# Patient Record
Sex: MALE | Race: WHITE | Employment: OTHER | URBAN - METROPOLITAN AREA
[De-identification: names, ages, dates, MRNs, and addresses within clinical notes are randomized per-mention and may not be internally consistent; named-entity substitution may affect disease eponyms.]

---

## 2017-12-01 ENCOUNTER — APPOINTMENT (OUTPATIENT)
Dept: GENERAL RADIOLOGY | Age: 67
DRG: 853 | End: 2017-12-01
Attending: EMERGENCY MEDICINE
Payer: MEDICARE

## 2017-12-01 ENCOUNTER — HOSPITAL ENCOUNTER (INPATIENT)
Age: 67
LOS: 28 days | Discharge: NURSING, REHAB OR LONG-TERM CARE | DRG: 853 | End: 2017-12-29
Attending: EMERGENCY MEDICINE | Admitting: INTERNAL MEDICINE
Payer: MEDICARE

## 2017-12-01 ENCOUNTER — APPOINTMENT (OUTPATIENT)
Dept: GENERAL RADIOLOGY | Age: 67
DRG: 853 | End: 2017-12-01
Attending: INTERNAL MEDICINE
Payer: MEDICARE

## 2017-12-01 ENCOUNTER — ANESTHESIA EVENT (OUTPATIENT)
Dept: SURGERY | Age: 67
DRG: 853 | End: 2017-12-01
Payer: MEDICARE

## 2017-12-01 ENCOUNTER — APPOINTMENT (OUTPATIENT)
Dept: CT IMAGING | Age: 67
DRG: 853 | End: 2017-12-01
Attending: EMERGENCY MEDICINE
Payer: MEDICARE

## 2017-12-01 DIAGNOSIS — R41.0 DELIRIUM: ICD-10-CM

## 2017-12-01 DIAGNOSIS — J96.01 ACUTE HYPOXEMIC RESPIRATORY FAILURE (HCC): ICD-10-CM

## 2017-12-01 DIAGNOSIS — M72.9 FASCIITIS: ICD-10-CM

## 2017-12-01 DIAGNOSIS — A41.9 SEVERE SEPSIS WITH SEPTIC SHOCK (HCC): ICD-10-CM

## 2017-12-01 DIAGNOSIS — E87.20 LACTIC ACIDOSIS: ICD-10-CM

## 2017-12-01 DIAGNOSIS — R65.21 SEVERE SEPSIS WITH SEPTIC SHOCK (HCC): ICD-10-CM

## 2017-12-01 DIAGNOSIS — N17.9 AKI (ACUTE KIDNEY INJURY) (HCC): ICD-10-CM

## 2017-12-01 DIAGNOSIS — E11.8 TYPE 2 DIABETES MELLITUS WITH COMPLICATION, UNSPECIFIED LONG TERM INSULIN USE STATUS: ICD-10-CM

## 2017-12-01 DIAGNOSIS — T17.500A MUCUS PLUGGING OF BRONCHI: ICD-10-CM

## 2017-12-01 DIAGNOSIS — J98.11 ATELECTASIS: ICD-10-CM

## 2017-12-01 DIAGNOSIS — Z99.11 ENCOUNTER FOR WEANING FROM VENTILATOR (HCC): ICD-10-CM

## 2017-12-01 DIAGNOSIS — R65.21 SEPTIC SHOCK (HCC): ICD-10-CM

## 2017-12-01 DIAGNOSIS — D72.829 LEUKOCYTOSIS, UNSPECIFIED TYPE: ICD-10-CM

## 2017-12-01 DIAGNOSIS — N49.3 FOURNIER GANGRENE: ICD-10-CM

## 2017-12-01 DIAGNOSIS — L02.415 ABSCESS OF RIGHT THIGH: ICD-10-CM

## 2017-12-01 DIAGNOSIS — E87.0 HYPERNATREMIA: ICD-10-CM

## 2017-12-01 DIAGNOSIS — J18.9 PNEUMONIA OF LEFT LOWER LOBE DUE TO INFECTIOUS ORGANISM: ICD-10-CM

## 2017-12-01 DIAGNOSIS — E13.8 OTHER SPECIFIED DIABETES MELLITUS WITH COMPLICATION, WITHOUT LONG-TERM CURRENT USE OF INSULIN: Primary | ICD-10-CM

## 2017-12-01 DIAGNOSIS — J96.01 ACUTE RESPIRATORY FAILURE WITH HYPOXIA (HCC): ICD-10-CM

## 2017-12-01 DIAGNOSIS — S06.5XAA SUBDURAL HEMATOMA: ICD-10-CM

## 2017-12-01 DIAGNOSIS — A41.9 SEPTIC SHOCK (HCC): ICD-10-CM

## 2017-12-01 PROBLEM — E87.8 ELECTROLYTE ABNORMALITY: Status: ACTIVE | Noted: 2017-12-01

## 2017-12-01 PROBLEM — R55 SYNCOPE: Status: ACTIVE | Noted: 2017-12-01

## 2017-12-01 LAB
ALBUMIN SERPL-MCNC: 2.3 G/DL (ref 3.2–4.6)
ALBUMIN/GLOB SERPL: 0.5 {RATIO} (ref 1.2–3.5)
ALP SERPL-CCNC: 99 U/L (ref 50–136)
ALT SERPL-CCNC: 15 U/L (ref 12–65)
ANION GAP SERPL CALC-SCNC: 18 MMOL/L (ref 7–16)
AST SERPL-CCNC: 15 U/L (ref 15–37)
ATRIAL RATE: 109 BPM
BACTERIA URNS QL MICRO: ABNORMAL /HPF
BASE EXCESS BLD CALC-SCNC: 0 MMOL/L
BASOPHILS # BLD: 0.2 K/UL (ref 0–0.2)
BASOPHILS NFR BLD MANUAL: 1 % (ref 0–2)
BILIRUB SERPL-MCNC: 1.5 MG/DL (ref 0.2–1.1)
BUN SERPL-MCNC: 79 MG/DL (ref 8–23)
CA-I BLD-MCNC: 1.03 MMOL/L (ref 1.12–1.32)
CALCIUM SERPL-MCNC: 8.3 MG/DL (ref 8.3–10.4)
CALCULATED P AXIS, ECG09: 72 DEGREES
CALCULATED R AXIS, ECG10: 37 DEGREES
CALCULATED T AXIS, ECG11: 58 DEGREES
CASTS URNS QL MICRO: ABNORMAL /LPF
CHLORIDE SERPL-SCNC: 92 MMOL/L (ref 98–107)
CO2 SERPL-SCNC: 25 MMOL/L (ref 21–32)
CREAT SERPL-MCNC: 1.52 MG/DL (ref 0.8–1.5)
DIAGNOSIS, 93000: NORMAL
DIFFERENTIAL METHOD BLD: ABNORMAL
EPI CELLS #/AREA URNS HPF: ABNORMAL /HPF
ERYTHROCYTE [DISTWIDTH] IN BLOOD BY AUTOMATED COUNT: 13.1 % (ref 11.9–14.6)
EST. AVERAGE GLUCOSE BLD GHB EST-MCNC: 232 MG/DL
GLOBULIN SER CALC-MCNC: 4.8 G/DL (ref 2.3–3.5)
GLUCOSE BLD STRIP.AUTO-MCNC: 244 MG/DL (ref 65–100)
GLUCOSE BLD STRIP.AUTO-MCNC: 290 MG/DL (ref 65–100)
GLUCOSE BLD STRIP.AUTO-MCNC: 358 MG/DL (ref 65–100)
GLUCOSE SERPL-MCNC: 426 MG/DL (ref 65–100)
HBA1C MFR BLD: 9.7 % (ref 4.8–6)
HCO3 BLD-SCNC: 25.2 MMOL/L (ref 22–26)
HCT VFR BLD AUTO: 40.4 % (ref 41.1–50.3)
HGB BLD-MCNC: 13.9 G/DL (ref 13.6–17.2)
LACTATE BLD-SCNC: 4.8 MMOL/L (ref 0.5–1.9)
LIPASE SERPL-CCNC: 43 U/L (ref 73–393)
LYMPHOCYTES # BLD: 4.8 K/UL (ref 0.5–4.6)
LYMPHOCYTES NFR BLD MANUAL: 22 % (ref 16–44)
MAGNESIUM SERPL-MCNC: 3.2 MG/DL (ref 1.8–2.4)
MCH RBC QN AUTO: 29.8 PG (ref 26.1–32.9)
MCHC RBC AUTO-ENTMCNC: 34.4 G/DL (ref 31.4–35)
MCV RBC AUTO: 86.5 FL (ref 79.6–97.8)
METAMYELOCYTES NFR BLD MANUAL: 2 %
MONOCYTES # BLD: 0.9 K/UL (ref 0.1–1.3)
MONOCYTES NFR BLD MANUAL: 4 % (ref 3–9)
MYELOCYTES NFR BLD MANUAL: 5 %
NEUTS SEG # BLD: 15.8 K/UL (ref 1.7–8.2)
NEUTS SEG NFR BLD MANUAL: 66 % (ref 47–75)
P-R INTERVAL, ECG05: 126 MS
PCO2 BLD: 44.9 MMHG (ref 35–45)
PH BLD: 7.36 [PH] (ref 7.35–7.45)
PHOSPHATE SERPL-MCNC: 2.9 MG/DL (ref 2.3–3.7)
PLATELET # BLD AUTO: 190 K/UL (ref 150–450)
PLATELET COMMENTS,PCOM: ADEQUATE
PMV BLD AUTO: 12.9 FL (ref 10.8–14.1)
PO2 BLD: 267 MMHG (ref 80–105)
POTASSIUM BLD-SCNC: 3.7 MMOL/L (ref 3.5–5.1)
POTASSIUM SERPL-SCNC: 3.3 MMOL/L (ref 3.5–5.1)
PROCALCITONIN SERPL-MCNC: 15.5 NG/ML
PROT SERPL-MCNC: 7.1 G/DL (ref 6.3–8.2)
Q-T INTERVAL, ECG07: 318 MS
QRS DURATION, ECG06: 78 MS
QTC CALCULATION (BEZET), ECG08: 428 MS
RBC # BLD AUTO: 4.67 M/UL (ref 4.23–5.67)
RBC #/AREA URNS HPF: ABNORMAL /HPF
RBC MORPH BLD: ABNORMAL
SAO2 % BLD: 100 % (ref 95–98)
SODIUM BLD-SCNC: 137 MMOL/L (ref 136–145)
SODIUM SERPL-SCNC: 135 MMOL/L (ref 136–145)
TROPONIN I BLD-MCNC: 0 NG/ML (ref 0.02–0.05)
TSH SERPL DL<=0.005 MIU/L-ACNC: 0.45 UIU/ML (ref 0.36–3.74)
VENTRICULAR RATE, ECG03: 109 BPM
WBC # BLD AUTO: 21.7 K/UL (ref 4.3–11.1)
WBC MORPH BLD: ABNORMAL
WBC URNS QL MICRO: ABNORMAL /HPF

## 2017-12-01 PROCEDURE — 84145 PROCALCITONIN (PCT): CPT | Performed by: INTERNAL MEDICINE

## 2017-12-01 PROCEDURE — 76060000035 HC ANESTHESIA 2 TO 2.5 HR: Performed by: SURGERY

## 2017-12-01 PROCEDURE — 70450 CT HEAD/BRAIN W/O DYE: CPT

## 2017-12-01 PROCEDURE — 74011250636 HC RX REV CODE- 250/636: Performed by: EMERGENCY MEDICINE

## 2017-12-01 PROCEDURE — 74011000250 HC RX REV CODE- 250

## 2017-12-01 PROCEDURE — 74011250636 HC RX REV CODE- 250/636

## 2017-12-01 PROCEDURE — 85025 COMPLETE CBC W/AUTO DIFF WBC: CPT | Performed by: EMERGENCY MEDICINE

## 2017-12-01 PROCEDURE — 87205 SMEAR GRAM STAIN: CPT | Performed by: EMERGENCY MEDICINE

## 2017-12-01 PROCEDURE — 81015 MICROSCOPIC EXAM OF URINE: CPT | Performed by: EMERGENCY MEDICINE

## 2017-12-01 PROCEDURE — 99285 EMERGENCY DEPT VISIT HI MDM: CPT | Performed by: EMERGENCY MEDICINE

## 2017-12-01 PROCEDURE — 74011250636 HC RX REV CODE- 250/636: Performed by: ANESTHESIOLOGY

## 2017-12-01 PROCEDURE — 74011636637 HC RX REV CODE- 636/637: Performed by: EMERGENCY MEDICINE

## 2017-12-01 PROCEDURE — 65610000001 HC ROOM ICU GENERAL

## 2017-12-01 PROCEDURE — 87040 BLOOD CULTURE FOR BACTERIA: CPT | Performed by: EMERGENCY MEDICINE

## 2017-12-01 PROCEDURE — 83605 ASSAY OF LACTIC ACID: CPT

## 2017-12-01 PROCEDURE — 77030019908 HC STETH ESOPH SIMS -A: Performed by: ANESTHESIOLOGY

## 2017-12-01 PROCEDURE — 0JBL0ZZ EXCISION OF RIGHT UPPER LEG SUBCUTANEOUS TISSUE AND FASCIA, OPEN APPROACH: ICD-10-PCS | Performed by: SURGERY

## 2017-12-01 PROCEDURE — 84443 ASSAY THYROID STIM HORMONE: CPT | Performed by: EMERGENCY MEDICINE

## 2017-12-01 PROCEDURE — 77030018836 HC SOL IRR NACL ICUM -A: Performed by: SURGERY

## 2017-12-01 PROCEDURE — 87205 SMEAR GRAM STAIN: CPT | Performed by: INTERNAL MEDICINE

## 2017-12-01 PROCEDURE — 77030019605

## 2017-12-01 PROCEDURE — 93005 ELECTROCARDIOGRAM TRACING: CPT | Performed by: EMERGENCY MEDICINE

## 2017-12-01 PROCEDURE — 77030032490 HC SLV COMPR SCD KNE COVD -B: Performed by: SURGERY

## 2017-12-01 PROCEDURE — 83735 ASSAY OF MAGNESIUM: CPT | Performed by: EMERGENCY MEDICINE

## 2017-12-01 PROCEDURE — 99223 1ST HOSP IP/OBS HIGH 75: CPT | Performed by: INTERNAL MEDICINE

## 2017-12-01 PROCEDURE — 87076 CULTURE ANAEROBE IDENT EACH: CPT

## 2017-12-01 PROCEDURE — 84295 ASSAY OF SERUM SODIUM: CPT

## 2017-12-01 PROCEDURE — 87075 CULTR BACTERIA EXCEPT BLOOD: CPT | Performed by: INTERNAL MEDICINE

## 2017-12-01 PROCEDURE — 76010000153 HC OR TIME 1.5 TO 2 HR: Performed by: SURGERY

## 2017-12-01 PROCEDURE — 87153 DNA/RNA SEQUENCING: CPT

## 2017-12-01 PROCEDURE — 82803 BLOOD GASES ANY COMBINATION: CPT

## 2017-12-01 PROCEDURE — 74011636637 HC RX REV CODE- 636/637: Performed by: ANESTHESIOLOGY

## 2017-12-01 PROCEDURE — 77030011640 HC PAD GRND REM COVD -A: Performed by: SURGERY

## 2017-12-01 PROCEDURE — 77030008477 HC STYL SATN SLP COVD -A: Performed by: ANESTHESIOLOGY

## 2017-12-01 PROCEDURE — 84484 ASSAY OF TROPONIN QUANT: CPT

## 2017-12-01 PROCEDURE — 71010 XR CHEST SNGL V: CPT

## 2017-12-01 PROCEDURE — 77030019927 HC TBNG IRR CYSTO BAXT -A: Performed by: SURGERY

## 2017-12-01 PROCEDURE — 82962 GLUCOSE BLOOD TEST: CPT

## 2017-12-01 PROCEDURE — 77030008703 HC TU ET UNCUF COVD -A: Performed by: ANESTHESIOLOGY

## 2017-12-01 PROCEDURE — 74011000258 HC RX REV CODE- 258: Performed by: EMERGENCY MEDICINE

## 2017-12-01 PROCEDURE — 94002 VENT MGMT INPAT INIT DAY: CPT

## 2017-12-01 PROCEDURE — 96360 HYDRATION IV INFUSION INIT: CPT | Performed by: EMERGENCY MEDICINE

## 2017-12-01 PROCEDURE — 83690 ASSAY OF LIPASE: CPT | Performed by: EMERGENCY MEDICINE

## 2017-12-01 PROCEDURE — 71020 XR CHEST PA LAT: CPT

## 2017-12-01 PROCEDURE — 87077 CULTURE AEROBIC IDENTIFY: CPT | Performed by: INTERNAL MEDICINE

## 2017-12-01 PROCEDURE — 81003 URINALYSIS AUTO W/O SCOPE: CPT | Performed by: EMERGENCY MEDICINE

## 2017-12-01 PROCEDURE — 80053 COMPREHEN METABOLIC PANEL: CPT | Performed by: EMERGENCY MEDICINE

## 2017-12-01 PROCEDURE — 83036 HEMOGLOBIN GLYCOSYLATED A1C: CPT | Performed by: INTERNAL MEDICINE

## 2017-12-01 PROCEDURE — 84100 ASSAY OF PHOSPHORUS: CPT | Performed by: EMERGENCY MEDICINE

## 2017-12-01 PROCEDURE — 77030008467 HC STPLR SKN COVD -B: Performed by: SURGERY

## 2017-12-01 PROCEDURE — 87186 SC STD MICRODIL/AGAR DIL: CPT | Performed by: INTERNAL MEDICINE

## 2017-12-01 PROCEDURE — 74011636637 HC RX REV CODE- 636/637: Performed by: INTERNAL MEDICINE

## 2017-12-01 PROCEDURE — 74011000258 HC RX REV CODE- 258

## 2017-12-01 RX ORDER — POTASSIUM CHLORIDE 14.9 MG/ML
10 INJECTION INTRAVENOUS
Status: DISCONTINUED | OUTPATIENT
Start: 2017-12-01 | End: 2017-12-01 | Stop reason: SDUPTHER

## 2017-12-01 RX ORDER — SODIUM CHLORIDE 0.9 % (FLUSH) 0.9 %
5-10 SYRINGE (ML) INJECTION AS NEEDED
Status: DISCONTINUED | OUTPATIENT
Start: 2017-12-01 | End: 2017-12-01

## 2017-12-01 RX ORDER — PROPOFOL 10 MG/ML
INJECTION, EMULSION INTRAVENOUS AS NEEDED
Status: DISCONTINUED | OUTPATIENT
Start: 2017-12-01 | End: 2017-12-01 | Stop reason: HOSPADM

## 2017-12-01 RX ORDER — OXYCODONE HYDROCHLORIDE 5 MG/1
5 TABLET ORAL
Status: DISCONTINUED | OUTPATIENT
Start: 2017-12-01 | End: 2017-12-01

## 2017-12-01 RX ORDER — ROCURONIUM BROMIDE 10 MG/ML
INJECTION, SOLUTION INTRAVENOUS AS NEEDED
Status: DISCONTINUED | OUTPATIENT
Start: 2017-12-01 | End: 2017-12-01 | Stop reason: HOSPADM

## 2017-12-01 RX ORDER — SODIUM CHLORIDE 9 MG/ML
125 INJECTION, SOLUTION INTRAVENOUS CONTINUOUS
Status: DISCONTINUED | OUTPATIENT
Start: 2017-12-01 | End: 2017-12-01

## 2017-12-01 RX ORDER — SODIUM CHLORIDE, SODIUM LACTATE, POTASSIUM CHLORIDE, CALCIUM CHLORIDE 600; 310; 30; 20 MG/100ML; MG/100ML; MG/100ML; MG/100ML
75 INJECTION, SOLUTION INTRAVENOUS CONTINUOUS
Status: DISCONTINUED | OUTPATIENT
Start: 2017-12-01 | End: 2017-12-01

## 2017-12-01 RX ORDER — FENTANYL CITRATE 50 UG/ML
100 INJECTION, SOLUTION INTRAMUSCULAR; INTRAVENOUS ONCE
Status: DISCONTINUED | OUTPATIENT
Start: 2017-12-01 | End: 2017-12-01 | Stop reason: HOSPADM

## 2017-12-01 RX ORDER — SUCCINYLCHOLINE CHLORIDE 20 MG/ML
INJECTION INTRAMUSCULAR; INTRAVENOUS AS NEEDED
Status: DISCONTINUED | OUTPATIENT
Start: 2017-12-01 | End: 2017-12-01 | Stop reason: HOSPADM

## 2017-12-01 RX ORDER — MIDAZOLAM HYDROCHLORIDE 1 MG/ML
INJECTION, SOLUTION INTRAMUSCULAR; INTRAVENOUS AS NEEDED
Status: DISCONTINUED | OUTPATIENT
Start: 2017-12-01 | End: 2017-12-01 | Stop reason: HOSPADM

## 2017-12-01 RX ORDER — SODIUM CHLORIDE 0.9 % (FLUSH) 0.9 %
5-10 SYRINGE (ML) INJECTION EVERY 8 HOURS
Status: DISCONTINUED | OUTPATIENT
Start: 2017-12-01 | End: 2017-12-01

## 2017-12-01 RX ORDER — FENTANYL CITRATE 50 UG/ML
INJECTION, SOLUTION INTRAMUSCULAR; INTRAVENOUS AS NEEDED
Status: DISCONTINUED | OUTPATIENT
Start: 2017-12-01 | End: 2017-12-01 | Stop reason: HOSPADM

## 2017-12-01 RX ORDER — INSULIN LISPRO 100 [IU]/ML
INJECTION, SOLUTION INTRAVENOUS; SUBCUTANEOUS
Status: DISCONTINUED | OUTPATIENT
Start: 2017-12-01 | End: 2017-12-01

## 2017-12-01 RX ORDER — LIDOCAINE HYDROCHLORIDE 20 MG/ML
INJECTION, SOLUTION EPIDURAL; INFILTRATION; INTRACAUDAL; PERINEURAL AS NEEDED
Status: DISCONTINUED | OUTPATIENT
Start: 2017-12-01 | End: 2017-12-01 | Stop reason: HOSPADM

## 2017-12-01 RX ORDER — SODIUM CHLORIDE, SODIUM LACTATE, POTASSIUM CHLORIDE, CALCIUM CHLORIDE 600; 310; 30; 20 MG/100ML; MG/100ML; MG/100ML; MG/100ML
75 INJECTION, SOLUTION INTRAVENOUS CONTINUOUS
Status: DISCONTINUED | OUTPATIENT
Start: 2017-12-01 | End: 2017-12-01 | Stop reason: HOSPADM

## 2017-12-01 RX ORDER — POTASSIUM CHLORIDE 29.8 MG/ML
INJECTION INTRAVENOUS AS NEEDED
Status: DISCONTINUED | OUTPATIENT
Start: 2017-12-01 | End: 2017-12-01 | Stop reason: HOSPADM

## 2017-12-01 RX ORDER — FENTANYL CITRATE-0.9 % NACL/PF 25 MCG/ML
25-200 PLASTIC BAG, INJECTION (ML) INJECTION
Status: DISCONTINUED | OUTPATIENT
Start: 2017-12-02 | End: 2017-12-19

## 2017-12-01 RX ORDER — SODIUM CHLORIDE 0.9 % (FLUSH) 0.9 %
5-10 SYRINGE (ML) INJECTION AS NEEDED
Status: DISCONTINUED | OUTPATIENT
Start: 2017-12-01 | End: 2017-12-29 | Stop reason: HOSPADM

## 2017-12-01 RX ORDER — NALOXONE HYDROCHLORIDE 0.4 MG/ML
0.2 INJECTION, SOLUTION INTRAMUSCULAR; INTRAVENOUS; SUBCUTANEOUS AS NEEDED
Status: DISCONTINUED | OUTPATIENT
Start: 2017-12-01 | End: 2017-12-01

## 2017-12-01 RX ORDER — POTASSIUM CHLORIDE 14.9 MG/ML
20 INJECTION INTRAVENOUS ONCE
Status: COMPLETED | OUTPATIENT
Start: 2017-12-01 | End: 2017-12-02

## 2017-12-01 RX ORDER — MIDAZOLAM HYDROCHLORIDE 1 MG/ML
2 INJECTION, SOLUTION INTRAMUSCULAR; INTRAVENOUS ONCE
Status: DISCONTINUED | OUTPATIENT
Start: 2017-12-01 | End: 2017-12-01 | Stop reason: HOSPADM

## 2017-12-01 RX ORDER — ONDANSETRON 2 MG/ML
4 INJECTION INTRAMUSCULAR; INTRAVENOUS
Status: DISCONTINUED | OUTPATIENT
Start: 2017-12-01 | End: 2017-12-01

## 2017-12-01 RX ORDER — LIDOCAINE HYDROCHLORIDE 10 MG/ML
0.1 INJECTION INFILTRATION; PERINEURAL AS NEEDED
Status: DISCONTINUED | OUTPATIENT
Start: 2017-12-01 | End: 2017-12-01 | Stop reason: HOSPADM

## 2017-12-01 RX ORDER — HYDROMORPHONE HYDROCHLORIDE 2 MG/ML
0.5 INJECTION, SOLUTION INTRAMUSCULAR; INTRAVENOUS; SUBCUTANEOUS
Status: DISCONTINUED | OUTPATIENT
Start: 2017-12-01 | End: 2017-12-01

## 2017-12-01 RX ORDER — SODIUM CHLORIDE, SODIUM LACTATE, POTASSIUM CHLORIDE, CALCIUM CHLORIDE 600; 310; 30; 20 MG/100ML; MG/100ML; MG/100ML; MG/100ML
INJECTION, SOLUTION INTRAVENOUS
Status: DISCONTINUED | OUTPATIENT
Start: 2017-12-01 | End: 2017-12-01 | Stop reason: HOSPADM

## 2017-12-01 RX ORDER — ENOXAPARIN SODIUM 100 MG/ML
40 INJECTION SUBCUTANEOUS EVERY 24 HOURS
Status: DISCONTINUED | OUTPATIENT
Start: 2017-12-02 | End: 2017-12-03

## 2017-12-01 RX ORDER — ACETAMINOPHEN 325 MG/1
650 TABLET ORAL
Status: DISCONTINUED | OUTPATIENT
Start: 2017-12-01 | End: 2017-12-01

## 2017-12-01 RX ORDER — VANCOMYCIN HYDROCHLORIDE 1 G/20ML
INJECTION, POWDER, LYOPHILIZED, FOR SOLUTION INTRAVENOUS EVERY 12 HOURS
Status: DISCONTINUED | OUTPATIENT
Start: 2017-12-01 | End: 2017-12-01

## 2017-12-01 RX ORDER — MIDAZOLAM HYDROCHLORIDE 1 MG/ML
2 INJECTION, SOLUTION INTRAMUSCULAR; INTRAVENOUS
Status: DISCONTINUED | OUTPATIENT
Start: 2017-12-01 | End: 2017-12-01 | Stop reason: HOSPADM

## 2017-12-01 RX ORDER — SODIUM CHLORIDE 0.9 % (FLUSH) 0.9 %
5-10 SYRINGE (ML) INJECTION EVERY 8 HOURS
Status: DISCONTINUED | OUTPATIENT
Start: 2017-12-02 | End: 2017-12-29 | Stop reason: HOSPADM

## 2017-12-01 RX ORDER — SODIUM CHLORIDE 9 MG/ML
200 INJECTION, SOLUTION INTRAVENOUS CONTINUOUS
Status: DISCONTINUED | OUTPATIENT
Start: 2017-12-02 | End: 2017-12-02

## 2017-12-01 RX ADMIN — SUCCINYLCHOLINE CHLORIDE 120 MG: 20 INJECTION INTRAMUSCULAR; INTRAVENOUS at 19:55

## 2017-12-01 RX ADMIN — SODIUM CHLORIDE, SODIUM LACTATE, POTASSIUM CHLORIDE, AND CALCIUM CHLORIDE: 600; 310; 30; 20 INJECTION, SOLUTION INTRAVENOUS at 19:47

## 2017-12-01 RX ADMIN — MIDAZOLAM HYDROCHLORIDE 2 MG: 1 INJECTION, SOLUTION INTRAMUSCULAR; INTRAVENOUS at 21:25

## 2017-12-01 RX ADMIN — ROCURONIUM BROMIDE 5 MG: 10 INJECTION, SOLUTION INTRAVENOUS at 19:55

## 2017-12-01 RX ADMIN — INSULIN HUMAN 5 UNITS: 100 INJECTION, SOLUTION PARENTERAL at 18:17

## 2017-12-01 RX ADMIN — LIDOCAINE HYDROCHLORIDE 60 MG: 20 INJECTION, SOLUTION EPIDURAL; INFILTRATION; INTRACAUDAL; PERINEURAL at 19:55

## 2017-12-01 RX ADMIN — FENTANYL CITRATE 100 MCG: 50 INJECTION, SOLUTION INTRAMUSCULAR; INTRAVENOUS at 19:55

## 2017-12-01 RX ADMIN — POTASSIUM CHLORIDE 20 MEQ: 14.9 INJECTION, SOLUTION INTRAVENOUS at 18:17

## 2017-12-01 RX ADMIN — SODIUM CHLORIDE 2 G: 900 INJECTION, SOLUTION INTRAVENOUS at 18:17

## 2017-12-01 RX ADMIN — POTASSIUM CHLORIDE 20 MEQ: 29.8 INJECTION INTRAVENOUS at 20:12

## 2017-12-01 RX ADMIN — INSULIN HUMAN 5 UNITS: 100 INJECTION, SOLUTION PARENTERAL at 19:42

## 2017-12-01 RX ADMIN — ROCURONIUM BROMIDE 30 MG: 10 INJECTION, SOLUTION INTRAVENOUS at 21:25

## 2017-12-01 RX ADMIN — SODIUM CHLORIDE, SODIUM LACTATE, POTASSIUM CHLORIDE, CALCIUM CHLORIDE: 600; 310; 30; 20 INJECTION, SOLUTION INTRAVENOUS at 19:48

## 2017-12-01 RX ADMIN — SODIUM CHLORIDE 1000 ML: 900 INJECTION, SOLUTION INTRAVENOUS at 15:49

## 2017-12-01 RX ADMIN — ROCURONIUM BROMIDE 30 MG: 10 INJECTION, SOLUTION INTRAVENOUS at 20:38

## 2017-12-01 RX ADMIN — VANCOMYCIN HYDROCHLORIDE 1 G: 1 INJECTION, POWDER, LYOPHILIZED, FOR SOLUTION INTRAVENOUS at 20:45

## 2017-12-01 RX ADMIN — PROPOFOL 100 MG: 10 INJECTION, EMULSION INTRAVENOUS at 19:55

## 2017-12-01 RX ADMIN — INSULIN LISPRO 4 UNITS: 100 INJECTION, SOLUTION INTRAVENOUS; SUBCUTANEOUS at 22:25

## 2017-12-01 RX ADMIN — SODIUM CHLORIDE, SODIUM LACTATE, POTASSIUM CHLORIDE, AND CALCIUM CHLORIDE 75 ML/HR: 600; 310; 30; 20 INJECTION, SOLUTION INTRAVENOUS at 22:01

## 2017-12-01 NOTE — PROGRESS NOTES
CT head with small subacute densities in the frontal subdural space near the vertex and right parietal region. These are not really big enough to even contact the cortical surface of the brain. There is no acute hemorrhage from the syncopal episode today. These are at very low risk of gradual expansion. No need for acute intervention.

## 2017-12-01 NOTE — ED NOTES
Patient awake, A&O x3. Patient has no teeth and is very difficult to understand. Patient denies chest pain, SOB, N/V/D. Patient also denies pain. Patient states that he received a flu shot 3 weeks ago and feels as though his \"legs have been giving out a lot\" and that he has been weak. Patient also states that he has pneumonia because he has coughed up some green mucous. No coughing or difficulty breathing noted at this time.

## 2017-12-01 NOTE — ED NOTES
Patient placed in gown with assistance of Dr Gage Mendez and medical student. 2 small open, draining, wounds noted to right groin area. Also so noted large, half dollar sized black swollen abscess to upper inner region of right thigh. Patient states they have been this way for 2 weeks. Malodorous.

## 2017-12-01 NOTE — ED PROVIDER NOTES
HPI Comments: 55-year-old male with a istory of aving a syncopal episode at home today. A neighbor went to go check on him and when the patient opened the door he apparently passed out and fell backward. Patient says that he has not seen a doctor in over 10 years and as far as he knows he has no medical problems. Patient does not have any teeth and he has a very difficult speech pattern to understand. Overall he says that he just feels weak and tired but he does not say he is having any specific pain. He denies chest pain or difficulty breathing. Patient says that he has no known history of diabetes. Elements of this note were made using speech recognition software. As such, there may be errors of speech recognition present. Patient is a 79 y.o. male presenting with fall. The history is provided by the patient. Fall   Pertinent negatives include no fever, no abdominal pain, no nausea, no vomiting, no hematuria and no headaches. History reviewed. No pertinent past medical history. History reviewed. No pertinent surgical history. History reviewed. No pertinent family history. Social History     Social History    Marital status: N/A     Spouse name: N/A    Number of children: N/A    Years of education: N/A     Occupational History    Not on file. Social History Main Topics    Smoking status: Not on file    Smokeless tobacco: Not on file    Alcohol use Not on file    Drug use: Not on file    Sexual activity: Not on file     Other Topics Concern    Not on file     Social History Narrative    No narrative on file         ALLERGIES: Review of patient's allergies indicates no known allergies. Review of Systems   Constitutional: Positive for activity change, appetite change and fatigue. Negative for chills, diaphoresis and fever. HENT: Negative for congestion, rhinorrhea and sore throat. Eyes: Negative for redness and visual disturbance.    Respiratory: Negative for cough, chest tightness, shortness of breath and wheezing. Cardiovascular: Negative for chest pain and palpitations. Gastrointestinal: Negative for abdominal pain, blood in stool, diarrhea, nausea and vomiting. Endocrine: Negative for polydipsia and polyuria. Genitourinary: Negative for dysuria and hematuria. Musculoskeletal: Negative for arthralgias, myalgias and neck stiffness. Skin: Negative for rash. Allergic/Immunologic: Negative for environmental allergies and food allergies. Neurological: Positive for syncope. Negative for dizziness, weakness and headaches. Hematological: Negative for adenopathy. Does not bruise/bleed easily. Psychiatric/Behavioral: Negative for confusion and sleep disturbance. The patient is not nervous/anxious. Vitals:    12/01/17 1457   BP: 112/73   Pulse: (!) 110   Resp: 20   Temp: 98.1 °F (36.7 °C)   SpO2: 93%   Weight: 72.1 kg (159 lb)   Height: 5' 11\" (1.803 m)            Physical Exam   Constitutional: He is oriented to person, place, and time. He appears well-developed and well-nourished. HENT:   Head: Normocephalic and atraumatic. Eyes: Conjunctivae and EOM are normal. Pupils are equal, round, and reactive to light. Neck: Normal range of motion. Cardiovascular: Regular rhythm. Mild tachycardia   Pulmonary/Chest: Effort normal and breath sounds normal. No respiratory distress. He has no wheezes. He has no rales. He exhibits no tenderness. Abdominal: Soft. Bowel sounds are normal. There is no rebound and no guarding. Musculoskeletal: Normal range of motion. He exhibits no edema or tenderness. Lymphadenopathy:     He has no cervical adenopathy. Neurological: He is alert and oriented to person, place, and time. Patient is neurologically intact with 5 out of 5 arm and leg strength and normal coordination. His speech is difficult to understand he says that his baseline for him   Skin: Skin is warm and dry.    Again the patient fully undressed he had a large necrotic abscess on his right medial upper thigh with tracts that appear to go to his right inguinal lymph nodes with draining sinuses   Psychiatric: He has a normal mood and affect. Nursing note and vitals reviewed. MDM  Number of Diagnoses or Management Options  Diagnosis management comments: Patient's overall health and hygiene is poor EMS reports that the environment he was living in was of very poor hygiene. Patient says that he eats regularly but his diet is not very nutritional. Certainly may have Z53 or folate problems but also given his diabetes I will check some other basic labs and defer further nutrition evaluation for what will likely be admission. 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 I will discuss the subdural with neurosurgery. I will go ahead and treat his hyperglycemia with IV insulin and I will give him some IV potassium since he is borderline low potassium. 5:20 PM  I discussed the head CT with neurosurgery who advised no surgery at this time. Therefore I will discuss the case with the hospitalist.    I spoke with the hospitalist who kindly agreed to see the patient. 5:48 PM  After fully undressing the patient a large necrotic abscess was seen  I will discuss this with surgery. We'll still plan to admit him to the hospitalist.    ................................................................... Patient received a liter of fluid from EMS and we gave him an additional liter of fluid. I will give him one more bolus of 500 mL for a total of 2500 mL of fluid which should cover the 30 mL/kg. ......................................................................................................     ED Course       Procedures

## 2017-12-01 NOTE — CONSULTS
H&P/Consult Note/Progress Note/Office Note:   Estela Veloz  MRN: [id number]  [facility]  Age:67 y.o. General Surgery Consult ordered by: Dr. Marily Linda  Reason for Consult: Eval Abscess Right Thigh    HPI: Estela Veloz is a 79 y.o.  male who presented tot he ED 17 for evaluation. Pt with a history of having a syncopal episode at home today. A neighbor went to go check on him and when the patient opened the door he apparently passed out and fell backward. Patient says that he has not seen a doctor in over 10 years and as far as he knows he has no medical problems. Patient does not have any teeth and he has a very difficult speech pattern to understand. Overall he says that he just feels weak and tired but he does not say he is having any specific pain. He denies chest pain or difficulty breathing. Patient says that he has no known history of diabetes.     Glucose 426, WBC 21.7, Lactic Acid 4.8. Pt states abscess to Right thigh has been approximately 6 months. History reviewed. No pertinent past medical history. History reviewed. No pertinent surgical history. Current Facility-Administered Medications   Medication Dose Route Frequency    0.9% sodium chloride infusion  125 mL/hr IntraVENous CONTINUOUS    sodium chloride (NS) flush 5-10 mL  5-10 mL IntraVENous Q8H    sodium chloride (NS) flush 5-10 mL  5-10 mL IntraVENous PRN    potassium chloride 20 mEq in 100 ml IVPB  20 mEq IntraVENous ONCE    cefepime (MAXIPIME) 2 g in 0.9% sodium chloride (MBP/ADV) 100 mL ADV  2 g IntraVENous NOW    vancomycin (VANCOCIN) 1,000 mg in 0.9% sodium chloride (MBP/ADV) 250 mL  1 g IntraVENous Q12H     No current outpatient prescriptions on file. Review of patient's allergies indicates no known allergies.   Social History     Social History    Marital status: SINGLE     Spouse name: N/A    Number of children: N/A    Years of education: N/A     Social History Main Topics    Smoking status: None    Smokeless tobacco: None    Alcohol use None    Drug use: None    Sexual activity: Not Asked     Other Topics Concern    None     Social History Narrative    None     History   Smoking Status    Not on file   Smokeless Tobacco    Not on file     History reviewed. No pertinent family history. ROS: Comprehensive review of systems was otherwise unremarkable except as noted above. Physical Exam:   Visit Vitals    /68 (BP 1 Location: Right arm, BP Patient Position: Sitting)    Pulse (!) 107    Temp 98.1 °F (36.7 °C)    Resp 16    Ht 5' 11\" (1.803 m)    Wt 159 lb (72.1 kg)    SpO2 94%    BMI 22.18 kg/m2     Constitutional: Alert, cooperative, no acute distress; appears stated age    Eyes:Sclera are clear. ENMT: no external lesions gross hearing normal; no obvious neck masses, no ear or lip lesions, nares normal  CV: Tachy. Normal perfusion  Resp: No JVD. Breathing is  non-labored; no audible wheezing. GI: soft, non-tender, non-distended, BS active     Skin: abscess Right medial upper thigh with tracts that appear to go to his right inguinal lymph nodes with draining sinuses. +erythema, +edema, +foul odor   Musculoskeletal: unremarkable with normal function. No embolic signs or cyanosis.    Neuro:  Oriented; moves all 4; no focal deficits  Psychiatric: normal affect and mood    Recent vitals (if inpt):  Patient Vitals for the past 24 hrs:   BP Temp Pulse Resp SpO2 Height Weight   12/01/17 1543 138/68 - (!) 107 16 94 % - -   12/01/17 1457 112/73 98.1 °F (36.7 °C) (!) 110 20 93 % 5' 11\" (1.803 m) 159 lb (72.1 kg)       Labs:  Recent Labs      12/01/17   1501   WBC  21.7*   HGB  13.9   PLT  190   NA  135*   K  3.3*   CL  92*   CO2  25   BUN  79*   CREA  1.52*   GLU  426*   TBILI  1.5*   SGOT  15   ALT  15   AP  99   LPSE  43*       Lab Results   Component Value Date/Time    WBC 21.7 12/01/2017 03:01 PM    HGB 13.9 12/01/2017 03:01 PM    PLATELET 807 47/92/0783 03:01 PM    Sodium 135 12/01/2017 03:01 PM    Potassium 3.3 12/01/2017 03:01 PM    Chloride 92 12/01/2017 03:01 PM    CO2 25 12/01/2017 03:01 PM    BUN 79 12/01/2017 03:01 PM    Creatinine 1.52 12/01/2017 03:01 PM    Glucose 426 12/01/2017 03:01 PM    Bilirubin, total 1.5 12/01/2017 03:01 PM    AST (SGOT) 15 12/01/2017 03:01 PM    ALT (SGPT) 15 12/01/2017 03:01 PM    Alk. phosphatase 99 12/01/2017 03:01 PM    Lipase 43 12/01/2017 03:01 PM     12/1/17 CHEST X-RAY, 2 views 12/1/2017     History: Elevated white blood cell count.     Technique: PA and lateral views of the chest.      Comparison: None.     Findings: The cardiac silhouette is normal in respect to size. The lungs are expanded  without evidence for pneumothorax. No consolidation, or evidence of pleural  effusion is seen. Only mild interstitial prominence is seen at the lung bases  likely representing scarring or atelectasis.     The bony thorax demonstrates no acute changes. The upper abdomen is  unremarkable in appearance.     IMPRESSION:   1. No acute cardiopulmonary process evident by plain film imaging.       12/1/17 CT HEAD WITHOUT CONTRAST, 12/1/2017      History: Confusion and hyperglycemia. Patient fell in front of neighbors.      Comparison: None      Technique:   5 mm axial scans from the skull base to the vertex. All CT scans  performed at this facility use one or all of the following: Automated exposure  control, adjustment of the mA and/or kVp according to patient's size, iterative  reconstruction.      Findings: An abnormal moderately hyperdense collection is seen along the right  lateral and high convexity measuring up to 4 mm which is favored to represent a  subacute although recent subdural hematoma given the lack of significant  hyperdensity. Moderate cortical involutional changes are seen which are not  felt to be abnormal given the patient's age. No evidence for acute  hydrocephalus is seen. No evidence of midline shift or herniation is seen. No  abnormal edema pattern is seen in a vascular distribution to suggest large  artery infarction. Only chronic encephalomalacic changes are seen in the right  cerebellar hemisphere consistent with a chronic right PICA infarction.     Evaluation with bone windows shows no acute osseous changes. The visualized  sinuses, middle ears, and mastoid air cells are well aerated.     IMPRESSION:    1. Small right subdural hematoma measuring 4 mm which demonstrates intermediate  attenuation suggesting a subacute although recent subdural hematoma.      The subacute appearing right subdural hematoma was discussed with Dr. Yassine Weeks by  myself personally at 5:05 PM on 12/1/2017. I reviewed recent labs and recent radiologic studies. I independently reviewed radiology images for studies I described above or studies I have ordered. Admission date (for inpatients): 12/1/2017   * No surgery found *  * No surgery found *    ASSESSMENT/PLAN:  Problem List  Never Reviewed          Codes Class Noted    Subdural hematoma ([facility] ICD-10-CM: I62.00  ICD-9-CM: 432.1  12/1/2017        Type II diabetes mellitus with complication ([facility] OYF-36-DR: E11.8  ICD-9-CM: 250.90  12/1/2017        LAUREANO (acute kidney injury) ([facility] ICD-10-CM: N17.9  ICD-9-CM: 584.9  12/1/2017        Syncope ICD-10-CM: R55  ICD-9-CM: 780.2  12/1/2017        Leukocytosis ICD-10-CM: E39.810  ICD-9-CM: 288.60  12/1/2017            Active Problems:    Subdural hematoma ([facility] (12/1/2017)      Type II diabetes mellitus with complication ([facility] (55/2/1565)      LAUREANO (acute kidney injury) ([facility] (12/1/2017)      Syncope (12/1/2017)      Leukocytosis (12/1/2017)      Plan:   Care Management per Hospitalist  Neurosurgery Following   -- CT head with small subacute densities in the frontal subdural space near the vertex and right parietal region. These are not really big enough to even contact the cortical surface of the brain.  There is no acute hemorrhage from the syncopal episode today. These are at very low risk of gradual expansion. No need for acute intervention. General Surgery  --Will follow  --IV Abx  --NPO pending eval by Dr Shar Connor    Signed:  ROCÍO BlanchardBC      The patient was seen in conjunction with Dr. Shar Connor who independently evaluated the patient, reviewed the chart and agreed with the assessment and plan.

## 2017-12-01 NOTE — ED TRIAGE NOTES
Pt was found by neighbors. Seemed confused. FSBS 360 by EMS. Pt A&O x4. Pt fell in front of neighbors. Pt landed on back, denies LOC or head injury.

## 2017-12-02 ENCOUNTER — APPOINTMENT (OUTPATIENT)
Dept: GENERAL RADIOLOGY | Age: 67
DRG: 853 | End: 2017-12-02
Attending: ORTHOPAEDIC SURGERY
Payer: MEDICARE

## 2017-12-02 ENCOUNTER — ANESTHESIA EVENT (OUTPATIENT)
Dept: SURGERY | Age: 67
DRG: 853 | End: 2017-12-02
Payer: MEDICARE

## 2017-12-02 ENCOUNTER — ANESTHESIA (OUTPATIENT)
Dept: SURGERY | Age: 67
DRG: 853 | End: 2017-12-02
Payer: MEDICARE

## 2017-12-02 ENCOUNTER — APPOINTMENT (OUTPATIENT)
Dept: GENERAL RADIOLOGY | Age: 67
DRG: 853 | End: 2017-12-02
Attending: INTERNAL MEDICINE
Payer: MEDICARE

## 2017-12-02 PROBLEM — M72.9 FASCIITIS: Status: ACTIVE | Noted: 2017-12-01

## 2017-12-02 PROBLEM — E87.20 LACTIC ACIDOSIS: Status: ACTIVE | Noted: 2017-12-02

## 2017-12-02 LAB
ALBUMIN SERPL-MCNC: 1.7 G/DL (ref 3.2–4.6)
ALBUMIN/GLOB SERPL: 0.4 {RATIO} (ref 1.2–3.5)
ALP SERPL-CCNC: 77 U/L (ref 50–136)
ALT SERPL-CCNC: 12 U/L (ref 12–65)
ANION GAP SERPL CALC-SCNC: 15 MMOL/L (ref 7–16)
ANION GAP SERPL CALC-SCNC: 15 MMOL/L (ref 7–16)
ARTERIAL PATENCY WRIST A: ABNORMAL
AST SERPL-CCNC: 14 U/L (ref 15–37)
BASE DEFICIT BLDA-SCNC: 2 MMOL/L (ref 0–2)
BASOPHILS # BLD: 0 K/UL (ref 0–0.2)
BASOPHILS NFR BLD: 0 % (ref 0–2)
BDY SITE: ABNORMAL
BILIRUB SERPL-MCNC: 1.2 MG/DL (ref 0.2–1.1)
BUN SERPL-MCNC: 67 MG/DL (ref 8–23)
BUN SERPL-MCNC: 81 MG/DL (ref 8–23)
CALCIUM SERPL-MCNC: 6 MG/DL (ref 8.3–10.4)
CALCIUM SERPL-MCNC: 7.2 MG/DL (ref 8.3–10.4)
CHLORIDE SERPL-SCNC: 104 MMOL/L (ref 98–107)
CHLORIDE SERPL-SCNC: 116 MMOL/L (ref 98–107)
CO2 SERPL-SCNC: 16 MMOL/L (ref 21–32)
CO2 SERPL-SCNC: 22 MMOL/L (ref 21–32)
COHGB MFR BLD: 1.1 % (ref 0.5–1.5)
CREAT SERPL-MCNC: 0.98 MG/DL (ref 0.8–1.5)
CREAT SERPL-MCNC: 1.16 MG/DL (ref 0.8–1.5)
DIFFERENTIAL METHOD BLD: ABNORMAL
DO-HGB BLD-MCNC: 1 % (ref 0–5)
EOSINOPHIL # BLD: 0 K/UL (ref 0–0.8)
EOSINOPHIL NFR BLD: 0 % (ref 0.5–7.8)
ERYTHROCYTE [DISTWIDTH] IN BLOOD BY AUTOMATED COUNT: 13.2 % (ref 11.9–14.6)
FIO2 ON VENT: 50 %
GLOBULIN SER CALC-MCNC: 4.1 G/DL (ref 2.3–3.5)
GLUCOSE BLD STRIP.AUTO-MCNC: 266 MG/DL (ref 65–100)
GLUCOSE BLD STRIP.AUTO-MCNC: 272 MG/DL (ref 65–100)
GLUCOSE SERPL-MCNC: 289 MG/DL (ref 65–100)
GLUCOSE SERPL-MCNC: 325 MG/DL (ref 65–100)
HCO3 BLDA-SCNC: 22 MMOL/L (ref 22–26)
HCT VFR BLD AUTO: 35 % (ref 41.1–50.3)
HGB BLD-MCNC: 12 G/DL (ref 13.6–17.2)
HGB BLDMV-MCNC: 12.4 GM/DL (ref 11.7–15)
IMM GRANULOCYTES # BLD: 0.5 K/UL (ref 0–0.5)
IMM GRANULOCYTES NFR BLD AUTO: 2 % (ref 0–5)
LACTATE SERPL-SCNC: 2.5 MMOL/L (ref 0.4–2)
LACTATE SERPL-SCNC: 3.5 MMOL/L (ref 0.4–2)
LACTATE SERPL-SCNC: 3.5 MMOL/L (ref 0.4–2)
LYMPHOCYTES # BLD: 0.9 K/UL (ref 0.5–4.6)
LYMPHOCYTES NFR BLD: 4 % (ref 13–44)
MCH RBC QN AUTO: 29.5 PG (ref 26.1–32.9)
MCHC RBC AUTO-ENTMCNC: 34.3 G/DL (ref 31.4–35)
MCV RBC AUTO: 86 FL (ref 79.6–97.8)
METHGB MFR BLD: 0.5 % (ref 0–1.5)
MONOCYTES # BLD: 1.4 K/UL (ref 0.1–1.3)
MONOCYTES NFR BLD: 6 % (ref 4–12)
NEUTS SEG # BLD: 21.2 K/UL (ref 1.7–8.2)
NEUTS SEG NFR BLD: 90 % (ref 43–78)
OXYHGB MFR BLDA: 97.9 % (ref 94–97)
PCO2 BLDA: 33 MMHG (ref 35–45)
PEEP RESPIRATORY: 8 CM[H2O]
PH BLDA: 7.43 [PH] (ref 7.35–7.45)
PLATELET # BLD AUTO: 211 K/UL (ref 150–450)
PLATELET COMMENTS,PCOM: ADEQUATE
PMV BLD AUTO: 12.6 FL (ref 10.8–14.1)
PO2 BLDA: 213 MMHG (ref 80–105)
POTASSIUM SERPL-SCNC: 3.6 MMOL/L (ref 3.5–5.1)
POTASSIUM SERPL-SCNC: 3.6 MMOL/L (ref 3.5–5.1)
PROT SERPL-MCNC: 5.8 G/DL (ref 6.3–8.2)
RBC # BLD AUTO: 4.07 M/UL (ref 4.23–5.67)
RBC MORPH BLD: ABNORMAL
RESP RATE: 15
SAO2 % BLD: 100 % (ref 92–98.5)
SODIUM SERPL-SCNC: 141 MMOL/L (ref 136–145)
SODIUM SERPL-SCNC: 147 MMOL/L (ref 136–145)
VENTILATION MODE VENT: ABNORMAL
VT SETTING VENT: 450 ML
WBC # BLD AUTO: 23.5 K/UL (ref 4.3–11.1)
WBC MORPH BLD: ABNORMAL

## 2017-12-02 PROCEDURE — 80053 COMPREHEN METABOLIC PANEL: CPT | Performed by: INTERNAL MEDICINE

## 2017-12-02 PROCEDURE — 74011636637 HC RX REV CODE- 636/637: Performed by: HOSPITALIST

## 2017-12-02 PROCEDURE — 0KBQ0ZZ EXCISION OF RIGHT UPPER LEG MUSCLE, OPEN APPROACH: ICD-10-PCS | Performed by: SURGERY

## 2017-12-02 PROCEDURE — 77030020186 HC BOOT HL PROTCT SAGE -B

## 2017-12-02 PROCEDURE — 77030019908 HC STETH ESOPH SIMS -A: Performed by: ANESTHESIOLOGY

## 2017-12-02 PROCEDURE — 77030003029 HC SUT VCRL J&J -B: Performed by: SURGERY

## 2017-12-02 PROCEDURE — 71010 XR CHEST PORT: CPT

## 2017-12-02 PROCEDURE — 74011250636 HC RX REV CODE- 250/636: Performed by: INTERNAL MEDICINE

## 2017-12-02 PROCEDURE — 86923 COMPATIBILITY TEST ELECTRIC: CPT | Performed by: ANESTHESIOLOGY

## 2017-12-02 PROCEDURE — 5A1955Z RESPIRATORY VENTILATION, GREATER THAN 96 CONSECUTIVE HOURS: ICD-10-PCS | Performed by: INTERNAL MEDICINE

## 2017-12-02 PROCEDURE — 76010000131 HC OR TIME 2 TO 2.5 HR: Performed by: SURGERY

## 2017-12-02 PROCEDURE — 74011000250 HC RX REV CODE- 250: Performed by: SURGERY

## 2017-12-02 PROCEDURE — 77030034850: Performed by: SURGERY

## 2017-12-02 PROCEDURE — 74011000258 HC RX REV CODE- 258: Performed by: SURGERY

## 2017-12-02 PROCEDURE — 80048 BASIC METABOLIC PNL TOTAL CA: CPT | Performed by: INTERNAL MEDICINE

## 2017-12-02 PROCEDURE — 85025 COMPLETE CBC W/AUTO DIFF WBC: CPT | Performed by: INTERNAL MEDICINE

## 2017-12-02 PROCEDURE — 74011250636 HC RX REV CODE- 250/636

## 2017-12-02 PROCEDURE — 0D1L0Z4 BYPASS TRANSVERSE COLON TO CUTANEOUS, OPEN APPROACH: ICD-10-PCS | Performed by: SURGERY

## 2017-12-02 PROCEDURE — 77030002996 HC SUT SLK J&J -A: Performed by: SURGERY

## 2017-12-02 PROCEDURE — 83605 ASSAY OF LACTIC ACID: CPT | Performed by: INTERNAL MEDICINE

## 2017-12-02 PROCEDURE — 74011250636 HC RX REV CODE- 250/636: Performed by: SURGERY

## 2017-12-02 PROCEDURE — 77030018836 HC SOL IRR NACL ICUM -A: Performed by: SURGERY

## 2017-12-02 PROCEDURE — 76060000035 HC ANESTHESIA 2 TO 2.5 HR: Performed by: SURGERY

## 2017-12-02 PROCEDURE — 77030011640 HC PAD GRND REM COVD -A: Performed by: SURGERY

## 2017-12-02 PROCEDURE — 77030002966 HC SUT PDS J&J -A: Performed by: SURGERY

## 2017-12-02 PROCEDURE — 77030011264 HC ELECTRD BLD EXT COVD -A: Performed by: SURGERY

## 2017-12-02 PROCEDURE — 77030005322: Performed by: SURGERY

## 2017-12-02 PROCEDURE — 86901 BLOOD TYPING SEROLOGIC RH(D): CPT | Performed by: ANESTHESIOLOGY

## 2017-12-02 PROCEDURE — 77030002916 HC SUT ETHLN J&J -A: Performed by: SURGERY

## 2017-12-02 PROCEDURE — 82803 BLOOD GASES ANY COMBINATION: CPT

## 2017-12-02 PROCEDURE — 74011000250 HC RX REV CODE- 250: Performed by: INTERNAL MEDICINE

## 2017-12-02 PROCEDURE — 94003 VENT MGMT INPAT SUBQ DAY: CPT

## 2017-12-02 PROCEDURE — 65610000001 HC ROOM ICU GENERAL

## 2017-12-02 PROCEDURE — 74011000250 HC RX REV CODE- 250

## 2017-12-02 PROCEDURE — 36415 COLL VENOUS BLD VENIPUNCTURE: CPT | Performed by: INTERNAL MEDICINE

## 2017-12-02 PROCEDURE — 99233 SBSQ HOSP IP/OBS HIGH 50: CPT | Performed by: INTERNAL MEDICINE

## 2017-12-02 PROCEDURE — 72170 X-RAY EXAM OF PELVIS: CPT

## 2017-12-02 PROCEDURE — 82962 GLUCOSE BLOOD TEST: CPT

## 2017-12-02 PROCEDURE — 77030008467 HC STPLR SKN COVD -B: Performed by: SURGERY

## 2017-12-02 RX ORDER — SODIUM CHLORIDE, SODIUM LACTATE, POTASSIUM CHLORIDE, CALCIUM CHLORIDE 600; 310; 30; 20 MG/100ML; MG/100ML; MG/100ML; MG/100ML
75 INJECTION, SOLUTION INTRAVENOUS CONTINUOUS
Status: CANCELLED | OUTPATIENT
Start: 2017-12-02 | End: 2017-12-03

## 2017-12-02 RX ORDER — HYDROMORPHONE HYDROCHLORIDE 2 MG/ML
0.5 INJECTION, SOLUTION INTRAMUSCULAR; INTRAVENOUS; SUBCUTANEOUS
Status: CANCELLED | OUTPATIENT
Start: 2017-12-02

## 2017-12-02 RX ORDER — SODIUM CHLORIDE 0.9 % (FLUSH) 0.9 %
5-10 SYRINGE (ML) INJECTION AS NEEDED
Status: CANCELLED | OUTPATIENT
Start: 2017-12-02

## 2017-12-02 RX ORDER — OXYCODONE HYDROCHLORIDE 5 MG/1
5 TABLET ORAL
Status: CANCELLED | OUTPATIENT
Start: 2017-12-02

## 2017-12-02 RX ORDER — MIDAZOLAM HYDROCHLORIDE 1 MG/ML
2 INJECTION, SOLUTION INTRAMUSCULAR; INTRAVENOUS ONCE
Status: CANCELLED | OUTPATIENT
Start: 2017-12-02 | End: 2017-12-02

## 2017-12-02 RX ORDER — CELECOXIB 200 MG/1
200 CAPSULE ORAL
Status: CANCELLED | OUTPATIENT
Start: 2017-12-02

## 2017-12-02 RX ORDER — SODIUM CHLORIDE 0.9 % (FLUSH) 0.9 %
5-10 SYRINGE (ML) INJECTION EVERY 8 HOURS
Status: CANCELLED | OUTPATIENT
Start: 2017-12-02

## 2017-12-02 RX ORDER — ALBUTEROL SULFATE 0.83 MG/ML
2.5 SOLUTION RESPIRATORY (INHALATION) AS NEEDED
Status: CANCELLED | OUTPATIENT
Start: 2017-12-02

## 2017-12-02 RX ORDER — FENTANYL CITRATE 50 UG/ML
100 INJECTION, SOLUTION INTRAMUSCULAR; INTRAVENOUS ONCE
Status: CANCELLED | OUTPATIENT
Start: 2017-12-02 | End: 2017-12-02

## 2017-12-02 RX ORDER — NOREPINEPHRINE BITARTRATE/D5W 4MG/250ML
2-16 PLASTIC BAG, INJECTION (ML) INTRAVENOUS
Status: DISCONTINUED | OUTPATIENT
Start: 2017-12-02 | End: 2017-12-06

## 2017-12-02 RX ORDER — LIDOCAINE HYDROCHLORIDE 10 MG/ML
0.1 INJECTION INFILTRATION; PERINEURAL AS NEEDED
Status: CANCELLED | OUTPATIENT
Start: 2017-12-02

## 2017-12-02 RX ORDER — MIDAZOLAM HYDROCHLORIDE 1 MG/ML
2 INJECTION, SOLUTION INTRAMUSCULAR; INTRAVENOUS
Status: CANCELLED | OUTPATIENT
Start: 2017-12-02

## 2017-12-02 RX ORDER — ROCURONIUM BROMIDE 10 MG/ML
INJECTION, SOLUTION INTRAVENOUS AS NEEDED
Status: DISCONTINUED | OUTPATIENT
Start: 2017-12-02 | End: 2017-12-02 | Stop reason: HOSPADM

## 2017-12-02 RX ORDER — MIDAZOLAM IN 0.9 % SOD.CHLORID 1 MG/ML
0-10 PLASTIC BAG, INJECTION (ML) INTRAVENOUS
Status: DISCONTINUED | OUTPATIENT
Start: 2017-12-02 | End: 2017-12-09

## 2017-12-02 RX ORDER — INSULIN LISPRO 100 [IU]/ML
INJECTION, SOLUTION INTRAVENOUS; SUBCUTANEOUS
Status: DISCONTINUED | OUTPATIENT
Start: 2017-12-02 | End: 2017-12-03

## 2017-12-02 RX ORDER — SODIUM CHLORIDE 9 MG/ML
INJECTION, SOLUTION INTRAVENOUS
Status: DISCONTINUED | OUTPATIENT
Start: 2017-12-02 | End: 2017-12-02 | Stop reason: HOSPADM

## 2017-12-02 RX ORDER — SODIUM CHLORIDE, SODIUM LACTATE, POTASSIUM CHLORIDE, CALCIUM CHLORIDE 600; 310; 30; 20 MG/100ML; MG/100ML; MG/100ML; MG/100ML
50 INJECTION, SOLUTION INTRAVENOUS CONTINUOUS
Status: CANCELLED | OUTPATIENT
Start: 2017-12-02

## 2017-12-02 RX ORDER — SODIUM CHLORIDE, SODIUM LACTATE, POTASSIUM CHLORIDE, CALCIUM CHLORIDE 600; 310; 30; 20 MG/100ML; MG/100ML; MG/100ML; MG/100ML
0-130 INJECTION, SOLUTION INTRAVENOUS CONTINUOUS
Status: DISCONTINUED | OUTPATIENT
Start: 2017-12-02 | End: 2017-12-07

## 2017-12-02 RX ADMIN — ROCURONIUM BROMIDE 40 MG: 10 INJECTION, SOLUTION INTRAVENOUS at 08:17

## 2017-12-02 RX ADMIN — ENOXAPARIN SODIUM 40 MG: 40 INJECTION SUBCUTANEOUS at 15:05

## 2017-12-02 RX ADMIN — INSULIN LISPRO 6 UNITS: 100 INJECTION, SOLUTION INTRAVENOUS; SUBCUTANEOUS at 21:50

## 2017-12-02 RX ADMIN — SODIUM CHLORIDE 3 G: 900 INJECTION, SOLUTION INTRAVENOUS at 01:00

## 2017-12-02 RX ADMIN — SODIUM CHLORIDE, SODIUM LACTATE, POTASSIUM CHLORIDE, AND CALCIUM CHLORIDE 200 ML/HR: 600; 310; 30; 20 INJECTION, SOLUTION INTRAVENOUS at 21:41

## 2017-12-02 RX ADMIN — SODIUM CHLORIDE 3 G: 900 INJECTION, SOLUTION INTRAVENOUS at 18:17

## 2017-12-02 RX ADMIN — FAMOTIDINE 20 MG: 10 INJECTION, SOLUTION INTRAVENOUS at 10:51

## 2017-12-02 RX ADMIN — SODIUM CHLORIDE 1000 ML: 900 INJECTION, SOLUTION INTRAVENOUS at 12:10

## 2017-12-02 RX ADMIN — Medication 50 MCG/HR: at 00:18

## 2017-12-02 RX ADMIN — SODIUM CHLORIDE: 9 INJECTION, SOLUTION INTRAVENOUS at 08:16

## 2017-12-02 RX ADMIN — SODIUM CHLORIDE 200 ML/HR: 900 INJECTION, SOLUTION INTRAVENOUS at 16:19

## 2017-12-02 RX ADMIN — SODIUM CHLORIDE 900 MG: 900 INJECTION, SOLUTION INTRAVENOUS at 01:11

## 2017-12-02 RX ADMIN — VANCOMYCIN HYDROCHLORIDE 750 MG: 10 INJECTION, POWDER, LYOPHILIZED, FOR SOLUTION INTRAVENOUS at 10:50

## 2017-12-02 RX ADMIN — SODIUM CHLORIDE 3 G: 900 INJECTION, SOLUTION INTRAVENOUS at 05:49

## 2017-12-02 RX ADMIN — SODIUM CHLORIDE 125 ML/HR: 900 INJECTION, SOLUTION INTRAVENOUS at 00:39

## 2017-12-02 RX ADMIN — Medication 10 ML: at 00:00

## 2017-12-02 RX ADMIN — Medication 10 ML: at 15:06

## 2017-12-02 RX ADMIN — INSULIN LISPRO 6 UNITS: 100 INJECTION, SOLUTION INTRAVENOUS; SUBCUTANEOUS at 16:26

## 2017-12-02 RX ADMIN — SODIUM CHLORIDE 900 MG: 900 INJECTION, SOLUTION INTRAVENOUS at 19:32

## 2017-12-02 RX ADMIN — SODIUM CHLORIDE 500 ML: 900 INJECTION, SOLUTION INTRAVENOUS at 15:19

## 2017-12-02 RX ADMIN — Medication 2 MG/HR: at 00:19

## 2017-12-02 RX ADMIN — SODIUM CHLORIDE 1000 ML: 900 INJECTION, SOLUTION INTRAVENOUS at 05:56

## 2017-12-02 RX ADMIN — SODIUM CHLORIDE, SODIUM LACTATE, POTASSIUM CHLORIDE, AND CALCIUM CHLORIDE 500 ML: 600; 310; 30; 20 INJECTION, SOLUTION INTRAVENOUS at 18:39

## 2017-12-02 RX ADMIN — Medication 6 MCG/MIN: at 10:51

## 2017-12-02 RX ADMIN — SODIUM CHLORIDE 1000 ML: 900 INJECTION, SOLUTION INTRAVENOUS at 14:10

## 2017-12-02 RX ADMIN — SODIUM CHLORIDE 3 G: 900 INJECTION, SOLUTION INTRAVENOUS at 23:56

## 2017-12-02 RX ADMIN — SODIUM CHLORIDE 900 MG: 900 INJECTION, SOLUTION INTRAVENOUS at 07:28

## 2017-12-02 RX ADMIN — PHENYLEPHRINE HYDROCHLORIDE 25 MCG/MIN: 10 INJECTION INTRAVENOUS at 15:07

## 2017-12-02 RX ADMIN — ROCURONIUM BROMIDE 10 MG: 10 INJECTION, SOLUTION INTRAVENOUS at 10:04

## 2017-12-02 RX ADMIN — Medication 10 ML: at 06:00

## 2017-12-02 RX ADMIN — VANCOMYCIN HYDROCHLORIDE 750 MG: 10 INJECTION, POWDER, LYOPHILIZED, FOR SOLUTION INTRAVENOUS at 21:35

## 2017-12-02 RX ADMIN — SODIUM CHLORIDE 1000 ML: 900 INJECTION, SOLUTION INTRAVENOUS at 00:59

## 2017-12-02 RX ADMIN — Medication 13 MCG/MIN: at 21:06

## 2017-12-02 RX ADMIN — Medication 10 ML: at 21:37

## 2017-12-02 RX ADMIN — SODIUM CHLORIDE 200 ML/HR: 900 INJECTION, SOLUTION INTRAVENOUS at 11:21

## 2017-12-02 RX ADMIN — SODIUM CHLORIDE 1000 ML: 900 INJECTION, SOLUTION INTRAVENOUS at 13:10

## 2017-12-02 RX ADMIN — SODIUM CHLORIDE 900 MG: 900 INJECTION, SOLUTION INTRAVENOUS at 13:35

## 2017-12-02 RX ADMIN — SODIUM CHLORIDE 3 G: 900 INJECTION, SOLUTION INTRAVENOUS at 12:16

## 2017-12-02 RX ADMIN — SODIUM CHLORIDE, SODIUM LACTATE, POTASSIUM CHLORIDE, AND CALCIUM CHLORIDE 200 ML/HR: 600; 310; 30; 20 INJECTION, SOLUTION INTRAVENOUS at 19:00

## 2017-12-02 NOTE — PROGRESS NOTES
Pharmacokinetic Consult to Pharmacist    Jocylorena Ty is a 79 y.o. 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    Height: 5' 11\" (180.3 cm)  Weight: 72.1 kg (159 lb)  Lab Results   Component Value Date/Time    BUN 79 12/01/2017 03:01 PM    Creatinine 1.52 12/01/2017 03:01 PM    WBC 21.7 12/01/2017 03:01 PM    Procalcitonin 15.5 12/01/2017 03:01 PM    Lactic Acid (POC) 4.8 12/01/2017 06:10 PM      Estimated Creatinine Clearance: 48.1 mL/min (based on Cr of 1.52). All Micro Results     Procedure Component Value Units Date/Time    CULTURE, Tylene Nephew STAIN [[id number]] Collected:  12/01/17 2010    Order Status:  Completed Updated:  12/02/17 0150    CULTURE, Tylene Nephew STAIN [[id number]] Collected:  12/01/17 2010    Order Status:  Completed Updated:  12/02/17 0150    CULTURE, ANAEROBIC [[id number]] Collected:  12/01/17 2010    Order Status:  Completed Updated:  12/02/17 0149    CULTURE, ANAEROBIC [[id number]] Collected:  12/01/17 2010    Order Status:  Completed Updated:  12/02/17 0149    CULTURE, ANAEROBIC [[id number]]     Order Status:  Canceled Specimen:  Wound Drainage     CULTURE, WOUND Gwendalyn Cuban STAIN [[id number]]     Order Status:  Canceled Specimen:  Wound from Groin     CULTURE, Tylene Nephew STAIN [[id number]]     Order Status:  Canceled Specimen:  Wound from Groin     CULTURE, ANAEROBIC [[id number]]     Order Status:  Canceled Specimen:  Wound Drainage     CULTURE, BLOOD [[id number]] Collected:  12/01/17 1804    Order Status:  Completed Specimen:  Whole Blood from Blood Updated:  12/01/17 1915    CULTURE, BLOOD [[id number]] Collected:  12/01/17 1805    Order Status:  Completed Specimen:  Whole Blood from Blood Updated:  12/01/17 1915          Day 1 of vancomycin. Goal trough is 15-20. Vancomycin dose initiated at 1 g once (given kim-operatively), followed by 750 mg q12h. Will continue to follow patient.       Thank you,  Danuta Hager Calvin Canas, PharmD

## 2017-12-02 NOTE — PROGRESS NOTES
Pt arrived via bed from OR with 2 RNs and Anesthesiologist. Placed pt on monitor, NSR, /63 with MAP 84, HR 96. Pt placed on vent on PRVC @ 50 % with 8 PEEP, O2 saturation 100%, fingers are cold, placed O2 sat sensor on nose, warm blanket placed. Bilateral breath sounds clear, chest expansion symmetrical. Abdomen is intact and soft, bowel sounds active. Pt not responding to voice or pain at the moment. Levophed stopped, LR restarted at 75 ml hr. Right IJ quad lumen central line with stop cocks on each port- placed new caps & flushed all ports. Dual Skin assessment completed with Barbie Naylor RN: Scattered scabs on left leg, right leg has incision from posterior margin of buttock to back of knee. Incision covered in gauze and kerlex, yellow drainage noted upon arrival- extra pads placed under right leg. CHG bath complete, sacrum is intact, no breakdown noted, allevyn placed.      Lines:   #18 Left AC  #20 Right Forearm  Right IJ Quad lumen  Left Radial Art line    Drains:  Gerardo

## 2017-12-02 NOTE — PROGRESS NOTES
Current CVP still 6. Dr. Last Joaquin re-notified, instructed to give 1 more liter of NS. Will monitor.

## 2017-12-02 NOTE — BRIEF OP NOTE
BRIEF OPERATIVE NOTE    Date of Procedure: 12/2/2017   Preoperative Diagnosis: Scrotal abcess  Postoperative Diagnosis: Scrotal abcess    Procedure(s):  LAPAROTOMY EXPLORATORY, loop colostomy ( first procedure): (second procedure) Re explore right thigh fasciitis  Debridement of Subcutaneous and Fascia Tissue  Surgeon(s) and Role:     * Bettye Marinelli MD - Primary         Assistant Staff:       Surgical Staff:  Circ-1: David Escalera RN  Scrub Tech-1: Reena Double  Scrub Tech-2: Sayra Steven  Event Time In   Incision Start 1551   Incision Close 0915     Anesthesia: General   Estimated Blood Loss: per anesthesia  Specimens: * No specimens in log *   Findings: circumferential fasciitis, involving all compartments from ischial tuberosity to at least the popliteal fossa. This process does not appear salvageable by debridement. Opinion requested from Dr Joey Hammond who agrees that disarticulation may be indicated.    Complications: none  Implants: * No implants in log *

## 2017-12-02 NOTE — PROGRESS NOTES
Kerlex on incision site on right leg is wet, removed pads from legs & placed new underpads. Foul odor coming from leg- no orders to replace dressings.

## 2017-12-02 NOTE — PROGRESS NOTES
Ventilator check complete; patient has a #8. 0 ET tube secured at the 22 at the lip. Patient is sedated. Patient is not able to follow commands. Breath sounds are clear and diminished. Trachea is midline, Negative for subcutaneous air, and chest excursion is symmetric. Patient is also Negative for cyanosis and is Negative for pitting edema. All alarms are set and audible. Resuscitation bag is at the head of the bed.       Ventilator Settings  Mode FIO2 Rate Tidal Volume Pressure PEEP I:E Ratio   PRVC  40 %          8 cm H20  1:3.33      Peak airway pressure: 17.5 cm H2O   Minute ventilation: 10.6 l/min     ABG:   Recent Labs      12/02/17   0220   PH  7.43   PCO2  33*   PO2  213*   HCO3  22         Lexine Duty, RT

## 2017-12-02 NOTE — PROGRESS NOTES
Dr. Gabby Redmond at bedside, plan is to take patient to the OR tomorrow morning 12/3/2017 for complex debridement of the right hip with possible leg amputation with wound vac placement. Procedure explained to the daughter who is at the bedside. Will monitor.

## 2017-12-02 NOTE — PROGRESS NOTES
MD Kline notified about HR in 120's and being hypotensive with SBP in 70's and MAP at 57. Telephone orders given to administer 1 liter NS bolus and to increase continuous NS fluids to 200 ml/hr. Orders given to start Levophed gtt if bolus does not help.

## 2017-12-02 NOTE — CONSULTS
Consult    Subjective:     Kayce Chin is a 79 y.o.      male seen and evaluated at the request of Dr. Sara Buchanan. Admitted to the hospital after he had a syncopal episode. Upon evaluation in the emergency room he was found to have a small subdural hematoma on head CT but he was found to have very large abscess in the right thigh area extended from his right groin all the way to the right popliteal area. This required urgent surgical consultation. Patient was taken to the operating room and underwent drainage and debridement of the abscess area. Postoperatively he returned back to the intensive care unit intubated and mechanically ventilated on pressors. Currently he is sedated and on Levophed drip because of severe hypotension. Patient is does not see a primary care physician on a regular basis. He has a daughter, Tricia Castañeda who is present in the room. Per discussion with Dr. Marlen Garcia his entire thigh was severely infected and necrotic with involvement of the neurovascular bundle. This did not appear to salvageable. The thigh was debrided as much as possible and then packed. Dr. Annika Kc was operating close by and was able to quickly view the wound as well to help confirm the level of necrotic tissue. 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 Past Medical History:   Diagnosis Date    Diabetes [facility])       History reviewed. No pertinent surgical history. History reviewed. No pertinent family history.    Social History   Substance Use Topics    Smoking status: Former Smoker    Smokeless tobacco: Not on file    Alcohol use Not on file       Current Facility-Administered Medications   Medication Dose Route Frequency Provider Last Rate Last Dose    midazolam in normal saline (VERSED) 1 mg/mL infusion  0-10 mg/hr IntraVENous TITRATE Orestes Lomeli MD 2 mL/hr at 12/02/17 1107 2 mg/hr at 12/02/17 1107    NOREPINephrine (LEVOPHED) 4 mg in 5% dextrose 250 mL infusion  2-16 mcg/min IntraVENous TITRATE Archana Gates MD 22.5 mL/hr at 12/02/17 1051 6 mcg/min at 12/02/17 1051    PHENYLephrine (ROSA-SYNEPHRINE) 30 mg in 0.9% sodium chloride 250 mL infusion   mcg/min IntraVENous TITRATE Papo Mohan MD        sodium chloride (NS) flush 5-10 mL  5-10 mL IntraVENous Q8H Rachana Ch MD   10 mL at 12/02/17 0600    sodium chloride (NS) flush 5-10 mL  5-10 mL IntraVENous PRN Rachana Ch MD        0.9% sodium chloride infusion  200 mL/hr IntraVENous CONTINUOUS Archana Gates  mL/hr at 12/02/17 1121 200 mL/hr at 12/02/17 1121    enoxaparin (LOVENOX) injection 40 mg  40 mg SubCUTAneous Q24H Rachana Ch MD        ampicillin-sulbactam (UNASYN) 3 g in 0.9% sodium chloride (MBP/ADV) 100 mL  3 g IntraVENous Q6H Rachana Ch  mL/hr at 12/02/17 1216 3 g at 12/02/17 1216    clindamycin phosphate (CLEOCIN) 900 mg in 0.9% sodium chloride 100 mL IVPB  900 mg IntraVENous Q6H Rachana Ch  mL/hr at 12/02/17 0728 900 mg at 12/02/17 0728    famotidine (PF) (PEPCID) 20 mg in sodium chloride 0.9 % 10 mL injection  20 mg IntraVENous Q24H Rachana Ch MD   20 mg at 12/02/17 1051    fentaNYL in normal saline (pf) 25 mcg/mL infusion   mcg/hr IntraVENous TITRATE Archana Gates MD 2 mL/hr at 12/02/17 1108 50 mcg/hr at 12/02/17 1108    vancomycin (VANCOCIN) 750 mg in  ml infusion  750 mg IntraVENous Q12H Rachana Ch  mL/hr at 12/02/17 1050 750 mg at 12/02/17 1050        No Known Allergies     Review of Systems:  A comprehensive review of systems was negative except for that written in the History of Present Illness. Objective: Intake and Output:    12/02 0701 - 12/02 1900  In: 1900 [I.V.:1900]  Out: 350 [Urine:340]  11/30 1901 - 12/02 0700  In: 2000 [I.V.:2000]  Out: 1300 [Urine:1100]    Physical Exam:     Respiratory: Intubated. Cardiovascular:  Tachy  Gastrointestinal: soft, colostomy in place  Musculoskeletal: warm without cyanosis.  No palpable fluctuance. Large posterior thigh wound from groin to popliteal fossa with gauze packing in place. Foul smell present. Below knee without significant findings. Pulses present at ankle with doppler. Skin:  no jaundice or rashes, surgical wounds R thigh. Neurologic: sedated    Psychiatric:  sedated      Data Review:   Recent Results (from the past 24 hour(s))   CBC WITH AUTOMATED DIFF    Collection Time: 12/01/17  3:01 PM   Result Value Ref Range    WBC 21.7 (H) 4.3 - 11.1 K/uL    RBC 4.67 4.23 - 5.67 M/uL    HGB 13.9 13.6 - 17.2 g/dL    HCT 40.4 (L) 41.1 - 50.3 %    MCV 86.5 79.6 - 97.8 FL    MCH 29.8 26.1 - 32.9 PG    MCHC 34.4 31.4 - 35.0 g/dL    RDW 13.1 11.9 - 14.6 %    PLATELET 866 142 - 224 K/uL    MPV 12.9 10.8 - 14.1 FL    NEUTROPHILS 66 47 - 75 %    LYMPHOCYTES 22 16 - 44 %    MONOCYTES 4 3 - 9 %    BASOPHILS 1 0 - 2 %    METAMYELOCYTES 2 %    MYELOCYTES 5 %    ABS. NEUTROPHILS 15.8 (H) 1.7 - 8.2 K/UL    ABS. LYMPHOCYTES 4.8 (H) 0.5 - 4.6 K/UL    ABS. MONOCYTES 0.9 0.1 - 1.3 K/UL    ABS. BASOPHILS 0.2 0.0 - 0.2 K/UL    RBC COMMENTS SLIGHT  ANISOCYTOSIS + POIKILOCYTOSIS        WBC COMMENTS OCCASIONAL      PLATELET COMMENTS ADEQUATE      DF AUTOMATED     METABOLIC PANEL, COMPREHENSIVE    Collection Time: 12/01/17  3:01 PM   Result Value Ref Range    Sodium 135 (L) 136 - 145 mmol/L    Potassium 3.3 (L) 3.5 - 5.1 mmol/L    Chloride 92 (L) 98 - 107 mmol/L    CO2 25 21 - 32 mmol/L    Anion gap 18 (H) 7 - 16 mmol/L    Glucose 426 (H) 65 - 100 mg/dL    BUN 79 (H) 8 - 23 MG/DL    Creatinine 1.52 (H) 0.8 - 1.5 MG/DL    GFR est AA 59 (L) >60 ml/min/1.73m2    GFR est non-AA 49 (L) >60 ml/min/1.73m2    Calcium 8.3 8.3 - 10.4 MG/DL    Bilirubin, total 1.5 (H) 0.2 - 1.1 MG/DL    ALT (SGPT) 15 12 - 65 U/L    AST (SGOT) 15 15 - 37 U/L    Alk.  phosphatase 99 50 - 136 U/L    Protein, total 7.1 6.3 - 8.2 g/dL    Albumin 2.3 (L) 3.2 - 4.6 g/dL    Globulin 4.8 (H) 2.3 - 3.5 g/dL    A-G Ratio 0.5 (L) 1.2 - 3.5 PHOSPHORUS    Collection Time: 12/01/17  3:01 PM   Result Value Ref Range    Phosphorus 2.9 2.3 - 3.7 MG/DL   MAGNESIUM    Collection Time: 12/01/17  3:01 PM   Result Value Ref Range    Magnesium 3.2 (H) 1.8 - 2.4 mg/dL   LIPASE    Collection Time: 12/01/17  3:01 PM   Result Value Ref Range    Lipase 43 (L) 73 - 393 U/L   HEMOGLOBIN A1C WITH EAG    Collection Time: 12/01/17  3:01 PM   Result Value Ref Range    Hemoglobin A1c 9.7 (H) 4.8 - 6.0 %    Est. average glucose 232 mg/dL   PROCALCITONIN    Collection Time: 12/01/17  3:01 PM   Result Value Ref Range    Procalcitonin 15.5 ng/mL   EKG, 12 LEAD, INITIAL    Collection Time: 12/01/17  3:42 PM   Result Value Ref Range    Ventricular Rate 109 BPM    Atrial Rate 109 BPM    P-R Interval 126 ms    QRS Duration 78 ms    Q-T Interval 318 ms    QTC Calculation (Bezet) 428 ms    Calculated P Axis 72 degrees    Calculated R Axis 37 degrees    Calculated T Axis 58 degrees    Diagnosis       !! AGE AND GENDER SPECIFIC ECG ANALYSIS !!   Sinus tachycardia  Otherwise normal ECG  No previous ECGs available  Confirmed by LUCAS ABBASI (), Dennis Dias ([id number]) on 12/1/2017 5:30:55 PM     TSH 3RD GENERATION    Collection Time: 12/01/17  3:45 PM   Result Value Ref Range    TSH 0.447 0.358 - 3.740 uIU/mL   POC TROPONIN-I    Collection Time: 12/01/17  3:46 PM   Result Value Ref Range    Troponin-I (POC) 0 (L) 0.02 - 0.05 ng/ml   URINE MICROSCOPIC    Collection Time: 12/01/17  4:42 PM   Result Value Ref Range    WBC 0-3 0 /hpf    RBC 3-5 0 /hpf    Epithelial cells 0-3 0 /hpf    Bacteria 1+ (H) 0 /hpf    Casts 5-10 0 /lpf   CULTURE, BLOOD    Collection Time: 12/01/17  6:04 PM   Result Value Ref Range    Special Requests: LEFT  HAND        Culture result: NO GROWTH AFTER 15 HOURS     CULTURE, BLOOD    Collection Time: 12/01/17  6:05 PM   Result Value Ref Range    Special Requests: RIGHT  FOREARM        Culture result: NO GROWTH AFTER 15 HOURS     POC LACTIC ACID    Collection Time: 12/01/17  6:10 PM   Result Value Ref Range    Lactic Acid (POC) 4.8 (H) 0.5 - 1.9 mmol/L   GLUCOSE, POC    Collection Time: 12/01/17  7:34 PM   Result Value Ref Range    Glucose (POC) 358 (H) 65 - 100 mg/dL   CULTURE, WOUND W GRAM STAIN    Collection Time: 12/01/17  8:10 PM   Result Value Ref Range    Special Requests: NO SPECIAL REQUESTS      GRAM STAIN PENDING     Culture result:        NO GROWTH AFTER SHORT PERIOD OF INCUBATION. FURTHER RESULTS TO FOLLOW AFTER OVERNIGHT INCUBATION. CULTURE, WOUND Benetta Ling STAIN    Collection Time: 12/01/17  8:10 PM   Result Value Ref Range    Special Requests: NO SPECIAL REQUESTS      GRAM STAIN PENDING     Culture result:        NO GROWTH AFTER SHORT PERIOD OF INCUBATION. FURTHER RESULTS TO FOLLOW AFTER OVERNIGHT INCUBATION.    POC CG8I    Collection Time: 12/01/17  9:11 PM   Result Value Ref Range    pH (POC) 7.357 7.35 - 7.45      pCO2 (POC) 44.9 35 - 45 MMHG    pO2 (POC) 267 (H) 80 - 105 MMHG    HCO3 (POC) 25.2 22 - 26 MMOL/L    sO2 (POC) 100 (H) 95 - 98 %    Base excess (POC) 0 mmol/L    Sodium,  136 - 145 MMOL/L    Potassium, POC 3.7 3.5 - 5.1 MMOL/L    Glucose,  (H) 65 - 100 MG/DL    Calcium, ionized (POC) 1.03 (L) 1.12 - 1.32 MMOL/L   GLUCOSE, POC    Collection Time: 12/01/17 10:19 PM   Result Value Ref Range    Glucose (POC) 244 (H) 65 - 100 mg/dL   BLOOD GAS, ARTERIAL    Collection Time: 12/02/17  2:20 AM   Result Value Ref Range    pH 7.43 7.35 - 7.45      PCO2 33 (L) 35 - 45 mmHg    PO2 213 (H) 80 - 105 mmHg    BICARBONATE 22 22 - 26 mmol/L    BASE DEFICIT 2.0 0 - 2 mmol/L    TOTAL HEMOGLOBIN 12.4 11.7 - 15.0 GM/DL    O2  (H) 92 - 98.5 %    Arterial O2 Hgb 97.9 (H) 94 - 97 %    CARBOXYHEMOGLOBIN 1.1 0.5 - 1.5 %    METHEMOGLOBIN 0.5 0.0 - 1.5 %    DEOXYHEMOGLOBIN 1 0.0 - 5.0 %    SITE NUHA     ALLENS TEST NA     MODE PRVC     FIO2 50.0 %    Tidal volume 450.0      RATE 15.0      PEEP/CPAP 8.0     CBC WITH AUTOMATED DIFF    Collection Time: 12/02/17  4:19 AM Result Value Ref Range    WBC 23.5 (H) 4.3 - 11.1 K/uL    RBC 4.07 (L) 4.23 - 5.67 M/uL    HGB 12.0 (L) 13.6 - 17.2 g/dL    HCT 35.0 (L) 41.1 - 50.3 %    MCV 86.0 79.6 - 97.8 FL    MCH 29.5 26.1 - 32.9 PG    MCHC 34.3 31.4 - 35.0 g/dL    RDW 13.2 11.9 - 14.6 %    PLATELET 418 887 - 232 K/uL    MPV 12.6 10.8 - 14.1 FL    NEUTROPHILS 90 (H) 43 - 78 %    LYMPHOCYTES 4 (L) 13 - 44 %    MONOCYTES 6 4.0 - 12.0 %    EOSINOPHILS 0 (L) 0.5 - 7.8 %    BASOPHILS 0 0.0 - 2.0 %    IMMATURE GRANULOCYTES 2 0.0 - 5.0 %    ABS. NEUTROPHILS 21.2 (H) 1.7 - 8.2 K/UL    ABS. LYMPHOCYTES 0.9 0.5 - 4.6 K/UL    ABS. MONOCYTES 1.4 (H) 0.1 - 1.3 K/UL    ABS. EOSINOPHILS 0.0 0.0 - 0.8 K/UL    ABS. BASOPHILS 0.0 0.0 - 0.2 K/UL    ABS. IMM. GRANS. 0.5 0.0 - 0.5 K/UL    RBC COMMENTS SLIGHT  ANISOCYTOSIS        WBC COMMENTS OCCASIONAL      PLATELET COMMENTS ADEQUATE      DF AUTOMATED     METABOLIC PANEL, COMPREHENSIVE    Collection Time: 12/02/17  4:19 AM   Result Value Ref Range    Sodium 141 136 - 145 mmol/L    Potassium 3.6 3.5 - 5.1 mmol/L    Chloride 104 98 - 107 mmol/L    CO2 22 21 - 32 mmol/L    Anion gap 15 7 - 16 mmol/L    Glucose 325 (H) 65 - 100 mg/dL    BUN 81 (H) 8 - 23 MG/DL    Creatinine 1.16 0.8 - 1.5 MG/DL    GFR est AA >60 >60 ml/min/1.73m2    GFR est non-AA >60 >60 ml/min/1.73m2    Calcium 7.2 (L) 8.3 - 10.4 MG/DL    Bilirubin, total 1.2 (H) 0.2 - 1.1 MG/DL    ALT (SGPT) 12 12 - 65 U/L    AST (SGOT) 14 (L) 15 - 37 U/L    Alk.  phosphatase 77 50 - 136 U/L    Protein, total 5.8 (L) 6.3 - 8.2 g/dL    Albumin 1.7 (L) 3.2 - 4.6 g/dL    Globulin 4.1 (H) 2.3 - 3.5 g/dL    A-G Ratio 0.4 (L) 1.2 - 3.5     LACTIC ACID    Collection Time: 12/02/17  4:19 AM   Result Value Ref Range    Lactic acid 3.5 (HH) 0.4 - 2.0 MMOL/L   TYPE & SCREEN    Collection Time: 12/02/17  4:23 AM   Result Value Ref Range    Crossmatch Expiration 12/05/2017     ABO/Rh(D) Shamika Mendiola NEGATIVE     Antibody screen NEG    LACTIC ACID    Collection Time: 12/02/17 11:37 AM Result Value Ref Range    Lactic acid 3.5 (HH) 0.4 - 2.0 MMOL/L       Pelvic x-ray does not show any obvious subcutaneous gas or other abnormality. 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 Marlen Garcia, Annika Kc, and Melisa Jorgensen. Complex history and situation. Right leg seems to be un-salvageable from description of those involved with prior debridement this am. Apparently proximal tissues of the pelvis may be healthy. Discussed possibility that patient may still have proximal involvement though. Patient unable at this time to be extubated to convey wishes. Discussed with family the severity of the situation and likely outcome of conservative care/comfort care. Daughter's wish for surgical debridement. Discussed potential continued morbity risks and mortality risks of all options. Likely further surgeries to include debridement, plastic surgery, etc.     Plan: Will discuss with Gen. Surgery and fellow orthopaedist. Likely plan further debridement. Electrolytes seem normal except for increased lactic acid.        Signed By: Ghassan Villalta MD     December 2, 2017

## 2017-12-02 NOTE — ANESTHESIA PREPROCEDURE EVALUATION
Anesthetic History   No history of anesthetic complications            Review of Systems / Medical History  Patient summary reviewed, nursing notes reviewed and pertinent labs reviewed    Pulmonary          Smoker (Former)         Neuro/Psych              Cardiovascular                  Exercise tolerance: >4 METS  Comments: Recent syncope, possible due to sepsis  Very poor historian  Denies CP, SOB or changes in functional status   GI/Hepatic/Renal         Renal disease: CRI and ARF       Endo/Other    Diabetes: poorly controlled, type 2         Other Findings   Comments: Small subdural hematoma without required intervention  Lactic acid >4           Physical Exam    Airway  Mallampati: II  TM Distance: 4 - 6 cm  Neck ROM: normal range of motion   Mouth opening: Normal     Cardiovascular    Rhythm: regular  Rate: normal         Dental    Dentition: Edentulous     Pulmonary  Breath sounds clear to auscultation               Abdominal  GI exam deferred       Other Findings            Anesthetic Plan    ASA: 4  Anesthesia type: general    Monitoring Plan: Arterial line    Post procedure ventilation   Induction: Intravenous and RSI  Anesthetic plan and risks discussed with: Patient      VERY POOR HISTORIAN. Likely numerous medical issues but undiagnosed. Pt has R IJ CVL and L radial arterial line. On norepinephrine infusion.  Septic

## 2017-12-02 NOTE — PROGRESS NOTES
Hospitalist Progress Note    2017  Admit Date: 2017  2:54 PM   NAME: Rafia Lizama   :  1950   DOS:              17  MRN:  [id number]   Attending: Sheila Grider MD  PCP:  PROVIDER UNKNOWN  Treatment Team: Attending Provider: Anna Le DO; Surgeon: Eulalia Franco MD; Consulting Provider: Tye Kussmaul, MD; Consulting Provider: Merritt Kim MD    Full Code     SUBJECTIVE:   As previously documented: \" Mr. Lon Sood is a 80 yo M with unknown PMHv - never seen a healthcare provider as an adult who was admitted on   After an syncopal episode. CT head with small right subdural hematoma - discussed with neurosurgery by Dr. Rosita Key - nonsurgical. 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 Placed on broad spectrum antibiotics, aggressive IV hydration. General surgery consulted and had emergent surgery on  that showed gangrene of subcutaneus fat and fascia of posterior compartment from groin to popliteal fossa, extending along margin of buttock. Had re-exploration of right tight fasciitis on  and thought that is extremity is not salvageable only by debridement and surgery requested orthopedic opinion who thought that likely will need disarticulation. \"               17   Mr. Rafia Lizama is intubated and sedated. On IV Unasyn/Clinda/Vanc. Low BP - on IV NS bolus  And on Levophed and Malcom- synephrine. Daughter and son in law at bedside. Waiting for ortho evaluation. Unable to review ROS due to patient clinical status.     No Known Allergies  Current Facility-Administered Medications   Medication Dose Route Frequency    midazolam in normal saline (VERSED) 1 mg/mL infusion  0-10 mg/hr IntraVENous TITRATE    NOREPINephrine (LEVOPHED) 4 mg in 5% dextrose 250 mL infusion  2-16 mcg/min IntraVENous TITRATE    PHENYLephrine (MALCOM-SYNEPHRINE) 30 mg in 0.9% sodium chloride 250 mL infusion  mcg/min IntraVENous TITRATE    sodium chloride 0.9 % bolus infusion 1,000 mL  1,000 mL IntraVENous ONCE    sodium chloride (NS) flush 5-10 mL  5-10 mL IntraVENous Q8H    sodium chloride (NS) flush 5-10 mL  5-10 mL IntraVENous PRN    0.9% sodium chloride infusion  200 mL/hr IntraVENous CONTINUOUS    enoxaparin (LOVENOX) injection 40 mg  40 mg SubCUTAneous Q24H    ampicillin-sulbactam (UNASYN) 3 g in 0.9% sodium chloride (MBP/ADV) 100 mL  3 g IntraVENous Q6H    clindamycin phosphate (CLEOCIN) 900 mg in 0.9% sodium chloride 100 mL IVPB  900 mg IntraVENous Q6H    famotidine (PF) (PEPCID) 20 mg in sodium chloride 0.9 % 10 mL injection  20 mg IntraVENous Q24H    fentaNYL in normal saline (pf) 25 mcg/mL infusion   mcg/hr IntraVENous TITRATE    vancomycin (VANCOCIN) 750 mg in  ml infusion  750 mg IntraVENous Q12H           There is no immunization history on file for this patient.   Objective:     Patient Vitals for the past 24 hrs:   Temp Pulse Resp BP SpO2   12/02/17 1146 - (!) 121 18 120/63 99 %   12/02/17 1130 - (!) 121 16 119/68 99 %   12/02/17 1115 - (!) 121 15 119/68 99 %   12/02/17 1100 - (!) 119 15 115/61 100 %   12/02/17 1059 99.4 °F (37.4 °C) (!) 119 15 115/71 100 %   12/02/17 1053 - (!) 117 15 - 99 %   12/02/17 1044 - (!) 115 15 - 99 %   12/02/17 1038 (!) 101.3 °F (38.5 °C) (!) 112 16 107/42 99 %   12/02/17 0759 - (!) 117 23 - 100 %   12/02/17 0745 - (!) 117 22 104/58 100 %   12/02/17 0716 - (!) 117 22 99/58 100 %   12/02/17 0701 99.3 °F (37.4 °C) (!) 114 20 105/58 100 %   12/02/17 0646 - (!) 114 22 114/55 100 %   12/02/17 0631 - (!) 116 22 112/59 100 %   12/02/17 0616 - (!) 117 23 118/57 100 %   12/02/17 0600 - (!) 121 23 111/57 100 %   12/02/17 0545 - (!) 121 23 99/63 100 %   12/02/17 0530 - (!) 121 25 110/62 100 %   12/02/17 0516 - (!) 120 21 100/55 100 %   12/02/17 0501 - (!) 121 22 108/60 100 %   12/02/17 0446 - (!) 120 23 106/64 100 %   12/02/17 0431 - (!) 120 22 106/63 100 % 17 0416 - (!) 119 24 100/62 100 %   17 0400 - (!) 119 21 100/55 100 %   17 0345 - (!) 117 21 97/54 100 %   17 0330 - (!) 115 22 - 100 %   17 0316 - (!) 113 23 98/53 100 %   17 0301 - (!) 112 23 98/53 100 %   17 0246 - (!) 112 23 101/57 100 %   17 0231 - (!) 111 21 102/57 100 %   17 0215 98.5 °F (36.9 °C) (!) 110 15 122/61 100 %   17 0200 - (!) 113 17 (!) 87/56 100 %   17 0146 - (!) 112 24 (!) 85/57 100 %   17 0131 - (!) 114 25 92/55 93 %   17 0116 - (!) 117 19 103/57 92 %   17 0101 - (!) 118 24 96/52 91 %   17 0046 - (!) 120 30 92/55 98 %   17 0030 - (!) 122 (!) 32 114/70 100 %   17 0016 - (!) 119 30 126/67 100 %   17 2346 - (!) 116 19 107/65 93 %   17 2333 - (!) 117 26 - 94 %   17 2331 - (!) 116 24 112/64 95 %   17 2316 - (!) 113 21 120/62 96 %   17 2301 - (!) 108 15 128/63 97 %   17 2245 - (!) 106 15 126/71 98 %   17 2230 - 100 15 96/56 98 %   17 2215 - 97 15 105/59 -   17 2201 - 96 15 105/62 -   17 2158 - 96 20 - -   17 97.5 °F (36.4 °C) 96 15 113/63 -   17 2152 - 94 15 113/63 -   17 1800 - 100 - 125/64 92 %   17 1543 - (!) 107 16 138/68 94 %   17 1457 98.1 °F (36.7 °C) (!) 110 20 112/73 93 %     Temp (24hrs), Av °F (37.2 °C), Min:97.5 °F (36.4 °C), Max:101.3 °F (38.5 °C)    Oxygen Therapy  O2 Sat (%): 99 % (17 1146)  Pulse via Oximetry: 121 beats per minute (17 1146)  O2 Device: Other (comment) (ETT) (17 1038)  O2 Flow Rate (L/min): 2 l/min (17 2230)  FIO2 (%): 40 % (17 1053)  Oxygen Therapy  O2 Sat (%): 99 % (17 1146)  Pulse via Oximetry: 121 beats per minute (17 1146)  O2 Device: Other (comment) (ETT) (17 1038)  O2 Flow Rate (L/min): 2 l/min (17 2230)  FIO2 (%): 40 % (17 1053)    Physical Exam:  General:         Intubated and sedated. Febrile 101.3F. Looks older that stated age. Alert, cooperative, no distress   HEENT:               NCAT. No obvious deformity. Nares normal. No drainage  Lungs:               Decreased air entry b/l. No wheezing  Cardiovascular:   tachycardic. No m/r/g. No pedal edema b/l. +2 PT/DT pulses b/l. Abdomen:       S/nt/nd. Bowel sounds normal. .   Skin:         Surgical wound R thigh. Not Jaundiced  Neurologic:    Intubated and sedated. + painful stimuli. Psychiatric:         Sedated              DIAGNOSTIC STUDIES      Data Review:   Recent Results (from the past 24 hour(s))   CBC WITH AUTOMATED DIFF    Collection Time: 12/01/17  3:01 PM   Result Value Ref Range    WBC 21.7 (H) 4.3 - 11.1 K/uL    RBC 4.67 4.23 - 5.67 M/uL    HGB 13.9 13.6 - 17.2 g/dL    HCT 40.4 (L) 41.1 - 50.3 %    MCV 86.5 79.6 - 97.8 FL    MCH 29.8 26.1 - 32.9 PG    MCHC 34.4 31.4 - 35.0 g/dL    RDW 13.1 11.9 - 14.6 %    PLATELET 822 524 - 456 K/uL    MPV 12.9 10.8 - 14.1 FL    NEUTROPHILS 66 47 - 75 %    LYMPHOCYTES 22 16 - 44 %    MONOCYTES 4 3 - 9 %    BASOPHILS 1 0 - 2 %    METAMYELOCYTES 2 %    MYELOCYTES 5 %    ABS. NEUTROPHILS 15.8 (H) 1.7 - 8.2 K/UL    ABS. LYMPHOCYTES 4.8 (H) 0.5 - 4.6 K/UL    ABS. MONOCYTES 0.9 0.1 - 1.3 K/UL    ABS.  BASOPHILS 0.2 0.0 - 0.2 K/UL    RBC COMMENTS SLIGHT  ANISOCYTOSIS + POIKILOCYTOSIS        WBC COMMENTS OCCASIONAL      PLATELET COMMENTS ADEQUATE      DF AUTOMATED     METABOLIC PANEL, COMPREHENSIVE    Collection Time: 12/01/17  3:01 PM   Result Value Ref Range    Sodium 135 (L) 136 - 145 mmol/L    Potassium 3.3 (L) 3.5 - 5.1 mmol/L    Chloride 92 (L) 98 - 107 mmol/L    CO2 25 21 - 32 mmol/L    Anion gap 18 (H) 7 - 16 mmol/L    Glucose 426 (H) 65 - 100 mg/dL    BUN 79 (H) 8 - 23 MG/DL    Creatinine 1.52 (H) 0.8 - 1.5 MG/DL    GFR est AA 59 (L) >60 ml/min/1.73m2    GFR est non-AA 49 (L) >60 ml/min/1.73m2    Calcium 8.3 8.3 - 10.4 MG/DL    Bilirubin, total 1.5 (H) 0.2 - 1.1 MG/DL    ALT (SGPT) 15 12 - 65 U/L    AST (SGOT) 15 15 - 37 U/L    Alk. phosphatase 99 50 - 136 U/L    Protein, total 7.1 6.3 - 8.2 g/dL    Albumin 2.3 (L) 3.2 - 4.6 g/dL    Globulin 4.8 (H) 2.3 - 3.5 g/dL    A-G Ratio 0.5 (L) 1.2 - 3.5     PHOSPHORUS    Collection Time: 12/01/17  3:01 PM   Result Value Ref Range    Phosphorus 2.9 2.3 - 3.7 MG/DL   MAGNESIUM    Collection Time: 12/01/17  3:01 PM   Result Value Ref Range    Magnesium 3.2 (H) 1.8 - 2.4 mg/dL   LIPASE    Collection Time: 12/01/17  3:01 PM   Result Value Ref Range    Lipase 43 (L) 73 - 393 U/L   HEMOGLOBIN A1C WITH EAG    Collection Time: 12/01/17  3:01 PM   Result Value Ref Range    Hemoglobin A1c 9.7 (H) 4.8 - 6.0 %    Est. average glucose 232 mg/dL   PROCALCITONIN    Collection Time: 12/01/17  3:01 PM   Result Value Ref Range    Procalcitonin 15.5 ng/mL   EKG, 12 LEAD, INITIAL    Collection Time: 12/01/17  3:42 PM   Result Value Ref Range    Ventricular Rate 109 BPM    Atrial Rate 109 BPM    P-R Interval 126 ms    QRS Duration 78 ms    Q-T Interval 318 ms    QTC Calculation (Bezet) 428 ms    Calculated P Axis 72 degrees    Calculated R Axis 37 degrees    Calculated T Axis 58 degrees    Diagnosis       !! AGE AND GENDER SPECIFIC ECG ANALYSIS !!   Sinus tachycardia  Otherwise normal ECG  No previous ECGs available  Confirmed by LUCAS ABBASI (), Jaida Winston ([id number]) on 12/1/2017 5:30:55 PM     TSH 3RD GENERATION    Collection Time: 12/01/17  3:45 PM   Result Value Ref Range    TSH 0.447 0.358 - 3.740 uIU/mL   POC TROPONIN-I    Collection Time: 12/01/17  3:46 PM   Result Value Ref Range    Troponin-I (POC) 0 (L) 0.02 - 0.05 ng/ml   URINE MICROSCOPIC    Collection Time: 12/01/17  4:42 PM   Result Value Ref Range    WBC 0-3 0 /hpf    RBC 3-5 0 /hpf    Epithelial cells 0-3 0 /hpf    Bacteria 1+ (H) 0 /hpf    Casts 5-10 0 /lpf   CULTURE, BLOOD    Collection Time: 12/01/17  6:04 PM   Result Value Ref Range    Special Requests: LEFT  HAND        Culture result: NO GROWTH AFTER 15 HOURS     CULTURE, BLOOD Collection Time: 12/01/17  6:05 PM   Result Value Ref Range    Special Requests: RIGHT  FOREARM        Culture result: NO GROWTH AFTER 15 HOURS     POC LACTIC ACID    Collection Time: 12/01/17  6:10 PM   Result Value Ref Range    Lactic Acid (POC) 4.8 (H) 0.5 - 1.9 mmol/L   GLUCOSE, POC    Collection Time: 12/01/17  7:34 PM   Result Value Ref Range    Glucose (POC) 358 (H) 65 - 100 mg/dL   CULTURE, WOUND W GRAM STAIN    Collection Time: 12/01/17  8:10 PM   Result Value Ref Range    Special Requests: NO SPECIAL REQUESTS      GRAM STAIN PENDING     Culture result:        NO GROWTH AFTER SHORT PERIOD OF INCUBATION. FURTHER RESULTS TO FOLLOW AFTER OVERNIGHT INCUBATION. CULTURE, WOUND Rich Medico STAIN    Collection Time: 12/01/17  8:10 PM   Result Value Ref Range    Special Requests: NO SPECIAL REQUESTS      GRAM STAIN PENDING     Culture result:        NO GROWTH AFTER SHORT PERIOD OF INCUBATION. FURTHER RESULTS TO FOLLOW AFTER OVERNIGHT INCUBATION.    POC CG8I    Collection Time: 12/01/17  9:11 PM   Result Value Ref Range    pH (POC) 7.357 7.35 - 7.45      pCO2 (POC) 44.9 35 - 45 MMHG    pO2 (POC) 267 (H) 80 - 105 MMHG    HCO3 (POC) 25.2 22 - 26 MMOL/L    sO2 (POC) 100 (H) 95 - 98 %    Base excess (POC) 0 mmol/L    Sodium,  136 - 145 MMOL/L    Potassium, POC 3.7 3.5 - 5.1 MMOL/L    Glucose,  (H) 65 - 100 MG/DL    Calcium, ionized (POC) 1.03 (L) 1.12 - 1.32 MMOL/L   GLUCOSE, POC    Collection Time: 12/01/17 10:19 PM   Result Value Ref Range    Glucose (POC) 244 (H) 65 - 100 mg/dL   BLOOD GAS, ARTERIAL    Collection Time: 12/02/17  2:20 AM   Result Value Ref Range    pH 7.43 7.35 - 7.45      PCO2 33 (L) 35 - 45 mmHg    PO2 213 (H) 80 - 105 mmHg    BICARBONATE 22 22 - 26 mmol/L    BASE DEFICIT 2.0 0 - 2 mmol/L    TOTAL HEMOGLOBIN 12.4 11.7 - 15.0 GM/DL    O2  (H) 92 - 98.5 %    Arterial O2 Hgb 97.9 (H) 94 - 97 %    CARBOXYHEMOGLOBIN 1.1 0.5 - 1.5 %    METHEMOGLOBIN 0.5 0.0 - 1.5 %    DEOXYHEMOGLOBIN 1 0.0 - 5.0 %    SITE NUHA     ALLENS TEST NA     MODE PRVC     FIO2 50.0 %    Tidal volume 450.0      RATE 15.0      PEEP/CPAP 8.0     CBC WITH AUTOMATED DIFF    Collection Time: 12/02/17  4:19 AM   Result Value Ref Range    WBC 23.5 (H) 4.3 - 11.1 K/uL    RBC 4.07 (L) 4.23 - 5.67 M/uL    HGB 12.0 (L) 13.6 - 17.2 g/dL    HCT 35.0 (L) 41.1 - 50.3 %    MCV 86.0 79.6 - 97.8 FL    MCH 29.5 26.1 - 32.9 PG    MCHC 34.3 31.4 - 35.0 g/dL    RDW 13.2 11.9 - 14.6 %    PLATELET 739 631 - 009 K/uL    MPV 12.6 10.8 - 14.1 FL    NEUTROPHILS 90 (H) 43 - 78 %    LYMPHOCYTES 4 (L) 13 - 44 %    MONOCYTES 6 4.0 - 12.0 %    EOSINOPHILS 0 (L) 0.5 - 7.8 %    BASOPHILS 0 0.0 - 2.0 %    IMMATURE GRANULOCYTES 2 0.0 - 5.0 %    ABS. NEUTROPHILS 21.2 (H) 1.7 - 8.2 K/UL    ABS. LYMPHOCYTES 0.9 0.5 - 4.6 K/UL    ABS. MONOCYTES 1.4 (H) 0.1 - 1.3 K/UL    ABS. EOSINOPHILS 0.0 0.0 - 0.8 K/UL    ABS. BASOPHILS 0.0 0.0 - 0.2 K/UL    ABS. IMM. GRANS. 0.5 0.0 - 0.5 K/UL    RBC COMMENTS SLIGHT  ANISOCYTOSIS        WBC COMMENTS OCCASIONAL      PLATELET COMMENTS ADEQUATE      DF AUTOMATED     METABOLIC PANEL, COMPREHENSIVE    Collection Time: 12/02/17  4:19 AM   Result Value Ref Range    Sodium 141 136 - 145 mmol/L    Potassium 3.6 3.5 - 5.1 mmol/L    Chloride 104 98 - 107 mmol/L    CO2 22 21 - 32 mmol/L    Anion gap 15 7 - 16 mmol/L    Glucose 325 (H) 65 - 100 mg/dL    BUN 81 (H) 8 - 23 MG/DL    Creatinine 1.16 0.8 - 1.5 MG/DL    GFR est AA >60 >60 ml/min/1.73m2    GFR est non-AA >60 >60 ml/min/1.73m2    Calcium 7.2 (L) 8.3 - 10.4 MG/DL    Bilirubin, total 1.2 (H) 0.2 - 1.1 MG/DL    ALT (SGPT) 12 12 - 65 U/L    AST (SGOT) 14 (L) 15 - 37 U/L    Alk.  phosphatase 77 50 - 136 U/L    Protein, total 5.8 (L) 6.3 - 8.2 g/dL    Albumin 1.7 (L) 3.2 - 4.6 g/dL    Globulin 4.1 (H) 2.3 - 3.5 g/dL    A-G Ratio 0.4 (L) 1.2 - 3.5     LACTIC ACID    Collection Time: 12/02/17  4:19 AM   Result Value Ref Range    Lactic acid 3.5 (HH) 0.4 - 2.0 MMOL/L   TYPE & SCREEN Collection Time: 12/02/17  4:23 AM   Result Value Ref Range    Crossmatch Expiration 12/05/2017     ABO/Rh(D) Vince Phillip NEGATIVE     Antibody screen NEG    LACTIC ACID    Collection Time: 12/02/17 11:37 AM   Result Value Ref Range    Lactic acid 3.5 (HH) 0.4 - 2.0 MMOL/L       All Micro Results     Procedure Component Value Units Date/Time    CULTURE, BLOOD [[id number]] Collected:  12/01/17 1804    Order Status:  Completed Specimen:  Whole Blood from Blood Updated:  12/02/17 1113     Special Requests: --        LEFT  HAND       Culture result: NO GROWTH AFTER 15 HOURS       CULTURE, BLOOD [[id number]] Collected:  12/01/17 1805    Order Status:  Completed Specimen:  Whole Blood from Blood Updated:  12/02/17 1113     Special Requests: --        RIGHT  FOREARM       Culture result: NO GROWTH AFTER 15 HOURS       CULTURE, Christianson Galindo STAIN [[id number]] Collected:  12/01/17 2010    Order Status:  Completed Specimen:  Groin Updated:  12/02/17 1046     Special Requests: NO SPECIAL REQUESTS        GRAM STAIN PENDING     Culture result:         NO GROWTH AFTER SHORT PERIOD OF INCUBATION. FURTHER RESULTS TO FOLLOW AFTER OVERNIGHT INCUBATION. CULTURE, Christianson Galindo STAIN [[id number]] Collected:  12/01/17 2010    Order Status:  Completed Specimen:  Groin Updated:  12/02/17 1045     Special Requests: NO SPECIAL REQUESTS        GRAM STAIN PENDING     Culture result:         NO GROWTH AFTER SHORT PERIOD OF INCUBATION. FURTHER RESULTS TO FOLLOW AFTER OVERNIGHT INCUBATION.     Bettye Reynaga [[id number]] Collected:  12/01/17 2010    Order Status:  Completed Updated:  12/02/17 0149    CULTURE, ANAEROBIC [[id number]] Collected:  12/01/17 2010    Order Status:  Completed Updated:  12/02/17 0149    CULTURE, ANAEROBIC [[id number]]     Order Status:  Canceled Specimen:  Wound Drainage     CULTURE, WOUND Bernie Daring STAIN [[id number]]     Order Status:  Canceled Specimen:  Wound from Groin     CULTURE, Christianson Galindo STAIN [[id number]]     Order Status: Canceled Specimen:  Wound from Groin     CULTURE, ANAEROBIC [[id number]]     Order Status:  Canceled Specimen:  Wound Drainage           Imaging /Procedures /Studies:    CXR Results  (Last 48 hours)               12/01/17 2211  XR CHEST SNGL V Final result    Impression:  IMPRESSION:    1. Low position of endotracheal tube tip overlying the most proximal right   mainstem bronchus. Retraction of this by 3 cm should place this at a more   acceptable position. 2.  Slight high position of feeding tube with the tip overlying the stomach   although the side-port likely overlies the distal esophagus. Advancement of this   by 4 cm should ensure that the side port is below the gastroesophageal junction. 3. No evolving acute cardiopulmonary changes. The abnormal endotracheal tube position was discussed with nursing staff a   muscle personally at 10:15 PM on 12/1/2017. Narrative:  CHEST RADIOGRAPH, 1 views, 12/1/2017       History: Intubation and central line placement. Technique: Portable frontal view of the chest.        Comparison: Chest radiograph 12/1/2017 at 5:15 PM       Findings:    The patient is now intubated. The endotracheal tube tip overlies the most   proximal right mainstem bronchus consistent with a right mainstem intubation. Retraction of this by 3 cm should place this in a more acceptable position. A   feeding tube tip overlies the most proximal stomach. The side-port is likely   above the gastroesophageal junction. Advancement of this by a proximally 4 cm   should ensure that the side port is below the gastroesophageal junction. The   cardiac silhouette is nonenlarged. Lungs are expanded without pneumothorax. No   consolidation, or pleural effusion is seen. 12/01/17 1724  XR CHEST PA LAT Final result    Impression:  IMPRESSION:    1. No acute cardiopulmonary process evident by plain film imaging.                    Narrative:  CHEST X-RAY, 2 views 12/1/2017 History: Elevated white blood cell count. Technique: PA and lateral views of the chest.        Comparison: None. Findings: The cardiac silhouette is normal in respect to size. The lungs are expanded   without evidence for pneumothorax. No consolidation, or evidence of pleural   effusion is seen. Only mild interstitial prominence is seen at the lung bases   likely representing scarring or atelectasis. The bony thorax demonstrates no acute changes. The upper abdomen is   unremarkable in appearance. CT Results  (Last 48 hours)               12/01/17 1715  CT HEAD WO CONT Final result    Impression:  IMPRESSION:     1. Small right subdural hematoma measuring 4 mm which demonstrates intermediate   attenuation suggesting a subacute although recent subdural hematoma. The subacute appearing right subdural hematoma was discussed with Dr. Seth Grant by   myself personally at 5:05 PM on 12/1/2017. Narrative:  CT HEAD WITHOUT CONTRAST, 12/1/2017        History: Confusion and hyperglycemia. Patient fell in front of neighbors. Comparison: None        Technique:   5 mm axial scans from the skull base to the vertex. All CT scans   performed at this facility use one or all of the following: Automated exposure   control, adjustment of the mA and/or kVp according to patient's size, iterative   reconstruction. Findings: An abnormal moderately hyperdense collection is seen along the right   lateral and high convexity measuring up to 4 mm which is favored to represent a   subacute although recent subdural hematoma given the lack of significant   hyperdensity. Moderate cortical involutional changes are seen which are not   felt to be abnormal given the patient's age. No evidence for acute   hydrocephalus is seen. No evidence of midline shift or herniation is seen. No   abnormal edema pattern is seen in a vascular distribution to suggest large   artery infarction. Only chronic encephalomalacic changes are seen in the right   cerebellar hemisphere consistent with a chronic right PICA infarction. Evaluation with bone windows shows no acute osseous changes. The visualized   sinuses, middle ears, and mastoid air cells are well aerated. No results found for this visit on 12/01/17. Labs and Studies from previous 24 hours have been personally reviewed by myself Meg Walker as of 12/2/2017  Date Reviewed: 12/1/2017          Codes Class Noted - Resolved POA    Lactic acidosis ICD-10-CM: E87.2  ICD-9-CM: 276.2  12/2/2017 - Present Unknown        Subdural hematoma ([facility] ICD-10-CM: I62.00  ICD-9-CM: 432.1  12/1/2017 - Present Yes        Type II diabetes mellitus with complication ([facility] WAK-27-QD: E11.8  ICD-9-CM: 250.90  12/1/2017 - Present Yes        LAUREANO (acute kidney injury) ([facility] ICD-10-CM: N17.9  ICD-9-CM: 584.9  12/1/2017 - Present Yes        Syncope ICD-10-CM: R55  ICD-9-CM: 780.2  12/1/2017 - Present Yes        Leukocytosis ICD-10-CM: T13.952  ICD-9-CM: 288.60  12/1/2017 - Present Yes        Fasciitis ICD-10-CM: M72.9  ICD-9-CM: 729.4  12/1/2017 - Present Yes        * (Principal)Severe sepsis with septic shock ([facility] ICD-10-CM: A41.9, R65.21  ICD-9-CM: 038.9, 995.92, 785.52  12/1/2017 - Present Yes        Encounter for weaning from ventilator [facility]) ICD-10-CM: Z99.11  ICD-9-CM: V46.13  12/1/2017 - Present Yes        Electrolyte abnormality ICD-10-CM: E87.8  ICD-9-CM: 276.9  12/1/2017 - Present Yes              Plan:  - continue broad spectrum abx - follow cultures. follow serial lactic acid. - on IV hydration and vassopressors  - ortho consult for evaluation for amputation  - intubated and sedated  - will need repeat CT Head when safely can go to CT. Avoid anticoagulation/ antiplatelets  - On ISS.  HgA1c:9.7  - renal function improving - monitor  - keep in ICU  - appreciate multiple specialists help      DVT Prophylaxis: SCDS  CODE Status: Full CODE  Plan of Care Discussed with: patient daughter and son in law at bedside in ICU. Care team.  Medical Risk: high. Critically ill.  Guarded outcome  Disposition: keep in ICU    Olga Castro MD  12/02/17

## 2017-12-02 NOTE — PROGRESS NOTES
Spoke with Dr. Jacque Li, CVP set up per request, current reading 6. Ordered to give 1 liter NS bolus at this time. Will monitor.

## 2017-12-02 NOTE — PROGRESS NOTES
Pt's only daughter named Mendy Stevens at bedside. Explained about pt going back to OR in the morning, daughter refused to sign consent for exploratory laparotomy for loop colostomy and for re-exploring scrotal abscess.

## 2017-12-02 NOTE — CONSULTS
CONSULT NOTE    Lazarus North    12/1/2017    Date of Admission:  12/1/2017    The patient's chart is reviewed and the patient is discussed with the staff. Subjective:     Patient is a 79 y.o.  male seen and evaluated at the request of Dr. Damon Rehman. This patient was admitted to the hospital after he had a syncopal episode. Upon evaluation in the emergency room he was found to have a small subdural hematoma on head CT but he was found to have very large abscess in the right thigh area extended from his right groin all the way to the right popliteal area. This required urgent surgical consultation. Patient was taken to the operating room and underwent drainage of the abscess area. Postoperatively he returned back to the intensive care unit intubated and mechanically ventilated. Currently he is sedated and on Levophed drip because of severe hypotension. Patient is does not see a primary care physician on a regular basis. No family is available at this point for any further history. Review of Systems  Review of systems not obtained due to patient factors. 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 None       Past Medical History:   Diagnosis Date    Diabetes [facility])      History reviewed. No pertinent surgical history. Social History     Social History    Marital status: SINGLE     Spouse name: N/A    Number of children: N/A    Years of education: N/A     Occupational History    Not on file.      Social History Main Topics    Smoking status: Former Smoker    Smokeless tobacco: Not on file    Alcohol use Not on file    Drug use: Not on file    Sexual activity: Not on file     Other Topics Concern    Not on file     Social History Narrative    No narrative on file     History reviewed. No pertinent family history. No Known Allergies    Current Facility-Administered Medications   Medication Dose Route Frequency    [START ON 12/2/2017] sodium chloride (NS) flush 5-10 mL  5-10 mL IntraVENous Q8H    sodium chloride (NS) flush 5-10 mL  5-10 mL IntraVENous PRN    [START ON 12/2/2017] 0.9% sodium chloride infusion  125 mL/hr IntraVENous CONTINUOUS    [START ON 12/2/2017] enoxaparin (LOVENOX) injection 40 mg  40 mg SubCUTAneous Q24H    [START ON 12/2/2017] ampicillin-sulbactam (UNASYN) 3 g in 0.9% sodium chloride (MBP/ADV) 100 mL  3 g IntraVENous Q6H    [START ON 12/2/2017] clindamycin phosphate (CLEOCIN) 900 mg in 0.9% sodium chloride 100 mL IVPB  900 mg IntraVENous Q6H    [START ON 12/2/2017] pantoprazole (PROTONIX) 40 mg in sodium chloride 0.9 % 10 mL injection  40 mg IntraVENous DAILY         Objective:     Vitals:    12/01/17 2301 12/01/17 2316 12/01/17 2331 12/01/17 2333   BP: 128/63 120/62 112/64    Pulse: (!) 108 (!) 113 (!) 116 (!) 117   Resp: 15 21 24 26   Temp:       SpO2: 97% 96% 95% 94%   Weight:       Height:           PHYSICAL EXAM     Constitutional:  the patient is chronically ill, orally intubated and mechanically ventilated  EENMT:  Sclera clear, pupils equal, oral mucosa moist  Respiratory: Clear anteriorly  Cardiovascular:  RRR without M,G,R  Gastrointestinal: soft and non-tender; with positive bowel sounds. Musculoskeletal: warm without cyanosis.  There is large abscess in the right thigh area posteriorly which was drained in the operating room and he has a ROSALINDA drain at the present time  Skin:  no jaundice or rashes, surgical wounds are noted  Neurologic: no gross neuro deficits     Psychiatric: Sedated at present    CXR:              Recent Labs      12/01/17   1501   WBC  21.7*   HGB  13.9   HCT 40.4*   PLT  190     Recent Labs      12/01/17   1501   NA  135*   K  3.3*   CL  92*   GLU  426*   CO2  25   BUN  79*   CREA  1.52*   MG  3.2*   PHOS  2.9   CA  8.3   ALB  2.3*   TBILI  1.5*   ALT  15   SGOT  15     No results for input(s): PH, PCO2, PO2, HCO3 in the last 72 hours. No results for input(s): LCAD, LAC in the last 72 hours. Assessment:  (Medical Decision Making)     Hospital Problems  Date Reviewed: 12/1/2017          Codes Class Noted POA    Subdural hematoma ([facility] ICD-10-CM: I62.00  ICD-9-CM: 432.1  12/1/2017 Yes        Type II diabetes mellitus with complication ([facility] SOY-35-YJ: E11.8  ICD-9-CM: 250.90  12/1/2017 Yes        LAUREANO (acute kidney injury) ([facility] ICD-10-CM: N17.9  ICD-9-CM: 584.9  12/1/2017 Yes        Syncope ICD-10-CM: R55  ICD-9-CM: 780.2  12/1/2017 Yes        Leukocytosis ICD-10-CM: C45.507  ICD-9-CM: 288.60  12/1/2017 Yes        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  ICD-9-CM: 608.83  12/1/2017 Yes        Septic shock ([facility] ICD-10-CM: A41.9, R65.21  ICD-9-CM: 038.9, 785.52, 995.92  12/1/2017 Yes        Encounter for weaning from ventilator [facility]) ICD-10-CM: Z99.11  ICD-9-CM: V46.13  12/1/2017 Yes        Electrolyte abnormality ICD-10-CM: E87.8  ICD-9-CM: 276.9  12/1/2017 Yes              Plan:  (Medical Decision Making)     --Adjust ventilator support  --Broad-spectrum antibiotic  --Continue Levophed drip for now and titrate to maintain MAP>/65  --He is likely to require further debridement of his wound. Therefore, we would keep him on complete vent support at this point  --DVT and GI prophylaxis  --Close follow-up for his lab value including chemistry, electrolytes replacement as needed by nutrition protocol, ABGs  --Poor ET tube back 2 cm for better positioning    He is definitely very critical at present. More than 50% of the time documented was spent in face-to-face contact with the patient and in the care of the patient on the floor/unit where the patient is located.     Thank you very much for this referral.  We appreciate the opportunity to participate in this patient's care. Will follow along with above stated plan.     Virgie Hooks MD

## 2017-12-02 NOTE — PROGRESS NOTES
After 1 liter NS bolus given, pt remains hypotensive- SBP in 70's and MAP 56. Levophed gtt started at 4 mcg/min and then increased to 6 mcg/min. Will continue to monitor closely.

## 2017-12-02 NOTE — PROGRESS NOTES
[facility]  Admission Date: 12/1/2017             Daily Progress Note: 12/2/2017    The patient's chart is reviewed and the patient is discussed with the staff. 68yo WMadmitted with syncope. 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 Subjective:     Patient just received from OR. Intubated and on levophed and rosa. Surgery to discuss amputation with ortho. Current Facility-Administered Medications   Medication Dose Route Frequency    midazolam in normal saline (VERSED) 1 mg/mL infusion  0-10 mg/hr IntraVENous TITRATE    NOREPINephrine (LEVOPHED) 4 mg in 5% dextrose 250 mL infusion  2-16 mcg/min IntraVENous TITRATE    PHENYLephrine (ROSA-SYNEPHRINE) 30 mg in 0.9% sodium chloride 250 mL infusion   mcg/min IntraVENous TITRATE    sodium chloride (NS) flush 5-10 mL  5-10 mL IntraVENous Q8H    sodium chloride (NS) flush 5-10 mL  5-10 mL IntraVENous PRN    0.9% sodium chloride infusion  200 mL/hr IntraVENous CONTINUOUS    enoxaparin (LOVENOX) injection 40 mg  40 mg SubCUTAneous Q24H    ampicillin-sulbactam (UNASYN) 3 g in 0.9% sodium chloride (MBP/ADV) 100 mL  3 g IntraVENous Q6H    clindamycin phosphate (CLEOCIN) 900 mg in 0.9% sodium chloride 100 mL IVPB  900 mg IntraVENous Q6H    famotidine (PF) (PEPCID) 20 mg in sodium chloride 0.9 % 10 mL injection  20 mg IntraVENous Q24H    fentaNYL in normal saline (pf) 25 mcg/mL infusion   mcg/hr IntraVENous TITRATE    vancomycin (VANCOCIN) 750 mg in  ml infusion  750 mg IntraVENous Q12H       Review of Systems  Unobtainable due to patient status.     Objective:     Vitals:    12/02/17 0745 12/02/17 0759 12/02/17 1038 12/02/17 1053   BP: 104/58  107/42    Pulse: (!) 117 (!) 117 (!) 112 (!) 117   Resp: 22 23 16 15   Temp:   (!) 101.3 °F (38.5 °C)    SpO2: 100% 100% 99% 99%   Weight:       Height:         Intake and Output: 11/30 1901 - 12/02 0700  In: 2000 [I.V.:2000]  Out: 1300 [Urine:1100]  12/02 0701 - 12/02 1900  In: 1900 [I.V.:1900]  Out: 350 [Urine:340]    Physical Exam:   Constitution:  the patient is well developed and in no acute distress  EENMT:  Sclera clear, pupils equal, oral mucosa moist  Respiratory: Intubated. Cardiovascular:  Tachy, regular. without M,G,R  Gastrointestinal: soft and non-tender; with positive bowel sounds. Musculoskeletal: warm without cyanosis. There is trace lower leg edema. Skin:  no jaundice or rashes, surgical wounds R thigh. Neurologic: sedated    Psychiatric:  sedated    CHEST XRAY:   12/1/17  1. Low position of endotracheal tube tip overlying the most proximal right  mainstem bronchus. Retraction of this by 3 cm should place this at a more  acceptable position.     2.  Slight high position of feeding tube with the tip overlying the stomach  although the side-port likely overlies the distal esophagus. Advancement of this  by 4 cm should ensure that the side port is below the gastroesophageal junction.     3. No evolving acute cardiopulmonary changes.       LAB  No lab exists for component: [place]   Recent Labs      12/02/17   0419  12/01/17   1501   WBC  23.5*  21.7*   HGB  12.0*  13.9   HCT  35.0*  40.4*   PLT  211  190     Recent Labs      12/02/17   0419  12/01/17   1501   NA  141  135*   K  3.6  3.3*   CL  104  92*   CO2  22  25   GLU  325*  426*   BUN  81*  79*   CREA  1.16  1.52*   MG   --   3.2*   CA  7.2*  8.3   PHOS   --   2.9   ALB  1.7*  2.3*   SGOT  14*  15     Recent Labs      12/02/17   0220   PH  7.43   PCO2  33*   PO2  213*   HCO3  22       MICRO  Blood - NGTD    Assessment:  (Medical Decision Making)     Hospital Problems  Date Reviewed: 12/1/2017          Codes Class Noted POA    Subdural hematoma ([facility] ICD-10-CM: I62.00  ICD-9-CM: 432.1  12/1/2017 Yes        Type II diabetes mellitus with complication ([facility] NTT-57-KZ: E11.8  ICD-9-CM: 250.90  12/1/2017 Yes        LAUREANO (acute kidney injury) ([facility] ICD-10-CM: N17.9  ICD-9-CM: 584.9  12/1/2017 Yes        Syncope ICD-10-CM: R55  ICD-9-CM: 780.2  12/1/2017 Yes        Leukocytosis ICD-10-CM: F25.996  ICD-9-CM: 288.60  12/1/2017 Yes        Ady gangrene ICD-10-CM: N49.3  ICD-9-CM: 608.83  12/1/2017 Yes        * (Principal)Septic shock ([facility] ICD-10-CM: A41.9, R65.21  ICD-9-CM: 038.9, 785.52, 995.92  12/1/2017 Yes        Encounter for weaning from ventilator [facility]) ICD-10-CM: Z99.11  ICD-9-CM: V46.13  12/1/2017 Yes        Electrolyte abnormality ICD-10-CM: E87.8  ICD-9-CM: 276.9  12/1/2017 Yes            Pt with severe ady's gangrene, resultant sepsis. Worsening hypotension post op. CXR with low ETT and high NG tube positioning. Plan:  (Medical Decision Making)   -repeat stat CXR now to check position of tubes. -continue current abx coverage.   -check lactate now and every 6 hours.   -check cvp now to determine need for additional aggressive IVF resuscitation. -patient will remain on vent with hemodynamic instability and need for additional surgery.        Richard Earl MD

## 2017-12-02 NOTE — PROGRESS NOTES
Patient out from operating room and placed on the ventilator on documented settings. Patient is orally intubated with a # 8.0 ET Tube secured at the 22 cm [place] at the [place]. Breath sounds are diminished. Trachea is midline. Negative for subcutaneous air, chest excursion is symmetric. Negative for pitting edema. Patient is also Negative for cyanosis. Patient has  arterial line. All alarms are set and audible. Resuscitation bag is at the head of the bed. Ventilator Settings  Mode FIO2 Rate Tidal Volume Pressure PEEP I:E Ratio   PRVC  60 %   15   500    8 cm H20  1:2.00        Peak airway pressure: 17.2 cm H2O   Minute ventilation: 7.6 l/min     ABG: No results for input(s): PH, PCO2, PO2, HCO3 in the last 72 hours.       RT Murray

## 2017-12-02 NOTE — PERIOP NOTES
Pt not consentable, daughter refused to sign consent for surgery. Signatures obtained from Dr Erin Robin and Dr Edin Berman for surgery.

## 2017-12-02 NOTE — PROGRESS NOTES
Events overnight noted. Daughter at bedside  Plan return to OR for further debridement, transverse colostomy.

## 2017-12-02 NOTE — PROGRESS NOTES
Pt responding to fluid, CVP now 8 HR down in the 90s. Will give one more liter of NS per Dr. Sade Monzon.

## 2017-12-02 NOTE — PROGRESS NOTES
Bedside, Verbal and Written shift change report given to Chloé Albert RN (oncoming nurse) by Tom Eisenberg RN (offgoing nurse). Report included the following information SBAR, Kardex, ED Summary, OR Summary, Procedure Summary, Intake/Output, MAR, Accordion, Recent Results, Med Rec Status, Cardiac Rhythm Sinus Tach and Alarm Parameters . Dually verified drips:   Fentanyl drip at 50 mcg/ hr  Versed at 2 mg/hr. Levophed at 6 mcg/min     Pulses on right lower leg found with doppler, however very faint.

## 2017-12-02 NOTE — H&P
HOSPITALIST H&P/CONSULT  NAME:  Jourdan Green   Age:  79 y.o.  :   1950   MRN:   [id number]  PCP: PROVIDER Yael Guerrier  Consulting MD:  Treatment Team: Attending Provider: Rebeca Leslie MD; Primary Nurse: Xavier Molina RN; Surgeon: Margarita Dao MD  HPI:   Patient 70D with no known PMHx - has never seen a doctor as an adult. Per EMS report was brought to ED after syncopal episode. Patient opened the door for a friend and passed out falling backwards. Patient says he is here because he thinks he has pneumonia due to a cough (cxr clear). Patient is adentuous and hard to understand, poor historian. Multiple other issues revealed during ED course. Subdural hematoma on CT head - reviewed by neurosx (no intervention). WBC 21K, , Cr 1.5 and found to have large foul smelling abscess of right inner thigh with purulent drainage. Surgery has seen with plans for operation tonight. Complete ROS done and is as stated in HPI or otherwise negative  History reviewed. No pertinent past medical history. History reviewed. No pertinent surgical history. None     No Known Allergies   Social History   Substance Use Topics    Smoking status: Not on file    Smokeless tobacco: Not on file    Alcohol use Not on file      History reviewed. No pertinent family history. Objective:     Visit Vitals    /64    Pulse 100    Temp 98.1 °F (36.7 °C)    Resp 16    Ht 5' 11\" (1.803 m)    Wt 72.1 kg (159 lb)    SpO2 92%    BMI 22.18 kg/m2      Temp (24hrs), Av.1 °F (36.7 °C), Min:98.1 °F (36.7 °C), Max:98.1 °F (36.7 °C)    Oxygen Therapy  O2 Sat (%): 92 % (17 1800)  Pulse via Oximetry: 100 beats per minute (17 1800)  O2 Device: Nasal cannula (17)  O2 Flow Rate (L/min): 2 l/min (17 155)  Physical Exam:  General:    Alert, cooperative, no distress, appears stated age. Head:   Normocephalic, without obvious abnormality, atraumatic.   Nose:  Nares normal. No drainage or sinus tenderness. Lungs:   Clear to auscultation bilaterally. No Wheezing or Rhonchi. No rales. Heart:   Regular rate and rhythm,  no murmur, rub or gallop. Abdomen:   Soft, non-tender. Not distended. Bowel sounds normal.   Extremities: Necrotic ulceration of right medial thigh with purulent foul smelling drainage. Skin:     Texture, turgor normal. No rashes or lesions. Not Jaundiced  Neurologic: Alert and oriented x 3, no focal deficits   Data Review:   Recent Results (from the past 24 hour(s))   CBC WITH AUTOMATED DIFF    Collection Time: 12/01/17  3:01 PM   Result Value Ref Range    WBC 21.7 (H) 4.3 - 11.1 K/uL    RBC 4.67 4.23 - 5.67 M/uL    HGB 13.9 13.6 - 17.2 g/dL    HCT 40.4 (L) 41.1 - 50.3 %    MCV 86.5 79.6 - 97.8 FL    MCH 29.8 26.1 - 32.9 PG    MCHC 34.4 31.4 - 35.0 g/dL    RDW 13.1 11.9 - 14.6 %    PLATELET 315 810 - 017 K/uL    MPV 12.9 10.8 - 14.1 FL    NEUTROPHILS 66 47 - 75 %    LYMPHOCYTES 22 16 - 44 %    MONOCYTES 4 3 - 9 %    BASOPHILS 1 0 - 2 %    METAMYELOCYTES 2 %    MYELOCYTES 5 %    ABS. NEUTROPHILS 15.8 (H) 1.7 - 8.2 K/UL    ABS. LYMPHOCYTES 4.8 (H) 0.5 - 4.6 K/UL    ABS. MONOCYTES 0.9 0.1 - 1.3 K/UL    ABS. BASOPHILS 0.2 0.0 - 0.2 K/UL    RBC COMMENTS SLIGHT  ANISOCYTOSIS + POIKILOCYTOSIS        WBC COMMENTS OCCASIONAL      PLATELET COMMENTS ADEQUATE      DF AUTOMATED     METABOLIC PANEL, COMPREHENSIVE    Collection Time: 12/01/17  3:01 PM   Result Value Ref Range    Sodium 135 (L) 136 - 145 mmol/L    Potassium 3.3 (L) 3.5 - 5.1 mmol/L    Chloride 92 (L) 98 - 107 mmol/L    CO2 25 21 - 32 mmol/L    Anion gap 18 (H) 7 - 16 mmol/L    Glucose 426 (H) 65 - 100 mg/dL    BUN 79 (H) 8 - 23 MG/DL    Creatinine 1.52 (H) 0.8 - 1.5 MG/DL    GFR est AA 59 (L) >60 ml/min/1.73m2    GFR est non-AA 49 (L) >60 ml/min/1.73m2    Calcium 8.3 8.3 - 10.4 MG/DL    Bilirubin, total 1.5 (H) 0.2 - 1.1 MG/DL    ALT (SGPT) 15 12 - 65 U/L    AST (SGOT) 15 15 - 37 U/L    Alk.  phosphatase 99 50 - 136 U/L Protein, total 7.1 6.3 - 8.2 g/dL    Albumin 2.3 (L) 3.2 - 4.6 g/dL    Globulin 4.8 (H) 2.3 - 3.5 g/dL    A-G Ratio 0.5 (L) 1.2 - 3.5     PHOSPHORUS    Collection Time: 12/01/17  3:01 PM   Result Value Ref Range    Phosphorus 2.9 2.3 - 3.7 MG/DL   MAGNESIUM    Collection Time: 12/01/17  3:01 PM   Result Value Ref Range    Magnesium 3.2 (H) 1.8 - 2.4 mg/dL   LIPASE    Collection Time: 12/01/17  3:01 PM   Result Value Ref Range    Lipase 43 (L) 73 - 393 U/L   EKG, 12 LEAD, INITIAL    Collection Time: 12/01/17  3:42 PM   Result Value Ref Range    Ventricular Rate 109 BPM    Atrial Rate 109 BPM    P-R Interval 126 ms    QRS Duration 78 ms    Q-T Interval 318 ms    QTC Calculation (Bezet) 428 ms    Calculated P Axis 72 degrees    Calculated R Axis 37 degrees    Calculated T Axis 58 degrees    Diagnosis       !! AGE AND GENDER SPECIFIC ECG ANALYSIS !! Sinus tachycardia  Otherwise normal ECG  No previous ECGs available  Confirmed by LUCAS ABBASI (), Slava Munoz ([id number]) on 12/1/2017 5:30:55 PM     TSH 3RD GENERATION    Collection Time: 12/01/17  3:45 PM   Result Value Ref Range    TSH 0.447 0.358 - 3.740 uIU/mL   POC TROPONIN-I    Collection Time: 12/01/17  3:46 PM   Result Value Ref Range    Troponin-I (POC) 0 (L) 0.02 - 0.05 ng/ml   URINE MICROSCOPIC    Collection Time: 12/01/17  4:42 PM   Result Value Ref Range    WBC 0-3 0 /hpf    RBC 3-5 0 /hpf    Epithelial cells 0-3 0 /hpf    Bacteria 1+ (H) 0 /hpf    Casts 5-10 0 /lpf   POC LACTIC ACID    Collection Time: 12/01/17  6:10 PM   Result Value Ref Range    Lactic Acid (POC) 4.8 (H) 0.5 - 1.9 mmol/L     Imaging Iris Inez /Studies   Status Provider Status         Final result (Exam End: 12/1/2017  5:24 PM) Open        Study Result      CHEST X-RAY, 2 views 12/1/2017     History: Elevated white blood cell count.     Technique: PA and lateral views of the chest.      Comparison: None.     Findings: The cardiac silhouette is normal in respect to size.   The lungs are expanded  without evidence for pneumothorax. No consolidation, or evidence of pleural  effusion is seen. Only mild interstitial prominence is seen at the lung bases  likely representing scarring or atelectasis.     The bony thorax demonstrates no acute changes. The upper abdomen is  unremarkable in appearance.     IMPRESSION  IMPRESSION:   1. No acute cardiopulmonary process evident by plain film imaging. Final result (Exam End: 12/1/2017  5:15 PM) Open        Study Result      CT HEAD WITHOUT CONTRAST, 12/1/2017      History: Confusion and hyperglycemia. Patient fell in front of neighbors.      Comparison: None      Technique:   5 mm axial scans from the skull base to the vertex. All CT scans  performed at this facility use one or all of the following: Automated exposure  control, adjustment of the mA and/or kVp according to patient's size, iterative  reconstruction.      Findings: An abnormal moderately hyperdense collection is seen along the right  lateral and high convexity measuring up to 4 mm which is favored to represent a  subacute although recent subdural hematoma given the lack of significant  hyperdensity. Moderate cortical involutional changes are seen which are not  felt to be abnormal given the patient's age. No evidence for acute  hydrocephalus is seen. No evidence of midline shift or herniation is seen. No  abnormal edema pattern is seen in a vascular distribution to suggest large  artery infarction. Only chronic encephalomalacic changes are seen in the right  cerebellar hemisphere consistent with a chronic right PICA infarction.     Evaluation with bone windows shows no acute osseous changes. The visualized  sinuses, middle ears, and mastoid air cells are well aerated.     IMPRESSION  IMPRESSION:    1.   Small right subdural hematoma measuring 4 mm which demonstrates intermediate  attenuation suggesting a subacute although recent subdural hematoma.           Assessment and Plan: Active Hospital Problems    Diagnosis Date Noted    Subdural hematoma ([facility] 12/01/2017    Type II diabetes mellitus with complication ([facility] 74/33/6399    LAUREANO (acute kidney injury) ([facility] 12/01/2017    Syncope 12/01/2017    Leukocytosis 12/01/2017    Shima gangrene 12/01/2017     A/P  - Subdural hematoma - will admit to ICU. Non surgical. Repeat CT head in AM and if stable plan for transfer to floor. Do not appreciate any neuro deficits. - Shima gangrene - sx has seen - plans for OR tonight. Keep NPO. IV atbx. Will follow-up intra-op cultures  - Leukocytosis - likely r/t above. However, smudge cells and atypical lymphocytes on smear. Consider oncology consult if no improvement in leukocytosis with antibiotic  - Syncope - echo, carotid duplex. Telemetry  - LAUREANO - uncertain baseline.  Cont ivf and repeat labs in AM. UA ok      Code Status: full code    Anticipated discharge: scds    Signed By: Anthony Guzman DO     December 1, 2017

## 2017-12-02 NOTE — PROGRESS NOTES
MD Kline notified on critical lab- Lactic 3.5. Orders received to administer 1 liter NS bolus. Will continue to monitor closely.

## 2017-12-02 NOTE — ANESTHESIA PREPROCEDURE EVALUATION
Anesthetic History   No history of anesthetic complications            Review of Systems / Medical History  Patient summary reviewed, nursing notes reviewed and pertinent labs reviewed    Pulmonary          Smoker (Former)         Neuro/Psych              Cardiovascular                  Exercise tolerance: >4 METS  Comments: Recent syncope, possible due to sepsis  Very poor historian  Denies CP, SOB or changes in functional status   GI/Hepatic/Renal         Renal disease: CRI and ARF       Endo/Other    Diabetes: poorly controlled, type 2         Other Findings   Comments: Small subdural hematoma without required intervention  Lactic acid >4         Physical Exam    Airway  Mallampati: II  TM Distance: 4 - 6 cm  Neck ROM: normal range of motion   Mouth opening: Normal     Cardiovascular    Rhythm: regular  Rate: normal         Dental    Dentition: Edentulous     Pulmonary  Breath sounds clear to auscultation               Abdominal  GI exam deferred       Other Findings            Anesthetic Plan    ASA: 3, emergent  Anesthesia type: general          Induction: Intravenous and RSI  Anesthetic plan and risks discussed with: Patient      VERY POOR HISTORIAN. Likely numerous medical issues but undiagnosed. Emergent procedure due to worsening sepsis. Attempted to contact patient's daughter, Luis Eduardo Torres. Glucose 400. Addendum: Glucose down to 350. Giving 5 more units of regular insulin. Pt's daughter, Luis Eduardo Torres, is at bedside.

## 2017-12-02 NOTE — ANESTHESIA POSTPROCEDURE EVALUATION
Post-Anesthesia Evaluation and Assessment    Patient: Yoana Sears MRN: [id number]  SSN: [id number]    YOB: 1950  Age: 79 y.o. Sex: male       Cardiovascular Function/Vital Signs  Visit Vitals    /62    Pulse 96    Temp 36.4 °C (97.5 °F)    Resp 15    Ht 5' 11\" (1.803 m)    Wt 72.1 kg (159 lb)    SpO2 92%    BMI 22.18 kg/m2       Patient is status post general anesthesia for Procedure(s):  INCISION AND DRAINAGE RIGHT GROIN. Nausea/Vomiting: None    Postoperative hydration reviewed and adequate. Pain:  Pain Scale 1: Numeric (0 - 10) (12/01/17 1553)  Pain Intensity 1: 0 (12/01/17 1553)   Managed    Neurological Status: At baseline    Mental Status and Level of Consciousness: Sedated    Pulmonary Status:   Intubated   Adequate oxygenation and airway patent    Complications related to anesthesia: None    Post-anesthesia assessment completed. No concerns. Report called to Dr. Dawson Hinds with pulmonary.      Signed By: Emily Colin MD     December 1, 2017

## 2017-12-02 NOTE — BRIEF OP NOTE
BRIEF OPERATIVE NOTE    Date of Procedure: 12/1/2017   Preoperative Diagnosis: Cellulitis of the right groin  Postoperative Diagnosis: Fourniers gangrene     Procedure(s):  INCISION AND DRAINAGE RIGHT GROIN  Surgeon(s) and Role:     * Grace Sparks MD - Primary         Assistant Staff:       Surgical Staff:  Circ-1: Antelmo Bello RN  Circ-2: Carlos Enrique Zheng RN  Scrub Tech-1: [employer]  Event Time In   Incision Start 2008   Incision Close 2125     Anesthesia: General   Estimated Blood Loss: per anesth.   Specimens:   ID Type Source Tests Collected by Time Destination   1 : right groin  Wound Groin CULTURE, ANAEROBIC, CULTURE, WOUND W GRAM STAIN Grace Sparks MD 12/1/2017 2010 Microbiology   2 : right groin  Wound Groin CULTURE, ANAEROBIC, CULTURE, WOUND W GRAM STAIN Grace Sparks MD 12/1/2017 2011 Microbiology      Findings: gangrene of subcutaneous fat and fascia of posterior compartment of thigh from groin to popliteal fossa, extending along posterior margin of buttock   Complications: none  Implants: * No implants in log *

## 2017-12-02 NOTE — PERIOP NOTES
TRANSFER - OUT REPORT:    Verbal report given to ANN Mims(name) on Clayton Crawford  being transferred to 3110(unit) for routine post - op       Report consisted of patients Situation, Background, Assessment and   Recommendations(SBAR). Information from the following report(s) OR Summary was reviewed with the receiving nurse. Lines:   Double Lumen Quad lumen- IJ 12/01/17 Right Internal jugular (Active)       Peripheral IV 12/01/17 Left Antecubital (Active)   Site Assessment Clean, dry, & intact 12/2/2017  7:01 AM   Phlebitis Assessment 0 12/2/2017  7:01 AM   Infiltration Assessment 0 12/2/2017  7:01 AM   Dressing Status Clean, dry, & intact 12/2/2017  7:01 AM   Dressing Type Transparent;Tape 12/2/2017  7:01 AM   Hub Color/Line Status Patent;Green;Flushed 12/2/2017  7:01 AM   Alcohol Cap Used No 12/2/2017  7:01 AM       Peripheral IV 12/01/17 Right Forearm (Active)   Site Assessment Clean, dry, & intact 12/2/2017  7:01 AM   Phlebitis Assessment 0 12/2/2017  7:01 AM   Infiltration Assessment 0 12/2/2017  7:01 AM   Dressing Status Clean, dry, & intact 12/2/2017  7:01 AM   Hub Color/Line Status Patent; Flushed;Pink 12/2/2017  7:01 AM   Alcohol Cap Used No 12/2/2017  7:01 AM       Arterial Line 12/01/17 Left Radial artery (Active)   Site Assessment Clean, dry, & intact 12/2/2017  7:01 AM   Dressing Status Clean, dry, & intact 12/2/2017  7:01 AM   Dressing Type Transparent;Tape 12/2/2017  7:01 AM   Line Status Intact and in place 12/2/2017  7:01 AM   Treatment Zeroed or re-zeroed 12/2/2017  7:01 AM   Affected Extremity/Extremities Color distal to insertion site pink (or appropriate for race); Pulses palpable;Range of motion performed 12/2/2017  7:01 AM        Opportunity for questions and clarification was provided.       Patient transported with:   Monitor Rn O2

## 2017-12-02 NOTE — ANESTHESIA PROCEDURE NOTES
Central Line Placement    Start time: 2017 8:20 PM  End time: 2017 8:30 PM  Performed by: Ginny Chavis  Authorized by: Janiya CARLSON     Indications: vascular access, need for vasopressors and sepsis protocol  Preanesthetic Checklist: patient identified, risks and benefits discussed, anesthesia consent, site marked, patient being monitored and timeout performed    Timeout Time: 20:20       Pre-procedure: All elements of maximal sterile barrier technique followed? Yes    Maximal barrier precautions followed, 2% Chlorhexidine for cutaneous antisepsis, Hand hygiene performed prior to catheter insertion and Ultrasound guidance    Sterile Ultrasound Technique followed?: Yes          Procedure:   Prep:  ChloraPrep  Location:  Internal jugular  Orientation:  Right  Patient position:  Trendelenburg  Catheter type:  Double lumen  Catheter size:  8.5 Fr  Catheter length:  20 cm  Number of attempts:  1  Successful placement: Yes      Assessment:   Post-procedure:  Catheter secured, sterile dressing applied and sterile dressing with CHG applied  Assessment:  Placement verified by x-ray, free fluid flow, blood return through all ports and guidewire removal verified  Insertion:  Uncomplicated  Patient tolerance:  Patient tolerated the procedure well with no immediate complications  Ultrasound used.  IMAGE ON CHART

## 2017-12-02 NOTE — PROGRESS NOTES
TRANSFER - IN REPORT:    Verbal report received from Orly Yusuf RN on [facility]  being received from OR(unit) for routine progression of care      Report consisted of patients Situation, Background, Assessment and   Recommendations(SBAR). Information from the following report(s) SBAR, Kardex, ED Summary, OR Summary, Procedure Summary, Intake/Output, MAR, Accordion, Recent Results, Med Rec Status, Cardiac Rhythm NSR and Alarm Parameters  was reviewed with the receiving nurse. Opportunity for questions and clarification was provided. Assessment completed upon patients arrival to unit and care assumed.

## 2017-12-02 NOTE — ROUTINE PROCESS
TRANSFER - OUT REPORT:    Verbal report given to [facility] RN(name) on [facility]  being transferred to [facility](unit) for routine progression of care       Report consisted of patients Situation, Background, Assessment and   Recommendations(SBAR). Information from the following report(s) SBAR and OR Summary was reviewed with the receiving nurse. Lines:   Double Lumen 12/01/17 Right Internal jugular (Active)       Peripheral IV 12/01/17 Left Antecubital (Active)   Site Assessment Clean, dry, & intact 12/1/2017  3:02 PM   Phlebitis Assessment 0 12/1/2017  3:02 PM   Infiltration Assessment 0 12/1/2017  3:02 PM   Dressing Status Clean, dry, & intact 12/1/2017  3:02 PM       Peripheral IV 12/01/17 Right Forearm (Active)   Site Assessment Clean, dry, & intact 12/1/2017  6:30 PM   Phlebitis Assessment 0 12/1/2017  6:30 PM   Infiltration Assessment 0 12/1/2017  6:30 PM   Dressing Status Clean, dry, & intact 12/1/2017  6:30 PM   Hub Color/Line Status Pink 12/1/2017  6:30 PM       Arterial Line 12/01/17 Left Radial artery (Active)        Opportunity for questions and clarification was provided.       Patient transported with:   Monitor  O2 @ 15 liters  Registered Nurse   CRNA  MDA

## 2017-12-02 NOTE — PROGRESS NOTES
Pt back from surgery, Dr. Bre Rosenbaum at the bedside with family. Instructed that infection has spread, only option is to amputate leg at the hip. Family discussing future approach, stable at this time.

## 2017-12-02 NOTE — ANESTHESIA PROCEDURE NOTES
Arterial Line Placement    Start time: 12/1/2017 8:55 PM  End time: 12/1/2017 9:00 PM  Performed by: Ailyn Holloway  Authorized by: [facility]     Pre-Procedure  Indications:  Arterial pressure monitoring and blood sampling  Preanesthetic Checklist: patient identified, risks and benefits discussed, anesthesia consent, site marked, patient being monitored, timeout performed and patient being monitored    Timeout Time: 20:55        Procedure:   Prep:  Chlorhexidine  Seldinger Technique?: Yes    Orientation:  Left  Location:  Radial artery  Catheter size:  20 G  Number of attempts:  1  Cont Cardiac Output Sensor: No      Assessment:   Post-procedure:  Line secured and sterile dressing applied  Patient Tolerance:  Patient tolerated the procedure well with no immediate complications

## 2017-12-02 NOTE — ANESTHESIA POSTPROCEDURE EVALUATION
Post-Anesthesia Evaluation and Assessment    Patient: Gogo Alonzo MRN: [id number]  SSN: [id number]    YOB: 1950  Age: 79 y.o. Sex: male       Cardiovascular Function/Vital Signs  Visit Vitals    /42    Pulse (!) 112    Temp (!) 38.5 °C (101.3 °F)    Resp 16    Ht 5' 11\" (1.803 m)    Wt 72.1 kg (159 lb)    SpO2 99%    BMI 22.18 kg/m2       Patient is status post general anesthesia for Procedure(s):  LAPAROTOMY EXPLORATORY, loop colostomy ( first procedure): (second procedure) Re explore scrotal abcess  Debridement of Subcutaneous and Fascia Tissue. Nausea/Vomiting: None    Postoperative hydration reviewed and adequate. Pain:  Pain Scale 1: Adult Nonverbal Pain Scale (12/02/17 0701)  Pain Intensity 1: 0 (12/02/17 0701)   Managed    Neurological Status:   Neuro  Neurologic State: Pharmacologically induced (comment) (12/02/17 0701)  Orientation Level: Unable to verbalize (12/02/17 0701)  Speech: Intubated (12/02/17 0701)  Assessment L Pupil: Brisk;Round (12/02/17 0701)  Size L Pupil (mm): 2 (12/02/17 0701)  Assessment R Pupil: Brisk;Round (12/02/17 0701)  Size R Pupil (mm): 2 (12/02/17 0701)  LUE Motor Response: Spontaneous  (12/02/17 0701)  LLE Motor Response: Spontaneous  (12/02/17 0701)  RUE Motor Response: Spontaneous  (12/02/17 0701)  RLE Motor Response: Spontaneous  (12/02/17 0701)   Neuromuscular block resolving    Mental Status and Level of Consciousness: Sedated    Pulmonary Status:   O2 Device: Other (comment) (ETT) (12/02/17 1038)   Adequate oxygenation and airway patent    Complications related to anesthesia: None    Post-anesthesia assessment completed.  No concerns    Signed By: Jef Mendoza MD     December 2, 2017

## 2017-12-03 ENCOUNTER — APPOINTMENT (OUTPATIENT)
Dept: GENERAL RADIOLOGY | Age: 67
DRG: 853 | End: 2017-12-03
Attending: INTERNAL MEDICINE
Payer: MEDICARE

## 2017-12-03 ENCOUNTER — ANESTHESIA (OUTPATIENT)
Dept: SURGERY | Age: 67
DRG: 853 | End: 2017-12-03
Payer: MEDICARE

## 2017-12-03 LAB
ALBUMIN SERPL-MCNC: 1.2 G/DL (ref 3.2–4.6)
ALBUMIN/GLOB SERPL: 0.3 {RATIO} (ref 1.2–3.5)
ALP SERPL-CCNC: 56 U/L (ref 50–136)
ALT SERPL-CCNC: 11 U/L (ref 12–65)
ANION GAP SERPL CALC-SCNC: 10 MMOL/L (ref 7–16)
ANION GAP SERPL CALC-SCNC: 8 MMOL/L (ref 7–16)
ARTERIAL PATENCY WRIST A: ABNORMAL
AST SERPL-CCNC: 19 U/L (ref 15–37)
BASE DEFICIT BLD-SCNC: 3 MMOL/L
BASE DEFICIT BLDA-SCNC: 6.4 MMOL/L (ref 0–2)
BDY SITE: ABNORMAL
BILIRUB SERPL-MCNC: 0.8 MG/DL (ref 0.2–1.1)
BNP SERPL-MCNC: 50 PG/ML
BUN SERPL-MCNC: 42 MG/DL (ref 8–23)
BUN SERPL-MCNC: 56 MG/DL (ref 8–23)
CA-I BLD-MCNC: 0.88 MMOL/L (ref 1.12–1.32)
CALCIUM SERPL-MCNC: 6 MG/DL (ref 8.3–10.4)
CALCIUM SERPL-MCNC: 6.4 MG/DL (ref 8.3–10.4)
CHLORIDE SERPL-SCNC: 116 MMOL/L (ref 98–107)
CHLORIDE SERPL-SCNC: 118 MMOL/L (ref 98–107)
CO2 SERPL-SCNC: 21 MMOL/L (ref 21–32)
CO2 SERPL-SCNC: 23 MMOL/L (ref 21–32)
COHGB MFR BLD: 0.7 % (ref 0.5–1.5)
CORTIS AM PEAK SERPL-MCNC: 38.2 UG/DL (ref 7–25)
CREAT SERPL-MCNC: 0.68 MG/DL (ref 0.8–1.5)
CREAT SERPL-MCNC: 0.9 MG/DL (ref 0.8–1.5)
DO-HGB BLD-MCNC: 1 % (ref 0–5)
ERYTHROCYTE [DISTWIDTH] IN BLOOD BY AUTOMATED COUNT: 13.9 % (ref 11.9–14.6)
FIO2 ON VENT: 40 %
GLOBULIN SER CALC-MCNC: 3.7 G/DL (ref 2.3–3.5)
GLUCOSE BLD STRIP.AUTO-MCNC: 199 MG/DL (ref 65–100)
GLUCOSE BLD STRIP.AUTO-MCNC: 221 MG/DL (ref 65–100)
GLUCOSE BLD STRIP.AUTO-MCNC: 223 MG/DL (ref 65–100)
GLUCOSE BLD STRIP.AUTO-MCNC: 231 MG/DL (ref 65–100)
GLUCOSE BLD STRIP.AUTO-MCNC: 238 MG/DL (ref 65–100)
GLUCOSE SERPL-MCNC: 210 MG/DL (ref 65–100)
GLUCOSE SERPL-MCNC: 266 MG/DL (ref 65–100)
HCO3 BLD-SCNC: 22.4 MMOL/L (ref 22–26)
HCO3 BLDA-SCNC: 19 MMOL/L (ref 22–26)
HCT VFR BLD AUTO: 28 % (ref 41.1–50.3)
HCT VFR BLD AUTO: 29.8 % (ref 41.1–50.3)
HGB BLD-MCNC: 9.1 G/DL (ref 13.6–17.2)
HGB BLD-MCNC: 9.6 G/DL (ref 13.6–17.2)
HGB BLDMV-MCNC: 10 GM/DL (ref 11.7–15)
LACTATE SERPL-SCNC: 2 MMOL/L (ref 0.4–2)
LACTATE SERPL-SCNC: 2.1 MMOL/L (ref 0.4–2)
LACTATE SERPL-SCNC: 2.2 MMOL/L (ref 0.4–2)
LACTATE SERPL-SCNC: 2.2 MMOL/L (ref 0.4–2)
MCH RBC QN AUTO: 29 PG (ref 26.1–32.9)
MCHC RBC AUTO-ENTMCNC: 32.9 G/DL (ref 31.4–35)
MCV RBC AUTO: 88.3 FL (ref 79.6–97.8)
METHGB MFR BLD: 0.5 % (ref 0–1.5)
OXYHGB MFR BLDA: 97.9 % (ref 94–97)
PCO2 BLD: 37.2 MMHG (ref 35–45)
PCO2 BLDA: 37 MMHG (ref 35–45)
PEEP RESPIRATORY: 8 CM[H2O]
PH BLD: 7.39 [PH] (ref 7.35–7.45)
PH BLDA: 7.33 [PH] (ref 7.35–7.45)
PLATELET # BLD AUTO: 173 K/UL (ref 150–450)
PMV BLD AUTO: 11.9 FL (ref 10.8–14.1)
PO2 BLD: 109 MMHG (ref 80–105)
PO2 BLDA: 162 MMHG (ref 80–105)
POTASSIUM BLD-SCNC: 4.1 MMOL/L (ref 3.5–5.1)
POTASSIUM SERPL-SCNC: 3.3 MMOL/L (ref 3.5–5.1)
POTASSIUM SERPL-SCNC: 3.7 MMOL/L (ref 3.5–5.1)
PROT SERPL-MCNC: 4.9 G/DL (ref 6.3–8.2)
RBC # BLD AUTO: 3.17 M/UL (ref 4.23–5.67)
RESP RATE: 15
SAO2 % BLD: 98 % (ref 95–98)
SAO2 % BLD: 99 % (ref 92–98.5)
SODIUM BLD-SCNC: 147 MMOL/L (ref 136–145)
SODIUM SERPL-SCNC: 147 MMOL/L (ref 136–145)
SODIUM SERPL-SCNC: 149 MMOL/L (ref 136–145)
VANCOMYCIN TROUGH SERPL-MCNC: 7.2 UG/ML (ref 5–20)
VENTILATION MODE VENT: ABNORMAL
VT SETTING VENT: 450 ML
WBC # BLD AUTO: 12.5 K/UL (ref 4.3–11.1)

## 2017-12-03 PROCEDURE — 82533 TOTAL CORTISOL: CPT | Performed by: INTERNAL MEDICINE

## 2017-12-03 PROCEDURE — 83605 ASSAY OF LACTIC ACID: CPT | Performed by: INTERNAL MEDICINE

## 2017-12-03 PROCEDURE — P9016 RBC LEUKOCYTES REDUCED: HCPCS | Performed by: ANESTHESIOLOGY

## 2017-12-03 PROCEDURE — 74011250636 HC RX REV CODE- 250/636: Performed by: SURGERY

## 2017-12-03 PROCEDURE — 0KB70ZZ EXCISION OF RIGHT UPPER ARM MUSCLE, OPEN APPROACH: ICD-10-PCS | Performed by: ORTHOPAEDIC SURGERY

## 2017-12-03 PROCEDURE — 82330 ASSAY OF CALCIUM: CPT

## 2017-12-03 PROCEDURE — 74011250637 HC RX REV CODE- 250/637: Performed by: INTERNAL MEDICINE

## 2017-12-03 PROCEDURE — 77030018717 HC DRSG GRNUFM KCON -B: Performed by: ORTHOPAEDIC SURGERY

## 2017-12-03 PROCEDURE — 74000 XR ABD (KUB): CPT

## 2017-12-03 PROCEDURE — 36592 COLLECT BLOOD FROM PICC: CPT

## 2017-12-03 PROCEDURE — 87070 CULTURE OTHR SPECIMN AEROBIC: CPT | Performed by: ORTHOPAEDIC SURGERY

## 2017-12-03 PROCEDURE — 77030019952 HC CANSTR VAC ASST KCON -B: Performed by: ANESTHESIOLOGY

## 2017-12-03 PROCEDURE — 80048 BASIC METABOLIC PNL TOTAL CA: CPT | Performed by: INTERNAL MEDICINE

## 2017-12-03 PROCEDURE — 77030018798 HC PMP KT ENTRL FED COVD -A

## 2017-12-03 PROCEDURE — 77030002996 HC SUT SLK J&J -A: Performed by: ORTHOPAEDIC SURGERY

## 2017-12-03 PROCEDURE — 80053 COMPREHEN METABOLIC PANEL: CPT | Performed by: INTERNAL MEDICINE

## 2017-12-03 PROCEDURE — 74011250636 HC RX REV CODE- 250/636: Performed by: INTERNAL MEDICINE

## 2017-12-03 PROCEDURE — 74011000250 HC RX REV CODE- 250

## 2017-12-03 PROCEDURE — 82962 GLUCOSE BLOOD TEST: CPT

## 2017-12-03 PROCEDURE — 77030010541

## 2017-12-03 PROCEDURE — 94003 VENT MGMT INPAT SUBQ DAY: CPT

## 2017-12-03 PROCEDURE — 76010000171 HC OR TIME 2 TO 2.5 HR INTENSV-TIER 1: Performed by: ORTHOPAEDIC SURGERY

## 2017-12-03 PROCEDURE — C9113 INJ PANTOPRAZOLE SODIUM, VIA: HCPCS | Performed by: SURGERY

## 2017-12-03 PROCEDURE — 83880 ASSAY OF NATRIURETIC PEPTIDE: CPT | Performed by: HOSPITALIST

## 2017-12-03 PROCEDURE — 77030019908 HC STETH ESOPH SIMS -A: Performed by: ANESTHESIOLOGY

## 2017-12-03 PROCEDURE — 74011000250 HC RX REV CODE- 250: Performed by: SURGERY

## 2017-12-03 PROCEDURE — 74011636637 HC RX REV CODE- 636/637: Performed by: HOSPITALIST

## 2017-12-03 PROCEDURE — 77030011640 HC PAD GRND REM COVD -A: Performed by: ORTHOPAEDIC SURGERY

## 2017-12-03 PROCEDURE — 82803 BLOOD GASES ANY COMBINATION: CPT

## 2017-12-03 PROCEDURE — 77030008467 HC STPLR SKN COVD -B: Performed by: ORTHOPAEDIC SURGERY

## 2017-12-03 PROCEDURE — 71010 XR CHEST SNGL V: CPT

## 2017-12-03 PROCEDURE — 74011000258 HC RX REV CODE- 258

## 2017-12-03 PROCEDURE — 80202 ASSAY OF VANCOMYCIN: CPT | Performed by: INTERNAL MEDICINE

## 2017-12-03 PROCEDURE — 99232 SBSQ HOSP IP/OBS MODERATE 35: CPT | Performed by: INTERNAL MEDICINE

## 2017-12-03 PROCEDURE — 85018 HEMOGLOBIN: CPT | Performed by: INTERNAL MEDICINE

## 2017-12-03 PROCEDURE — 74011250636 HC RX REV CODE- 250/636: Performed by: HOSPITALIST

## 2017-12-03 PROCEDURE — 77030019940 HC BLNKT HYPOTHRM STRY -B: Performed by: ANESTHESIOLOGY

## 2017-12-03 PROCEDURE — 77030006835 HC BLD SAW SAG STRY -B: Performed by: ORTHOPAEDIC SURGERY

## 2017-12-03 PROCEDURE — 74011000250 HC RX REV CODE- 250: Performed by: INTERNAL MEDICINE

## 2017-12-03 PROCEDURE — 77030013794 HC KT TRNSDUC BLD EDWD -B

## 2017-12-03 PROCEDURE — 74011000258 HC RX REV CODE- 258: Performed by: SURGERY

## 2017-12-03 PROCEDURE — 85027 COMPLETE CBC AUTOMATED: CPT | Performed by: INTERNAL MEDICINE

## 2017-12-03 PROCEDURE — 87075 CULTR BACTERIA EXCEPT BLOOD: CPT | Performed by: ORTHOPAEDIC SURGERY

## 2017-12-03 PROCEDURE — 76060000035 HC ANESTHESIA 2 TO 2.5 HR: Performed by: ORTHOPAEDIC SURGERY

## 2017-12-03 PROCEDURE — 77030010522

## 2017-12-03 PROCEDURE — 65610000001 HC ROOM ICU GENERAL

## 2017-12-03 RX ORDER — INSULIN LISPRO 100 [IU]/ML
INJECTION, SOLUTION INTRAVENOUS; SUBCUTANEOUS EVERY 4 HOURS
Status: DISCONTINUED | OUTPATIENT
Start: 2017-12-03 | End: 2017-12-29 | Stop reason: HOSPADM

## 2017-12-03 RX ORDER — POTASSIUM CHLORIDE 14.9 MG/ML
20 INJECTION INTRAVENOUS
Status: DISCONTINUED | OUTPATIENT
Start: 2017-12-03 | End: 2017-12-03 | Stop reason: SDUPTHER

## 2017-12-03 RX ORDER — PANTOPRAZOLE SODIUM 40 MG/10ML
40 INJECTION, POWDER, LYOPHILIZED, FOR SOLUTION INTRAVENOUS EVERY 24 HOURS
Status: DISCONTINUED | OUTPATIENT
Start: 2017-12-03 | End: 2017-12-03 | Stop reason: SDUPTHER

## 2017-12-03 RX ORDER — CHLORHEXIDINE GLUCONATE 1.2 MG/ML
15 RINSE ORAL 2 TIMES DAILY
Status: DISCONTINUED | OUTPATIENT
Start: 2017-12-03 | End: 2017-12-04

## 2017-12-03 RX ORDER — SODIUM CHLORIDE 9 MG/ML
250 INJECTION, SOLUTION INTRAVENOUS AS NEEDED
Status: DISCONTINUED | OUTPATIENT
Start: 2017-12-03 | End: 2017-12-04

## 2017-12-03 RX ORDER — ROCURONIUM BROMIDE 10 MG/ML
INJECTION, SOLUTION INTRAVENOUS AS NEEDED
Status: DISCONTINUED | OUTPATIENT
Start: 2017-12-03 | End: 2017-12-03 | Stop reason: HOSPADM

## 2017-12-03 RX ADMIN — SODIUM CHLORIDE 40 MEQ: 900 INJECTION, SOLUTION INTRAVENOUS at 06:00

## 2017-12-03 RX ADMIN — CHLORHEXIDINE GLUCONATE 15 ML: 1.2 RINSE ORAL at 18:16

## 2017-12-03 RX ADMIN — FAMOTIDINE 20 MG: 10 INJECTION, SOLUTION INTRAVENOUS at 09:40

## 2017-12-03 RX ADMIN — SODIUM CHLORIDE 3 G: 900 INJECTION, SOLUTION INTRAVENOUS at 05:20

## 2017-12-03 RX ADMIN — VANCOMYCIN HYDROCHLORIDE 0.75 G: 10 INJECTION, POWDER, LYOPHILIZED, FOR SOLUTION INTRAVENOUS at 11:01

## 2017-12-03 RX ADMIN — INSULIN LISPRO 4 UNITS: 100 INJECTION, SOLUTION INTRAVENOUS; SUBCUTANEOUS at 23:23

## 2017-12-03 RX ADMIN — INSULIN LISPRO 2 UNITS: 100 INJECTION, SOLUTION INTRAVENOUS; SUBCUTANEOUS at 15:27

## 2017-12-03 RX ADMIN — SODIUM CHLORIDE 900 MG: 900 INJECTION, SOLUTION INTRAVENOUS at 07:11

## 2017-12-03 RX ADMIN — SODIUM CHLORIDE 900 MG: 900 INJECTION, SOLUTION INTRAVENOUS at 00:55

## 2017-12-03 RX ADMIN — Medication 13 MCG/MIN: at 02:42

## 2017-12-03 RX ADMIN — SODIUM CHLORIDE 40 MG: 9 INJECTION INTRAMUSCULAR; INTRAVENOUS; SUBCUTANEOUS at 15:27

## 2017-12-03 RX ADMIN — AMPICILLIN SODIUM AND SULBACTAM SODIUM 3 G: 2; 1 INJECTION, POWDER, FOR SOLUTION INTRAMUSCULAR; INTRAVENOUS at 15:26

## 2017-12-03 RX ADMIN — SODIUM CHLORIDE, SODIUM LACTATE, POTASSIUM CHLORIDE, AND CALCIUM CHLORIDE 100 ML/HR: 600; 310; 30; 20 INJECTION, SOLUTION INTRAVENOUS at 19:08

## 2017-12-03 RX ADMIN — VANCOMYCIN HYDROCHLORIDE 750 MG: 10 INJECTION, POWDER, LYOPHILIZED, FOR SOLUTION INTRAVENOUS at 22:01

## 2017-12-03 RX ADMIN — SODIUM CHLORIDE, SODIUM LACTATE, POTASSIUM CHLORIDE, AND CALCIUM CHLORIDE: 600; 310; 30; 20 INJECTION, SOLUTION INTRAVENOUS at 11:01

## 2017-12-03 RX ADMIN — VANCOMYCIN HYDROCHLORIDE 750 MG: 10 INJECTION, POWDER, LYOPHILIZED, FOR SOLUTION INTRAVENOUS at 10:04

## 2017-12-03 RX ADMIN — SODIUM CHLORIDE, SODIUM LACTATE, POTASSIUM CHLORIDE, AND CALCIUM CHLORIDE 200 ML/HR: 600; 310; 30; 20 INJECTION, SOLUTION INTRAVENOUS at 07:30

## 2017-12-03 RX ADMIN — Medication 10 ML: at 15:21

## 2017-12-03 RX ADMIN — SODIUM CHLORIDE, SODIUM LACTATE, POTASSIUM CHLORIDE, AND CALCIUM CHLORIDE 200 ML/HR: 600; 310; 30; 20 INJECTION, SOLUTION INTRAVENOUS at 02:42

## 2017-12-03 RX ADMIN — AMPICILLIN SODIUM AND SULBACTAM SODIUM 3 G: 2; 1 INJECTION, POWDER, FOR SOLUTION INTRAMUSCULAR; INTRAVENOUS at 22:00

## 2017-12-03 RX ADMIN — SODIUM CHLORIDE, SODIUM LACTATE, POTASSIUM CHLORIDE, AND CALCIUM CHLORIDE: 600; 310; 30; 20 INJECTION, SOLUTION INTRAVENOUS at 12:23

## 2017-12-03 RX ADMIN — Medication 10 ML: at 22:01

## 2017-12-03 RX ADMIN — INSULIN LISPRO 4 UNITS: 100 INJECTION, SOLUTION INTRAVENOUS; SUBCUTANEOUS at 20:27

## 2017-12-03 RX ADMIN — SODIUM CHLORIDE 900 MG: 900 INJECTION, SOLUTION INTRAVENOUS at 13:56

## 2017-12-03 RX ADMIN — ROCURONIUM BROMIDE 30 MG: 10 INJECTION, SOLUTION INTRAVENOUS at 12:50

## 2017-12-03 RX ADMIN — SODIUM CHLORIDE 900 MG: 900 INJECTION, SOLUTION INTRAVENOUS at 19:13

## 2017-12-03 RX ADMIN — INSULIN LISPRO 4 UNITS: 100 INJECTION, SOLUTION INTRAVENOUS; SUBCUTANEOUS at 07:10

## 2017-12-03 RX ADMIN — Medication 2 MCG/MIN: at 19:58

## 2017-12-03 RX ADMIN — Medication 10 ML: at 05:22

## 2017-12-03 RX ADMIN — CHLORHEXIDINE GLUCONATE 15 ML: 1.2 RINSE ORAL at 09:40

## 2017-12-03 NOTE — ANESTHESIA POSTPROCEDURE EVALUATION
Post-Anesthesia Evaluation and Assessment    Patient: Anjana Hernandez MRN: [id number]  SSN: [id number]    YOB: 1950  Age: 79 y.o. Sex: male       Cardiovascular Function/Vital Signs  Visit Vitals    /78    Pulse 93    Temp 36.9 °C (98.5 °F)    Resp 15    Ht 5' 11\" (1.803 m)    Wt 83.5 kg (184 lb 1.4 oz)    SpO2 98%    BMI 25.67 kg/m2       Patient is status post general anesthesia for Procedure(s):  RIGHT LEG AND HIP DEBRIDEMENT WOUND VAC PLACEMENT. Nausea/Vomiting: None    Postoperative hydration reviewed and adequate. Pain:  Pain Scale 1: Adult Nonverbal Pain Scale (12/03/17 0716)  Pain Intensity 1: 0 (12/03/17 0716)   Managed    Neurological Status:   Neuro  Neurologic State: Pharmacologically induced (comment); Lethargic (12/03/17 8706)  Orientation Level: Unable to verbalize (12/03/17 0716)  Cognition: Decreased attention/concentration;Decreased command following;Poor safety awareness (12/03/17 0716)  Speech: Intubated (12/03/17 0716)  Assessment L Pupil: Brisk;Round (12/03/17 0716)  Size L Pupil (mm): 2 (12/03/17 0716)  Assessment R Pupil: Brisk;Round (12/03/17 0716)  Size R Pupil (mm): 2 (12/03/17 0716)  LUE Motor Response: Weak;Withdraws (12/03/17 0716)  LLE Motor Response: Weak;Withdraws (12/03/17 0716)  RUE Motor Response: Weak;Withdraws (12/03/17 0716)  RLE Motor Response: Weak;Withdraws (12/03/17 0716)   At baseline    Mental Status and Level of Consciousness: Arousable    Pulmonary Status:   O2 Device: Other (comment) (Intubated) (12/03/17 1314)   Adequate oxygenation and airway patent    Complications related to anesthesia: None    Post-anesthesia assessment completed.  No concerns    Signed By: Narciso Mann MD     December 3, 2017

## 2017-12-03 NOTE — PROGRESS NOTES
0000: ABD pads saturated on RLE. Replaced with dry pads and secured with paper tape. Patient in no apparent distress.

## 2017-12-03 NOTE — PROGRESS NOTES
Ventilator check complete; patient has a #8. 0 ET tube secured at the 22 at the lip. Patient is sedated. Patient is not able to follow commands. Breath sounds are diminished. Trachea is midline, Negative for subcutaneous air, and chest excursion is symmetric. Patient is also Negative for cyanosis and is Negative for pitting edema. All alarms are set and audible. Resuscitation bag is at the head of the bed.       Ventilator Settings  Mode FIO2 Rate Tidal Volume Pressure PEEP I:E Ratio   PRVC  40 %   15 450 ml     8 cm H20  1:3.33      Peak airway pressure: 14.1 cm H2O   Minute ventilation: 8.2 l/min     ABG:   Recent Labs      12/02/17   0220   PH  7.43   PCO2  33*   PO2  213*   HCO3  22         [place], RT

## 2017-12-03 NOTE — ANESTHESIA PREPROCEDURE EVALUATION
Anesthetic History   No history of anesthetic complications            Review of Systems / Medical History  Patient summary reviewed, nursing notes reviewed and pertinent labs reviewed    Pulmonary          Smoker (Former)         Neuro/Psych              Cardiovascular                  Exercise tolerance: >4 METS  Comments: Recent syncope, possible due to sepsis  Very poor historian  Denies CP, SOB or changes in functional status   GI/Hepatic/Renal         Renal disease: CRI and ARF       Endo/Other    Diabetes: poorly controlled, type 2         Other Findings   Comments: Small subdural hematoma without required intervention  Lactic acid 2.5  Sepsis           Physical Exam    Airway  Mallampati: II  TM Distance: 4 - 6 cm  Neck ROM: normal range of motion   Mouth opening: Normal  Intubated   Cardiovascular    Rhythm: regular  Rate: normal         Dental    Dentition: Edentulous     Pulmonary  Breath sounds clear to auscultation               Abdominal  GI exam deferred       Other Findings            Anesthetic Plan    ASA: 4  Anesthesia type: general    Monitoring Plan: Arterial line    Post procedure ventilation   Induction: Intravenous and Inhalational  Anesthetic plan and risks discussed with: Patient, Son / Daughter and Carol. Likely numerous medical issues but undiagnosed. Pt has R IJ CVL and L radial arterial line. On norepinephrine infusion at 10.  Septic

## 2017-12-03 NOTE — OP NOTES
Operative Note    12/3/2017     Preoperative diagnosis:  Right leg infection and soft tissue necrosis    Postoperative diagnosis: Right leg infection and soft tissue necrosis    Surgeon(s) and Role:     * Nicol Machado MD - Sandra Crum MD - Assisting     * Debbie Blank MD - Co-surgeon    Anesthesia: General    Antibiotics: IV Vancomycin, Unasyn    Procedures:  Procedure(s):  RIGHT LEG AND HIP COMPLEX EXCISIONAL DEBRIDEMENT OF SKIN, SUBCUTANEOUS TISSUE AND MUSCLE WITH WOUND VAC PLACEMENT   [id number] over 50 square centimeters    Findings:  1. Right leg wound inspection from prior debridement revealed the tissue although poor with further areas of infectious involvement was still viable and hip dis-articulation was not deemed necessary by all three surgeons involved today. Once the debridement began excision of skin was performed where the linear area of demarcation anteriorly and posteriorly was noted on the skin indicating it was not viable tissue. The fascia of the TFL did not appear healthy was was resected. The posterior lateral compartment of the thigh showed that the semimembranosus and parts of the muscle of the biceps femoris was dead tissue and was removed. The wound extended posteriorly just proximal to the distal aspect of the gluteous vinny down the leg to just superior to the popliteal fossa. The infection did not extend into the calf or distal to the knee. Much of the anterior thigh compartment did no appear to be involved and there was at least a 10 cm skin bridge. The entire wound was about 62 cm x 52 cm for which the wound vac was applied. The anterior neurovascular bundle was not visible and was within healthy tissue. The posterior sciatic nerve was able to be visualized and was in continuity without sign of necrosis. Indications / Consent:   This is a patient who was noted from prior debridements to have significant necrosis to warrant consideration for possible hip dis-articulation. After extensive discussions with general surgery, vascular surgery and the pulmonary intensive care team the decsion to proceed with complex debridement and possible hip amputation. We discuss treatments using both conservative and/or non-operative treatment options with the patient's family as he was on the ventilator. A review of the risks and benefits, including but not limited to death, need for further debridements, injury to nerves and vessels, DVT, PE, MI, and other anesthesia related risks was performed with the patient's family. After this review and the review of the likely outcome and potential complications of the procedure, preoperative verbal and written consents were obtained. The operative procedure and postoperative course were discussed with the family in detail and the extremity was marked by myself. Procedure: The patient is brought to the Operative Suite and placed in the supine position. The patient was positioned in the lateral decubitus position with the right side up. Down leg is well padded and axillary roll is placed. Time out was done to confirm the operating procedure, surgeon, patient, and site. Prior to the beginning of the procedure, a time-out was performed for correct surgical site identification as was marked during the pre-operative meeting. This was confirmed using the written consent and history/physical. Time-out for antibiotic dosing, timing and selection was also performed. Once confirmed by the team, procedure was started. The righthip is then prepped and draped in the usual sterile fashion. The peripheral skin around the wound had demarcated and any areas that were not viable were removed with the scalpel and bovie cauterization. The subcutaneous tissue that appeared necrotic was removed with blunt and sharp dissection using curets, rongeurs and bovie. A small collection of fluid was cultured as well.  The fascia marilee did not appear viable and was removed almost in its entirety. The posterior compartment was debrided and portions of the hamstring complex involving the biceps femoris and semimembranosus were noted to be ischemic without response to cautery. The dead tissue was removed. Once the debridement of the non-viable tissue was removed the tissue was pulse lavaged. Dakin's soaked sponges were used on the tissue. The wound was irrigated and the skin edges cleaned and prepared for wound vac placement. A wound vac sponge was placed after being cut to size. If needed a skin stapler was used to position the sponge within the wound. Then using the adhesive for the wound vac and ioban the sponge and wound was contained with the suction attachment in place. A good suction seal was obtained. All counts were correct. Post-operative plan: The patient was transferred back to the ICU with the wound vac in place. He will remain on IV abx's. As discussed with the primary treating surgeon, plastics/neutrition/ and infectious disease will be consulted. From an orthopedic standpoint if he has worsening issues that require possible orthopedic involvement I will be available. Estimated Blood Loss:  350 ml    Fluids:   See anesthesia notes.   1 unit of PRBC's was started during the procedure    Implant: * No implants in log *    Closure: Large wound vac    Complications: None    Signed By: Mel Salter MD

## 2017-12-03 NOTE — PROGRESS NOTES
Pt back from the OR, stable, I&D completed by Dr. Minal Garcia with wound vac placement. Family present and updated, will monitor.

## 2017-12-03 NOTE — PROGRESS NOTES
Bedside and Verbal shift change report given to [employer], RN (oncoming nurse) by ANN Bo (offgoing nurse). Report included the following information SBAR, Kardex, OR Summary, Procedure Summary, Intake/Output, MAR, Recent Results, Med Rec Status and Cardiac Rhythm SR. Dual verification of gtts completed.

## 2017-12-03 NOTE — PROGRESS NOTES
Patient is calm. [place] family at bedside. Receptive to  presence and support. Prayer for upcoming procedure.     Jhonathan Zabala, staff [address], [address] [facility]  /   [email or web address]

## 2017-12-03 NOTE — PROGRESS NOTES
Pharmacokinetic Consult to Pharmacist    Naya Yahir is a 79 y.o. 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    Height: 5' 11\" (180.3 cm)  Weight: 83.5 kg (184 lb 1.4 oz)  Lab Results   Component Value Date/Time    BUN 56 12/03/2017 03:38 AM    Creatinine 0.90 12/03/2017 03:38 AM    WBC 12.5 12/03/2017 03:38 AM    Procalcitonin 15.5 12/01/2017 03:01 PM    Lactic acid 2.2 12/03/2017 09:04 AM    Lactic Acid (POC) 4.8 12/01/2017 06:10 PM      Estimated Creatinine Clearance: 84.8 mL/min (based on Cr of 0.9). Lab Results   Component Value Date/Time    Vancomycin,trough 7.2 12/03/2017 09:04 AM       Day 2 of vancomycin. Goal trough is 15-20. Increase to 750 mg q8h with next dose at 2200 tonight. Will continue to follow patient. Thank you,  Miladys Cummings, Pharm. D.   Clinical Pharmacist  [phone]

## 2017-12-03 NOTE — PROGRESS NOTES
Mr. Shellie Diamond is intubated  and sedated, on IV antibiotics and plan for surgery today. Managed by intesivist, general surgery, orthopedic surgery and vascular surgery. Discussed wit dr. Jacque Li and family. Matt Hammond will take over as primary team. Will sign off and will take over when MR. Shellie Diamond is extubated/ out of ICU. Appreciate the help. Please do not hesitate to call us if any questions.        Bebe Connolly MD  12/03/17

## 2017-12-03 NOTE — PROGRESS NOTES
Lab results back, spoke with Dr. Johnson Edge, ordered to give LR 500mL bolus x 1 then change continuous fluids to LR at 200ml/hr. Will monitor.

## 2017-12-03 NOTE — OP NOTES
[address]   OPERATIVE REPORT       Name:  Fabiano Alexis   MR#:  [id number]   :  1950   Account #:  [de-identified]   Date of Adm:  2017       PREOPERATIVE DIAGNOSIS: Shima gangrene of the right groin,   extending down the right leg. POSTOPERATIVE DIAGNOSIS: Shima gangrene of the right groin,   extending down the right leg. PROCEDURE PERFORMED: Debridement of skin, subcutaneous tissue   and fascia of the right groin, right posterior thigh and right   buttocks, comprising an area of 45 x 15 cm. SURGEON: Gomez Lima MD    ANESTHESIA: General.    COMPLICATIONS: None. INDICATIONS: As outlined in the history and physical by me   earlier this evening. PROCEDURE IN DETAIL: Informed consent was not able to be   obtained from the patient, given questionable mental status, and   his daughter, who was present, declined to give consent. The   consent form was signed by myself and Dr. Margie Abrams, the acting   anesthesiologist.     The patient was thus brought to the operating room, placed on   the table in supine position with adequate padding of all   pressure points. After successful induction of general   anesthesia, he was placed in a lithotomy position in candy   canes, and the perineum and right leg were prepped and draped   sterilely. A knife was used to excise the roughly half dollar-sized area of   frankly necrotic skin of the posterior right groin. This   revealed the characteristic appearance and odor of synergistic   gangrene. This incision was initially extended anteriorly along   the groin crease to the location of several other draining   sites. Sharp dissection and cautery were used to excise the frankly   necrotic subcutaneous tissue and areas of fascial and even some   superficial myonecrosis where it was present.  The incision was   extended in an anterior direction, a posterior direction and a   distal direction down the posterior thigh as more necrotic   tissue was encountered. Quite unbelievably, the subcutaneous   necrosis extended all the way down the posterior aspect of the   thigh to just above the popliteal fossa, and even at that level,   the tissue was edematous, but not frankly necrotic. The process   extended from essentially the equator of the thigh medially to   the lateral midline of the thigh along its entire posterior   length. All of the subcutaneous tissue and several large   sections of fascia were removed. There were several areas of   skin that became apparently ischemic from loss of subcutaneous   tissue, and these were removed; however, the majority of the   skin was left in place at this initial debridement. The   dissection also continued posteriorly onto the buttocks for   another 5 to 10 cm. Significant bleeding perforating vessels   were controlled with cautery. After this prolonged excision, the area was irrigated   thoroughly. A packing of dilute Betadine-impregnated Kerlix was   placed over the exposed thigh musculature. Along the posterior   thigh in 2 locations, the skin was approximated over top of the   Kerlix, as well as along the posterior limb of the incision in   an attempt to minimize fluid loss from this wound. The entire   area was then covered with gauze. A Gerardo catheter was placed. He was taken directly to the   Intensive Care Unit for additional resuscitation. Plans are made   for return to the operating room tomorrow for additional   debridement. Due to the proximity to the anus, a colonic   diversion will also be recommended to the family.         MD JENNIFER Crain / Philomena Lindsay   D:  12/01/2017   22:30   T:  12/02/2017   21:31   Job #:  [id number]

## 2017-12-03 NOTE — PROGRESS NOTES
Annamaria Armas  Admission Date: 12/1/2017             Daily Progress Note: 12/3/2017    The patient's chart is reviewed and the patient is discussed with the staff. 68yo WM admitted with syncope. 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 Subjective:     Patient remains intubated. On levophed 13mcg today. To go back to OR today.      Current Facility-Administered Medications   Medication Dose Route Frequency    chlorhexidine (PERIDEX) 0.12 % mouthwash 15 mL  15 mL Oral BID    ampicillin-sulbactam 3 g in 0.9% sodium chloride (MBP/ADV) 100 mL ADV  3 g IntraVENous Q6H    NUTRITIONAL SUPPORT ELECTROLYTE PRN ORDERS   Does Not Apply PRN    potassium chloride 40 mEq in 0.9% sodium chloride 250 mL IVPB  40 mEq IntraVENous ONCE    insulin lispro (HUMALOG) injection   SubCUTAneous Q4H    0.9% sodium chloride infusion 250 mL  250 mL IntraVENous PRN    midazolam in normal saline (VERSED) 1 mg/mL infusion  0-10 mg/hr IntraVENous TITRATE    NOREPINephrine (LEVOPHED) 4 mg in 5% dextrose 250 mL infusion  2-16 mcg/min IntraVENous TITRATE    PHENYLephrine (ROSA-SYNEPHRINE) 30 mg in 0.9% sodium chloride 250 mL infusion   mcg/min IntraVENous TITRATE    VANCOMYCIN TROUGH LEVEL REMINDER   Other ONCE    lactated Ringers infusion  200 mL/hr IntraVENous CONTINUOUS    sodium chloride (NS) flush 5-10 mL  5-10 mL IntraVENous Q8H    sodium chloride (NS) flush 5-10 mL  5-10 mL IntraVENous PRN    clindamycin phosphate (CLEOCIN) 900 mg in 0.9% sodium chloride 100 mL IVPB  900 mg IntraVENous Q6H    famotidine (PF) (PEPCID) 20 mg in sodium chloride 0.9 % 10 mL injection  20 mg IntraVENous Q24H    fentaNYL in normal saline (pf) 25 mcg/mL infusion   mcg/hr IntraVENous TITRATE    vancomycin (VANCOCIN) 750 mg in  ml infusion  750 mg IntraVENous Q12H       Review of Systems  Unobtainable due to patient status. Objective:     Vitals:    12/03/17 0800 12/03/17 0846 12/03/17 0901 12/03/17 0916   BP: 101/56  110/58 110/56   Pulse: 86 86 86 87   Resp: 18      Temp:       SpO2: 98%  98% 98%   Weight:       Height:         Intake and Output:   12/01 1901 - 12/03 0700  In: 9724.5 [I.V.:9724.5]  Out: 6655 [Urine:3150]       Physical Exam:   Constitution:  the patient is well developed and in no acute distress  EENMT:  Sclera clear, pupils equal, oral mucosa moist  Respiratory: Intubated. Cardiovascular:  Tachy, regular. without M,G,R  Gastrointestinal: soft and non-tender; with positive bowel sounds. Musculoskeletal: warm without cyanosis. There is trace lower leg edema. Skin:  no jaundice or rashes, surgical wounds R thigh. Neurologic: sedated    Psychiatric:  sedated    CHEST XRAY:   12/3/17  IMPRESSION: No acute cardiopulmonary disease.         LAB  No lab exists for component: [place]   Recent Labs      12/03/17   0338  12/02/17   0419  12/01/17   1501   WBC  12.5*  23.5*  21.7*   HGB  9.1*  12.0*  13.9   HCT  28.0*  35.0*  40.4*   PLT  173  211  190     Recent Labs      12/03/17   0338  12/02/17   1723  12/02/17   0419  12/01/17   1501   NA  147*  147*  141  135*   K  3.3*  3.6  3.6  3.3*   CL  116*  116*  104  92*   CO2  23  16*  22  25   GLU  266*  289*  325*  426*   BUN  56*  67*  81*  79*   CREA  0.90  0.98  1.16  1.52*   MG   --    --    --   3.2*   CA  6.0*  6.0*  7.2*  8.3   PHOS   --    --    --   2.9   ALB  1.2*   --   1.7*  2.3*   SGOT  19   --   14*  15     Recent Labs      12/03/17   0235  12/02/17   0220   PH  7.33*  7.43   PCO2  37  33*   PO2  162*  213*   HCO3  19*  22       MICRO  Blood - NGTD    Assessment:  (Medical Decision Making)     Hospital Problems  Date Reviewed: 12/1/2017          Codes Class Noted POA    Lactic acidosis ICD-10-CM: E87.2  ICD-9-CM: 276.2  12/2/2017 Unknown        Subdural hematoma (HCC) ICD-10-CM: I62.00  ICD-9-CM: 432.1  12/1/2017 Yes        Type II diabetes mellitus with complication ([facility] LDH-43-HZ: E11.8  ICD-9-CM: 250.90  12/1/2017 Yes        LAUREANO (acute kidney injury) ([facility] ICD-10-CM: N17.9  ICD-9-CM: 584.9  12/1/2017 Yes        Syncope ICD-10-CM: R55  ICD-9-CM: 780.2  12/1/2017 Yes        Leukocytosis ICD-10-CM: K43.097  ICD-9-CM: 288.60  12/1/2017 Yes        Fasciitis ICD-10-CM: M72.9  ICD-9-CM: 729.4  12/1/2017 Yes        * (Principal)Severe sepsis with septic shock [facility]) ICD-10-CM: A41.9, R65.21  ICD-9-CM: 038.9, 995.92, 785.52  12/1/2017 Yes        Encounter for weaning from ventilator [facility]) ICD-10-CM: Z99.11  ICD-9-CM: V46.13  12/1/2017 Yes        Electrolyte abnormality ICD-10-CM: E87.8  ICD-9-CM: 276.9  12/1/2017 Yes            Pt with severe ady's gangrene, resultant sepsis. Worsening hypotension post op. On 13mcg levophed. Plan:  (Medical Decision Making)   -patient to remain intubated. -cont pressor support and IVF  -cont abx.   -follow up post op.        Papo Mohan MD

## 2017-12-03 NOTE — PROGRESS NOTES
Ventilator check complete; patient has a #8. 0 ET tube secured at the 22 at the lip. Patient is sedated. Patient is not able to follow commands. Breath sounds are clear and diminished. Trachea is midline, Negative for subcutaneous air, and chest excursion is symmetric. Patient is also Negative for cyanosis and is Negative for pitting edema. All alarms are set and audible. Resuscitation bag is at the head of the bed.       Ventilator Settings  Mode FIO2 Rate Tidal Volume Pressure PEEP I:E Ratio   PRVC  35 %    450 ml     8 cm H20  1:3.33      Peak airway pressure: 15 cm H2O   Minute ventilation: 7.5 l/min     ABG:   Recent Labs      12/03/17   0235  12/02/17   0220   PH  7.33*  7.43   PCO2  37  33*   PO2  162*  213*   HCO3  19*  22         Durga Fernandez, RT

## 2017-12-03 NOTE — BRIEF OP NOTE
BRIEF OPERATIVE NOTE    Date of Procedure: 12/3/2017   Preoperative Diagnosis: .Right leg infection and soft tissue necrosis  Postoperative Diagnosis: Right leg infection and soft tissue necrosis  Procedure(s):  RIGHT LEG AND HIP COMPLEX EXCISIONAL DEBRIDEMENT OF SKIN/ SUBCUTANEOUS TISSUE/ MUSCLE - WOUND VAC PLACEMENT  Surgeon(s) and Role:     * Leigha Lin MD - co-surgeon     * Miky Ayala MD - assisting     * Grace Sparks MD - co-surgeon         Assistant Staff:       Surgical Staff:  Circ-1: Brooke Fuentes RN  Scrub Tech-1: Ari Cardenas  Event Time In   Incision Start 1140   Incision Close 1253     Anesthesia: General   Estimated Blood Loss: 350 ml  Specimens:   ID Type Source Tests Collected by Time Destination   1 : Right leg wound Wound Leg CULTURE, WOUND W GRAM STAIN, CULTURE, ANAEROBIC Leigha Lin MD 12/3/2017 1210 Microbiology      Findings: Right thigh soft tissue infection of skin, subcutaneous tissue, muscle.     Complications: none  Implants: * No implants in log *

## 2017-12-04 ENCOUNTER — APPOINTMENT (OUTPATIENT)
Dept: GENERAL RADIOLOGY | Age: 67
DRG: 853 | End: 2017-12-04
Attending: SURGERY
Payer: MEDICARE

## 2017-12-04 ENCOUNTER — ANESTHESIA (OUTPATIENT)
Dept: SURGERY | Age: 67
DRG: 853 | End: 2017-12-04
Payer: MEDICARE

## 2017-12-04 LAB
ALBUMIN SERPL-MCNC: 1 G/DL (ref 3.2–4.6)
ALBUMIN/GLOB SERPL: 0.3 {RATIO} (ref 1.2–3.5)
ALP SERPL-CCNC: 59 U/L (ref 50–136)
ALT SERPL-CCNC: 12 U/L (ref 12–65)
ANION GAP SERPL CALC-SCNC: 7 MMOL/L (ref 7–16)
ARTERIAL PATENCY WRIST A: ABNORMAL
AST SERPL-CCNC: 27 U/L (ref 15–37)
BASE DEFICIT BLDA-SCNC: 1.8 MMOL/L (ref 0–2)
BDY SITE: ABNORMAL
BILIRUB SERPL-MCNC: 0.7 MG/DL (ref 0.2–1.1)
BUN SERPL-MCNC: 39 MG/DL (ref 8–23)
CALCIUM SERPL-MCNC: 6.3 MG/DL (ref 8.3–10.4)
CHLORIDE SERPL-SCNC: 118 MMOL/L (ref 98–107)
CO2 SERPL-SCNC: 25 MMOL/L (ref 21–32)
COHGB MFR BLD: 0.5 % (ref 0.5–1.5)
CREAT SERPL-MCNC: 0.75 MG/DL (ref 0.8–1.5)
DO-HGB BLD-MCNC: 1 % (ref 0–5)
ERYTHROCYTE [DISTWIDTH] IN BLOOD BY AUTOMATED COUNT: 14.7 % (ref 11.9–14.6)
FIO2 ON VENT: 50 %
GLOBULIN SER CALC-MCNC: 3.8 G/DL (ref 2.3–3.5)
GLUCOSE BLD STRIP.AUTO-MCNC: 186 MG/DL (ref 65–100)
GLUCOSE BLD STRIP.AUTO-MCNC: 188 MG/DL (ref 65–100)
GLUCOSE BLD STRIP.AUTO-MCNC: 200 MG/DL (ref 65–100)
GLUCOSE BLD STRIP.AUTO-MCNC: 202 MG/DL (ref 65–100)
GLUCOSE BLD STRIP.AUTO-MCNC: 206 MG/DL (ref 65–100)
GLUCOSE SERPL-MCNC: 177 MG/DL (ref 65–100)
HCO3 BLDA-SCNC: 23 MMOL/L (ref 22–26)
HCT VFR BLD AUTO: 26.6 % (ref 41.1–50.3)
HGB BLD-MCNC: 8.7 G/DL (ref 13.6–17.2)
HGB BLDMV-MCNC: 9.4 GM/DL (ref 11.7–15)
LACTATE SERPL-SCNC: 1.7 MMOL/L (ref 0.4–2)
MCH RBC QN AUTO: 29 PG (ref 26.1–32.9)
MCHC RBC AUTO-ENTMCNC: 32.7 G/DL (ref 31.4–35)
MCV RBC AUTO: 88.7 FL (ref 79.6–97.8)
METHGB MFR BLD: 0.8 % (ref 0–1.5)
OXYHGB MFR BLDA: 97.6 % (ref 94–97)
PCO2 BLDA: 38 MMHG (ref 35–45)
PEEP RESPIRATORY: 8 CM[H2O]
PH BLDA: 7.4 [PH] (ref 7.35–7.45)
PLATELET # BLD AUTO: 121 K/UL (ref 150–450)
PMV BLD AUTO: 11.6 FL (ref 10.8–14.1)
PO2 BLDA: 159 MMHG (ref 80–105)
POTASSIUM SERPL-SCNC: 3.5 MMOL/L (ref 3.5–5.1)
PROT SERPL-MCNC: 4.8 G/DL (ref 6.3–8.2)
RBC # BLD AUTO: 3 M/UL (ref 4.23–5.67)
RESP RATE: 15
SAO2 % BLD: 99 % (ref 92–98.5)
SODIUM SERPL-SCNC: 150 MMOL/L (ref 136–145)
VENTILATION MODE VENT: ABNORMAL
VT SETTING VENT: 450 ML
WBC # BLD AUTO: 10.7 K/UL (ref 4.3–11.1)

## 2017-12-04 PROCEDURE — 36592 COLLECT BLOOD FROM PICC: CPT

## 2017-12-04 PROCEDURE — 74011250636 HC RX REV CODE- 250/636: Performed by: INTERNAL MEDICINE

## 2017-12-04 PROCEDURE — 74750000023 HC WOUND THERAPY

## 2017-12-04 PROCEDURE — 83605 ASSAY OF LACTIC ACID: CPT | Performed by: SURGERY

## 2017-12-04 PROCEDURE — 74011000258 HC RX REV CODE- 258: Performed by: SURGERY

## 2017-12-04 PROCEDURE — 74011000250 HC RX REV CODE- 250: Performed by: SURGERY

## 2017-12-04 PROCEDURE — 94003 VENT MGMT INPAT SUBQ DAY: CPT

## 2017-12-04 PROCEDURE — 71010 XR CHEST SNGL V: CPT

## 2017-12-04 PROCEDURE — 82962 GLUCOSE BLOOD TEST: CPT

## 2017-12-04 PROCEDURE — 80053 COMPREHEN METABOLIC PANEL: CPT | Performed by: SURGERY

## 2017-12-04 PROCEDURE — 74011636637 HC RX REV CODE- 636/637: Performed by: HOSPITALIST

## 2017-12-04 PROCEDURE — 65610000001 HC ROOM ICU GENERAL

## 2017-12-04 PROCEDURE — 74011250637 HC RX REV CODE- 250/637: Performed by: INTERNAL MEDICINE

## 2017-12-04 PROCEDURE — 77030018798 HC PMP KT ENTRL FED COVD -A

## 2017-12-04 PROCEDURE — 99291 CRITICAL CARE FIRST HOUR: CPT | Performed by: INTERNAL MEDICINE

## 2017-12-04 PROCEDURE — C9113 INJ PANTOPRAZOLE SODIUM, VIA: HCPCS | Performed by: SURGERY

## 2017-12-04 PROCEDURE — 82803 BLOOD GASES ANY COMBINATION: CPT

## 2017-12-04 PROCEDURE — 74011250636 HC RX REV CODE- 250/636: Performed by: SURGERY

## 2017-12-04 PROCEDURE — 85027 COMPLETE CBC AUTOMATED: CPT | Performed by: SURGERY

## 2017-12-04 PROCEDURE — 77030019952 HC CANSTR VAC ASST KCON -B

## 2017-12-04 RX ORDER — CHLORHEXIDINE GLUCONATE 1.2 MG/ML
15 RINSE ORAL EVERY 12 HOURS
Status: DISCONTINUED | OUTPATIENT
Start: 2017-12-04 | End: 2017-12-15

## 2017-12-04 RX ORDER — BISMUTH SUBSALICYLATE 262 MG
1 TABLET,CHEWABLE ORAL DAILY
COMMUNITY

## 2017-12-04 RX ADMIN — SODIUM CHLORIDE, SODIUM LACTATE, POTASSIUM CHLORIDE, AND CALCIUM CHLORIDE 100 ML/HR: 600; 310; 30; 20 INJECTION, SOLUTION INTRAVENOUS at 09:18

## 2017-12-04 RX ADMIN — PIPERACILLIN SODIUM,TAZOBACTAM SODIUM 4.5 G: 4; .5 INJECTION, POWDER, FOR SOLUTION INTRAVENOUS at 13:26

## 2017-12-04 RX ADMIN — Medication 10 ML: at 13:37

## 2017-12-04 RX ADMIN — PIPERACILLIN SODIUM,TAZOBACTAM SODIUM 4.5 G: 4; .5 INJECTION, POWDER, FOR SOLUTION INTRAVENOUS at 05:04

## 2017-12-04 RX ADMIN — SODIUM CHLORIDE 900 MG: 900 INJECTION, SOLUTION INTRAVENOUS at 06:43

## 2017-12-04 RX ADMIN — CHLORHEXIDINE GLUCONATE 15 ML: 1.2 RINSE ORAL at 21:06

## 2017-12-04 RX ADMIN — SODIUM CHLORIDE 900 MG: 900 INJECTION, SOLUTION INTRAVENOUS at 00:55

## 2017-12-04 RX ADMIN — INSULIN LISPRO 4 UNITS: 100 INJECTION, SOLUTION INTRAVENOUS; SUBCUTANEOUS at 16:33

## 2017-12-04 RX ADMIN — SODIUM CHLORIDE 500 ML: 900 INJECTION, SOLUTION INTRAVENOUS at 12:26

## 2017-12-04 RX ADMIN — Medication 10 ML: at 21:05

## 2017-12-04 RX ADMIN — INSULIN LISPRO 2 UNITS: 100 INJECTION, SOLUTION INTRAVENOUS; SUBCUTANEOUS at 04:56

## 2017-12-04 RX ADMIN — Medication 25 MCG/HR: at 06:00

## 2017-12-04 RX ADMIN — VANCOMYCIN HYDROCHLORIDE 750 MG: 10 INJECTION, POWDER, LYOPHILIZED, FOR SOLUTION INTRAVENOUS at 21:48

## 2017-12-04 RX ADMIN — CHLORHEXIDINE GLUCONATE 15 ML: 1.2 RINSE ORAL at 08:37

## 2017-12-04 RX ADMIN — INSULIN LISPRO 4 UNITS: 100 INJECTION, SOLUTION INTRAVENOUS; SUBCUTANEOUS at 20:14

## 2017-12-04 RX ADMIN — SODIUM CHLORIDE, SODIUM LACTATE, POTASSIUM CHLORIDE, AND CALCIUM CHLORIDE 100 ML/HR: 600; 310; 30; 20 INJECTION, SOLUTION INTRAVENOUS at 19:01

## 2017-12-04 RX ADMIN — PIPERACILLIN SODIUM,TAZOBACTAM SODIUM 4.5 G: 4; .5 INJECTION, POWDER, FOR SOLUTION INTRAVENOUS at 21:04

## 2017-12-04 RX ADMIN — SODIUM CHLORIDE 500 ML: 900 INJECTION, SOLUTION INTRAVENOUS at 10:54

## 2017-12-04 RX ADMIN — SODIUM CHLORIDE 500 ML: 900 INJECTION, SOLUTION INTRAVENOUS at 20:33

## 2017-12-04 RX ADMIN — VANCOMYCIN HYDROCHLORIDE 750 MG: 10 INJECTION, POWDER, LYOPHILIZED, FOR SOLUTION INTRAVENOUS at 05:37

## 2017-12-04 RX ADMIN — INSULIN LISPRO 2 UNITS: 100 INJECTION, SOLUTION INTRAVENOUS; SUBCUTANEOUS at 08:26

## 2017-12-04 RX ADMIN — SODIUM CHLORIDE 900 MG: 900 INJECTION, SOLUTION INTRAVENOUS at 12:31

## 2017-12-04 RX ADMIN — SODIUM CHLORIDE 40 MG: 9 INJECTION INTRAMUSCULAR; INTRAVENOUS; SUBCUTANEOUS at 16:06

## 2017-12-04 RX ADMIN — SODIUM CHLORIDE 900 MG: 900 INJECTION, SOLUTION INTRAVENOUS at 21:14

## 2017-12-04 RX ADMIN — Medication 10 ML: at 05:04

## 2017-12-04 RX ADMIN — VANCOMYCIN HYDROCHLORIDE 750 MG: 10 INJECTION, POWDER, LYOPHILIZED, FOR SOLUTION INTRAVENOUS at 13:41

## 2017-12-04 RX ADMIN — INSULIN LISPRO 4 UNITS: 100 INJECTION, SOLUTION INTRAVENOUS; SUBCUTANEOUS at 12:23

## 2017-12-04 NOTE — PROGRESS NOTES
[facility] Drips dually verified. Pt resting quietly in bed, does not open eyes to any stimulus. No movement to any stimulus. Sedation decreased. Pupils equal and round, sluggish to light 2mm. Afebrile, NSR, normotensive on levo gtt, O2 sat 100% on vent. Lines:  Peripheral IV x2  R quad IJ  L radial art line  ETT    Drains:  Gerardo  Wound vac  OGT  ostomy    Drips:  LR @ 100 mL/hr  Fentanyl @ 50 mcg/hr  Versed @ 1 mg/hr  Levophed @ 4 mcg/min    Skin assessment performed. Bilateral feet cool with pulses present. R leg wound vac intact and draining serosanguinous drainage. Black foam stapled to portion of leg. Ostomy to mid abdomen with no drainage at this time.

## 2017-12-04 NOTE — PROGRESS NOTES
PLAN:  Consult Dr. Tremaine Matthew for leg reconstruction  No further debridements at this time  Will continue to monitor    ASSESSMENT:  Admit Date: 12/1/2017   1 Day Post-Op  Procedure(s):  RIGHT LEG AND HIP DEBRIDEMENT WOUND VAC PLACEMENT    Principal Problem:    Severe sepsis with septic shock ([facility] (12/1/2017)    Active Problems:    Subdural hematoma ([address] (12/1/2017)      Type II diabetes mellitus with complication ([address] (55/0/9867)      LAUREANO (acute kidney injury) ([address] (12/1/2017)      Syncope (12/1/2017)      Leukocytosis (12/1/2017)      Fasciitis (12/1/2017)      Encounter for weaning from ventilator ([facility] (12/1/2017)      Electrolyte abnormality (12/1/2017)      Lactic acidosis (12/2/2017)       HPI: Brianna Giron is a 79 y.o.  male who presented tot he ED 12/1/17 for evaluation. Pt with a history of having a syncopal episode at home today.  A neighbor went to go check on him and when the patient opened the door he apparently passed out and fell backward. Jef Baez says that he has not seen a doctor in over 10 years and as far as he knows he has no medical problems.  Patient does not have any teeth and he has a very difficult speech pattern to understand.  Overall he says that he just feels weak and tired but he does not say he is having any specific pain.  He denies chest pain or difficulty breathing. Patient says that he has no known history of diabetes. SUBJECTIVE: Patient intubated and on Levo @ 7.5mL/hr. Unresponsive. S/p right thigh debridement with NPWT placement and diverting colostomy. VAC had 750 mL output overnight. Colostomy has no documented output so far. H/H 8.7/26.6, WBC 10.7. On Cleocin, Vanc, and Zosyn. OBJECTIVE:  Constitutional: Unresponsive in no acute distress; appears stated age   Visit Vitals    BP 95/54    Pulse 93    Temp 99.4 °F (37.4 °C)    Resp 16    Ht 5' 11\" (1.803 m)    Wt 84.7 kg (186 lb 11.7 oz)    SpO2 98%    BMI 26.04 kg/m2     Eyes:Sclera are clear. ENMT: no external lesions gross hearing normal; no obvious neck masses, no ear or lip lesions  CV: RRR. Normal perfusion  Resp: No JVD. Breathing is  non-labored; no audible wheezing. GI: soft and non-distended. Colostomy patent with small amount of watery output in pouch. Stoma edematous but viable. Musculoskeletal: Right second toe cyanotic. Pedal pulse monophasic on doppler. All 5 digits cool to touch. Foot and lower leg warm to touch.   Neuro:  Unresponsive  Psychiatric: unable to assess    Patient Vitals for the past 24 hrs:   BP Temp Pulse Resp SpO2 Weight   12/04/17 0915 - - 93 16 98 % -   12/04/17 0901 95/54 - 97 15 99 % -   12/04/17 0900 - - 94 16 97 % -   12/04/17 0845 - - 93 14 98 % -   12/04/17 0839 - - 92 20 97 % -   12/04/17 0830 111/58 - 90 15 98 % -   12/04/17 0815 - - 90 15 97 % -   12/04/17 0800 94/55 99.4 °F (37.4 °C) 89 13 97 % -   12/04/17 0745 - - 88 13 96 % -   12/04/17 0730 - - 90 13 96 % -   12/04/17 0715 - - 91 13 98 % -   12/04/17 0701 90/51 - 90 14 97 % -   12/04/17 0700 - - 89 14 97 % -   12/04/17 0645 - - 90 15 97 % -   12/04/17 0630 99/56 - 90 13 98 % -   12/04/17 0615 - - 90 13 98 % -   12/04/17 0600 100/56 - 91 12 99 % -   12/04/17 0545 - - 92 15 99 % -   12/04/17 0530 - - 87 14 99 % -   12/04/17 0515 - - 87 15 98 % -   12/04/17 0500 - - 87 14 97 % -   12/04/17 0445 - - 87 15 97 % -   12/04/17 0430 97/52 - 86 13 97 % -   12/04/17 0415 - - 86 14 97 % -   12/04/17 0400 - - 84 17 99 % -   12/04/17 0346 - - - - - 84.7 kg (186 lb 11.7 oz)   12/04/17 0345 - - 85 14 98 % -   12/04/17 0333 - - 85 15 99 % -   12/04/17 0330 - - 85 15 99 % -   12/04/17 0315 - - 84 19 99 % -   12/04/17 0300 99/55 98 °F (36.7 °C) 84 15 98 % -   12/04/17 0245 - - 83 20 98 % -   12/04/17 0230 93/55 - 83 15 99 % -   12/04/17 0215 - - 82 16 99 % -   12/04/17 0200 - - 83 13 100 % -   12/04/17 0145 - - 85 18 100 % -   12/04/17 0130 - - 90 15 100 % -   12/04/17 0115 - - 82 10 99 % -   12/04/17 0100 99/56 - 83 8 100 % -   12/04/17 0045 - - 83 12 100 % -   12/04/17 0030 96/54 - 83 15 100 % -   12/04/17 0024 - - 83 10 99 % -   12/04/17 0015 - - 83 8 100 % -   12/03/17 2345 - - 86 12 99 % -   12/03/17 2330 - - 87 12 100 % -   12/03/17 2315 - - 85 14 99 % -   12/03/17 2300 106/55 97.9 °F (36.6 °C) 85 12 99 % -   12/03/17 2245 - - 86 12 100 % -   12/03/17 2230 - - 85 14 100 % -   12/03/17 2215 - - 85 14 100 % -   12/03/17 2145 - - 84 15 100 % -   12/03/17 2130 - - 84 15 100 % -   12/03/17 2115 - - 85 12 100 % -   12/03/17 2100 106/59 - 84 15 100 % -   12/03/17 2045 - - 85 15 100 % -   12/03/17 2030 - - 85 15 100 % -   12/03/17 2027 - - 84 15 100 % -   12/03/17 2015 - - 84 14 100 % -   12/03/17 2000 - - 86 15 100 % -   12/03/17 1945 - - 86 10 100 % -   12/03/17 1930 99/54 - 88 12 100 % -   12/03/17 1915 102/55 98.4 °F (36.9 °C) 87 15 100 % -   12/03/17 1601 113/56 - 91 15 100 % -   12/03/17 1545 116/59 - 91 12 100 % -   12/03/17 1530 118/63 - 90 15 100 % -   12/03/17 1515 110/58 97.7 °F (36.5 °C) 90 15 100 % -   12/03/17 1501 104/56 - 90 15 100 % -   12/03/17 1446 107/58 - 90 15 99 % -   12/03/17 1432 115/63 - 91 12 99 % -   12/03/17 1414 - 97.3 °F (36.3 °C) 91 15 98 % -   12/03/17 1314 145/78 98.5 °F (36.9 °C) 93 15 98 % -     Labs:  Recent Labs      12/04/17   0346   12/01/17   1501   WBC  10.7   < >  21.7*   HGB  8.7*   < >  13.9   PLT  121*   < >  190   NA  150*   < >  135*   K  3.5   < >  3.3*   CL  118*   < >  92*   CO2  25   < >  25   BUN  39*   < >  79*   CREA  0.75*   < >  1.52*   GLU  177*   < >  426*   TBILI  0.7   < >  1.5*   SGOT  27   < >  15   ALT  12   < >  15   AP  59   < >  99   LPSE   --    --   43*    < > = values in this interval not displayed.      Elmira Eisenmenger, NP

## 2017-12-04 NOTE — PROGRESS NOTES
Spoke with Dr Alvarez Key. Given results of BNP and lactic, ordered to decreased LR to 100/hr and add free water 40mL Q1 to OGT. Will monitor.

## 2017-12-04 NOTE — PROGRESS NOTES
Weaning sedation as pt tolerates. Remains minimally responsive to pain at rest. Will intermittently withdraw in BUE, but not BLE. Pupils equal round and sluggish. With turning, pt appears uncomfortable and moves bilateral UE nonpurposefully. VSS. Will continue to monitor.

## 2017-12-04 NOTE — PROGRESS NOTES
Bedside shift change report given to Christina Gonzalez (oncoming nurse) by Lon Alexander RN (offgoing nurse). Report included the following information SBAR, Kardex, ED Summary, OR Summary, Intake/Output, MAR, Recent Results and Cardiac Rhythm NSR. Skin assessed. Patient turned. Fentanyl gtt verified.

## 2017-12-04 NOTE — PROGRESS NOTES
Ventilator check complete; patient has a #8. 0 ET tube secured at the 22 at the lip. Patient is sedated. Patient is not able to follow commands. Breath sounds are coarse and diminished. Trachea is midline, Negative for subcutaneous air, and chest excursion is symmetric. Patient is also Negative for cyanosis and is Negative for pitting edema. All alarms are set and audible. Resuscitation bag is at the head of the bed.       Ventilator Settings  Mode FIO2 Rate Tidal Volume Pressure PEEP I:E Ratio   PRVC  35 %  15  450 ml     8 cm H20  1:3.33      Peak airway pressure: 14 cm H2O   Minute ventilation: 7.1 l/min     ABG:   Recent Labs      12/04/17   0248  12/03/17   0235  12/02/17   0220   PH  7.40  7.33*  7.43   PCO2  38  37  33*   PO2  159*  162*  213*   HCO3  23  19*  22         Melodie Ward, RT

## 2017-12-04 NOTE — PROGRESS NOTES
Spoke with Dr. Jennefer Schwab, 1 unit PRBC given in OR. Ordered to do repeat HH and BNP at 1500. Will monitor.

## 2017-12-04 NOTE — PROGRESS NOTES
Patient appears calm. Higginson family at bedside. Family very optimistic and thankful for progress made. Continue to support as more procedures may be needed.     Vinay Moser, staff [address] [facility]  /   [email or web address]

## 2017-12-04 NOTE — PROGRESS NOTES
Ventilator check complete; patient has a #8. 0 ET tube secured at the 22 at the lip. Patient is sedated. Patient is not able to follow commands. Breath sounds are diminished. Trachea is midline, Negative for subcutaneous air, and chest excursion is symmetric. Patient is also Negative for cyanosis and is Negative for pitting edema. All alarms are set and audible. Resuscitation bag is at the head of the bed.       Ventilator Settings  Mode FIO2 Rate Tidal Volume Pressure PEEP I:E Ratio   PRVC  51 %   15 450 ml     8 cm H20  1:3.33      Peak airway pressure: 17.6 cm H2O   Minute ventilation: 6.7 l/min     ABG:   Recent Labs      12/03/17   0235  12/02/17   0220   PH  7.33*  7.43   PCO2  37  33*   PO2  162*  213*   HCO3  19*  22         Abimbola James, RT

## 2017-12-04 NOTE — INTERDISCIPLINARY ROUNDS
Interdisciplinary team rounds were held 12/4/2017 with the following team members:Care Management, Nursing, Nurse Practitioner, Nutrition, Palliative Care, Pastoral Care, Pharmacy, Physical Therapy, Physician, Respiratory Therapy and Clinical Coordinator. Plan of care discussed. See clinical pathway and/or care plan for interventions and desired outcomes.

## 2017-12-04 NOTE — PROGRESS NOTES
Billie Cordero  Admission Date: 12/1/2017             Daily Progress Note: 12/4/2017    The patient's chart is reviewed and the patient is discussed with the staff. 68yo WM admitted with syncope. 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 Pt with severe ady's gangrene, resultant sepsis. Worsening hypotension post op(12/3/17). Subjective:     Patient remains intubated. On levophed 1mcg today. To go back to OR today possibly for further debridement.   Unresponsive, preferentially moves L side    Current Facility-Administered Medications   Medication Dose Route Frequency    piperacillin-tazobactam (ZOSYN) 4.5 g in 0.9% sodium chloride (MBP/ADV) 100 mL  4.5 g IntraVENous Q8H    [START ON 12/5/2017] vanc trough reminder   Other ONCE    chlorhexidine (PERIDEX) 0.12 % mouthwash 15 mL  15 mL Oral BID    NUTRITIONAL SUPPORT ELECTROLYTE PRN ORDERS   Does Not Apply PRN    insulin lispro (HUMALOG) injection   SubCUTAneous Q4H    0.9% sodium chloride infusion 250 mL  250 mL IntraVENous PRN    0.9% sodium chloride infusion 250 mL  250 mL IntraVENous PRN    vancomycin (VANCOCIN) 750 mg in  ml infusion  750 mg IntraVENous Q8H    pantoprazole (PROTONIX) 40 mg in sodium chloride 0.9 % 10 mL injection  40 mg IntraVENous Q24H    midazolam in normal saline (VERSED) 1 mg/mL infusion  0-10 mg/hr IntraVENous TITRATE    NOREPINephrine (LEVOPHED) 4 mg in 5% dextrose 250 mL infusion  2-16 mcg/min IntraVENous TITRATE    PHENYLephrine (ROSA-SYNEPHRINE) 30 mg in 0.9% sodium chloride 250 mL infusion   mcg/min IntraVENous TITRATE    lactated Ringers infusion  100 mL/hr IntraVENous CONTINUOUS    sodium chloride (NS) flush 5-10 mL  5-10 mL IntraVENous Q8H    sodium chloride (NS) flush 5-10 mL  5-10 mL IntraVENous PRN    clindamycin phosphate (CLEOCIN) 900 mg in 0.9% sodium chloride 100 mL IVPB  900 mg IntraVENous Q6H    fentaNYL in normal saline (pf) 25 mcg/mL infusion   mcg/hr IntraVENous TITRATE       Review of Systems  Unobtainable due to patient status. Objective:     Vitals:    12/04/17 0845 12/04/17 0900 12/04/17 0901 12/04/17 0915   BP:   95/54    Pulse: 93 94 97 93   Resp: 14 16 15 16   Temp:       SpO2: 98% 97% 99% 98%   Weight:       Height:         Intake and Output:   12/02 1901 - 12/04 0700  In: 43032 [I.V.:20966]  Out: 9301 [RPJTX:7766; Drains:1400]  12/04 0701 - 12/04 1900  In: -   Out: 280 [Urine:280]    Physical Exam:   Constitution:  the patient is well developed and in no acute distress  EENMT:  Sclera clear, pupils equal, oral mucosa moist  Respiratory: Intubated. Cardiovascular:  Tachy, regular. without M,G,R  Gastrointestinal: soft and non-tender; with positive bowel sounds. Musculoskeletal: warm without cyanosis. There is trace lower leg edema. Skin:  no jaundice or rashes, surgical wounds R thigh. Neurologic: sedated    Psychiatric:  sedated    CHEST XRAY:   12/3/17  IMPRESSION: No acute cardiopulmonary disease.         LAB  No lab exists for component: [place]   Recent Labs      12/04/17   0346  12/03/17   1512  12/03/17   0338  12/02/17   0419  12/01/17   1501   WBC  10.7   --   12.5*  23.5*  21.7*   HGB  8.7*  9.6*  9.1*  12.0*  13.9   HCT  26.6*  29.8*  28.0*  35.0*  40.4*   PLT  121*   --   173  211  190     Recent Labs      12/04/17   0346  12/03/17   1512  12/03/17   0338   12/02/17   0419  12/01/17   1501   NA  150*  149*  147*   < >  141  135*   K  3.5  3.7  3.3*   < >  3.6  3.3*   CL  118*  118*  116*   < >  104  92*   CO2  25  21  23   < >  22  25   GLU  177*  210*  266*   < >  325*  426*   BUN  39*  42*  56*   < >  81*  79*   CREA  0.75*  0.68*  0.90   < >  1.16  1.52*   MG   --    --    --    --    --   3.2*   CA  6.3*  6.4*  6.0*   < >  7.2*  8.3   PHOS   --    --    --    --    --   2.9   ALB  1.0*   --   1.2*   --   1.7*  2.3*   SGOT  27   --   19   --   14* 15    < > = values in this interval not displayed. Recent Labs      12/04/17   0248  12/03/17   0235  12/02/17   0220   PH  7.40  7.33*  7.43   PCO2  38  37  33*   PO2  159*  162*  213*   HCO3  23  19*  22       MICRO  Blood - GPC ident pending  Wound Nl skin genevieve + GNR- ident pending    Assessment:  (Medical Decision Making)     Patient Active Problem List   Diagnosis Code    Subdural hematoma (HCC) I62.00    Type II diabetes mellitus with complication (HCC) Q01.0    LAUREANO (acute kidney injury) ([facility] N17.9    Syncope R55    Leukocytosis D72.829    Fasciitis M72.9    Severe sepsis with septic shock (HCC) A41.9, R65.21    Encounter for weaning from ventilator ([facility] Z99.11    Electrolyte abnormality E87.8    Lactic acidosis E87.2            Plan:  (Medical Decision Making)     Hospital Problems  Date Reviewed: 12/1/2017          Codes Class Noted POA    Lactic acidosis ICD-10-CM: E87.2  ICD-9-CM: 276.2  12/2/2017 Unknown    Due to sepsis and hypotension    Subdural hematoma (HCC) ICD-10-CM: I62.00  ICD-9-CM: 432.1  12/1/2017 Yes    No surgical    Type II diabetes mellitus with complication ([facility] ZVL-20-KS: E11.8  ICD-9-CM: 250.90  12/1/2017 Yes    Results for Bettye Noland (MRN [id number]) as of 12/4/2017 09:36   Ref.  Range 12/3/2017 15:20 12/3/2017 20:24 12/3/2017 23:17 12/4/2017 04:52 12/4/2017 08:22   GLUCOSE,FAST - POC Latest Ref Range: 65 - 100 mg/dL 199 (H) 221 (H) 231 (H) 186 (H) 188 (H)     Fair control    LAUREANO (acute kidney injury) ([facility] ICD-10-CM: N17.9  ICD-9-CM: 584.9  12/1/2017 Yes    Normal kidney function today    Leukocytosis ICD-10-CM: D72.829  ICD-9-CM: 288.60  12/1/2017 Yes    Due to sepsis    Fasciitis ICD-10-CM: M72.9  ICD-9-CM: 729.4  12/1/2017 Yes    Shima's gangrene- for possible further debridement today  On Vancomycin , zosyn and clindamycin    * (Principal)Severe sepsis with septic shock ([facility] ICD-10-CM: A41.9, R65.21  ICD-9-CM: 038.9, 995.92, 785.52  12/1/2017 Yes Encounter for weaning from ventilator [facility]) ICD-10-CM: Z99.11  ICD-9-CM: V46.13  12/1/2017 Yes    Not ready to wean    Electrolyte abnormality ICD-10-CM: E87.8  ICD-9-CM: 276.9  12/1/2017 Yes          On SCD(not on lovenox due to SDH)  Pepcid IV 20 mg daily for SUP    Total CC time spent 30 min      Isabela Ron MD

## 2017-12-05 ENCOUNTER — ANESTHESIA EVENT (OUTPATIENT)
Dept: SURGERY | Age: 67
DRG: 853 | End: 2017-12-05
Payer: MEDICARE

## 2017-12-05 ENCOUNTER — APPOINTMENT (OUTPATIENT)
Dept: GENERAL RADIOLOGY | Age: 67
DRG: 853 | End: 2017-12-05
Attending: SURGERY
Payer: MEDICARE

## 2017-12-05 LAB
ALBUMIN SERPL-MCNC: 0.9 G/DL (ref 3.2–4.6)
ALBUMIN/GLOB SERPL: 0.2 {RATIO} (ref 1.2–3.5)
ALP SERPL-CCNC: 98 U/L (ref 50–136)
ALT SERPL-CCNC: 15 U/L (ref 12–65)
ANION GAP SERPL CALC-SCNC: 8 MMOL/L (ref 7–16)
ARTERIAL PATENCY WRIST A: ABNORMAL
AST SERPL-CCNC: 32 U/L (ref 15–37)
BACTERIA SPEC CULT: NORMAL
BASE DEFICIT BLDA-SCNC: 2.6 MMOL/L (ref 0–2)
BDY SITE: ABNORMAL
BILIRUB SERPL-MCNC: 0.7 MG/DL (ref 0.2–1.1)
BUN SERPL-MCNC: 30 MG/DL (ref 8–23)
CALCIUM SERPL-MCNC: 6.4 MG/DL (ref 8.3–10.4)
CHLORIDE SERPL-SCNC: 118 MMOL/L (ref 98–107)
CO2 SERPL-SCNC: 26 MMOL/L (ref 21–32)
COHGB MFR BLD: 2 % (ref 0.5–1.5)
CREAT SERPL-MCNC: 0.76 MG/DL (ref 0.8–1.5)
DO-HGB BLD-MCNC: 1 % (ref 0–5)
ERYTHROCYTE [DISTWIDTH] IN BLOOD BY AUTOMATED COUNT: 14.7 % (ref 11.9–14.6)
GLOBULIN SER CALC-MCNC: 4.4 G/DL (ref 2.3–3.5)
GLUCOSE BLD STRIP.AUTO-MCNC: 171 MG/DL (ref 65–100)
GLUCOSE BLD STRIP.AUTO-MCNC: 176 MG/DL (ref 65–100)
GLUCOSE BLD STRIP.AUTO-MCNC: 192 MG/DL (ref 65–100)
GLUCOSE BLD STRIP.AUTO-MCNC: 199 MG/DL (ref 65–100)
GLUCOSE BLD STRIP.AUTO-MCNC: 201 MG/DL (ref 65–100)
GLUCOSE BLD STRIP.AUTO-MCNC: 207 MG/DL (ref 65–100)
GLUCOSE SERPL-MCNC: 173 MG/DL (ref 65–100)
GRAM STN SPEC: NORMAL
HCO3 BLDA-SCNC: 21 MMOL/L (ref 22–26)
HCT VFR BLD AUTO: 25.8 % (ref 41.1–50.3)
HGB BLD-MCNC: 8.4 G/DL (ref 13.6–17.2)
HGB BLDMV-MCNC: 7.7 GM/DL (ref 11.7–15)
MCH RBC QN AUTO: 29.3 PG (ref 26.1–32.9)
MCHC RBC AUTO-ENTMCNC: 32.6 G/DL (ref 31.4–35)
MCV RBC AUTO: 89.9 FL (ref 79.6–97.8)
METHGB MFR BLD: 0.3 % (ref 0–1.5)
OXYHGB MFR BLDA: 97.2 % (ref 94–97)
PCO2 BLDA: 31 MMHG (ref 35–45)
PEEP RESPIRATORY: 8 CM[H2O]
PH BLDA: 7.45 [PH] (ref 7.35–7.45)
PLATELET # BLD AUTO: 132 K/UL (ref 150–450)
PMV BLD AUTO: 12 FL (ref 10.8–14.1)
PO2 BLDA: 132 MMHG (ref 80–105)
POTASSIUM SERPL-SCNC: 3.5 MMOL/L (ref 3.5–5.1)
PROT SERPL-MCNC: 5.3 G/DL (ref 6.3–8.2)
RBC # BLD AUTO: 2.87 M/UL (ref 4.23–5.67)
RESP RATE: 15
SAO2 % BLD: 100 % (ref 92–98.5)
SERVICE CMNT-IMP: NORMAL
SODIUM SERPL-SCNC: 152 MMOL/L (ref 136–145)
VANCOMYCIN TROUGH SERPL-MCNC: 23.1 UG/ML (ref 5–20)
VENTILATION MODE VENT: ABNORMAL
VT SETTING VENT: 450 ML
WBC # BLD AUTO: 9.5 K/UL (ref 4.3–11.1)

## 2017-12-05 PROCEDURE — 80053 COMPREHEN METABOLIC PANEL: CPT | Performed by: SURGERY

## 2017-12-05 PROCEDURE — 85027 COMPLETE CBC AUTOMATED: CPT | Performed by: SURGERY

## 2017-12-05 PROCEDURE — 74011250636 HC RX REV CODE- 250/636: Performed by: SURGERY

## 2017-12-05 PROCEDURE — 82962 GLUCOSE BLOOD TEST: CPT

## 2017-12-05 PROCEDURE — 36592 COLLECT BLOOD FROM PICC: CPT

## 2017-12-05 PROCEDURE — 65610000001 HC ROOM ICU GENERAL

## 2017-12-05 PROCEDURE — 94003 VENT MGMT INPAT SUBQ DAY: CPT

## 2017-12-05 PROCEDURE — 74011000250 HC RX REV CODE- 250: Performed by: SURGERY

## 2017-12-05 PROCEDURE — 77030019952 HC CANSTR VAC ASST KCON -B

## 2017-12-05 PROCEDURE — 77030018798 HC PMP KT ENTRL FED COVD -A

## 2017-12-05 PROCEDURE — 74011250636 HC RX REV CODE- 250/636: Performed by: INTERNAL MEDICINE

## 2017-12-05 PROCEDURE — 71010 XR CHEST SNGL V: CPT

## 2017-12-05 PROCEDURE — 82803 BLOOD GASES ANY COMBINATION: CPT

## 2017-12-05 PROCEDURE — 76450000000

## 2017-12-05 PROCEDURE — 74750000023 HC WOUND THERAPY

## 2017-12-05 PROCEDURE — 99291 CRITICAL CARE FIRST HOUR: CPT | Performed by: INTERNAL MEDICINE

## 2017-12-05 PROCEDURE — 80202 ASSAY OF VANCOMYCIN: CPT | Performed by: INTERNAL MEDICINE

## 2017-12-05 PROCEDURE — 74011250637 HC RX REV CODE- 250/637: Performed by: SURGERY

## 2017-12-05 PROCEDURE — 74011250637 HC RX REV CODE- 250/637: Performed by: INTERNAL MEDICINE

## 2017-12-05 PROCEDURE — 74011000258 HC RX REV CODE- 258: Performed by: SURGERY

## 2017-12-05 PROCEDURE — 74011636637 HC RX REV CODE- 636/637: Performed by: HOSPITALIST

## 2017-12-05 PROCEDURE — C9113 INJ PANTOPRAZOLE SODIUM, VIA: HCPCS | Performed by: SURGERY

## 2017-12-05 RX ORDER — SODIUM CHLORIDE, SODIUM LACTATE, POTASSIUM CHLORIDE, CALCIUM CHLORIDE 600; 310; 30; 20 MG/100ML; MG/100ML; MG/100ML; MG/100ML
150 INJECTION, SOLUTION INTRAVENOUS CONTINUOUS
Status: CANCELLED | OUTPATIENT
Start: 2017-12-05

## 2017-12-05 RX ORDER — SODIUM CHLORIDE 0.9 % (FLUSH) 0.9 %
5-10 SYRINGE (ML) INJECTION AS NEEDED
Status: CANCELLED | OUTPATIENT
Start: 2017-12-05

## 2017-12-05 RX ORDER — MIDAZOLAM HYDROCHLORIDE 1 MG/ML
5 INJECTION, SOLUTION INTRAMUSCULAR; INTRAVENOUS ONCE
Status: CANCELLED | OUTPATIENT
Start: 2017-12-05 | End: 2017-12-05

## 2017-12-05 RX ORDER — LIDOCAINE HYDROCHLORIDE 10 MG/ML
0.1 INJECTION INFILTRATION; PERINEURAL AS NEEDED
Status: CANCELLED | OUTPATIENT
Start: 2017-12-05

## 2017-12-05 RX ORDER — MIDAZOLAM HYDROCHLORIDE 1 MG/ML
2 INJECTION, SOLUTION INTRAMUSCULAR; INTRAVENOUS
Status: CANCELLED | OUTPATIENT
Start: 2017-12-05

## 2017-12-05 RX ORDER — SODIUM CHLORIDE 9 MG/ML
50 INJECTION, SOLUTION INTRAVENOUS CONTINUOUS
Status: CANCELLED | OUTPATIENT
Start: 2017-12-05

## 2017-12-05 RX ORDER — HYDROCODONE BITARTRATE AND ACETAMINOPHEN 5; 325 MG/1; MG/1
1 TABLET ORAL AS NEEDED
Status: CANCELLED | OUTPATIENT
Start: 2017-12-05

## 2017-12-05 RX ORDER — SODIUM CHLORIDE 0.9 % (FLUSH) 0.9 %
5-10 SYRINGE (ML) INJECTION EVERY 8 HOURS
Status: CANCELLED | OUTPATIENT
Start: 2017-12-05

## 2017-12-05 RX ORDER — SODIUM CHLORIDE, SODIUM LACTATE, POTASSIUM CHLORIDE, CALCIUM CHLORIDE 600; 310; 30; 20 MG/100ML; MG/100ML; MG/100ML; MG/100ML
150 INJECTION, SOLUTION INTRAVENOUS CONTINUOUS
Status: CANCELLED | OUTPATIENT
Start: 2017-12-05 | End: 2017-12-06

## 2017-12-05 RX ORDER — ACETAMINOPHEN 500 MG
1000 TABLET ORAL
Status: CANCELLED | OUTPATIENT
Start: 2017-12-05

## 2017-12-05 RX ORDER — FENTANYL CITRATE 50 UG/ML
100 INJECTION, SOLUTION INTRAMUSCULAR; INTRAVENOUS ONCE
Status: CANCELLED | OUTPATIENT
Start: 2017-12-05 | End: 2017-12-05

## 2017-12-05 RX ORDER — FAMOTIDINE 20 MG/1
20 TABLET, FILM COATED ORAL ONCE
Status: CANCELLED | OUTPATIENT
Start: 2017-12-05 | End: 2017-12-05

## 2017-12-05 RX ORDER — HYDROMORPHONE HYDROCHLORIDE 2 MG/ML
0.5 INJECTION, SOLUTION INTRAMUSCULAR; INTRAVENOUS; SUBCUTANEOUS
Status: CANCELLED | OUTPATIENT
Start: 2017-12-05

## 2017-12-05 RX ADMIN — SODIUM CHLORIDE 900 MG: 900 INJECTION, SOLUTION INTRAVENOUS at 12:55

## 2017-12-05 RX ADMIN — Medication 10 ML: at 21:32

## 2017-12-05 RX ADMIN — Medication 10 ML: at 13:37

## 2017-12-05 RX ADMIN — Medication 1 AMPULE: at 21:32

## 2017-12-05 RX ADMIN — INSULIN LISPRO 2 UNITS: 100 INJECTION, SOLUTION INTRAVENOUS; SUBCUTANEOUS at 16:30

## 2017-12-05 RX ADMIN — Medication 10 ML: at 05:10

## 2017-12-05 RX ADMIN — INSULIN LISPRO 4 UNITS: 100 INJECTION, SOLUTION INTRAVENOUS; SUBCUTANEOUS at 12:54

## 2017-12-05 RX ADMIN — PIPERACILLIN SODIUM,TAZOBACTAM SODIUM 4.5 G: 4; .5 INJECTION, POWDER, FOR SOLUTION INTRAVENOUS at 21:32

## 2017-12-05 RX ADMIN — Medication 50 MCG/HR: at 07:04

## 2017-12-05 RX ADMIN — SODIUM CHLORIDE 40 MG: 9 INJECTION INTRAMUSCULAR; INTRAVENOUS; SUBCUTANEOUS at 14:08

## 2017-12-05 RX ADMIN — PIPERACILLIN SODIUM,TAZOBACTAM SODIUM 4.5 G: 4; .5 INJECTION, POWDER, FOR SOLUTION INTRAVENOUS at 04:46

## 2017-12-05 RX ADMIN — VANCOMYCIN HYDROCHLORIDE 1000 MG: 1 INJECTION, POWDER, LYOPHILIZED, FOR SOLUTION INTRAVENOUS at 21:31

## 2017-12-05 RX ADMIN — VANCOMYCIN HYDROCHLORIDE 750 MG: 10 INJECTION, POWDER, LYOPHILIZED, FOR SOLUTION INTRAVENOUS at 06:00

## 2017-12-05 RX ADMIN — INSULIN LISPRO 4 UNITS: 100 INJECTION, SOLUTION INTRAVENOUS; SUBCUTANEOUS at 19:49

## 2017-12-05 RX ADMIN — SODIUM CHLORIDE 900 MG: 900 INJECTION, SOLUTION INTRAVENOUS at 04:51

## 2017-12-05 RX ADMIN — Medication 1 AMPULE: at 08:45

## 2017-12-05 RX ADMIN — PIPERACILLIN SODIUM,TAZOBACTAM SODIUM 4.5 G: 4; .5 INJECTION, POWDER, FOR SOLUTION INTRAVENOUS at 13:36

## 2017-12-05 RX ADMIN — INSULIN LISPRO 2 UNITS: 100 INJECTION, SOLUTION INTRAVENOUS; SUBCUTANEOUS at 08:46

## 2017-12-05 RX ADMIN — SODIUM CHLORIDE, SODIUM LACTATE, POTASSIUM CHLORIDE, AND CALCIUM CHLORIDE 50 ML/HR: 600; 310; 30; 20 INJECTION, SOLUTION INTRAVENOUS at 20:22

## 2017-12-05 RX ADMIN — CHLORHEXIDINE GLUCONATE 15 ML: 1.2 RINSE ORAL at 21:32

## 2017-12-05 RX ADMIN — CHLORHEXIDINE GLUCONATE 15 ML: 1.2 RINSE ORAL at 08:45

## 2017-12-05 RX ADMIN — SODIUM CHLORIDE 900 MG: 900 INJECTION, SOLUTION INTRAVENOUS at 20:29

## 2017-12-05 RX ADMIN — INSULIN LISPRO 2 UNITS: 100 INJECTION, SOLUTION INTRAVENOUS; SUBCUTANEOUS at 01:00

## 2017-12-05 RX ADMIN — INSULIN LISPRO 2 UNITS: 100 INJECTION, SOLUTION INTRAVENOUS; SUBCUTANEOUS at 23:58

## 2017-12-05 RX ADMIN — INSULIN LISPRO 2 UNITS: 100 INJECTION, SOLUTION INTRAVENOUS; SUBCUTANEOUS at 04:46

## 2017-12-05 NOTE — PROGRESS NOTES
Kasia Core  Admission Date: 12/1/2017             Daily Progress Note: 12/5/2017    The patient's chart is reviewed and the patient is discussed with the staff. 70yo WM admitted with syncope. 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 Pt with severe ady's gangrene, resultant sepsis. Worsening hypotension post op(12/3/17). Subjective:     Patient remains intubated. On levophed 1mcg today. To go back to OR today possibly for further debridement.   Unresponsive, preferentially moves L side    Current Facility-Administered Medications   Medication Dose Route Frequency    alcohol 62% (NOZIN) nasal  1 Ampule  1 Ampule Topical Q12H    piperacillin-tazobactam (ZOSYN) 4.5 g in 0.9% sodium chloride (MBP/ADV) 100 mL  4.5 g IntraVENous Q8H    sodium chloride 0.9 % bolus infusion 500 mL  500 mL IntraVENous PRN    influenza vaccine 2017-18 (3 yrs+)(PF) (FLUZONE QUAD/FLUARIX QUAD) injection 0.5 mL  0.5 mL IntraMUSCular PRIOR TO DISCHARGE    clindamycin phosphate (CLEOCIN) 900 mg in 0.9% sodium chloride 100 mL IVPB  900 mg IntraVENous Q8H    chlorhexidine (PERIDEX) 0.12 % mouthwash 15 mL  15 mL Oral Q12H    NUTRITIONAL SUPPORT ELECTROLYTE PRN ORDERS   Does Not Apply PRN    insulin lispro (HUMALOG) injection   SubCUTAneous Q4H    vancomycin (VANCOCIN) 750 mg in  ml infusion  750 mg IntraVENous Q8H    pantoprazole (PROTONIX) 40 mg in sodium chloride 0.9 % 10 mL injection  40 mg IntraVENous Q24H    midazolam in normal saline (VERSED) 1 mg/mL infusion  0-10 mg/hr IntraVENous TITRATE    NOREPINephrine (LEVOPHED) 4 mg in 5% dextrose 250 mL infusion  2-16 mcg/min IntraVENous TITRATE    PHENYLephrine (ROSA-SYNEPHRINE) 30 mg in 0.9% sodium chloride 250 mL infusion   mcg/min IntraVENous TITRATE    lactated Ringers infusion  100 mL/hr IntraVENous CONTINUOUS    sodium chloride (NS) flush 5-10 mL  5-10 mL IntraVENous Q8H    sodium chloride (NS) flush 5-10 mL  5-10 mL IntraVENous PRN    fentaNYL in normal saline (pf) 25 mcg/mL infusion   mcg/hr IntraVENous TITRATE       Review of Systems  Unobtainable due to patient status. Objective:     Vitals:    12/05/17 0615 12/05/17 0631 12/05/17 0640 12/05/17 0849   BP:  137/69     Pulse: 84  87 94   Resp: 15  15 18   Temp:       SpO2: 96% 96% 96% 99%   Weight:       Height:         Intake and Output:   12/03 1901 - 12/05 0700  In: 8945.7 [I.V.:6963.7]  Out: 7094 [Urine:2635; Drains:4200]       Physical Exam:   Constitution:  the patient is well developed and in no acute distress  EENMT:  Sclera clear, pupils equal, oral mucosa moist  Respiratory: Intubated. Cardiovascular:  Tachy, regular. without M,G,R  Gastrointestinal: soft and non-tender; with positive bowel sounds. Musculoskeletal: warm without cyanosis. There is trace lower leg edema. Skin:  no jaundice or rashes, surgical wounds R thigh. Neurologic: sedated    Psychiatric:  sedated    CHEST XRAY:   12/3/17  IMPRESSION: No acute cardiopulmonary disease.         LAB  No lab exists for component: [place]   Recent Labs      12/05/17   0320  12/04/17   0346  12/03/17   1512  12/03/17   0338   WBC  9.5  10.7   --   12.5*   HGB  8.4*  8.7*  9.6*  9.1*   HCT  25.8*  26.6*  29.8*  28.0*   PLT  132*  121*   --   173     Recent Labs      12/05/17   0320  12/04/17   0346  12/03/17   1512  12/03/17   0338   NA  152*  150*  149*  147*   K  3.5  3.5  3.7  3.3*   CL  118*  118*  118*  116*   CO2  26  25  21  23   GLU  173*  177*  210*  266*   BUN  30*  39*  42*  56*   CREA  0.76*  0.75*  0.68*  0.90   CA  6.4*  6.3*  6.4*  6.0*   ALB  0.9*  1.0*   --   1.2*   SGOT  32  27   --   19     Recent Labs      12/05/17   0330  12/04/17   0248  12/03/17   0235   PH  7.45  7.40  7.33*   PCO2  31*  38  37   PO2  132*  159*  162*   HCO3  21*  23  19*       MICRO  Blood - GPC ident pending  Wound Nl skin genevieve + GNR- ident pending    Assessment:  (Medical Decision Making)     Patient Active Problem List   Diagnosis Code    Subdural hematoma (HCC) I62.00    Type II diabetes mellitus with complication (HCC) T66.5    LAUREANO (acute kidney injury) ([facility] N17.9    Syncope R55    Leukocytosis D72.829    Fasciitis M72.9    Severe sepsis with septic shock ([facility] A41.9, R65.21    Encounter for weaning from ventilator ([facility] Z99.11    Electrolyte abnormality E87.8    Lactic acidosis E87.2            Plan:  (Medical Decision Making)     Hospital Problems  Date Reviewed: 12/1/2017          Codes Class Noted POA    Lactic acidosis ICD-10-CM: E87.2  ICD-9-CM: 276.2  12/2/2017 Unknown    Due to sepsis and hypotension    Subdural hematoma ([facility] ICD-10-CM: I62.00  ICD-9-CM: 432.1  12/1/2017 Yes    No surgical    Type II diabetes mellitus with complication ([facility] BNQ-30-CV: E11.8  ICD-9-CM: 250.90  12/1/2017 Yes    Results for Shankar Anna (MRN [id number]) as of 12/5/2017 09:00   Ref.  Range 12/5/2017 04:43 12/5/2017 08:28   GLUCOSE,FAST - POC Latest Ref Range: 65 - 100 mg/dL 176 (H) 192 (H)       Fair control    LAUREANO (acute kidney injury) ([facility] ICD-10-CM: N17.9  ICD-9-CM: 584.9  12/1/2017 Yes    Normal kidney function today  Hypernatremic- add FWF through  cc q 4h    Leukocytosis ICD-10-CM: J72.081  ICD-9-CM: 288.60  12/1/2017 Yes    Due to sepsis    Fasciitis ICD-10-CM: M72.9  ICD-9-CM: 729.4  12/1/2017 Yes    Shima's gangrene- for possible further debridement in AM  On Vancomycin , zosyn and clindamycin    * (Principal)Severe sepsis with septic shock ([facility] ICD-10-CM: A41.9, R65.21  ICD-9-CM: 038.9, 995.92, 785.52  12/1/2017 Yes        Encounter for weaning from ventilator [facility]) ICD-10-CM: Z99.11  ICD-9-CM: V46.13  12/1/2017 Yes    Seems to be tolerating PSV- continue as tolerated- will likely extubate after OR debridement if ready    Electrolyte abnormality ICD-10-CM: E87.8  ICD-9-CM: 276.9  12/1/2017 Yes          On SCD(not on lovenox due to SDH)  Pepcid IV 20 mg daily for SUP    Total CC time spent 30 min      Tom Grewal MD

## 2017-12-05 NOTE — PROGRESS NOTES
Bedside shift report received from [facility]. Drips dually verified. Pt attempts to open eyes with loud voice stimulation. Spontaneous movements noted to BUE, L arm moves more than R arm. No response to any stimuli in BLE and no command following. PERRL. Afebrile, NSR, BP stable on levo gtt, O2 sat 97% on vent. Lines:  ETT  L radial art line  R quad IJ    Drains:  Gerardo  Ostomy  OGT    Drips:  Fentanyl @ 25 mcg/hr  LR @ 100 mL/hr  Levophed @ 2 mcg/min    Dual skin assessment performed. RLE wound vac remains in place. Staples noted to posterior thigh. Wound vac draining serosanguinous drainage. BUE edematous. R first and second toe are purple and blanchable. Pulses found with doppler in BLE. No family at bedside. Will continue to monitor.

## 2017-12-05 NOTE — INTERDISCIPLINARY ROUNDS
Interdisciplinary team rounds were held 12/5/2017 with the following team members:Care Management, Nursing, Nurse Practitioner, Nutrition, Palliative Care, Pastoral Care, Pharmacy, Physician, Respiratory Therapy and Clinical Coordinator. Plan of care discussed. See clinical pathway and/or care plan for interventions and desired outcomes.

## 2017-12-05 NOTE — CONSULTS
Plastic Surgery Consult    Impression:   · Ady gangrene and necrotizing fasciitis, sepsis, hypotension    Plan:   · Will plan to evaluate wound possibly debride further if necessary and exchange VAC in OR likely 12/6 to allow further stabilization, pressor weaning. Further surgical plans pending that clinical evaluation. Subjective:   Date of Consultation:  December 5, 2017  Referring Physician: Lorna Centeno    80 y/o with history limited to chart review as he is intubated and sedated. Apparently presented to the ER via EMS 12/1 with non op subdural hematoma, sepsis, ady gangrene with essentially sustained medical care in his adult life. Has undergone serial debridement, transverse colostomy, evaluation for possible leg amputation, hip disartic although this was deemed unnecessary in the OR. Cultures polymicrobial on gram stain. Has remained on the ventilator and is still pressor dependent. Wound being managed by wound vac, putting out >1500 ml a day although patient is still receiving significant volume for pressure support and has gross anasarca. Sedation and analgesia limited since last pm 10 but still has not made much neuro progress. Getting TF at 25 and still awaiting ostomy output. Patient Active Problem List   Diagnosis Code    Subdural hematoma (HCC) I62.00    Type II diabetes mellitus with complication ([facility]) W94.4    LAUREANO (acute kidney injury) ([facility] N17.9    Syncope R55    Leukocytosis D72.829    Fasciitis M72.9    Severe sepsis with septic shock ([address] A41.9, R65.21    Encounter for weaning from ventilator ([facility] Z99.11    Electrolyte abnormality E87.8    Lactic acidosis E87.2     Past Medical History:   Diagnosis Date    Diabetes ([address]       History reviewed. No pertinent family history. Social History   Substance Use Topics    Smoking status: Former Smoker    Smokeless tobacco: Not on file    Alcohol use Not on file     History reviewed. No pertinent surgical history. Prior to Admission medications    Medication Sig Start Date End Date Taking? Authorizing Provider   multivitamin (ONE A DAY) tablet Take 1 Tab by mouth daily. Yes Historical Provider     No Known Allergies     ROS unobtainable due to condition    Objective:   Blood pressure 137/69, pulse 87, temperature 98.2 °F (36.8 °C), resp. rate 15, height 5' 11\" (1.803 m), weight 86.5 kg (190 lb 11.2 oz), SpO2 96 %.   Temp (24hrs), Av.6 °F (37 °C), Min:98.1 °F (36.7 °C), Max:99.4 °F (37.4 °C)    Current Facility-Administered Medications   Medication Dose Route Frequency    alcohol 62% (NOZIN) nasal  1 Ampule  1 Ampule Topical Q12H    piperacillin-tazobactam (ZOSYN) 4.5 g in 0.9% sodium chloride (MBP/ADV) 100 mL  4.5 g IntraVENous Q8H    sodium chloride 0.9 % bolus infusion 500 mL  500 mL IntraVENous PRN    influenza vaccine - (3 yrs+)(PF) (FLUZONE QUAD/FLUARIX QUAD) injection 0.5 mL  0.5 mL IntraMUSCular PRIOR TO DISCHARGE    clindamycin phosphate (CLEOCIN) 900 mg in 0.9% sodium chloride 100 mL IVPB  900 mg IntraVENous Q8H    chlorhexidine (PERIDEX) 0.12 % mouthwash 15 mL  15 mL Oral Q12H    NUTRITIONAL SUPPORT ELECTROLYTE PRN ORDERS   Does Not Apply PRN    insulin lispro (HUMALOG) injection   SubCUTAneous Q4H    vancomycin (VANCOCIN) 750 mg in  ml infusion  750 mg IntraVENous Q8H    pantoprazole (PROTONIX) 40 mg in sodium chloride 0.9 % 10 mL injection  40 mg IntraVENous Q24H    midazolam in normal saline (VERSED) 1 mg/mL infusion  0-10 mg/hr IntraVENous TITRATE    NOREPINephrine (LEVOPHED) 4 mg in 5% dextrose 250 mL infusion  2-16 mcg/min IntraVENous TITRATE    PHENYLephrine (ROSA-SYNEPHRINE) 30 mg in 0.9% sodium chloride 250 mL infusion   mcg/min IntraVENous TITRATE    lactated Ringers infusion  100 mL/hr IntraVENous CONTINUOUS    sodium chloride (NS) flush 5-10 mL  5-10 mL IntraVENous Q8H    sodium chloride (NS) flush 5-10 mL  5-10 mL IntraVENous PRN    fentaNYL in normal saline (pf) 25 mcg/mL infusion   mcg/hr IntraVENous TITRATE        Exam:      Visit Vitals    /69    Pulse 87    Temp 98.2 °F (36.8 °C)    Resp 15    Ht 5' 11\" (1.803 m)    Wt 86.5 kg (190 lb 11.2 oz)    SpO2 96%    BMI 26.6 kg/m2     Grimaces to repositioning, does not respond to commands. ET tube in place, secured  Feeding tube in place  Tachy  Mechanically vented  Abd soft, minimal BS  Anasarca throughout  Right 2nd and 3rd toes with some distal ischemia. DP/PT weak  Wound VAC to right lateral thigh extending posteriorly to the medial groin. No crepitance. Further visualization limited. Some echymotic skin visible through vac drape. Vac output serous.         Signed By: Lauren Chan MD     December 5, 2017

## 2017-12-05 NOTE — PROGRESS NOTES
Pharmacokinetic Consult to Pharmacist    Sukhi Judgedevin is a 79 y.o. 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 Height: 5' 11\" (180.3 cm)  Weight: 86.5 kg (190 lb 11.2 oz)  Lab Results   Component Value Date/Time    BUN 30 12/05/2017 03:20 AM    Creatinine 0.76 12/05/2017 03:20 AM    WBC 9.5 12/05/2017 03:20 AM    Procalcitonin 15.5 12/01/2017 03:01 PM    Lactic acid 1.7 12/04/2017 03:46 AM    Lactic Acid (POC) 4.8 12/01/2017 06:10 PM      Estimated Creatinine Clearance: 100.5 mL/min (based on Cr of 0.76). Lab Results   Component Value Date/Time    Vancomycin,trough 23.1 12/05/2017 05:03 AM       Day 5 of vancomycin. Goal trough is 15-20. Trough is supra therapeutic today. Calculated half life ~ 12 hours. Will adjust dose to 1000 mg q 12 hours. Will continue to follow patient.       Thank you,  Sb Siegel, PharmD  Clinical Pharmacist  [phone]

## 2017-12-05 NOTE — PROGRESS NOTES
Bedside shift change report given to Chad Kwan (oncoming nurse) by Dahlia Judge RN (offgoing nurse). Report included the following information SBAR, Kardex, ED Summary, OR Summary, Intake/Output, MAR, Recent Results and Cardiac Rhythm NSR. Fentanyl gtt verified, skin assessed, patient turned.

## 2017-12-05 NOTE — PROGRESS NOTES
Ventilator check complete; patient has a #8. 0 ET tube secured at the 22 at the gums. Breath sounds are coarse. Trachea is midline, Negative for subcutaneous air, and chest excursion is symmetric. Patient is also Negative for cyanosis and is Negative for pitting edema. All alarms are set and audible. Resuscitation bag is at the head of the bed.       Ventilator Settings  Mode FIO2 Rate Tidal Volume Pressure PEEP I:E Ratio   PRVC  35 %   15 450 ml     8 cm H20  1:3.33      Peak airway pressure: 16.7 cm H2O   Minute ventilation: 8.3 l/min     ABG:   Recent Labs      12/04/17   0248  12/03/17   0235  12/02/17   0220   PH  7.40  7.33*  7.43   PCO2  38  37  33*   PO2  159*  162*  213*   HCO3  23  19*  22         Jorge Gomes, RT

## 2017-12-05 NOTE — PROGRESS NOTES
Visited attempted but patient was asleep.  left a  contact card and had a prayer for the patient.       [employer]

## 2017-12-05 NOTE — PROGRESS NOTES
With loud voice patient noted to be trying to open eyes but unable at this time, continues to not follow commands, continues to spontaneously move BUE, left more than right. Breath sounds now coarse to bilateral lower lobes.

## 2017-12-05 NOTE — CONSULTS
Palliative Care    Patient: West Adam MRN: [id number]  SSN: [id number]    YOB: 1950  Age: 79 y.o. Sex: male       Date of Request: 12/5/2017  Date of Consult:  12/5/2017  Reason for Consult:  goals of care  Requesting Physician: Balwinder Otto NP     Assessment/Plan:     Principal Diagnosis:    Altered Mental Status R41.82  Additional Diagnoses:   · Acute Respiratory Failure, Unspecified  J96.00  · Debility, Unspecified  R53.81  · Edema  R60.9  · Frailty  R54  · Encounter for Palliative Care  Z51.5    Palliative Performance Scale (PPS):  PPS: 20    Medical Decision Making:   Reviewed and summarized chart from admission to present. Discussed case with appropriate providers: ICU IDT  Reviewed laboratory and x-ray data: CBC, CMP, ABG, CXR    Patient intubated and ventilated. He attempts to open eyes to voice. He moves spontaneously, but does not follow commands. On no sedation. Reassured patient of our ongoing care. No family present. Will attempt to meet with family. Will continue to follow. Will discuss findings with members of the interdisciplinary team.      Thank you for this referral.   The Palliative Care team is available from 8 am to 4:30 pm Monday-Friday. Medical management during other hours is per the primary attending service. .    Subjective:     History obtained from:  Care Provider and Chart    Chief Complaint: Respiratory failure, AMS  History of Present Illness:  Mr. Jared Whatley is a 80 yo male with limited history due to not seeing a doctor as an adult. He presented to [facility] ER via EMS on 12/1 after syncopal episode at home. In ER, patient reported cough. CXR in ER was clear. CT head was done due to fall and AMS, which revealed SDH. He was evaluated by neurosurgery and not felt to be a surgical candidate. Other workup included the finding of large abscess of right inner thigh with purulent drainage and leukocytosis.   Surgery evaluated [facility] for [facility] gangrene. He was taken to surgery that evening and admitted to ICU intubated and on vasopressors. Patient has had debridement with wound vac placed. Plans are for possible debridement and wound vac exchange on 12/6. Advance Directive: No       Code Status:  Full Code            Health Care Power of : No - Patient does not have a [address]. Past Medical History:   Diagnosis Date    Diabetes [facility])       History reviewed. No pertinent surgical history. History reviewed. No pertinent family history. Social History   Substance Use Topics    Smoking status: Former Smoker    Smokeless tobacco: Not on file    Alcohol use Not on file     Prior to Admission medications    Medication Sig Start Date End Date Taking? Authorizing Provider   multivitamin (ONE A DAY) tablet Take 1 Tab by mouth daily. Yes Historical Provider       No Known Allergies     Review of Systems:  Review of systems not obtained due to patient factors: AMS     Objective:     Visit Vitals    /61    Pulse 95    Temp 98.6 °F (37 °C)    Resp 15    Ht 5' 11\" (1.803 m)    Wt 86.5 kg (190 lb 11.2 oz)    SpO2 98%    BMI 26.6 kg/m2        Physical Exam:    General:  Frail. Intubated and ventilated. Eyes:  Conjunctivae/corneas clear. Nose: Nares normal. Septum midline. Neck: Supple, symmetrical, trachea midline. Lungs:   Clear to auscultation bilaterally, unlabored. Heart:  Regular rate and rhythm. Abdomen:   Soft, non-tender, non-distended. Extremities: Normal, atraumatic, no cyanosis. BLE edema. Wound to right thigh with wound vac. Skin: Skin color, texture, turgor normal. No rash. Neurologic: AMS. Psych: Unable to assess.       Assessment:     Hospital Problems  Date Reviewed: 12/1/2017          Codes Class Noted POA    Lactic acidosis ICD-10-CM: E87.2  ICD-9-CM: 276.2  12/2/2017 Unknown        Subdural hematoma ([facility] ICD-10-CM: I62.00  ICD-9-CM: 432.1  12/1/2017 Yes        Type II diabetes mellitus with complication ([facility] WTY-21-UC: E11.8  ICD-9-CM: 250.90  12/1/2017 Yes        LAUREANO (acute kidney injury) ([facility] ICD-10-CM: N17.9  ICD-9-CM: 584.9  12/1/2017 Yes        Syncope ICD-10-CM: R55  ICD-9-CM: 780.2  12/1/2017 Yes        Leukocytosis ICD-10-CM: I33.678  ICD-9-CM: 288.60  12/1/2017 Yes        Fasciitis ICD-10-CM: M72.9  ICD-9-CM: 729.4  12/1/2017 Yes        * (Principal)Severe sepsis with septic shock [facility]) ICD-10-CM: A41.9, R65.21  ICD-9-CM: 038.9, 995.92, 785.52  12/1/2017 Yes        Encounter for weaning from ventilator [facility]) ICD-10-CM: Z99.11  ICD-9-CM: V46.13  12/1/2017 Yes        Electrolyte abnormality ICD-10-CM: E87.8  ICD-9-CM: 276.9  12/1/2017 Yes              Signed By: Matthias Wallis NP     December 5, 2017

## 2017-12-05 NOTE — PROGRESS NOTES
Problem: Falls - Risk of  Goal: *Absence of Falls  Document Larry Fall Risk and appropriate interventions in the flowsheet.    Outcome: Progressing Towards Goal  Fall Risk Interventions:  Mobility Interventions: Bed/chair exit alarm, Communicate number of staff needed for ambulation/transfer, Patient to call before getting OOB, PT Consult for mobility concerns, PT Consult for assist device competence, Strengthening exercises (ROM-active/passive)         Medication Interventions: Bed/chair exit alarm, Patient to call before getting OOB, Teach patient to arise slowly    Elimination Interventions: Bed/chair exit alarm, Call light in reach, Toileting schedule/hourly rounds    History of Falls Interventions: Bed/chair exit alarm, Consult care management for discharge planning, Evaluate medications/consider consulting pharmacy, Investigate reason for fall

## 2017-12-05 NOTE — PROGRESS NOTES
Bedside shift change report given to Jac Ruff RN (oncoming nurse) by Kingsley Rosa RN (offgoing nurse). Report included the following information SBAR, Kardex, OR Summary, Procedure Summary, Intake/Output, MAR, Recent Results and Cardiac Rhythm NSR. Skin assessed. Patient turned. Fentanyl gtt verified.

## 2017-12-05 NOTE — PROGRESS NOTES
Shift assessment complete, chart reviewed. Patient is orally intubated, vent settings per RT. No command following, no eye opening. Pupils are round, sluggish to react, 3 mm, scleral edema. NSR on monitor, BP stable on Levophed gtt at 2 mcg/min, will wean as BP permits for goal MAP > 65. Breath sounds clear throughout, FiO2 35%, SpO2 96-98%. OGT in place, tolerating tube feedings with minimal residual. Abdomen is semi-soft, edematous, bowel sounds hypoactive. Left colostomy in place, stoma is pink, moist, and edematous, no output. Gerardo cath in place, draining laurence, clear urine. Wound vac to right leg, upper thigh, and inner thigh, CDI, large amounts of serosanguinous output, approximately 1500 ml per shift. Spontaneously moves BUE, left more than right, withdraws from left arm, no movement to BLE. +2 pitting edema to BUE, +1 pitting edema to BLE. Pulses are palpable to BUE, doppler to BLE. Right great toe and 2nd toe are purple in color, blanchable. BLE are cool to touch with greater than 3 second cap refill. No s/s of pain at this time, fentanyl gtt infusing at 25 mcg/hr for pain control. NAD.

## 2017-12-05 NOTE — PROGRESS NOTES
Problem: Nutrition Deficit  Goal: *Optimize nutritional status  Nutrition:  Nutrition Consult for TF Management. (Dr. Gary Pascal)  Assessment:  Anthropometrics:   Ht - 5'11\", wgt - 86.5 kg (ICU bed 12/5/17), BMI 26.6 c/w \"overweight\", edema - 2+ pitting generalized. Macronutrient Needs:  Estimated calorie needs - 5170-0488 sunni/day (25-28 sunni/kg/day)   Estimated protein needs - 101-117 gm pro/day (1.3-1.5 gm pro/kgIBW/day) (GFR >60 ml/min)  Max CHO/day - 294 gm CHO/day (50% sunni/day)   Fluid/day - 2.1-2.4 liters/day (1 ml/sunni/day)  Intake/Comparative Standards: The patient is currently NPO which meets 0% of his calorie and 0% of his protein needs. Pertinent Labs/Hemodynamic Parameters:   AM glucose 173, sodium 152, potassium 3.5; MAP - 97. Pertinent Medications/Drips:   Cleocin, Vancomycin, Zosyn, SSI coverage; Fentanyl drip; IVF - LR @ 100 ml/hr. GI Function/Activity:   Hypoactive bowel sounds, stool x 7 yesterday. Diet:   NPO. Food/Nutrition History:   79year old gentleman admitted with severe sepsis from thigh fasciitis. He is a newly diagnosed diabetic this admission. He is now s/p 3 surgeries this admission ( I & D, colostomy with re-exploration and debridement and placement of wound vac) and scheduled for another tomorrow. Diagnosis (Nutrition): Inadequate energy intake related to NPO status as evidenced by the patient being intubated and ventilated and requires TF for nutrition support. Intervention:  Meals and Snacks: NPO. Enteral Nutrition: Start TF with Glucerna 1.2 @ 15 ml/hr with a 45 ml/hr water flush via OGT. Increase TF as tolerated to the goal rate of 75 ml/hr -  2160 calories/day (100% calorie goal), 108 grams protein/day (100% protein goal), 207 grams CHO/day (does not exceed max CHO limit) and 2592 ml water/day (>100 fluid goal - to treat hypernatremia). IV Fluid: (TF + water flush) + IVF = 130 ml/hr.   Mineral Supplement Therapy: Nutrition Support Orders for Electrolyte Management Replacements are activated and placed on the MAR. Coordination of Nutrition Care by a Nutrition Professional: AM ICU rounds, collaboration with Bessie Varela. Nutrition Discharge Plan: Too soon to determine. Justina Delgado.  Heather Mock  [phone]

## 2017-12-05 NOTE — PROGRESS NOTES
PLAN:  To OR for VAC change with Dr. Xander Funez tomorrow  Will continue to monitor  Medical care per primary    ASSESSMENT:  Admit Date: 12/1/2017   2 Day Post-Op  Procedure(s):  RIGHT LEG AND HIP DEBRIDEMENT WOUND VAC PLACEMENT    Principal Problem:    Severe sepsis with septic shock ([address] (12/1/2017)    Active Problems:    Subdural hematoma ([address] (12/1/2017)      Type II diabetes mellitus with complication ([address] (25/7/7082)      LAUREANO (acute kidney injury) ([address] (12/1/2017)      Syncope (12/1/2017)      Leukocytosis (12/1/2017)      Fasciitis (12/1/2017)      Encounter for weaning from ventilator ([address] (12/1/2017)      Electrolyte abnormality (12/1/2017)      Lactic acidosis (12/2/2017)       HPI: Will Gutierrez is a 79 y.o.  male who presented tot he ED 12/1/17 for evaluation. Pt with a history of having a syncopal episode at home today.  A neighbor went to go check on him and when the patient opened the door he apparently passed out and fell backward. Sixto Oliver says that he has not seen a doctor in over 10 years and as far as he knows he has no medical problems.  Patient does not have any teeth and he has a very difficult speech pattern to understand.  Overall he says that he just feels weak and tired but he does not say he is having any specific pain.  He denies chest pain or difficulty breathing. Patient says that he has no known history of diabetes. SUBJECTIVE: Patient intubated. Off Levo. Grimaces to movement. S/p right thigh debridement with NPWT placement and diverting colostomy. VAC had 3450 mL output overnight. Colostomy had 7mL documented output so far. H/H 8.4/25.8. On Cleocin, Vanc, and Zosyn. OBJECTIVE:  Constitutional: Grimaces to movements but in no acute distress; appears stated age   Visit Vitals    /61    Pulse 88    Temp 98.6 °F (37 °C)    Resp 14    Ht 5' 11\" (1.803 m)    Wt 86.5 kg (190 lb 11.2 oz)    SpO2 100%    BMI 26.6 kg/m2     Eyes:Sclera are clear.    ENMT: no external lesions gross hearing normal; no obvious neck masses, no ear or lip lesions  CV: RRR. Normal perfusion  Resp: No JVD. Breathing is  non-labored; no audible wheezing. GI: soft and non-distended. Colostomy patent with small amount of brown output in pouch. Stoma edematous but viable. Musculoskeletal: Right great and second toe cyanotic. Pulses + on doppler. All 5 digits cool to touch. Foot and lower leg warm to touch. Skin: Left lateral thigh wound with NPWT intact. Wound is putting out large amounts of serous drainage.    Neuro:  Grimaces to stimuli  Psychiatric: unable to assess    Patient Vitals for the past 24 hrs:   BP Temp Pulse Resp SpO2 Weight   12/05/17 1158 - - 88 14 100 % -   12/05/17 1115 - - 92 13 98 % -   12/05/17 1100 - - 95 16 98 % -   12/05/17 1045 - - 96 19 98 % -   12/05/17 1030 - - 96 19 98 % -   12/05/17 1015 - - 96 13 98 % -   12/05/17 1000 129/61 - 95 15 98 % -   12/05/17 0945 - - 95 16 98 % -   12/05/17 0935 - - 96 13 98 % -   12/05/17 0930 - - 95 27 98 % -   12/05/17 0915 - - 96 18 98 % -   12/05/17 0900 - - 96 30 98 % -   12/05/17 0849 - - 94 18 99 % -   12/05/17 0845 - - 98 18 98 % -   12/05/17 0830 - - 95 13 100 % -   12/05/17 0815 - - 90 15 100 % -   12/05/17 0800 104/53 98.6 °F (37 °C) 85 13 99 % -   12/05/17 0745 - - 87 15 99 % -   12/05/17 0730 115/59 - 89 15 99 % -   12/05/17 0715 - - 87 15 99 % -   12/05/17 0700 - - 94 12 - -   12/05/17 0645 - - 90 14 98 % -   12/05/17 0640 - - 87 15 96 % -   12/05/17 0631 137/69 - - - 96 % -   12/05/17 0615 - - 84 15 96 % -   12/05/17 0600 102/58 - 85 15 96 % -   12/05/17 0545 - - 85 15 97 % -   12/05/17 0531 98/55 - - - - -   12/05/17 0530 - - 87 15 96 % -   12/05/17 0515 - - 91 15 96 % -   12/05/17 0502 112/59 - - - - -   12/05/17 0500 - - 93 19 96 % -   12/05/17 0445 - - 97 19 96 % -   12/05/17 0431 120/58 - - - - -   12/05/17 0430 - - 93 14 97 % -   12/05/17 0417 - - 90 15 98 % -   12/05/17 0415 - - 90 15 97 % -   12/05/17 0400 117/58 - 93 15 96 % -   12/05/17 0333 - - - - - 86.5 kg (190 lb 11.2 oz)   12/05/17 0331 127/65 - 94 17 99 % -   12/05/17 0325 - - 95 19 99 % -   12/05/17 0315 - - 95 19 98 % -   12/05/17 0300 126/60 98.2 °F (36.8 °C) 92 15 97 % -   12/05/17 0245 - - 81 16 98 % -   12/05/17 0230 97/54 - 82 14 98 % -   12/05/17 0215 - - 83 15 98 % -   12/05/17 0200 115/56 - 88 14 98 % -   12/05/17 0145 - - 90 21 98 % -   12/05/17 0130 - - 79 15 98 % -   12/05/17 0115 - - 80 15 98 % -   12/05/17 0100 - - 80 13 98 % -   12/05/17 0045 - - 81 20 98 % -   12/05/17 0030 93/51 - 84 15 98 % -   12/05/17 0015 - - 85 15 97 % -   12/04/17 2355 - - 92 25 97 % -   12/04/17 2345 - - 87 15 97 % -   12/04/17 2330 101/55 - 88 15 96 % -   12/04/17 2315 - - 92 13 97 % -   12/04/17 2300 105/57 98.1 °F (36.7 °C) 89 14 96 % -   12/04/17 2245 - - 95 16 98 % -   12/04/17 2215 - - 85 13 98 % -   12/04/17 2200 - - 84 14 98 % -   12/04/17 2145 - - 86 8 98 % -   12/04/17 2130 94/52 - 87 14 98 % -   12/04/17 2123 - - 91 14 98 % -   12/04/17 2115 - - 93 15 98 % -   12/04/17 2111 - - 95 23 98 % -   12/04/17 2101 108/56 - 87 12 98 % -   12/04/17 2100 - - 88 14 98 % -   12/04/17 2045 - - 91 14 98 % -   12/04/17 2015 - - 90 13 97 % -   12/04/17 2000 115/58 - 91 - 98 % -   12/04/17 1945 - - 91 15 98 % -   12/04/17 1930 115/57 98.4 °F (36.9 °C) 90 15 98 % -   12/04/17 1915 - - 94 12 98 % -   12/04/17 1901 123/64 - 90 15 98 % -   12/04/17 1900 - - 90 13 98 % -   12/04/17 1845 - - 81 17 98 % -   12/04/17 1831 93/55 - 81 17 98 % -   12/04/17 1830 - - 81 18 98 % -   12/04/17 1815 - - 82 15 97 % -   12/04/17 1800 91/50 - 84 19 97 % -   12/04/17 1745 - - 88 14 97 % -   12/04/17 1730 94/62 - 90 13 98 % -   12/04/17 1715 - - 86 15 98 % -   12/04/17 1701 100/53 - 87 14 99 % -   12/04/17 1700 - - 86 14 99 % -   12/04/17 1645 - - 88 14 98 % -   12/04/17 1631 100/53 - 92 17 97 % -   12/04/17 1630 - - 92 18 97 % -   12/04/17 1615 - - 95 (!) 46 98 % -   12/04/17 1600 107/57 98.2 °F (36.8 °C) 87 13 99 % -   12/04/17 1545 - - 87 (!) 0 99 % -   12/04/17 1530 - - 88 11 99 % -   12/04/17 1515 - - 89 13 99 % -   12/04/17 1501 103/61 - 89 (!) 6 99 % -   12/04/17 1500 - - 88 (!) 6 100 % -   12/04/17 1445 - - 89 14 99 % -   12/04/17 1430 101/59 - 86 14 99 % -   12/04/17 1415 - - 87 14 99 % -   12/04/17 1400 102/58 - 87 14 99 % -   12/04/17 1345 - - 91 18 98 % -   12/04/17 1330 - - 91 14 99 % -   12/04/17 1300 99/54 - 92 12 98 % -   12/04/17 1245 - - 91 16 98 % -   12/04/17 1230 - - 91 11 99 % -   12/04/17 1215 - - 91 14 99 % -     Labs:    Recent Labs      12/05/17   0320   WBC  9.5   HGB  8.4*   PLT  132*   NA  152*   K  3.5   CL  118*   CO2  26   BUN  30*   CREA  0.76*   GLU  173*   TBILI  0.7   SGOT  32   ALT  15   AP  98     Falguni Shields NP

## 2017-12-05 NOTE — PROGRESS NOTES
With stimulation, pt becomes agitated and restless. Fentanyl gtt infusing @ 50 mcg/hr. No purposeful movement or command following. Levophed gtt has remained off since 2317. VSS at this time.

## 2017-12-05 NOTE — PROGRESS NOTES
Ventilator check complete; patient has a #8. 0 ET tube secured at the 22 at the lip. Patient is sedated. Patient is not able to follow commands. Breath sounds are clear and diminished. Trachea is midline, Negative for subcutaneous air, and chest excursion is symmetric. Patient is also Negative for cyanosis and is Negative for pitting edema. All alarms are set and audible. Resuscitation bag is at the head of the bed.       Ventilator Settings  Mode FIO2 Rate Tidal Volume Pressure PEEP I:E Ratio   PRVC  30%   15 450 ml     8 cm H20  1:3.33      Peak airway pressure: 19 cm H2O   Minute ventilation: 10 l/min     ABG:   Recent Labs      12/05/17   0330  12/04/17   0248  12/03/17   0235   PH  7.45  7.40  7.33*   PCO2  31*  38  37   PO2  132*  159*  162*   HCO3  21*  23  19*         Melodie Ward, RT

## 2017-12-06 ENCOUNTER — APPOINTMENT (OUTPATIENT)
Dept: GENERAL RADIOLOGY | Age: 67
DRG: 853 | End: 2017-12-06
Attending: SURGERY
Payer: MEDICARE

## 2017-12-06 ENCOUNTER — ANESTHESIA (OUTPATIENT)
Dept: SURGERY | Age: 67
DRG: 853 | End: 2017-12-06
Payer: MEDICARE

## 2017-12-06 PROBLEM — E87.0 HYPERNATREMIA: Status: ACTIVE | Noted: 2017-12-06

## 2017-12-06 PROBLEM — E87.20 LACTIC ACIDOSIS: Status: RESOLVED | Noted: 2017-12-02 | Resolved: 2017-12-06

## 2017-12-06 LAB
ABO + RH BLD: NORMAL
ANION GAP SERPL CALC-SCNC: 7 MMOL/L (ref 7–16)
ARTERIAL PATENCY WRIST A: POSITIVE
BACTERIA SPEC CULT: NORMAL
BACTERIA SPEC CULT: NORMAL
BASE EXCESS BLDA CALC-SCNC: 2.1 MMOL/L (ref 0–3)
BDY SITE: ABNORMAL
BLD PROD TYP BPU: NORMAL
BLD PROD TYP BPU: NORMAL
BLOOD BANK CMNT PATIENT-IMP: NORMAL
BLOOD GROUP ANTIBODIES SERPL: NORMAL
BPU ID: NORMAL
BPU ID: NORMAL
BUN SERPL-MCNC: 21 MG/DL (ref 8–23)
CALCIUM SERPL-MCNC: 6.5 MG/DL (ref 8.3–10.4)
CHLORIDE SERPL-SCNC: 114 MMOL/L (ref 98–107)
CO2 SERPL-SCNC: 27 MMOL/L (ref 21–32)
COHGB MFR BLD: 1.2 % (ref 0.5–1.5)
CREAT SERPL-MCNC: 0.6 MG/DL (ref 0.8–1.5)
CROSSMATCH RESULT,%XM: NORMAL
CROSSMATCH RESULT,%XM: NORMAL
DO-HGB BLD-MCNC: 1 % (ref 0–5)
GLUCOSE BLD STRIP.AUTO-MCNC: 140 MG/DL (ref 65–100)
GLUCOSE BLD STRIP.AUTO-MCNC: 145 MG/DL (ref 65–100)
GLUCOSE BLD STRIP.AUTO-MCNC: 149 MG/DL (ref 65–100)
GLUCOSE BLD STRIP.AUTO-MCNC: 159 MG/DL (ref 65–100)
GLUCOSE BLD STRIP.AUTO-MCNC: 179 MG/DL (ref 65–100)
GLUCOSE BLD STRIP.AUTO-MCNC: 180 MG/DL (ref 65–100)
GLUCOSE SERPL-MCNC: 149 MG/DL (ref 65–100)
HCO3 BLDA-SCNC: 26 MMOL/L (ref 22–26)
HGB BLDMV-MCNC: 10 GM/DL (ref 11.7–15)
MAGNESIUM SERPL-MCNC: 2.2 MG/DL (ref 1.8–2.4)
METHGB MFR BLD: 0.3 % (ref 0–1.5)
OXYHGB MFR BLDA: 97.9 % (ref 94–97)
PCO2 BLDA: 36 MMHG (ref 35–45)
PEEP RESPIRATORY: 8 CM[H2O]
PH BLDA: 7.48 [PH] (ref 7.35–7.45)
PHOSPHATE SERPL-MCNC: 1.5 MG/DL (ref 2.3–3.7)
PHOSPHATE SERPL-MCNC: 2.6 MG/DL (ref 2.3–3.7)
PO2 BLDA: 132 MMHG (ref 80–105)
POTASSIUM SERPL-SCNC: 3.3 MMOL/L (ref 3.5–5.1)
POTASSIUM SERPL-SCNC: 3.5 MMOL/L (ref 3.5–5.1)
RESP RATE: 15
SAO2 % BLD: 99 % (ref 92–98.5)
SERVICE CMNT-IMP: NORMAL
SERVICE CMNT-IMP: NORMAL
SODIUM SERPL-SCNC: 148 MMOL/L (ref 136–145)
SPECIMEN EXP DATE BLD: NORMAL
STATUS OF UNIT,%ST: NORMAL
STATUS OF UNIT,%ST: NORMAL
UNIT DIVISION, %UDIV: 0
UNIT DIVISION, %UDIV: 0
VENTILATION MODE VENT: ABNORMAL

## 2017-12-06 PROCEDURE — 77030028873 HC PULSVAC IRR ZIMM -B: Performed by: SURGERY

## 2017-12-06 PROCEDURE — 74011000258 HC RX REV CODE- 258

## 2017-12-06 PROCEDURE — 74011250636 HC RX REV CODE- 250/636: Performed by: SURGERY

## 2017-12-06 PROCEDURE — 77030019940 HC BLNKT HYPOTHRM STRY -B: Performed by: ANESTHESIOLOGY

## 2017-12-06 PROCEDURE — 74011000250 HC RX REV CODE- 250: Performed by: SURGERY

## 2017-12-06 PROCEDURE — 76060000033 HC ANESTHESIA 1 TO 1.5 HR: Performed by: SURGERY

## 2017-12-06 PROCEDURE — 87075 CULTR BACTERIA EXCEPT BLOOD: CPT | Performed by: SURGERY

## 2017-12-06 PROCEDURE — 87076 CULTURE ANAEROBE IDENT EACH: CPT

## 2017-12-06 PROCEDURE — 77030019952 HC CANSTR VAC ASST KCON -B

## 2017-12-06 PROCEDURE — 83735 ASSAY OF MAGNESIUM: CPT | Performed by: INTERNAL MEDICINE

## 2017-12-06 PROCEDURE — 76010000149 HC OR TIME 1 TO 1.5 HR: Performed by: SURGERY

## 2017-12-06 PROCEDURE — 65610000001 HC ROOM ICU GENERAL

## 2017-12-06 PROCEDURE — 71010 XR CHEST SNGL V: CPT

## 2017-12-06 PROCEDURE — 0BDL8ZX EXTRACTION OF LEFT LUNG, VIA NATURAL OR ARTIFICIAL OPENING ENDOSCOPIC, DIAGNOSTIC: ICD-10-PCS | Performed by: INTERNAL MEDICINE

## 2017-12-06 PROCEDURE — 74011250636 HC RX REV CODE- 250/636: Performed by: INTERNAL MEDICINE

## 2017-12-06 PROCEDURE — C9113 INJ PANTOPRAZOLE SODIUM, VIA: HCPCS | Performed by: SURGERY

## 2017-12-06 PROCEDURE — 74750000023 HC WOUND THERAPY

## 2017-12-06 PROCEDURE — 99233 SBSQ HOSP IP/OBS HIGH 50: CPT | Performed by: INTERNAL MEDICINE

## 2017-12-06 PROCEDURE — 87176 TISSUE HOMOGENIZATION CULTR: CPT | Performed by: INTERNAL MEDICINE

## 2017-12-06 PROCEDURE — 84132 ASSAY OF SERUM POTASSIUM: CPT | Performed by: INTERNAL MEDICINE

## 2017-12-06 PROCEDURE — 0JBL0ZZ EXCISION OF RIGHT UPPER LEG SUBCUTANEOUS TISSUE AND FASCIA, OPEN APPROACH: ICD-10-PCS | Performed by: SURGERY

## 2017-12-06 PROCEDURE — 74011250636 HC RX REV CODE- 250/636

## 2017-12-06 PROCEDURE — 0BD18ZX EXTRACTION OF TRACHEA, VIA NATURAL OR ARTIFICIAL OPENING ENDOSCOPIC, DIAGNOSTIC: ICD-10-PCS | Performed by: INTERNAL MEDICINE

## 2017-12-06 PROCEDURE — 74011000250 HC RX REV CODE- 250: Performed by: INTERNAL MEDICINE

## 2017-12-06 PROCEDURE — 87070 CULTURE OTHR SPECIMN AEROBIC: CPT | Performed by: INTERNAL MEDICINE

## 2017-12-06 PROCEDURE — 74011250637 HC RX REV CODE- 250/637: Performed by: SURGERY

## 2017-12-06 PROCEDURE — 94003 VENT MGMT INPAT SUBQ DAY: CPT

## 2017-12-06 PROCEDURE — 87153 DNA/RNA SEQUENCING: CPT

## 2017-12-06 PROCEDURE — 80048 BASIC METABOLIC PNL TOTAL CA: CPT | Performed by: INTERNAL MEDICINE

## 2017-12-06 PROCEDURE — 87205 SMEAR GRAM STAIN: CPT | Performed by: INTERNAL MEDICINE

## 2017-12-06 PROCEDURE — 82803 BLOOD GASES ANY COMBINATION: CPT

## 2017-12-06 PROCEDURE — 36592 COLLECT BLOOD FROM PICC: CPT

## 2017-12-06 PROCEDURE — 36600 WITHDRAWAL OF ARTERIAL BLOOD: CPT

## 2017-12-06 PROCEDURE — 84100 ASSAY OF PHOSPHORUS: CPT | Performed by: INTERNAL MEDICINE

## 2017-12-06 PROCEDURE — 74011636637 HC RX REV CODE- 636/637: Performed by: HOSPITALIST

## 2017-12-06 PROCEDURE — 74011000250 HC RX REV CODE- 250

## 2017-12-06 PROCEDURE — 74011000258 HC RX REV CODE- 258: Performed by: SURGERY

## 2017-12-06 PROCEDURE — 74011250637 HC RX REV CODE- 250/637: Performed by: INTERNAL MEDICINE

## 2017-12-06 RX ORDER — BACITRACIN 50000 [IU]/1
INJECTION, POWDER, FOR SOLUTION INTRAMUSCULAR AS NEEDED
Status: DISCONTINUED | OUTPATIENT
Start: 2017-12-06 | End: 2017-12-06 | Stop reason: HOSPADM

## 2017-12-06 RX ORDER — SODIUM CHLORIDE, SODIUM LACTATE, POTASSIUM CHLORIDE, CALCIUM CHLORIDE 600; 310; 30; 20 MG/100ML; MG/100ML; MG/100ML; MG/100ML
INJECTION, SOLUTION INTRAVENOUS
Status: DISCONTINUED | OUTPATIENT
Start: 2017-12-06 | End: 2017-12-06 | Stop reason: HOSPADM

## 2017-12-06 RX ORDER — HYDRALAZINE HYDROCHLORIDE 20 MG/ML
10 INJECTION INTRAMUSCULAR; INTRAVENOUS
Status: DISCONTINUED | OUTPATIENT
Start: 2017-12-06 | End: 2017-12-29 | Stop reason: HOSPADM

## 2017-12-06 RX ADMIN — PIPERACILLIN SODIUM,TAZOBACTAM SODIUM 4.5 G: 4; .5 INJECTION, POWDER, FOR SOLUTION INTRAVENOUS at 20:02

## 2017-12-06 RX ADMIN — INSULIN LISPRO 2 UNITS: 100 INJECTION, SOLUTION INTRAVENOUS; SUBCUTANEOUS at 11:29

## 2017-12-06 RX ADMIN — Medication 1 AMPULE: at 21:04

## 2017-12-06 RX ADMIN — SODIUM CHLORIDE, SODIUM LACTATE, POTASSIUM CHLORIDE, AND CALCIUM CHLORIDE 130 ML/HR: 600; 310; 30; 20 INJECTION, SOLUTION INTRAVENOUS at 09:08

## 2017-12-06 RX ADMIN — Medication 10 ML: at 21:10

## 2017-12-06 RX ADMIN — CHLORHEXIDINE GLUCONATE 15 ML: 1.2 RINSE ORAL at 20:43

## 2017-12-06 RX ADMIN — PIPERACILLIN SODIUM,TAZOBACTAM SODIUM 4.5 G: 4; .5 INJECTION, POWDER, FOR SOLUTION INTRAVENOUS at 12:01

## 2017-12-06 RX ADMIN — INSULIN LISPRO 2 UNITS: 100 INJECTION, SOLUTION INTRAVENOUS; SUBCUTANEOUS at 09:03

## 2017-12-06 RX ADMIN — SODIUM CHLORIDE 40 MG: 9 INJECTION INTRAMUSCULAR; INTRAVENOUS; SUBCUTANEOUS at 14:26

## 2017-12-06 RX ADMIN — VANCOMYCIN HYDROCHLORIDE 1000 MG: 1 INJECTION, POWDER, LYOPHILIZED, FOR SOLUTION INTRAVENOUS at 09:07

## 2017-12-06 RX ADMIN — PIPERACILLIN SODIUM,TAZOBACTAM SODIUM 4.5 G: 4; .5 INJECTION, POWDER, FOR SOLUTION INTRAVENOUS at 04:35

## 2017-12-06 RX ADMIN — HYDRALAZINE HYDROCHLORIDE 10 MG: 20 INJECTION INTRAMUSCULAR; INTRAVENOUS at 04:36

## 2017-12-06 RX ADMIN — POTASSIUM PHOSPHATE, MONOBASIC AND POTASSIUM PHOSPHATE, DIBASIC: 224; 236 INJECTION, SOLUTION INTRAVENOUS at 05:36

## 2017-12-06 RX ADMIN — SODIUM CHLORIDE 900 MG: 900 INJECTION, SOLUTION INTRAVENOUS at 04:39

## 2017-12-06 RX ADMIN — SODIUM CHLORIDE 900 MG: 900 INJECTION, SOLUTION INTRAVENOUS at 12:43

## 2017-12-06 RX ADMIN — SODIUM CHLORIDE, SODIUM LACTATE, POTASSIUM CHLORIDE, CALCIUM CHLORIDE: 600; 310; 30; 20 INJECTION, SOLUTION INTRAVENOUS at 19:15

## 2017-12-06 RX ADMIN — VANCOMYCIN HYDROCHLORIDE 1 G: 1 INJECTION, POWDER, LYOPHILIZED, FOR SOLUTION INTRAVENOUS at 20:05

## 2017-12-06 RX ADMIN — CHLORHEXIDINE GLUCONATE 15 ML: 1.2 RINSE ORAL at 09:03

## 2017-12-06 RX ADMIN — Medication 10 ML: at 13:18

## 2017-12-06 RX ADMIN — HYDRALAZINE HYDROCHLORIDE 10 MG: 20 INJECTION INTRAMUSCULAR; INTRAVENOUS at 17:36

## 2017-12-06 RX ADMIN — INSULIN LISPRO 2 UNITS: 100 INJECTION, SOLUTION INTRAVENOUS; SUBCUTANEOUS at 23:38

## 2017-12-06 RX ADMIN — SODIUM CHLORIDE, SODIUM LACTATE, POTASSIUM CHLORIDE, AND CALCIUM CHLORIDE 130 ML/HR: 600; 310; 30; 20 INJECTION, SOLUTION INTRAVENOUS at 16:49

## 2017-12-06 RX ADMIN — Medication 1 AMPULE: at 09:07

## 2017-12-06 RX ADMIN — Medication 10 ML: at 05:37

## 2017-12-06 RX ADMIN — Medication 75 MCG/HR: at 16:55

## 2017-12-06 RX ADMIN — SODIUM CHLORIDE 900 MG: 900 INJECTION, SOLUTION INTRAVENOUS at 20:03

## 2017-12-06 NOTE — INTERDISCIPLINARY ROUNDS
Interdisciplinary team rounds were held 12/6/2017 with the following team members:Nursing, Nurse Practitioner, Nutrition, Occupational Therapy, Palliative Care, Pastoral Care, Pharmacy, Physical Therapy, Physician, Respiratory Therapy, Speech Therapy and Clinical Coordinator. Plan of care discussed. See clinical pathway and/or care plan for interventions and desired outcomes.

## 2017-12-06 NOTE — PROGRESS NOTES
H&P Update:  Toniamello Kasperchidi was seen and examined. No changes. Will proceed to OR.       Signed By: Mello Dubose MD     December 6, 2017 6:21 PM

## 2017-12-06 NOTE — PROGRESS NOTES
Ventilator check complete; patient has a #8. 0 ET tube secured at the 22 at the lip. Patient is sedated. Patient is not able to follow commands. Breath sounds are diminished. Trachea is midline, Negative for subcutaneous air, and chest excursion is symmetric. Patient is also Negative for cyanosis and is Negative for pitting edema. All alarms are set and audible. Resuscitation bag is at the head of the bed.       Ventilator Settings  Mode FIO2 Rate Tidal Volume Pressure PEEP I:E Ratio   Pressure support  31 %    450 ml  5 cm H2O  8 cm H20  1:3.33      Peak airway pressure: 14.5 cm H2O   Minute ventilation: 7.3 l/min     ABG:   Recent Labs      12/06/17   0305  12/05/17   0330  12/04/17   0248   PH  7.48*  7.45  7.40   PCO2  36  31*  38   PO2  132*  132*  159*   HCO3  26  21*  23         Stephanie Martin, RT

## 2017-12-06 NOTE — PROGRESS NOTES
Bedside shift change report given to Isak Antunez RN (oncoming nurse) by Chasity Avalos RN (offgoing nurse). Report included the following information SBAR, Kardex, ED Summary, OR Summary, Procedure Summary, Intake/Output, MAR, Recent Results and Cardiac Rhythm NSR. Skin assessed. Patient turned. Fentanyl gtt verified. Patient did squeeze right hand on command x 2, spontaneous movement to all other extremities.

## 2017-12-06 NOTE — PROGRESS NOTES
A follow up visit was made to the patient. Support and prayer were provided. The patient was sedated.  had a discussion with a relative of his that was present in the room.       [employer]

## 2017-12-06 NOTE — PROGRESS NOTES
Ventilator check complete; patient has a #8. 0 ET tube secured at the 22 at the gum. Breath sounds are diminished. Trachea is midline, Negative for subcutaneous air, and chest excursion is symmetric. Patient is also Negative for cyanosis and is Negative for pitting edema. All alarms are set and audible. Resuscitation bag is at the head of the bed.       Ventilator Settings  Mode FIO2 Rate Tidal Volume Pressure PEEP I:E Ratio   Pressure support  30 %    450 ml  5 cm H2O  8 cm H20  1:3.33      Peak airway pressure: 14.5 cm H2O   Minute ventilation: 9.6 l/min     ABG:   Recent Labs      12/05/17   0330  12/04/17   0248  12/03/17   0235   PH  7.45  7.40  7.33*   PCO2  31*  38  37   PO2  132*  159*  162*   HCO3  21*  23  19*         David Pedro, RT

## 2017-12-06 NOTE — PROGRESS NOTES
MD notified of pt's hypertension -180 despite increase of fentanyl gtt. Orders received for PRN hydralazine.

## 2017-12-06 NOTE — PROGRESS NOTES
Problem: Nutrition Deficit  Goal: *Optimize nutritional status  Nutrition F/U:  TF Management for Dr. Tereso Lambert. Assessment:  Weight 83.5 kg (ICU bed 12/6/17), edema - 2+ generalized. The patient was started on TF yesterday at noon and the rate was able to be advanced to 35 ml/hr without difficulty before it was held for surgery today. Good GI tolerance was reported with low gastric residual and some colostomy output. Labs are remarkable for sodium 148 (down from 152), potassium 3.3 and phosphorus 1.5 - he is currently receiving a 30 millimole IV potassium phosphate bolus. SQBS (12/6) [phone] - covered with SSI coverage. Macronutrient Needs:  Estimated calorie needs - 0729-1668 sunni/day (25-28 sunni/kg/day)   Estimated protein needs - 101-117 gm pro/day (1.3-1.5 gm pro/kgIBW/day) (GFR >60 ml/min)  Max CHO/day - 294 gm CHO/day (50% sunni/day)   Fluid/day - 2.1-2.4 liters/day (1 ml/sunni/day)  Intake/Comparative Standards:  Current intake of TF (Glucerna 1.2 @ 35 ml/hr with a 45 ml/hr water flush) provides 1008 calories/day (47% calorie goal), 50 grams protein/day (46% protein goal), 97 grams CHO/day (does not exceed max CHO limit) and 1786 ml water/day (69% fluid goal). Intervention:   Meals and Snacks: NPO. Enteral Nutrition: Resume TF post-op at previous rate and advance to goal rate as tolerated. Mineral Supplement Therapy: Nutrition Support Orders/Electrolyte Management Replacement Protocols are active on the MAR. 30 millimole IV potassium phosphate bolus x 1 today. Check phosphorus and magnesium with AM labs x 2 days. Coordination of Nutrition Care by a Nutrition Professional: AM ICU rounds, collaboration with Caterina Whatley RN. Nutrition Discharge Plan: Too soon to determine. Belkys Cordon Session  [phone]

## 2017-12-06 NOTE — PROGRESS NOTES
Pt now opening eyes to voice more frequently and focuses with eyes. No command following. Continues to move all extremities and become agitated with stimulation. VSS.

## 2017-12-06 NOTE — PROGRESS NOTES
PLAN:  To OR for VAC change with Dr. Kent Better today  Will continue to monitor  Medical care per primary    ASSESSMENT:  Admit Date: 12/1/2017   3 Day Post-Op  Procedure(s):  RIGHT LEG AND HIP DEBRIDEMENT WOUND VAC PLACEMENT    Principal Problem:    Severe sepsis with septic shock ([address] (12/1/2017)    Active Problems:    Subdural hematoma ([address] (12/1/2017)      Type II diabetes mellitus with complication ([address] (97/2/5342)      LAUREANO (acute kidney injury) ([address] (12/1/2017)      Syncope (12/1/2017)      Leukocytosis (12/1/2017)      Fasciitis (12/1/2017)      Encounter for weaning from ventilator ([facility] (12/1/2017)      Electrolyte abnormality (12/1/2017)      Lactic acidosis (12/2/2017)       HPI: Tiago Rosenthal is a 79 y.o.  male who presented tot he ED 12/1/17 for evaluation. Pt with a history of having a syncopal episode at home today.  A neighbor went to go check on him and when the patient opened the door he apparently passed out and fell backward. Cheli Mckenzie says that he has not seen a doctor in over 10 years and as far as he knows he has no medical problems.  Patient does not have any teeth and he has a very difficult speech pattern to understand.  Overall he says that he just feels weak and tired but he does not say he is having any specific pain.  He denies chest pain or difficulty breathing. Patient says that he has no known history of diabetes. SUBJECTIVE: Patient intubated. Off Levo. Only on Fentanyl. Grimaces to movement. S/p right thigh debridement with NPWT placement and diverting colostomy. VAC had 3500 mL output overnight. Colostomy had 245mL/24h. On Cleocin, Vanc, and Zosyn. OBJECTIVE:  Constitutional: Grimaces to movements but in no acute distress; appears stated age   Visit Vitals    /63    Pulse 90    Temp 98.2 °F (36.8 °C)    Resp 13    Ht 5' 11\" (1.803 m)    Wt 83.5 kg (184 lb 1.4 oz)    SpO2 98%    BMI 25.67 kg/m2     Eyes:Sclera are clear.    ENMT: no external lesions gross hearing normal; no obvious neck masses, no ear or lip lesions  CV: RRR. Normal perfusion  Resp: No JVD. Breathing is  non-labored; no audible wheezing. GI: soft and non-distended. Colostomy patent with brown output in pouch. Stoma edematous but viable. Musculoskeletal: Right great and second toe cyanotic. Pulses + on doppler. All 5 digits cool to touch. Foot and lower leg warm to touch. Skin: Left lateral thigh wound with NPWT intact. Wound is putting out large amounts of serous drainage.    Neuro:  Grimaces to stimuli  Psychiatric: unable to assess    Patient Vitals for the past 24 hrs:   BP Temp Pulse Resp SpO2 Weight   12/06/17 0531 134/63 - 90 13 98 % -   12/06/17 0502 162/77 - 95 16 99 % -   12/06/17 0436 175/84 - 85 - - -   12/06/17 0434 175/84 - 85 (!) 6 99 % -   12/06/17 0410 163/79 - 84 (!) 6 99 % -   12/06/17 0333 165/83 - 83 12 99 % -   12/06/17 0312 - - - - - 83.5 kg (184 lb 1.4 oz)   12/06/17 0302 162/87 98.2 °F (36.8 °C) 85 14 99 % -   12/06/17 0259 - - 86 16 99 % -   12/06/17 0231 159/79 - 84 11 99 % -   12/06/17 0201 160/85 - 85 11 99 % -   12/06/17 0132 153/79 - 87 12 99 % -   12/06/17 0101 155/74 - 86 13 99 % -   12/06/17 0031 136/71 - 85 9 99 % -   12/05/17 2332 162/80 - 94 20 99 % -   12/05/17 2322 - - 94 14 99 % -   12/05/17 2301 159/67 97.7 °F (36.5 °C) 96 13 98 % -   12/05/17 2231 150/77 - 94 21 98 % -   12/05/17 2202 124/61 - 90 12 97 % -   12/05/17 2131 155/74 - 94 19 97 % -   12/05/17 2101 152/75 - 96 14 97 % -   12/05/17 2048 160/78 - 97 18 98 % -   12/05/17 2011 - - 100 21 97 % -   12/05/17 2002 150/82 - 100 15 97 % -   12/05/17 1931 162/74 98.5 °F (36.9 °C) 99 14 98 % -   12/05/17 1902 176/86 - (!) 101 (!) 32 98 % -   12/05/17 1831 164/79 - 98 14 98 % -   12/05/17 1802 153/82 - 96 15 98 % -   12/05/17 1731 142/74 - 93 12 98 % -   12/05/17 1701 143/71 - 94 18 98 % -   12/05/17 1635 - - 95 16 98 % -   12/05/17 1601 157/75 98.4 °F (36.9 °C) 93 9 98 % -   12/05/17 1600 - - 93 13 98 % -   12/05/17 1545 - - 93 14 99 % -   12/05/17 1530 - - 92 25 98 % -   12/05/17 1515 - - 89 16 98 % -   12/05/17 1500 - - 90 14 98 % -   12/05/17 1445 - - 91 11 98 % -   12/05/17 1430 - - 93 12 98 % -   12/05/17 1415 - - 92 12 99 % -   12/05/17 1401 130/63 - 93 12 98 % -   12/05/17 1400 - - 93 15 99 % -   12/05/17 1345 - - 100 24 99 % -   12/05/17 1330 - - 95 14 99 % -   12/05/17 1315 - - 92 14 99 % -   12/05/17 1300 - - 97 12 99 % -   12/05/17 1245 - - 97 16 98 % -   12/05/17 1230 - - 96 13 99 % -   12/05/17 1215 - - 92 13 99 % -   12/05/17 1201 135/68 98.5 °F (36.9 °C) 92 16 100 % -   12/05/17 1200 - - 92 14 100 % -   12/05/17 1158 - - 88 14 100 % -   12/05/17 1145 - - 96 15 98 % -   12/05/17 1130 - - 92 14 98 % -   12/05/17 1115 - - 92 13 98 % -   12/05/17 1100 - - 95 16 98 % -   12/05/17 1045 - - 96 19 98 % -   12/05/17 1030 - - 96 19 98 % -   12/05/17 1015 - - 96 13 98 % -   12/05/17 1000 129/61 - 95 15 98 % -   12/05/17 0945 - - 95 16 98 % -   12/05/17 0935 - - 96 13 98 % -   12/05/17 0930 - - 95 27 98 % -   12/05/17 0915 - - 96 18 98 % -   12/05/17 0900 - - 96 30 98 % -     Labs:    Recent Labs      12/06/17   0320  12/05/17   0320   WBC   --   9.5   HGB   --   8.4*   PLT   --   132*   NA  148*  152*   K  3.3*  3.5   CL  114*  118*   CO2  27  26   BUN  21  30*   CREA  0.60*  0.76*   GLU  149*  173*   TBILI   --   0.7   SGOT   --   32   ALT   --   15   AP   --   98     Josefina Dotson NP    As above- watching ischemia of tips of toes.  Otherwise no acute surgical issues  dmj

## 2017-12-06 NOTE — PROGRESS NOTES
RLE now warm to touch, spontaneous movement of all 4 extremities at this time, continues with no command following.

## 2017-12-06 NOTE — PROGRESS NOTES
Palliative Care Progress Note    Patient: Stefan Lamar MRN: [id number]  SSN: [id number]    YOB: 1950  Age: 79 y.o. Sex: male       Assessment/Plan:     Chief Complaint/Interval History: Remains on ventilator, to OR today     Principal Diagnosis:    · Altered Mental Status R41.82    Additional Diagnoses:   · Debility, Unspecified  R53.81  · Fatigue, Lethargy  R53.83  · Frailty  R54  · Sepsis, Unspecified Organism:  A41.9  · Encounter for Palliative Care  Z51.5    Palliative Performance Scale (PPS)  PPS: 20    Medical Decision Making:   Reviewed and summarized notes over previous 24 hours. Discussed case with appropriate providers: ICU IDT; primary RN  Reviewed laboratory and x-ray data: ABG, BMP, CXR    Patient lightly sedated on ventilator, granddaughter Caty León is at the bedside. Patient calmer today, opens eyes to voice and squeezes hand on command, but otherwise difficult to communicate with patient. Caty León defers any sharing of medical information to her mom and does not really want to know update. Will attempt to speak to patient's daughter (patient's only child per Caty León). Plans are for OR later today. Will continue to follow. Will discuss findings with members of the interdisciplinary team.         More than 50% of this 15 minute visit was spent counseling and coordination of care as outlined above. Subjective:     Review of Systems:  Review of systems not obtained due to patient factors: intubated and sedated     Objective:     Visit Vitals    /83 (BP 1 Location: Right arm, BP Patient Position: At rest)    Pulse 91    Temp 97.5 °F (36.4 °C)    Resp 14    Ht 5' 11\" (1.803 m)    Wt 83.5 kg (184 lb 1.4 oz)    SpO2 98%    BMI 25.67 kg/m2       Physical Exam:    General:  Frail, intubated and ventilated. Eyes:  Conjunctivae/corneas clear. Nose: Nares normal. Septum midline. Neck: Supple, symmetrical, trachea midline.    Lungs:   Clear to auscultation bilaterally, unlabored. Heart:  Regular rate and rhythm. Abdomen:   Soft, non-tender, non-distended. Extremities: Normal, atraumatic, no cyanosis. Wound vac to right thigh wound. Skin: Skin color, texture, turgor normal.    Neurologic: Sedated. Psych: Unable to assess.      Signed By: Natasha Hayes NP     December 6, 2017

## 2017-12-06 NOTE — PROGRESS NOTES
Patient shaved per family request. Central line dressing changed from better skin contact. Patient tolerated well.

## 2017-12-06 NOTE — PROGRESS NOTES
Shift assessment complete, chart reviewed. Patient is orally intubated, vent settings per RT. No command following, eye opening to voice and any stimulus. More restless today but continues with no command following. Pupils are round, sluggish to react, 3 mm, scleral edema. NSR on monitor, BP stable off Levophed. Breath sounds coarse with rhonchi throughout, FiO2 30%, SpO2 96-98%. OGT in place, tolerating tube feedings with minimal residual. Abdomen is semi-soft, edematous, bowel sounds hypoactive. Left colostomy in place, stoma is pink, moist, and edematous, small amount of brown loose stool in ostomy bag. Gerardo cath in place, draining laurence, clear urine. Wound vac to right leg, upper thigh, and inner thigh, CDI, large amounts of serosanguinous output, approximately 1500 ml per shift. Spontaneously moves BUE, lwithdraws from [address] [place] [address], no movement to BLE. +2 pitting edema to BUE, +1 pitting edema to BLE. Pulses are palpable to BUE, doppler to BLE. Right great toe and 2nd toe are purple in color. RLE are cool, LLE warm to touch with greater than 3 second cap refill. No s/s of pain at this time, fentanyl gtt infusing at 50 mcg/hr for pain control.

## 2017-12-06 NOTE — PROGRESS NOTES
Bedside, Verbal and Written shift change report given to Saintclair Half, RN and Rene Elias by [facility] ANN DIMAS. Report included the following information SBAR, Kardex, ED Summary, OR Summary, Procedure Summary, Intake/Output, MAR, Recent Results, Med Rec Status and Cardiac Rhythm NSR.

## 2017-12-06 NOTE — PROGRESS NOTES
Care Daily Progress Note: 12/6/2017  Admission Date: 12/1/2017     The patient's chart is reviewed and the patient is discussed with the staff. 70yo WM admitted with syncope. 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 Pt with severe ady's gangrene, resultant sepsis. Worsening hypotension post op(12/3/17).      Subjective:     Sedated ,on vent  Going to OR today     Current Facility-Administered Medications   Medication Dose Route Frequency    hydrALAZINE (APRESOLINE) 20 mg/mL injection 10 mg  10 mg IntraVENous Q4H PRN    potassium phosphate 30 mmol in 0.9% sodium chloride 250 mL infusion   IntraVENous ONCE    [START ON 12/7/2017] vanc trough reminder   Other ONCE    alcohol 62% (NOZIN) nasal  1 Ampule  1 Ampule Topical Q12H    vancomycin (VANCOCIN) 1,000 mg in 0.9% sodium chloride (MBP/ADV) 250 mL  1 g IntraVENous Q12H    NUTRITIONAL SUPPORT ELECTROLYTE PRN ORDERS   Does Not Apply PRN    piperacillin-tazobactam (ZOSYN) 4.5 g in 0.9% sodium chloride (MBP/ADV) 100 mL  4.5 g IntraVENous Q8H    sodium chloride 0.9 % bolus infusion 500 mL  500 mL IntraVENous PRN    influenza vaccine 2017-18 (3 yrs+)(PF) (FLUZONE QUAD/FLUARIX QUAD) injection 0.5 mL  0.5 mL IntraMUSCular PRIOR TO DISCHARGE    clindamycin phosphate (CLEOCIN) 900 mg in 0.9% sodium chloride 100 mL IVPB  900 mg IntraVENous Q8H    chlorhexidine (PERIDEX) 0.12 % mouthwash 15 mL  15 mL Oral Q12H    NUTRITIONAL SUPPORT ELECTROLYTE PRN ORDERS   Does Not Apply PRN    insulin lispro (HUMALOG) injection   SubCUTAneous Q4H    pantoprazole (PROTONIX) 40 mg in sodium chloride 0.9 % 10 mL injection  40 mg IntraVENous Q24H    midazolam in normal saline (VERSED) 1 mg/mL infusion  0-10 mg/hr IntraVENous TITRATE    lactated Ringers infusion  0-130 mL/hr IntraVENous CONTINUOUS    sodium chloride (NS) flush 5-10 mL  5-10 mL IntraVENous Q8H    sodium chloride (NS) flush 5-10 mL  5-10 mL IntraVENous PRN    fentaNYL in normal saline (pf) 25 mcg/mL infusion   mcg/hr IntraVENous TITRATE       Review of Systems   Unobtainable due to patient status. Objective:     Vitals:    12/06/17 0436 12/06/17 0502 12/06/17 0531 12/06/17 0859   BP: 175/84 162/77 134/63    Pulse: 85 95 90 90   Resp:  16 13 11   Temp:       SpO2:  99% 98% 99%   Weight:       Height:           Intake and Output:   12/04 1901 - 12/06 0700  In: 7944.1 [I.V.:4860.1]  Out: 9100 [Urine:3400; Drains:5450]  12/06 0701 - 12/06 1900  In: -   Out: 450 [Drains:450]    Physical Exam:          Constitutional:  intubated and mechanically ventilated.   EENMT:  Sclera clear, pupils equal, oral mucosa moist  Respiratory:coarse  Cardiovascular:  RRR with no M,G,R;  Gastrointestinal:  soft with no tenderness; positive bowel sounds present  Musculoskeletal:  warm with no cyanosis, trace  lower leg edema  Skin:  no jaundice or ecchymosis  Neurologic: no gross neuro deficits     Psychiatric:  Sedated, on vent      LINES:  ETT, Gerardo, RIj centra line, Wound vac , NG     DRIPS:  Fentanyl, iv fluids ,versed     CXR:        Ventilator Settings  Mode FIO2 Rate Tidal Volume Pressure PEEP   Pressure support  31 %    450 ml  5 cm H2O  8 cm H20      Peak airway pressure: 14.5 cm H2O   Minute ventilation: 7.3 l/min     ABG:   Recent Labs      12/06/17   0305  12/05/17   0330  12/04/17   0248   PH  7.48*  7.45  7.40   PCO2  36  31*  38   PO2  132*  132*  159*   HCO3  26  21*  23        LAB  Recent Labs      12/06/17   0720  12/06/17   0435  12/05/17   2355  12/05/17   1947  12/05/17   1626   GLUCPOC  180*  140*  179*  201*  199*     Recent Labs      12/05/17   0320  12/04/17   0346  12/03/17   1512   WBC  9.5  10.7   --    HGB  8.4*  8.7*  9.6*   HCT  25.8*  26.6*  29.8*   PLT  132*  121*   --      Recent Labs      12/06/17   0320  12/05/17   0320 12/04/17   0346   NA  148*  152*  150*   K 3. 3*  3.5  3.5   CL  114*  118*  118*   CO2  27  26  25   GLU  149*  173*  177*   BUN  21  30*  39*   CREA  0.60*  0.76*  0.75*   MG  2.2   --    --    PHOS  1.5*   --    --    CA  6.5*  6.4*  6.3*   ALB   --   0.9*  1.0*   SGOT   --   32  27     Recent Labs      12/04/17   0346  12/03/17   2125  12/03/17   1512   LAC  1.7  2.0  2.1*         Assessment:  (Medical Decision Making)     Hospital Problems  Date Reviewed: 12/1/2017          Codes Class Noted POA    Lactic acidosis   resolved  ICD-10-CM: E87.2  ICD-9-CM: 276.2  12/2/2017 Unknown        Subdural hematoma ([facility] ICD-10-CM: I62.00  ICD-9-CM: 432.1  12/1/2017 Yes        Type II diabetes mellitus with complication (HCC) [id number]-03-XY: E11.8  ICD-9-CM: 250.90  12/1/2017 Yes        LAUREANO (acute kidney injury) ([facility] ICD-10-CM: N17.9  ICD-9-CM: 584.9  12/1/2017 Yes        Leukocytosis ICD-10-CM: T85.631  ICD-9-CM: 288.60  12/1/2017 Yes        Fasciitis Shima gangrene, on Vanco ,Clinda, Zosyn, going to OR  ICD-10-CM: M72.9  ICD-9-CM: 729.4  12/1/2017 Yes        * (Principal)Severe sepsis with septic shock (HCC)off pressors  ICD-10-CM: A41.9, R65.21  ICD-9-CM: 038.9, 995.92, 785.52  12/1/2017 Yes        Encounter for weaning from ventilator ([facility]   still on vent ,could be extubated after back from OR ,maybe in AM as he is going to OR late today  ICD-10-CM: Z99.11  ICD-9-CM: V46.13  12/1/2017 Yes        Electrolyte abnormality ICD-10-CM: E87.8  ICD-9-CM: 276.9  12/1/2017 Yes              Plan:  (Medical Decision Making)     -- continue full support, ABX  - will keep on vent at least today as he is going to OR later today but tomorrow could start weaning     More than 50% of the time documented was spent in face-to-face contact with the patient and in the care of the patient on the floor/unit where the patient is located.     Mackenzie Rapp MD

## 2017-12-06 NOTE — PROGRESS NOTES
Bedside shift report received from [facility]. Pt resting quietly in bed, opens eyes to voice. No tracking or focusing. Moves all extremities spontaneously. With stimulation, pt becomes restless. Command following noted to [facility] [address] [facility]. Afebrile, NSR, 150/82, O2 sat 97% on vent. Lines:  Lines:  ETT  R quad IJ     Drains:  Gerardo  Ostomy  OGT     Drips:  Fentanyl @ 50 mcg/hr  LR @ 60 mL/hr       Dual skin assessment performed. RLE wound vac remains in place. Staples noted to posterior thigh. Wound vac draining serosanguinous drainage. R first and second toe are purple and blanchable. Pulses found with doppler in BLE.      Family at bedside.  Will continue to monitor.

## 2017-12-06 NOTE — ANESTHESIA PREPROCEDURE EVALUATION
Anesthetic History   No history of anesthetic complications            Review of Systems / Medical History  Patient summary reviewed, nursing notes reviewed and pertinent labs reviewed    Pulmonary          Smoker (Former)      Comments: Intubated and ventilated, but on minimal support. Currently on Pressure Support Mode with PS of 5, PEEP of 8 and FiO2 of 30%. Neuro/Psych              Cardiovascular                  Exercise tolerance: >4 METS     GI/Hepatic/Renal         Renal disease: CRI and ARF       Endo/Other    Diabetes: poorly controlled, type 2         Other Findings   Comments: Small subdural hematoma without required intervention    Admitted with Shima's Gangrene and s/p multiple debridements. Physical Exam    Airway  Mallampati: II  TM Distance: 4 - 6 cm  Neck ROM: normal range of motion   Mouth opening: Normal  Intubated   Cardiovascular    Rhythm: regular  Rate: normal         Dental    Dentition: Edentulous     Pulmonary  Breath sounds clear to auscultation               Abdominal  GI exam deferred       Other Findings            Anesthetic Plan    ASA: 4  Anesthesia type: general        Post procedure ventilation   Induction: Inhalational  Anesthetic plan and risks discussed with: Patient, Son / Daughter and Family      No longer on Norepi gtt. Only gtt is Fentanyl.

## 2017-12-06 NOTE — PROGRESS NOTES
Plastic Surgery Consult    Impression:   · Ady gangrene and necrotizing fasciitis, sepsis, hypotension    Plan:   · OR tomorrow for washout, vac change. Further surgical plans pending that clinical evaluation. Subjective:   Date of Consultation:  December 5, 2017  Referring Physician: Lorna Centeno    78 y/o with history limited to chart review as he is intubated and sedated. Apparently presented to the ER via EMS 12/1 with non op subdural hematoma, sepsis, ady gangrene with essentially sustained medical care in his adult life. Has undergone serial debridement, transverse colostomy, evaluation for possible leg amputation, hip disartic although this was deemed unnecessary in the OR. Cultures polymicrobial on gram stain. Has remained on the ventilator and is still pressor dependent. Wound being managed by wound vac, putting out >1500 ml a day. On vanc, clinda, zosyn. TF at 25. Some ostomy output. Patient Active Problem List   Diagnosis Code    Subdural hematoma (HCC) I62.00    Type II diabetes mellitus with complication ([facility]) L61.9    LAUREANO (acute kidney injury) ([facility] N17.9    Syncope R55    Leukocytosis D72.829    Fasciitis M72.9    Severe sepsis with septic shock ([address] A41.9, R65.21    Encounter for weaning from ventilator ([facility] Z99.11    Electrolyte abnormality E87.8    Hypernatremia E87.0     Past Medical History:   Diagnosis Date    Diabetes ([facility]       History reviewed. No pertinent family history. Social History   Substance Use Topics    Smoking status: Former Smoker    Smokeless tobacco: Not on file    Alcohol use Not on file     History reviewed. No pertinent surgical history. Prior to Admission medications    Medication Sig Start Date End Date Taking? Authorizing Provider   multivitamin (ONE A DAY) tablet Take 1 Tab by mouth daily.    Yes Historical Provider     No Known Allergies     Review of Systems: Unobtainable    Objective:   Blood pressure 173/78, pulse 96, temperature 98.4 °F (36.9 °C), resp. rate 14, height 5' 11\" (1.803 m), weight 83.5 kg (184 lb 1.4 oz), SpO2 99 %. Temp (24hrs), Av.1 °F (36.7 °C), Min:97.5 °F (36.4 °C), Max:98.5 °F (36.9 °C)    Current Facility-Administered Medications   Medication Dose Route Frequency    hydrALAZINE (APRESOLINE) 20 mg/mL injection 10 mg  10 mg IntraVENous Q4H PRN    [START ON 2017] vanc trough reminder   Other ONCE    alcohol 62% (NOZIN) nasal  1 Ampule  1 Ampule Topical Q12H    vancomycin (VANCOCIN) 1,000 mg in 0.9% sodium chloride (MBP/ADV) 250 mL  1 g IntraVENous Q12H    NUTRITIONAL SUPPORT ELECTROLYTE PRN ORDERS   Does Not Apply PRN    piperacillin-tazobactam (ZOSYN) 4.5 g in 0.9% sodium chloride (MBP/ADV) 100 mL  4.5 g IntraVENous Q8H    sodium chloride 0.9 % bolus infusion 500 mL  500 mL IntraVENous PRN    influenza vaccine  (3 yrs+)(PF) (FLUZONE QUAD/FLUARIX QUAD) injection 0.5 mL  0.5 mL IntraMUSCular PRIOR TO DISCHARGE    clindamycin phosphate (CLEOCIN) 900 mg in 0.9% sodium chloride 100 mL IVPB  900 mg IntraVENous Q8H    chlorhexidine (PERIDEX) 0.12 % mouthwash 15 mL  15 mL Oral Q12H    insulin lispro (HUMALOG) injection   SubCUTAneous Q4H    pantoprazole (PROTONIX) 40 mg in sodium chloride 0.9 % 10 mL injection  40 mg IntraVENous Q24H    midazolam in normal saline (VERSED) 1 mg/mL infusion  0-10 mg/hr IntraVENous TITRATE    lactated Ringers infusion  0-130 mL/hr IntraVENous CONTINUOUS    sodium chloride (NS) flush 5-10 mL  5-10 mL IntraVENous Q8H    sodium chloride (NS) flush 5-10 mL  5-10 mL IntraVENous PRN    fentaNYL in normal saline (pf) 25 mcg/mL infusion   mcg/hr IntraVENous TITRATE        Exam:      General:  Intubated and ventilated. Eyes:  Conjunctivae/corneas clear. Nose: Nares normal. Septum midline. Neck: Supple, symmetrical, trachea midline. Lungs:   Mechanical vent   Heart:  Regular rate and rhythm. Abdomen:   Soft, non-tender, non-distended. Extremities: Normal, atraumatic, no cyanosis. Wound vac to right thigh wound. Skin: Warm, pink, some distal 2nd and 3rd right toe ischemia. Neurologic: Sedated. Follows commands   Psych: Unable to assess.          Signed By: Savita Pike MD     December 6, 2017

## 2017-12-07 ENCOUNTER — ANESTHESIA EVENT (OUTPATIENT)
Dept: SURGERY | Age: 67
DRG: 853 | End: 2017-12-07
Payer: MEDICARE

## 2017-12-07 ENCOUNTER — APPOINTMENT (OUTPATIENT)
Dept: GENERAL RADIOLOGY | Age: 67
DRG: 853 | End: 2017-12-07
Attending: SURGERY
Payer: MEDICARE

## 2017-12-07 LAB
ANION GAP SERPL CALC-SCNC: 9 MMOL/L (ref 7–16)
ARTERIAL PATENCY WRIST A: POSITIVE
BACTERIA SPEC CULT: ABNORMAL
BASE EXCESS BLDA CALC-SCNC: 2.4 MMOL/L (ref 0–3)
BDY SITE: ABNORMAL
BUN SERPL-MCNC: 16 MG/DL (ref 8–23)
CALCIUM SERPL-MCNC: 6.6 MG/DL (ref 8.3–10.4)
CHLORIDE SERPL-SCNC: 111 MMOL/L (ref 98–107)
CO2 SERPL-SCNC: 27 MMOL/L (ref 21–32)
COHGB MFR BLD: 1 % (ref 0.5–1.5)
CREAT SERPL-MCNC: 0.59 MG/DL (ref 0.8–1.5)
DO-HGB BLD-MCNC: 2 % (ref 0–5)
ERYTHROCYTE [DISTWIDTH] IN BLOOD BY AUTOMATED COUNT: 14.7 % (ref 11.9–14.6)
FIO2 ON VENT: 30 %
GLUCOSE BLD STRIP.AUTO-MCNC: 160 MG/DL (ref 65–100)
GLUCOSE BLD STRIP.AUTO-MCNC: 171 MG/DL (ref 65–100)
GLUCOSE BLD STRIP.AUTO-MCNC: 173 MG/DL (ref 65–100)
GLUCOSE BLD STRIP.AUTO-MCNC: 201 MG/DL (ref 65–100)
GLUCOSE BLD STRIP.AUTO-MCNC: 205 MG/DL (ref 65–100)
GLUCOSE BLD STRIP.AUTO-MCNC: 208 MG/DL (ref 65–100)
GLUCOSE BLD STRIP.AUTO-MCNC: 213 MG/DL (ref 65–100)
GLUCOSE SERPL-MCNC: 177 MG/DL (ref 65–100)
GRAM STN SPEC: ABNORMAL
HCO3 BLDA-SCNC: 26 MMOL/L (ref 22–26)
HCT VFR BLD AUTO: 27.9 % (ref 41.1–50.3)
HGB BLD-MCNC: 8.9 G/DL (ref 13.6–17.2)
HGB BLDMV-MCNC: 9.7 GM/DL (ref 11.7–15)
MAGNESIUM SERPL-MCNC: 1.9 MG/DL (ref 1.8–2.4)
MCH RBC QN AUTO: 28.6 PG (ref 26.1–32.9)
MCHC RBC AUTO-ENTMCNC: 31.9 G/DL (ref 31.4–35)
MCV RBC AUTO: 89.7 FL (ref 79.6–97.8)
METHGB MFR BLD: 0.6 % (ref 0–1.5)
OXYHGB MFR BLDA: 96.5 % (ref 94–97)
PCO2 BLDA: 36 MMHG (ref 35–45)
PEEP RESPIRATORY: 8 CM[H2O]
PH BLDA: 7.48 [PH] (ref 7.35–7.45)
PHOSPHATE SERPL-MCNC: 2 MG/DL (ref 2.3–3.7)
PHOSPHATE SERPL-MCNC: 2 MG/DL (ref 2.3–3.7)
PLATELET # BLD AUTO: 181 K/UL (ref 150–450)
PMV BLD AUTO: 11.8 FL (ref 10.8–14.1)
PO2 BLDA: 108 MMHG (ref 80–105)
POTASSIUM SERPL-SCNC: 3.4 MMOL/L (ref 3.5–5.1)
POTASSIUM SERPL-SCNC: 3.6 MMOL/L (ref 3.5–5.1)
RBC # BLD AUTO: 3.11 M/UL (ref 4.23–5.67)
SAO2 % BLD: 98 % (ref 92–98.5)
SERVICE CMNT-IMP: ABNORMAL
SODIUM SERPL-SCNC: 147 MMOL/L (ref 136–145)
VANCOMYCIN TROUGH SERPL-MCNC: 13 UG/ML (ref 5–20)
VENTILATION MODE VENT: ABNORMAL
WBC # BLD AUTO: 8.3 K/UL (ref 4.3–11.1)

## 2017-12-07 PROCEDURE — 80048 BASIC METABOLIC PNL TOTAL CA: CPT | Performed by: SURGERY

## 2017-12-07 PROCEDURE — 74011250636 HC RX REV CODE- 250/636: Performed by: INTERNAL MEDICINE

## 2017-12-07 PROCEDURE — 84100 ASSAY OF PHOSPHORUS: CPT | Performed by: SURGERY

## 2017-12-07 PROCEDURE — 77030018846 HC SOL IRR STRL H20 ICUM -A

## 2017-12-07 PROCEDURE — 74011000258 HC RX REV CODE- 258: Performed by: SURGERY

## 2017-12-07 PROCEDURE — 65610000001 HC ROOM ICU GENERAL

## 2017-12-07 PROCEDURE — 82803 BLOOD GASES ANY COMBINATION: CPT

## 2017-12-07 PROCEDURE — 80202 ASSAY OF VANCOMYCIN: CPT | Performed by: INTERNAL MEDICINE

## 2017-12-07 PROCEDURE — 77030018836 HC SOL IRR NACL ICUM -A

## 2017-12-07 PROCEDURE — 74011250637 HC RX REV CODE- 250/637: Performed by: INTERNAL MEDICINE

## 2017-12-07 PROCEDURE — 74011250636 HC RX REV CODE- 250/636: Performed by: SURGERY

## 2017-12-07 PROCEDURE — 84100 ASSAY OF PHOSPHORUS: CPT | Performed by: INTERNAL MEDICINE

## 2017-12-07 PROCEDURE — 74011000250 HC RX REV CODE- 250: Performed by: SURGERY

## 2017-12-07 PROCEDURE — 74011000250 HC RX REV CODE- 250: Performed by: INTERNAL MEDICINE

## 2017-12-07 PROCEDURE — 85027 COMPLETE CBC AUTOMATED: CPT | Performed by: SURGERY

## 2017-12-07 PROCEDURE — 83735 ASSAY OF MAGNESIUM: CPT | Performed by: SURGERY

## 2017-12-07 PROCEDURE — 74011250637 HC RX REV CODE- 250/637: Performed by: SURGERY

## 2017-12-07 PROCEDURE — 74011636637 HC RX REV CODE- 636/637: Performed by: HOSPITALIST

## 2017-12-07 PROCEDURE — 36600 WITHDRAWAL OF ARTERIAL BLOOD: CPT

## 2017-12-07 PROCEDURE — 82962 GLUCOSE BLOOD TEST: CPT

## 2017-12-07 PROCEDURE — 94003 VENT MGMT INPAT SUBQ DAY: CPT

## 2017-12-07 PROCEDURE — 84132 ASSAY OF SERUM POTASSIUM: CPT | Performed by: INTERNAL MEDICINE

## 2017-12-07 PROCEDURE — C9113 INJ PANTOPRAZOLE SODIUM, VIA: HCPCS | Performed by: SURGERY

## 2017-12-07 PROCEDURE — 99233 SBSQ HOSP IP/OBS HIGH 50: CPT | Performed by: INTERNAL MEDICINE

## 2017-12-07 PROCEDURE — 71010 XR CHEST SNGL V: CPT

## 2017-12-07 RX ORDER — ENOXAPARIN SODIUM 100 MG/ML
40 INJECTION SUBCUTANEOUS EVERY 24 HOURS
Status: DISCONTINUED | OUTPATIENT
Start: 2017-12-07 | End: 2017-12-29 | Stop reason: HOSPADM

## 2017-12-07 RX ORDER — VANCOMYCIN HYDROCHLORIDE
1250 EVERY 12 HOURS
Status: DISCONTINUED | OUTPATIENT
Start: 2017-12-07 | End: 2017-12-13

## 2017-12-07 RX ADMIN — SODIUM CHLORIDE 900 MG: 900 INJECTION, SOLUTION INTRAVENOUS at 04:05

## 2017-12-07 RX ADMIN — VANCOMYCIN HYDROCHLORIDE 1000 MG: 1 INJECTION, POWDER, LYOPHILIZED, FOR SOLUTION INTRAVENOUS at 08:41

## 2017-12-07 RX ADMIN — ENOXAPARIN SODIUM 40 MG: 40 INJECTION SUBCUTANEOUS at 13:25

## 2017-12-07 RX ADMIN — SODIUM CHLORIDE 40 MG: 9 INJECTION INTRAMUSCULAR; INTRAVENOUS; SUBCUTANEOUS at 15:31

## 2017-12-07 RX ADMIN — SODIUM CHLORIDE 900 MG: 900 INJECTION, SOLUTION INTRAVENOUS at 13:17

## 2017-12-07 RX ADMIN — INSULIN LISPRO 4 UNITS: 100 INJECTION, SOLUTION INTRAVENOUS; SUBCUTANEOUS at 19:15

## 2017-12-07 RX ADMIN — INSULIN LISPRO 4 UNITS: 100 INJECTION, SOLUTION INTRAVENOUS; SUBCUTANEOUS at 11:48

## 2017-12-07 RX ADMIN — CHLORHEXIDINE GLUCONATE 15 ML: 1.2 RINSE ORAL at 20:19

## 2017-12-07 RX ADMIN — PIPERACILLIN SODIUM,TAZOBACTAM SODIUM 4.5 G: 4; .5 INJECTION, POWDER, FOR SOLUTION INTRAVENOUS at 04:22

## 2017-12-07 RX ADMIN — Medication 125 MCG/HR: at 14:32

## 2017-12-07 RX ADMIN — INSULIN LISPRO 4 UNITS: 100 INJECTION, SOLUTION INTRAVENOUS; SUBCUTANEOUS at 23:13

## 2017-12-07 RX ADMIN — VANCOMYCIN HYDROCHLORIDE 1250 MG: 10 INJECTION, POWDER, LYOPHILIZED, FOR SOLUTION INTRAVENOUS at 17:56

## 2017-12-07 RX ADMIN — CHLORHEXIDINE GLUCONATE 15 ML: 1.2 RINSE ORAL at 11:49

## 2017-12-07 RX ADMIN — INSULIN LISPRO 2 UNITS: 100 INJECTION, SOLUTION INTRAVENOUS; SUBCUTANEOUS at 04:00

## 2017-12-07 RX ADMIN — PIPERACILLIN SODIUM,TAZOBACTAM SODIUM 4.5 G: 4; .5 INJECTION, POWDER, FOR SOLUTION INTRAVENOUS at 20:19

## 2017-12-07 RX ADMIN — SODIUM CHLORIDE 900 MG: 900 INJECTION, SOLUTION INTRAVENOUS at 20:19

## 2017-12-07 RX ADMIN — Medication 10 ML: at 20:19

## 2017-12-07 RX ADMIN — PIPERACILLIN SODIUM,TAZOBACTAM SODIUM 4.5 G: 4; .5 INJECTION, POWDER, FOR SOLUTION INTRAVENOUS at 13:13

## 2017-12-07 RX ADMIN — Medication 10 ML: at 05:57

## 2017-12-07 RX ADMIN — Medication 1 AMPULE: at 08:28

## 2017-12-07 RX ADMIN — Medication 1 AMPULE: at 20:19

## 2017-12-07 RX ADMIN — POTASSIUM PHOSPHATE, MONOBASIC AND POTASSIUM PHOSPHATE, DIBASIC: 224; 236 INJECTION, SOLUTION INTRAVENOUS at 05:58

## 2017-12-07 RX ADMIN — SODIUM CHLORIDE, SODIUM LACTATE, POTASSIUM CHLORIDE, AND CALCIUM CHLORIDE 20 ML/HR: 600; 310; 30; 20 INJECTION, SOLUTION INTRAVENOUS at 11:44

## 2017-12-07 RX ADMIN — Medication 10 ML: at 13:26

## 2017-12-07 RX ADMIN — CHLORHEXIDINE GLUCONATE 15 ML: 1.2 RINSE ORAL at 08:29

## 2017-12-07 RX ADMIN — INSULIN LISPRO 2 UNITS: 100 INJECTION, SOLUTION INTRAVENOUS; SUBCUTANEOUS at 08:18

## 2017-12-07 RX ADMIN — INSULIN LISPRO 4 UNITS: 100 INJECTION, SOLUTION INTRAVENOUS; SUBCUTANEOUS at 15:30

## 2017-12-07 NOTE — PROGRESS NOTES
Patient leaving to go to OR at this time. Report received from [facility] for when patient returns to ICU.

## 2017-12-07 NOTE — PROGRESS NOTES
Patient returned from OR. MD came by and stated that patient's wound appears infected so the wound vac was not reapplied. The patient is to get dry to dry dressing changes twice a day. MD also stated he will talk to the intensivist in the AM regarding the patient remaining on the vent to tolerate dressing changes.

## 2017-12-07 NOTE — PROGRESS NOTES
Ventilator check complete; patient has a #8. 0 ET tube secured at the 22 at the lip. Patient is sedated. Patient is able to follow commands. Breath sounds are clear. Trachea is midline, Negative for subcutaneous air, and chest excursion is symmetric. Patient is also Negative for cyanosis and is Negative for pitting edema. All alarms are set and audible. Resuscitation bag is at the head of the bed.       Ventilator Settings  Mode FIO2 Rate Tidal Volume Pressure PEEP I:E Ratio   Pressure support  31 %    450 ml  5 cm H2O  8 cm H20  1:3.33      Peak airway pressure: 14.5 cm H2O   Minute ventilation: 5.3 l/min     ABG:   Recent Labs      12/07/17   0300  12/06/17   0305  12/05/17   0330   PH  7.48*  7.48*  7.45   PCO2  36  36  31*   PO2  108*  132*  132*   HCO3  26  26  21*         Stephanie Martin, RT

## 2017-12-07 NOTE — PROGRESS NOTES
Bedside shift report given to WINDY JARROD [facility] and fentanyl gtt verified. Patient remains on pressure support on the vent. VSS and does not appear to be in any pain at the moment.

## 2017-12-07 NOTE — PROGRESS NOTES
Ventilator check complete; patient has a #8. 0 ET tube secured at the 22 at the lip. Patient is sedated. Patient is not able to follow commands. Breath sounds are clear. Trachea is midline, Negative for subcutaneous air, and chest excursion is symmetric. Patient is also Negative for cyanosis and is Negative for pitting edema. All alarms are set and audible. Resuscitation bag is at the head of the bed.       Ventilator Settings  Mode FIO2 Rate Tidal Volume Pressure PEEP I:E Ratio   Pressure support  30 %    450 ml  5 cm H2O  8 cm H20  1:3.33      Peak airway pressure: 14.6 cm H2O   Minute ventilation: 8.6 l/min     ABG:   Recent Labs      12/06/17   0305  12/05/17   0330  12/04/17   0248   PH  7.48*  7.45  7.40   PCO2  36  31*  38   PO2  132*  132*  159*   HCO3  26  21*  23         Milus Day, RT

## 2017-12-07 NOTE — BRIEF OP NOTE
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 Patient taken to OR and position left lateral decubitus position. Surgical timeout performed. Wound vac removed and patient prepped. Noted to have additional areas of non viable skin as well as fascia. Skin margins were primarily lateral and medial. These were excised using scissors and bovie to healthy appearing viable tissue. Deep culture taken and sent. Pulsavac used to further cleanse wound. Due to the presence of some purulent drainage in the wound bed, the vac was not reapplied and instead the wound was packed and dressed with 9 kerlexes. Pre Debridment Dimensions:  60x 40 cm x 6 cm deep  Post Debridement Dimensions: Same  Dimensions of material removed: 60 x 40 x 3-4 mm  Percentage of Wound:  100%  Material Removed:  Skin, subq, fascia  Frequency of Debridements:  To be determined by clinical response       Surgical Staff:  Circ-1: Bonnie Weeks RN  Scrub Tech-1: Bereket Gonzalez  Scrub Tech-2: Arash Baez  Event Time In   Incision Start 1947   Incision Close 2016     Anesthesia: General   Estimated Blood Loss: 30 ml  Specimens:   ID Type Source Tests Collected by Time Destination   1 : RIGHT LEG WOUND Wound Leg, Right CULTURE, ANAEROBIC, CULTURE, WOUND W [address] Rajesh Shields MD 12/6/2017 2018 Microbiology   2 : RIGHT LATERAL COMPARTMENT FASCIA Wound Leg, Right CULTURE, ANAEROBIC, CULTURE, WOUND W GRAM STAIN Marc Zavala MD 12/6/2017 2012 Microbiology      Implants: * No implants in log *

## 2017-12-07 NOTE — PROGRESS NOTES
PLAN:  Will continue to monitor  Monitor ischemia of right 1st and 2nd toes  Leg wound per Dr. Alyssa Tellez care per primary    ASSESSMENT:  Admit Date: 12/1/2017   4 Day Post-Op  Procedure(s):  DEBRIDEMENT RIGHT LEG WOUND       Principal Problem:    Severe sepsis with septic shock ([facility] [address] (12/1/2017)    Active Problems:    Subdural hematoma ([address] (12/1/2017)      Type II diabetes mellitus with complication ([facility] [address] (61/7/6056)      LAUREANO (acute kidney injury) ([address] (12/1/2017)      Syncope (12/1/2017)      Leukocytosis (12/1/2017)      Fasciitis (12/1/2017)      Encounter for weaning from ventilator ([facility] [address] (12/1/2017)      Electrolyte abnormality (12/1/2017)      Hypernatremia (12/6/2017)       HPI: Marco A Johnson is a 79 y.o.  male who presented tot he ED 12/1/17 for evaluation. Pt with a history of having a syncopal episode at home today.  A neighbor went to go check on him and when the patient opened the door he apparently passed out and fell backward. Jose Antonio Miller says that he has not seen a doctor in over 10 years and as far as he knows he has no medical problems.  Patient does not have any teeth and he has a very difficult speech pattern to understand.  Overall he says that he just feels weak and tired but he does not say he is having any specific pain.  He denies chest pain or difficulty breathing. Patient says that he has no known history of diabetes. SUBJECTIVE: Patient intubated. Off Levo. Only on Fentanyl. Grimaces to movement. Opens eyes to verbal. S/p diverting colostomy and rt leg debridement. Colostomy had 100mL/24h. On Cleocin, Vanc, and Zosyn. Went to OR with Dr. Nohemy Bell yesterday-notes reviewed. OBJECTIVE:  Constitutional: Grimaces to movements but in no acute distress; appears stated age   Visit Vitals    /67    Pulse 97    Temp 99.5 °F (37.5 °C)    Resp 11    Ht 5' 11\" (1.803 m)    Wt 82.7 kg (182 lb 5.1 oz)    SpO2 96%    BMI 25.43 kg/m2     Eyes:Sclera are clear.    ENMT: no external lesions gross hearing normal; no obvious neck masses, no ear or lip lesions  CV: RRR. Normal perfusion  Resp: No JVD. Breathing is  non-labored; no audible wheezing. GI: soft and non-distended. Colostomy patent with brown output in pouch. Stoma edematous but viable. Musculoskeletal: Right great and second toe cyanotic. Pulses + on doppler. All 5 digits cool to touch. Foot and lower leg warm to touch. Skin: Right lateral thigh wound with dressing intact with serous drainange.    Neuro:  Grimaces to stimuli  Psychiatric: unable to assess    Patient Vitals for the past 24 hrs:   BP Temp Pulse Resp SpO2 Weight   12/07/17 1145 - 99.5 °F (37.5 °C) - - - -   12/07/17 1102 - - 97 11 96 % -   12/07/17 1101 123/67 - - - - -   12/07/17 1031 112/60 - 95 11 97 % -   12/07/17 1002 118/69 - 92 11 97 % -   12/07/17 0931 109/62 - 96 14 98 % -   12/07/17 0901 105/58 - 95 11 95 % -   12/07/17 0837 - - 97 14 96 % -   12/07/17 0801 102/56 - 95 12 (!) 87 % -   12/07/17 0731 112/58 98.5 °F (36.9 °C) 95 12 97 % -   12/07/17 0701 105/59 - 91 10 97 % -   12/07/17 0632 119/61 - 92 11 98 % -   12/07/17 0601 121/66 - 93 11 97 % -   12/07/17 0549 117/63 - 95 12 97 % -   12/07/17 0501 111/58 - 95 13 97 % -   12/07/17 0432 122/62 - 95 11 96 % -   12/07/17 0401 143/69 - 99 25 97 % -   12/07/17 0349 - - - - - 82.7 kg (182 lb 5.1 oz)   12/07/17 0332 - 98.3 °F (36.8 °C) - - - -   12/07/17 0331 118/61 - 95 11 96 % -   12/07/17 0308 - - 97 11 96 % -   12/07/17 0302 134/70 - 95 14 98 % -   12/07/17 0232 120/62 - 93 15 97 % -   12/07/17 0201 117/64 - 96 12 97 % -   12/07/17 0131 118/64 - 97 18 98 % -   12/07/17 0102 126/61 - 100 11 98 % -   12/07/17 0031 134/71 - 97 15 98 % -   12/07/17 0005 - - 97 14 98 % -   12/06/17 2332 136/66 - 100 15 98 % -   12/06/17 2301 134/71 - 99 15 98 % -   12/06/17 2231 138/68 - (!) 101 15 98 % -   12/06/17 2201 147/76 - 97 14 99 % -   12/06/17 2135 - - 95 15 97 % -   12/06/17 2130 146/79 - 96 15 98 % - 12/06/17 2115 141/81 - 94 14 98 % -   12/06/17 2100 139/75 - 96 15 97 % -   12/06/17 2045 159/82 97.8 °F (36.6 °C) 97 17 96 % -   12/06/17 2034 134/66 98 °F (36.7 °C) 97 18 96 % -   12/06/17 2030 120/58 - 96 16 95 % -   12/06/17 2015 121/56 - - - - -   12/06/17 1736 173/78 - 96 - - -   12/06/17 1733 173/78 - 96 14 99 % -   12/06/17 1701 173/84 - 94 12 99 % -   12/06/17 1631 164/79 - 93 13 99 % -   12/06/17 1603 179/85 98.4 °F (36.9 °C) 94 13 99 % -   12/06/17 1433 162/83 - 94 11 98 % -   12/06/17 1402 143/70 - 88 17 99 % -   12/06/17 1331 134/70 - 89 12 98 % -   12/06/17 1301 161/82 - 93 12 99 % -     Labs:    Recent Labs      12/07/17   0345   12/05/17   0320   WBC  8.3   --   9.5   HGB  8.9*   --   8.4*   PLT  181   --   132*   NA  147*   < >  152*   K  3.4*   < >  3.5   CL  111*   < >  118*   CO2  27   < >  26   BUN  16   < >  30*   CREA  0.59*   < >  0.76*   GLU  177*   < >  173*   TBILI   --    --   0.7   SGOT   --    --   32   ALT   --    --   15   AP   --    --   98    < > = values in this interval not displayed.      Anthony Omer, NP

## 2017-12-07 NOTE — PROGRESS NOTES
Bedside report received from [facility]. Pt resting quietly in bed. Will respond to voice with decreased command following. Pt is intubated with ETT at 22cm, Pressure Support with 31% Fi02. Lung sounds are diminished bilaterally. Pulses palpable and present all extremities. BUE's have 1+ pitting edema. Abdomen soft with active bowel sounds. NGT in place and tube feedings infusing. Ostomy in place draining loose brown stool. Gerardo catheter in place draining yellow clear urine. Pt has severe wound on RLE (see wound flow sheet). Dual verification of fentanyl gtt completed. Will continue to monitor pt.

## 2017-12-07 NOTE — PROGRESS NOTES
Pharmacokinetic Consult to Pharmacist    Rene Lin is a 79 y.o. 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 Height: 5' 11\" (180.3 cm)  Weight: 82.7 kg (182 lb 5.1 oz)  Lab Results   Component Value Date/Time    BUN 16 12/07/2017 03:45 AM    Creatinine 0.59 12/07/2017 03:45 AM    WBC 8.3 12/07/2017 03:45 AM    Procalcitonin 15.5 12/01/2017 03:01 PM    Lactic acid 1.7 12/04/2017 03:46 AM    Lactic Acid (POC) 4.8 12/01/2017 06:10 PM      Estimated Creatinine Clearance: 129.4 mL/min (based on Cr of 0.59). Lab Results   Component Value Date/Time    Vancomycin,trough 13.0 12/07/2017 08:20 AM       Day 7 of vancomycin. Goal trough is 15-20. Trough is slightly low. Will increase from 1000 mg IV q12h to 1250 mg IV q12h. Further levels will be ordered as clinically indicated. Pharmacy will continue to follow. Please call with any questions.     Thank you,  Lauren Garner, PharmD  Clinical Pharmacist  [phone]

## 2017-12-07 NOTE — PROGRESS NOTES
Care Daily Progress Note: 12/7/2017  Admission Date: 12/1/2017     The patient's chart is reviewed and the patient is discussed with the staff. Pt is a 80 yo male with no prior known medical history. Pt presented to the ER on 12/1 with syncopal episode. He was admitted by hospitalist after CT head confirmed subdural hematoma that was not felt to require intervention. 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 He was taken to the OR for debridement and has been seen by Dr. Juice Apodaca with plastic surgery for continued wound debridement. He has remained intubated for further anticipated procedures. Subjective:     Sedated and unresponsive to voice.      Current Facility-Administered Medications   Medication Dose Route Frequency    potassium phosphate 20 mmol in 0.9% sodium chloride 250 mL infusion   IntraVENous ONCE    vancomycin (VANCOCIN) 1250 mg in  ml infusion  1,250 mg IntraVENous Q12H    hydrALAZINE (APRESOLINE) 20 mg/mL injection 10 mg  10 mg IntraVENous Q4H PRN    alcohol 62% (NOZIN) nasal  1 Ampule  1 Ampule Topical Q12H    NUTRITIONAL SUPPORT ELECTROLYTE PRN ORDERS   Does Not Apply PRN    piperacillin-tazobactam (ZOSYN) 4.5 g in 0.9% sodium chloride (MBP/ADV) 100 mL  4.5 g IntraVENous Q8H    sodium chloride 0.9 % bolus infusion 500 mL  500 mL IntraVENous PRN    influenza vaccine 2017-18 (3 yrs+)(PF) (FLUZONE QUAD/FLUARIX QUAD) injection 0.5 mL  0.5 mL IntraMUSCular PRIOR TO DISCHARGE    clindamycin phosphate (CLEOCIN) 900 mg in 0.9% sodium chloride 100 mL IVPB  900 mg IntraVENous Q8H    chlorhexidine (PERIDEX) 0.12 % mouthwash 15 mL  15 mL Oral Q12H    insulin lispro (HUMALOG) injection   SubCUTAneous Q4H    pantoprazole (PROTONIX) 40 mg in sodium chloride 0.9 % 10 mL injection  40 mg IntraVENous Q24H    midazolam in normal saline (VERSED) 1 mg/mL infusion  0-10 mg/hr IntraVENous TITRATE    lactated Ringers infusion  0-130 mL/hr IntraVENous CONTINUOUS    sodium chloride (NS) flush 5-10 mL  5-10 mL IntraVENous Q8H    sodium chloride (NS) flush 5-10 mL  5-10 mL IntraVENous PRN    fentaNYL in normal saline (pf) 25 mcg/mL infusion   mcg/hr IntraVENous TITRATE       Review of Systems  Unobtainable due to patient status. Objective:     Vitals:    12/07/17 0801 12/07/17 0837 12/07/17 0901 12/07/17 0931   BP: 102/56  105/58 109/62   Pulse: 95 97 95 96   Resp: 12 14 11 14   Temp:       SpO2: (!) 87% 96% 95% 98%   Weight:       Height:           Intake and Output:   12/05 1901 - 12/07 0700  In: 7017.4 [I.V.:5435.4]  Out: 8370 [Urine:4975; Drains:3200]       Physical Exam:          Constitutional:  intubated and mechanically ventilated.   EENMT:  Sclera clear, pupils equal, oral mucosa moist  Respiratory: clear anteriorly  Cardiovascular:  RRR with no M,G,R;  Gastrointestinal:  soft with no tenderness; positive bowel sounds present  Musculoskeletal:  warm with no cyanosis, trace lower leg edema  Skin:  no jaundice or ecchymosis  Neurologic: sedated    Psychiatric:  Sedated, unresponsive    LINES:  ETT, quad lumen R IJ, OGT, colostomy, kathleen,     DRIPS:  Fentanyl, KCL,    CXR:        Ventilator Settings  Mode FIO2 Rate Tidal Volume Pressure PEEP   Pressure support  31 %    450 ml  5 cm H2O  8 cm H20      Peak airway pressure: 14.5 cm H2O   Minute ventilation: 5.3 l/min     ABG:   Recent Labs      12/07/17   0300  12/06/17   0305  12/05/17   0330   PH  7.48*  7.48*  7.45   PCO2  36  36  31*   PO2  108*  132*  132*   HCO3  26  26  21*        LAB  Recent Labs      12/07/17   0729  12/07/17   0355  12/06/17   2335  12/06/17   2108  12/06/17   1524   GLUCPOC  160*  171*  173*  149*  145*     Recent Labs      12/07/17   0345  12/05/17   0320   WBC  8.3  9.5   HGB  8.9*  8.4*   HCT  27.9*  25.8*   PLT  181  132*     Recent Labs      12/07/17   0345  12/06/17   1208  12/06/17   0320  12/05/17   0320   NA  147*   --   148* 152*   K  3.4*  3.5  3.3*  3.5   CL  111*   --   114*  118*   CO2  27   --   27  26   GLU  177*   --   149*  173*   BUN  16   --   21  30*   CREA  0.59*   --   0.60*  0.76*   MG  1.9   --   2.2   --    PHOS  2.0*  2.6  1.5*   --    CA  6.6*   --   6.5*  6.4*   ALB   --    --    --   0.9*   SGOT   --    --    --   32     No results for input(s): LCAD, LAC in the last 72 hours. Assessment:  (Medical Decision Making)     Hospital Problems  Date Reviewed: 12/7/2017          Codes Class Noted POA    Hypernatremia-monitor, may need to increase free water in tf ICD-10-CM: E87.0  ICD-9-CM: 276.0  12/6/2017 Unknown        Subdural hematoma (HCC)-nonsurgical ICD-10-CM: I62.00  ICD-9-CM: 432.1  12/1/2017 Yes    Will resume dvt ppx now.     Type II diabetes mellitus with complication (HCC)-SSI HCA-00-IB: E11.8  ICD-9-CM: 250.90  12/1/2017 Yes        LAUREANO (acute kidney injury) (HCC)-resolved ICD-10-CM: N17.9  ICD-9-CM: 584.9  12/1/2017 Yes        Syncope-on monitor, no cardiac issues noted  ICD-10-CM: R55  ICD-9-CM: 780.2  12/1/2017 Yes        Leukocytosis-resolved ICD-10-CM: P89.156  ICD-9-CM: 288.60  12/1/2017 Yes        Fasciitis-per surgery ICD-10-CM: M72.9  ICD-9-CM: 729.4  12/1/2017 Yes        * (Principal)Severe sepsis with septic shock (HCC)-clindaymcin/vanc/zosyn ICD-10-CM: A41.9, R65.21  ICD-9-CM: 038.9, 995.92, 785.52  12/1/2017 Yes        Encounter for weaning from ventilator ([facility])-will keep pt intubated until procedures completed ICD-10-CM: Z99.11  ICD-9-CM: V46.13  12/1/2017 Yes        Electrolyte abnormality-will given K+ ICD-10-CM: E87.8  ICD-9-CM: 276.9  12/1/2017 Yes              Plan:  (Medical Decision Making)     --maintain vent for now  --continue Clindamycin, Vanc, Zosyn  --debridement per plastics/surgery  --prognosis guarded for now    More than 50% of the time documented was spent in face-to-face contact with the patient and in the care of the patient on the floor/unit where the patient is located. KASSI Mendez     I have spoken with and examined the patient. I agree with the above assessment and plan as documented. Currently not on vasopressors    Gen: pleasant, NGT  Lungs: CTA  Heart:  RRR with no Murmur/Rubs/Gallops  Abd: NABS  Ext: anasarca    QLC 12/1  Colostomy 12/2  ET tube 12/1  Gerardo 12/1  Clindamycin 900mg q8h 12/  Zosyn  Vanco  TF  CXR improved. --Wean sedation when not getting dressing changes.    --Continue surgical plans per Dr. Cathy Edwards  --Can stop daily CXR and order prn  --continue PPI, DVT ppx resumed, TF  --full code  Suzan Case MD

## 2017-12-07 NOTE — PROGRESS NOTES
Ventilator check complete; patient has a #8. 0 ET tube secured at the 22 at the lip. Patient is sedated. Patient is not able to follow commands. Breath sounds are clear. Trachea is midline, Negative for subcutaneous air, and chest excursion is symmetric. Patient is also Negative for cyanosis and is Negative for pitting edema. All alarms are set and audible. Resuscitation bag is at the head of the bed.       Ventilator Settings  Mode FIO2 Rate Tidal Volume Pressure PEEP I:E Ratio   Pressure support  30 %    450 ml  5 cm H2O  8 cm H20  1:3.33      Peak airway pressure: 14.6 cm H2O   Minute ventilation: 8.6 l/min     ABG:   Recent Labs      12/06/17   0305  12/05/17   0330  12/04/17   0248   PH  7.48*  7.45  7.40   PCO2  36  31*  38   PO2  132*  132*  159*   HCO3  26  21*  23         Allyssa Maldonado RT

## 2017-12-07 NOTE — PROGRESS NOTES
Problem: Nutrition Deficit  Goal: *Optimize nutritional status  Nutrition F/U:  TF Management for Dr. Bettye Azar. Assessment:  Weight 82.7 kg (ICU bed 12/7/17), edema - 1+ generalized. TF was resumed last night after his return from the OR. Current infusion rate is 65 ml/hr with a 45 ml/hr water flush - 87% goal rate. Good GI tolerance is reported with low gastric residuals reported and minimal colostomy output. Labs are remarkable for AM glucose 177, sodium 147 (trending downward), potassium 3.4 and phosphorus 2.0 - some refeeding noted based on low lytes. He received a 20 millimole IV potassium phosphate bolus today. POC glucose (12/6) 140,180,159,145,149,173 and (12/7) 171,160.208. Macronutrient Needs:  Estimated calorie needs - 6622-5915 sunni/day (25-28 sunni/kg/day)   Estimated protein needs - 101-117 gm pro/day (1.3-1.5 gm pro/kgIBW/day) (GFR >60 ml/min)  Max CHO/day - 294 gm CHO/day (50% sunni/day)   Fluid/day - 2.1-2.4 liters/day (1 ml/sunni/day)  Intake/Comparative Standards:  Current intake of TF (Glucerna 1.2 @ 65 ml/hr with a 45 ml/hr water flush) provides 1872 calories/day (87% calorie goal), 94 grams protein/day (87% protein goal), 179 grams CHO/day (does not exceed max CHO limit) and 2390 ml water/day (>100% fluid goal to treat hypernatremia). Intervention:   Meals and Snacks: NPO. Enteral Nutrition: Continue to advance TF as tolerated to the goal rate of 75 ml/hr with a 45 ml/hr water flush - 2160 calories/day (100% calorie goal), 108 grams protein/day (100% protein goal), 207 grams CHO/day (does not exceed max CHO limit) and 2592 ml water/day (>100% fluid goal to treat hypernatremia). Mineral Supplement Therapy: Nutrition Support Orders/Electrolyte Management Replacement Protocols are active on the MAR. 20 millimole IV potassium phosphate x 1 today. Coordination of Nutrition Care by a Nutrition Professional: AM ICU rounds, collaboration with Amanda Kelly RN.   Nutrition Discharge Plan: Too soon to determine. Mariam Puentes.  Richardson Liebermaning  [phone]

## 2017-12-07 NOTE — PROGRESS NOTES
Palliative Care Progress Note    Patient: Hannah Driver MRN: [id number]  SSN: [id number]    YOB: 1950  Age: 79 y.o. Sex: male       Assessment/Plan:     Chief Complaint/Interval History: Remains on ventilator, to OR today     Principal Diagnosis:    · Altered Mental Status R41.82    Additional Diagnoses:   · Debility, Unspecified  R53.81  · Fatigue, Lethargy  R53.83  · Frailty  R54  · Sepsis, Unspecified Organism:  A41.9  · Encounter for Palliative Care  Z51.5    Palliative Performance Scale (PPS)  PPS: 20    Medical Decision Making:   Reviewed and summarized notes over previous 24 hours. Discussed case with appropriate providers: ICU IDT; primary RN  Reviewed laboratory and x-ray data: ABG, BMP, CXR    Ongoing surgical management with serial debridements. Hemodynamically stable. Will sign off, please call if needed. Will discuss findings with members of the interdisciplinary team.         More than 50% of this 15 minute visit was spent counseling and coordination of care as outlined above. Subjective:     Review of Systems:  Review of systems not obtained due to patient factors: intubated and sedated     Objective:     Visit Vitals    /59    Pulse 97    Temp 98.3 °F (36.8 °C)    Resp 14    Ht 5' 11\" (1.803 m)    Wt 82.7 kg (182 lb 5.1 oz)    SpO2 96%    BMI 25.43 kg/m2       Physical Exam:    General:  Frail, intubated and ventilated. Eyes:  Conjunctivae/corneas clear. Nose: Nares normal. Septum midline. Neck: Supple, symmetrical, trachea midline. Lungs:    unlabored. Heart:  Regular rate and rhythm. Abdomen:    non-distended. Extremities: Normal, atraumatic, no cyanosis. Wound vac to right thigh wound. Skin: Skin color, texture, turgor normal.    Neurologic: Sedated. Psych: Unable to assess.      Signed By: Caden Rodriguez MD     December 7, 2017

## 2017-12-07 NOTE — PROGRESS NOTES
Patient is currently on pressure support on the vent with respirations even and unlabored. Intensivist aware of patient on fentanyl gtt for pain and that Dr. Lenis Apgar requests patient to remain intubated.

## 2017-12-07 NOTE — INTERDISCIPLINARY ROUNDS
Interdisciplinary team rounds were held 12/7/2017 with the following team members:Care Management, Nursing, Nurse Practitioner, Nutrition, Palliative Care, Pastoral Care, Pharmacy, Physical Therapy, Physician, Physician's Assistant, Respiratory Therapy and Clinical Coordinator and the patient. Plan of care discussed. See clinical pathway and/or care plan for interventions and desired outcomes.

## 2017-12-08 ENCOUNTER — ANESTHESIA (OUTPATIENT)
Dept: SURGERY | Age: 67
DRG: 853 | End: 2017-12-08
Payer: MEDICARE

## 2017-12-08 PROBLEM — E87.8 ELECTROLYTE ABNORMALITY: Status: RESOLVED | Noted: 2017-12-01 | Resolved: 2017-12-08

## 2017-12-08 PROBLEM — D72.829 LEUKOCYTOSIS: Status: RESOLVED | Noted: 2017-12-01 | Resolved: 2017-12-08

## 2017-12-08 PROBLEM — N17.9 AKI (ACUTE KIDNEY INJURY) (HCC): Status: RESOLVED | Noted: 2017-12-01 | Resolved: 2017-12-08

## 2017-12-08 PROBLEM — J96.00 ACUTE RESPIRATORY FAILURE (HCC): Status: ACTIVE | Noted: 2017-12-08

## 2017-12-08 LAB
ARTERIAL PATENCY WRIST A: ABNORMAL
BACTERIA SPEC CULT: NORMAL
BACTERIA SPEC CULT: NORMAL
BASE EXCESS BLDA CALC-SCNC: 3.9 MMOL/L (ref 0–3)
BDY SITE: ABNORMAL
COHGB MFR BLD: 0.7 % (ref 0.5–1.5)
DO-HGB BLD-MCNC: 2 % (ref 0–5)
FIO2 ON VENT: 30 %
GLUCOSE BLD STRIP.AUTO-MCNC: 144 MG/DL (ref 65–100)
GLUCOSE BLD STRIP.AUTO-MCNC: 178 MG/DL (ref 65–100)
GLUCOSE BLD STRIP.AUTO-MCNC: 201 MG/DL (ref 65–100)
GLUCOSE BLD STRIP.AUTO-MCNC: 234 MG/DL (ref 65–100)
GLUCOSE BLD STRIP.AUTO-MCNC: 256 MG/DL (ref 65–100)
HCO3 BLDA-SCNC: 27 MMOL/L (ref 22–26)
HGB BLDMV-MCNC: 9.1 GM/DL (ref 11.7–15)
MAGNESIUM SERPL-MCNC: 2 MG/DL (ref 1.8–2.4)
METHGB MFR BLD: 0.6 % (ref 0–1.5)
OXYHGB MFR BLDA: 96.4 % (ref 94–97)
PCO2 BLDA: 35 MMHG (ref 35–45)
PEEP RESPIRATORY: 8 CM[H2O]
PH BLDA: 7.51 [PH] (ref 7.35–7.45)
PHOSPHATE SERPL-MCNC: 1.9 MG/DL (ref 2.3–3.7)
PO2 BLDA: 101 MMHG (ref 80–105)
POTASSIUM SERPL-SCNC: 3.6 MMOL/L (ref 3.5–5.1)
SAO2 % BLD: 98 % (ref 92–98.5)
SERVICE CMNT-IMP: ABNORMAL
SERVICE CMNT-IMP: NORMAL
SERVICE CMNT-IMP: NORMAL
VENTILATION MODE VENT: ABNORMAL

## 2017-12-08 PROCEDURE — 74011250636 HC RX REV CODE- 250/636: Performed by: INTERNAL MEDICINE

## 2017-12-08 PROCEDURE — 77030018798 HC PMP KT ENTRL FED COVD -A

## 2017-12-08 PROCEDURE — 77030020782 HC GWN BAIR PAWS FLX 3M -B: Performed by: ANESTHESIOLOGY

## 2017-12-08 PROCEDURE — 74011636637 HC RX REV CODE- 636/637: Performed by: HOSPITALIST

## 2017-12-08 PROCEDURE — 84100 ASSAY OF PHOSPHORUS: CPT | Performed by: SURGERY

## 2017-12-08 PROCEDURE — 0JBL0ZZ EXCISION OF RIGHT UPPER LEG SUBCUTANEOUS TISSUE AND FASCIA, OPEN APPROACH: ICD-10-PCS | Performed by: SURGERY

## 2017-12-08 PROCEDURE — 74011000250 HC RX REV CODE- 250

## 2017-12-08 PROCEDURE — 74011000250 HC RX REV CODE- 250: Performed by: INTERNAL MEDICINE

## 2017-12-08 PROCEDURE — 36600 WITHDRAWAL OF ARTERIAL BLOOD: CPT

## 2017-12-08 PROCEDURE — 84132 ASSAY OF SERUM POTASSIUM: CPT | Performed by: INTERNAL MEDICINE

## 2017-12-08 PROCEDURE — 74011250637 HC RX REV CODE- 250/637: Performed by: SURGERY

## 2017-12-08 PROCEDURE — 74011000258 HC RX REV CODE- 258: Performed by: SURGERY

## 2017-12-08 PROCEDURE — C9113 INJ PANTOPRAZOLE SODIUM, VIA: HCPCS | Performed by: SURGERY

## 2017-12-08 PROCEDURE — 65610000001 HC ROOM ICU GENERAL

## 2017-12-08 PROCEDURE — 76010000161 HC OR TIME 1 TO 1.5 HR INTENSV-TIER 1: Performed by: SURGERY

## 2017-12-08 PROCEDURE — 76060000033 HC ANESTHESIA 1 TO 1.5 HR: Performed by: SURGERY

## 2017-12-08 PROCEDURE — 77030008703 HC TU ET UNCUF COVD -A: Performed by: ANESTHESIOLOGY

## 2017-12-08 PROCEDURE — 74011000250 HC RX REV CODE- 250: Performed by: SURGERY

## 2017-12-08 PROCEDURE — 74011250637 HC RX REV CODE- 250/637: Performed by: INTERNAL MEDICINE

## 2017-12-08 PROCEDURE — 82803 BLOOD GASES ANY COMBINATION: CPT

## 2017-12-08 PROCEDURE — 77030008477 HC STYL SATN SLP COVD -A: Performed by: ANESTHESIOLOGY

## 2017-12-08 PROCEDURE — 77030013727 HC IRR FAN PULSVC ZIMM -B: Performed by: SURGERY

## 2017-12-08 PROCEDURE — 83735 ASSAY OF MAGNESIUM: CPT | Performed by: SURGERY

## 2017-12-08 PROCEDURE — 99233 SBSQ HOSP IP/OBS HIGH 50: CPT | Performed by: INTERNAL MEDICINE

## 2017-12-08 PROCEDURE — 94003 VENT MGMT INPAT SUBQ DAY: CPT

## 2017-12-08 PROCEDURE — 74011250636 HC RX REV CODE- 250/636: Performed by: SURGERY

## 2017-12-08 PROCEDURE — 74011250636 HC RX REV CODE- 250/636

## 2017-12-08 PROCEDURE — 82962 GLUCOSE BLOOD TEST: CPT

## 2017-12-08 PROCEDURE — 77030011640 HC PAD GRND REM COVD -A: Performed by: SURGERY

## 2017-12-08 RX ORDER — SODIUM CHLORIDE, SODIUM LACTATE, POTASSIUM CHLORIDE, CALCIUM CHLORIDE 600; 310; 30; 20 MG/100ML; MG/100ML; MG/100ML; MG/100ML
INJECTION, SOLUTION INTRAVENOUS
Status: DISCONTINUED | OUTPATIENT
Start: 2017-12-08 | End: 2017-12-08 | Stop reason: HOSPADM

## 2017-12-08 RX ADMIN — SODIUM CHLORIDE 900 MG: 900 INJECTION, SOLUTION INTRAVENOUS at 05:13

## 2017-12-08 RX ADMIN — SODIUM CHLORIDE 40 MG: 9 INJECTION INTRAMUSCULAR; INTRAVENOUS; SUBCUTANEOUS at 15:26

## 2017-12-08 RX ADMIN — Medication 10 ML: at 22:52

## 2017-12-08 RX ADMIN — INSULIN LISPRO 4 UNITS: 100 INJECTION, SOLUTION INTRAVENOUS; SUBCUTANEOUS at 04:03

## 2017-12-08 RX ADMIN — INSULIN LISPRO 6 UNITS: 100 INJECTION, SOLUTION INTRAVENOUS; SUBCUTANEOUS at 12:47

## 2017-12-08 RX ADMIN — INSULIN LISPRO 4 UNITS: 100 INJECTION, SOLUTION INTRAVENOUS; SUBCUTANEOUS at 08:14

## 2017-12-08 RX ADMIN — PIPERACILLIN SODIUM,TAZOBACTAM SODIUM 4.5 G: 4; .5 INJECTION, POWDER, FOR SOLUTION INTRAVENOUS at 04:04

## 2017-12-08 RX ADMIN — PIPERACILLIN SODIUM,TAZOBACTAM SODIUM 4.5 G: 4; .5 INJECTION, POWDER, FOR SOLUTION INTRAVENOUS at 20:56

## 2017-12-08 RX ADMIN — Medication 10 ML: at 13:54

## 2017-12-08 RX ADMIN — SODIUM CHLORIDE 900 MG: 900 INJECTION, SOLUTION INTRAVENOUS at 12:35

## 2017-12-08 RX ADMIN — ENOXAPARIN SODIUM 40 MG: 40 INJECTION SUBCUTANEOUS at 12:35

## 2017-12-08 RX ADMIN — SODIUM CHLORIDE 900 MG: 900 INJECTION, SOLUTION INTRAVENOUS at 20:57

## 2017-12-08 RX ADMIN — POTASSIUM PHOSPHATE, MONOBASIC AND POTASSIUM PHOSPHATE, DIBASIC: 224; 236 INJECTION, SOLUTION INTRAVENOUS at 08:11

## 2017-12-08 RX ADMIN — Medication 1 AMPULE: at 08:12

## 2017-12-08 RX ADMIN — VANCOMYCIN HYDROCHLORIDE 1250 MG: 10 INJECTION, POWDER, LYOPHILIZED, FOR SOLUTION INTRAVENOUS at 05:14

## 2017-12-08 RX ADMIN — VANCOMYCIN HYDROCHLORIDE 1250 MG: 10 INJECTION, POWDER, LYOPHILIZED, FOR SOLUTION INTRAVENOUS at 17:37

## 2017-12-08 RX ADMIN — Medication 125 MCG/HR: at 12:38

## 2017-12-08 RX ADMIN — CHLORHEXIDINE GLUCONATE 15 ML: 1.2 RINSE ORAL at 08:12

## 2017-12-08 RX ADMIN — Medication 10 ML: at 05:17

## 2017-12-08 RX ADMIN — Medication 1 AMPULE: at 20:57

## 2017-12-08 RX ADMIN — CHLORHEXIDINE GLUCONATE 15 ML: 1.2 RINSE ORAL at 22:52

## 2017-12-08 RX ADMIN — SODIUM CHLORIDE, SODIUM LACTATE, POTASSIUM CHLORIDE, CALCIUM CHLORIDE: 600; 310; 30; 20 INJECTION, SOLUTION INTRAVENOUS at 18:34

## 2017-12-08 RX ADMIN — PIPERACILLIN SODIUM,TAZOBACTAM SODIUM 4.5 G: 4; .5 INJECTION, POWDER, FOR SOLUTION INTRAVENOUS at 13:53

## 2017-12-08 NOTE — PROGRESS NOTES
TF placed on hold in anticipation of surgery this evening. Pt fails PS trial but awakens to voice, follows commands, & denies pain. Fentanyl infusing, VSS. Will continue to monitor.

## 2017-12-08 NOTE — PROGRESS NOTES
Shift report given to Majo Calvo RN. Pt resting in bed. VSS. Dual skin and wound assessment. Fentanyl gtt signed off.

## 2017-12-08 NOTE — PROGRESS NOTES
Shift report received from [place], [facility]. Pt resting in bed on vent. VSS. Dual sign off of fentanyl gtt. Wound sites dually checked. See flowsheet for full assessment.

## 2017-12-08 NOTE — PROGRESS NOTES
Ventilator check complete; patient has a #8. 0 ET tube secured at the 23 at the lip. .  Breath sounds are diminished. Trachea is midline, Negative for subcutaneous air, and chest excursion is symmetric. Patient is also Negative for cyanosis and is Negative for pitting edema. All alarms are set and audible. Resuscitation bag is at the head of the bed.       Ventilator Settings  Mode FIO2 Rate Tidal Volume Pressure PEEP I:E Ratio   PRVC  30 %    450 ml  5 cm H2O  8 cm H20  1:2.00      Peak airway pressure: 17.8 cm H2O   Minute ventilation: 7.9 l/min     ABG:   Recent Labs      12/08/17   0313  12/07/17   0300  12/06/17   0305   PH  7.51*  7.48*  7.48*   PCO2  35  36  36   PO2  101  108*  132*   HCO3  27*  26  26         Jacki Jesus, RT

## 2017-12-08 NOTE — PROGRESS NOTES
provided initial visit. Pt entered through the ED and came into ICU. Pt was not alert. Pt's daughter and son-in-law were present in the room. Visitors shared family dynamics and other health concerns with additional family members. Some frustration shared on daughters part towards her father regarding his reluctance in getting medical care that could have prevented this visit.  provided spiritual care through presence, active listening, supportive responses, and Pre surgical prayer.

## 2017-12-08 NOTE — PROGRESS NOTES
TRANSFER - OUT REPORT:    Verbal report given to Luana Pitt RN(name) on [facility]  being transferred to OR(unit) for ordered procedure       Report consisted of patients Situation, Background, Assessment and   Recommendations(SBAR). Information from the following report(s) SBAR, Kardex, Med Rec Status and Cardiac Rhythm SR was reviewed with the receiving nurse. Lines:   Quad Lumen placed in procedure, documented incorrectly as a double lumen 12/01/17 Right Internal jugular (Active)   Central Line Being Utilized Yes 12/8/2017  4:45 PM   Criteria for Appropriate Use Hemodynamically unstable, requiring monitoring lines, vasopressors, or volume resuscitation 12/8/2017  4:45 PM   Site Assessment Clean, dry, & intact 12/8/2017  4:45 PM   Infiltration Assessment 0 12/8/2017  4:45 PM   Affected Extremity/Extremities Color distal to insertion site pink (or appropriate for race) 12/8/2017  4:45 PM   Date of Last Dressing Change 12/10/17 12/8/2017  4:45 PM   Dressing Status Clean, dry, & intact 12/8/2017  4:45 PM   Dressing Type Disk with Chlorhexadine gluconate (CHG); Transparent 12/8/2017  4:45 PM   Action Taken Blood drawn 12/7/2017  8:01 AM   Proximal Hub Color/Line Status Patent; White 12/8/2017  4:45 PM   Positive Blood Return (Medial Site) Yes 12/8/2017  4:45 PM   Medial 1 Hub Color/Line Status Patent;Gray 12/8/2017  4:45 PM   Positive Blood Return (Lateral Site) Yes 12/8/2017  4:45 PM   Medial 2 Hub Color/Line Status Patent; Infusing 12/8/2017  4:45 PM   Positive Blood Return (Site #3) Yes 12/8/2017  4:45 PM   Distal Hub Color/Line Status Patent; Infusing 12/8/2017  4:45 PM   Positive Blood Return (Site #4) Yes 12/8/2017  4:45 PM   Alcohol Cap Used No 12/6/2017  9:00 PM        Opportunity for questions and clarification was provided.       Patient transported with:   Monitor  Registered Nurse

## 2017-12-08 NOTE — PROGRESS NOTES
Dressing changed on RUE/groin surgical site. Large amount of serous drainage on old dressing which also saturated joann pads around leg. Wound area was moist, muscle tissue was red, minimal blood noted around wound and no foul odor noted. Right greater and second toe noted to be dusky in color. Pulses palpable in right foot. Will continue to monitor. Wound redressed with 6 kerlex, 12 4x4's and 2 abd pads. Paper tape placed around edges of outer dressing.

## 2017-12-08 NOTE — PROGRESS NOTES
No acute events past 24 hours  Remains intubated- I agree this is a good idea due to severity of wounds  Nothing to add from gen surg standpoint.

## 2017-12-08 NOTE — INTERDISCIPLINARY ROUNDS
Interdisciplinary team rounds were held 12/8/2017 with the following team members:Nursing, Nurse Practitioner, Nutrition, Occupational Therapy, Palliative Care, Pastoral Care, Pharmacy, Physical Therapy, Physician, Respiratory Therapy and Clinical Coordinator and the patient. Plan of care discussed. See clinical pathway and/or care plan for interventions and desired outcomes.

## 2017-12-08 NOTE — PROGRESS NOTES
Physical Therapy Note:    Participated in interdisciplinary rounds in ICU/CCU and chart reviewed. Patient is experiencing decrease in function from baseline. Patient would benefit from skilled acute therapy to increase independence with self care/ADLs, strength, endurance, and functional mobility. Recommend PT/OT consult when medically stable and MD agrees.     Thank you for your consideration,  ARCHIE EcheverriaT

## 2017-12-08 NOTE — PROGRESS NOTES
Ventilator check complete; patient has a #8. 0 ET tube secured at the 22 at the lip. Patient is sedated. Patient is not able to follow commands. Breath sounds are diminished. Trachea is midline, Negative for subcutaneous air, and chest excursion is symmetric. Patient is also Negative for cyanosis and is Negative for pitting edema. All alarms are set and audible. Resuscitation bag is at the head of the bed.       Ventilator Settings  Mode FIO2 Rate Tidal Volume Pressure PEEP I:E Ratio   Pressure support  31 %    450 ml  5 cm H2O  8 cm H20  1:3.33      Peak airway pressure: 13.3 cm H2O   Minute ventilation: 6.6 l/min     ABG:   Recent Labs      12/07/17   0300  12/06/17   0305  12/05/17   0330   PH  7.48*  7.48*  7.45   PCO2  36  36  31*   PO2  108*  132*  132*   HCO3  26  26  21*         Dominga Sheets, RT

## 2017-12-08 NOTE — PROGRESS NOTES
H&P Update:  Benjy Green was seen and examined. No acute events. Proceed with washout today.       Signed By: Milind Wright MD     December 8, 2017 6:16 PM

## 2017-12-08 NOTE — PROGRESS NOTES
Dal Blizzard  home retrieved pt, family going home with pt belongings. Pt cleaned & identification attached.

## 2017-12-08 NOTE — PROGRESS NOTES
Jourdan Avelar MD from anesthesia came by to check on patient. From his standpoint it is OK to continue TF.  Plan for debridement tomorrow AM.

## 2017-12-08 NOTE — ANESTHESIA PREPROCEDURE EVALUATION
Anesthetic History   No history of anesthetic complications            Review of Systems / Medical History  Patient summary reviewed and pertinent labs reviewed    Pulmonary          Smoker (Former)      Comments: Intubated and ventilated, but on minimal support. Currently on Pressure Support Mode with PS of 5, PEEP of 8 and FiO2 of 30%. Neuro/Psych              Cardiovascular                  Exercise tolerance: >4 METS     GI/Hepatic/Renal         Renal disease (improved, CR now in normal range. ): ARF       Endo/Other    Diabetes: poorly controlled, type 2    Anemia    Comments: Hypernatremic (improving)  Hypocalcemic  Hyperchloremia Other Findings   Comments: H/o Small subdural hematoma without required intervention. Admitted with Shima's Gangrene and s/p multiple debridements. Patient has little known medical history other than poorly controlled diabetes, however given his albumin <1.0 on 12/5/17 he likely has longstanding problems with fraility and nutrition. Physical Exam    Airway  Mallampati: II  TM Distance: 4 - 6 cm  Neck ROM: normal range of motion   Mouth opening: Normal  Intubated   Cardiovascular    Rhythm: regular  Rate: normal      Pertinent negatives: No murmur, JVD and peripheral edema   Dental    Dentition: Edentulous     Pulmonary  Breath sounds clear to auscultation               Abdominal  GI exam deferred       Other Findings            Anesthetic Plan    ASA: 3  Anesthesia type: general          Induction: Inhalational  Anesthetic plan and risks discussed with: Patient, Son / Daughter and Family      Norepi and fentangyl gtt off, patient still intubated but on minimal support settings.

## 2017-12-08 NOTE — PROGRESS NOTES
Care Daily Progress Note: 12/8/2017  Admission Date: 12/1/2017     The patient's chart is reviewed and the patient is discussed with the staff. The patient's chart is reviewed and the patient is discussed with the staff. Pt is a 78 yo male with no prior known medical history. Pt presented to the ER on 12/1 with syncopal episode. He was admitted by hospitalist after CT head confirmed subdural hematoma that was not felt to require intervention. 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 He was taken to the OR for debridement and has been seen by Dr. Anu Pimentel with plastic surgery for continued wound debridement. He has remained intubated for further anticipated procedures.      Subjective:     Sedated ,on vent     Current Facility-Administered Medications   Medication Dose Route Frequency    potassium phosphate 20 mmol in 0.9% sodium chloride 250 mL infusion   IntraVENous ONCE    vancomycin (VANCOCIN) 1250 mg in  ml infusion  1,250 mg IntraVENous Q12H    enoxaparin (LOVENOX) injection 40 mg  40 mg SubCUTAneous Q24H    hydrALAZINE (APRESOLINE) 20 mg/mL injection 10 mg  10 mg IntraVENous Q4H PRN    alcohol 62% (NOZIN) nasal  1 Ampule  1 Ampule Topical Q12H    NUTRITIONAL SUPPORT ELECTROLYTE PRN ORDERS   Does Not Apply PRN    piperacillin-tazobactam (ZOSYN) 4.5 g in 0.9% sodium chloride (MBP/ADV) 100 mL  4.5 g IntraVENous Q8H    sodium chloride 0.9 % bolus infusion 500 mL  500 mL IntraVENous PRN    influenza vaccine 2017-18 (3 yrs+)(PF) (FLUZONE QUAD/FLUARIX QUAD) injection 0.5 mL  0.5 mL IntraMUSCular PRIOR TO DISCHARGE    clindamycin phosphate (CLEOCIN) 900 mg in 0.9% sodium chloride 100 mL IVPB  900 mg IntraVENous Q8H    chlorhexidine (PERIDEX) 0.12 % mouthwash 15 mL  15 mL Oral Q12H    insulin lispro (HUMALOG) injection   SubCUTAneous Q4H    pantoprazole (PROTONIX) 40 mg in sodium chloride 0.9 % 10 mL injection  40 mg IntraVENous Q24H    midazolam in normal saline (VERSED) 1 mg/mL infusion  0-10 mg/hr IntraVENous TITRATE    sodium chloride (NS) flush 5-10 mL  5-10 mL IntraVENous Q8H    sodium chloride (NS) flush 5-10 mL  5-10 mL IntraVENous PRN    fentaNYL in normal saline (pf) 25 mcg/mL infusion   mcg/hr IntraVENous TITRATE       Review of Systems   Unobtainable due to patient status. Objective:     Vitals:    12/08/17 0801 12/08/17 0825 12/08/17 0831 12/08/17 0902   BP: 115/63  113/56 113/60   Pulse: 84 87 86 84   Resp:  14 15 15   Temp:       SpO2: 98% 98% 98% 97%   Weight:       Height:           Intake and Output:   12/06 1901 - 12/08 0700  In: 8811 [I.V.:4654]  Out: 4656 [Urine:3495]       Physical Exam:          Constitutional:  intubated and mechanically ventilated.   EENMT:  Sclera clear, pupils equal, oral mucosa moist  Respiratory: coarse   Cardiovascular:  RRR with no M,G,R;  Gastrointestinal:  soft with no tenderness; positive bowel sounds present  Musculoskeletal:  warm with no cyanosis, no lower leg edema  Skin:  no jaundice or ecchymosis  Neurologic: no gross neuro deficits     Psychiatric:  On vent, sedated      LINES:  ETT, Gerardo, RIJ central line, NG,colostomy     DRIPS:  TF, fentanyl   CXR:  None today       Ventilator Settings  Mode FIO2 Rate Tidal Volume Pressure PEEP   PRVC  30 %    450 ml  5 cm H2O  8 cm H20      Peak airway pressure: 17.8 cm H2O   Minute ventilation: 7.9 l/min     ABG:   Recent Labs      12/08/17   0313  12/07/17   0300  12/06/17   0305   PH  7.51*  7.48*  7.48*   PCO2  35  36  36   PO2  101  108*  132*   HCO3  27*  26  26        LAB  Recent Labs      12/08/17   0814  12/08/17   0345  12/07/17   2310  12/07/17   1905  12/07/17   1526   GLUCPOC  234*  201*  213*  205*  201*     Recent Labs      12/07/17   0345   WBC  8.3   HGB  8.9*   HCT  27.9*   PLT  181     Recent Labs      12/08/17   0345  12/07/17   1300  12/07/17   0345   12/06/17   0320   NA   --    --   147*   -- 148*   K  3.6  3.6  3.4*   < >  3.3*   CL   --    --   111*   --   114*   CO2   --    --   27   --   27   GLU   --    --   177*   --   149*   BUN   --    --   16   --   21   CREA   --    --   0.59*   --   0.60*   MG  2.0   --   1.9   --   2.2   PHOS  1.9*  2.0*  2.0*   < >  1.5*   CA   --    --   6.6*   --   6.5*    < > = values in this interval not displayed. Assessment:  (Medical Decision Making)     Hospital Problems  Date Reviewed: 12/7/2017          Codes Class Noted POA     no labs today, will order BMP  ICD-10-CM: E87.0  ICD-9-CM: 276.0  12/6/2017 Unknown        Subdural hematoma ([facility] ICD-10-CM: I62.00  ICD-9-CM: 432.1  12/1/2017 Yes        Type II diabetes mellitus with complication ([facility] KDZ-69-HR: E11.8  ICD-9-CM: 250.90  12/1/2017 Yes        LAUREANO (acute kidney injury) ([facility] ICD-10-CM: N17.9  ICD-9-CM: 584.9  12/1/2017 Yes        Syncope ICD-10-CM: R55  ICD-9-CM: 780.2  12/1/2017 Yes        Leukocytosis ICD-10-CM: J73.111  ICD-9-CM: 288.60  12/1/2017 Yes        Fasciitis ICD-10-CM: M72.9  ICD-9-CM: 729.4  12/1/2017 Yes        * (Principal)Severe sepsis with septic shock ([facility] ICD-10-CM: A41.9, R65.21  ICD-9-CM: 038.9, 995.92, 785.52  12/1/2017 Yes        Encounter for weaning from ventilator [facility]) ICD-10-CM: Z99.11  ICD-9-CM: V46.13  12/1/2017 Yes                  Plan:  (Medical Decision Making)     --continue Clinda, Vanco Zosyn  -almost daily OR visit for debridement, will keep on vent for now will OR is not daily     More than 50% of the time documented was spent in face-to-face contact with the patient and in the care of the patient on the floor/unit where the patient is located.     Shaggy Chacko MD

## 2017-12-09 ENCOUNTER — APPOINTMENT (OUTPATIENT)
Dept: GENERAL RADIOLOGY | Age: 67
DRG: 853 | End: 2017-12-09
Attending: INTERNAL MEDICINE
Payer: MEDICARE

## 2017-12-09 LAB
ARTERIAL PATENCY WRIST A: POSITIVE
BACTERIA SPEC CULT: NORMAL
BACTERIA SPEC CULT: NORMAL
BASE EXCESS BLDA CALC-SCNC: 4.5 MMOL/L (ref 0–3)
BDY SITE: ABNORMAL
FIO2 ON VENT: 30 %
GLUCOSE BLD STRIP.AUTO-MCNC: 190 MG/DL (ref 65–100)
GLUCOSE BLD STRIP.AUTO-MCNC: 210 MG/DL (ref 65–100)
GLUCOSE BLD STRIP.AUTO-MCNC: 213 MG/DL (ref 65–100)
GLUCOSE BLD STRIP.AUTO-MCNC: 213 MG/DL (ref 65–100)
GLUCOSE BLD STRIP.AUTO-MCNC: 219 MG/DL (ref 65–100)
GLUCOSE BLD STRIP.AUTO-MCNC: 227 MG/DL (ref 65–100)
GRAM STN SPEC: NORMAL
HCO3 BLDA-SCNC: 25 MMOL/L (ref 22–26)
PCO2 BLDA: 28 MMHG (ref 35–45)
PEEP RESPIRATORY: 8 CM[H2O]
PH BLDA: 7.58 [PH] (ref 7.35–7.45)
PO2 BLDA: 123 MMHG (ref 80–105)
RESP RATE: 15
SERVICE CMNT-IMP: ABNORMAL
SERVICE CMNT-IMP: NORMAL
SERVICE CMNT-IMP: NORMAL
VENTILATION MODE VENT: ABNORMAL
VT SETTING VENT: 500 ML

## 2017-12-09 PROCEDURE — 77030018836 HC SOL IRR NACL ICUM -A

## 2017-12-09 PROCEDURE — 74011250636 HC RX REV CODE- 250/636: Performed by: SURGERY

## 2017-12-09 PROCEDURE — 99233 SBSQ HOSP IP/OBS HIGH 50: CPT | Performed by: INTERNAL MEDICINE

## 2017-12-09 PROCEDURE — C9113 INJ PANTOPRAZOLE SODIUM, VIA: HCPCS | Performed by: SURGERY

## 2017-12-09 PROCEDURE — 82803 BLOOD GASES ANY COMBINATION: CPT

## 2017-12-09 PROCEDURE — 74011250636 HC RX REV CODE- 250/636: Performed by: INTERNAL MEDICINE

## 2017-12-09 PROCEDURE — 94003 VENT MGMT INPAT SUBQ DAY: CPT

## 2017-12-09 PROCEDURE — 74011000250 HC RX REV CODE- 250: Performed by: SURGERY

## 2017-12-09 PROCEDURE — 65610000001 HC ROOM ICU GENERAL

## 2017-12-09 PROCEDURE — 74011250637 HC RX REV CODE- 250/637: Performed by: SURGERY

## 2017-12-09 PROCEDURE — 74011000258 HC RX REV CODE- 258: Performed by: SURGERY

## 2017-12-09 PROCEDURE — 71010 XR CHEST SNGL V: CPT

## 2017-12-09 PROCEDURE — 77030019605

## 2017-12-09 PROCEDURE — 77030018798 HC PMP KT ENTRL FED COVD -A

## 2017-12-09 PROCEDURE — 82962 GLUCOSE BLOOD TEST: CPT

## 2017-12-09 PROCEDURE — 74011636637 HC RX REV CODE- 636/637: Performed by: HOSPITALIST

## 2017-12-09 PROCEDURE — 74011250637 HC RX REV CODE- 250/637: Performed by: INTERNAL MEDICINE

## 2017-12-09 PROCEDURE — 36600 WITHDRAWAL OF ARTERIAL BLOOD: CPT

## 2017-12-09 RX ADMIN — INSULIN LISPRO 2 UNITS: 100 INJECTION, SOLUTION INTRAVENOUS; SUBCUTANEOUS at 01:46

## 2017-12-09 RX ADMIN — ENOXAPARIN SODIUM 40 MG: 40 INJECTION SUBCUTANEOUS at 13:50

## 2017-12-09 RX ADMIN — VANCOMYCIN HYDROCHLORIDE 1250 MG: 10 INJECTION, POWDER, LYOPHILIZED, FOR SOLUTION INTRAVENOUS at 05:06

## 2017-12-09 RX ADMIN — Medication 1 AMPULE: at 08:07

## 2017-12-09 RX ADMIN — CHLORHEXIDINE GLUCONATE 15 ML: 1.2 RINSE ORAL at 20:32

## 2017-12-09 RX ADMIN — Medication 10 ML: at 13:50

## 2017-12-09 RX ADMIN — SODIUM CHLORIDE 900 MG: 900 INJECTION, SOLUTION INTRAVENOUS at 20:33

## 2017-12-09 RX ADMIN — INSULIN LISPRO 4 UNITS: 100 INJECTION, SOLUTION INTRAVENOUS; SUBCUTANEOUS at 20:32

## 2017-12-09 RX ADMIN — Medication 10 ML: at 21:36

## 2017-12-09 RX ADMIN — INSULIN LISPRO 4 UNITS: 100 INJECTION, SOLUTION INTRAVENOUS; SUBCUTANEOUS at 11:45

## 2017-12-09 RX ADMIN — INSULIN LISPRO 4 UNITS: 100 INJECTION, SOLUTION INTRAVENOUS; SUBCUTANEOUS at 16:27

## 2017-12-09 RX ADMIN — SODIUM CHLORIDE 900 MG: 900 INJECTION, SOLUTION INTRAVENOUS at 12:34

## 2017-12-09 RX ADMIN — SODIUM CHLORIDE 900 MG: 900 INJECTION, SOLUTION INTRAVENOUS at 04:31

## 2017-12-09 RX ADMIN — CHLORHEXIDINE GLUCONATE 15 ML: 1.2 RINSE ORAL at 08:07

## 2017-12-09 RX ADMIN — Medication 1 AMPULE: at 20:32

## 2017-12-09 RX ADMIN — PIPERACILLIN SODIUM,TAZOBACTAM SODIUM 4.5 G: 4; .5 INJECTION, POWDER, FOR SOLUTION INTRAVENOUS at 04:31

## 2017-12-09 RX ADMIN — INSULIN LISPRO 4 UNITS: 100 INJECTION, SOLUTION INTRAVENOUS; SUBCUTANEOUS at 07:55

## 2017-12-09 RX ADMIN — PIPERACILLIN SODIUM,TAZOBACTAM SODIUM 4.5 G: 4; .5 INJECTION, POWDER, FOR SOLUTION INTRAVENOUS at 20:32

## 2017-12-09 RX ADMIN — VANCOMYCIN HYDROCHLORIDE 1250 MG: 10 INJECTION, POWDER, LYOPHILIZED, FOR SOLUTION INTRAVENOUS at 18:17

## 2017-12-09 RX ADMIN — Medication 10 ML: at 05:06

## 2017-12-09 RX ADMIN — SODIUM CHLORIDE 40 MG: 9 INJECTION INTRAMUSCULAR; INTRAVENOUS; SUBCUTANEOUS at 16:27

## 2017-12-09 RX ADMIN — Medication 50 MCG/HR: at 21:58

## 2017-12-09 RX ADMIN — INSULIN LISPRO 4 UNITS: 100 INJECTION, SOLUTION INTRAVENOUS; SUBCUTANEOUS at 04:31

## 2017-12-09 RX ADMIN — PIPERACILLIN SODIUM,TAZOBACTAM SODIUM 4.5 G: 4; .5 INJECTION, POWDER, FOR SOLUTION INTRAVENOUS at 13:50

## 2017-12-09 NOTE — PROGRESS NOTES
Care Daily Progress Note: 12/9/2017  Admission Date: 12/1/2017     The patient's chart is reviewed and the patient is discussed with the staff. The patient's chart is reviewed and the patient is discussed with the staff.     The patient's chart is reviewed and the patient is discussed with the staff. Pt is a 80 yo male with no prior known medical history. Pt presented to the ER on 12/1 with syncopal episode. He was admitted by hospitalist after CT head confirmed subdural hematoma that was not felt to require intervention. 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 He was taken to the OR for debridement and has been seen by Dr. Tremaine Matthew with plastic surgery for continued wound debridement. He has remained intubated for further anticipated procedures.      Subjective:     On vent  Open eyes to voice     Current Facility-Administered Medications   Medication Dose Route Frequency    bacitracin (BACIIM) 50,000/1000ml NS irrigation bottle    PRN    vancomycin (VANCOCIN) 1250 mg in  ml infusion  1,250 mg IntraVENous Q12H    enoxaparin (LOVENOX) injection 40 mg  40 mg SubCUTAneous Q24H    hydrALAZINE (APRESOLINE) 20 mg/mL injection 10 mg  10 mg IntraVENous Q4H PRN    alcohol 62% (NOZIN) nasal  1 Ampule  1 Ampule Topical Q12H    NUTRITIONAL SUPPORT ELECTROLYTE PRN ORDERS   Does Not Apply PRN    piperacillin-tazobactam (ZOSYN) 4.5 g in 0.9% sodium chloride (MBP/ADV) 100 mL  4.5 g IntraVENous Q8H    influenza vaccine 2017-18 (3 yrs+)(PF) (FLUZONE QUAD/FLUARIX QUAD) injection 0.5 mL  0.5 mL IntraMUSCular PRIOR TO DISCHARGE    clindamycin phosphate (CLEOCIN) 900 mg in 0.9% sodium chloride 100 mL IVPB  900 mg IntraVENous Q8H    chlorhexidine (PERIDEX) 0.12 % mouthwash 15 mL  15 mL Oral Q12H    insulin lispro (HUMALOG) injection   SubCUTAneous Q4H    pantoprazole (PROTONIX) 40 mg in sodium chloride 0.9 % 10 mL injection  40 mg IntraVENous Q24H    sodium chloride (NS) flush 5-10 mL  5-10 mL IntraVENous Q8H    sodium chloride (NS) flush 5-10 mL  5-10 mL IntraVENous PRN    fentaNYL in normal saline (pf) 25 mcg/mL infusion   mcg/hr IntraVENous TITRATE       Review of Systems    Constitutional:  negative for fever, chills, sweats  Cardiovascular:  negative for chest pain, palpitations, syncope, edema  Gastrointestinal:  negative for dysphagia, reflux, vomiting, diarrhea, abdominal pain, or melena  Neurologic:  negative for focal weakness, numbness, headache        Objective:     Vitals:    12/09/17 0746 12/09/17 0801 12/09/17 0818 12/09/17 0847   BP: 121/59 104/55     Pulse: 91 88 91 85   Resp: 14 15 15 11   Temp:       SpO2: 97% 97% 98% 97%   Weight:       Height:           Intake and Output:   12/07 1901 - 12/09 0700  In: 6362.2 [I.V.:1700.2]  Out: 3225 [Urine:2720]  12/09 0701 - 12/09 1900  In: 300   Out: 185 [Urine:185]    Physical Exam:          Constitutional:  intubated and mechanically ventilated.   EENMT:  Sclera clear, pupils equal, oral mucosa moist  Respiratory: clear  Cardiovascular:  RRR with no M,G,R;  Gastrointestinal:  soft with no tenderness; positive bowel sounds present  Musculoskeletal:  warm with no cyanosis, no  lower leg edema, R leg -wound   Skin:  no jaundice or ecchymosis  Neurologic: no gross neuro deficits     Psychiatric:  Awake      LINES:  ETT, Gerardo, RIJ central line, colostomy, NG     DRIPS:  TF, fentanyl gtt     CXR:        Ventilator Settings  Mode FIO2 Rate Tidal Volume Pressure PEEP   Pressure support (weaned per Dr. Yon Multani)  31 %    500 ml  8 cm H2O  8 cm H20      Peak airway pressure: 17.3 cm H2O   Minute ventilation: 6.9 l/min     ABG:   Recent Labs      12/09/17   0345  12/08/17   0313  12/07/17   0300   PH  7.58*  7.51*  7.48*   PCO2  28*  35  36   PO2  123*  101  108*   HCO3  25  27*  26        LAB  Recent Labs      12/09/17   0752  12/09/17   0430  12/09/17   0139  12/08/17 2055 12/08/17   1523   GLUCPOC  219*  213*  190*  144*  178*     Recent Labs      12/07/17   0345   WBC  8.3   HGB  8.9*   HCT  27.9*   PLT  181     Recent Labs      12/08/17   0345  12/07/17   1300  12/07/17   0345   NA   --    --   147*   K  3.6  3.6  3.4*   CL   --    --   111*   CO2   --    --   27   GLU   --    --   177*   BUN   --    --   16   CREA   --    --   0.59*   MG  2.0   --   1.9   PHOS  1.9*  2.0*  2.0*   CA   --    --   6.6*         Assessment:  (Medical Decision Making)     Hospital Problems  Date Reviewed: 12/7/2017          Codes Class Noted POA    Acute respiratory failure ([facility]   on vent  ICD-10-CM: J96.00  ICD-9-CM: 518.81  12/8/2017 Unknown        Hypernatremia   147  ICD-10-CM: E87.0  ICD-9-CM: 276.0  12/6/2017 Unknown        Subdural hematoma ([facility] ICD-10-CM: I62.00  ICD-9-CM: 432.1  12/1/2017 Yes        Type II diabetes mellitus with complication ([facility]) NTB-98-VX: E11.8  ICD-9-CM: 250.90  12/1/2017 Yes            Fasciitis   s/p multiple OR visits due to wash outs,  ICD-10-CM: M72.9  ICD-9-CM: 729.4  12/1/2017 Yes        * (Principal)Severe sepsis with septic shock ([facility]   off pressors now ICD-10-CM: A41.9, R65.21  ICD-9-CM: 038.9, 995.92, 785.52  12/1/2017 Yes              Plan:  (Medical Decision Making)     -- he is not going to OR till next week therefore will try to extubate him today  - continue ABX -clinda, Vanco ,Zosyn       More than 50% of the time documented was spent in face-to-face contact with the patient and in the care of the patient on the floor/unit where the patient is located.     Clayton Ashton MD

## 2017-12-09 NOTE — PROGRESS NOTES
Ventilator check complete; patient has a #8. 0 ET tube secured at the 23 at the lip. Patient is sedated. Patient is not able to follow commands. Breath sounds are coarse. Trachea is midline, Negative for subcutaneous air, and chest excursion is symmetric. Patient is also Negative for cyanosis and is Negative for pitting edema. All alarms are set and audible. Resuscitation bag is at the head of the bed.       Ventilator Settings  Mode FIO2 Rate Tidal Volume Pressure PEEP I:E Ratio   PRVC  36 %    500 ml  5 cm H2O  8 cm H20  1:2.00      Peak airway pressure: 19 cm H2O   Minute ventilation: 7.3 l/min     ABG:   Recent Labs      12/08/17   0313  12/07/17   0300  12/06/17   0305   PH  7.51*  7.48*  7.48*   PCO2  35  36  36   PO2  101  108*  132*   HCO3  27*  26  26         Lana Bhatt, RT

## 2017-12-09 NOTE — PROGRESS NOTES
Bedside shift change report given to Fawn Pickett RN (oncoming nurse) by Javed Spence RN (offgoing nurse). Report included the following information SBAR, Kardex, ED Summary, OR Summary, Procedure Summary, Intake/Output, MAR, Recent Results and Cardiac Rhythm NSR. Skin assessed. Patient turned. Fentanyl gtt decreased to 100 mcg/hr and verified.

## 2017-12-09 NOTE — PROGRESS NOTES
Ventilator check complete; patient has a #8. 0 ET tube secured at the 24 at the lip. .  Breath sounds are diminished. Trachea is midline, Negative for subcutaneous air, and chest excursion is symmetric. Patient is also Negative for cyanosis and is Positive for pitting edema. All alarms are set and audible. Resuscitation bag is at the head of the bed.       Ventilator Settings  Mode FIO2 Rate Tidal Volume Pressure PEEP I:E Ratio   PRVC  74 %    500 ml  5 cm H2O  8 cm H20  1:2.00      Peak airway pressure: 24.3 cm H2O   Minute ventilation: 5.5 l/min     ABG:   Recent Labs      12/09/17   0345  12/08/17   0313  12/07/17   0300   PH  7.58*  7.51*  7.48*   PCO2  28*  35  36   PO2  123*  101  108*   HCO3  25  27*  26         Daren Branch, RT

## 2017-12-09 NOTE — PROGRESS NOTES
Bedside and Verbal shift change report given to Royer by Tre Lemus. Report included the following information SBAR, Kardex, ED Summary, OR Summary, Intake/Output, MAR, Accordion, Recent Results, Med Rec Status and Cardiac Rhythm NSR.

## 2017-12-09 NOTE — OP NOTES
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 Patient taken to OR and position left lateral decubitus position. Surgical timeout performed. Wound vac removed and patient prepped. Noted to have additional areas of non viable skin as well as fascia. Skin margins were primarily lateral and medial. These were excised using scissors and bovie to healthy appearing viable tissue. Deep culture taken and sent. Pulsavac used to further cleanse wound. Due to the presence of some purulent drainage in the wound bed, the vac was not reapplied and instead the wound was packed and dressed with 9 kerlexes.      Pre Debridment Dimensions:             60x 40 cm x 6 cm deep  Post Debridement Dimensions:                    Same  Dimensions of material removed: 60 x 40 x 3-4 mm  Percentage of Wound:                                            100%  Material Removed:                                      Skin, subq, fascia  Frequency of Debridements:             To be determined by clinical response         Surgical Staff:  Circ-1: Ashia Owens RN  Scrub Tech-1: Bereket Gonzalez  Scrub Tech-2: Renee Evans  Event Time In   Incision Start 1947   Incision Close 2016      Anesthesia: General   Estimated Blood Loss: 30 ml  Specimens:   ID Type Source Tests Collected by Time Destination   1 : RIGHT LEG WOUND Wound Leg, Right CULTURE, ANAEROBIC, CULTURE, WOUND W [address] Raz Ibrahim MD 12/6/2017 2018 Microbiology   2 : RIGHT LATERAL COMPARTMENT FASCIA Wound Leg, Right CULTURE, ANAEROBIC, CULTURE, WOUND W GRAM STAIN Sheri Bonner MD 12/6/2017 2012 Microbiology       Implants: * No implants in log *

## 2017-12-09 NOTE — PROGRESS NOTES
Did well with washout. Tissue appeared healthier, no overt purulence drainage. No progression of fasciitis. Muscle appeared viable and contractile. Will plan for additional washout Monday or Tuesday with replacement of the wound VAC as infection appears under control. Continue twice daily packing until then. Will plan for skin grafting to wound bed as long as it continues to progress towards end of week.

## 2017-12-09 NOTE — ANESTHESIA POSTPROCEDURE EVALUATION
Post-Anesthesia Evaluation and Assessment    Patient: Aline Carrero MRN: [id number]  SSN: [id number]    YOB: 1950  Age: 79 y.o. Sex: male       Cardiovascular Function/Vital Signs  Visit Vitals    /66    Pulse 90    Temp 36.7 °C (98 °F)    Resp 15    Ht 5' 11\" (1.803 m)    Wt 82.1 kg (181 lb)    SpO2 98%    BMI 25.24 kg/m2       Patient is status post general anesthesia for Procedure(s):  Irrigation and DEBRIDEMENT RIGHT LEG WOUND AND Dressing change  . Nausea/Vomiting: N/A    Postoperative hydration reviewed and adequate. Pain:  Pain Scale 1: Visual (12/08/17 1546)  Pain Intensity 1: 0 (12/08/17 1546)   Managed    Neurological Status: Sedated    Mental Status and Level of Consciousness: Sedated    Pulmonary Status:   O2 Device: Endotracheal tube;Ventilator (12/08/17 0731)   Adequate oxygenation and airway patent    Complications related to anesthesia: None    Post-anesthesia assessment completed.  No concerns    Signed By: Azeem Pascual MD     December 8, 2017

## 2017-12-09 NOTE — OP NOTES
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 Patient taken to OR and position left lateral decubitus position. Surgical timeout performed. Dressing removed and patient prepped. No additional areas of questionable skin. Some additional fascia excised to healthier appearing tissue. No overt purulence present. Wound irrigated with pulsavac and OR scrub brush. Packed with kerlex and absorbent cotton batting.     Pre Debridment Dimensions:             60x 40 cm x 6 cm deep  Post Debridement Dimensions:                    Same  Dimensions of material removed: 10 x 5 x 3-4 mm  Percentage of Wound:                                            100%  Material Removed:                                      Skin, subq, fascia  Frequency of Debridements:             To be determined by clinical response           Surgical Staff:  Circ-1: Neva Gil RN  Circ-Relief: Laila Barker RN  Scrub Tech-1: Domingo Posada  Scrub Tech-2: Lindy Shoulder Pyhala  Event Time In   Incision Start 1911   Incision Close 1925     Anesthesia: General   Estimated Blood Loss: 20 ml

## 2017-12-10 ENCOUNTER — APPOINTMENT (OUTPATIENT)
Dept: GENERAL RADIOLOGY | Age: 67
DRG: 853 | End: 2017-12-10
Attending: INTERNAL MEDICINE
Payer: MEDICARE

## 2017-12-10 LAB
ANION GAP SERPL CALC-SCNC: 5 MMOL/L (ref 7–16)
ARTERIAL PATENCY WRIST A: POSITIVE
BASE EXCESS BLDA CALC-SCNC: 1.8 MMOL/L (ref 0–3)
BASOPHILS # BLD: 0 K/UL (ref 0–0.2)
BASOPHILS NFR BLD: 0 % (ref 0–2)
BDY SITE: ABNORMAL
BUN SERPL-MCNC: 14 MG/DL (ref 8–23)
CALCIUM SERPL-MCNC: 6.8 MG/DL (ref 8.3–10.4)
CHLORIDE SERPL-SCNC: 106 MMOL/L (ref 98–107)
CO2 SERPL-SCNC: 30 MMOL/L (ref 21–32)
COHGB MFR BLD: 1 % (ref 0.5–1.5)
CREAT SERPL-MCNC: 0.67 MG/DL (ref 0.8–1.5)
DIFFERENTIAL METHOD BLD: ABNORMAL
DO-HGB BLD-MCNC: 4 % (ref 0–5)
EOSINOPHIL # BLD: 0.1 K/UL (ref 0–0.8)
EOSINOPHIL NFR BLD: 1 % (ref 0.5–7.8)
ERYTHROCYTE [DISTWIDTH] IN BLOOD BY AUTOMATED COUNT: 14.5 % (ref 11.9–14.6)
FIO2 ON VENT: 30 %
GLUCOSE BLD STRIP.AUTO-MCNC: 184 MG/DL (ref 65–100)
GLUCOSE BLD STRIP.AUTO-MCNC: 200 MG/DL (ref 65–100)
GLUCOSE BLD STRIP.AUTO-MCNC: 204 MG/DL (ref 65–100)
GLUCOSE BLD STRIP.AUTO-MCNC: 206 MG/DL (ref 65–100)
GLUCOSE BLD STRIP.AUTO-MCNC: 210 MG/DL (ref 65–100)
GLUCOSE BLD STRIP.AUTO-MCNC: 215 MG/DL (ref 65–100)
GLUCOSE SERPL-MCNC: 208 MG/DL (ref 65–100)
HCO3 BLDA-SCNC: 25 MMOL/L (ref 22–26)
HCT VFR BLD AUTO: 25.1 % (ref 41.1–50.3)
HGB BLD-MCNC: 7.9 G/DL (ref 13.6–17.2)
HGB BLDMV-MCNC: 8.8 GM/DL (ref 11.7–15)
IMM GRANULOCYTES # BLD: 0.1 K/UL (ref 0–0.5)
IMM GRANULOCYTES NFR BLD AUTO: 1 % (ref 0–5)
LYMPHOCYTES # BLD: 1.1 K/UL (ref 0.5–4.6)
LYMPHOCYTES NFR BLD: 14 % (ref 13–44)
MCH RBC QN AUTO: 28.4 PG (ref 26.1–32.9)
MCHC RBC AUTO-ENTMCNC: 31.5 G/DL (ref 31.4–35)
MCV RBC AUTO: 90.3 FL (ref 79.6–97.8)
METHGB MFR BLD: 0.7 % (ref 0–1.5)
MONOCYTES # BLD: 0.5 K/UL (ref 0.1–1.3)
MONOCYTES NFR BLD: 6 % (ref 4–12)
NEUTS SEG # BLD: 6.6 K/UL (ref 1.7–8.2)
NEUTS SEG NFR BLD: 78 % (ref 43–78)
OXYHGB MFR BLDA: 93.9 % (ref 94–97)
PCO2 BLDA: 35 MMHG (ref 35–45)
PEEP RESPIRATORY: 8 CM[H2O]
PH BLDA: 7.48 [PH] (ref 7.35–7.45)
PLATELET # BLD AUTO: 231 K/UL (ref 150–450)
PMV BLD AUTO: 11.7 FL (ref 10.8–14.1)
PO2 BLDA: 78 MMHG (ref 80–105)
POTASSIUM SERPL-SCNC: 3.8 MMOL/L (ref 3.5–5.1)
RBC # BLD AUTO: 2.78 M/UL (ref 4.23–5.67)
RESP RATE: 15
SAO2 % BLD: 96 % (ref 92–98.5)
SERVICE CMNT-IMP: ABNORMAL
SODIUM SERPL-SCNC: 141 MMOL/L (ref 136–145)
VENTILATION MODE VENT: ABNORMAL
VT SETTING VENT: 458 ML
WBC # BLD AUTO: 8.3 K/UL (ref 4.3–11.1)

## 2017-12-10 PROCEDURE — 74011000258 HC RX REV CODE- 258: Performed by: SURGERY

## 2017-12-10 PROCEDURE — 74011250637 HC RX REV CODE- 250/637: Performed by: SURGERY

## 2017-12-10 PROCEDURE — 80048 BASIC METABOLIC PNL TOTAL CA: CPT | Performed by: INTERNAL MEDICINE

## 2017-12-10 PROCEDURE — 85025 COMPLETE CBC W/AUTO DIFF WBC: CPT | Performed by: INTERNAL MEDICINE

## 2017-12-10 PROCEDURE — 71010 XR CHEST SNGL V: CPT

## 2017-12-10 PROCEDURE — 65610000001 HC ROOM ICU GENERAL

## 2017-12-10 PROCEDURE — 77030018836 HC SOL IRR NACL ICUM -A

## 2017-12-10 PROCEDURE — 36600 WITHDRAWAL OF ARTERIAL BLOOD: CPT

## 2017-12-10 PROCEDURE — 74011636637 HC RX REV CODE- 636/637: Performed by: HOSPITALIST

## 2017-12-10 PROCEDURE — 74011636637 HC RX REV CODE- 636/637: Performed by: INTERNAL MEDICINE

## 2017-12-10 PROCEDURE — C9113 INJ PANTOPRAZOLE SODIUM, VIA: HCPCS | Performed by: SURGERY

## 2017-12-10 PROCEDURE — 99233 SBSQ HOSP IP/OBS HIGH 50: CPT | Performed by: INTERNAL MEDICINE

## 2017-12-10 PROCEDURE — 74011250636 HC RX REV CODE- 250/636: Performed by: INTERNAL MEDICINE

## 2017-12-10 PROCEDURE — 74011250636 HC RX REV CODE- 250/636: Performed by: SURGERY

## 2017-12-10 PROCEDURE — 82803 BLOOD GASES ANY COMBINATION: CPT

## 2017-12-10 PROCEDURE — 74011000250 HC RX REV CODE- 250: Performed by: SURGERY

## 2017-12-10 PROCEDURE — 82962 GLUCOSE BLOOD TEST: CPT

## 2017-12-10 PROCEDURE — 74011250637 HC RX REV CODE- 250/637: Performed by: INTERNAL MEDICINE

## 2017-12-10 RX ORDER — INSULIN GLARGINE 100 [IU]/ML
10 INJECTION, SOLUTION SUBCUTANEOUS DAILY
Status: DISCONTINUED | OUTPATIENT
Start: 2017-12-10 | End: 2017-12-18

## 2017-12-10 RX ADMIN — PIPERACILLIN SODIUM,TAZOBACTAM SODIUM 4.5 G: 4; .5 INJECTION, POWDER, FOR SOLUTION INTRAVENOUS at 04:35

## 2017-12-10 RX ADMIN — Medication 10 ML: at 06:28

## 2017-12-10 RX ADMIN — PIPERACILLIN SODIUM,TAZOBACTAM SODIUM 4.5 G: 4; .5 INJECTION, POWDER, FOR SOLUTION INTRAVENOUS at 14:04

## 2017-12-10 RX ADMIN — Medication 1 AMPULE: at 08:56

## 2017-12-10 RX ADMIN — VANCOMYCIN HYDROCHLORIDE 1250 MG: 10 INJECTION, POWDER, LYOPHILIZED, FOR SOLUTION INTRAVENOUS at 18:03

## 2017-12-10 RX ADMIN — INSULIN LISPRO 4 UNITS: 100 INJECTION, SOLUTION INTRAVENOUS; SUBCUTANEOUS at 12:07

## 2017-12-10 RX ADMIN — INSULIN LISPRO 4 UNITS: 100 INJECTION, SOLUTION INTRAVENOUS; SUBCUTANEOUS at 08:55

## 2017-12-10 RX ADMIN — CHLORHEXIDINE GLUCONATE 15 ML: 1.2 RINSE ORAL at 08:56

## 2017-12-10 RX ADMIN — PIPERACILLIN SODIUM,TAZOBACTAM SODIUM 4.5 G: 4; .5 INJECTION, POWDER, FOR SOLUTION INTRAVENOUS at 20:07

## 2017-12-10 RX ADMIN — SODIUM CHLORIDE 900 MG: 900 INJECTION, SOLUTION INTRAVENOUS at 04:24

## 2017-12-10 RX ADMIN — Medication 10 ML: at 13:26

## 2017-12-10 RX ADMIN — SODIUM CHLORIDE 900 MG: 900 INJECTION, SOLUTION INTRAVENOUS at 13:14

## 2017-12-10 RX ADMIN — CHLORHEXIDINE GLUCONATE 15 ML: 1.2 RINSE ORAL at 20:03

## 2017-12-10 RX ADMIN — VANCOMYCIN HYDROCHLORIDE 1250 MG: 10 INJECTION, POWDER, LYOPHILIZED, FOR SOLUTION INTRAVENOUS at 06:27

## 2017-12-10 RX ADMIN — ENOXAPARIN SODIUM 40 MG: 40 INJECTION SUBCUTANEOUS at 13:20

## 2017-12-10 RX ADMIN — INSULIN LISPRO 4 UNITS: 100 INJECTION, SOLUTION INTRAVENOUS; SUBCUTANEOUS at 19:58

## 2017-12-10 RX ADMIN — INSULIN LISPRO 4 UNITS: 100 INJECTION, SOLUTION INTRAVENOUS; SUBCUTANEOUS at 00:39

## 2017-12-10 RX ADMIN — SODIUM CHLORIDE 40 MG: 9 INJECTION INTRAMUSCULAR; INTRAVENOUS; SUBCUTANEOUS at 14:06

## 2017-12-10 RX ADMIN — INSULIN LISPRO 4 UNITS: 100 INJECTION, SOLUTION INTRAVENOUS; SUBCUTANEOUS at 04:23

## 2017-12-10 RX ADMIN — INSULIN GLARGINE 10 UNITS: 100 INJECTION, SOLUTION SUBCUTANEOUS at 12:07

## 2017-12-10 RX ADMIN — INSULIN LISPRO 2 UNITS: 100 INJECTION, SOLUTION INTRAVENOUS; SUBCUTANEOUS at 16:16

## 2017-12-10 RX ADMIN — Medication 1 AMPULE: at 20:07

## 2017-12-10 RX ADMIN — INSULIN LISPRO 4 UNITS: 100 INJECTION, SOLUTION INTRAVENOUS; SUBCUTANEOUS at 23:54

## 2017-12-10 RX ADMIN — Medication 10 ML: at 22:00

## 2017-12-10 RX ADMIN — SODIUM CHLORIDE 900 MG: 900 INJECTION, SOLUTION INTRAVENOUS at 20:05

## 2017-12-10 NOTE — PROGRESS NOTES
Shift assessment complete, chart reviewed. Patient awakens to voice easily, follows some commands, CAM-ICU positive. Pupils are sluggish to react, equal, 2 mm. NSR on monitor, BP stable. Intubated, vent settings per RT, breath sounds have expiratory rhonchi, worse to right lower lobe, FiO2 30%, SpO2 98%. OG tube, tolerating tube feedings well, minimal gastric residual. Abdomen is semi-soft, intact, bowel sounds active, left colostomy in place, draining loose, brown stool. Gerardo cath in place, draining clear, yellow urine. Moves all extremities spontaneously and to command, +2/+3 pitting edema to BUE, +2 pitting edema to BLE in upper thigh area, pulses doppler to BLE. Tip of right great toe and 2nd toe purple, MD aware. Dressing to right upper leg/thigh/groin-Intact. No s/s of pain at this time, Fentanyl gtt infusing at 50 mcg/hr. VSS. NAD.

## 2017-12-10 NOTE — PROGRESS NOTES
Bedside shift change report given to Benito Padilla RN and Ashutosh Damon RN (oncoming nurse) by Juaquin Meredith RN (offgoing nurse). Report included the following information SBAR, Kardex, ED Summary, OR Summary, Procedure Summary, Intake/Output, MAR, Recent Results and Cardiac Rhythm NSR. Skin assessed. Patient turned. Fentanyl gtt verified.

## 2017-12-10 NOTE — PROGRESS NOTES
Shift assessment complete, chart reviewed. Patient is lethargic, awakens to verbal stimuli, follows some commands with delayed responses. Pupils are sluggish to react, equal, 3 mm. NSR on monitor, BP stable. Intubated, vent settings per RT, breath sounds are coarse upper, diminished lower, FiO2 30%, SpO2 98%. OG tube, tolerating tube feedings well, minimal gastric residual. Abdomen is semi-soft, intact, bowel sounds active, left colostomy in place, draining loose, brown stool. Gerardo cath in place, draining clear, yellow urine. Moves all extremities spontaneously and to command except for right foot at this time. +2 pitting edema to BUE, +1 non-pitting to BLE, pulses doppler to BLE, right pedal pulse very faint with doppler. Tip of right great toe and 2nd toe purple, MD aware. Dressing to right upper leg/thigh/groin-CDI. No s/s of pain at this time. VSS. NAD.

## 2017-12-10 NOTE — PROGRESS NOTES
Bedside shift change report given to ANN Linton (oncoming nurse) by Sadia Pino RN and Med Elizondo RN (offgoing nurse). Report included the following information SBAR, Kardex, ED Summary, OR Summary, Procedure Summary, Intake/Output, MAR, Recent Results and Cardiac Rhythm NSR. Skin assessed. Patient turned. Fentanyl gtt verified.

## 2017-12-10 NOTE — PROGRESS NOTES
Dressing changed per order to right thigh using sterile technique. Wound bed is pink, not as beefy red as yesterday to right outer thigh area, muscle, tendon, and ligament visualized. Applied wet to dry packing with , covered with ABD pad, Gila wrap, and Elastic Bandage. Patient tolerated well.

## 2017-12-10 NOTE — PROGRESS NOTES
Bedside shift change report given to ANN Linton (oncoming nurse) by Becca Doshi RN (offgoing nurse). Report included the following information SBAR, Kardex, ED Summary, OR Summary, Procedure Summary, Intake/Output, MAR, Recent Results and Cardiac Rhythm NSR. Skin assessed. Patient turned.

## 2017-12-10 NOTE — PROGRESS NOTES
Dressing changed to right leg per order. Removed surgical dressing. Applied wet-to-dry semaj wrap to wound and packed in areas, covered with ABD pad, semaj wrap, and wrapped with ACE wrap using sterile technique and assist of 3 staff members. Fentanyl gtt restarted due to pain with dressing change and extubation to wait until tomorrow. Family at bedside and requested to visualize wound.

## 2017-12-10 NOTE — PROGRESS NOTES
Ventilator check complete; patient has a #8. 0 ET tube secured at the 23 at the lip. .  Breath sounds are coarse and diminished. Trachea is midline, Negative for subcutaneous air, and chest excursion is symmetric. Patient is also Negative for cyanosis and is Negative for pitting edema. All alarms are set and audible. Resuscitation bag is at the head of the bed.       Ventilator Settings  Mode FIO2 Rate Tidal Volume Pressure PEEP I:E Ratio   CPAP (weaned per Dr. Norberto Lechuga)  31 %    500 ml  0 cm H2O  8 cm H20  1:2.00      Peak airway pressure: 11.6 cm H2O   Minute ventilation: 8.1 l/min     ABG:   Recent Labs      12/10/17   0303  12/09/17   0345  12/08/17   0313   PH  7.48*  7.58*  7.51*   PCO2  35  28*  35   PO2  78*  123*  101   HCO3  25  25  27*         Kirti Israel, RT

## 2017-12-10 NOTE — PROGRESS NOTES
Ventilator check complete; patient has a #8. 0 ET tube secured at the 23 at the lip. Patient is sedated. Patient is able to follow commands. Breath sounds are diminished. Trachea is midline, Negative for subcutaneous air, and chest excursion is symmetric. Patient is also Negative for cyanosis and is Negative for pitting edema. All alarms are set and audible. Resuscitation bag is at the head of the bed.       Ventilator Settings  Mode FIO2 Rate Tidal Volume Pressure PEEP I:E Ratio   Pressure control  31 %    500 ml  5 cm H2O  8 cm H20  1:2.00      Peak airway pressure: 14.5 cm H2O   Minute ventilation: 9.1 l/min     ABG:   Recent Labs      12/09/17   0345  12/08/17   0313  12/07/17   0300   PH  7.58*  7.51*  7.48*   PCO2  28*  35  36   PO2  123*  101  108*   HCO3  25  27*  26         Ino Holloway, RT

## 2017-12-11 ENCOUNTER — APPOINTMENT (OUTPATIENT)
Dept: GENERAL RADIOLOGY | Age: 67
DRG: 853 | End: 2017-12-11
Attending: INTERNAL MEDICINE
Payer: MEDICARE

## 2017-12-11 LAB
ARTERIAL PATENCY WRIST A: POSITIVE
BASE EXCESS BLDA CALC-SCNC: 4.2 MMOL/L (ref 0–3)
BDY SITE: ABNORMAL
COHGB MFR BLD: 0.7 % (ref 0.5–1.5)
DO-HGB BLD-MCNC: 2 % (ref 0–5)
FIO2 ON VENT: 30 %
GLUCOSE BLD STRIP.AUTO-MCNC: 196 MG/DL (ref 65–100)
GLUCOSE BLD STRIP.AUTO-MCNC: 202 MG/DL (ref 65–100)
GLUCOSE BLD STRIP.AUTO-MCNC: 205 MG/DL (ref 65–100)
GLUCOSE BLD STRIP.AUTO-MCNC: 207 MG/DL (ref 65–100)
GLUCOSE BLD STRIP.AUTO-MCNC: 216 MG/DL (ref 65–100)
GLUCOSE BLD STRIP.AUTO-MCNC: 220 MG/DL (ref 65–100)
GLUCOSE BLD STRIP.AUTO-MCNC: 221 MG/DL (ref 65–100)
HCO3 BLDA-SCNC: 28 MMOL/L (ref 22–26)
HGB BLDMV-MCNC: 9.1 GM/DL (ref 11.7–15)
METHGB MFR BLD: 0.6 % (ref 0–1.5)
OXYHGB MFR BLDA: 96.5 % (ref 94–97)
PCO2 BLDA: 36 MMHG (ref 35–45)
PEEP RESPIRATORY: 8 CM[H2O]
PH BLDA: 7.5 [PH] (ref 7.35–7.45)
PO2 BLDA: 104 MMHG (ref 80–105)
RESP RATE: 15
SAO2 % BLD: 98 % (ref 92–98.5)
SERVICE CMNT-IMP: ABNORMAL
VANCOMYCIN TROUGH SERPL-MCNC: 16.9 UG/ML (ref 5–20)
VENTILATION MODE VENT: ABNORMAL
VT SETTING VENT: 639 ML

## 2017-12-11 PROCEDURE — 74011636637 HC RX REV CODE- 636/637: Performed by: HOSPITALIST

## 2017-12-11 PROCEDURE — 99233 SBSQ HOSP IP/OBS HIGH 50: CPT | Performed by: INTERNAL MEDICINE

## 2017-12-11 PROCEDURE — 82962 GLUCOSE BLOOD TEST: CPT

## 2017-12-11 PROCEDURE — 74011250637 HC RX REV CODE- 250/637: Performed by: INTERNAL MEDICINE

## 2017-12-11 PROCEDURE — 74011250636 HC RX REV CODE- 250/636: Performed by: INTERNAL MEDICINE

## 2017-12-11 PROCEDURE — 74011250636 HC RX REV CODE- 250/636: Performed by: SURGERY

## 2017-12-11 PROCEDURE — 77030018798 HC PMP KT ENTRL FED COVD -A

## 2017-12-11 PROCEDURE — 74011000258 HC RX REV CODE- 258: Performed by: SURGERY

## 2017-12-11 PROCEDURE — 74011000250 HC RX REV CODE- 250: Performed by: SURGERY

## 2017-12-11 PROCEDURE — 65610000001 HC ROOM ICU GENERAL

## 2017-12-11 PROCEDURE — C9113 INJ PANTOPRAZOLE SODIUM, VIA: HCPCS | Performed by: SURGERY

## 2017-12-11 PROCEDURE — 82803 BLOOD GASES ANY COMBINATION: CPT

## 2017-12-11 PROCEDURE — 71010 XR CHEST SNGL V: CPT

## 2017-12-11 PROCEDURE — 77030018836 HC SOL IRR NACL ICUM -A

## 2017-12-11 PROCEDURE — 74011636637 HC RX REV CODE- 636/637: Performed by: INTERNAL MEDICINE

## 2017-12-11 PROCEDURE — 36600 WITHDRAWAL OF ARTERIAL BLOOD: CPT

## 2017-12-11 PROCEDURE — 80202 ASSAY OF VANCOMYCIN: CPT | Performed by: INTERNAL MEDICINE

## 2017-12-11 PROCEDURE — 74011250637 HC RX REV CODE- 250/637: Performed by: SURGERY

## 2017-12-11 RX ADMIN — Medication 10 ML: at 21:45

## 2017-12-11 RX ADMIN — INSULIN GLARGINE 10 UNITS: 100 INJECTION, SOLUTION SUBCUTANEOUS at 08:27

## 2017-12-11 RX ADMIN — Medication 1 AMPULE: at 20:23

## 2017-12-11 RX ADMIN — PIPERACILLIN SODIUM,TAZOBACTAM SODIUM 4.5 G: 4; .5 INJECTION, POWDER, FOR SOLUTION INTRAVENOUS at 05:10

## 2017-12-11 RX ADMIN — INSULIN LISPRO 2 UNITS: 100 INJECTION, SOLUTION INTRAVENOUS; SUBCUTANEOUS at 17:17

## 2017-12-11 RX ADMIN — Medication 10 ML: at 14:00

## 2017-12-11 RX ADMIN — INSULIN LISPRO 4 UNITS: 100 INJECTION, SOLUTION INTRAVENOUS; SUBCUTANEOUS at 20:22

## 2017-12-11 RX ADMIN — VANCOMYCIN HYDROCHLORIDE 1250 MG: 10 INJECTION, POWDER, LYOPHILIZED, FOR SOLUTION INTRAVENOUS at 17:15

## 2017-12-11 RX ADMIN — INSULIN LISPRO 4 UNITS: 100 INJECTION, SOLUTION INTRAVENOUS; SUBCUTANEOUS at 23:33

## 2017-12-11 RX ADMIN — ENOXAPARIN SODIUM 40 MG: 40 INJECTION SUBCUTANEOUS at 12:11

## 2017-12-11 RX ADMIN — CHLORHEXIDINE GLUCONATE 15 ML: 1.2 RINSE ORAL at 08:26

## 2017-12-11 RX ADMIN — SODIUM CHLORIDE 900 MG: 900 INJECTION, SOLUTION INTRAVENOUS at 13:59

## 2017-12-11 RX ADMIN — VANCOMYCIN HYDROCHLORIDE 1250 MG: 10 INJECTION, POWDER, LYOPHILIZED, FOR SOLUTION INTRAVENOUS at 05:11

## 2017-12-11 RX ADMIN — Medication 1 AMPULE: at 08:26

## 2017-12-11 RX ADMIN — SODIUM CHLORIDE 900 MG: 900 INJECTION, SOLUTION INTRAVENOUS at 05:11

## 2017-12-11 RX ADMIN — PIPERACILLIN SODIUM,TAZOBACTAM SODIUM 4.5 G: 4; .5 INJECTION, POWDER, FOR SOLUTION INTRAVENOUS at 12:11

## 2017-12-11 RX ADMIN — INSULIN LISPRO 4 UNITS: 100 INJECTION, SOLUTION INTRAVENOUS; SUBCUTANEOUS at 12:24

## 2017-12-11 RX ADMIN — INSULIN LISPRO 4 UNITS: 100 INJECTION, SOLUTION INTRAVENOUS; SUBCUTANEOUS at 08:26

## 2017-12-11 RX ADMIN — PIPERACILLIN SODIUM,TAZOBACTAM SODIUM 4.5 G: 4; .5 INJECTION, POWDER, FOR SOLUTION INTRAVENOUS at 20:23

## 2017-12-11 RX ADMIN — Medication 10 ML: at 05:12

## 2017-12-11 RX ADMIN — SODIUM CHLORIDE 40 MG: 9 INJECTION INTRAMUSCULAR; INTRAVENOUS; SUBCUTANEOUS at 14:09

## 2017-12-11 RX ADMIN — INSULIN LISPRO 4 UNITS: 100 INJECTION, SOLUTION INTRAVENOUS; SUBCUTANEOUS at 03:40

## 2017-12-11 RX ADMIN — Medication 75 MCG/HR: at 10:55

## 2017-12-11 RX ADMIN — CHLORHEXIDINE GLUCONATE 15 ML: 1.2 RINSE ORAL at 20:23

## 2017-12-11 NOTE — OP NOTES
Operative Report    Patient: Mortimer Beal MRN: [id number]     YOB: 1950  Age: 79 y.o. Sex: male       Date of Surgery: 12/2/2017     Preoperative Diagnosis: Scrotal abcess     Postoperative Diagnosis: Scrotal abcess     NAME OF PROCEDURE:  Procedure(s):  LAPAROTOMY EXPLORATORY, loop colostomy ( first procedure): (second procedure) Re explore scrotal abcess  Debridement of Subcutaneous and Fascia Tissue, muscle and skin (sharp, escisional)    SURGEON: Kaelyn Barraza MD    Anesthesia: General     Complications: none      Procedure Details       Consent ;form was not signed by family, although daughter verbally agrees with the plan to proceed; consent form signed by myself and anesthesiologist as this is a reasonable, indicated procedure. The patient was brought to the operating room, remained in his ICU bed. After induction of a general anesthetic, the abdomen was prepped and draped in standard fashion. A short upper midline vertical incision was made and cautery was used to dissect  into the peritoneal cavity. Digital manipulation of the unremarkable omentum allowed visualization of the transverse colon. This was elevated through the fascial defect and the omentum was readily freed from the mesocolon with cautery. This continued until a roughly 5cm segment was mobile, ensuring adequate length for loop colostomy formation. A window was made through the mesentery at the margin of the bowel and the colostomy bar was passed through. The colon was then incised axially for 2cm. The colon wall was fixated to the skin in a 4-corners fashion with vicryl to aid in stoma maturation, and the bar was sutured to the skin on its lower end. An appliance was placed. There was minimal blood loss during this portion of the procedure. The patient was then rolled into a prone krys-knife position on the OR table ensuring adequate padding to all pressure points.   The right buttock, hip and leg were prepped with betadine to the mid calf, and draped. Examination revealed continued/advancing fasciitis ascending further up the right buttock and distally towards the popliteal fossa. Scissors, scalpel, and cautery were all used to remove addition necrotic subcutaneous tissue and obviously necrotic fascia, as well as frankly ischemic overlying skin. Findings were similar to the process noted the prior day. The mid thigh revealed the fasciitis extended >180 degrees around the thigh. The hamstrings were elevated revealing necrosis of the fascia around the neurovascular bundle. Obviously necrotic fascia as well as some non-contractile muscle was excised. The tissue planes were easily dissected all the way down to the popliteal fossa, which was not formally entered. Upon noting the condition of the deeper thigh muscles and the fascia around the neurovascular bundle, salvage of the extremity became questionable. Dr Linnette Landau was operating next door and agreed, although not on call officially for consults,  to view the leg and also felt that salvage was questionable. Thus, after removing remaining overtly nonviable tissue, the wound was packed with gauze and the procedure terminated, with plans to discuss the option of leg removal/disarticulation with orthopedics and the family. The patient tolerated the procedure well with no apparent complications and was awakened from anesthesia and extubated in the operating room and taken to the recovery room in satisfactory condition. counts were correct times two as reported to me.     Estimated Blood Loss: per anesthesia           Specimens: * No specimens in log *        Signed By:  Amadou Morocho MD     December 11, 2017

## 2017-12-11 NOTE — PROGRESS NOTES
Pt restless, eyes open, moving arms and left leg, trying to move ET and OG tube with tongue, repositioned in bed, pt nods head to indicate yes to question on pain, will increase fentanyl gtt from 75mcg/hr to 100mcg/hr. vss on monitor.

## 2017-12-11 NOTE — PROGRESS NOTES
PLAN:  Colostomy viable  Leg wound per Dr. Tereza Caal  Will sign off--nothing further to add from general surgery standpoint    ASSESSMENT:  Admit Date: 12/1/2017   8 Day Post-Op  Procedure(s):  Irrigation and DEBRIDEMENT RIGHT LEG WOUND AND Dressing change      Principal Problem:    Severe sepsis with septic shock ([address] (12/1/2017)    Active Problems:    Subdural hematoma ([address] (12/1/2017)      Type II diabetes mellitus with complication ([address] (52/6/9538)      Syncope (12/1/2017)      Fasciitis (12/1/2017)      Hypernatremia (12/6/2017)      Acute respiratory failure ([address] (12/8/2017)       HPI: Brodie Winston is a 79 y.o.  male who presented tot he ED 12/1/17 for evaluation. Pt with a history of having a syncopal episode at home today.  A neighbor went to go check on him and when the patient opened the door he apparently passed out and fell backward. Cheo Roland says that he has not seen a doctor in over 10 years and as far as he knows he has no medical problems.  Patient does not have any teeth and he has a very difficult speech pattern to understand.  Overall he says that he just feels weak and tired but he does not say he is having any specific pain.  He denies chest pain or difficulty breathing. Patient says that he has no known history of diabetes. SUBJECTIVE: Patient intubated and on Fentanyl. Grimaces to movement. S/p diverting colostomy and rt leg debridement. OBJECTIVE:  Constitutional: Grimaces to movements but in no acute distress; appears stated age   Visit Vitals    /72 (BP 1 Location: Right arm, BP Patient Position: At rest;Head of bed elevated (Comment degrees))    Pulse 83    Temp 98.4 °F (36.9 °C)    Resp 12    Ht 5' 11\" (1.803 m)    Wt 84.9 kg (187 lb 2.7 oz)    SpO2 93%    BMI 26.11 kg/m2     Eyes:Sclera are clear. ENMT: no external lesions gross hearing normal; no obvious neck masses, no ear or lip lesions  CV: RRR. Normal perfusion  Resp: No JVD.   Breathing is non-labored; no audible wheezing. GI: soft and non-distended. Colostomy patent with brown output in pouch. Stoma edematous but viable. Musculoskeletal: Right great and second toe cyanotic. Pulses + on doppler. All 5 digits cool to touch. Foot and lower leg warm to touch. Skin: Right lateral thigh wound with dressing intact with serous drainange.    Neuro:  Grimaces to stimuli  Psychiatric: unable to assess    Patient Vitals for the past 24 hrs:   BP Temp Pulse Resp SpO2   12/11/17 0839 - - 83 12 93 %   12/11/17 0702 155/72 98.4 °F (36.9 °C) 87 15 96 %   12/11/17 0601 111/58 - 81 11 93 %   12/11/17 0501 113/60 - 87 9 95 %   12/11/17 0401 97/59 98.5 °F (36.9 °C) 86 12 99 %   12/11/17 0309 - - 84 13 99 %   12/11/17 0301 97/58 - 85 (!) 6 99 %   12/11/17 0201 99/62 - 89 11 100 %   12/11/17 0101 98/56 - 86 17 100 %   12/10/17 2301 112/56 97.8 °F (36.6 °C) 87 15 98 %   12/10/17 2258 - - 85 17 97 %   12/10/17 2202 136/59 - 83 15 98 %   12/10/17 2101 104/60 - 83 13 100 %   12/10/17 2007 - - 84 15 99 %   12/10/17 2001 106/61 98.4 °F (36.9 °C) 85 11 99 %   12/10/17 2000 - - - - 99 %   12/10/17 1901 110/57 - 83 8 99 %   12/10/17 1801 122/55 - 81 15 99 %   12/10/17 1701 96/53 - 80 15 99 %   12/10/17 1601 101/57 98.6 °F (37 °C) 80 15 96 %   12/10/17 1501 111/57 - 83 15 99 %   12/10/17 1401 115/56 - 83 15 100 %   12/10/17 1301 127/64 - 85 16 98 %   12/10/17 1201 101/55 98.6 °F (37 °C) 90 15 97 %   12/10/17 1139 - - 86 13 96 %   12/10/17 1102 110/64 - 88 15 98 %   12/10/17 1016 113/59 - 85 14 98 %   12/10/17 1001 111/55 - 88 14 97 %   12/10/17 0946 124/65 - 86 21 97 %   12/10/17 0931 131/69 - 82 9 100 %     Labs:    Recent Labs      12/10/17   0431   WBC  8.3   HGB  7.9*   PLT  231   NA  141   K  3.8   CL  106   CO2  30   BUN  14   CREA  0.67*   GLU  208*     Josefina Dotson, FNP-BC

## 2017-12-11 NOTE — PROGRESS NOTES
Problem: Nutrition Deficit  Goal: *Optimize nutritional status  Nutrition F/U:  TF Management for Dr. Stefani Brown. Assessment:  Weight 84.9 kg (ICU bed 12/10/17), edema - 2+ generalized. The patient remains dependent on enteral nutrition while intubated and ventilated for further surgical debridements of wound (s/p washout on 12/8 with plans for repeat tomorrow morning). Good GI tolerance is reported with low gastric residuals reported and minimal colostomy output (400 cc noted yesterday). No BMP ordered for today. POC glucose 221, 207, 202 mg/dl (12/11); Lantus was initiated yesterday (10 units/day) as he has received between 14-26 units SSI for the past 4 days; AM BMP glucose 12/10: 208. Macronutrient Needs:  Estimated calorie needs - 6542-7083 sunni/day (25-28 sunni/kg/day)   Estimated protein needs - 101-117 gm pro/day (1.3-1.5 gm pro/kgIBW/day) (GFR >60 ml/min)  Max CHO/day - 294 gm CHO/day (50% sunni/day)   Fluid/day - 2.1-2.4 liters/day (1 ml/sunni/day)  Intake/Comparative Standards:  Current intake of TF (Glucerna 1.2 @ goal rate of 75 ml/hr with a 45 ml/hr water flush - 2160 calories/day (100% calorie goal), 108 grams protein/day (100% protein goal), 207 grams CHO/day (does not exceed max CHO limit) and 2592 ml water/day (>100% fluid goal to treat hypernatremia). Intervention:   Meals and Snacks: NPO. Enteral Nutrition: Continue current TF and water flush of glucerna 1.2 at goal rate of 75 ml/hr with a 45 ml/hr water flush. Mineral Supplement Therapy: Nutrition Support Orders/Electrolyte Management Replacement Protocols are active on the MAR. Coordination of Nutrition Care by a Nutrition Professional: AM ICU rounds  Nutrition Discharge Plan: Too soon to determine.     Reji Carrasquillo [address], [address] [place] [address], [address] [place] [address], [phone]

## 2017-12-11 NOTE — PROGRESS NOTES
Bedside report from [place] with dual gtt verification, fentanyl continuesn at 100mcg/hr. Pt resting quietly, nods appropriately and follows commands, indicates comfort by nodding head. vss on monitor.

## 2017-12-11 NOTE — PROGRESS NOTES
Ventilator check complete; patient has a #8. 0 ET tube secured at the 23 at the lip. Patient is sedated. Patient is able to follow commands. Breath sounds are diminished. Trachea is midline, Negative for subcutaneous air, and chest excursion is symmetric. Patient is also Negative for cyanosis and is Negative for pitting edema. All alarms are set and audible. Resuscitation bag is at the head of the bed.       Ventilator Settings  Mode FIO2 Rate Tidal Volume Pressure PEEP I:E Ratio   Pressure support  30 %    500 ml  6 cm H2O  8 cm H20  1:2.00      Peak airway pressure: 14.9 cm H2O   Minute ventilation: 8.1 l/min     ABG:   Recent Labs      12/10/17   0303  12/09/17   0345  12/08/17   0313   PH  7.48*  7.58*  7.51*   PCO2  35  28*  35   PO2  78*  123*  101   HCO3  25  25  27*         Olayinka Pardo, RT

## 2017-12-11 NOTE — PROGRESS NOTES
Bedside and Verbal shift change report given to Royer by Diego Hawkins. Report included the following information SBAR, Kardex, ED Summary, OR Summary, Intake/Output, MAR, Accordion, Recent Results, Med Rec Status and Cardiac Rhythm NSR. Fentanyl gtt dual verified.

## 2017-12-11 NOTE — PROGRESS NOTES
Ventilator check complete; patient has a #8. 0 ET tube secured at the 22 at the lip. Patient is sedated. Patient is not able to follow commands. Breath sounds are clear. Trachea is midline, Negative for subcutaneous air, and chest excursion is symmetric. Patient is also Negative for cyanosis. All alarms are set and audible. Resuscitation bag is at the head of the bed.       Ventilator Settings  Mode FIO2 Rate Tidal Volume Pressure PEEP I:E Ratio   Pressure support  30 %      5 cm H2O  8 cm H20        Peak airway pressure: 14.5 cm H2O   Minute ventilation: 6.8 l/min     ABG:   Recent Labs      12/11/17   0315  12/10/17   0303  12/09/17   0345   PH  7.50*  7.48*  7.58*   PCO2  36  35  28*   PO2  104  78*  123*   HCO3  28*  25  25         Rosita Shelley, RT

## 2017-12-11 NOTE — PROGRESS NOTES
Care Daily Progress Note: 12/11/2017  Admission Date: 12/1/2017       The patient's chart is reviewed and the patient is discussed with the staff. Pt is a 80 yo male with no prior known medical history. Pt presented to the ER on 12/1 with syncopal episode. He was admitted by hospitalist after CT head confirmed subdural hematoma that was not felt to require intervention. 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 He was taken to the OR for debridement and has been seen by Dr. Mari Chanel with plastic surgery for continued wound debridement. He has remained intubated for further anticipated procedures.      Subjective:     On vent  Sedated with Fentanyl    Current Facility-Administered Medications   Medication Dose Route Frequency    vanc trough reminder   Other ONCE    insulin glargine (LANTUS) injection 10 Units  10 Units SubCUTAneous DAILY    bacitracin (BACIIM) 50,000/1000ml NS irrigation bottle    PRN    vancomycin (VANCOCIN) 1250 mg in  ml infusion  1,250 mg IntraVENous Q12H    enoxaparin (LOVENOX) injection 40 mg  40 mg SubCUTAneous Q24H    hydrALAZINE (APRESOLINE) 20 mg/mL injection 10 mg  10 mg IntraVENous Q4H PRN    alcohol 62% (NOZIN) nasal  1 Ampule  1 Ampule Topical Q12H    NUTRITIONAL SUPPORT ELECTROLYTE PRN ORDERS   Does Not Apply PRN    piperacillin-tazobactam (ZOSYN) 4.5 g in 0.9% sodium chloride (MBP/ADV) 100 mL  4.5 g IntraVENous Q8H    influenza vaccine 2017-18 (3 yrs+)(PF) (FLUZONE QUAD/FLUARIX QUAD) injection 0.5 mL  0.5 mL IntraMUSCular PRIOR TO DISCHARGE    clindamycin phosphate (CLEOCIN) 900 mg in 0.9% sodium chloride 100 mL IVPB  900 mg IntraVENous Q8H    chlorhexidine (PERIDEX) 0.12 % mouthwash 15 mL  15 mL Oral Q12H    insulin lispro (HUMALOG) injection   SubCUTAneous Q4H    pantoprazole (PROTONIX) 40 mg in sodium chloride 0.9 % 10 mL injection  40 mg IntraVENous Q24H    sodium chloride (NS) flush 5-10 mL  5-10 mL IntraVENous Q8H    sodium chloride (NS) flush 5-10 mL  5-10 mL IntraVENous PRN    fentaNYL in normal saline (pf) 25 mcg/mL infusion   mcg/hr IntraVENous TITRATE       Review of Systems    Unable assess      Objective:     Vitals:    12/11/17 1101 12/11/17 1102 12/11/17 1151 12/11/17 1202   BP:  100/58  120/59   Pulse: 82 82 80 85   Resp: 13 12 13 19   Temp: 98.3 °F (36.8 °C)      SpO2: 95% 94% 94% 95%   Weight:       Height:           Intake and Output:   12/09 1901 - 12/11 0700  In: 7886.3 [I.V.:1579.3]  Out: 4500 [Urine:3950]  12/11 0701 - 12/11 1900  In: 660.7 [I.V.:16.7]  Out: 1450 [Urine:1350]    Physical Exam:          Constitutional:  intubated and mechanically ventilated.   EENMT:  Sclera clear, pupils equal, oral mucosa moist  Respiratory: clear  Cardiovascular:  RRR with no M,G,R;  Gastrointestinal:  soft with no tenderness; positive bowel sounds present  Musculoskeletal:  warm with no cyanosis, no  lower leg edema, R leg -wound   Skin:  no jaundice or ecchymosis  Neurologic: no gross neuro deficits     Psychiatric:  Awake      LINES:  ETT, Gerardo, RIJ central line, colostomy, NG     DRIPS:  TF, fentanyl     CXR:            Ventilator Settings  Mode FIO2 Rate Tidal Volume Pressure PEEP   Pressure support  30 %    500 ml  5 cm H2O  8 cm H20      Peak airway pressure: 14.6 cm H2O   Minute ventilation: 6.3 l/min     ABG:   Recent Labs      12/11/17   0315  12/10/17   0303  12/09/17   0345   PH  7.50*  7.48*  7.58*   PCO2  36  35  28*   PO2  104  78*  123*   HCO3  28*  25  25        LAB  Recent Labs      12/11/17   0825  12/11/17   0339  12/10/17   2352  12/10/17   1957  12/10/17   1611   GLUCPOC  207*  221*  216*  200*  184*     Recent Labs      12/10/17   0431   WBC  8.3   HGB  7.9*   HCT  25.1*   PLT  231     Recent Labs      12/10/17   0431   NA  141   K  3.8   CL  106   CO2  30   GLU  208*   BUN  14   CREA  0.67*   CA  6.8*         Assessment:  (Medical Decision Making) Hospital Problems  Date Reviewed: 12/7/2017          Codes Class Noted POA    Acute respiratory failure ([facility]   on vent  ICD-10-CM: J96.00  ICD-9-CM: 518.81  12/8/2017 Unknown    No changes till surgery is done with debridement     Hypernatremia   141  ICD-10-CM: E87.0  ICD-9-CM: 276.0  12/6/2017 Unknown        Subdural hematoma ([facility] ICD-10-CM: I62.00  ICD-9-CM: 432.1  12/1/2017 Yes        Type II diabetes mellitus with complication ([facility] SEQ-09-YO: E11.8  ICD-9-CM: 250.90  12/1/2017 Yes            Fasciitis   s/p multiple OR visits due to wash outs,  ICD-10-CM: M72.9  ICD-9-CM: 729.4  12/1/2017 Yes        * (Principal)Severe sepsis with septic shock ([facility]   off pressors  ICD-10-CM: A41.9, R65.21  ICD-9-CM: 038.9, 995.92, 785.52  12/1/2017 Yes              Plan:  (Medical Decision Making)     - continue ABX -Vanco ,Zosyn. Will stop cleocin  - he could be extubated but he is going to OR frequently and maybe in AM therefore will leave him on vent  For now   -cont. SSI  -follow labs    More than 50% of the time documented was spent in face-to-face contact with the patient and in the care of the patient on the floor/unit where the patient is located.     Becca James MD

## 2017-12-11 NOTE — PROGRESS NOTES
Pharmacokinetic Consult to Pharmacist    [place] Galo is a 79 y.o. 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 The patient was also on IV clindamycin until 12/11/17 at which time it was discontinued. Height: 5' 11\" (180.3 cm)  Weight: 84.9 kg (187 lb 2.7 oz)  Lab Results   Component Value Date/Time    BUN 14 12/10/2017 04:31 AM    Creatinine 0.67 12/10/2017 04:31 AM    WBC 8.3 12/10/2017 04:31 AM    Procalcitonin 15.5 12/01/2017 03:01 PM    Lactic acid 1.7 12/04/2017 03:46 AM    Lactic Acid (POC) 4.8 12/01/2017 06:10 PM      Estimated Creatinine Clearance: 113.9 mL/min (based on Cr of 0.67). Lab Results   Component Value Date/Time    Vancomycin,trough 16.9 12/11/2017 05:08 PM       Day 11 of vancomycin. Goal trough is 15-20. Trough drawn this afternoon resulted within goal at 16.9, so will continue with 1250 mg Q12H. Next trough level to be drawn in 2 to 3 days or as clinically indicated. Plans noted for possible further washout in OR tomorrow. Pharmacy will continue to follow. Please call with any questions.     Thank you,  Army Jeter, PharmD  Clinical Pharmacist  [phone]

## 2017-12-12 ENCOUNTER — ANESTHESIA (OUTPATIENT)
Dept: SURGERY | Age: 67
DRG: 853 | End: 2017-12-12
Payer: MEDICARE

## 2017-12-12 ENCOUNTER — ANESTHESIA EVENT (OUTPATIENT)
Dept: SURGERY | Age: 67
DRG: 853 | End: 2017-12-12
Payer: MEDICARE

## 2017-12-12 ENCOUNTER — APPOINTMENT (OUTPATIENT)
Dept: GENERAL RADIOLOGY | Age: 67
DRG: 853 | End: 2017-12-12
Attending: INTERNAL MEDICINE
Payer: MEDICARE

## 2017-12-12 LAB
ALBUMIN SERPL-MCNC: 1.1 G/DL (ref 3.2–4.6)
ALBUMIN/GLOB SERPL: 0.2 {RATIO} (ref 1.2–3.5)
ALP SERPL-CCNC: 108 U/L (ref 50–136)
ALT SERPL-CCNC: 13 U/L (ref 12–65)
ANION GAP SERPL CALC-SCNC: 5 MMOL/L (ref 7–16)
ARTERIAL PATENCY WRIST A: POSITIVE
AST SERPL-CCNC: 17 U/L (ref 15–37)
BASE EXCESS BLDA CALC-SCNC: 7.6 MMOL/L (ref 0–3)
BDY SITE: ABNORMAL
BILIRUB SERPL-MCNC: 0.3 MG/DL (ref 0.2–1.1)
BUN SERPL-MCNC: 12 MG/DL (ref 8–23)
CALCIUM SERPL-MCNC: 7.3 MG/DL (ref 8.3–10.4)
CHLORIDE SERPL-SCNC: 103 MMOL/L (ref 98–107)
CO2 SERPL-SCNC: 31 MMOL/L (ref 21–32)
COHGB MFR BLD: 0.8 % (ref 0.5–1.5)
CREAT SERPL-MCNC: 0.63 MG/DL (ref 0.8–1.5)
DO-HGB BLD-MCNC: 6 % (ref 0–5)
ERYTHROCYTE [DISTWIDTH] IN BLOOD BY AUTOMATED COUNT: 14.3 % (ref 11.9–14.6)
FIO2 ON VENT: 30 %
GLOBULIN SER CALC-MCNC: 5 G/DL (ref 2.3–3.5)
GLUCOSE BLD STRIP.AUTO-MCNC: 139 MG/DL (ref 65–100)
GLUCOSE BLD STRIP.AUTO-MCNC: 159 MG/DL (ref 65–100)
GLUCOSE BLD STRIP.AUTO-MCNC: 163 MG/DL (ref 65–100)
GLUCOSE BLD STRIP.AUTO-MCNC: 171 MG/DL (ref 65–100)
GLUCOSE BLD STRIP.AUTO-MCNC: 204 MG/DL (ref 65–100)
GLUCOSE SERPL-MCNC: 187 MG/DL (ref 65–100)
HCO3 BLDA-SCNC: 32 MMOL/L (ref 22–26)
HCT VFR BLD AUTO: 26.1 % (ref 41.1–50.3)
HGB BLD-MCNC: 8.2 G/DL (ref 13.6–17.2)
HGB BLDMV-MCNC: 11.2 GM/DL (ref 11.7–15)
MCH RBC QN AUTO: 28.4 PG (ref 26.1–32.9)
MCHC RBC AUTO-ENTMCNC: 31.4 G/DL (ref 31.4–35)
MCV RBC AUTO: 90.3 FL (ref 79.6–97.8)
METHGB MFR BLD: 0.4 % (ref 0–1.5)
OXYHGB MFR BLDA: 92.7 % (ref 94–97)
PCO2 BLDA: 43 MMHG (ref 35–45)
PEEP RESPIRATORY: 8 CM[H2O]
PH BLDA: 7.48 [PH] (ref 7.35–7.45)
PLATELET # BLD AUTO: 287 K/UL (ref 150–450)
PMV BLD AUTO: 11.8 FL (ref 10.8–14.1)
PO2 BLDA: 68 MMHG (ref 80–105)
POTASSIUM SERPL-SCNC: 4.1 MMOL/L (ref 3.5–5.1)
PROCALCITONIN SERPL-MCNC: 0.2 NG/ML
PROT SERPL-MCNC: 6.1 G/DL (ref 6.3–8.2)
RBC # BLD AUTO: 2.89 M/UL (ref 4.23–5.67)
SAO2 % BLD: 94 % (ref 92–98.5)
SODIUM SERPL-SCNC: 139 MMOL/L (ref 136–145)
VENTILATION MODE VENT: ABNORMAL
WBC # BLD AUTO: 9.9 K/UL (ref 4.3–11.1)

## 2017-12-12 PROCEDURE — 80053 COMPREHEN METABOLIC PANEL: CPT | Performed by: INTERNAL MEDICINE

## 2017-12-12 PROCEDURE — 82803 BLOOD GASES ANY COMBINATION: CPT

## 2017-12-12 PROCEDURE — 85027 COMPLETE CBC AUTOMATED: CPT | Performed by: INTERNAL MEDICINE

## 2017-12-12 PROCEDURE — 65610000001 HC ROOM ICU GENERAL

## 2017-12-12 PROCEDURE — 74011250636 HC RX REV CODE- 250/636: Performed by: INTERNAL MEDICINE

## 2017-12-12 PROCEDURE — P9047 ALBUMIN (HUMAN), 25%, 50ML: HCPCS | Performed by: INTERNAL MEDICINE

## 2017-12-12 PROCEDURE — 74011250636 HC RX REV CODE- 250/636: Performed by: SURGERY

## 2017-12-12 PROCEDURE — 36600 WITHDRAWAL OF ARTERIAL BLOOD: CPT

## 2017-12-12 PROCEDURE — 84145 PROCALCITONIN (PCT): CPT | Performed by: INTERNAL MEDICINE

## 2017-12-12 PROCEDURE — 74011636637 HC RX REV CODE- 636/637: Performed by: INTERNAL MEDICINE

## 2017-12-12 PROCEDURE — 74011000258 HC RX REV CODE- 258: Performed by: SURGERY

## 2017-12-12 PROCEDURE — 77030018798 HC PMP KT ENTRL FED COVD -A

## 2017-12-12 PROCEDURE — 94003 VENT MGMT INPAT SUBQ DAY: CPT

## 2017-12-12 PROCEDURE — 74011636637 HC RX REV CODE- 636/637: Performed by: HOSPITALIST

## 2017-12-12 PROCEDURE — 77030018836 HC SOL IRR NACL ICUM -A

## 2017-12-12 PROCEDURE — 74011000250 HC RX REV CODE- 250: Performed by: SURGERY

## 2017-12-12 PROCEDURE — 87205 SMEAR GRAM STAIN: CPT | Performed by: INTERNAL MEDICINE

## 2017-12-12 PROCEDURE — 74011250637 HC RX REV CODE- 250/637: Performed by: INTERNAL MEDICINE

## 2017-12-12 PROCEDURE — 99233 SBSQ HOSP IP/OBS HIGH 50: CPT | Performed by: INTERNAL MEDICINE

## 2017-12-12 PROCEDURE — 36592 COLLECT BLOOD FROM PICC: CPT

## 2017-12-12 PROCEDURE — 87106 FUNGI IDENTIFICATION YEAST: CPT | Performed by: INTERNAL MEDICINE

## 2017-12-12 PROCEDURE — C9113 INJ PANTOPRAZOLE SODIUM, VIA: HCPCS | Performed by: SURGERY

## 2017-12-12 PROCEDURE — 77030010547 HC BG URIN W/UMETER -A

## 2017-12-12 PROCEDURE — 74011250637 HC RX REV CODE- 250/637: Performed by: SURGERY

## 2017-12-12 PROCEDURE — 82962 GLUCOSE BLOOD TEST: CPT

## 2017-12-12 PROCEDURE — 71010 XR CHEST SNGL V: CPT

## 2017-12-12 RX ORDER — ALBUMIN HUMAN 250 G/1000ML
25 SOLUTION INTRAVENOUS EVERY 6 HOURS
Status: COMPLETED | OUTPATIENT
Start: 2017-12-12 | End: 2017-12-19

## 2017-12-12 RX ORDER — SODIUM CHLORIDE 9 MG/ML
100 INJECTION, SOLUTION INTRAVENOUS CONTINUOUS
Status: DISCONTINUED | OUTPATIENT
Start: 2017-12-12 | End: 2017-12-15

## 2017-12-12 RX ORDER — HYDROMORPHONE HYDROCHLORIDE 2 MG/ML
1 INJECTION, SOLUTION INTRAMUSCULAR; INTRAVENOUS; SUBCUTANEOUS
Status: DISCONTINUED | OUTPATIENT
Start: 2017-12-12 | End: 2017-12-29

## 2017-12-12 RX ADMIN — PIPERACILLIN SODIUM,TAZOBACTAM SODIUM 4.5 G: 4; .5 INJECTION, POWDER, FOR SOLUTION INTRAVENOUS at 04:17

## 2017-12-12 RX ADMIN — Medication 10 ML: at 05:06

## 2017-12-12 RX ADMIN — INSULIN LISPRO 2 UNITS: 100 INJECTION, SOLUTION INTRAVENOUS; SUBCUTANEOUS at 23:25

## 2017-12-12 RX ADMIN — INSULIN LISPRO 4 UNITS: 100 INJECTION, SOLUTION INTRAVENOUS; SUBCUTANEOUS at 09:25

## 2017-12-12 RX ADMIN — Medication 1 AMPULE: at 09:27

## 2017-12-12 RX ADMIN — SODIUM CHLORIDE 100 ML/HR: 900 INJECTION, SOLUTION INTRAVENOUS at 10:16

## 2017-12-12 RX ADMIN — VANCOMYCIN HYDROCHLORIDE 1250 MG: 10 INJECTION, POWDER, LYOPHILIZED, FOR SOLUTION INTRAVENOUS at 18:02

## 2017-12-12 RX ADMIN — INSULIN LISPRO 2 UNITS: 100 INJECTION, SOLUTION INTRAVENOUS; SUBCUTANEOUS at 17:10

## 2017-12-12 RX ADMIN — PIPERACILLIN SODIUM,TAZOBACTAM SODIUM 4.5 G: 4; .5 INJECTION, POWDER, FOR SOLUTION INTRAVENOUS at 21:24

## 2017-12-12 RX ADMIN — INSULIN LISPRO 2 UNITS: 100 INJECTION, SOLUTION INTRAVENOUS; SUBCUTANEOUS at 12:27

## 2017-12-12 RX ADMIN — SODIUM CHLORIDE 40 MG: 9 INJECTION INTRAMUSCULAR; INTRAVENOUS; SUBCUTANEOUS at 14:42

## 2017-12-12 RX ADMIN — CHLORHEXIDINE GLUCONATE 15 ML: 1.2 RINSE ORAL at 20:11

## 2017-12-12 RX ADMIN — ALBUMIN (HUMAN) 25 G: 0.25 INJECTION, SOLUTION INTRAVENOUS at 17:09

## 2017-12-12 RX ADMIN — Medication 10 ML: at 21:26

## 2017-12-12 RX ADMIN — INSULIN GLARGINE 10 UNITS: 100 INJECTION, SOLUTION SUBCUTANEOUS at 09:25

## 2017-12-12 RX ADMIN — VANCOMYCIN HYDROCHLORIDE 1250 MG: 10 INJECTION, POWDER, LYOPHILIZED, FOR SOLUTION INTRAVENOUS at 05:05

## 2017-12-12 RX ADMIN — Medication 1 AMPULE: at 20:11

## 2017-12-12 RX ADMIN — Medication 125 MCG/HR: at 05:27

## 2017-12-12 RX ADMIN — PIPERACILLIN SODIUM,TAZOBACTAM SODIUM 4.5 G: 4; .5 INJECTION, POWDER, FOR SOLUTION INTRAVENOUS at 12:20

## 2017-12-12 RX ADMIN — SODIUM CHLORIDE 100 ML/HR: 900 INJECTION, SOLUTION INTRAVENOUS at 18:03

## 2017-12-12 RX ADMIN — Medication 10 ML: at 14:42

## 2017-12-12 RX ADMIN — CHLORHEXIDINE GLUCONATE 15 ML: 1.2 RINSE ORAL at 09:25

## 2017-12-12 RX ADMIN — INSULIN LISPRO 2 UNITS: 100 INJECTION, SOLUTION INTRAVENOUS; SUBCUTANEOUS at 04:33

## 2017-12-12 RX ADMIN — ENOXAPARIN SODIUM 40 MG: 40 INJECTION SUBCUTANEOUS at 12:20

## 2017-12-12 RX ADMIN — ALBUMIN (HUMAN) 25 G: 0.25 INJECTION, SOLUTION INTRAVENOUS at 12:19

## 2017-12-12 NOTE — PROGRESS NOTES
Bedside verbal report received from Joel Escalona, [place]. Patient resting on vent, VSS. Responds to voice, RASS -1. Fentanyl verified in MAR. RLE dressing and colostomy C/D/I.

## 2017-12-12 NOTE — PROGRESS NOTES
Bedside and Verbal shift change report given to Christiane [address] (oncoming nurse) by Petey Martino (offgoing nurse). Report included the following information SBAR, Kardex, ED Summary, OR Summary, Procedure Summary, Intake/Output, MAR, Accordion, Recent Results, Med Rec Status, Cardiac Rhythm SR and Alarm Parameters .

## 2017-12-12 NOTE — PROGRESS NOTES
Changed pt to SIMV+PRVC+Pressure Support to rest pt. Pt wasn't taking any spontaneous breath while on Pressure Support/CPAP. No complications noted at this time.

## 2017-12-12 NOTE — PROGRESS NOTES
Chart reviewed for LOS 10 days. Pt remains intubated with vent. Pt open eyes and follows commands. He remains on medication and vent as returning to OR frequently for debridement and dressing changes with Dr. Margo Ewing. Dr. James Guerrero hoping to extubate soon. CM will continue to follow and assist with any needs.

## 2017-12-12 NOTE — PROGRESS NOTES
Per Dr. Shay Smith, will restart tube feeds tonight. Resume dressing changes. And plan for debridement tomorrow afternoon.

## 2017-12-12 NOTE — PROGRESS NOTES
Ventilator check complete; patient has a #8. 0 ET tube secured at the 22 at the lip. Patient is not able to follow commands. Breath sounds are coarse. Trachea is midline, Negative for subcutaneous air, and chest excursion is symmetric. Patient is also Negative for cyanosis and is Negative for pitting edema. All alarms are set and audible. Resuscitation bag is at the head of the bed.       Ventilator Settings  Mode FIO2 Rate Tidal Volume Pressure PEEP I:E Ratio   Pressure support  40 % (increased from 30% post ABG)    500 ml (post abg)  8 cm H2O  8 cm H20  1:2.00      Peak airway pressure: 17 cm H2O   Minute ventilation: 5.8 l/min     ABG:   Recent Labs      12/12/17   0320  12/11/17   0315  12/10/17   0303   PH  7.48*  7.50*  7.48*   PCO2  43  36  35   PO2  68*  104  78*   HCO3  32*  28*  25         Ana Dillon

## 2017-12-12 NOTE — PROGRESS NOTES
Ventilator check complete; patient has a #8. 0 ET tube secured at the 22 at the lip. Patient is sedated. Patient is able to follow commands. Breath sounds are clear. Trachea is midline, Negative for subcutaneous air, and chest excursion is symmetric. Patient is also Negative for cyanosis. All alarms are set and audible. Resuscitation bag is at the head of the bed.       Ventilator Settings  Mode FIO2 Rate Tidal Volume Pressure PEEP I:E Ratio   SIMV, PRVC, Pressure support  40 %   10 500 ml  8 cm H2O  8 cm H20  1:3.33      Peak airway pressure: 16.9 cm H2O   Minute ventilation: 8.5 l/min     ABG:   Recent Labs      12/12/17   0320  12/11/17   0315  12/10/17   0303   PH  7.48*  7.50*  7.48*   PCO2  43  36  35   PO2  68*  104  78*   HCO3  32*  28*  25         Rosita Shelley, RT

## 2017-12-12 NOTE — INTERDISCIPLINARY ROUNDS
Interdisciplinary team rounds were held 12/12/2017 with the following team members:Nursing, Nurse Practitioner, Nutrition, Palliative Care, Pastoral Care, Pharmacy, Physical Therapy, Physician, Respiratory Therapy and Clinical Coordinator and the patient. Plan of care discussed. See clinical pathway and/or care plan for interventions and desired outcomes.

## 2017-12-12 NOTE — PROGRESS NOTES
Critical Care Daily Progress Note: 12/12/2017  Admission Date: 12/1/2017       The patient's chart is reviewed and the patient is discussed with the staff. Pt is a 80 yo male with no prior known medical history. Pt presented to the ER on 12/1 with syncopal episode. He was admitted by hospitalist after CT head confirmed subdural hematoma that was not felt to require intervention. 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 He was taken to the OR for debridement and has been seen by Dr. Oli Fisher with plastic surgery for continued wound debridement. He has remained intubated for further anticipated procedures.      Subjective:     On vent  Sedated with Fentanyl but can open his eyes and nod his head  For OR this pm    Current Facility-Administered Medications   Medication Dose Route Frequency    insulin glargine (LANTUS) injection 10 Units  10 Units SubCUTAneous DAILY    bacitracin (BACIIM) 50,000/1000ml NS irrigation bottle    PRN    vancomycin (VANCOCIN) 1250 mg in  ml infusion  1,250 mg IntraVENous Q12H    enoxaparin (LOVENOX) injection 40 mg  40 mg SubCUTAneous Q24H    hydrALAZINE (APRESOLINE) 20 mg/mL injection 10 mg  10 mg IntraVENous Q4H PRN    alcohol 62% (NOZIN) nasal  1 Ampule  1 Ampule Topical Q12H    NUTRITIONAL SUPPORT ELECTROLYTE PRN ORDERS   Does Not Apply PRN    piperacillin-tazobactam (ZOSYN) 4.5 g in 0.9% sodium chloride (MBP/ADV) 100 mL  4.5 g IntraVENous Q8H    influenza vaccine 2017-18 (3 yrs+)(PF) (FLUZONE QUAD/FLUARIX QUAD) injection 0.5 mL  0.5 mL IntraMUSCular PRIOR TO DISCHARGE    chlorhexidine (PERIDEX) 0.12 % mouthwash 15 mL  15 mL Oral Q12H    insulin lispro (HUMALOG) injection   SubCUTAneous Q4H    pantoprazole (PROTONIX) 40 mg in sodium chloride 0.9 % 10 mL injection  40 mg IntraVENous Q24H    sodium chloride (NS) flush 5-10 mL  5-10 mL IntraVENous Q8H    sodium chloride (NS) flush 5-10 mL  5-10 mL IntraVENous PRN    fentaNYL in normal saline (pf) 25 mcg/mL infusion   mcg/hr IntraVENous TITRATE       Review of Systems    Unable assess      Objective:     Vitals:    12/12/17 0702 12/12/17 0802 12/12/17 0825 12/12/17 0901   BP: 122/64 110/56  103/56   Pulse: 89 87 90 86   Resp: 16 11 12 12   Temp:    98.4 °F (36.9 °C)   SpO2: 98% 99% 100% 99%   Weight:       Height:           Intake and Output:   12/10 1901 - 12/12 0700  In: 5798.4 [I.V.:1326.4]  [facility]: 7606 [facility]  12/12 0701 - 12/12 1900  In: 30   Out: -     Physical Exam:          Constitutional:  intubated and mechanically ventilated.   EENMT:  Sclera clear, pupils equal, oral mucosa moist  Respiratory: clear  Cardiovascular:  RRR with no M,G,R;  Gastrointestinal:  soft with no tenderness; positive bowel sounds present  Musculoskeletal:  warm with no cyanosis, no  lower leg edema, R leg -wound   Skin:  no jaundice or ecchymosis  Neurologic: no gross neuro deficits     Psychiatric:  Awake      LINES:  ETT, Gerardo, RIJ central line, colostomy, NG     DRIPS:  fentanyl     CXR:              Ventilator Settings  Mode FIO2 Rate Tidal Volume Pressure PEEP   SIMV, PRVC, Pressure support  40 %    500 ml  8 cm H2O  8 cm H20      Peak airway pressure: 16.9 cm H2O   Minute ventilation: 8.5 l/min     ABG:   Recent Labs      12/12/17   0320  12/11/17   0315  12/10/17   0303   PH  7.48*  7.50*  7.48*   PCO2  43  36  35   PO2  68*  104  78*   HCO3  32*  28*  25        LAB  Recent Labs      12/12/17   0918  12/11/17   2329  12/11/17   2007  12/11/17   1713  12/11/17   1223   GLUCPOC  204*  220*  205*  196*  202*     Recent Labs      12/12/17   0350  12/10/17   0431   WBC  9.9  8.3   HGB  8.2*  7.9*   HCT  26.1*  25.1*   PLT  287  231     Recent Labs      12/12/17   0350  12/10/17   0431   NA  139  141   K  4.1  3.8   CL  103  106   CO2  31  30   GLU  187*  208*   BUN  12  14   CREA  0.63*  0.67*   CA  7.3*  6.8*   ALB  1.1*   --    SGOT  17   -- Assessment:  (Medical Decision Making)     Hospital Problems  Date Reviewed: 12/7/2017          Codes Class Noted POA    Acute respiratory failure ([facility]   on vent  ICD-10-CM: J96.00  ICD-9-CM: 518.81  12/8/2017 Unknown    No changes till surgery is done with debridement     Hypernatremia    ICD-10-CM: E87.0  ICD-9-CM: 276.0  12/6/2017 Unknown    139    Subdural hematoma ([facility] ICD-10-CM: I62.00  ICD-9-CM: 432.1  12/1/2017 Yes        Type II diabetes mellitus with complication ([facility] TAP-25-FO: E11.8  ICD-9-CM: 250.90  12/1/2017 Yes        -220    Fasciitis   s/p multiple OR visits due to wash outs,  ICD-10-CM: M72.9  ICD-9-CM: 729.4  12/1/2017 Yes    For OR today    * (Principal)Severe sepsis with septic shock ([facility]   off pressors  ICD-10-CM: A41.9, R65.21  ICD-9-CM: 038.9, 995.92, 785.52  12/1/2017 Yes          CXR is worse. Will get Pro sunni and sputum culture and follow    Plan:  (Medical Decision Making)     - continue ABX -Vanco ,Zosyn. Will get Pro sunni and sputum culture   - he could be extubated but he is going to OR frequently and maybe in AM therefore will leave him on vent  For now   -cont. SSI  -follow labs  -IVF  --Albumin X2 today    More than 50% of the time documented was spent in face-to-face contact with the patient and in the care of the patient on the floor/unit where the patient is located.     Orestes Lomeli MD

## 2017-12-13 ENCOUNTER — ANESTHESIA EVENT (OUTPATIENT)
Dept: SURGERY | Age: 67
DRG: 853 | End: 2017-12-13
Payer: MEDICARE

## 2017-12-13 ENCOUNTER — APPOINTMENT (OUTPATIENT)
Dept: GENERAL RADIOLOGY | Age: 67
DRG: 853 | End: 2017-12-13
Attending: INTERNAL MEDICINE
Payer: MEDICARE

## 2017-12-13 ENCOUNTER — ANESTHESIA (OUTPATIENT)
Dept: SURGERY | Age: 67
DRG: 853 | End: 2017-12-13
Payer: MEDICARE

## 2017-12-13 LAB
ALBUMIN SERPL-MCNC: 1.7 G/DL (ref 3.2–4.6)
ALBUMIN/GLOB SERPL: 0.4 {RATIO} (ref 1.2–3.5)
ALP SERPL-CCNC: 86 U/L (ref 50–136)
ALT SERPL-CCNC: 11 U/L (ref 12–65)
ANION GAP SERPL CALC-SCNC: 5 MMOL/L (ref 7–16)
ARTERIAL PATENCY WRIST A: POSITIVE
AST SERPL-CCNC: 18 U/L (ref 15–37)
BASE EXCESS BLDA CALC-SCNC: 6.4 MMOL/L (ref 0–3)
BDY SITE: ABNORMAL
BILIRUB SERPL-MCNC: 0.5 MG/DL (ref 0.2–1.1)
BUN SERPL-MCNC: 9 MG/DL (ref 8–23)
CALCIUM SERPL-MCNC: 7.2 MG/DL (ref 8.3–10.4)
CHLORIDE SERPL-SCNC: 104 MMOL/L (ref 98–107)
CO2 SERPL-SCNC: 31 MMOL/L (ref 21–32)
COHGB MFR BLD: 1.8 % (ref 0.5–1.5)
CREAT SERPL-MCNC: 0.52 MG/DL (ref 0.8–1.5)
DO-HGB BLD-MCNC: 3 % (ref 0–5)
ERYTHROCYTE [DISTWIDTH] IN BLOOD BY AUTOMATED COUNT: 14.5 % (ref 11.9–14.6)
FIO2 ON VENT: 40 %
GLOBULIN SER CALC-MCNC: 4.3 G/DL (ref 2.3–3.5)
GLUCOSE BLD STRIP.AUTO-MCNC: 113 MG/DL (ref 65–100)
GLUCOSE BLD STRIP.AUTO-MCNC: 129 MG/DL (ref 65–100)
GLUCOSE BLD STRIP.AUTO-MCNC: 205 MG/DL (ref 65–100)
GLUCOSE BLD STRIP.AUTO-MCNC: 57 MG/DL (ref 65–100)
GLUCOSE BLD STRIP.AUTO-MCNC: 97 MG/DL (ref 65–100)
GLUCOSE SERPL-MCNC: 146 MG/DL (ref 65–100)
HCO3 BLDA-SCNC: 32 MMOL/L (ref 22–26)
HCT VFR BLD AUTO: 23.9 % (ref 41.1–50.3)
HGB BLD-MCNC: 7.4 G/DL (ref 13.6–17.2)
HGB BLDMV-MCNC: 8 GM/DL (ref 11.7–15)
MCH RBC QN AUTO: 28.4 PG (ref 26.1–32.9)
MCHC RBC AUTO-ENTMCNC: 31 G/DL (ref 31.4–35)
MCV RBC AUTO: 91.6 FL (ref 79.6–97.8)
METHGB MFR BLD: 0.6 % (ref 0–1.5)
OXYHGB MFR BLDA: 95 % (ref 94–97)
PCO2 BLDA: 50 MMHG (ref 35–45)
PEEP RESPIRATORY: 8 CM[H2O]
PH BLDA: 7.42 [PH] (ref 7.35–7.45)
PLATELET # BLD AUTO: 266 K/UL (ref 150–450)
PMV BLD AUTO: 11.3 FL (ref 10.8–14.1)
PO2 BLDA: 96 MMHG (ref 80–105)
POTASSIUM SERPL-SCNC: 3.9 MMOL/L (ref 3.5–5.1)
PROT SERPL-MCNC: 6 G/DL (ref 6.3–8.2)
RBC # BLD AUTO: 2.61 M/UL (ref 4.23–5.67)
RESP RATE: 12
SAO2 % BLD: 97 % (ref 92–98.5)
SODIUM SERPL-SCNC: 140 MMOL/L (ref 136–145)
VENTILATION MODE VENT: ABNORMAL
VT SETTING VENT: 450 ML
WBC # BLD AUTO: 7.5 K/UL (ref 4.3–11.1)

## 2017-12-13 PROCEDURE — 77030002916 HC SUT ETHLN J&J -A: Performed by: SURGERY

## 2017-12-13 PROCEDURE — 77030011283 HC ELECTRD NDL COVD -A: Performed by: SURGERY

## 2017-12-13 PROCEDURE — 74011636637 HC RX REV CODE- 636/637: Performed by: HOSPITALIST

## 2017-12-13 PROCEDURE — 77030006998

## 2017-12-13 PROCEDURE — 74011250637 HC RX REV CODE- 250/637: Performed by: SURGERY

## 2017-12-13 PROCEDURE — 76060000034 HC ANESTHESIA 1.5 TO 2 HR: Performed by: SURGERY

## 2017-12-13 PROCEDURE — 74011250636 HC RX REV CODE- 250/636: Performed by: INTERNAL MEDICINE

## 2017-12-13 PROCEDURE — 74011000250 HC RX REV CODE- 250: Performed by: SURGERY

## 2017-12-13 PROCEDURE — 65610000001 HC ROOM ICU GENERAL

## 2017-12-13 PROCEDURE — 74750000023 HC WOUND THERAPY

## 2017-12-13 PROCEDURE — 74011000250 HC RX REV CODE- 250

## 2017-12-13 PROCEDURE — 74011250636 HC RX REV CODE- 250/636: Performed by: SURGERY

## 2017-12-13 PROCEDURE — 77030019952 HC CANSTR VAC ASST KCON -B: Performed by: SURGERY

## 2017-12-13 PROCEDURE — 0JBL0ZZ EXCISION OF RIGHT UPPER LEG SUBCUTANEOUS TISSUE AND FASCIA, OPEN APPROACH: ICD-10-PCS | Performed by: SURGERY

## 2017-12-13 PROCEDURE — 36592 COLLECT BLOOD FROM PICC: CPT

## 2017-12-13 PROCEDURE — 74011000258 HC RX REV CODE- 258: Performed by: SURGERY

## 2017-12-13 PROCEDURE — 74011250636 HC RX REV CODE- 250/636

## 2017-12-13 PROCEDURE — 94003 VENT MGMT INPAT SUBQ DAY: CPT

## 2017-12-13 PROCEDURE — 77030018836 HC SOL IRR NACL ICUM -A: Performed by: SURGERY

## 2017-12-13 PROCEDURE — 77030019605

## 2017-12-13 PROCEDURE — C9113 INJ PANTOPRAZOLE SODIUM, VIA: HCPCS | Performed by: SURGERY

## 2017-12-13 PROCEDURE — 77030018673: Performed by: SURGERY

## 2017-12-13 PROCEDURE — 74011000258 HC RX REV CODE- 258: Performed by: INTERNAL MEDICINE

## 2017-12-13 PROCEDURE — 74011000250 HC RX REV CODE- 250: Performed by: INTERNAL MEDICINE

## 2017-12-13 PROCEDURE — 82962 GLUCOSE BLOOD TEST: CPT

## 2017-12-13 PROCEDURE — 74011250637 HC RX REV CODE- 250/637: Performed by: INTERNAL MEDICINE

## 2017-12-13 PROCEDURE — 76010000149 HC OR TIME 1 TO 1.5 HR: Performed by: SURGERY

## 2017-12-13 PROCEDURE — 77030031476 HC EXCH HEAT MOISTW FLTR HALY -A

## 2017-12-13 PROCEDURE — 36600 WITHDRAWAL OF ARTERIAL BLOOD: CPT

## 2017-12-13 PROCEDURE — 71010 XR CHEST SNGL V: CPT

## 2017-12-13 PROCEDURE — 77030019908 HC STETH ESOPH SIMS -A: Performed by: ANESTHESIOLOGY

## 2017-12-13 PROCEDURE — 99233 SBSQ HOSP IP/OBS HIGH 50: CPT | Performed by: INTERNAL MEDICINE

## 2017-12-13 PROCEDURE — 85027 COMPLETE CBC AUTOMATED: CPT | Performed by: INTERNAL MEDICINE

## 2017-12-13 PROCEDURE — 80053 COMPREHEN METABOLIC PANEL: CPT | Performed by: INTERNAL MEDICINE

## 2017-12-13 PROCEDURE — 77030018717 HC DRSG GRNUFM KCON -B: Performed by: SURGERY

## 2017-12-13 PROCEDURE — 77030020788: Performed by: SURGERY

## 2017-12-13 PROCEDURE — 74011636637 HC RX REV CODE- 636/637: Performed by: INTERNAL MEDICINE

## 2017-12-13 PROCEDURE — 77030013727 HC IRR FAN PULSVC ZIMM -B: Performed by: SURGERY

## 2017-12-13 PROCEDURE — 77030008467 HC STPLR SKN COVD -B: Performed by: SURGERY

## 2017-12-13 PROCEDURE — 77030018836 HC SOL IRR NACL ICUM -A

## 2017-12-13 PROCEDURE — 77030011640 HC PAD GRND REM COVD -A: Performed by: SURGERY

## 2017-12-13 PROCEDURE — 82803 BLOOD GASES ANY COMBINATION: CPT

## 2017-12-13 PROCEDURE — 94660 CPAP INITIATION&MGMT: CPT

## 2017-12-13 RX ORDER — FENTANYL CITRATE 50 UG/ML
INJECTION, SOLUTION INTRAMUSCULAR; INTRAVENOUS AS NEEDED
Status: DISCONTINUED | OUTPATIENT
Start: 2017-12-13 | End: 2017-12-13 | Stop reason: HOSPADM

## 2017-12-13 RX ORDER — SODIUM CHLORIDE, SODIUM LACTATE, POTASSIUM CHLORIDE, CALCIUM CHLORIDE 600; 310; 30; 20 MG/100ML; MG/100ML; MG/100ML; MG/100ML
INJECTION, SOLUTION INTRAVENOUS
Status: DISCONTINUED | OUTPATIENT
Start: 2017-12-13 | End: 2017-12-13 | Stop reason: HOSPADM

## 2017-12-13 RX ORDER — VANCOMYCIN HYDROCHLORIDE
1250 EVERY 12 HOURS
Status: COMPLETED | OUTPATIENT
Start: 2017-12-13 | End: 2017-12-19

## 2017-12-13 RX ORDER — DEXTROSE 50 % IN WATER (D50W) INTRAVENOUS SYRINGE
Status: ACTIVE
Start: 2017-12-13 | End: 2017-12-14

## 2017-12-13 RX ORDER — DEXTROSE 50 % IN WATER (D50W) INTRAVENOUS SYRINGE
25 AS NEEDED
Status: DISCONTINUED | OUTPATIENT
Start: 2017-12-13 | End: 2017-12-29 | Stop reason: HOSPADM

## 2017-12-13 RX ORDER — ONDANSETRON 2 MG/ML
INJECTION INTRAMUSCULAR; INTRAVENOUS AS NEEDED
Status: DISCONTINUED | OUTPATIENT
Start: 2017-12-13 | End: 2017-12-13 | Stop reason: HOSPADM

## 2017-12-13 RX ADMIN — HYDROMORPHONE HYDROCHLORIDE 1 MG: 2 INJECTION, SOLUTION INTRAMUSCULAR; INTRAVENOUS; SUBCUTANEOUS at 14:33

## 2017-12-13 RX ADMIN — SODIUM CHLORIDE, SODIUM LACTATE, POTASSIUM CHLORIDE, CALCIUM CHLORIDE: 600; 310; 30; 20 INJECTION, SOLUTION INTRAVENOUS at 17:50

## 2017-12-13 RX ADMIN — Medication 10 ML: at 05:06

## 2017-12-13 RX ADMIN — FENTANYL CITRATE 100 MCG: 50 INJECTION, SOLUTION INTRAMUSCULAR; INTRAVENOUS at 19:01

## 2017-12-13 RX ADMIN — INSULIN LISPRO 4 UNITS: 100 INJECTION, SOLUTION INTRAVENOUS; SUBCUTANEOUS at 08:31

## 2017-12-13 RX ADMIN — VANCOMYCIN HYDROCHLORIDE 1250 MG: 10 INJECTION, POWDER, LYOPHILIZED, FOR SOLUTION INTRAVENOUS at 05:06

## 2017-12-13 RX ADMIN — Medication 1 AMPULE: at 08:31

## 2017-12-13 RX ADMIN — SODIUM CHLORIDE 100 ML/HR: 900 INJECTION, SOLUTION INTRAVENOUS at 22:01

## 2017-12-13 RX ADMIN — CHLORHEXIDINE GLUCONATE 15 ML: 1.2 RINSE ORAL at 21:09

## 2017-12-13 RX ADMIN — SODIUM CHLORIDE 100 ML/HR: 900 INJECTION, SOLUTION INTRAVENOUS at 02:41

## 2017-12-13 RX ADMIN — ONDANSETRON 4 MG: 2 INJECTION INTRAMUSCULAR; INTRAVENOUS at 18:29

## 2017-12-13 RX ADMIN — Medication 125 MCG/HR: at 02:41

## 2017-12-13 RX ADMIN — PIPERACILLIN SODIUM,TAZOBACTAM SODIUM 4.5 G: 4; .5 INJECTION, POWDER, FOR SOLUTION INTRAVENOUS at 05:06

## 2017-12-13 RX ADMIN — Medication 10 ML: at 15:02

## 2017-12-13 RX ADMIN — Medication 1 AMPULE: at 21:09

## 2017-12-13 RX ADMIN — PIPERACILLIN SODIUM,TAZOBACTAM SODIUM 4.5 G: 4; .5 INJECTION, POWDER, FOR SOLUTION INTRAVENOUS at 13:06

## 2017-12-13 RX ADMIN — SODIUM CHLORIDE 40 MG: 9 INJECTION INTRAMUSCULAR; INTRAVENOUS; SUBCUTANEOUS at 15:01

## 2017-12-13 RX ADMIN — HYDROMORPHONE HYDROCHLORIDE 1 MG: 2 INJECTION, SOLUTION INTRAMUSCULAR; INTRAVENOUS; SUBCUTANEOUS at 01:51

## 2017-12-13 RX ADMIN — CHLORHEXIDINE GLUCONATE 15 ML: 1.2 RINSE ORAL at 08:31

## 2017-12-13 RX ADMIN — ENOXAPARIN SODIUM 40 MG: 40 INJECTION SUBCUTANEOUS at 13:07

## 2017-12-13 RX ADMIN — VANCOMYCIN HYDROCHLORIDE 1250 MG: 10 INJECTION, POWDER, LYOPHILIZED, FOR SOLUTION INTRAVENOUS at 17:17

## 2017-12-13 RX ADMIN — DEXTROSE MONOHYDRATE 12.5 G: 25 INJECTION, SOLUTION INTRAVENOUS at 21:27

## 2017-12-13 RX ADMIN — Medication 150 MCG/HR: at 22:02

## 2017-12-13 RX ADMIN — SODIUM CHLORIDE, SODIUM LACTATE, POTASSIUM CHLORIDE, CALCIUM CHLORIDE: 600; 310; 30; 20 INJECTION, SOLUTION INTRAVENOUS at 18:47

## 2017-12-13 RX ADMIN — SODIUM CHLORIDE 100 ML/HR: 900 INJECTION, SOLUTION INTRAVENOUS at 16:36

## 2017-12-13 RX ADMIN — PIPERACILLIN SODIUM,TAZOBACTAM SODIUM 4.5 G: 4; .5 INJECTION, POWDER, FOR SOLUTION INTRAVENOUS at 21:09

## 2017-12-13 RX ADMIN — Medication 10 ML: at 21:10

## 2017-12-13 RX ADMIN — INSULIN GLARGINE 10 UNITS: 100 INJECTION, SOLUTION SUBCUTANEOUS at 08:31

## 2017-12-13 NOTE — ANESTHESIA POSTPROCEDURE EVALUATION
Post-Anesthesia Evaluation and Assessment    Patient: Justus Tothh MRN: [id number]  SSN: [id number]    YOB: 1950  Age: 79 y.o. Sex: male       Cardiovascular Function/Vital Signs  Visit Vitals    /56    Pulse 82    Temp 36.5 °C (97.7 °F)    Resp 16    Ht 5' 11\" (1.803 m)    Wt 81 kg (178 lb 9.2 oz)    SpO2 100%    BMI 24.91 kg/m2       Patient is status post general anesthesia for Procedure(s):  DEBRIDEMENT RIGHT LEG WOUND   . Nausea/Vomiting: None    Postoperative hydration reviewed and adequate. Pain:  Pain Scale 1: Adult Nonverbal Pain Scale (12/13/17 0342)  Pain Intensity 1: 0 (12/13/17 0342)   Managed    Neurological Status:   Neuro  Neurologic State: Lethargic; Pharmacologically induced (comment); Eyes open spontaneously; Eyes open to voice (12/12/17 1910)  Orientation Level: Unable to verbalize (12/12/17 1910)  Cognition: Follows commands;Decreased attention/concentration (12/12/17 1910)  Speech: Intubated; Nods appropriately (12/12/17 1910)  Assessment L Pupil: Round;Brisk (12/12/17 1910)  Size L Pupil (mm): 2 (12/12/17 1910)  Assessment R Pupil: Brisk;Round (12/12/17 1910)  Size R Pupil (mm): 2 (12/12/17 1910)  LUE Motor Response: Weak;Purposeful (12/12/17 1910)  LLE Motor Response: Weak;Purposeful (12/12/17 1910)  RUE Motor Response: Weak;Purposeful (12/12/17 1910)  RLE Motor Response: Weak;Purposeful (12/12/17 1910)   At baseline    Mental Status and Level of Consciousness: Arousable    Pulmonary Status:   O2 Device: BIPAP (12/13/17 0009)   Adequate oxygenation and airway patent    Complications related to anesthesia: None    Post-anesthesia assessment completed.  No concerns    Signed By: Kera Chery MD     December 13, 2017

## 2017-12-13 NOTE — PROGRESS NOTES
Critical Care Daily Progress Note: 12/13/2017  Admission Date: 12/1/2017       The patient's chart is reviewed and the patient is discussed with the staff. Pt is a 78 yo male with no prior known medical history. Pt presented to the ER on 12/1 with syncopal episode. He was admitted by hospitalist after CT head confirmed subdural hematoma that was not felt to require intervention. 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 He was taken to the OR for debridement and has been seen by Dr. Tereza Caal with plastic surgery for continued wound debridement. He has remained intubated for further anticipated procedures.      Subjective:     Did not go to OR yesterday  On vent  Sedated with Fentanyl   For OR this pm    Current Facility-Administered Medications   Medication Dose Route Frequency    0.9% sodium chloride infusion  100 mL/hr IntraVENous CONTINUOUS    HYDROmorphone (PF) (DILAUDID) injection 1 mg  1 mg IntraVENous Q4H PRN    insulin glargine (LANTUS) injection 10 Units  10 Units SubCUTAneous DAILY    bacitracin (BACIIM) 50,000/1000ml NS irrigation bottle    PRN    vancomycin (VANCOCIN) 1250 mg in  ml infusion  1,250 mg IntraVENous Q12H    enoxaparin (LOVENOX) injection 40 mg  40 mg SubCUTAneous Q24H    hydrALAZINE (APRESOLINE) 20 mg/mL injection 10 mg  10 mg IntraVENous Q4H PRN    alcohol 62% (NOZIN) nasal  1 Ampule  1 Ampule Topical Q12H    NUTRITIONAL SUPPORT ELECTROLYTE PRN ORDERS   Does Not Apply PRN    piperacillin-tazobactam (ZOSYN) 4.5 g in 0.9% sodium chloride (MBP/ADV) 100 mL  4.5 g IntraVENous Q8H    influenza vaccine 2017-18 (3 yrs+)(PF) (FLUZONE QUAD/FLUARIX QUAD) injection 0.5 mL  0.5 mL IntraMUSCular PRIOR TO DISCHARGE    chlorhexidine (PERIDEX) 0.12 % mouthwash 15 mL  15 mL Oral Q12H    insulin lispro (HUMALOG) injection   SubCUTAneous Q4H    pantoprazole (PROTONIX) 40 mg in sodium chloride 0.9 % 10 mL injection  40 mg IntraVENous Q24H    sodium chloride (NS) flush 5-10 mL  5-10 mL IntraVENous Q8H    sodium chloride (NS) flush 5-10 mL  5-10 mL IntraVENous PRN    fentaNYL in normal saline (pf) 25 mcg/mL infusion   mcg/hr IntraVENous TITRATE       Review of Systems    Unable assess      Objective:     Vitals:    12/13/17 0601 12/13/17 0701 12/13/17 0801 12/13/17 0811   BP: 120/56 117/58 117/59    Pulse: 82 89 87 86   Resp: 16 14 16 12   Temp:   98 °F (36.7 °C)    SpO2: 100% 100% 100% 100%   Weight:       Height:           Intake and Output:   12/11 1901 - 12/13 0700  In: 6220 [I.V.:3169]  Out: 0549 [Urine:4525]       Physical Exam:          Constitutional:  intubated and mechanically ventilated.   EENMT:  Sclera clear, pupils equal, oral mucosa moist  Respiratory: clear  Cardiovascular:  RRR with no M,G,R;  Gastrointestinal:  soft with no tenderness; positive bowel sounds present  Musculoskeletal:  warm with no cyanosis, no  lower leg edema, R leg -wound   Skin:  no jaundice or ecchymosis  Neurologic: no gross neuro deficits     Psychiatric:  Awake      LINES:  ETT, Gerardo, RIJ central line, colostomy, NG     DRIPS:  fentanyl     CXR:        \      Ventilator Settings  Mode FIO2 Rate Tidal Volume Pressure PEEP   SIMV  41 %    450 ml  8 cm H2O  8 cm H20      Peak airway pressure: 18.6 cm H2O   Minute ventilation: 6.8 l/min     ABG:   Recent Labs      12/13/17   0320  12/12/17   0320  12/11/17   0315   PH  7.42  7.48*  7.50*   PCO2  50*  43  36   PO2  96  68*  104   HCO3  32*  32*  28*        LAB  Recent Labs      12/13/17   0830  12/12/17   2322  12/12/17 2010 12/12/17   1708  12/12/17   1224   GLUCPOC  205*  171*  139*  159*  163*     Recent Labs      12/13/17   0353  12/12/17   0350   WBC  7.5  9.9   HGB  7.4*  8.2*   HCT  23.9*  26.1*   PLT  266  287     Recent Labs      12/13/17   0353  12/12/17   0350   NA  140  139   K  3.9  4.1   CL  104  103   CO2  31  31   GLU  146*  187*   BUN  9  12   CREA  0.52* 0.63*   CA  7.2*  7.3*   ALB  1.7*  1.1*   SGOT  18  17         Assessment:  (Medical Decision Making)     Hospital Problems  Date Reviewed: 12/7/2017          Codes Class Noted POA    Acute respiratory failure ([facility]   on vent  ICD-10-CM: J96.00  ICD-9-CM: 518.81  12/8/2017 Unknown    No changes till surgery is done with debridement   Hope to wean and extubate post surgery today    Hypernatremia    ICD-10-CM: E87.0  ICD-9-CM: 276.0  12/6/2017 Unknown     Na140    [facility] ICD-10-CM: I62.00  ICD-9-CM: 432.1  12/1/2017 Yes        Type II diabetes mellitus with complication ([facility] JOL-62-GO: E11.8  ICD-9-CM: 250.90  12/1/2017 Yes        -205    Fasciitis   s/p multiple OR visits due to wash outs,  ICD-10-CM: M72.9  ICD-9-CM: 729.4  12/1/2017 Yes    For OR today    * (Principal)Severe sepsis with septic shock ([facility]   off pressors  ICD-10-CM: A41.9, R65.21  ICD-9-CM: 038.9, 995.92, 785.52  12/1/2017 Yes          CXR is noted. Pro sunni is 0.2. Finish ABX 2 weeks      Plan:  (Medical Decision Making)     - continue ABX -Vanco ,Zosyn D #13 of 14  -cont. SSI  -follow labs  -IVF  -hope to extubate post surgery today    More than 50% of the time documented was spent in face-to-face contact with the patient and in the care of the patient on the floor/unit where the patient is located.     Dorothea Mancilla MD

## 2017-12-13 NOTE — WOUND CARE
Patient seen for small scab to right side of nose (nare) from reported infra red probe. Area dry and without redness at this time. No dressing required. Continue to monitor and re-consult wound team if it develops redness or drainage.

## 2017-12-13 NOTE — PROGRESS NOTES
TRANSFER - OUT REPORT:    Verbal report given to Martha Saldana (name) on [facility]  being transferred to OR(unit) for ordered procedure       Report consisted of patients Situation, Background, Assessment and   Recommendations(SBAR). Information from the following report(s) SBAR, Kardex, OR Summary, Procedure Summary, Intake/Output, MAR, Accordion, Recent Results, Med Rec Status, Cardiac Rhythm SR and Alarm Parameters  was reviewed with the receiving nurse. Opportunity for questions and clarification was provided. Patient transported with:   Monitor  O2 @ 15 liters  Registered Nurse  Tech   Respiratory therapist    Fentanyl drip @ 150 mcg/hr.

## 2017-12-13 NOTE — PROGRESS NOTES
No new issues. Discussed with ICU team. Patient ready to extubate. Tolerating dressing changes. Dressings c/d/i. No strikethrough. On vent, responsive    Will proceed with washout and vac placement (no residual signs of overt wound infection). ICU to extubate following. Plan to leave vac in place plan for SG early next week.

## 2017-12-13 NOTE — PROGRESS NOTES
made visit for possible pre-op prayer. Pt was unable to communicate. Daughter was present and shared her emotions and challenges in caring for her father as an only child. Spoke of children and pts siblings who can only do/help in a limited fashion. Pt is on waiting list for surgery. Another  visited earlier and had prayer with the pt and daughter.  provided spiritual care through presence, pastoral conversation, and assurance of our prayers.

## 2017-12-13 NOTE — PROGRESS NOTES
A follow up visit was made to the patient. Support and prayer were provided.  talked with patient's daughter at length and provided active listening. She had some concerns about a procedure the patient needs and she shared her concerns with his nurse.       [employer]

## 2017-12-13 NOTE — PROGRESS NOTES
Bedside verbal report received from [facility]. Patient responds to voice, follows commands in all extremeties. Dressing clean, intact, some moisture weeping through. Patient denies pain. VSS. Tube Feedings restarted at this time.

## 2017-12-13 NOTE — PROGRESS NOTES
Bedside and Verbal shift change report given to Christiane Hernández (oncoming nurse) by Modesta Degroot (offgoing nurse). Report included the following information SBAR, Kardex, OR Summary, Procedure Summary, Intake/Output, MAR, Accordion, Recent Results, Med Rec Status, Cardiac Rhythm SR and Alarm Parameters .

## 2017-12-13 NOTE — PROGRESS NOTES
Ventilator check complete; patient has a #8. 0 ET tube secured at the 22 at the lip. Patient is not sedated. Patient is able to follow commands. Breath sounds are diminished. Trachea is midline, Negative for subcutaneous air, and chest excursion is symmetric. Patient is also Negative for cyanosis and is Negative for pitting edema. All alarms are set and audible. Resuscitation bag is at the head of the bed.       Ventilator Settings  Mode FIO2 Rate Tidal Volume Pressure PEEP I:E Ratio   SIMV, PRVC, Pressure support  86 %    450 ml  8 cm H2O  8 cm H20  1:3.33      Peak airway pressure: 20.4 cm H2O   Minute ventilation: 5.7 l/min     ABG:   Recent Labs      12/13/17   0320  12/12/17   0320  12/11/17   0315   PH  7.42  7.48*  7.50*   PCO2  50*  43  36   PO2  96  68*  104   HCO3  32*  32*  28*         Khai Escamilla, RT

## 2017-12-13 NOTE — INTERDISCIPLINARY ROUNDS
Interdisciplinary team rounds were held 12/13/2017 with the following team members:Nursing, Nurse Practitioner, Nutrition, Palliative Care, Pastoral Care, Pharmacy, Physical Therapy, Physician, Respiratory Therapy and Clinical Coordinator and the patient. Plan of care discussed. See clinical pathway and/or care plan for interventions and desired outcomes.

## 2017-12-13 NOTE — PROGRESS NOTES
Ventilator check complete; patient has a #8. 0 ET tube secured at the 22 at the lip. Patient is not able to follow commands. Breath sounds are diminished. Trachea is midline, Negative for subcutaneous air, and chest excursion is symmetric. Patient is also Negative for cyanosis and is Negative for pitting edema. All alarms are set and audible. Resuscitation bag is at the head of the bed.       Ventilator Settings  Mode FIO2 Rate Tidal Volume Pressure PEEP I:E Ratio   SIMV, PRVC, Pressure support  40%   12 450 ml  8 cm H2O  8 cm H20  1:3.33      Peak airway pressure: 20.4 cm H2O   Minute ventilation: 5.7 l/min     ABG:   Recent Labs      12/13/17   0320  12/12/17   0320  12/11/17   0315   PH  7.42  7.48*  7.50*   PCO2  50*  43  36   PO2  96  68*  104   HCO3  32*  32*  28*         Lorenzo Mayt

## 2017-12-14 ENCOUNTER — APPOINTMENT (OUTPATIENT)
Dept: GENERAL RADIOLOGY | Age: 67
DRG: 853 | End: 2017-12-14
Attending: INTERNAL MEDICINE
Payer: MEDICARE

## 2017-12-14 LAB
ANION GAP SERPL CALC-SCNC: 5 MMOL/L (ref 7–16)
ARTERIAL PATENCY WRIST A: POSITIVE
BACTERIA SPEC CULT: NORMAL
BASE EXCESS BLDA CALC-SCNC: 7.5 MMOL/L (ref 0–3)
BASOPHILS # BLD: 0 K/UL (ref 0–0.2)
BASOPHILS NFR BLD: 0 % (ref 0–2)
BDY SITE: ABNORMAL
BUN SERPL-MCNC: 7 MG/DL (ref 8–23)
CALCIUM SERPL-MCNC: 7.5 MG/DL (ref 8.3–10.4)
CHLORIDE SERPL-SCNC: 103 MMOL/L (ref 98–107)
CO2 SERPL-SCNC: 32 MMOL/L (ref 21–32)
COHGB MFR BLD: 1.3 % (ref 0.5–1.5)
CREAT SERPL-MCNC: 0.58 MG/DL (ref 0.8–1.5)
DIFFERENTIAL METHOD BLD: ABNORMAL
DO-HGB BLD-MCNC: 2 % (ref 0–5)
EOSINOPHIL # BLD: 0 K/UL (ref 0–0.8)
EOSINOPHIL NFR BLD: 0 % (ref 0.5–7.8)
ERYTHROCYTE [DISTWIDTH] IN BLOOD BY AUTOMATED COUNT: 14.4 % (ref 11.9–14.6)
FIO2 ON VENT: 40 %
GLUCOSE BLD STRIP.AUTO-MCNC: 108 MG/DL (ref 65–100)
GLUCOSE BLD STRIP.AUTO-MCNC: 135 MG/DL (ref 65–100)
GLUCOSE BLD STRIP.AUTO-MCNC: 150 MG/DL (ref 65–100)
GLUCOSE BLD STRIP.AUTO-MCNC: 154 MG/DL (ref 65–100)
GLUCOSE BLD STRIP.AUTO-MCNC: 185 MG/DL (ref 65–100)
GLUCOSE SERPL-MCNC: 154 MG/DL (ref 65–100)
GRAM STN SPEC: NORMAL
HCO3 BLDA-SCNC: 33 MMOL/L (ref 22–26)
HCT VFR BLD AUTO: 23.8 % (ref 41.1–50.3)
HGB BLD-MCNC: 7.5 G/DL (ref 13.6–17.2)
HGB BLDMV-MCNC: 9.1 GM/DL (ref 11.7–15)
IMM GRANULOCYTES # BLD: 0.1 K/UL (ref 0–0.5)
IMM GRANULOCYTES NFR BLD AUTO: 1 % (ref 0–5)
LYMPHOCYTES # BLD: 1.2 K/UL (ref 0.5–4.6)
LYMPHOCYTES NFR BLD: 13 % (ref 13–44)
MCH RBC QN AUTO: 28.8 PG (ref 26.1–32.9)
MCHC RBC AUTO-ENTMCNC: 31.5 G/DL (ref 31.4–35)
MCV RBC AUTO: 91.5 FL (ref 79.6–97.8)
METHGB MFR BLD: 0.6 % (ref 0–1.5)
MONOCYTES # BLD: 0.6 K/UL (ref 0.1–1.3)
MONOCYTES NFR BLD: 6 % (ref 4–12)
NEUTS SEG # BLD: 7.3 K/UL (ref 1.7–8.2)
NEUTS SEG NFR BLD: 80 % (ref 43–78)
OXYHGB MFR BLDA: 96.1 % (ref 94–97)
PCO2 BLDA: 52 MMHG (ref 35–45)
PEEP RESPIRATORY: 8 CM[H2O]
PH BLDA: 7.42 [PH] (ref 7.35–7.45)
PLATELET # BLD AUTO: 258 K/UL (ref 150–450)
PMV BLD AUTO: 11.4 FL (ref 10.8–14.1)
PO2 BLDA: 109 MMHG (ref 80–105)
POTASSIUM SERPL-SCNC: 3.9 MMOL/L (ref 3.5–5.1)
RBC # BLD AUTO: 2.6 M/UL (ref 4.23–5.67)
SAO2 % BLD: 98 % (ref 92–98.5)
SERVICE CMNT-IMP: ABNORMAL
SERVICE CMNT-IMP: NORMAL
SODIUM SERPL-SCNC: 140 MMOL/L (ref 136–145)
VENTILATION MODE VENT: ABNORMAL
WBC # BLD AUTO: 9.1 K/UL (ref 4.3–11.1)

## 2017-12-14 PROCEDURE — 74000 XR ABD (KUB): CPT

## 2017-12-14 PROCEDURE — 74011250636 HC RX REV CODE- 250/636: Performed by: INTERNAL MEDICINE

## 2017-12-14 PROCEDURE — 74011000250 HC RX REV CODE- 250: Performed by: SURGERY

## 2017-12-14 PROCEDURE — 92610 EVALUATE SWALLOWING FUNCTION: CPT

## 2017-12-14 PROCEDURE — 36600 WITHDRAWAL OF ARTERIAL BLOOD: CPT

## 2017-12-14 PROCEDURE — 65610000001 HC ROOM ICU GENERAL

## 2017-12-14 PROCEDURE — 74011636637 HC RX REV CODE- 636/637: Performed by: HOSPITALIST

## 2017-12-14 PROCEDURE — 74011000258 HC RX REV CODE- 258: Performed by: INTERNAL MEDICINE

## 2017-12-14 PROCEDURE — 77010033678 HC OXYGEN DAILY

## 2017-12-14 PROCEDURE — 74750000023 HC WOUND THERAPY

## 2017-12-14 PROCEDURE — 74011636637 HC RX REV CODE- 636/637: Performed by: INTERNAL MEDICINE

## 2017-12-14 PROCEDURE — 77030018798 HC PMP KT ENTRL FED COVD -A

## 2017-12-14 PROCEDURE — 94003 VENT MGMT INPAT SUBQ DAY: CPT

## 2017-12-14 PROCEDURE — 36591 DRAW BLOOD OFF VENOUS DEVICE: CPT

## 2017-12-14 PROCEDURE — 74011250636 HC RX REV CODE- 250/636: Performed by: SURGERY

## 2017-12-14 PROCEDURE — 74011250637 HC RX REV CODE- 250/637: Performed by: INTERNAL MEDICINE

## 2017-12-14 PROCEDURE — C9113 INJ PANTOPRAZOLE SODIUM, VIA: HCPCS | Performed by: SURGERY

## 2017-12-14 PROCEDURE — 85025 COMPLETE CBC W/AUTO DIFF WBC: CPT | Performed by: INTERNAL MEDICINE

## 2017-12-14 PROCEDURE — 80048 BASIC METABOLIC PNL TOTAL CA: CPT | Performed by: INTERNAL MEDICINE

## 2017-12-14 PROCEDURE — 97162 PT EVAL MOD COMPLEX 30 MIN: CPT

## 2017-12-14 PROCEDURE — 99291 CRITICAL CARE FIRST HOUR: CPT | Performed by: INTERNAL MEDICINE

## 2017-12-14 PROCEDURE — 77030019605

## 2017-12-14 PROCEDURE — 82803 BLOOD GASES ANY COMBINATION: CPT

## 2017-12-14 PROCEDURE — 77030004950 HC CATH ENTRL NG COVD -A

## 2017-12-14 PROCEDURE — 74011250637 HC RX REV CODE- 250/637: Performed by: SURGERY

## 2017-12-14 PROCEDURE — 82962 GLUCOSE BLOOD TEST: CPT

## 2017-12-14 RX ADMIN — SODIUM CHLORIDE 40 MG: 9 INJECTION INTRAMUSCULAR; INTRAVENOUS; SUBCUTANEOUS at 15:30

## 2017-12-14 RX ADMIN — INSULIN LISPRO 2 UNITS: 100 INJECTION, SOLUTION INTRAVENOUS; SUBCUTANEOUS at 15:30

## 2017-12-14 RX ADMIN — INSULIN GLARGINE 10 UNITS: 100 INJECTION, SOLUTION SUBCUTANEOUS at 09:15

## 2017-12-14 RX ADMIN — Medication 1 AMPULE: at 09:12

## 2017-12-14 RX ADMIN — Medication 10 ML: at 15:22

## 2017-12-14 RX ADMIN — PIPERACILLIN SODIUM,TAZOBACTAM SODIUM 4.5 G: 4; .5 INJECTION, POWDER, FOR SOLUTION INTRAVENOUS at 12:22

## 2017-12-14 RX ADMIN — CHLORHEXIDINE GLUCONATE 15 ML: 1.2 RINSE ORAL at 09:12

## 2017-12-14 RX ADMIN — CHLORHEXIDINE GLUCONATE 15 ML: 1.2 RINSE ORAL at 20:11

## 2017-12-14 RX ADMIN — INSULIN LISPRO 2 UNITS: 100 INJECTION, SOLUTION INTRAVENOUS; SUBCUTANEOUS at 20:10

## 2017-12-14 RX ADMIN — SODIUM CHLORIDE 100 ML/HR: 900 INJECTION, SOLUTION INTRAVENOUS at 17:09

## 2017-12-14 RX ADMIN — ENOXAPARIN SODIUM 40 MG: 40 INJECTION SUBCUTANEOUS at 12:21

## 2017-12-14 RX ADMIN — Medication 1 AMPULE: at 20:16

## 2017-12-14 RX ADMIN — Medication 10 ML: at 22:27

## 2017-12-14 RX ADMIN — INSULIN LISPRO 2 UNITS: 100 INJECTION, SOLUTION INTRAVENOUS; SUBCUTANEOUS at 12:12

## 2017-12-14 RX ADMIN — PIPERACILLIN SODIUM,TAZOBACTAM SODIUM 4.5 G: 4; .5 INJECTION, POWDER, FOR SOLUTION INTRAVENOUS at 04:50

## 2017-12-14 RX ADMIN — Medication 10 ML: at 05:45

## 2017-12-14 RX ADMIN — VANCOMYCIN HYDROCHLORIDE 1250 MG: 10 INJECTION, POWDER, LYOPHILIZED, FOR SOLUTION INTRAVENOUS at 05:44

## 2017-12-14 RX ADMIN — VANCOMYCIN HYDROCHLORIDE 1250 MG: 10 INJECTION, POWDER, LYOPHILIZED, FOR SOLUTION INTRAVENOUS at 17:03

## 2017-12-14 RX ADMIN — PIPERACILLIN SODIUM,TAZOBACTAM SODIUM 4.5 G: 4; .5 INJECTION, POWDER, FOR SOLUTION INTRAVENOUS at 22:27

## 2017-12-14 NOTE — PROGRESS NOTES
Bedside and Verbal shift change report given to [address] (oncoming nurse) by Jenna Temple (offgoing nurse). Report included the following information SBAR, Kardex, OR Summary, Procedure Summary, Intake/Output, MAR, Accordion, Recent Results, Med Rec Status, Cardiac Rhythm SR and Alarm Parameters .

## 2017-12-14 NOTE — PROGRESS NOTES
Ventilator check complete; patient has a #8. 0 ET tube secured at the 22 at the lip. Patient is not sedated. Patient is able to follow commands. Breath sounds are diminished. Trachea is midline, Negative for subcutaneous air, and chest excursion is symmetric. Patient is also Negative for cyanosis and is Negative for pitting edema. All alarms are set and audible. Resuscitation bag is at the head of the bed.       Ventilator Settings  Mode FIO2 Rate Tidal Volume Pressure PEEP I:E Ratio   Pressure support  30 % (weaned from 40% post ABG)    450 ml  5 cm H2O  8 cm H20  1:3.33      Peak airway pressure: 14 cm H2O   Minute ventilation: 5.4 l/min     ABG:   Recent Labs      12/14/17   0315  12/13/17   0320  12/12/17   0320   PH  7.42  7.42  7.48*   PCO2  52*  50*  43   PO2  109*  96  68*   HCO3  33*  32*  32*         Khai Kitchen, RT

## 2017-12-14 NOTE — PROGRESS NOTES
Critical Care Daily Progress Note: 12/14/2017  Admission Date: 12/1/2017       The patient's chart is reviewed and the patient is discussed with the staff. Pt is a 80 yo male with no prior known medical history. Pt presented to the ER on 12/1 with syncopal episode. He was admitted by hospitalist after CT head confirmed subdural hematoma that was not felt to require intervention. 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 He was taken to the OR for debridement and has been seen by Dr. Cirilo Zaidi with plastic surgery for continued wound debridement. He has remained intubated for further anticipated procedures.      Subjective:     Awake and alert seems ready to wean off ventilator    Current Facility-Administered Medications   Medication Dose Route Frequency    vancomycin (VANCOCIN) 1250 mg in  ml infusion  1,250 mg IntraVENous Q12H    piperacillin-tazobactam (ZOSYN) 4.5 g in 0.9% sodium chloride (MBP/ADV) 100 mL  4.5 g IntraVENous Q8H    dextrose (D50W) injection syrg 12.5 g  25 mL IntraVENous PRN    0.9% sodium chloride infusion  100 mL/hr IntraVENous CONTINUOUS    HYDROmorphone (PF) (DILAUDID) injection 1 mg  1 mg IntraVENous Q4H PRN    insulin glargine (LANTUS) injection 10 Units  10 Units SubCUTAneous DAILY    enoxaparin (LOVENOX) injection 40 mg  40 mg SubCUTAneous Q24H    hydrALAZINE (APRESOLINE) 20 mg/mL injection 10 mg  10 mg IntraVENous Q4H PRN    alcohol 62% (NOZIN) nasal  1 Ampule  1 Ampule Topical Q12H    NUTRITIONAL SUPPORT ELECTROLYTE PRN ORDERS   Does Not Apply PRN    influenza vaccine 2017-18 (3 yrs+)(PF) (FLUZONE QUAD/FLUARIX QUAD) injection 0.5 mL  0.5 mL IntraMUSCular PRIOR TO DISCHARGE    chlorhexidine (PERIDEX) 0.12 % mouthwash 15 mL  15 mL Oral Q12H    insulin lispro (HUMALOG) injection   SubCUTAneous Q4H    pantoprazole (PROTONIX) 40 mg in sodium chloride 0.9 % 10 mL injection  40 mg IntraVENous Q24H    sodium chloride (NS) flush 5-10 mL  5-10 mL IntraVENous Q8H    sodium chloride (NS) flush 5-10 mL  5-10 mL IntraVENous PRN    fentaNYL in normal saline (pf) 25 mcg/mL infusion   mcg/hr IntraVENous TITRATE       Review of Systems    Unable assess      Objective:     Vitals:    12/14/17 0818 12/14/17 0833 12/14/17 0849 12/14/17 0903   BP:  111/50 111/54 114/57   Pulse: 88 86 86 86   Resp: 19 15 17 15   Temp:   98.7 °F (37.1 °C)    SpO2: 97% 99% 99% 98%   Weight:       Height:           Intake and Output:   12/12 1901 - 12/14 0700  In: 8045.6 [I.V.:5597.6]  Out: 3164 [Urine:5150]       Physical Exam:          Constitutional:  intubated and mechanically ventilated.   EENMT:  Sclera clear, pupils equal, oral mucosa moist  Respiratory: clear  Cardiovascular:  RRR with no M,G,R;  Gastrointestinal:  soft with no tenderness; positive bowel sounds present  Musculoskeletal:  warm with no cyanosis, no  lower leg edema, R leg -wound   Skin:  no jaundice or ecchymosis  Neurologic: no gross neuro deficits     Psychiatric:  Awake  Follows vommands    LINES:  ETT, Gerardo, RIJ central line, colostomy, NG     DRIPS:  fentanyl     CXR:        \      Ventilator Settings  Mode FIO2 Rate Tidal Volume Pressure PEEP   CPAP  30 %    450 ml  0 cm H2O  8 cm H20      Peak airway pressure: 10.8 cm H2O   Minute ventilation: 5.2 l/min     ABG:   Recent Labs      12/14/17   0315  12/13/17   0320  12/12/17   0320   PH  7.42  7.42  7.48*   PCO2  52*  50*  43   PO2  109*  96  68*   HCO3  33*  32*  32*        LAB  Recent Labs      12/14/17   0455  12/14/17   0017  12/13/17   2144  12/13/17   2111  12/13/17   1630   GLUCPOC  135*  108*  113*  57*  97     Recent Labs      12/14/17   0747  12/13/17   0353  12/12/17   0350   WBC  9.1  7.5  9.9   HGB  7.5*  7.4*  8.2*   HCT  23.8*  23.9*  26.1*   PLT  258  266  287     Recent Labs      12/14/17   0747  12/13/17   0353  12/12/17   0350   NA  140  140  139   K  3.9  3.9  4.1   CL  103  104  103 CO2  32  31  31   GLU  154*  146*  187*   BUN  7*  9  12   CREA  0.58*  0.52*  0.63*   CA  7.5*  7.2*  7.3*   ALB   --   1.7*  1.1*   SGOT   --   18  17         Assessment:  (Medical Decision Making)     Patient Active Problem List   Diagnosis Code    Subdural hematoma (HCC) I62.00    Type II diabetes mellitus with complication (HCC) C11.5    Syncope R55    Fasciitis M72.9    Severe sepsis with septic shock (HCC) A41.9, R65.21    Hypernatremia E87.0    Acute respiratory failure (HCC) J96.00         Plan:  (Medical Decision Making)     Hospital Problems  Date Reviewed: 12/7/2017          Codes Class Noted POA    Acute respiratory failure (HCC)   on vent  ICD-10-CM: J96.00  ICD-9-CM: 518.81  12/8/2017 Unknown    No changes till surgery is done with debridement   Hope to wean and extubate post surgery today    Hypernatremia    ICD-10-CM: E87.0  ICD-9-CM: 276.0  12/6/2017 Unknown     Na140    Subdural hematoma ([facility] ICD-10-CM: I62.00  ICD-9-CM: 432.1  12/1/2017 Yes        Type II diabetes mellitus with complication ([facility] [id number]: E11.8  ICD-9-CM: 250.90  12/1/2017 Yes        -205    Fasciitis   s/p multiple OR visits due to wash outs,  ICD-10-CM: M72.9  ICD-9-CM: 729.4  12/1/2017 Yes    For OR today    * (Principal)Severe sepsis with septic shock (HCC)   off pressors  ICD-10-CM: A41.9, R65.21  ICD-9-CM: 038.9, 995.92, 785.52  12/1/2017 Yes          CXR is noted. Pro sunni is 0.2. Finish ABX 2 weeks- last day today  -cont. SSI  -extubate today    More than 50% of the time documented was spent in face-to-face contact with the patient and in the care of the patient on the floor/unit where the patient is located.     Dawit Bowman MD    Total critical care time spent 30 min

## 2017-12-14 NOTE — INTERDISCIPLINARY ROUNDS
Interdisciplinary team rounds were held 12/14/2017 with the following team members:Nursing, Nurse Practitioner, Nutrition, Palliative Care, Pastoral Care, Pharmacy, Physical Therapy, Physician, Respiratory Therapy and Clinical Coordinator and the patient. Plan of care discussed. See clinical pathway and/or care plan for interventions and desired outcomes.

## 2017-12-14 NOTE — PROGRESS NOTES
Problem: Nutrition Deficit  Goal: *Optimize nutritional status  Nutrition F/U:  TF Management for [facility]. Assessment:  Weight 81 kg (ICU bed 12/14/17), edema - 2+ generalized edema. The patient was extubated today but failed his bedside swallow evaluation, therefore FT will be replaced and TF resumed. Expect ST to revisit tomorrow and re-evaluate. Labs are remarkable for AM glucose 154; POC glucose (12/14) 927,741,525,416. Daily stool output. Macronutrient Needs:  Estimated calorie needs - 4079-1505 sunni/day (25-28 sunni/kg/day)   Estimated protein needs - 101-117 gm pro/day (1.3-1.5 gm pro/kgIBW/day) (GFR >60 ml/min)  Max CHO/day - 294 gm CHO/day (50% sunni/day)   Fluid/day - 2.1-2.4 liters/day (1 ml/sunni/day)  Intake/Comparative Standards:  Previous intake of TF (Glucerna 1.2 @ 60 ml/hr with 45 ml/hr water flush) provided 1728 calories/day (82% calorie goal), 86 grams protein/day (85% protein goal), 166 grams CHO/day (does not exceed max CHO limit) and 2290 ml water/day (100% fluid goal). Intervention:   Meals and Snacks: NPO. Enteral Nutrition: Place NGT and resume TF, starting with Glucerna 1.2 @ 35 ml/hr with a 45 ml/hr and advance as tolerated to the goal rate of 75 ml/hr - 2160 calories/day (100% calorie goal), 108 grams protein/day (100% protein goal), 207 grams CHO/day (does not exceed max CHO limit) and 2592 ml water/day (100% fluid goal). Mineral Supplement Therapy: Nutrition Support Orders/Electrolyte Management Replacement Protocols are active on the MAR. Coordination of Nutrition Care by a Nutrition Professional: AM ICU rounds, collaboration with Hao Victoria. Nutrition Discharge Plan: Too soon to determine. Varun Tate.  Xi Qiu  [phone]

## 2017-12-14 NOTE — ROUTINE PROCESS
TRANSFER - OUT REPORT:    Verbal report given to Jerry Acuna RN(name) on [facility]  being transferred to [facility](unit) for routine progression of care       Report consisted of patients Situation, Background, Assessment and   Recommendations(SBAR). Information from the following report(s) SBAR and OR Summary was reviewed with the receiving nurse. Lines:   Quad Lumen placed in procedure, documented incorrectly as a double lumen 12/01/17 Right Internal jugular (Active)   Central Line Being Utilized Yes 12/13/2017  8:01 AM   Criteria for Appropriate Use Hemodynamically unstable, requiring monitoring lines, vasopressors, or volume resuscitation 12/13/2017  8:01 AM   Site Assessment Clean, dry, & intact 12/13/2017  8:01 AM   Infiltration Assessment 0 12/13/2017  8:01 AM   Affected Extremity/Extremities Color distal to insertion site pink (or appropriate for race); Pulses palpable;Range of motion performed 12/13/2017  8:01 AM   Date of Last Dressing Change 12/13/17 12/13/2017  3:59 PM   Dressing Status Clean, dry, & intact; New 12/13/2017  3:59 PM   Dressing Type Disk with Chlorhexadine gluconate (CHG); Transparent 12/13/2017  8:01 AM   Action Taken Dressing changed 12/11/2017  3:09 PM   Proximal Hub Color/Line Status White; Infusing 12/13/2017  8:01 AM   Positive Blood Return (Medial Site) Yes 12/13/2017  8:01 AM   Medial 1 Hub Color/Line Status Sosa Mail; Infusing 12/13/2017  8:01 AM   Positive Blood Return (Lateral Site) Yes 12/13/2017  8:01 AM   Medial 2 Hub Color/Line Status Blue;Flushed;Capped 12/13/2017  8:01 AM   Positive Blood Return (Site #3) Yes 12/13/2017  8:01 AM   Distal Hub Color/Line Status Brown; Infusing;Flushed 12/13/2017  8:01 AM   Positive Blood Return (Site #4) Yes 12/13/2017  8:01 AM   Alcohol Cap Used No 12/11/2017  7:50 PM        Opportunity for questions and clarification was provided.       Patient transported with:   Monitor  O2 @ 15 liters  Registered Nurse   CRNA

## 2017-12-14 NOTE — ANESTHESIA POSTPROCEDURE EVALUATION
Post-Anesthesia Evaluation and Assessment    Patient: Bessie Villatoro MRN: [id number]  SSN: [id number]    YOB: 1950  Age: 79 y.o. Sex: male       Cardiovascular Function/Vital Signs  Visit Vitals    /54    Pulse 77    Temp 36.6 °C (97.8 °F)    Resp 14    Ht 5' 11\" (1.803 m)    Wt 81 kg (178 lb 9.2 oz)    SpO2 100%    BMI 24.91 kg/m2       Patient is status post general anesthesia for Procedure(s):  RIGHT LEG DEBRIDEMENT / WOUND VAC APPLICATION . Nausea/Vomiting: None    Postoperative hydration reviewed and adequate. Pain:  Pain Scale 1: Adult Nonverbal Pain Scale (12/13/17 1601)  Pain Intensity 1: 0 (12/13/17 1601)   Managed    Neurological Status:   Neuro  Neurologic State: Eyes open spontaneously (12/13/17 0801)  Orientation Level: Unable to verbalize (12/13/17 0801)  Cognition: Follows commands;Decreased attention/concentration;Decreased command following (12/13/17 0801)  Speech: Intubated; Nods appropriately (12/13/17 0801)  Assessment L Pupil: Round;Sluggish (12/13/17 0801)  Size L Pupil (mm): 3 (12/13/17 0801)  Assessment R Pupil: Round;Sluggish (12/13/17 0801)  Size R Pupil (mm): 3 (12/13/17 0801)  LUE Motor Response: Weak;Purposeful (12/13/17 0801)  LLE Motor Response: Weak;Purposeful (12/13/17 0801)  RUE Motor Response: Weak;Purposeful (12/13/17 0801)  RLE Motor Response: Weak;Purposeful (12/13/17 0801)   At baseline    Mental Status and Level of Consciousness: Arousable    Pulmonary Status:   O2 Device: Ventilator;Endotracheal tube (12/13/17 0801)   Adequate oxygenation and airway patent    Complications related to anesthesia: None    Post-anesthesia assessment completed.  No concerns    Signed By: Ann-Marie Milian MD     December 13, 2017

## 2017-12-14 NOTE — PROGRESS NOTES
Problem: Mobility Impaired (Adult and Pediatric)  Goal: *Acute Goals and Plan of Care (Insert Text)  STG:  (1.)Mr. Robel Sorenson will move from supine to sit and sit to supine , scoot up and down and roll side to side with MAXIMAL ASSIST within 3 day(s). (2.)Mr. Robel Sorenson will transfer from bed to chair and chair to bed with MINIMAL ASSIST using the least restrictive device within 3 day(s). (3.)Mr. Robel Sorenson will ambulate with MAXIMAL ASSIST for 15 feet with the least restrictive device within 3 day(s). (4.)Mr. Robel Sorenson will participate in therapeutic activity/exerices x 12 minutes for increased strength within 3 days. LTG:  (1.)Mr. Robel Sorenson will move from supine to sit and sit to supine , scoot up and down and roll side to side in bed with MINIMAL ASSIST within 7 day(s). (2.)Mr. Robel Sorenson will transfer from bed to chair and chair to bed with MINIMAL ASSIST using the least restrictive device within 7 day(s). (3.)Mr. Robel Sorenson will ambulate with MINIMAL ASSIST for 75 feet with the least restrictive device within 7 day(s). (4.)Mr. Robel Sorenson will participate in therapeutic activity/exerices x 23 minutes for increased strength within 7 days.     ________________________________________________________________________________________________      PHYSICAL THERAPY: Initial Assessment, PM 12/14/2017  INPATIENT: Hospital Day: 14  Payor: Kadi Matthews / Plan: [address] [place] [address] HMO / Product Type: [employer] /    *SEE ASSESSMENT NOTE*     NAME/AGE/GENDER: Nga Morfin is a 79 y.o. 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 ASSESSMENT:     Mr. Jared Whatley is a 79 y.o. male in the hospital for the above who was unable to vwerbalize during evaluation. Pt indicate that he was in a hospital and lived alone with head gestures. When asked if he was able to walk PTA he shook his head no. According to pt's granddaughter he was living in mane apartment by himself and independent with ADLs. She also stated he used a cane for ambulating and has had recent falls. Mr. Jared Whatley presents to PT with grossly decreased strength and AROM in B LEs. His R LE was wrapped with wound vac in tact so unable to test sensation. He did exhibit slight knee flexion AROM (2+/5) and B dorsiflexion AROM. He reported intact sensation to light touch in L LE. Pt required max assist for rolling and total assist x 2 for supine to sit and sit to supine. He has poor sitting balance with increased posterior lean and required constant support. Pt grimaced often when R LE moved likely given nature of wound. Pt's SpO2 noted to drop to high 60s after movement with RN alerted but could have been due to artifact from mobility. Later pt recorded at 100% SpO2. Mr. Jared Whatley could benefit from skilled PT due to his deficits in strength, AROM, and balance. *PT spoke with Dr. Lana Young, plastic surgery, who reported pt has no activity or weight bearing restriction but if excess posterior drainage noted (especially with R hip abduction) to \"back off. \"*    This section established at most recent assessment   PROBLEM LIST (Impairments causing functional limitations):  1. Decreased Strength  2. Decreased ADL/Functional Activities  3. Decreased Transfer Abilities  4. Decreased Ambulation Ability/Technique  5. Decreased Balance  6. Increased Pain  7. Decreased Activity Tolerance   INTERVENTIONS PLANNED: (Benefits and precautions of physical therapy have been discussed with the patient.)  1. Balance Exercise  2. Bed Mobility  3. Family Education  4.  Gait Training  5. Neuromuscular Re-education/Strengthening  6. Range of Motion (ROM)  7. Therapeutic Activites  8. Therapeutic Exercise/Strengthening  9. Transfer Training  10. Group Therapy     TREATMENT PLAN: Frequency/Duration: 3 times a week for duration of hospital stay  Rehabilitation Potential For Stated Goals: Good     RECOMMENDED REHABILITATION/EQUIPMENT: (at time of discharge pending progress): Due to the probability of continued deficits (see above) this patient will likely need continued skilled physical therapy after discharge. Equipment:    None at this time              HISTORY:   History of Present Injury/Illness (Reason for Referral):  Per H&P: \"  Past Medical History/Comorbidities:   Mr. Danielle Carvajal  has a past medical history of Diabetes ([facility] Mr. Danielle Carvajal  has no past surgical history on file. Social History/Living Environment:   Home Environment: Apartment  # Steps to Enter: 0  One/Two Story Residence: One story  Living Alone: Yes  Support Systems: Family member(s)  Patient Expects to be Discharged to[de-identified] Unknown  Current DME Used/Available at Home: Walker, rollator, Cane, straight  Prior Level of Function/Work/Activity:  Per granddaughter he was independent with ADLs and used a cane for ambulation with recent history of falls. Also pt was living alone. Number of Personal Factors/Comorbidities that affect the Plan of Care:  Diabetes 1-2: MODERATE COMPLEXITY   EXAMINATION:   Most Recent Physical Functioning:   Gross Assessment:  AROM: Grossly decreased, non-functional  PROM: Within functional limits (L LE)  Strength: Grossly decreased, non-functional  Tone: Normal  Sensation: Intact (L LE)               Posture:     Balance:  Sitting: Impaired  Sitting - Static: Poor (constant support)  Sitting - Dynamic: Poor (constant support) Bed Mobility:  Rolling: Maximum assistance  Supine to Sit: Total assistance;Assist x2  Sit to Supine: Total assistance;Assist x2  Scooting:  Total assistance;Assist x2  Wheelchair Mobility:     Transfers:     Gait:            Body Structures Involved:  1. Nerves  2. Voice/Speech  3. Metabolic  4. Muscles Body Functions Affected:  1. Mental  2. Sensory/Pain  3. Voice and Speech  4. Neuromusculoskeletal  5. Movement Related  6. Skin Related  7. Metobolic/Endocrine Activities and Participation Affected:  1. General Tasks and Demands  2. Communication  3. Mobility  4. Self Care  5. Domestic Life  6. Interpersonal Interactions and Relationships  7. Community, Social and [place]   Number of elements that affect the Plan of Care: 4+: HIGH COMPLEXITY   CLINICAL PRESENTATION:   Presentation: Evolving clinical presentation with changing clinical characteristics: MODERATE COMPLEXITY   CLINICAL DECISION MAKIN [facility] Mobility Inpatient Short Form  How much difficulty does the patient currently have. .. Unable A Lot A Little None   1. Turning over in bed (including adjusting bedclothes, sheets and blankets)? [] 1   [x] 2   [] 3   [] 4   2. Sitting down on and standing up from a chair with arms ( e.g., wheelchair, bedside commode, etc.)   [x] 1   [] 2   [] 3   [] 4   3. Moving from lying on back to sitting on the side of the bed? [x] 1   [] 2   [] 3   [] 4   How much help from another person does the patient currently need. .. Total A Lot A Little None   4. Moving to and from a bed to a chair (including a wheelchair)? [x] 1   [] 2   [] 3   [] 4   5. Need to walk in hospital room? [x] 1   [] 2   [] 3   [] 4   6. Climbing 3-5 steps with a railing? [x] 1   [] 2   [] 3   [] 4   © , Trustees of [address] [place] [id number], under license to [employer]. All rights reserved      Score:  Initial: 7 Most Recent: X (Date: -- )    Interpretation of Tool:  Represents activities that are increasingly more difficult (i.e. Bed mobility, Transfers, Gait). Score 24 23 22-20 19-15 14-10 9-7 6     Modifier CH CI CJ CK CL CM CN      ?  Mobility - Walking and Moving Around:     - CURRENT STATUS: CM - 80%-99% impaired, limited or restricted    - GOAL STATUS: CL - 60%-79% impaired, limited or restricted    - D/C STATUS:  ---------------To be determined---------------  Payor: [employer] MEDICARE / Plan: [address] [place] [address] HMO / Product Type: Managed Care Medicare /      Medical Necessity:     · Patient demonstrates good rehab potential due to higher previous functional level. Reason for Services/Other Comments:  · Patient continues to require skilled intervention due to decreased strength, balance, and activity tolerance. Use of outcome tool(s) and clinical judgement create a POC that gives a: Questionable prediction of patient's progress: MODERATE COMPLEXITY            TREATMENT:   (In addition to Assessment/Re-Assessment sessions the following treatments were rendered)   Pre-treatment Symptoms/Complaints:  Pain in R LE with movement  Pain: Initial:   Pain Intensity 1: 0  Post Session:  0     Assessment/Reassessment only, no treatment provided today    Braces/Orthotics/Lines/Etc:   · IV  · kathleen catheter  · wound vac  · O2 Device: Nasal cannula  Treatment/Session Assessment:    · Response to Treatment:  Pt tolerated poorly due to increased pain with movement. · Interdisciplinary Collaboration:   o Physical Therapist  o Registered Nurse  o Rehabilitation Attendant  o Respiratory Therapist  · After treatment position/precautions:   o Supine in bed  o Bed/Chair-wheels locked  o Bed in low position  o RN notified  o Side rails x 3   · Compliance with Program/Exercises: Will assess as treatment progresses. · Recommendations/Intent for next treatment session: \"Next visit will focus on advancements to more challenging activities and reduction in assistance provided\".   Total Treatment Duration:  PT Patient Time In/Time Out  Time In: 8610  Time Out: [facility], PT, DPT

## 2017-12-14 NOTE — PROGRESS NOTES
Respiratory Mechanics completed and are as follows:  Weaning Parameters  Spontaneous Breathing Trial Complete: Yes  Resp Rate Observed: 17  Ve: 7.9  VT: 558  RSBI: 30  Hedrick Agitation Sedation Scale (RASS): Drowsy  Patient extubated to a 4L NC. Patient is able to communicate and is negative for stridor. Breath sounds are diminished. No complications with extubation.      Mayur Martinez, RT

## 2017-12-14 NOTE — PROGRESS NOTES
Physical Therapy Note:    Participated in interdisciplinary rounds in ICU/CCU and chart reviewed. Patient is experiencing decrease in function from baseline. Patient would benefit from skilled acute therapy to increase independence with self care/ADLs, strength, endurance, and functional mobility. Recommend PT/OT consult when medically stable and MD agrees.     Thank you for your consideration,  ARCHIE BaigT

## 2017-12-14 NOTE — PROGRESS NOTES
Reported BPs 89/53 MAP 68 to Dr. Melani Marshall. Will continue to monitor. Patient has no signs or symptoms of distress. Will continue to monitor.

## 2017-12-14 NOTE — PROGRESS NOTES
Ventilator check complete; patient has a #8. 0 ET tube secured at the 22 at the teeth. Patient is sedated. Patient is not able to follow commands. Breath sounds are coarse. Trachea is midline, Negative for subcutaneous air, and chest excursion is symmetric. Patient is also Negative for cyanosis and is Negative for pitting edema. All alarms are set and audible. Resuscitation bag is at the head of the bed.       Ventilator Settings  Mode FIO2 Rate Tidal Volume Pressure PEEP I:E Ratio   SIMV, PRVC, Pressure support  40 %  15  450 ml  8 cm H2O  8 cm H20  1:3.33      Peak airway pressure: 20 cm H2O   Minute ventilation: 7 l/min     ABG:   Recent Labs      12/13/17   0320  12/12/17   0320  12/11/17   0315   PH  7.42  7.48*  7.50*   PCO2  50*  43  36   PO2  96  68*  104   HCO3  32*  32*  28*         Burnie Corazon, RT

## 2017-12-14 NOTE — PROGRESS NOTES
Pt brought back from OR, VSS stable. Wound vac reapplied in OR after debridement with adequate seal. Fentanyl gtt restarted at previous rate of 150mcg/hr. Family updated, will monitor.

## 2017-12-14 NOTE — PROGRESS NOTES
LTG: Patient will tolerate least restrictive diet without overt signs or symptoms of airway compromise. STG: Patient will tolerate po trials with speech therapy only. STG: Patient will participate in modified barium swallow study as clinically indicated. Speech language pathology: bedside swallow note: Initial Assessment    NAME/AGE/GENDER: Estela Veloz is a 79 y.o. 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 INTERDISCIPLINARY COLLABORATION: Registered Nurse and Physician  PRECAUTIONS/ALLERGIES: Review of patient's allergies indicates no known allergies. ASSESSMENT:Based on the objective data described below, Mr. Margaret Leach presents with severe oropharyngeal dysphagia. He extubated this AM following 12 day intubation. Oriented to self and location, but not situation or time. Patient is aphonic with very minimal verbalizations. He was presented with ice chips, thin via tsp, and honey via tsp. Delayed swallow initiation with reduced hyolaryngeal elevation/excursion palpated with all trials. Weak cough with facial redness, increased respiratory rate, and decreased Sp02 with thin liquid trials. No overt signs or symptoms of airway compromise initially noted with tsp honey thick; however, audible cracks and exhalations after 3 trials. With cued vocalization attempt, patient with throat clear and subsequent wet cough, likely indicating silent aspiration of honey thick liquids. No additional trials provided at this time. Recommend NPO with alternative means of nutrition/hydration/medication. Suspect dysphagia is related to prolonged intubation rather than subdural hematoma. Speech to continue following for po trials and initiation of po diet when appropriate.  Patient will benefit from skilled intervention to address the below impairments. ?????? ? ? This section established at most recent assessment??????????  PROBLEM LIST (Impairments causing functional limitations):  1. Oropharyngeal dysphagia  REHABILITATION POTENTIAL FOR STATED GOALS: Good  PLAN OF CARE:   Patient will benefit from skilled intervention to address the following impairments. RECOMMENDATIONS AND PLANNED INTERVENTIONS (Benefits and precautions of therapy have been discussed with the patient.):  · NPO with alternative means of nutrition  MEDICATIONS:  · Non-oral  COMPENSATORY STRATEGIES/MODIFICATIONS INCLUDING:  · None  OTHER RECOMMENDATIONS (including follow up treatment recommendations):   · Laryngeal exercises  · Patient education  RECOMMENDED DIET MODIFICATIONS DISCUSSED WITH:  · Medical Sub-Specialist  · Nursing  · Patient  FREQUENCY/DURATION: Continue to follow patient 3 times a week for duration of hospital stay to address above goals. RECOMMENDED REHABILITATION/EQUIPMENT: (at time of discharge pending progress): Due to the probability of continued deficits (see above) this patient will likely need continued skilled speech therapy after discharge. SUBJECTIVE:   Drowsy, but attended to clinician with minimal prompts  History of Present Injury/Illness: Mr. Aaron Viera  has a past medical history of Diabetes ([facility] [address] .  He also  has no past surgical history on file. Present Symptoms: oropharyngeal dysphagia   Pain Intensity 1: 9  Pain Location 1: Buttocks  Pain Orientation 1: Right  Pain Intervention(s) 1: Nurse notified  Current Medications:   No current facility-administered medications on file prior to encounter. No current outpatient prescriptions on file prior to encounter.      Current Dietary Status:  NPO      Social History/Home Situation:    Home Environment: Apartment  # Steps to Enter: 0  One/Two Story Residence: One story  Living Alone: Yes  Support Systems: Family member(s)  Patient Expects to be Discharged to[de-identified] Rehabilitation facility  Current DME Used/Available at Home: nick Altman  OBJECTIVE:   Respiratory Status:  Endotracheal tube;Ventilator     CXR Results:No acute findings  MRI/CT Results:1. Small right subdural hematoma measuring 4 mm which demonstrates intermediate  attenuation suggesting a subacute although recent subdural hematoma  Oral Motor Structure/Speech:  Oral-Motor Structure/Motor Speech  Labial: No impairment  Dentition: Edentulous  Oral Hygiene: Dry  Lingual: Decreased strength, Decreased rate    Cognitive and Communication Status:  Neurologic State: Drowsy; Eyes open to stimulus  Orientation Level: Oriented to person;Disoriented to time;Disoriented to situation;Disoriented to place  Cognition: Follows commands  Perception: Appears intact  Perseveration: No perseveration noted  Safety/Judgement: Decreased insight into deficits; Decreased awareness of environment    BEDSIDE SWALLOW EVALUATION  Oral Assessment:  Oral Assessment  Labial: No impairment  Dentition: Edentulous  Oral Hygiene: Dry  Lingual: Decreased strength;Decreased rate  P.O. Trials: The patient was given tsp amounts of the following:   Consistency Presented: Thin liquid;Honey thick liquid  How Presented: SLP-fed/presented;Spoon    ORAL PHASE:  Bolus Acceptance: No impairment  Bolus Formation/Control: Impaired  Propulsion: Delayed (# of seconds); Discoordination  Type of Impairment: Delayed  Oral Residue: None    PHARYNGEAL PHASE:  Initiation of Swallow: Delayed (# of seconds)  Laryngeal Elevation: Decreased;Weak  Aspiration Signs/Symptoms: Delayed cough/throat clear  Vocal Quality: Aphonic           Pharyngeal Phase Characteristics: Suspected pharyngeal residue; Other (comment) (Audible exhalations)    OTHER OBSERVATIONS:  Rate/bite size: Impaired   Endurance:  Impaired   Comments:       Tool Used: Dysphagia Outcome and Severity Scale (ANGEL)    Score Comments   Normal Diet  [] 7 With no strategies or extra time needed   Functional Swallow  [] 6 May have mild oral or pharyngeal delay       Mild Dysphagia    [] 5 Which may require one diet consistency restricted (those who demonstrate penetration which is entirely cleared on MBS would be included)   Mild-Moderate Dysphagia  [] 4 With 1-2 diet consistencies restricted       Moderate Dysphagia  [] 3 With 2 or more diet consistencies restricted       Moderately Severe Dysphagia  [] 2 With partial PO strategies (trials with ST only)       Severe Dysphagia  [x] 1 With inability to tolerate any PO safely          Score:  Initial: 1 Most Recent: X (Date: -- )   Interpretation of Tool: The Dysphagia Outcome and Severity Scale (ANGEL) is a simple, easy-to-use, 7-point scale developed to systematically rate the functional severity of dysphagia based on objective assessment and make recommendations for diet level, independence level, and type of nutrition. Score 7 6 5 4 3 2 1   Modifier CH CI CJ CK CL CM CN   ?  Swallowing:     - CURRENT STATUS: CN - 100% impaired, limited or restricted    - GOAL STATUS:  CI - 1%-19% impaired, limited or restricted    - D/C STATUS:  ---------------To be determined---------------  Payor: Rodrigo Diego / Plan: [address] [place] [address] HMO / Product Type: Managed Care Medicare /     TREATMENT:    (In addition to Assessment/Re-Assessment sessions the following treatments were rendered)  Assessment/Reassessment only, no treatment provided today  MODALITIES:                                                                    ORAL MOTOR  EXERCISES:                                                                                                                                                                      LARYNGEAL / PHARYNGEAL EXERCISES: __________________________________________________________________________________________________  Safety:   After treatment position/precautions:  · Nurse at bedside  · Upright in Bed. Progression/Medical Necessity:   · Patient is expected to demonstrate progress in swallow strength, swallow timeliness, swallow function and diet tolerance to improve swallow safety, work toward diet advancement and decrease aspiration risk. Compliance with Program/Exercises: Will assess as treatment progresses. Reason for Continuation of Services/Other Comments:  · Patient continues to require skilled intervention due to oropharyngeal dysphagia. Recommendations/Intent for next treatment session: \"Treatment next visit will focus on po trials\".     Total Treatment Duration:  Time In: 1340  Time Out: 1400    DEAN Paniagua, CCC-SLP, CBIS

## 2017-12-15 PROBLEM — J96.00 ACUTE RESPIRATORY FAILURE (HCC): Status: RESOLVED | Noted: 2017-12-08 | Resolved: 2017-12-15

## 2017-12-15 PROBLEM — R65.21 SEVERE SEPSIS WITH SEPTIC SHOCK (HCC): Status: RESOLVED | Noted: 2017-12-01 | Resolved: 2017-12-15

## 2017-12-15 PROBLEM — E11.8 TYPE II DIABETES MELLITUS WITH COMPLICATION (HCC): Chronic | Status: ACTIVE | Noted: 2017-12-01

## 2017-12-15 PROBLEM — E87.0 HYPERNATREMIA: Status: RESOLVED | Noted: 2017-12-06 | Resolved: 2017-12-15

## 2017-12-15 PROBLEM — A41.9 SEVERE SEPSIS WITH SEPTIC SHOCK (HCC): Status: RESOLVED | Noted: 2017-12-01 | Resolved: 2017-12-15

## 2017-12-15 LAB
ANION GAP SERPL CALC-SCNC: 7 MMOL/L (ref 7–16)
BASOPHILS # BLD: 0 K/UL (ref 0–0.2)
BASOPHILS NFR BLD: 0 % (ref 0–2)
BUN SERPL-MCNC: 7 MG/DL (ref 8–23)
CALCIUM SERPL-MCNC: 7.4 MG/DL (ref 8.3–10.4)
CHLORIDE SERPL-SCNC: 103 MMOL/L (ref 98–107)
CO2 SERPL-SCNC: 32 MMOL/L (ref 21–32)
CREAT SERPL-MCNC: 0.53 MG/DL (ref 0.8–1.5)
DIFFERENTIAL METHOD BLD: ABNORMAL
EOSINOPHIL # BLD: 0 K/UL (ref 0–0.8)
EOSINOPHIL NFR BLD: 0 % (ref 0.5–7.8)
ERYTHROCYTE [DISTWIDTH] IN BLOOD BY AUTOMATED COUNT: 14.1 % (ref 11.9–14.6)
GLUCOSE BLD STRIP.AUTO-MCNC: 143 MG/DL (ref 65–100)
GLUCOSE BLD STRIP.AUTO-MCNC: 158 MG/DL (ref 65–100)
GLUCOSE BLD STRIP.AUTO-MCNC: 173 MG/DL (ref 65–100)
GLUCOSE BLD STRIP.AUTO-MCNC: 182 MG/DL (ref 65–100)
GLUCOSE BLD STRIP.AUTO-MCNC: 195 MG/DL (ref 65–100)
GLUCOSE BLD STRIP.AUTO-MCNC: 211 MG/DL (ref 65–100)
GLUCOSE BLD STRIP.AUTO-MCNC: 219 MG/DL (ref 65–100)
GLUCOSE SERPL-MCNC: 163 MG/DL (ref 65–100)
HCT VFR BLD AUTO: 24 % (ref 41.1–50.3)
HGB BLD-MCNC: 7.4 G/DL (ref 13.6–17.2)
IMM GRANULOCYTES # BLD: 0.1 K/UL (ref 0–0.5)
IMM GRANULOCYTES NFR BLD AUTO: 1 % (ref 0–5)
LYMPHOCYTES # BLD: 1.1 K/UL (ref 0.5–4.6)
LYMPHOCYTES NFR BLD: 16 % (ref 13–44)
MCH RBC QN AUTO: 27.9 PG (ref 26.1–32.9)
MCHC RBC AUTO-ENTMCNC: 30.8 G/DL (ref 31.4–35)
MCV RBC AUTO: 90.6 FL (ref 79.6–97.8)
MONOCYTES # BLD: 0.6 K/UL (ref 0.1–1.3)
MONOCYTES NFR BLD: 8 % (ref 4–12)
NEUTS SEG # BLD: 5.1 K/UL (ref 1.7–8.2)
NEUTS SEG NFR BLD: 75 % (ref 43–78)
PLATELET # BLD AUTO: 306 K/UL (ref 150–450)
PMV BLD AUTO: 11.5 FL (ref 10.8–14.1)
POTASSIUM SERPL-SCNC: 3.5 MMOL/L (ref 3.5–5.1)
RBC # BLD AUTO: 2.65 M/UL (ref 4.23–5.67)
SODIUM SERPL-SCNC: 142 MMOL/L (ref 136–145)
WBC # BLD AUTO: 6.8 K/UL (ref 4.3–11.1)

## 2017-12-15 PROCEDURE — 77030018798 HC PMP KT ENTRL FED COVD -A

## 2017-12-15 PROCEDURE — 74011250636 HC RX REV CODE- 250/636: Performed by: INTERNAL MEDICINE

## 2017-12-15 PROCEDURE — C9113 INJ PANTOPRAZOLE SODIUM, VIA: HCPCS | Performed by: SURGERY

## 2017-12-15 PROCEDURE — 97110 THERAPEUTIC EXERCISES: CPT

## 2017-12-15 PROCEDURE — 82962 GLUCOSE BLOOD TEST: CPT

## 2017-12-15 PROCEDURE — 74011000250 HC RX REV CODE- 250: Performed by: SURGERY

## 2017-12-15 PROCEDURE — 97165 OT EVAL LOW COMPLEX 30 MIN: CPT

## 2017-12-15 PROCEDURE — 77010033678 HC OXYGEN DAILY

## 2017-12-15 PROCEDURE — 80048 BASIC METABOLIC PNL TOTAL CA: CPT | Performed by: INTERNAL MEDICINE

## 2017-12-15 PROCEDURE — 74011250637 HC RX REV CODE- 250/637: Performed by: INTERNAL MEDICINE

## 2017-12-15 PROCEDURE — 74750000023 HC WOUND THERAPY

## 2017-12-15 PROCEDURE — 65610000001 HC ROOM ICU GENERAL

## 2017-12-15 PROCEDURE — 74011250636 HC RX REV CODE- 250/636: Performed by: SURGERY

## 2017-12-15 PROCEDURE — 74011636637 HC RX REV CODE- 636/637: Performed by: INTERNAL MEDICINE

## 2017-12-15 PROCEDURE — 74011250637 HC RX REV CODE- 250/637: Performed by: SURGERY

## 2017-12-15 PROCEDURE — 85025 COMPLETE CBC W/AUTO DIFF WBC: CPT | Performed by: INTERNAL MEDICINE

## 2017-12-15 PROCEDURE — 92526 ORAL FUNCTION THERAPY: CPT

## 2017-12-15 PROCEDURE — 74011636637 HC RX REV CODE- 636/637: Performed by: HOSPITALIST

## 2017-12-15 PROCEDURE — 99233 SBSQ HOSP IP/OBS HIGH 50: CPT | Performed by: INTERNAL MEDICINE

## 2017-12-15 RX ADMIN — INSULIN GLARGINE 10 UNITS: 100 INJECTION, SOLUTION SUBCUTANEOUS at 09:02

## 2017-12-15 RX ADMIN — INSULIN LISPRO 4 UNITS: 100 INJECTION, SOLUTION INTRAVENOUS; SUBCUTANEOUS at 23:23

## 2017-12-15 RX ADMIN — Medication 10 ML: at 16:17

## 2017-12-15 RX ADMIN — Medication 1 AMPULE: at 20:30

## 2017-12-15 RX ADMIN — CHLORHEXIDINE GLUCONATE 15 ML: 1.2 RINSE ORAL at 09:03

## 2017-12-15 RX ADMIN — SODIUM CHLORIDE 40 MG: 9 INJECTION INTRAMUSCULAR; INTRAVENOUS; SUBCUTANEOUS at 16:17

## 2017-12-15 RX ADMIN — Medication 10 ML: at 23:22

## 2017-12-15 RX ADMIN — ENOXAPARIN SODIUM 40 MG: 40 INJECTION SUBCUTANEOUS at 12:44

## 2017-12-15 RX ADMIN — Medication 1 AMPULE: at 09:02

## 2017-12-15 RX ADMIN — INSULIN LISPRO 2 UNITS: 100 INJECTION, SOLUTION INTRAVENOUS; SUBCUTANEOUS at 20:30

## 2017-12-15 RX ADMIN — INSULIN LISPRO 4 UNITS: 100 INJECTION, SOLUTION INTRAVENOUS; SUBCUTANEOUS at 16:20

## 2017-12-15 RX ADMIN — INSULIN LISPRO 2 UNITS: 100 INJECTION, SOLUTION INTRAVENOUS; SUBCUTANEOUS at 09:02

## 2017-12-15 RX ADMIN — Medication 10 ML: at 05:12

## 2017-12-15 RX ADMIN — INSULIN LISPRO 2 UNITS: 100 INJECTION, SOLUTION INTRAVENOUS; SUBCUTANEOUS at 04:13

## 2017-12-15 RX ADMIN — INSULIN LISPRO 2 UNITS: 100 INJECTION, SOLUTION INTRAVENOUS; SUBCUTANEOUS at 12:42

## 2017-12-15 NOTE — PROGRESS NOTES
No new issues. Extubated. Responsive. Getting TF. Jolaine Comes has remained sealed. Jolaine Comes in place, sealed. ~ 1500 serous fluid out. Distal toe cyanosis stable. Continue WV, will plan for skin graft early next week and replace Jolaine Comes over. Wound appeared healthy, not signs of overt infection in OR. Starting to granulate. Discussed with PT. PT limited only by vac stability (back off on ROM if it is causing WV leak).

## 2017-12-15 NOTE — PROGRESS NOTES
Pt is alert, oriented to person and place \"hospital\", disoriented to time and situation. Eyes focus and track, pt follows commands. Breath sounds coarse, symmetrical chest expansion on 4L NC. NSR on monitor, S1/S2 auscultated. Bowel sounds active, abdomen intact and soft with ostomy present. Skin pale and wound on R thigh with wound vac in place. Lines: R IJ  Drips: Tube Feed, NS @ 100 mL/hr  Drains: wound vac, kathleen, NGT     Pt has no complaints of pain. Will continue to monitor.

## 2017-12-15 NOTE — PROGRESS NOTES
Mortimer Beal  Admission Date: 12/1/2017             Daily Progress Note: 12/15/2017     Pt is a 80 yo male with no prior known medical history. Pt presented to the ER on 12/1 with syncopal episode. He was admitted by hospitalist after CT head confirmed subdural hematoma that was not felt to require intervention. 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 He was taken to the OR for debridement and has been seen by Dr. Damaris Baugh with plastic surgery for continued wound debridement. He has remained intubated for further anticipated procedures. Now extubated. Subjective:     Comfortable, off vent.    Tube feedings per NG, speech following but recommending continued NPO    Review of Systems  Constitutional: negative for fevers, chills and sweats  Respiratory: negative for cough, sputum or dyspnea on exertion  Cardiovascular: negative for chest pressure/discomfort    Current Facility-Administered Medications   Medication Dose Route Frequency    dextrose (D50W) injection syrg 12.5 g  25 mL IntraVENous PRN    HYDROmorphone (PF) (DILAUDID) injection 1 mg  1 mg IntraVENous Q4H PRN    insulin glargine (LANTUS) injection 10 Units  10 Units SubCUTAneous DAILY    enoxaparin (LOVENOX) injection 40 mg  40 mg SubCUTAneous Q24H    hydrALAZINE (APRESOLINE) 20 mg/mL injection 10 mg  10 mg IntraVENous Q4H PRN    alcohol 62% (NOZIN) nasal  1 Ampule  1 Ampule Topical Q12H    NUTRITIONAL SUPPORT ELECTROLYTE PRN ORDERS   Does Not Apply PRN    influenza vaccine 2017-18 (3 yrs+)(PF) (FLUZONE QUAD/FLUARIX QUAD) injection 0.5 mL  0.5 mL IntraMUSCular PRIOR TO DISCHARGE    insulin lispro (HUMALOG) injection   SubCUTAneous Q4H    pantoprazole (PROTONIX) 40 mg in sodium chloride 0.9 % 10 mL injection  40 mg IntraVENous Q24H    sodium chloride (NS) flush 5-10 mL  5-10 mL IntraVENous Q8H    sodium chloride (NS) flush 5-10 mL  5-10 mL IntraVENous PRN    fentaNYL in normal saline (pf) 25 mcg/mL infusion   mcg/hr IntraVENous TITRATE         Objective:     Vitals:    12/15/17 0243 12/15/17 0304 12/15/17 0403 12/15/17 0503   BP:  102/58 107/59 111/55   Pulse:  86 81 86   Resp:  15 18 27   Temp:   98.2 °F (36.8 °C)    SpO2: 99% 99% 100% 100%   Weight:       Height:         Intake and Output:   12/13 1901 - 12/15 0700  In: 6049.2 [I.V.:4026.2]  Out: 4380 [Urine:3855]       Physical Exam:          Constitutional: the patient is debiliated  HEENT: Sclera clear, pupils equal, oral mucosa moist  Lungs:  Clear bilaterally  Cardiovascular: RRR without M,G,R  Abd/GI: soft and non-tender; with positive bowel sounds. ostomy  Ext: warm without cyanosis. There is no lower leg edema.  RLE with ace wrap  Skin: RLE wounds   Neuro: alert, flat      Lines/Drains: IV,  NG, quad lumen, ostomy, kathleen  Nutrition: npo, tube feedings    CHEST XRAY: 12/13      LAB  Recent Labs      12/15/17   0406  12/14/17   0747  12/13/17   0353   WBC  6.8  9.1  7.5   HGB  7.4*  7.5*  7.4*   HCT  24.0*  23.8*  23.9*   PLT  306  258  266     Recent Labs      12/15/17   0406  12/14/17   0747  12/13/17   0353   NA  142  140  140   K  3.5  3.9  3.9   CL  103  103  104   CO2  32  32  31   GLU  163*  154*  146*   BUN  7*  7*  9   CREA  0.53*  0.58*  0.52*     Recent Labs      12/14/17   0315  12/13/17   0320   PH  7.42  7.42   PCO2  52*  50*   PO2  109*  96   HCO3  33*  32*       Assessment:     Patient Active Problem List   Diagnosis Code    Subdural hematoma (HCC) I62.00    Type II diabetes mellitus with complication ([facility] [address] W45.1    Syncope R55    Fasciitis M72.9       Plan     Hospital Problems  Date Reviewed: 12/15/2017          Codes Class Noted POA    Subdural hematoma ([facility] [address] ICD-10-CM: I62.00  ICD-9-CM: 432.1  12/1/2017 Yes        Type II diabetes mellitus with complication (HCC) (Chronic) ICD-10-CM: E11.8  ICD-9-CM: 250.90  12/1/2017 Yes    controlled    Syncope ICD-10-CM: R55  ICD-9-CM: 780.2  12/1/2017 Yes Fasciitis ICD-10-CM: M72.9  ICD-9-CM: 729.4  12/1/2017 Yes    Per plastic surgery          -- now extubated, S/P abx  -- wound care, plastic surgery  -- Hospitalist last saw on 12/3 when in ICU , ask to resume care soon  -- Diet per speech therapy. TF  -- PT/OT as able    Junior Armas, JUAN MIGUEL    More than 50% of time documented was spent in face-to-face contact with the patient and in the care of the patient on the floor/unit where the patient is located. The patient has been seen and examined by me personally, the chart,labs, and radiographic studies have been reviewed. Chest: CTA  Extremities: trace edema    I agree with the above assessment and plan.     Josesito Harris MD.

## 2017-12-15 NOTE — PROGRESS NOTES
Bedside and Verbal shift change report given to Shiv Barron RN (oncoming nurse) by Chen Schmitz RN (offgoing nurse). Report included the following information SBAR, Kardex, OR Summary, Intake/Output, MAR and Cardiac Rhythm NSR.

## 2017-12-15 NOTE — PROGRESS NOTES
Interdisciplinary team rounds were held 12/15/2017 with the following team members:Care Management, Nursing, Nurse Practitioner, Nutrition, Palliative Care, Pharmacy, Physical Therapy, Physician, Respiratory Therapy, Speech Therapy and Clinical Coordinator and the patient. Plan of care discussed. See clinical pathway and/or care plan for interventions and desired outcomes.

## 2017-12-15 NOTE — PROGRESS NOTES
LTG: Patient will tolerate least restrictive diet without overt signs or symptoms of airway compromise. STG: Patient will tolerate po trials with speech therapy only. STG: Patient will participate in modified barium swallow study as clinically indicated. Speech language pathology: bedside swallow note: Daily Note 1    NAME/AGE/GENDER: Ceci Lovett is a 79 y.o. 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 INTERDISCIPLINARY COLLABORATION: Registered Nurse and Physician  PRECAUTIONS/ALLERGIES: Review of patient's allergies indicates no known allergies. ASSESSMENT:Patient seen for po trials. He was drowsy this AM, but able to maintain adequate alertness for participation. Improved vocal quality but hoarseness and low volume persist. Per RN, patient has been requiring oral suction of thick secretions intermittently. Patient presented with thin, nectar, and honey thick liquids via tsp. Timely swallow initiation with each trials. Decreased laryngeal elevation and excursion palpated. Immediate weak coughing with thin liquid and nectar thick trials. When presented with honey by tsp, not immediate, overt signs or symptoms of airway compromise. However, weak cough and throat clearing occurred following cued vocalizations. No additional trials presented secondary to overt signs and symptoms of airway compromise with all liquid consistencies. Recommend NPO with alternative means of nutrition/hydration/medication. Speech to continue following for po trials and initiation of po diet when appropriate. Patient will benefit from skilled intervention to address the below impairments. ?????? ? ? This section established at most recent assessment??????????  PROBLEM LIST (Impairments causing functional limitations):  1. Oropharyngeal dysphagia  REHABILITATION POTENTIAL FOR STATED GOALS: Good  PLAN OF CARE:   Patient will benefit from skilled intervention to address the following impairments. RECOMMENDATIONS AND PLANNED INTERVENTIONS (Benefits and precautions of therapy have been discussed with the patient.):  · NPO with alternative means of nutrition  MEDICATIONS:  · Non-oral  COMPENSATORY STRATEGIES/MODIFICATIONS INCLUDING:  · None  OTHER RECOMMENDATIONS (including follow up treatment recommendations):   · Laryngeal exercises  · Patient education  RECOMMENDED DIET MODIFICATIONS DISCUSSED WITH:  · Medical Sub-Specialist  · Nursing  · Patient  FREQUENCY/DURATION: Continue to follow patient 3 times a week for duration of hospital stay to address above goals. RECOMMENDED REHABILITATION/EQUIPMENT: (at time of discharge pending progress): Due to the probability of continued deficits (see above) this patient will likely need continued skilled speech therapy after discharge. SUBJECTIVE:   Drowsy, but able to participate in minimal cues. History of Present Injury/Illness: Mr. Ace  has a past medical history of Diabetes ([facility] [address] .  He also  has no past surgical history on file. Present Symptoms: oropharyngeal dysphagia   Pain Intensity 1: 0  Pain Location 1: Buttocks  Pain Orientation 1: Right  Pain Intervention(s) 1: Nurse notified  Current Medications:   No current facility-administered medications on file prior to encounter. No current outpatient prescriptions on file prior to encounter. Current Dietary Status:  NPO- tube feedings.        Social History/Home Situation:    Home Environment: Apartment  # Steps to Enter: 0  One/Two Story Residence: One story  Living Alone: Yes  Support Systems: Family member(s)  Patient Expects to be Discharged to[de-identified] Unknown  Current DME Used/Available at Home: Walker, rollator, Cane, straight  OBJECTIVE:   Respiratory Status:  Nasal cannula  4 l/min  CXR Results:No acute findings  MRI/CT Results:1. Small right subdural hematoma measuring 4 mm which demonstrates intermediate  attenuation suggesting a subacute although recent subdural hematoma  Oral Motor Structure/Speech:  Oral-Motor Structure/Motor Speech  Labial: No impairment  Dentition: Edentulous  Oral Hygiene: Dry  Lingual: No impairment    Cognitive and Communication Status:  Neurologic State: Drowsy; Eyes open to stimulus  Orientation Level: Oriented to person;Oriented to place  Cognition: Decreased attention/concentration; Follows commands  Perception: Appears intact  Perseveration: No perseveration noted  Safety/Judgement: Decreased insight into deficits    BEDSIDE SWALLOW EVALUATION  Oral Assessment:  Oral Assessment  Labial: No impairment  Dentition: Edentulous  Oral Hygiene: Dry  Lingual: No impairment  P.O. Trials:  Patient Position: Upright in bed    The patient was given tsp amounts of the following:   Consistency Presented: Thin liquid;Honey thick liquid; Nectar thick liquid  How Presented: SLP-fed/presented;Spoon    ORAL PHASE:  Bolus Acceptance: No impairment  Bolus Formation/Control: No impairment  Propulsion: No impairment     Oral Residue: None    PHARYNGEAL PHASE:     Laryngeal Elevation: Decreased;Weak  Aspiration Signs/Symptoms: Delayed cough/throat clear;Weak cough  Vocal Quality: Hoarse;Low volume     Effective Modifications: None     Pharyngeal Phase Characteristics: Suspected pharyngeal residue    OTHER OBSERVATIONS:  Rate/bite size: Impaired   Endurance:  Impaired   Comments:       Tool Used: Dysphagia Outcome and Severity Scale (ANGEL)    Score Comments   Normal Diet  [] 7 With no strategies or extra time needed   Functional Swallow  [] 6 May have mild oral or pharyngeal delay       Mild Dysphagia    [] 5 Which may require one diet consistency restricted (those who demonstrate penetration which is entirely cleared on MBS would be included)   Mild-Moderate Dysphagia  [] 4 With 1-2 diet consistencies restricted       Moderate Dysphagia  [] 3 With 2 or more diet consistencies restricted       Moderately Severe Dysphagia  [] 2 With partial PO strategies (trials with ST only)       Severe Dysphagia  [x] 1 With inability to tolerate any PO safely          Score:  Initial: 1 Most Recent: X (Date: -- )   Interpretation of Tool: The Dysphagia Outcome and Severity Scale (ANGEL) is a simple, easy-to-use, 7-point scale developed to systematically rate the functional severity of dysphagia based on objective assessment and make recommendations for diet level, independence level, and type of nutrition. Score 7 6 5 4 3 2 1   Modifier CH CI CJ CK CL CM CN   ? Swallowing:     - CURRENT STATUS: CN - 100% impaired, limited or restricted    - GOAL STATUS:  CI - 1%-19% impaired, limited or restricted    - D/C STATUS:  ---------------To be determined---------------  Payor: Shane Haley / Plan: [address] [place] [address] HMO / Product Type: [employer] Medicare /     TREATMENT:    (In addition to Assessment/Re-Assessment sessions the following treatments were rendered)  Assessment/Reassessment only, no treatment provided today  MODALITIES:                                                                    ORAL MOTOR  EXERCISES:                                                                                                                                                                      LARYNGEAL / PHARYNGEAL EXERCISES:                                                                                                                                     __________________________________________________________________________________________________  Safety:   After treatment position/precautions:  · Nurse at bedside  · Upright in Bed.   Progression/Medical Necessity:   · Patient is expected to demonstrate progress in swallow strength, swallow timeliness, swallow function and diet tolerance to improve swallow safety, work toward diet advancement and decrease aspiration risk. Compliance with Program/Exercises: Will assess as treatment progresses. Reason for Continuation of Services/Other Comments:  · Patient continues to require skilled intervention due to oropharyngeal dysphagia. Recommendations/Intent for next treatment session: \"Treatment next visit will focus on po trials\".     Total Treatment Duration:  Time In: 0930  Time Out: 0940    DEAN Hull, CCC-SLP, CBIS

## 2017-12-15 NOTE — PROGRESS NOTES
Per primary RN, Daniel Diaz. MD would like referral sent to [facility]. Clinical to [place], liaison and aware of referral. Pt is [employer] so will need precert. CM to follow.

## 2017-12-15 NOTE — PROGRESS NOTES
Problem: Self Care Deficits Care Plan (Adult)  Goal: *Acute Goals and Plan of Care (Insert Text)  1. Dot Decant will complete BUE strengthening 15-20 minutes in prep for ADL. 107 Haleigh Garcia will complete grooming/oral care with minimal assistance. Tex Garcia will roll L/R with moderate assistance or less as needed for toileting/kim-care. Time frame: 4 - 7 visits    OCCUPATIONAL THERAPY: Initial Assessment and Treatment Day: 1st 12/15/2017  INPATIENT: Hospital Day: 15  Payor: Rodrigo Diego / Plan: [address] [place] [address] HMO / Product Type: Managed Care Medicare /      NAME/AGE/GENDER: Brianna Giron is a 79 y.o. 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 Tremaine Matthew, plastic surgery, who reported pt has no activity or weight bearing restriction but if excess posterior drainage noted (especially with R hip abduction) to 'back off.'\" ; Review of patient's allergies indicates no known allergies. ASSESSMENT:     Mr. Aaron Viera presents with several irrigation and debridement surgeries secondary to proximal RLE necrotizing fascitis. He was intubated now extubated on 2 L oxygen via nasal cannula. Bed placed in pseudo-chair position and patient had difficulty leaning forward. Bilateral shoulders very weak especially LUE. He participated in a brief set of BUE exercises. Laid back in supine due to complaints of RLE pain and numbness. Currently he requires total assistance for activities of daily living.   Brianna Giron presents with the following problems and would benefit from occupational therapy to maximize independence with self-care,instrumental activities of daily living, and functional transfers/mobility for activities of daily living/instrumental activities of daily living via the stated goals. This section established at most recent assessment   PROBLEM LIST (Impairments causing functional limitations):  1. Decreased Strength  2. Decreased ADL/Functional Activities  3. Decreased Transfer Abilities  4. Decreased Balance  5. Increased Pain  6. Decreased Activity Tolerance  7. Decreased Flexibility/Joint Mobility  8. Edema/Girth  9. Decreased Stephenson with Home Exercise Program   INTERVENTIONS PLANNED: (Benefits and precautions of occupational therapy have been discussed with the patient.)  1. Activities of daily living training  2. Therapeutic activity  3. Therapeutic exercise     TREATMENT PLAN: Frequency/Duration: Follow patient 3 times/week to address above goals. Rehabilitation Potential For Stated Goals: Good     RECOMMENDED REHABILITATION/EQUIPMENT: (at time of discharge pending progress): Due to the probability of continued deficits (see above) this patient will likely need continued skilled occupational therapy after discharge. Equipment:    None at this time              OCCUPATIONAL PROFILE AND HISTORY:   History of Present Injury/Illness (Reason for Referral):  Per MD H & P: Patient 67M with no known PMHx - has never seen a doctor as an adult. Per EMS report was brought to ED after syncopal episode. Patient opened the door for a friend and passed out falling backwards. Patient says he is here because he thinks he has pneumonia due to a cough (cxr clear). Patient is adentuous and hard to understand, poor historian. Multiple other issues revealed during ED course. Subdural hematoma on CT head - reviewed by neurosx (no intervention). WBC 21K, , Cr 1.5 and found to have large foul smelling abscess of right inner thigh with purulent drainage.  Surgery has seen with plans for operation tonight. Past Medical History/Comorbidities:   Mr. Jacklyn Angel  has a past medical history of Diabetes ([facility] [address] Mr. Jacklyn Angel  has no past surgical history on file. Social History/Living Environment: Lives alone. Home Environment: Apartment  # Steps to Enter: 0  One/Two Story Residence: One story  Living Alone: Yes  Support Systems: Family member(s)  Patient Expects to be Discharged to[de-identified] Unknown  Current DME Used/Available at Home: Walker, rollator, Cane, straight  Prior Level of Function/Work/Activity:  Patient typically independent with all; however, recently using a cane to walk due to falls over a period of 2 weeks. Dominant Side:         LEFT   Number of Personal Factors/Comorbidities that affect the Plan of Care: Brief history (0):  LOW COMPLEXITY   ASSESSMENT OF OCCUPATIONAL PERFORMANCE[de-identified]   Activities of Daily Living:           Basic ADLs (From Assessment) Complex ADLs (From Assessment)   Basic ADL  Feeding: Total assistance  Oral Facial Hygiene/Grooming: Maximum assistance  Bathing: Total assistance  Upper Body Dressing: Total assistance  Lower Body Dressing: Total assistance  Toileting: Total assistance Instrumental ADL  Meal Preparation: Total assistance  Homemaking:  Total assistance  Medication Management: Total assistance  Financial Management: Total assistance   Grooming/Bathing/Dressing Activities of Daily Living     Cognitive Retraining  Safety/Judgement: Fall prevention                       Bed/Mat Mobility  Supine to Sit: Total assistance       Most Recent Physical Functioning:   Gross Assessment:  AROM: Generally decreased, functional (Minimal L shoulder flexion)  Strength: Generally decreased, functional (BUE grossly 3+/5 except; L shoulder 2/5; R shoulder 3-/5)               Posture:     Balance:  Sitting: Impaired  Sitting - Static: Poor (constant support)  Sitting - Dynamic: Poor (constant support) Bed Mobility:  Supine to Sit: Total assistance  Wheelchair Mobility:     Transfers: Patient Vitals for the past 6 hrs:   BP SpO2 O2 Flow Rate (L/min) Pulse   12/15/17 0903 109/54 97 % - 71   12/15/17 1003 108/58 97 % - 79   12/15/17 1014 - 96 % 2 l/min -   12/15/17 1103 119/59 97 % - 80   12/15/17 1303 113/57 98 % - 85       Mental Status  Neurologic State: Drowsy  Orientation Level: Oriented to person  Cognition: Follows commands  Perception: Appears intact  Perseveration: No perseveration noted  Safety/Judgement: Fall prevention                          Physical Skills Involved:  1. Range of Motion  2. Balance  3. Strength  4. Activity Tolerance  5. Sensation  6. Fine Motor Control  7. Gross Motor Control  8. Pain (Chronic)  9. Edema  10. Skin Integrity Cognitive Skills Affected (resulting in the inability to perform in a timely and safe manner):  1. Executive Function  2. Divided Attention Psychosocial Skills Affected:  1. Habits/Routines  2. Environmental Adaptation  3. Social Interaction  4. Social Roles   Number of elements that affect the Plan of Care: 1-3:  LOW COMPLEXITY   CLINICAL DECISION MAKING:   [facility] AM-PAC 6 Clicks   Daily Activity Inpatient Short Form  How much help from another person does the patient currently need. .. Total A Lot A Little None   1. Putting on and taking off regular lower body clothing? [x] 1   [] 2   [] 3   [] 4   2. Bathing (including washing, rinsing, drying)? [x] 1   [] 2   [] 3   [] 4   3. Toileting, which includes using toilet, bedpan or urinal?   [x] 1   [] 2   [] 3   [] 4   4. Putting on and taking off regular upper body clothing? [x] 1   [] 2   [] 3   [] 4   5. Taking care of personal grooming such as brushing teeth? [x] 1   [] 2   [] 3   [] 4   6. Eating meals? [x] 1   [] 2   [] 3   [] 4   © 2007, Trustees of [facility], under license to [employer].  All rights reserved      Score:  Initial: 6 Most Recent: X (Date: -- )    Interpretation of Tool:  Represents activities that are increasingly more difficult (i.e. Bed mobility, Transfers, Gait). Score 24 23 22-20 19-15 14-10 9-7 6     Modifier CH CI CJ CK CL CM CN      ? Self Care:     - CURRENT STATUS: CN - 100% impaired, limited or restricted    - GOAL STATUS: CL - 60%-79% impaired, limited or restricted    - D/C STATUS:  ---------------To be determined---------------  Payor: [employer] MEDICARE / Plan: [address] [place] [address] HMO / Product Type: Managed Care Medicare /      Medical Necessity:     · Patient demonstrates fair rehab potential due to higher previous functional level. Reason for Services/Other Comments:  · Patient continues to require skilled intervention due to decreased independence with activities of daily living and instrumental activities of daily living. Use of outcome tool(s) and clinical judgement create a POC that gives a: MODERATE COMPLEXITY         TREATMENT:   (In addition to Assessment/Re-Assessment sessions the following treatments were rendered)     Pre-treatment Symptoms/Complaints:    Pain: Initial:   Pain Intensity 1:  (complains of RLE pain - did not rate)  Post Session:  Less after repositioned. Therapeutic Exercise: (  8 minutes):  Exercises per grid below to improve mobility, strength, balance and coordination. Required maximal visual, verbal, manual and tactile cues to promote proper body alignment, promote proper body posture, promote proper body mechanics and promote proper body breathing techniques. Progressed resistance, range, repetitions and complexity of movement as indicated. Date:  12/15/17 Date:   Date:     Activity/Exercise Parameters Parameters Parameters   Shoulder elevation 2 sets 5-10 reps (however, pt.  Compensating by extending elbows)     Hands to head with shoulders supported at ~90° 2 sets 5 reps     Gripping 10 reps                                   Braces/Orthotics/Lines/Etc:   · IV  · kathleen catheter  · nasogastric tube  · O2 Device: Nasal cannula  Treatment/Session Assessment: Response to Treatment:  Tolerated well without complications. · Interdisciplinary Collaboration:   o Occupational Therapist  o Registered Nurse  o Rehabilitation Attendant  · After treatment position/precautions:   o Supine in bed  o Bed/Chair-wheels locked  o Bed in low position  o Call light within reach  o Family at bedside  o Side rails x 3   · Compliance with Program/Exercises: Will assess as treatment progresses. · Recommendations/Intent for next treatment session: \"Next visit will focus on advancements to more challenging activities\".   Total Treatment Duration:  OT Patient Time In/Time Out  Time In: 1329  Time Out: [address] [facility] SYBIL Collins, OTR/L

## 2017-12-16 LAB
BACTERIA SPEC CULT: ABNORMAL
BACTERIA SPEC CULT: ABNORMAL
GLUCOSE BLD STRIP.AUTO-MCNC: 183 MG/DL (ref 65–100)
GLUCOSE BLD STRIP.AUTO-MCNC: 204 MG/DL (ref 65–100)
GLUCOSE BLD STRIP.AUTO-MCNC: 204 MG/DL (ref 65–100)
GLUCOSE BLD STRIP.AUTO-MCNC: 206 MG/DL (ref 65–100)
GLUCOSE BLD STRIP.AUTO-MCNC: 228 MG/DL (ref 65–100)
GRAM STN SPEC: ABNORMAL
SERVICE CMNT-IMP: ABNORMAL

## 2017-12-16 PROCEDURE — 74011636637 HC RX REV CODE- 636/637: Performed by: INTERNAL MEDICINE

## 2017-12-16 PROCEDURE — 77030018798 HC PMP KT ENTRL FED COVD -A

## 2017-12-16 PROCEDURE — C9113 INJ PANTOPRAZOLE SODIUM, VIA: HCPCS | Performed by: SURGERY

## 2017-12-16 PROCEDURE — 74011636637 HC RX REV CODE- 636/637: Performed by: HOSPITALIST

## 2017-12-16 PROCEDURE — 65270000029 HC RM PRIVATE

## 2017-12-16 PROCEDURE — 99233 SBSQ HOSP IP/OBS HIGH 50: CPT | Performed by: INTERNAL MEDICINE

## 2017-12-16 PROCEDURE — 82962 GLUCOSE BLOOD TEST: CPT

## 2017-12-16 PROCEDURE — 74750000023 HC WOUND THERAPY

## 2017-12-16 PROCEDURE — 74011250637 HC RX REV CODE- 250/637: Performed by: SURGERY

## 2017-12-16 PROCEDURE — 74011250636 HC RX REV CODE- 250/636: Performed by: SURGERY

## 2017-12-16 PROCEDURE — 74011250636 HC RX REV CODE- 250/636: Performed by: INTERNAL MEDICINE

## 2017-12-16 PROCEDURE — 74011000250 HC RX REV CODE- 250: Performed by: SURGERY

## 2017-12-16 RX ADMIN — INSULIN LISPRO 2 UNITS: 100 INJECTION, SOLUTION INTRAVENOUS; SUBCUTANEOUS at 16:00

## 2017-12-16 RX ADMIN — Medication 10 ML: at 21:03

## 2017-12-16 RX ADMIN — Medication 1 AMPULE: at 21:14

## 2017-12-16 RX ADMIN — INSULIN LISPRO 2 UNITS: 100 INJECTION, SOLUTION INTRAVENOUS; SUBCUTANEOUS at 23:53

## 2017-12-16 RX ADMIN — INSULIN LISPRO 4 UNITS: 100 INJECTION, SOLUTION INTRAVENOUS; SUBCUTANEOUS at 04:17

## 2017-12-16 RX ADMIN — INSULIN LISPRO 4 UNITS: 100 INJECTION, SOLUTION INTRAVENOUS; SUBCUTANEOUS at 20:50

## 2017-12-16 RX ADMIN — Medication 1 AMPULE: at 08:15

## 2017-12-16 RX ADMIN — Medication 10 ML: at 13:16

## 2017-12-16 RX ADMIN — ENOXAPARIN SODIUM 40 MG: 40 INJECTION SUBCUTANEOUS at 13:15

## 2017-12-16 RX ADMIN — Medication 10 ML: at 05:34

## 2017-12-16 RX ADMIN — SODIUM CHLORIDE 40 MG: 9 INJECTION INTRAMUSCULAR; INTRAVENOUS; SUBCUTANEOUS at 15:17

## 2017-12-16 RX ADMIN — INSULIN GLARGINE 10 UNITS: 100 INJECTION, SOLUTION SUBCUTANEOUS at 08:15

## 2017-12-16 RX ADMIN — INSULIN LISPRO 4 UNITS: 100 INJECTION, SOLUTION INTRAVENOUS; SUBCUTANEOUS at 13:16

## 2017-12-16 RX ADMIN — INSULIN LISPRO 4 UNITS: 100 INJECTION, SOLUTION INTRAVENOUS; SUBCUTANEOUS at 08:15

## 2017-12-16 NOTE — PROGRESS NOTES
TRANSFER - OUT REPORT:    Verbal report given to Josh Mark RN(name) on [facility]  being transferred to [facility](unit) for routine progression of care       Report consisted of patients Situation, Background, Assessment and   Recommendations(SBAR). Information from the following report(s) SBAR, Kardex, OR Summary, MAR and Cardiac Rhythm NSR was reviewed with the receiving nurse. Opportunity for questions and clarification was provided.       Patient transported with:   O2 @ 2 liters  Registered Nurse  [facility]

## 2017-12-16 NOTE — PROGRESS NOTES
Shift report received from [facility], [address] [facility]. Patient drowsy, opens eyes spontaneously and to voice. Oriented to self and place only. Delayed responses. Follows commands. NSR on the monitor. BP WDL. SpO2 91% on 2L NC. Patient with copious amount of thick, tan secretions. Orally suctioned. Lung sounds coarse. NGT in place and infusing at goal. Abdomen soft, non tender. Ostomy dressing c/d/i. Gerardo draining c/y urine. Right leg dressing c/d/i. Wound vac at continuous suction with serosanguinous drainage.

## 2017-12-16 NOTE — PROGRESS NOTES
Problem: Falls - Risk of  Goal: *Absence of Falls  Document Larry Fall Risk and appropriate interventions in the flowsheet.    Outcome: Progressing Towards Goal  Fall Risk Interventions:  Mobility Interventions: Bed/chair exit alarm, Communicate number of staff needed for ambulation/transfer, OT consult for ADLs, Patient to call before getting OOB, PT Consult for mobility concerns, PT Consult for assist device competence, Strengthening exercises (ROM-active/passive)    Mentation Interventions: Adequate sleep, hydration, pain control, Bed/chair exit alarm, Door open when patient unattended, Evaluate medications/consider consulting pharmacy, Reorient patient, Toileting rounds, Update white board    Medication Interventions: Bed/chair exit alarm, Evaluate medications/consider consulting pharmacy, Patient to call before getting OOB    Elimination Interventions: Bed/chair exit alarm, Call light in reach, Toileting schedule/hourly rounds    History of Falls Interventions: Consult care management for discharge planning, Bed/chair exit alarm, Door open when patient unattended, Evaluate medications/consider consulting pharmacy

## 2017-12-16 NOTE — PROGRESS NOTES
Pt is alert, oriented to person and place, eyes focus and track, following commands. NSR on monitor, S1/S2 auscultated. Breath sounds coarse and diminished, symmetrical chest expansion on room air. Bowel sounds active, abdomen soft with colostomy present. Skin pale, R thigh wound with wound vac present. Purple discoloration to R Great and 2nd toe, pulses are present. Lines: R IJ  Drips: tube feed @ 75 mL/hr  Drains: NGT, kathleen, wound vac    Pt has no complaints of pain. Will continue to monitor.

## 2017-12-16 NOTE — PROGRESS NOTES
Patient doing well with clearing secretions. Expectorating large amount of tan secretions/ suction PRN by this RN.

## 2017-12-16 NOTE — PROGRESS NOTES
Bedside and Verbal shift change report given to Andrea Halsted, RN (oncoming nurse) by Arina Romano RN (offgoing nurse). Report included the following information SBAR, Kardex, ED Summary, MAR, Recent Results and Cardiac Rhythm NSR.

## 2017-12-16 NOTE — PROGRESS NOTES
Clayton Crawford  Admission Date: 12/1/2017             Daily Progress Note: 12/16/2017     Pt is a 80 yo male with no prior known medical history. Pt presented to the ER on 12/1 with syncopal episode. He was admitted by hospitalist after CT head confirmed subdural hematoma that was not felt to require intervention. 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 He was taken to the OR for debridement and has been seen by Dr. Ericka Guerrero with plastic surgery for continued wound debridement. He has remained intubated for further anticipated procedures. Now extubated. Subjective:     Comfortable, off vent.    Tube feedings per NG, speech following but recommending continued NPO  Mildly confused needs mittens to prevent NG removal    Review of Systems  Constitutional: negative for fevers, chills and sweats  Respiratory: negative for cough, sputum or dyspnea on exertion  Cardiovascular: negative for chest pressure/discomfort    Current Facility-Administered Medications   Medication Dose Route Frequency    dextrose (D50W) injection syrg 12.5 g  25 mL IntraVENous PRN    HYDROmorphone (PF) (DILAUDID) injection 1 mg  1 mg IntraVENous Q4H PRN    insulin glargine (LANTUS) injection 10 Units  10 Units SubCUTAneous DAILY    enoxaparin (LOVENOX) injection 40 mg  40 mg SubCUTAneous Q24H    hydrALAZINE (APRESOLINE) 20 mg/mL injection 10 mg  10 mg IntraVENous Q4H PRN    alcohol 62% (NOZIN) nasal  1 Ampule  1 Ampule Topical Q12H    NUTRITIONAL SUPPORT ELECTROLYTE PRN ORDERS   Does Not Apply PRN    influenza vaccine 2017-18 (3 yrs+)(PF) (FLUZONE QUAD/FLUARIX QUAD) injection 0.5 mL  0.5 mL IntraMUSCular PRIOR TO DISCHARGE    insulin lispro (HUMALOG) injection   SubCUTAneous Q4H    pantoprazole (PROTONIX) 40 mg in sodium chloride 0.9 % 10 mL injection  40 mg IntraVENous Q24H    sodium chloride (NS) flush 5-10 mL  5-10 mL IntraVENous Q8H    sodium chloride (NS) flush 5-10 mL  5-10 mL IntraVENous PRN    fentaNYL in normal saline (pf) 25 mcg/mL infusion   mcg/hr IntraVENous TITRATE         Objective:     Vitals:    12/16/17 0659 12/16/17 0703 12/16/17 0803 12/16/17 0903   BP: 115/63 111/58 115/61 117/68   Pulse: 86 86 84 83   Resp: 15 20 23 13   Temp: 98 °F (36.7 °C)      SpO2: 94% 97% 93% 91%   Weight:       Height:         Intake and Output:   12/14 1901 - 12/16 0700  In: 3832 [I.V.:1590]  Out: 4880 [Urine:3080; Drains:975]       Physical Exam:          Constitutional: the patient is debiliated  HEENT: Sclera clear, pupils equal, oral mucosa moist  Lungs:  Clear bilaterally  Cardiovascular: RRR without M,G,R  Abd/GI: soft and non-tender; with positive bowel sounds. ostomy  Ext: warm without cyanosis. There is no lower leg edema.  RLE with ace wrap  Skin: RLE wounds   Neuro: alert, flat affect      Lines/Drains: IV,  NG, quad lumen, ostomy, kathleen  Nutrition: npo, tube feedings    CHEST XRAY: 12/13      LAB  Recent Labs      12/15/17   0406  12/14/17   0747   WBC  6.8  9.1   HGB  7.4*  7.5*   HCT  24.0*  23.8*   PLT  306  258     Recent Labs      12/15/17   0406  12/14/17   0747   NA  142  140   K  3.5  3.9   CL  103  103   CO2  32  32   GLU  163*  154*   BUN  7*  7*   CREA  0.53*  0.58*     Recent Labs      12/14/17   0315   PH  7.42   PCO2  52*   PO2  109*   HCO3  33*       Assessment:     Patient Active Problem List   Diagnosis Code    Subdural hematoma (HCC) I62.00    Type II diabetes mellitus with complication ([facility] [address] H31.9    Syncope R55    Fasciitis M72.9       Plan     Hospital Problems  Date Reviewed: 12/15/2017          Codes Class Noted POA    Subdural hematoma ([facility] [address] ICD-10-CM: I62.00  ICD-9-CM: 432.1  12/1/2017 Yes        Type II diabetes mellitus with complication (HCC) (Chronic) ICD-10-CM: E11.8  ICD-9-CM: 250.90  12/1/2017 Yes    controlled    Syncope ICD-10-CM: R55  ICD-9-CM: 780.2  12/1/2017 Yes        Fasciitis ICD-10-CM: M72.9  ICD-9-CM: 729.4 12/1/2017 Yes    Per plastic surgery          -- now extubated, S/P abx  -- wound care, plastic surgery  -- Hospitalist last saw on 12/3 when in ICU   -- Diet per speech therapy. TF  -- PT/OT as abl  --transfer to surgical floor      Colleen Grier MD    More than 50% of time documented was spent in face-to-face contact with the patient and in the care of the patient on the floor/unit where the patient is located.

## 2017-12-17 PROBLEM — N49.3 FOURNIER GANGRENE: Status: ACTIVE | Noted: 2017-12-17

## 2017-12-17 LAB
GLUCOSE BLD STRIP.AUTO-MCNC: 183 MG/DL (ref 65–100)
GLUCOSE BLD STRIP.AUTO-MCNC: 190 MG/DL (ref 65–100)
GLUCOSE BLD STRIP.AUTO-MCNC: 198 MG/DL (ref 65–100)
GLUCOSE BLD STRIP.AUTO-MCNC: 199 MG/DL (ref 65–100)
GLUCOSE BLD STRIP.AUTO-MCNC: 201 MG/DL (ref 65–100)
GLUCOSE BLD STRIP.AUTO-MCNC: 236 MG/DL (ref 65–100)

## 2017-12-17 PROCEDURE — C9113 INJ PANTOPRAZOLE SODIUM, VIA: HCPCS | Performed by: SURGERY

## 2017-12-17 PROCEDURE — 74011250637 HC RX REV CODE- 250/637: Performed by: SURGERY

## 2017-12-17 PROCEDURE — 74750000023 HC WOUND THERAPY

## 2017-12-17 PROCEDURE — 74011250636 HC RX REV CODE- 250/636: Performed by: INTERNAL MEDICINE

## 2017-12-17 PROCEDURE — 74011000250 HC RX REV CODE- 250: Performed by: SURGERY

## 2017-12-17 PROCEDURE — 74011250636 HC RX REV CODE- 250/636: Performed by: SURGERY

## 2017-12-17 PROCEDURE — 77030018798 HC PMP KT ENTRL FED COVD -A

## 2017-12-17 PROCEDURE — 74011636637 HC RX REV CODE- 636/637: Performed by: INTERNAL MEDICINE

## 2017-12-17 PROCEDURE — 65270000029 HC RM PRIVATE

## 2017-12-17 PROCEDURE — 99232 SBSQ HOSP IP/OBS MODERATE 35: CPT | Performed by: INTERNAL MEDICINE

## 2017-12-17 PROCEDURE — 74011636637 HC RX REV CODE- 636/637: Performed by: HOSPITALIST

## 2017-12-17 PROCEDURE — 77030019952 HC CANSTR VAC ASST KCON -B

## 2017-12-17 RX ADMIN — Medication 5 ML: at 13:42

## 2017-12-17 RX ADMIN — INSULIN LISPRO 4 UNITS: 100 INJECTION, SOLUTION INTRAVENOUS; SUBCUTANEOUS at 05:20

## 2017-12-17 RX ADMIN — INSULIN LISPRO 2 UNITS: 100 INJECTION, SOLUTION INTRAVENOUS; SUBCUTANEOUS at 17:02

## 2017-12-17 RX ADMIN — INSULIN LISPRO 2 UNITS: 100 INJECTION, SOLUTION INTRAVENOUS; SUBCUTANEOUS at 12:26

## 2017-12-17 RX ADMIN — Medication 1 AMPULE: at 22:25

## 2017-12-17 RX ADMIN — ENOXAPARIN SODIUM 40 MG: 40 INJECTION SUBCUTANEOUS at 13:41

## 2017-12-17 RX ADMIN — Medication 20 ML: at 07:40

## 2017-12-17 RX ADMIN — INSULIN LISPRO 4 UNITS: 100 INJECTION, SOLUTION INTRAVENOUS; SUBCUTANEOUS at 09:51

## 2017-12-17 RX ADMIN — INSULIN GLARGINE 10 UNITS: 100 INJECTION, SOLUTION SUBCUTANEOUS at 09:51

## 2017-12-17 RX ADMIN — SODIUM CHLORIDE 40 MG: 9 INJECTION INTRAMUSCULAR; INTRAVENOUS; SUBCUTANEOUS at 14:16

## 2017-12-17 RX ADMIN — INSULIN LISPRO 2 UNITS: 100 INJECTION, SOLUTION INTRAVENOUS; SUBCUTANEOUS at 22:26

## 2017-12-17 NOTE — PROGRESS NOTES
END OF SHIFT NOTE:    INTAKE/OUTPUT  12/16 0701 - 12/17 0700  In: 710   Out: 2225 [KQTQC:[id number]]  Voiding: NO  Catheter: YES  Drain:   Nasogastric Tube 12/14/17 (Active)   Site Assessment Clean, dry, & intact 12/17/2017  2:24 PM   Dressing Status Clean, dry, & intact 12/17/2017  2:24 PM   G Port Status Infusing 12/17/2017  2:24 PM   External Insertion Binh (cms) 60 cms 12/16/2017  2:18 PM   Action Taken Placement verified (comment) 12/16/2017  2:18 PM   Drainage Description Tan 12/16/2017  2:18 PM   Gastric Residual (mL) 125 ml 12/16/2017  2:18 PM   Tube Feeding/Formula Options Diabetic (Glucerna 1.2) 12/17/2017  2:24 PM   Tube Feeding/Verify Rate (mL/hr) 75 12/17/2017  2:24 PM   Water Flush Volume (mL) 45 mL 12/17/2017  2:24 PM   Intake (ml) 455 ml 12/16/2017 11:56 PM       Vacuum Assisted Closure Right Thigh (Active)   Vac Status Suction, continuous 12/17/2017  2:24 PM   Suction (mmHg) 125 12/17/2017  2:24 PM   Site Assessment Clean, dry, & intact 12/17/2017  2:24 PM   Dressing Status Clean, dry, & intact 12/17/2017  2:24 PM   Drainage Description Serosanguinous 12/17/2017  2:24 PM   Drainage Chamber Level (ml) 0 ml 12/16/2017  2:18 PM   Cannister Changed No 12/16/2017  6:59 AM   Output (ml) 0 ml 12/16/2017  2:18 PM       Colostomy 12/02/17 Upper Abdomen (Active)   Drainage Color [place] 12/17/2017  2:24 PM   Site Assessment Clean, dry, intact 12/17/2017  2:24 PM   Treatment Bag change;Site care; Wafer changed 12/9/2017  1:59 AM   Output (ml) 50 mL 12/17/2017 11:30 AM               Flatus: Patient does have flatus present. Stool:  Colostomy with brown stool. Stoma pink. .   Characteristics:  Stool Assessment  Stool Color: Brown  Stool Appearance: Loose  Stool Amount: Medium  Stool Source/Status: Colostomy    Emesis: 0 occurrences.     Characteristics:        VITAL SIGNS  Patient Vitals for the past 12 hrs:   Temp Pulse Resp BP SpO2   12/17/17 1102 97.9 °F (36.6 °C) 82 18 134/77 93 %   12/17/17 0717 98 °F (36.7 °C) 90 18 119/71 95 %       Pain Assessment  Pain Intensity 1: 0 (12/17/17 1424)  Pain Location 1: Buttocks  Pain Intervention(s) 1: Nurse notified  Patient Stated Pain Goal: 0    Ambulating  No    Shift report given to oncoming nurse at the bedside. Alli Canales RN    bilateral mitts remain on patients hands to prevent patient from pulling at lines.   Good radial pulses

## 2017-12-17 NOTE — PROGRESS NOTES
Discussed with pulmonary team, Dr. Yolanda Calloway. Will resume care starting tomorrow. Appreciate the help.      Vera Sauceda MD  12/17/17

## 2017-12-17 NOTE — PROGRESS NOTES
END OF SHIFT NOTE:    INTAKE/OUTPUT   0701 -  0700  In: 710   Out: 2225 [[facility]]  Voiding: NO  Catheter: YES  Drain:   Nasogastric Tube 17 (Active)   Site Assessment Clean, dry, & intact 2017  2:18 PM   Dressing Status Clean, dry, & intact 2017  2:18 PM   G Port Status Infusing 2017  2:18 PM   External Insertion Binh (cms) 60 cms 2017  2:18 PM   Action Taken Placement verified (comment) 2017  2:18 PM   Drainage Description Tan 2017  2:18 PM   Gastric Residual (mL) 125 ml 2017  2:18 PM   Tube Feeding/Formula Options Diabetic (Glucerna 1.2) 2017  2:18 PM   Tube Feeding/Verify Rate (mL/hr) 75 2017  2:18 PM   Water Flush Volume (mL) 255 mL 2017 11:56 PM   Intake (ml) 455 ml 2017 11:56 PM       Vacuum Assisted Closure Right Thigh (Active)   Vac Status Suction, continuous 2017  2:18 PM   Suction (mmHg) 125 2017  2:18 PM   Site Assessment Clean, dry, & intact 2017  2:18 PM   Dressing Status Clean, dry, & intact 2017  2:18 PM   Drainage Description Serosanguinous 2017  2:18 PM   Drainage Chamber Level (ml) 0 ml 2017  2:18 PM   Cannister Changed No 2017  6:59 AM   Output (ml) 0 ml 2017  2:18 PM       Colostomy 17 Upper Abdomen (Active)   Drainage Color Travon Gear 2017  2:18 PM   Site Assessment Clean, dry, intact 2017  2:18 PM   Treatment Bag change;Site care; Wafer changed 2017  1:59 AM   Output (ml) 75 mL 2017  3:00 AM               Flatus: Patient does have flatus present. Stool:  2 occurrences. Characteristics:  Stool Assessment  Stool Color: Brown  Stool Appearance: Loose  Stool Amount: Medium  Stool Source/Status: Colostomy    Emesis: 0 occurrences.     Characteristics:        VITAL SIGNS  Patient Vitals for the past 12 hrs:   Temp Pulse Resp BP   17 0300 98.1 °F (36.7 °C) 64 - 145/75   17 2300 98.1 °F (36.7 °C) - 21 124/75   17 1900 98.5 °F (36.9 °C) - 21 147/82       Pain Assessment  Pain Intensity 1: 0 (12/16/17 1418)  Pain Location 1: Buttocks  Pain Intervention(s) 1: Nurse notified  Patient Stated Pain Goal: 0    Ambulating  No  Mitts removed periodicaly throughout the shift and replaced to prevent pt from pullng out tubes. Shift report given to oncoming nurse at the bedside.     Jared Joseph RN

## 2017-12-17 NOTE — PROGRESS NOTES
Cuong Robledo  Admission Date: 12/1/2017             Daily Progress Note: 12/17/2017    The patient's chart is reviewed and the patient is discussed with the staff. Pt is a 80 yo male with no prior known medical history. Pt presented to the ER on 12/1 with syncopal episode. He was admitted by hospitalist after CT head confirmed subdural hematoma that was not felt to require intervention. 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 He was taken to the OR for debridement and has been seen by Dr. Cathy Edwards with plastic surgery for continued wound debridement. He has remained intubated for further anticipated procedures. Pt extubated on 12/14/17. Pt transferred out of ICU on 12/16. Subjective:     Pt asleep and did not awake to voice.      Current Facility-Administered Medications   Medication Dose Route Frequency    dextrose (D50W) injection syrg 12.5 g  25 mL IntraVENous PRN    HYDROmorphone (PF) (DILAUDID) injection 1 mg  1 mg IntraVENous Q4H PRN    insulin glargine (LANTUS) injection 10 Units  10 Units SubCUTAneous DAILY    enoxaparin (LOVENOX) injection 40 mg  40 mg SubCUTAneous Q24H    hydrALAZINE (APRESOLINE) 20 mg/mL injection 10 mg  10 mg IntraVENous Q4H PRN    alcohol 62% (NOZIN) nasal  1 Ampule  1 Ampule Topical Q12H    NUTRITIONAL SUPPORT ELECTROLYTE PRN ORDERS   Does Not Apply PRN    influenza vaccine 2017-18 (3 yrs+)(PF) (FLUZONE QUAD/FLUARIX QUAD) injection 0.5 mL  0.5 mL IntraMUSCular PRIOR TO DISCHARGE    insulin lispro (HUMALOG) injection   SubCUTAneous Q4H    pantoprazole (PROTONIX) 40 mg in sodium chloride 0.9 % 10 mL injection  40 mg IntraVENous Q24H    sodium chloride (NS) flush 5-10 mL  5-10 mL IntraVENous Q8H    sodium chloride (NS) flush 5-10 mL  5-10 mL IntraVENous PRN    fentaNYL in normal saline (pf) 25 mcg/mL infusion   mcg/hr IntraVENous TITRATE       Review of Systems   Unobtainable due to patient status. Objective:     Vitals:    12/16/17 2300 12/17/17 0205 12/17/17 0300 12/17/17 0717   BP: 124/75  145/75 119/71   Pulse:   64 90   Resp: 21   18   Temp: 98.1 °F (36.7 °C)  98.1 °F (36.7 °C) 98 °F (36.7 °C)   SpO2:    95%   Weight:  151 lb 14.4 oz (68.9 kg)     Height:         Intake and Output:   12/15 1901 - 12/17 0700  In: 2099   Out: 4200 [RDHOC:3620; Drains:475]  12/17 0701 - 12/17 1900  In: -   Out: 900 [Urine:900]    Physical Exam:   Constitution:  the patient is well developed and in no acute distress, on RA  EENMT:  Sclera clear, pupils equal, oral mucosa moist  Respiratory: diminished anteriorly  Cardiovascular:  RRR without M,G,R  Gastrointestinal: soft and non-tender; with positive bowel sounds. Musculoskeletal: warm without cyanosis. There is no lower leg edema. Skin:  no jaundice or rashes, wound vac in place   Neurologic:asleep    Psychiatric:  Asleep     CXR: none today      LAB  Recent Labs      12/17/17   0514  12/16/17   2350  12/16/17   2049  12/16/17   1619  12/16/17   1308   GLUCPOC  236*  190*  204*  183*  206*      Recent Labs      12/15/17   0406   WBC  6.8   HGB  7.4*   HCT  24.0*   PLT  306     Recent Labs      12/15/17   0406   NA  142   K  3.5   CL  103   CO2  32   GLU  163*   BUN  7*   CREA  0.53*   CA  7.4*     No results for input(s): PH, PCO2, PO2, HCO3 in the last 72 hours. No results for input(s): LCAD, LAC in the last 72 hours.       Assessment:  (Medical Decision Making)     Patient Active Problem List   Diagnosis Code    Subdural hematoma ([facility] I62.00    Type II diabetes mellitus with complication ([facility] A42.4    Syncope R55    Fasciitis M72.9         Plan:  (Medical Decision Making)     Hospital Problems  Date Reviewed: 12/17/2017          Codes Class Noted POA    Subdural hematoma ([facility] ICD-10-CM: I62.00  ICD-9-CM: 432.1  12/1/2017 Yes    No surgical intervention needed    Type II diabetes mellitus with complication (HCC) (Chronic) ICD-10-CM: E11.8  ICD-9-CM: 250.90  12/1/2017 Yes    SSI    Syncope ICD-10-CM: R55  ICD-9-CM: 780.2  12/1/2017 Yes    Cause of hematoma    Fasciitis ICD-10-CM: M72.9  ICD-9-CM: 729.4  12/1/2017 Yes    S/p debridement for ady's gangrene, wound vac in place. Will ask hospitalist to resume as primary, once they have taken over we will sign off. Discussed with Dr Jenny Fine    More than 50% of the time documented was spent in face-to-face contact with the patient and in the care of the patient on the floor/unit where the patient is located. KASSI Castro  The patient has been seen and examined by me personally, the chart,labs, and radiographic studies have been reviewed. Chest:CTA-occasional rhonchi  Extremities: trace edema    I agree with the above assessment and plan.     Rainer Iverson MD.

## 2017-12-18 ENCOUNTER — ANESTHESIA EVENT (OUTPATIENT)
Dept: SURGERY | Age: 67
DRG: 853 | End: 2017-12-18
Payer: MEDICARE

## 2017-12-18 LAB
GLUCOSE BLD STRIP.AUTO-MCNC: 172 MG/DL (ref 65–100)
GLUCOSE BLD STRIP.AUTO-MCNC: 204 MG/DL (ref 65–100)
GLUCOSE BLD STRIP.AUTO-MCNC: 209 MG/DL (ref 65–100)
GLUCOSE BLD STRIP.AUTO-MCNC: 225 MG/DL (ref 65–100)
GLUCOSE BLD STRIP.AUTO-MCNC: 227 MG/DL (ref 65–100)

## 2017-12-18 PROCEDURE — 74750000023 HC WOUND THERAPY

## 2017-12-18 PROCEDURE — C9113 INJ PANTOPRAZOLE SODIUM, VIA: HCPCS | Performed by: SURGERY

## 2017-12-18 PROCEDURE — 97530 THERAPEUTIC ACTIVITIES: CPT

## 2017-12-18 PROCEDURE — 77030019952 HC CANSTR VAC ASST KCON -B

## 2017-12-18 PROCEDURE — 74011636637 HC RX REV CODE- 636/637: Performed by: INTERNAL MEDICINE

## 2017-12-18 PROCEDURE — 82962 GLUCOSE BLOOD TEST: CPT

## 2017-12-18 PROCEDURE — 74011250636 HC RX REV CODE- 250/636: Performed by: INTERNAL MEDICINE

## 2017-12-18 PROCEDURE — 92526 ORAL FUNCTION THERAPY: CPT

## 2017-12-18 PROCEDURE — 74011250637 HC RX REV CODE- 250/637: Performed by: SURGERY

## 2017-12-18 PROCEDURE — 74011250636 HC RX REV CODE- 250/636: Performed by: SURGERY

## 2017-12-18 PROCEDURE — 74011000250 HC RX REV CODE- 250: Performed by: SURGERY

## 2017-12-18 PROCEDURE — 77030027138 HC INCENT SPIROMETER -A

## 2017-12-18 PROCEDURE — 74011636637 HC RX REV CODE- 636/637: Performed by: HOSPITALIST

## 2017-12-18 PROCEDURE — 77030018798 HC PMP KT ENTRL FED COVD -A

## 2017-12-18 PROCEDURE — 65270000029 HC RM PRIVATE

## 2017-12-18 RX ORDER — INSULIN GLARGINE 100 [IU]/ML
15 INJECTION, SOLUTION SUBCUTANEOUS DAILY
Status: DISCONTINUED | OUTPATIENT
Start: 2017-12-19 | End: 2017-12-19

## 2017-12-18 RX ADMIN — ENOXAPARIN SODIUM 40 MG: 40 INJECTION SUBCUTANEOUS at 12:30

## 2017-12-18 RX ADMIN — Medication 5 ML: at 06:04

## 2017-12-18 RX ADMIN — Medication 1 AMPULE: at 07:30

## 2017-12-18 RX ADMIN — Medication 1 AMPULE: at 20:56

## 2017-12-18 RX ADMIN — INSULIN GLARGINE 10 UNITS: 100 INJECTION, SOLUTION SUBCUTANEOUS at 07:31

## 2017-12-18 RX ADMIN — INSULIN LISPRO 4 UNITS: 100 INJECTION, SOLUTION INTRAVENOUS; SUBCUTANEOUS at 17:28

## 2017-12-18 RX ADMIN — INSULIN LISPRO 4 UNITS: 100 INJECTION, SOLUTION INTRAVENOUS; SUBCUTANEOUS at 20:56

## 2017-12-18 RX ADMIN — Medication 5 ML: at 13:14

## 2017-12-18 RX ADMIN — Medication 20 ML: at 00:53

## 2017-12-18 RX ADMIN — INSULIN LISPRO 4 UNITS: 100 INJECTION, SOLUTION INTRAVENOUS; SUBCUTANEOUS at 00:51

## 2017-12-18 RX ADMIN — INSULIN LISPRO 2 UNITS: 100 INJECTION, SOLUTION INTRAVENOUS; SUBCUTANEOUS at 11:40

## 2017-12-18 RX ADMIN — INSULIN LISPRO 4 UNITS: 100 INJECTION, SOLUTION INTRAVENOUS; SUBCUTANEOUS at 06:02

## 2017-12-18 RX ADMIN — SODIUM CHLORIDE 40 MG: 9 INJECTION INTRAMUSCULAR; INTRAVENOUS; SUBCUTANEOUS at 16:12

## 2017-12-18 RX ADMIN — Medication 10 ML: at 20:57

## 2017-12-18 RX ADMIN — INSULIN LISPRO 4 UNITS: 100 INJECTION, SOLUTION INTRAVENOUS; SUBCUTANEOUS at 07:30

## 2017-12-18 NOTE — PROGRESS NOTES
HOSPITALIST  NAME:  Lazarus North   Age:  79 y.o.  :   1950   MRN:   [id number]  PCP: None  Consulting MD:  Treatment Team: Attending Provider: Kel Michel MD; Consulting Provider: Bernie Xiong MD; Consulting Provider: Matthieu Nava MD; Consulting Provider: Roula Cardoso MD; Consulting Provider: Kel Michel MD; Care Manager: Darwin Billings RN  HPI:   80 yo male with no prior known medical history. Pt presented to the ER on  with syncopal episode. He was admitted by hospitalist after CT head confirmed subdural hematoma that was not felt to require intervention. 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 He was taken to the OR for debridement and has been seen by Dr. Oli Fisher with plastic surgery for continued wound debridement. Pt is extubated and transferred out fo ICU    Today, no pain or fever, no ac events    Past Medical History:   Diagnosis Date    Diabetes ([facility]       History reviewed. No pertinent surgical history. None     No Known Allergies   Social History   Substance Use Topics    Smoking status: Former Smoker    Smokeless tobacco: Not on file    Alcohol use Not on file      History reviewed. No pertinent family history. Objective:     Visit Vitals    /75    Pulse 81    Temp 98.9 °F (37.2 °C)    Resp 18    Ht 5' 11\" (1.803 m)    Wt 75.5 kg (166 lb 6.4 oz)    SpO2 97%    BMI 23.21 kg/m2      Temp (24hrs), Av.7 °F (37.1 °C), Min:98 °F (36.7 °C), Max:99.3 °F (37.4 °C)    Oxygen Therapy  O2 Sat (%): 97 % (17 1549)  Pulse via Oximetry: 88 beats per minute (17 1304)  O2 Device: Room air (17 0659)  O2 Flow Rate (L/min): 2 l/min (17 4286)  FIO2 (%): 30 % (17 0818)  Physical Exam:  General:    Alert, cooperative, mild distress, appears stated age. pale  Lungs:   Clear to auscultation bilaterally. No Wheezing or Rhonchi. No rales.   Heart:   Regular rate and rhythm,  no murmur, rub or gallop. Abdomen:   Soft, non-tender. Not distended. Bowel sounds normal.   Extremities: Necrotic ulceration of right medial thigh with purulent foul smelling drainage. Skin:     Boot on right leg/ dressed  Neurologic: Grossly intact    Data Review:   Recent Results (from the past 24 hour(s))   GLUCOSE, POC    Collection Time: 12/17/17  4:36 PM   Result Value Ref Range    Glucose (POC) 183 (H) 65 - 100 mg/dL   GLUCOSE, POC    Collection Time: 12/17/17  8:57 PM   Result Value Ref Range    Glucose (POC) 199 (H) 65 - 100 mg/dL   GLUCOSE, POC    Collection Time: 12/18/17 12:13 AM   Result Value Ref Range    Glucose (POC) 204 (H) 65 - 100 mg/dL   GLUCOSE, POC    Collection Time: 12/18/17  5:22 AM   Result Value Ref Range    Glucose (POC) 227 (H) 65 - 100 mg/dL   GLUCOSE, POC    Collection Time: 12/18/17 11:07 AM   Result Value Ref Range    Glucose (POC) 172 (H) 65 - 100 mg/dL     Imaging Tony Orellana /Studies   Status Provider Status         Final result (Exam End: 12/1/2017  5:24 PM) Open        Study Result      CHEST X-RAY, 2 views 12/1/2017     History: Elevated white blood cell count.     Technique: PA and lateral views of the chest.      Comparison: None.     Findings: The cardiac silhouette is normal in respect to size. The lungs are expanded  without evidence for pneumothorax. No consolidation, or evidence of pleural  effusion is seen. Only mild interstitial prominence is seen at the lung bases  likely representing scarring or atelectasis.     The bony thorax demonstrates no acute changes. The upper abdomen is  unremarkable in appearance.     IMPRESSION  IMPRESSION:   1. No acute cardiopulmonary process evident by plain film imaging. Final result (Exam End: 12/1/2017  5:15 PM) Open        Study Result      CT HEAD WITHOUT CONTRAST, 12/1/2017      History: Confusion and hyperglycemia.  Patient fell in front of neighbors.      Comparison: None      Technique:   5 mm axial scans from the skull base to the vertex. All CT scans  performed at this facility use one or all of the following: Automated exposure  control, adjustment of the mA and/or kVp according to patient's size, iterative  reconstruction.      Findings: An abnormal moderately hyperdense collection is seen along the right  lateral and high convexity measuring up to 4 mm which is favored to represent a  subacute although recent subdural hematoma given the lack of significant  hyperdensity. Moderate cortical involutional changes are seen which are not  felt to be abnormal given the patient's age. No evidence for acute  hydrocephalus is seen. No evidence of midline shift or herniation is seen. No  abnormal edema pattern is seen in a vascular distribution to suggest large  artery infarction. Only chronic encephalomalacic changes are seen in the right  cerebellar hemisphere consistent with a chronic right PICA infarction.     Evaluation with bone windows shows no acute osseous changes. The visualized  sinuses, middle ears, and mastoid air cells are well aerated.     IMPRESSION  IMPRESSION:    1. Small right subdural hematoma measuring 4 mm which demonstrates intermediate  attenuation suggesting a subacute although recent subdural hematoma.           Assessment and Plan:      Active Hospital Problems    Diagnosis Date Noted    Shima gangrene 12/17/2017    Subdural hematoma ([facility] 12/01/2017    Type II diabetes mellitus with complication ([facility] [address] [facility] 53/37/0124    Syncope 12/01/2017    Fasciitis 12/01/2017     A/P    Continue antibiotics and check w/ surgery for further recommendations if abx are needed, consider ID consult if necessary  Tubal feeding tolerated  DM controlled  Ch anemia, stable  Dispo: rehab?, CM consulted    Signed By: Carlos Enrique Barbosa MD     December 18, 2017

## 2017-12-18 NOTE — PROGRESS NOTES
END OF SHIFT NOTE:    INTAKE/OUTPUT  12/17 0701 - 12/18 0700  In: 551 [I.V.:174]  Out: 4025 [Urine:3800]  Voiding: NO  Catheter: YES  Drain:   Nasogastric Tube 12/14/17 (Active)   Site Assessment Clean, dry, & intact 12/17/2017  2:24 PM   Dressing Status Clean, dry, & intact 12/17/2017  2:24 PM   G Port Status Infusing 12/17/2017  2:24 PM   External Insertion Binh (cms) 60 cms 12/16/2017  2:18 PM   Action Taken Placement verified (comment) 12/16/2017  2:18 PM   Drainage Description Tan 12/16/2017  2:18 PM   Gastric Residual (mL) 125 ml 12/16/2017  2:18 PM   Tube Feeding/Formula Options Diabetic (Glucerna 1.2) 12/17/2017  2:24 PM   Tube Feeding/Verify Rate (mL/hr) 75 12/17/2017  2:24 PM   Water Flush Volume (mL) 45 mL 12/17/2017  2:24 PM   Intake (ml) 523 ml 12/17/2017 10:29 PM       Vacuum Assisted Closure Right Thigh (Active)   Vac Status Suction, continuous 12/17/2017  2:24 PM   Suction (mmHg) 125 12/17/2017  2:24 PM   Site Assessment Clean, dry, & intact 12/17/2017  2:24 PM   Dressing Status Clean, dry, & intact 12/17/2017  2:24 PM   Drainage Description Serosanguinous 12/17/2017  2:24 PM   Drainage Chamber Level (ml) 0 ml 12/16/2017  2:18 PM   Cannister Changed No 12/16/2017  6:59 AM   Output (ml) 0 ml 12/16/2017  2:18 PM       Colostomy 12/02/17 Upper Abdomen (Active)   Drainage Color Spencer Cools 12/17/2017  6:32 PM   Site Assessment Clean, dry, intact 12/17/2017  6:32 PM   Treatment Bag change;Site care; Wafer changed 12/9/2017  1:59 AM   Output (ml) 75 mL 12/17/2017  6:32 PM               Flatus: Patient does have flatus present. Stool:  2 occurrences. Characteristics:  Stool Assessment  Stool Color: Brown  Stool Appearance: Loose  Stool Amount: Medium  Stool Source/Status: Colostomy    Emesis: 0 occurrences.     Characteristics:        VITAL SIGNS  Patient Vitals for the past 12 hrs:   Temp Pulse Resp BP SpO2   12/18/17 0300 98.9 °F (37.2 °C) 91 18 123/76 92 %   12/17/17 2300 98 °F (36.7 °C) 89 18 114/69 91 % 12/17/17 1900 98.3 °F (36.8 °C) 75 18 122/69 92 %       Pain Assessment  Pain Intensity 1: 0 (12/17/17 1424)  Pain Location 1: Buttocks  Pain Intervention(s) 1: Nurse notified  Patient Stated Pain Goal: 0    Ambulating  No    Shift report given to oncoming nurse at the bedside.     Aubrey Briones RN

## 2017-12-18 NOTE — PROGRESS NOTES
Problem: Mobility Impaired (Adult and Pediatric)  Goal: *Acute Goals and Plan of Care (Insert Text)  STG:  (1.)Mr. Rumalda Boxer will move from supine to sit and sit to supine , scoot up and down and roll side to side with MAXIMAL ASSIST within 3 day(s). (2.)Mr. Rumalda Boxer will transfer from bed to chair and chair to bed with MINIMAL ASSIST using the least restrictive device within 3 day(s). (3.)Mr. Rumalda Boxer will ambulate with MAXIMAL ASSIST for 15 feet with the least restrictive device within 3 day(s). (4.)Mr. Rumalda Boxer will participate in therapeutic activity/exerices x 12 minutes for increased strength within 3 days. LTG:  (1.)Mr. Rumalda Boxer will move from supine to sit and sit to supine , scoot up and down and roll side to side in bed with MINIMAL ASSIST within 7 day(s). (2.)Mr. Rumalda Boxer will transfer from bed to chair and chair to bed with MINIMAL ASSIST using the least restrictive device within 7 day(s). (3.)Mr. Rumalda Boxer will ambulate with MINIMAL ASSIST for 75 feet with the least restrictive device within 7 day(s). (4.)Mr. Rumalda Boxer will participate in therapeutic activity/exerices x 23 minutes for increased strength within 7 days.     ________________________________________________________________________________________________      PHYSICAL THERAPY: Daily Note, AM 12/18/2017  INPATIENT: Hospital Day: 18  Payor: Gabe Mata / Plan: [address] [place] [address] HMO / Product Type: [employer] /    *SEE ASSESSMENT NOTE*     NAME/AGE/GENDER: Annamaria Armas is a 79 y.o. 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 ASSESSMENT:     Mr. Andreea Hudson is supine in bed on arrival.  He is agreeable to PT by shaking his head yes. Pt sat EOB with CGA to mod assist for balance. Pt had posterior lean while sitting. Pt tolerated sitting x 10 minutes. Pt returned to supine with pillows in place for comfort and mittens on B hands. Pt left supine with HOB elevated due to tube feeding. This section established at most recent assessment   PROBLEM LIST (Impairments causing functional limitations):  1. Decreased Strength  2. Decreased ADL/Functional Activities  3. Decreased Transfer Abilities  4. Decreased Ambulation Ability/Technique  5. Decreased Balance  6. Increased Pain  7. Decreased Activity Tolerance   INTERVENTIONS PLANNED: (Benefits and precautions of physical therapy have been discussed with the patient.)  1. Balance Exercise  2. Bed Mobility  3. Family Education  4. Gait Training  5. Neuromuscular Re-education/Strengthening  6. Range of Motion (ROM)  7. Therapeutic Activites  8. Therapeutic Exercise/Strengthening  9. Transfer Training  10. Group Therapy     TREATMENT PLAN: Frequency/Duration: 3 times a week for duration of hospital stay  Rehabilitation Potential For Stated Goals: Good     RECOMMENDED REHABILITATION/EQUIPMENT: (at time of discharge pending progress): Due to the probability of continued deficits (see above) this patient will likely need continued skilled physical therapy after discharge. Equipment:    None at this time              HISTORY:   History of Present Injury/Illness (Reason for Referral):  Per H&P: \"  Past Medical History/Comorbidities:   Mr. Andreea Hudson  has a past medical history of Diabetes ([facility] [address] Mr. Andreea Hudson  has no past surgical history on file.   Social History/Living Environment:   Home Environment: Apartment  # Steps to Enter: 0  One/Two Story Residence: One story  Living Alone: Yes  Support Systems: Family member(s)  Patient Expects to be Discharged to[de-identified] Unknown  Current DME Used/Available at Home: Walker, rollator, Cane, straight  Prior Level of Function/Work/Activity:  Per granddaughter he was independent with ADLs and used a cane for ambulation with recent history of falls. Also pt was living alone. Number of Personal Factors/Comorbidities that affect the Plan of Care:  Diabetes 1-2: MODERATE COMPLEXITY   EXAMINATION:   Most Recent Physical Functioning:   Gross Assessment:                  Posture:     Balance:    Bed Mobility:  Supine to Sit: Maximum assistance;Assist x2  Sit to Supine: Total assistance  Wheelchair Mobility:     Transfers:     Gait:            Body Structures Involved:  1. Nerves  2. Voice/Speech  3. Metabolic  4. Muscles Body Functions Affected:  1. Mental  2. Sensory/Pain  3. Voice and Speech  4. Neuromusculoskeletal  5. Movement Related  6. Skin Related  7. Metobolic/Endocrine Activities and Participation Affected:  1. General Tasks and Demands  2. Communication  3. Mobility  4. Self Care  5. Domestic Life  6. Interpersonal Interactions and Relationships  7. Community, Social and [place]   Number of elements that affect the Plan of Care: 4+: HIGH COMPLEXITY   CLINICAL PRESENTATION:   Presentation: Evolving clinical presentation with changing clinical characteristics: MODERATE COMPLEXITY   CLINICAL DECISION MAKIN [facility] Mobility Inpatient Short Form  How much difficulty does the patient currently have. .. Unable A Lot A Little None   1. Turning over in bed (including adjusting bedclothes, sheets and blankets)? [] 1   [x] 2   [] 3   [] 4   2. Sitting down on and standing up from a chair with arms ( e.g., wheelchair, bedside commode, etc.)   [x] 1   [] 2   [] 3   [] 4   3. Moving from lying on back to sitting on the side of the bed? [x] 1   [] 2   [] 3   [] 4   How much help from another person does the patient currently need. .. Total A Lot A Little None   4.   Moving to and from a bed to a chair (including a wheelchair)? [x] 1   [] 2   [] 3   [] 4   5. Need to walk in hospital room? [x] 1   [] 2   [] 3   [] 4   6. Climbing 3-5 steps with a railing? [x] 1   [] 2   [] 3   [] 4   © 2007, Trustees of [address] [place] [address] [address] [address], under license to [employer]. All rights reserved      Score:  Initial: 7 Most Recent: X (Date: -- )    Interpretation of Tool:  Represents activities that are increasingly more difficult (i.e. Bed mobility, Transfers, Gait). Score 24 23 22-20 19-15 14-10 9-7 6     Modifier CH CI CJ CK CL CM CN      ? Mobility - Walking and Moving Around:     - CURRENT STATUS: CM - 80%-99% impaired, limited or restricted    - GOAL STATUS: CL - 60%-79% impaired, limited or restricted    - D/C STATUS:  ---------------To be determined---------------  Payor: [employer] MEDICARE / Plan: [address] [place] [address] HMO / Product Type: Managed Care Medicare /      Medical Necessity:     · Patient demonstrates good rehab potential due to higher previous functional level. Reason for Services/Other Comments:  · Patient continues to require skilled intervention due to decreased strength, balance, and activity tolerance. Use of outcome tool(s) and clinical judgement create a POC that gives a: Questionable prediction of patient's progress: MODERATE COMPLEXITY            TREATMENT:   (In addition to Assessment/Re-Assessment sessions the following treatments were rendered)   Pre-treatment Symptoms/Complaints:  No complaints of pain  Pain: Initial:      Post Session:  0     Therapeutic Activity: (    15 minutes): Therapeutic activities including Bed transfers to improve mobility, strength and balance. Required minimal   to promote static balance in sitting. Braces/Orthotics/Lines/Etc:   · IV  · kathleen catheter  · wound vac  · O2 Device: Nasal cannula  Treatment/Session Assessment:    · Response to Treatment:  Pt tolerated poorly due to increased pain with movement.   · Interdisciplinary Collaboration:   o Registered Nurse  o Rehabilitation Attendant  · After treatment position/precautions:   o Supine in bed  o Bed/Chair-wheels locked  o Bed in low position  o RN notified  o Side rails x 3   · Compliance with Program/Exercises: Will assess as treatment progresses. · Recommendations/Intent for next treatment session: \"Next visit will focus on advancements to more challenging activities and reduction in assistance provided\".   Total Treatment Duration:PT Patient Time In/Time Out  Time In: 1110  Time Out: [address] [place], [place]

## 2017-12-18 NOTE — PROGRESS NOTES
LTG: Patient will tolerate least restrictive diet without overt signs or symptoms of airway compromise. STG: Patient will tolerate po trials with speech therapy only. STG: Patient will participate in modified barium swallow study as clinically indicated. Speech language pathology: bedside swallow note: Daily Note: 2    NAME/AGE/GENDER: Nga Morfin is a 79 y.o. 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 INTERDISCIPLINARY COLLABORATION: Registered Nurse and Physician  PRECAUTIONS/ALLERGIES: Review of patient's allergies indicates no known allergies. ASSESSMENT:Patient seen for po trials. Pt's voice remains hoarse and breathy with aphonia at times. Pt with congestion and coughing at baseline. Mod secretions in oral cavity which SLP removed with a swab. Pt given one trial honey thick liquids via spoon. Pt required max cues (verbal and visual) to close his mouth around the spoon to accept the trial as he kept his mouth wide open initially. Delayed swallowing observed. Delayed coughing also observed. Therefore, further trials discontinued. Pt able to complete 3 dry swallow request.  He then exhibited perseverations on sustained \"ah\". He was able to achieve adequate voicing with \"ah\" 40% of the time. Will follow for laryngeal exercises as able, ST evaluation and po trials as appropriate. He will likely need a MBS prior to initiating a po diet. However, he is not appropriate for that at this time. Patient will benefit from skilled intervention to address the below impairments. ?????? ? ? This section established at most recent assessment??????????  PROBLEM LIST (Impairments causing functional limitations):  1.  Oropharyngeal dysphagia  REHABILITATION POTENTIAL FOR STATED GOALS: Good  PLAN OF CARE:   Patient will benefit from skilled intervention to address the following impairments. RECOMMENDATIONS AND PLANNED INTERVENTIONS (Benefits and precautions of therapy have been discussed with the patient.):  · NPO with alternative means of nutrition  MEDICATIONS:  · Non-oral  COMPENSATORY STRATEGIES/MODIFICATIONS INCLUDING:  · None  OTHER RECOMMENDATIONS (including follow up treatment recommendations):   · Laryngeal exercises  · Patient education  RECOMMENDED DIET MODIFICATIONS DISCUSSED WITH:  · Medical Sub-Specialist  · Nursing  · Patient  FREQUENCY/DURATION: Continue to follow patient 3 times a week for duration of hospital stay to address above goals. RECOMMENDED REHABILITATION/EQUIPMENT: (at time of discharge pending progress): Due to the probability of continued deficits (see above) this patient will likely need continued skilled speech therapy after discharge. SUBJECTIVE:   Drowsy, but able to participate in minimal cues. History of Present Injury/Illness: Mr. Mona Akhtar  has a past medical history of Diabetes ([facility] [address] .  He also  has no past surgical history on file. Present Symptoms: oropharyngeal dysphagia   Pain Intensity 1: 0  Pain Location 1: Buttocks  Pain Orientation 1: Right  Pain Intervention(s) 1: Nurse notified  Current Medications:   No current facility-administered medications on file prior to encounter. No current outpatient prescriptions on file prior to encounter. Current Dietary Status:  NPO- tube feedings. Social History/Home Situation:    Home Environment: Apartment  # Steps to Enter: 0  One/Two Story Residence: One story  Living Alone: Yes  Support Systems: Family member(s)  Patient Expects to be Discharged to[de-identified] Unknown  Current DME Used/Available at Home: Walker, rollator, Cane, straight  OBJECTIVE:   Respiratory Status:        CXR Results:No acute findings  MRI/CT Results:1.   Small right subdural hematoma measuring 4 mm which demonstrates intermediate  attenuation suggesting a subacute although recent subdural hematoma  Oral Motor Structure/Speech:  Oral-Motor Structure/Motor Speech  Labial: No impairment  Dentition: Edentulous  Oral Hygiene:  (mod secretions in oral cavity)  Lingual: No impairment    Cognitive and Communication Status:  Neurologic State: Alert  Orientation Level: Unable to verbalize  Cognition: Decreased attention/concentration             BEDSIDE SWALLOW EVALUATION  Oral Assessment:  Oral Assessment  Oral Hygiene:  (mod secretions in oral cavity)  P. O. Trials:  Patient Position: upright in bed    The patient was given tsp amounts of the following:   Consistency Presented: Honey thick liquid  How Presented: Self-fed/presented;Spoon    ORAL PHASE:  Bolus Acceptance: Impaired  Bolus Formation/Control: Impaired  Propulsion: No impairment  Type of Impairment: Delayed  Oral Residue: None    PHARYNGEAL PHASE:  Initiation of Swallow: Delayed (# of seconds)     Aspiration Signs/Symptoms: Delayed cough/throat clear  Vocal Quality: Aphonic;Breathy;Hoarse                OTHER OBSERVATIONS:  Rate/bite size: Impaired   Endurance:  Impaired   Comments:       Tool Used: Dysphagia Outcome and Severity Scale (ANGEL)    Score Comments   Normal Diet  [] 7 With no strategies or extra time needed   Functional Swallow  [] 6 May have mild oral or pharyngeal delay       Mild Dysphagia    [] 5 Which may require one diet consistency restricted (those who demonstrate penetration which is entirely cleared on MBS would be included)   Mild-Moderate Dysphagia  [] 4 With 1-2 diet consistencies restricted       Moderate Dysphagia  [] 3 With 2 or more diet consistencies restricted       Moderately Severe Dysphagia  [] 2 With partial PO strategies (trials with ST only)       Severe Dysphagia  [x] 1 With inability to tolerate any PO safely          Score:  Initial: 1 Most Recent: X (Date: -- )   Interpretation of Tool: The Dysphagia Outcome and Severity Scale (ANGEL) is a simple, easy-to-use, 7-point scale developed to systematically rate the functional severity of dysphagia based on objective assessment and make recommendations for diet level, independence level, and type of nutrition. Score 7 6 5 4 3 2 1   Modifier CH CI CJ CK CL CM CN   ? Swallowing:     - CURRENT STATUS: CN - 100% impaired, limited or restricted    - GOAL STATUS:  CI - 1%-19% impaired, limited or restricted    - D/C STATUS:  ---------------To be determined---------------  Payor: Jarett Quintanilla / Plan: [address] [place] [address] HMO / Product Type: Managed Care Medicare /     TREATMENT:    (In addition to Assessment/Re-Assessment sessions the following treatments were rendered)  Assessment/Reassessment only, no treatment provided today  MODALITIES:                                                                    ORAL MOTOR  EXERCISES:                                                                                                                                                                      LARYNGEAL / PHARYNGEAL EXERCISES:                    Hard Glottal Attack: Yes  Reps : 10  Sets : 1                                                                                     Sustained \"ah\": Yes  Sets : 1  Reps : 10 (adequate voicing 40% of the time)              __________________________________________________________________________________________________  Safety:   After treatment position/precautions:  · Nurse at bedside  · Upright in Bed. Progression/Medical Necessity:   · Patient is expected to demonstrate progress in swallow strength, swallow timeliness, swallow function and diet tolerance to improve swallow safety, work toward diet advancement and decrease aspiration risk. Compliance with Program/Exercises: Will assess as treatment progresses.    Reason for Continuation of Services/Other Comments:  · Patient continues to require skilled intervention due to oropharyngeal dysphagia. Recommendations/Intent for next treatment session: \"Treatment next visit will focus on laryngeal exercises, ST evaluation, po trials if appropriate\".     Total Treatment Duration:  Time In: 1007  Time Out: 88 DEAN Everett, CCC-SLP

## 2017-12-19 ENCOUNTER — APPOINTMENT (OUTPATIENT)
Dept: GENERAL RADIOLOGY | Age: 67
DRG: 853 | End: 2017-12-19
Attending: INTERNAL MEDICINE
Payer: MEDICARE

## 2017-12-19 ENCOUNTER — APPOINTMENT (OUTPATIENT)
Dept: GENERAL RADIOLOGY | Age: 67
DRG: 853 | End: 2017-12-19
Attending: HOSPITALIST
Payer: MEDICARE

## 2017-12-19 PROBLEM — R41.0 DELIRIUM: Status: ACTIVE | Noted: 2017-12-19

## 2017-12-19 LAB
APPEARANCE UR: ABNORMAL
ARTERIAL PATENCY WRIST A: POSITIVE
BACTERIA URNS QL MICRO: 0 /HPF
BASE EXCESS BLDA CALC-SCNC: 2 MMOL/L (ref 0–3)
BASOPHILS # BLD: 0 K/UL (ref 0–0.2)
BASOPHILS NFR BLD: 0 % (ref 0–2)
BDY SITE: ABNORMAL
BILIRUB UR QL: NEGATIVE
COHGB MFR BLD: 1.7 % (ref 0.5–1.5)
COLOR UR: YELLOW
DIFFERENTIAL METHOD BLD: ABNORMAL
DO-HGB BLD-MCNC: 9 % (ref 0–5)
EOSINOPHIL # BLD: 0 K/UL (ref 0–0.8)
EOSINOPHIL NFR BLD: 0 % (ref 0.5–7.8)
EPI CELLS #/AREA URNS HPF: ABNORMAL /HPF
ERYTHROCYTE [DISTWIDTH] IN BLOOD BY AUTOMATED COUNT: 15.7 % (ref 11.9–14.6)
GAS FLOW.O2 O2 DELIVERY SYS: 10 L/MIN
GLUCOSE BLD STRIP.AUTO-MCNC: 190 MG/DL (ref 65–100)
GLUCOSE BLD STRIP.AUTO-MCNC: 202 MG/DL (ref 65–100)
GLUCOSE BLD STRIP.AUTO-MCNC: 218 MG/DL (ref 65–100)
GLUCOSE BLD STRIP.AUTO-MCNC: 223 MG/DL (ref 65–100)
GLUCOSE BLD STRIP.AUTO-MCNC: 226 MG/DL (ref 65–100)
GLUCOSE BLD STRIP.AUTO-MCNC: 249 MG/DL (ref 65–100)
GLUCOSE BLD STRIP.AUTO-MCNC: 300 MG/DL (ref 65–100)
GLUCOSE UR STRIP.AUTO-MCNC: 100 MG/DL
HCO3 BLDA-SCNC: 24 MMOL/L (ref 22–26)
HCT VFR BLD AUTO: 32.5 % (ref 41.1–50.3)
HGB BLD-MCNC: 10.1 G/DL (ref 13.6–17.2)
HGB BLD-MCNC: 8.6 G/DL (ref 13.6–17.2)
HGB BLDMV-MCNC: 9.3 GM/DL (ref 11.7–15)
HGB UR QL STRIP: NEGATIVE
IMM GRANULOCYTES # BLD: 0.3 K/UL (ref 0–0.5)
IMM GRANULOCYTES NFR BLD AUTO: 2 % (ref 0–5)
KETONES UR QL STRIP.AUTO: NEGATIVE MG/DL
LEUKOCYTE ESTERASE UR QL STRIP.AUTO: ABNORMAL
LYMPHOCYTES # BLD: 1.5 K/UL (ref 0.5–4.6)
LYMPHOCYTES NFR BLD: 13 % (ref 13–44)
MCH RBC QN AUTO: 28 PG (ref 26.1–32.9)
MCHC RBC AUTO-ENTMCNC: 31.1 G/DL (ref 31.4–35)
MCV RBC AUTO: 90 FL (ref 79.6–97.8)
METHGB MFR BLD: 0.3 % (ref 0–1.5)
MONOCYTES # BLD: 0.8 K/UL (ref 0.1–1.3)
MONOCYTES NFR BLD: 7 % (ref 4–12)
NEUTS SEG # BLD: 9 K/UL (ref 1.7–8.2)
NEUTS SEG NFR BLD: 78 % (ref 43–78)
NITRITE UR QL STRIP.AUTO: NEGATIVE
OXYHGB MFR BLDA: 89.1 % (ref 94–97)
PCO2 BLDA: 28 MMHG (ref 35–45)
PH BLDA: 7.55 [PH] (ref 7.35–7.45)
PH UR STRIP: 8 [PH] (ref 5–9)
PLATELET # BLD AUTO: 318 K/UL (ref 150–450)
PMV BLD AUTO: 11 FL (ref 10.8–14.1)
PO2 BLDA: 55 MMHG (ref 80–105)
PROT UR STRIP-MCNC: 30 MG/DL
RBC # BLD AUTO: 3.61 M/UL (ref 4.23–5.67)
SAO2 % BLD: 91 % (ref 92–98.5)
SERVICE CMNT-IMP: ABNORMAL
SP GR UR REFRACTOMETRY: 1.02 (ref 1–1.02)
UROBILINOGEN UR QL STRIP.AUTO: 0.2 EU/DL (ref 0.2–1)
VENTILATION MODE VENT: ABNORMAL
WBC # BLD AUTO: 11.6 K/UL (ref 4.3–11.1)
WBC URNS QL MICRO: ABNORMAL /HPF
YEAST URNS QL MICRO: ABNORMAL

## 2017-12-19 PROCEDURE — 77030012794 HC KT NSL CANN/HGH TRAN -A

## 2017-12-19 PROCEDURE — 74750000023 HC WOUND THERAPY

## 2017-12-19 PROCEDURE — 86580 TB INTRADERMAL TEST: CPT | Performed by: HOSPITALIST

## 2017-12-19 PROCEDURE — 71010 XR CHEST SNGL V: CPT

## 2017-12-19 PROCEDURE — 74011250636 HC RX REV CODE- 250/636: Performed by: INTERNAL MEDICINE

## 2017-12-19 PROCEDURE — 77030021668 HC NEB PREFIL KT VYRM -A

## 2017-12-19 PROCEDURE — 74011000302 HC RX REV CODE- 302: Performed by: HOSPITALIST

## 2017-12-19 PROCEDURE — 74011636637 HC RX REV CODE- 636/637: Performed by: HOSPITALIST

## 2017-12-19 PROCEDURE — 82803 BLOOD GASES ANY COMBINATION: CPT

## 2017-12-19 PROCEDURE — 77030010522

## 2017-12-19 PROCEDURE — 85025 COMPLETE CBC W/AUTO DIFF WBC: CPT | Performed by: INTERNAL MEDICINE

## 2017-12-19 PROCEDURE — 36415 COLL VENOUS BLD VENIPUNCTURE: CPT | Performed by: INTERNAL MEDICINE

## 2017-12-19 PROCEDURE — 77030019952 HC CANSTR VAC ASST KCON -B

## 2017-12-19 PROCEDURE — 65270000029 HC RM PRIVATE

## 2017-12-19 PROCEDURE — 97532 HC OT COGNITIVE SKILL DEV 15 MIN: CPT

## 2017-12-19 PROCEDURE — 74011000250 HC RX REV CODE- 250: Performed by: SURGERY

## 2017-12-19 PROCEDURE — 81001 URINALYSIS AUTO W/SCOPE: CPT | Performed by: INTERNAL MEDICINE

## 2017-12-19 PROCEDURE — 77030018798 HC PMP KT ENTRL FED COVD -A

## 2017-12-19 PROCEDURE — 74011250636 HC RX REV CODE- 250/636: Performed by: SURGERY

## 2017-12-19 PROCEDURE — P9047 ALBUMIN (HUMAN), 25%, 50ML: HCPCS | Performed by: HOSPITALIST

## 2017-12-19 PROCEDURE — 74011250637 HC RX REV CODE- 250/637: Performed by: SURGERY

## 2017-12-19 PROCEDURE — C9113 INJ PANTOPRAZOLE SODIUM, VIA: HCPCS | Performed by: SURGERY

## 2017-12-19 PROCEDURE — 36591 DRAW BLOOD OFF VENOUS DEVICE: CPT

## 2017-12-19 PROCEDURE — 92526 ORAL FUNCTION THERAPY: CPT

## 2017-12-19 PROCEDURE — 77030010541

## 2017-12-19 PROCEDURE — 85018 HEMOGLOBIN: CPT | Performed by: HOSPITALIST

## 2017-12-19 PROCEDURE — 77030019605

## 2017-12-19 PROCEDURE — 87040 BLOOD CULTURE FOR BACTERIA: CPT | Performed by: INTERNAL MEDICINE

## 2017-12-19 PROCEDURE — 74011000258 HC RX REV CODE- 258: Performed by: INTERNAL MEDICINE

## 2017-12-19 PROCEDURE — 36600 WITHDRAWAL OF ARTERIAL BLOOD: CPT

## 2017-12-19 PROCEDURE — 74011250636 HC RX REV CODE- 250/636: Performed by: HOSPITALIST

## 2017-12-19 PROCEDURE — 97535 SELF CARE MNGMENT TRAINING: CPT

## 2017-12-19 RX ORDER — INSULIN GLARGINE 100 [IU]/ML
20 INJECTION, SOLUTION SUBCUTANEOUS DAILY
Status: DISCONTINUED | OUTPATIENT
Start: 2017-12-20 | End: 2017-12-29 | Stop reason: HOSPADM

## 2017-12-19 RX ORDER — FUROSEMIDE 10 MG/ML
20 INJECTION INTRAMUSCULAR; INTRAVENOUS ONCE
Status: COMPLETED | OUTPATIENT
Start: 2017-12-19 | End: 2017-12-19

## 2017-12-19 RX ORDER — SODIUM CHLORIDE 0.9 % (FLUSH) 0.9 %
5-10 SYRINGE (ML) INJECTION AS NEEDED
Status: CANCELLED | OUTPATIENT
Start: 2017-12-19

## 2017-12-19 RX ORDER — FAMOTIDINE 20 MG/1
20 TABLET, FILM COATED ORAL ONCE
Status: CANCELLED | OUTPATIENT
Start: 2017-12-19 | End: 2017-12-19

## 2017-12-19 RX ORDER — VANCOMYCIN HYDROCHLORIDE
1250 EVERY 12 HOURS
Status: DISCONTINUED | OUTPATIENT
Start: 2017-12-19 | End: 2017-12-20

## 2017-12-19 RX ORDER — FUROSEMIDE 10 MG/ML
40 INJECTION INTRAMUSCULAR; INTRAVENOUS ONCE
Status: COMPLETED | OUTPATIENT
Start: 2017-12-19 | End: 2017-12-19

## 2017-12-19 RX ORDER — SODIUM CHLORIDE, SODIUM LACTATE, POTASSIUM CHLORIDE, CALCIUM CHLORIDE 600; 310; 30; 20 MG/100ML; MG/100ML; MG/100ML; MG/100ML
100 INJECTION, SOLUTION INTRAVENOUS CONTINUOUS
Status: CANCELLED | OUTPATIENT
Start: 2017-12-19 | End: 2017-12-20

## 2017-12-19 RX ORDER — ALBUMIN HUMAN 250 G/1000ML
12.5 SOLUTION INTRAVENOUS ONCE
Status: COMPLETED | OUTPATIENT
Start: 2017-12-19 | End: 2017-12-28

## 2017-12-19 RX ORDER — SODIUM CHLORIDE 9 MG/ML
50 INJECTION, SOLUTION INTRAVENOUS CONTINUOUS
Status: CANCELLED | OUTPATIENT
Start: 2017-12-19 | End: 2017-12-20

## 2017-12-19 RX ORDER — SODIUM CHLORIDE 0.9 % (FLUSH) 0.9 %
5-10 SYRINGE (ML) INJECTION EVERY 8 HOURS
Status: CANCELLED | OUTPATIENT
Start: 2017-12-19

## 2017-12-19 RX ORDER — LIDOCAINE HYDROCHLORIDE 10 MG/ML
0.1 INJECTION INFILTRATION; PERINEURAL AS NEEDED
Status: CANCELLED | OUTPATIENT
Start: 2017-12-19

## 2017-12-19 RX ADMIN — ENOXAPARIN SODIUM 40 MG: 40 INJECTION SUBCUTANEOUS at 12:29

## 2017-12-19 RX ADMIN — INSULIN LISPRO 8 UNITS: 100 INJECTION, SOLUTION INTRAVENOUS; SUBCUTANEOUS at 23:56

## 2017-12-19 RX ADMIN — Medication 1 AMPULE: at 21:06

## 2017-12-19 RX ADMIN — INSULIN LISPRO 4 UNITS: 100 INJECTION, SOLUTION INTRAVENOUS; SUBCUTANEOUS at 12:00

## 2017-12-19 RX ADMIN — FUROSEMIDE 40 MG: 10 INJECTION, SOLUTION INTRAMUSCULAR; INTRAVENOUS at 20:32

## 2017-12-19 RX ADMIN — Medication 10 ML: at 21:06

## 2017-12-19 RX ADMIN — ALBUMIN (HUMAN) 12.5 G: 0.25 INJECTION, SOLUTION INTRAVENOUS at 22:43

## 2017-12-19 RX ADMIN — FUROSEMIDE 20 MG: 10 INJECTION, SOLUTION INTRAMUSCULAR; INTRAVENOUS at 23:17

## 2017-12-19 RX ADMIN — INSULIN GLARGINE 15 UNITS: 100 INJECTION, SOLUTION SUBCUTANEOUS at 08:04

## 2017-12-19 RX ADMIN — INSULIN LISPRO 4 UNITS: 100 INJECTION, SOLUTION INTRAVENOUS; SUBCUTANEOUS at 07:56

## 2017-12-19 RX ADMIN — TUBERCULIN PURIFIED PROTEIN DERIVATIVE 5 UNITS: 5 INJECTION INTRADERMAL at 08:03

## 2017-12-19 RX ADMIN — INSULIN LISPRO 4 UNITS: 100 INJECTION, SOLUTION INTRAVENOUS; SUBCUTANEOUS at 17:04

## 2017-12-19 RX ADMIN — VANCOMYCIN HYDROCHLORIDE 1250 MG: 10 INJECTION, POWDER, LYOPHILIZED, FOR SOLUTION INTRAVENOUS at 14:07

## 2017-12-19 RX ADMIN — Medication 10 ML: at 23:17

## 2017-12-19 RX ADMIN — Medication 10 ML: at 17:04

## 2017-12-19 RX ADMIN — INSULIN LISPRO 2 UNITS: 100 INJECTION, SOLUTION INTRAVENOUS; SUBCUTANEOUS at 04:09

## 2017-12-19 RX ADMIN — Medication 1 AMPULE: at 08:05

## 2017-12-19 RX ADMIN — INSULIN LISPRO 4 UNITS: 100 INJECTION, SOLUTION INTRAVENOUS; SUBCUTANEOUS at 00:36

## 2017-12-19 RX ADMIN — SODIUM CHLORIDE 1 G: 900 INJECTION, SOLUTION INTRAVENOUS at 13:54

## 2017-12-19 RX ADMIN — Medication 10 ML: at 05:13

## 2017-12-19 RX ADMIN — INSULIN LISPRO 4 UNITS: 100 INJECTION, SOLUTION INTRAVENOUS; SUBCUTANEOUS at 21:00

## 2017-12-19 RX ADMIN — SODIUM CHLORIDE 40 MG: 9 INJECTION INTRAMUSCULAR; INTRAVENOUS; SUBCUTANEOUS at 17:03

## 2017-12-19 NOTE — PROGRESS NOTES
LTG: Patient will tolerate least restrictive diet without overt signs or symptoms of airway compromise. STG: Patient will tolerate po trials with speech therapy only. STG: Patient will participate in modified barium swallow study as clinically indicated. Speech language pathology: bedside swallow note: Daily Note: 3    NAME/AGE/GENDER: Clayton Crawford is a 79 y.o. 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 INTERDISCIPLINARY COLLABORATION: Registered Nurse and Physician  PRECAUTIONS/ALLERGIES: Review of patient's allergies indicates no known allergies. ASSESSMENT:Patient seen for po trials. Pt with congestion and coughing at baseline. Suctioning. Pt given two teaspoon trials of pureed via spoon with a timely swallow, however, signs. sx of aspiration; wet cough and vocal quality. Pt able to complete 3 effortful swallows. Will follow for laryngeal exercises as able, ST evaluation and po trials as appropriate. Pt with decreased command following. Pt may benefit from alternate needs of nutrition. Patient will benefit from skilled intervention to address the below impairments. ?????? ? ? This section established at most recent assessment??????????  PROBLEM LIST (Impairments causing functional limitations):  1. Oropharyngeal dysphagia  REHABILITATION POTENTIAL FOR STATED GOALS: Good  PLAN OF CARE:   Patient will benefit from skilled intervention to address the following impairments.   RECOMMENDATIONS AND PLANNED INTERVENTIONS (Benefits and precautions of therapy have been discussed with the patient.):  · NPO with alternative means of nutrition  MEDICATIONS:  · Non-oral  COMPENSATORY STRATEGIES/MODIFICATIONS INCLUDING:  · None  OTHER RECOMMENDATIONS (including follow up treatment recommendations):   · Laryngeal exercises  · Patient education  RECOMMENDED DIET MODIFICATIONS DISCUSSED WITH:  · Medical Sub-Specialist  · Nursing  · Patient  FREQUENCY/DURATION: Continue to follow patient 3 times a week for duration of hospital stay to address above goals. RECOMMENDED REHABILITATION/EQUIPMENT: (at time of discharge pending progress): Due to the probability of continued deficits (see above) this patient will likely need continued skilled speech therapy after discharge. SUBJECTIVE:   Alert, confused, no voicing  History of Present Injury/Illness: Mr. Alfredo Rodriguez  has a past medical history of Diabetes ([facility] [address] .  He also  has no past surgical history on file. Present Symptoms: oropharyngeal dysphagia   Pain Intensity 1: 0  Pain Location 1: Buttocks  Pain Orientation 1: Right  Pain Intervention(s) 1: Nurse notified  Current Medications:   No current facility-administered medications on file prior to encounter. No current outpatient prescriptions on file prior to encounter. Current Dietary Status:  NPO- tube feedings. Social History/Home Situation:    Home Environment: Apartment  # Steps to Enter: 0  One/Two Story Residence: One story  Living Alone: Yes  Support Systems: Family member(s)  Patient Expects to be Discharged to[de-identified] Unknown  Current DME Used/Available at Home: Walker, rollator, Cane, straight  OBJECTIVE:   Respiratory Status:        CXR Results:No acute findings  MRI/CT Results:1.   Small right subdural hematoma measuring 4 mm which demonstrates intermediate  attenuation suggesting a subacute although recent subdural hematoma  Oral Motor Structure/Speech:  Oral-Motor Structure/Motor Speech  Labial: No impairment  Dentition: Edentulous  Oral Hygiene: dry  Lingual: No impairment    Cognitive and Communication Status:  Neurologic State: Alert  Orientation Level: Unable to verbalize  Cognition: Decreased command following        Safety/Judgement: Fall prevention    BEDSIDE SWALLOW EVALUATION  Oral Assessment:  Oral Assessment  Labial: No impairment  Dentition: Edentulous  Oral Hygiene: dry  P.O. Trials:  Patient Position: upright in bed    The patient was given tsp amounts of the following:   Consistency Presented: Puree  How Presented: SLP-fed/presented;Spoon    ORAL PHASE:  Bolus Acceptance: No impairment  Bolus Formation/Control: No impairment  Propulsion: No impairment     Oral Residue: None    PHARYNGEAL PHASE:  Initiation of Swallow: No impairment  Laryngeal Elevation: Weak  Aspiration Signs/Symptoms: Change vocal quality  Vocal Quality: Wet           Pharyngeal Phase Characteristics: Suspected pharyngeal residue    OTHER OBSERVATIONS:  Rate/bite size: Impaired   Endurance:  Impaired   Comments: Tool Used: Dysphagia Outcome and Severity Scale (ANGEL)    Score Comments   Normal Diet  [] 7 With no strategies or extra time needed   Functional Swallow  [] 6 May have mild oral or pharyngeal delay       Mild Dysphagia    [] 5 Which may require one diet consistency restricted (those who demonstrate penetration which is entirely cleared on MBS would be included)   Mild-Moderate Dysphagia  [] 4 With 1-2 diet consistencies restricted       Moderate Dysphagia  [] 3 With 2 or more diet consistencies restricted       Moderately Severe Dysphagia  [] 2 With partial PO strategies (trials with ST only)       Severe Dysphagia  [x] 1 With inability to tolerate any PO safely          Score:  Initial: 1 Most Recent: X (Date: -- )   Interpretation of Tool: The Dysphagia Outcome and Severity Scale (ANGEL) is a simple, easy-to-use, 7-point scale developed to systematically rate the functional severity of dysphagia based on objective assessment and make recommendations for diet level, independence level, and type of nutrition. Score 7 6 5 4 3 2 1   Modifier CH CI CJ CK CL CM CN   ?  Swallowing:     - CURRENT STATUS: CN - 100% impaired, limited or restricted    - GOAL STATUS:  CI - 1%-19% impaired, limited or restricted    - D/C STATUS:  ---------------To be determined---------------  Payor: Rodrigo Diego / Plan: [address] [place] [address] HMO / Product Type: Managed Care Medicare /     TREATMENT:    (In addition to Assessment/Re-Assessment sessions the following treatments were rendered)  Dysphagia Activities: Activities/Procedures listed utilized to improve progress in swallow strength. Required moderate cueing to decrease aspiration risk. MODALITIES:         ORAL MOTOR  EXERCISES:        LARYNGEAL / PHARYNGEAL EXERCISES:        __________________________________________________________________________________________________  Safety:   After treatment position/precautions:  · Nurse at bedside  · Upright in Bed. Progression/Medical Necessity:   · Patient is expected to demonstrate progress in swallow strength, swallow timeliness, swallow function and diet tolerance to improve swallow safety, work toward diet advancement and decrease aspiration risk. Compliance with Program/Exercises: Will assess as treatment progresses. Reason for Continuation of Services/Other Comments:  · Patient continues to require skilled intervention due to oropharyngeal dysphagia. Recommendations/Intent for next treatment session: \"Treatment next visit will focus on laryngeal exercises, ST evaluation, po trials if appropriate\".     Total Treatment Duration:Time In: 0797  Time Out: 0830     Bishop Abhilash SIMPSON/JES/SLP

## 2017-12-19 NOTE — ROUTINE PROCESS
END OF SHIFT NOTE:    INTAKE/OUTPUT  12/18 0701 - 12/19 0700  In: 2501   Out: 2150 [Urine:1350; Drains:625]  Voiding: YES  Catheter: YES  Drain:   Nasogastric Tube 12/14/17 (Active)   Site Assessment Clean, dry, & intact 12/19/2017 12:40 AM   Dressing Status Clean, dry, & intact 12/19/2017 12:40 AM   G Port Status Infusing 12/19/2017 12:40 AM   External Insertion Binh (cms) 60 cms 12/16/2017  2:18 PM   Action Taken Feed set changed 12/18/2017  9:57 AM   Drainage Description Tan 12/18/2017  9:57 AM   Gastric Residual (mL) 0 ml 12/18/2017  5:49 PM   Tube Feeding/Formula Options Diabetic (Glucerna 1.2) 12/19/2017 12:40 AM   Tube Feeding/Verify Rate (mL/hr) 75 12/19/2017 12:40 AM   Water Flush Volume (mL) 636 mL 12/19/2017 12:40 AM   Intake (ml) 1775 ml 12/19/2017 12:40 AM       Vacuum Assisted Closure Right Thigh (Active)   Vac Status Suction, continuous 12/19/2017 12:40 AM   Suction (mmHg) 125 12/19/2017 12:40 AM   Site Assessment Clean, dry, & intact 12/19/2017 12:40 AM   Dressing Status Clean, dry, & intact 12/19/2017 12:40 AM   Drainage Description Serosanguinous 12/19/2017 12:40 AM   Drainage Chamber Level (ml) 125 ml 12/19/2017 12:40 AM   Cannister Changed Yes 12/18/2017  5:49 PM   Output (ml) 25 ml 12/19/2017 12:40 AM       Colostomy 12/02/17 Upper Abdomen (Active)   Drainage Color Brown 12/19/2017 12:40 AM   Site Assessment Clean, dry, intact 12/19/2017 12:40 AM   Treatment Bag change; Wafer changed 12/19/2017 12:40 AM   Output (ml) 100 mL 12/18/2017  7:30 PM               Flatus: Patient does have flatus present. Stool:  1 occurrences. Characteristics:  Stool Assessment  Stool Color: Brown  Stool Appearance: Loose  Stool Amount: Medium  Stool Source/Status: Colostomy    Emesis: 0 occurrences.     Characteristics:        VITAL SIGNS  Patient Vitals for the past 12 hrs:   Temp Pulse Resp BP SpO2   12/19/17 0702 99.1 °F (37.3 °C) (!) 101 18 115/74 90 %   12/19/17 0334 (!) 100.6 °F (38.1 °C) 66 18 125/69 96 % 12/18/17 2305 98.2 °F (36.8 °C) 89 18 116/82 93 %   12/18/17 1930 98.6 °F (37 °C) 92 18 143/86 91 %       Pain Assessment  Pain Intensity 1: 0 (12/18/17 1920)  Pain Location 1: Buttocks  Pain Intervention(s) 1: Nurse notified  Patient Stated Pain Goal: 0    Ambulating  No    Shift report given to oncoming nurse at the bedside.     He Haynes LPN

## 2017-12-19 NOTE — PROGRESS NOTES
Pharmacokinetic Consult to Pharmacist    Eliezer Carolyn is a 79 y.o. male being treated for HAP with cefepime and vancomycin. Height: 5' 11\" (180.3 cm)  Weight: 75.5 kg (166 lb 6.4 oz)  Lab Results   Component Value Date/Time    BUN 7 12/15/2017 04:06 AM    Creatinine 0.53 12/15/2017 04:06 AM    WBC 11.6 12/19/2017 10:07 AM    Procalcitonin 0.2 12/12/2017 12:26 PM    Lactic acid 1.7 12/04/2017 03:46 AM    Lactic Acid (POC) 4.8 12/01/2017 06:10 PM      Estimated Creatinine Clearance: 144 mL/min (based on Cr of 0.53).     CULTURES:  All Micro Results     Procedure Component Value Units Date/Time    CULTURE, BLOOD [[id number]] Collected:  12/19/17 1013    Order Status:  Completed Specimen:  Blood from Blood Updated:  12/19/17 1107    CULTURE, BLOOD [[id number]] Collected:  12/19/17 1007    Order Status:  Completed Specimen:  Blood from Blood Updated:  12/19/17 1107    CULTURE, RESPIRATORY/SPUTUM/BRONCH Kimberly Brunswick STAIN [[id number]]  (Abnormal) Collected:  12/12/17 1234    Order Status:  Completed Specimen:  Sputum from Sputum Updated:  12/16/17 0750     Special Requests: NO SPECIAL REQUESTS        GRAM STAIN 10 TO 45 WBC'S/OIF      NO EPITHELIAL CELLS SEEN      FEW YEAST         4+ MUCUS PRESENT        Culture result:         MODERATE ROLAND ALBICANS (A)      NO GROWTH OF  NORMAL RESPIRATORY TEJA    CULTURE, ANAEROBIC [[id number]]  (Abnormal) Collected:  12/06/17 2013    Order Status:  Completed Specimen:  Wound Drainage Updated:  12/15/17 1212     Special Requests: RIGHT LEG     Culture result:         SCANT ANAEROBIC GRAM POSITIVE COCCI (A)      ISOLATE FORWARD TO [facility] FOR ID AND SUSCEPTIBILITY ON 12/15/17      REFER TO [address] [id number]    CULTURE, BLOOD [[id number]] Collected:  12/01/17 1805    Order Status:  Completed Specimen:  Whole Blood from Blood Updated:  12/14/17 1030     Special Requests: --        RIGHT  FOREARM       GRAM STAIN GRAM POSITIVE COCCI         ANAEROBIC BOTTLE POSITIVE                 RESULTS VERIFIED, PHONED TO AND READ BACK BY HOANG De La Cruz RN AT 2081 ON [id number] BY MPJ     Culture result: PAR[facility]ONAS [facility]            RESULTS VERIFIED, PHONED TO AND READ BACK BY  ALEXIS Lemons [facility] ON 12/08/17 @ [phone], ADS        SUSCEPTIBILITY RESULTS SCANNED IN [facility] 12/14/17, ADS    CULTURE, ANAEROBIC [[id number]] Collected:  12/06/17 2013    Order Status:  Completed Specimen:  Tissue Updated:  12/13/17 1113     Special Requests: RIGHT LATERAL FASCIA     Culture result:         SUBCULTURE IS NECESSARY TO DETERMINE PRESENCE OR ABSENCE OF ANAEROBIC BACTERIA IN THIS CULTURE. FURTHER REPORT TO FOLLOW AFTER INCUBATION OF SUBCULTURE. CULTURE, Page Cyrus STAIN [[id number]] Collected:  12/06/17 2013    Order Status:  Completed Specimen:  Wound Updated:  12/09/17 0527     Special Requests: RIGHT LEG     GRAM STAIN 0 TO 5 WBC'S/OIF      FEW GRAM POSITIVE COCCI        Culture result: NO GROWTH 2 DAYS       CULTURE, TISSUE Luther Teo STAIN [[id number]] Collected:  12/06/17 2013    Order Status:  Completed Specimen:  Tissue Updated:  12/09/17 0520     Special Requests: RIGHT LATERAL COMPARTMENT FASCIA     GRAM STAIN 0 TO 25 WBC'S/OIF      FEW GRAM POSITIVE COCCI        Culture result: NO GROWTH 2 DAYS       CULTURE, ANAEROBIC [[id number]] Collected:  12/03/17 1215    Order Status:  Completed Specimen:  Leg Updated:  12/08/17 0943     Special Requests: NO SPECIAL REQUESTS        Culture result:         MULTIPLE ANAEROBES ISOLATED, SUSCEPTIBILITY AND IDENTIFICATION AVAILABLE UPON REQUEST. ORGANISM WILL BE HELD FOR 5 DAYS. CULTURE, ANAEROBIC [[id number]] Collected:  12/01/17 2010    Order Status:  Completed Specimen:  Groin Updated:  12/08/17 0943     Special Requests: NO SPECIAL REQUESTS        Culture result:         MULTIPLE ANAEROBES ISOLATED, SUSCEPTIBILITY AND IDENTIFICATION AVAILABLE UPON REQUEST. ORGANISM WILL BE HELD FOR 5 DAYS.     CULTURE, Page Cyrus STAIN [[id number]]  (Abnormal) Collected:  12/01/17 2010 Order Status:  Completed Specimen:  Groin Updated:  12/07/17 0707     Special Requests: NO SPECIAL REQUESTS        GRAM STAIN 0 TO 1 WBC'S/OIF              MODERATE GRAM POSITIVE COCCI              MODERATE GRAM NEGATIVE RODS              MODERATE GRAM POSITIVE RODS     Culture result:         MODERATE GRAM NEGATIVE RODS (A)      MODERATE  NORMAL SKIN TEJA ISOLATED               REFER TO SPECIMEN NUMBER  [id number] FOR ID AND SUSCEPTIBILITY      CULTURE, Sulaiman Lapine STAIN [[id number]]  (Abnormal)  (Susceptibility) Collected:  12/01/17 2010    Order Status:  Completed Specimen:  Groin Updated:  12/07/17 0705     Special Requests: NO SPECIAL REQUESTS        GRAM STAIN 0 TO 1 WBC'S/OIF              MODERATE GRAM POSITIVE COCCI              MODERATE GRAM NEGATIVE RODS              MODERATE GRAM POSITIVE RODS     Culture result:         MODERATE PROTEUS VULGARIS/PENNERI (A)      LIGHT  NORMAL SKIN TEJA ISOLATED       CULTURE, ANAEROBIC [[id number]] Collected:  12/01/17 2010    Order Status:  Completed Specimen:  Groin Updated:  12/06/17 1148     Special Requests: NO SPECIAL REQUESTS        Culture result:         MULTIPLE ANAEROBES ISOLATED, SUSCEPTIBILITY AND IDENTIFICATION AVAILABLE UPON REQUEST. ORGANISM WILL BE HELD FOR 5 DAYS.     CULTURE, BLOOD [[id number]] Collected:  12/01/17 1804    Order Status:  Completed Specimen:  Whole Blood from Blood Updated:  12/06/17 0617     Special Requests: --        LEFT  HAND       Culture result: NO GROWTH 5 DAYS       CULTURE, Sulaiman Lapine STAIN [[id number]] Collected:  12/03/17 1215    Order Status:  Completed Specimen:  Wound from Leg Updated:  12/05/17 0656     Special Requests: NO SPECIAL REQUESTS        GRAM STAIN NO WBCS SEEN         RARE GRAM POSITIVE COCCI         FEW GRAM NEGATIVE RODS        Culture result: NO GROWTH 2 DAYS       CULTURE, ANAEROBIC [[id number]]     Order Status:  Canceled Specimen:  Wound Drainage     CULTURE, Sulaiman Lapine STAIN [[id number]]     Order Status:  Canceled Specimen:  Wound from Groin     CULTURE, WOUND Annie Batter [[id number]]     Order Status:  Canceled Specimen:  Wound from Groin     CULTURE, ANAEROBIC [[id number]]     Order Status:  Canceled Specimen:  Wound Drainage             Day 1 of vancomycin. Goal trough is 15-20. Vancomycin dose initiated at 1250mg q 12 hours. Will continue to follow patient.       Thank you,  Samantha Melchor, PharmD

## 2017-12-19 NOTE — ANESTHESIA PREPROCEDURE EVALUATION
Anesthetic History   No history of anesthetic complications            Review of Systems / Medical History  Patient summary reviewed and pertinent labs reviewed    Pulmonary              Pertinent negatives: Smoker: ex. Comments: Latest oximeter readings 96% on room air on flowsheet    Neuro/Psych              Cardiovascular                  Exercise tolerance: <4 METS  Comments: Hx syncope. Had small subdural hematoma that did not require intervention. 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 He has a poor nutrition status and has poorly controlled type II DM. He was previously intubate, now on oxygen by NC only. GI/Hepatic/Renal                Endo/Other    Diabetes: type 2         Other Findings   Comments: Daughter states pt has been noncommunicative for days  He does smile. cx 12/20 by Pulmonary due to worsening hypoxia, apparent LLL pneumonia     Anesthetic History   No history of anesthetic complications            Review of Systems / Medical History  Patient summary reviewed and pertinent labs reviewed    Pulmonary          Smoker (Former)      Comments: Intubated and ventilated, but on minimal support. Currently on Pressure Support Mode with PS of 5, PEEP of 8 and FiO2 of 30%. Neuro/Psych              Cardiovascular                  Exercise tolerance: >4 METS     GI/Hepatic/Renal         Renal disease (improved, CR now in normal range. ): ARF       Endo/Other    Diabetes: poorly controlled, type 2    Anemia    Comments: Hypernatremic (improving)  Hypocalcemic  Hyperchloremia Other Findings   Comments: H/o Small subdural hematoma without required intervention. Admitted with Shima's Gangrene and s/p multiple debridements. Patient has little known medical history other than poorly controlled diabetes, however given his albumin <1.0 on 12/5/17 he likely has longstanding problems with fraility and nutrition.              Physical Exam    Airway  Mallampati: II  TM Distance: 4 - 6 cm  Neck ROM: normal range of motion   Mouth opening: Normal  Intubated   Cardiovascular    Rhythm: regular  Rate: normal      Pertinent negatives: No murmur, JVD and peripheral edema   Dental    Dentition: Edentulous     Pulmonary  Breath sounds clear to auscultation               Abdominal  GI exam deferred       Other Findings            Anesthetic Plan    ASA: 3  Anesthesia type: general          Induction: Inhalational  Anesthetic plan and risks discussed with: Patient, Son / Daughter and Family      Norepi and fentangyl gtt off, patient still intubated but on minimal support settings. Physical Exam    Airway  Mallampati: II  TM Distance: > 6 cm  Neck ROM: normal range of motion   Mouth opening: Normal    Comments: Has NG feeding tube Cardiovascular    Rhythm: regular  Rate: normal         Dental    Dentition: Edentulous     Pulmonary    Rhonchi (coarse bilateral; mild):bilateral             Abdominal  GI exam deferred       Other Findings   Comments: Has right IJ access  Hands are padded  Pt is heavily sedated and not responsive         Anesthetic Plan    ASA: 3  Anesthesia type: general          Induction: Intravenous      On lantus at night and SS q 4. Talked with daughter on the phone for consent. Her , Sara Hilton, will be there tomorrow. Daughter is Vitaliy Gonzalez [phone].

## 2017-12-19 NOTE — PROGRESS NOTES
Dispo update:  Discussed case with Dr. Joyce Rodriguez, and provided Ms. Erica Tomlinson, 4th floor [facility] liaison with updated clinicals. [employer] pre-cert is pending. Will re-evaluate discharge planning with Mr. Andreea uHdson:  Would STR at SNF be appropriate?

## 2017-12-19 NOTE — PROGRESS NOTES
Hospitalist Progress Note    2017  Admit Date: 2017  2:54 PM   NAME: Tiago Rosenthal   :  1950   MRN:  [id number]   Attending: Alexus Angela MD  PCP:  None    SUBJECTIVE:     Tiago Rosenthal is a 13GAR with no previous known medical hx who was admitted on  with a syncopal episode. CT head with a SDH, no intervention per NSGY. 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 Was taken to the OR for debridement on  [place]an [place]) and [facility]. Currently with a wound vac. Extubated on  and transferred out of ICU.    : stable on floor. Tolerating TFs through NGT. Appears confused, sleepy, does not answer questions. Had a fever overnight and nurse reports that he has had increased sputum production. Review of Systems negative with exception of pertinent positives noted above      PHYSICAL EXAM       Visit Vitals    /74    Pulse (!) 101    Temp 99.1 °F (37.3 °C)    Resp 18    Ht 5' 11\" (1.803 m)    Wt 75.5 kg (166 lb 6.4 oz)    SpO2 90%    BMI 23.21 kg/m2      Temp (24hrs), Av.1 °F (37.3 °C), Min:98.2 °F (36.8 °C), Max:100.6 °F (38.1 °C)    Oxygen Therapy  O2 Sat (%): 90 % (17 0702)  Pulse via Oximetry: 88 beats per minute (17 1304)  O2 Device: Room air (17 0659)  O2 Flow Rate (L/min): 2 l/min (17 6538)  FIO2 (%): 30 % (17 0818)    Intake/Output Summary (Last 24 hours) at 17 0885  Last data filed at 17 033   Gross per 24 hour   Intake             2456 ml   Output             2125 ml   Net              331 ml          General: No acute distress. Head:  Atraumatic Normocephalic. ENT:  NGT in place, NC in place  Lungs:  Coarse lung sounds in upper airways  CVS:  RRR  Abdomen: Soft, ND/NTTP, +BS  MSK:  Boots on feet bilaterally  Neurologic:  Awakens to name, but does not answer questions.        Recent Results (from the past 24 hour(s))   GLUCOSE, POC    Collection Time: 12/18/17 11:07 AM   Result Value Ref Range    Glucose (POC) 172 (H) 65 - 100 mg/dL   GLUCOSE, POC    Collection Time: 12/18/17  5:11 PM   Result Value Ref Range    Glucose (POC) 225 (H) 65 - 100 mg/dL   GLUCOSE, POC    Collection Time: 12/18/17  7:31 PM   Result Value Ref Range    Glucose (POC) 209 (H) 65 - 100 mg/dL   GLUCOSE, POC    Collection Time: 12/18/17 11:52 PM   Result Value Ref Range    Glucose (POC) 202 (H) 65 - 100 mg/dL   GLUCOSE, POC    Collection Time: 12/19/17  3:41 AM   Result Value Ref Range    Glucose (POC) 190 (H) 65 - 100 mg/dL   HEMOGLOBIN    Collection Time: 12/19/17  4:45 AM   Result Value Ref Range    HGB 8.6 (L) 13.6 - 17.2 g/dL   GLUCOSE, POC    Collection Time: 12/19/17  7:37 AM   Result Value Ref Range    Glucose (POC) 226 (H) 65 - 100 mg/dL         Imaging /Procedures /Studies   XR ABD (KUB)   Final Result   Impression: No acute pathology identified. XR CHEST SNGL V   Final Result   IMPRESSION: No acute findings      XR CHEST SNGL V   Final Result   IMPRESSION: Mild worsening left lower lobe atelectasis or pneumonia      XR CHEST SNGL V   Final Result   IMPRESSION: No acute findings in the chest         XR CHEST SNGL V   Final Result   IMPRESSION: ET tube has been repositioned and is now 9 cm above the cathy, at   the level of the thoracic inlet. This could be advanced 4 to 5 cm. The lungs are   clear. XR CHEST SNGL V   Final Result   IMPRESSION: Some left infrahilar atelectasis or infiltrate now present. XR CHEST SNGL V   Final Result   IMPRESSION: Bibasilar atelectasis or pneumonia significantly improved. XR CHEST SNGL V   Final Result   IMPRESSION: Bibasilar atelectasis or pneumonia worse in the interval.            XR CHEST SNGL V   Final Result   IMPRESSION: Left basilar atelectasis improved. XR CHEST SNGL V   Final Result   IMPRESSION: New left lower lobe atelectasis or pneumonia. XR ABD (KUB)   Final Result   IMPRESSION: OG tube further in the stomach. XR CHEST SNGL V   Final Result   IMPRESSION: No acute cardiopulmonary disease. Recommend advancing the orogastric tube further into the stomach. XR PELV AP ONLY   Final Result   IMPRESSION:   1. 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 This is very poorly   assessed and characterized by portable plain film imaging. XR CHEST PORT   Final Result   IMPRESSION:    1. Improved although slight low position of endotracheal tube. Retraction of   this by 1 to 2 cm complex cyst and more ideal position. 2.  Persistent high position of an nasogastric tube. Advancement of this by 4 cm   would place this in a more ideal position. XR CHEST SNGL V   Final Result   IMPRESSION:    1. Low position of endotracheal tube tip overlying the most proximal right   mainstem bronchus. Retraction of this by 3 cm should place this at a more   acceptable position. 2.  Slight high position of feeding tube with the tip overlying the stomach   although the side-port likely overlies the distal esophagus. Advancement of this   by 4 cm should ensure that the side port is below the gastroesophageal junction. 3. No evolving acute cardiopulmonary changes. The abnormal endotracheal tube position was discussed with nursing staff a   muscle personally at 10:15 PM on 12/1/2017. XR CHEST PA LAT   Final Result   IMPRESSION:    1. No acute cardiopulmonary process evident by plain film imaging. CT HEAD WO CONT   Final Result   IMPRESSION:     1. Small right subdural hematoma measuring 4 mm which demonstrates intermediate   attenuation suggesting a subacute although recent subdural hematoma.        The subacute appearing right subdural hematoma was discussed with Dr. Bradley Schmitz by   myself personally at 5:05 PM on 12/1/2017.          XR CHEST SNGL V    (Results Pending)         ASSESSMENT      Hospital Problems as of 12/19/2017  Date Reviewed: 12/17/2017          Codes Class Noted - Resolved POA    Delirium ICD-10-CM: R41.0  ICD-9-CM: 780.09  12/19/2017 - Present No        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  ICD-9-CM: 608.83  12/17/2017 - Present Unknown        Subdural hematoma ([facility] ICD-10-CM: I62.00  ICD-9-CM: 432.1  12/1/2017 - Present Yes        Type II diabetes mellitus with complication (HCC) (Chronic) ICD-10-CM: E11.8  ICD-9-CM: 250.90  12/1/2017 - Present Yes        Syncope ICD-10-CM: R55  ICD-9-CM: 780.2  12/1/2017 - Present Yes        Fasciitis ICD-10-CM: M72.9  ICD-9-CM: 729.4  12/1/2017 - Present Yes        RESOLVED: Acute respiratory failure ([facility] ICD-10-CM: J96.00  ICD-9-CM: 518.81  12/8/2017 - 12/15/2017 Yes        RESOLVED: Hypernatremia ICD-10-CM: E87.0  ICD-9-CM: 276.0  12/6/2017 - 12/15/2017 Yes        RESOLVED: Lactic acidosis ICD-10-CM: E87.2  ICD-9-CM: 276.2  12/2/2017 - 12/6/2017 Yes        RESOLVED: LAUREANO (acute kidney injury) ([facility] ICD-10-CM: N17.9  ICD-9-CM: 584.9  12/1/2017 - 12/8/2017 Yes        RESOLVED: Leukocytosis ICD-10-CM: O08.589  ICD-9-CM: 288.60  12/1/2017 - 12/8/2017 Yes        * (Principal)RESOLVED: Severe sepsis with septic shock ([facility] ICD-10-CM: A41.9, R65.21  ICD-9-CM: 038.9, 995.92, 785.52  12/1/2017 - 12/15/2017 Yes        RESOLVED: Electrolyte abnormality ICD-10-CM: E87.8  ICD-9-CM: 276.9  12/1/2017 - 12/8/2017 Yes                  Plan:  - Fever:  Check CBC, blood cx  UA and CXR    - 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:  Initially debrided by Albert Dominique 12/1  Followed by [facility] [place] for further debridement  Wound vac  Has completed 2 weeks of IV abx    - Subdural hematoma:  Non-surgical per NSGY    - Delirium:  Likely related to prolonged ICU stay  Continue to monitor closely    - DM2:  BS uncontrolled  Increase Lantus today  Continue SSI ACHS    - Dysphagia:  SLP following  MBS when appropriate    DVT Prophylaxis: Lovenox  Dispo: PT/OT following, PPD placed; CM following, referral to LTAC pending    Yuridia Martinez MD    Addendum: WBC elevated to 11.6, UA clear, CXR with LLL atelectasis vs consolidation. Will start abx for HAP given fever and increase leukocytosis. Continue to monitor closely.

## 2017-12-19 NOTE — PROGRESS NOTES
Problem: Falls - Risk of  Goal: *Absence of Falls  Document Larry Fall Risk and appropriate interventions in the flowsheet.    Outcome: Progressing Towards Goal  Fall Risk Interventions:  Mobility Interventions: Bed/chair exit alarm, Communicate number of staff needed for ambulation/transfer, Patient to call before getting OOB, PT Consult for mobility concerns    Mentation Interventions: Bed/chair exit alarm, Door open when patient unattended, Reorient patient    Medication Interventions: Bed/chair exit alarm, Patient to call before getting OOB, Teach patient to arise slowly    Elimination Interventions: Bed/chair exit alarm, Call light in reach, Patient to call for help with toileting needs, Toilet paper/wipes in reach    History of Falls Interventions: Bed/chair exit alarm, Door open when patient unattended

## 2017-12-20 ENCOUNTER — ANESTHESIA (OUTPATIENT)
Dept: SURGERY | Age: 67
DRG: 853 | End: 2017-12-20
Payer: MEDICARE

## 2017-12-20 PROBLEM — J18.9 PNEUMONIA DUE TO INFECTIOUS ORGANISM: Status: ACTIVE | Noted: 2017-12-20

## 2017-12-20 PROBLEM — J96.01 ACUTE HYPOXEMIC RESPIRATORY FAILURE (HCC): Status: ACTIVE | Noted: 2017-12-20

## 2017-12-20 LAB
ANION GAP SERPL CALC-SCNC: 8 MMOL/L (ref 7–16)
ARTERIAL PATENCY WRIST A: POSITIVE
BASE EXCESS BLDA CALC-SCNC: 5.3 MMOL/L (ref 0–3)
BASOPHILS # BLD: 0 K/UL (ref 0–0.2)
BASOPHILS NFR BLD: 0 % (ref 0–2)
BDY SITE: ABNORMAL
BNP SERPL-MCNC: 43 PG/ML
BUN SERPL-MCNC: 21 MG/DL (ref 8–23)
CALCIUM SERPL-MCNC: 8 MG/DL (ref 8.3–10.4)
CHLORIDE SERPL-SCNC: 105 MMOL/L (ref 98–107)
CO2 SERPL-SCNC: 29 MMOL/L (ref 21–32)
CREAT SERPL-MCNC: 1.03 MG/DL (ref 0.8–1.5)
DIFFERENTIAL METHOD BLD: ABNORMAL
EOSINOPHIL # BLD: 0 K/UL (ref 0–0.8)
EOSINOPHIL NFR BLD: 0 % (ref 0.5–7.8)
ERYTHROCYTE [DISTWIDTH] IN BLOOD BY AUTOMATED COUNT: 15.5 % (ref 11.9–14.6)
FIO2 ON VENT: 50 %
GAS FLOW.O2 O2 DELIVERY SYS: 15 L/MIN
GLUCOSE BLD STRIP.AUTO-MCNC: 154 MG/DL (ref 65–100)
GLUCOSE BLD STRIP.AUTO-MCNC: 163 MG/DL (ref 65–100)
GLUCOSE BLD STRIP.AUTO-MCNC: 167 MG/DL (ref 65–100)
GLUCOSE BLD STRIP.AUTO-MCNC: 174 MG/DL (ref 65–100)
GLUCOSE BLD STRIP.AUTO-MCNC: 198 MG/DL (ref 65–100)
GLUCOSE SERPL-MCNC: 204 MG/DL (ref 65–100)
HCO3 BLDA-SCNC: 28 MMOL/L (ref 22–26)
HCT VFR BLD AUTO: 25.4 % (ref 41.1–50.3)
HGB BLD-MCNC: 8 G/DL (ref 13.6–17.2)
IMM GRANULOCYTES # BLD: 0.1 K/UL (ref 0–0.5)
IMM GRANULOCYTES NFR BLD AUTO: 1 % (ref 0–5)
LACTATE SERPL-SCNC: 1.7 MMOL/L (ref 0.4–2)
LYMPHOCYTES # BLD: 1.5 K/UL (ref 0.5–4.6)
LYMPHOCYTES NFR BLD: 14 % (ref 13–44)
Lab: NORMAL
MAGNESIUM SERPL-MCNC: 2.2 MG/DL (ref 1.8–2.4)
MCH RBC QN AUTO: 28.1 PG (ref 26.1–32.9)
MCHC RBC AUTO-ENTMCNC: 31.5 G/DL (ref 31.4–35)
MCV RBC AUTO: 89.1 FL (ref 79.6–97.8)
MM INDURATION POC: 0 MM (ref 0–5)
MONOCYTES # BLD: 1 K/UL (ref 0.1–1.3)
MONOCYTES NFR BLD: 9 % (ref 4–12)
NEUTS SEG # BLD: 8 K/UL (ref 1.7–8.2)
NEUTS SEG NFR BLD: 76 % (ref 43–78)
PCO2 BLDA: 34 MMHG (ref 35–45)
PH BLDA: 7.53 [PH] (ref 7.35–7.45)
PLATELET # BLD AUTO: 250 K/UL (ref 150–450)
PMV BLD AUTO: 11.1 FL (ref 10.8–14.1)
PO2 BLDA: 59 MMHG (ref 80–105)
POTASSIUM SERPL-SCNC: 3.2 MMOL/L (ref 3.5–5.1)
PPD POC: NEGATIVE NEGATIVE
PROCALCITONIN SERPL-MCNC: 0.4 NG/ML
RBC # BLD AUTO: 2.85 M/UL (ref 4.23–5.67)
REFERENCE LAB,REFLB: NORMAL
SODIUM SERPL-SCNC: 142 MMOL/L (ref 136–145)
TEST DESCRIPTION:,ATST: NORMAL
WBC # BLD AUTO: 10.6 K/UL (ref 4.3–11.1)

## 2017-12-20 PROCEDURE — 74011636637 HC RX REV CODE- 636/637: Performed by: HOSPITALIST

## 2017-12-20 PROCEDURE — 65270000029 HC RM PRIVATE

## 2017-12-20 PROCEDURE — 83735 ASSAY OF MAGNESIUM: CPT | Performed by: INTERNAL MEDICINE

## 2017-12-20 PROCEDURE — 74011000258 HC RX REV CODE- 258: Performed by: INTERNAL MEDICINE

## 2017-12-20 PROCEDURE — 74011000250 HC RX REV CODE- 250: Performed by: HOSPITALIST

## 2017-12-20 PROCEDURE — 74011000250 HC RX REV CODE- 250: Performed by: INTERNAL MEDICINE

## 2017-12-20 PROCEDURE — 74011250636 HC RX REV CODE- 250/636: Performed by: HOSPITALIST

## 2017-12-20 PROCEDURE — 77030034849

## 2017-12-20 PROCEDURE — 85025 COMPLETE CBC W/AUTO DIFF WBC: CPT | Performed by: INTERNAL MEDICINE

## 2017-12-20 PROCEDURE — 74011636637 HC RX REV CODE- 636/637: Performed by: INTERNAL MEDICINE

## 2017-12-20 PROCEDURE — 83880 ASSAY OF NATRIURETIC PEPTIDE: CPT | Performed by: INTERNAL MEDICINE

## 2017-12-20 PROCEDURE — 94760 N-INVAS EAR/PLS OXIMETRY 1: CPT

## 2017-12-20 PROCEDURE — 94640 AIRWAY INHALATION TREATMENT: CPT

## 2017-12-20 PROCEDURE — 84145 PROCALCITONIN (PCT): CPT | Performed by: INTERNAL MEDICINE

## 2017-12-20 PROCEDURE — 82803 BLOOD GASES ANY COMBINATION: CPT

## 2017-12-20 PROCEDURE — 74011250637 HC RX REV CODE- 250/637: Performed by: SURGERY

## 2017-12-20 PROCEDURE — 74011000250 HC RX REV CODE- 250: Performed by: SURGERY

## 2017-12-20 PROCEDURE — 82962 GLUCOSE BLOOD TEST: CPT

## 2017-12-20 PROCEDURE — 36591 DRAW BLOOD OFF VENOUS DEVICE: CPT

## 2017-12-20 PROCEDURE — 92526 ORAL FUNCTION THERAPY: CPT

## 2017-12-20 PROCEDURE — 74011250636 HC RX REV CODE- 250/636: Performed by: INTERNAL MEDICINE

## 2017-12-20 PROCEDURE — 74011250636 HC RX REV CODE- 250/636: Performed by: SURGERY

## 2017-12-20 PROCEDURE — 77010033711 HC HIGH FLOW OXYGEN

## 2017-12-20 PROCEDURE — 74750000023 HC WOUND THERAPY

## 2017-12-20 PROCEDURE — 80048 BASIC METABOLIC PNL TOTAL CA: CPT | Performed by: INTERNAL MEDICINE

## 2017-12-20 PROCEDURE — 83605 ASSAY OF LACTIC ACID: CPT | Performed by: INTERNAL MEDICINE

## 2017-12-20 PROCEDURE — 36600 WITHDRAWAL OF ARTERIAL BLOOD: CPT

## 2017-12-20 PROCEDURE — 77010033678 HC OXYGEN DAILY

## 2017-12-20 PROCEDURE — C9113 INJ PANTOPRAZOLE SODIUM, VIA: HCPCS | Performed by: SURGERY

## 2017-12-20 PROCEDURE — 99291 CRITICAL CARE FIRST HOUR: CPT | Performed by: INTERNAL MEDICINE

## 2017-12-20 RX ORDER — VANCOMYCIN/0.9 % SOD CHLORIDE 1.5G/250ML
1500 PLASTIC BAG, INJECTION (ML) INTRAVENOUS EVERY 24 HOURS
Status: DISCONTINUED | OUTPATIENT
Start: 2017-12-21 | End: 2017-12-21

## 2017-12-20 RX ORDER — POTASSIUM CHLORIDE 14.9 MG/ML
20 INJECTION INTRAVENOUS
Status: DISCONTINUED | OUTPATIENT
Start: 2017-12-20 | End: 2017-12-20

## 2017-12-20 RX ORDER — ALBUTEROL SULFATE 0.83 MG/ML
2.5 SOLUTION RESPIRATORY (INHALATION)
Status: DISCONTINUED | OUTPATIENT
Start: 2017-12-20 | End: 2017-12-24

## 2017-12-20 RX ORDER — ACETYLCYSTEINE 100 MG/ML
3 SOLUTION ORAL; RESPIRATORY (INHALATION)
Status: DISCONTINUED | OUTPATIENT
Start: 2017-12-20 | End: 2017-12-24

## 2017-12-20 RX ADMIN — INSULIN LISPRO 2 UNITS: 100 INJECTION, SOLUTION INTRAVENOUS; SUBCUTANEOUS at 04:05

## 2017-12-20 RX ADMIN — Medication 1 AMPULE: at 09:05

## 2017-12-20 RX ADMIN — Medication 1 AMPULE: at 20:21

## 2017-12-20 RX ADMIN — ALTEPLASE 2 MG: 2.2 INJECTION, POWDER, LYOPHILIZED, FOR SOLUTION INTRAVENOUS at 17:21

## 2017-12-20 RX ADMIN — ALBUTEROL SULFATE 2.5 MG: 2.5 SOLUTION RESPIRATORY (INHALATION) at 16:00

## 2017-12-20 RX ADMIN — ALBUTEROL SULFATE 2.5 MG: 2.5 SOLUTION RESPIRATORY (INHALATION) at 19:14

## 2017-12-20 RX ADMIN — Medication 10 ML: at 21:19

## 2017-12-20 RX ADMIN — INSULIN GLARGINE 10 UNITS: 100 INJECTION, SOLUTION SUBCUTANEOUS at 09:04

## 2017-12-20 RX ADMIN — POTASSIUM CHLORIDE 10 MEQ: 2 INJECTION, SOLUTION, CONCENTRATE INTRAVENOUS at 20:37

## 2017-12-20 RX ADMIN — VANCOMYCIN HYDROCHLORIDE 1250 MG: 10 INJECTION, POWDER, LYOPHILIZED, FOR SOLUTION INTRAVENOUS at 01:36

## 2017-12-20 RX ADMIN — INSULIN LISPRO 2 UNITS: 100 INJECTION, SOLUTION INTRAVENOUS; SUBCUTANEOUS at 17:21

## 2017-12-20 RX ADMIN — Medication 10 ML: at 15:37

## 2017-12-20 RX ADMIN — INSULIN LISPRO 2 UNITS: 100 INJECTION, SOLUTION INTRAVENOUS; SUBCUTANEOUS at 20:20

## 2017-12-20 RX ADMIN — SODIUM CHLORIDE 1 G: 900 INJECTION, SOLUTION INTRAVENOUS at 18:50

## 2017-12-20 RX ADMIN — SODIUM CHLORIDE 40 MG: 9 INJECTION INTRAMUSCULAR; INTRAVENOUS; SUBCUTANEOUS at 16:16

## 2017-12-20 RX ADMIN — SODIUM CHLORIDE 1 G: 900 INJECTION, SOLUTION INTRAVENOUS at 11:31

## 2017-12-20 RX ADMIN — POTASSIUM CHLORIDE 50 MEQ: 2 INJECTION, SOLUTION, CONCENTRATE INTRAVENOUS at 15:42

## 2017-12-20 RX ADMIN — Medication 10 ML: at 05:07

## 2017-12-20 RX ADMIN — ACETYLCYSTEINE 300 MG: 100 SOLUTION ORAL; RESPIRATORY (INHALATION) at 16:00

## 2017-12-20 RX ADMIN — SODIUM CHLORIDE 1 G: 900 INJECTION, SOLUTION INTRAVENOUS at 00:33

## 2017-12-20 NOTE — PROGRESS NOTES
Central line not working properly. [place] port only port to draw and flush easily.   Cathflo ordered to open ports

## 2017-12-20 NOTE — WOUND CARE
Patient seen, son in law present. New colostomy. Pouch intact. Son in law not able/willing to do lessons. He stated he is not sure of the long term plan. Patient not teachable today. Supplies in room. Talked at length with them and teaching plan if able. Also discussed with  Evon Willoughby. Ostomy nurse will continue to follow and teach when/if appropriate. Noted possible [facility]. Their liaison aware of above.

## 2017-12-20 NOTE — PROGRESS NOTES
Lazarus Portage  Admission Date: 12/1/2017             Daily Progress Note: 12/20/2017    The patient's chart is reviewed and the patient is discussed with the staff. Subjective:     80 yo WM known to [facility] from earlier this admission when admitted to ICU as follows:    Patient is a 79 y.o.  male seen and evaluated at the request of Dr. Damon Rehman. This patient was admitted to the hospital after he had a syncopal episode. Upon evaluation in the emergency room he was found to have a small subdural hematoma on head CT but he was found to have very large abscess in the right thigh area extended from his right groin all the way to the right popliteal area. This required urgent surgical consultation. Patient was taken to the operating room and underwent drainage of the abscess area. Postoperatively he returned back to the intensive care unit intubated and mechanically ventilated. Currently he is sedated and on Levophed drip because of severe hypotension. Patient is does not see a primary care physician on a regular basis. No family is available at this point for any further history. 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 He underwent multiple debridements including a complex debridement of the R hip region. He was able to be extubated 12/14 and was transferred to the floor. Remained confused and new fever noted yesterday/last PM with CXR concerning for LLL pneumonia. He had been weaned to 2L but his oxygen requirement has now increased to 60%. He is scheduled for surgery again today. He is awake and mouth breathing on Optiflow. He has been able to expectorate some secretions.      Current Facility-Administered Medications   Medication Dose Route Frequency    cefepime (MAXIPIME) 1 g in 0.9% sodium chloride (MBP/ADV) 50 mL ADV  1 g IntraVENous Q8H    [START ON 12/21/2017] vancomycin (VANCOCIN) 1500 mg in  ml infusion  1,500 mg IntraVENous Q24H    insulin glargine (LANTUS) injection 20 Units  20 Units SubCUTAneous DAILY    lip protectant (BLISTEX) ointment   Topical PRN    dextrose (D50W) injection syrg 12.5 g  25 mL IntraVENous PRN    HYDROmorphone (PF) (DILAUDID) injection 1 mg  1 mg IntraVENous Q4H PRN    enoxaparin (LOVENOX) injection 40 mg  40 mg SubCUTAneous Q24H    hydrALAZINE (APRESOLINE) 20 mg/mL injection 10 mg  10 mg IntraVENous Q4H PRN    alcohol 62% (NOZIN) nasal  1 Ampule  1 Ampule Topical Q12H    NUTRITIONAL SUPPORT ELECTROLYTE PRN ORDERS   Does Not Apply PRN    influenza vaccine 2017-18 (3 yrs+)(PF) (FLUZONE QUAD/FLUARIX QUAD) injection 0.5 mL  0.5 mL IntraMUSCular PRIOR TO DISCHARGE    insulin lispro (HUMALOG) injection   SubCUTAneous Q4H    pantoprazole (PROTONIX) 40 mg in sodium chloride 0.9 % 10 mL injection  40 mg IntraVENous Q24H    sodium chloride (NS) flush 5-10 mL  5-10 mL IntraVENous Q8H    sodium chloride (NS) flush 5-10 mL  5-10 mL IntraVENous PRN       Review of Systems    Constitutional: negative for fever, chills, sweats  Cardiovascular: negative for chest pain, palpitations, syncope, edema  Gastrointestinal:  negative for dysphagia, reflux, vomiting, diarrhea, abdominal pain, or melena  Neurologic:  negative for focal weakness, numbness, headache    Objective:     Vitals:    12/20/17 0733 12/20/17 0734 12/20/17 1057 12/20/17 1101   BP:   104/54    Pulse:   98    Resp:   18    Temp: (!) 101 °F (38.3 °C) 98.7 °F (37.1 °C) 100.3 °F (37.9 °C)    SpO2:   90% 92%   Weight:       Height:         Intake and Output:   12/18 1901 - 12/20 0700  In: 8770 [I.V.:736]  Out: 3500 [Urine:2325; Drains:500]  12/20 0701 - 12/20 1900  In: -   Out: 450 [Urine:200; Drains:250]    Physical Exam:   Constitution:  the patient is well developed and in no acute distress  EENMT:  Sclera clear, pupils equal, oral mucosa moist  Respiratory: decreased BS bilaterally with rhonchi L> R  Cardiovascular:  RRR without M,G,R  Gastrointestinal: soft and non-tender; with positive bowel sounds. Musculoskeletal: warm without cyanosis. There is no lower leg edema. Skin:  no jaundice or rashes, legs wrapped. Neurologic: no gross neuro deficits     Psychiatric:  Arouses but not very interactive    CXR:         Rotated with evidence of LLL infiltrate and possibly some effusion    LAB  Recent Labs      12/20/17   1144  12/20/17   0750  12/20/17   0324  12/19/17   2315  12/19/17   1950   GLUCPOC  154*  174*  198*  300*  223*      Recent Labs      12/20/17   0455  12/19/17   1007  12/19/17   0445   WBC  10.6  11.6*   --    HGB  8.0*  10.1*  8.6*   HCT  25.4*  32.5*   --    PLT  250  318   --      Recent Labs      12/20/17   0455   NA  142   K  3.2*   CL  105   CO2  29   GLU  204*   BUN  21   CREA  1.03   CA  8.0*     Recent Labs      12/19/17 2020   PH  7.55*   PCO2  28*   PO2  55*   HCO3  24     No results for input(s): LCAD, LAC in the last 72 hours. Assessment:  (Medical Decision Making)     Hospital Problems  Date Reviewed: 12/17/2017          Codes Class Noted POA    Acute hypoxemic respiratory failure [facility]) ICD-10-CM: J96.01  ICD-9-CM: 518.81  12/20/2017 Unknown    Now on 60% FIO2 but pt mouth breathing and entraining considerable RA. Pneumonia due to infectious organism ICD-10-CM: J18.9  ICD-9-CM: 136.9, 484.8  12/20/2017 Unknown    HAP coverage required. Would add mucomyst and Vibralung to mobilize secretions.      Delirium ICD-10-CM: R41.0  ICD-9-CM: 780.09  12/19/2017 No    Multifactorial.     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 Try on FM with FIO2 of 50% and check ABG. Consider bronch if unable to improve sats. Add Mucomyst and Vibralung. Control BS. Replete K. Check lactate and PCT. --    More than 50% of the time documented was spent in face-to-face contact with the patient and in the care of the patient on the floor/unit where the patient is located.     Sheila Bates MD

## 2017-12-20 NOTE — ROUTINE PROCESS
Called & spoke with Dr. Clint Brown. notified of urinary output of 65 cc after receiving IV lasix. New orders received, read back, & acknowledged. Will continue to monitor.

## 2017-12-20 NOTE — PROGRESS NOTES
Patient's Arterial Blood Gases resulted and were abnormal with little improvement from the last set. Notified Dr. Janeth Richardson. Also attempted to page Dr. Deepthi Lopez but did not get a call back. Informed Director who contacted the ICU Charge RN who came to assess the patient and spoke to Dr. Deepthi Lopez. RT changed patient to a NRB mask. Sitting bedside to monitor patient along with ICU Charge RN. Dr. Deepthi Lopez to come reassess the patient and determine further treatment options.

## 2017-12-20 NOTE — PROGRESS NOTES
met pt in his room. Pt had oxygen mask on his face.  introduced himself and provided spiritual care through presence and prayer.  learned later that ptt's surgery was cancelled due to earlier breathing episode.

## 2017-12-20 NOTE — ROUTINE PROCESS
Urine leaking out around kathleen catheter. Kathleen removed to reinsert new, after removal, steady stream of urine saturating under pad on bed. New kathleen inserted without difficulty & tolerated well, draining large amount of clear yellow urine. Bedding changed & incontinent care provided. Will continue to monitor.

## 2017-12-20 NOTE — PROGRESS NOTES
Hospitalist Progress Note    2017  Admit Date: 2017  2:54 PM   NAME: Rafia Lizama   :  1950   MRN:  [id number]   Attending: Chas Andrade MD  PCP:  None    SUBJECTIVE:     Rafia Lizama is a 34ZDC with no previous known medical hx who was admitted on  with a syncopal episode. CT head with a SDH, no intervention per NSGY. 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 Was taken to the OR for debridement on  Eleni Lorenzana and has been followed by [facility] for further wound debridement. Planning for skin graft. Extubated on  and transferred out of ICU.    : febrile overnight with increasing O2 requirement. CXR looks significantly worse. Pulled NGT out overnight. Is able to follow commands, but still remains a little confused. Review of Systems negative with exception of pertinent positives noted above      PHYSICAL EXAM       Visit Vitals    /54    Pulse 98    Temp 100.3 °F (37.9 °C)    Resp 18    Ht 5' 11\" (1.803 m)    Wt 75.5 kg (166 lb 6.4 oz)    SpO2 92%    BMI 23.21 kg/m2      Temp (24hrs), Av.4 °F (37.4 °C), Min:97.7 °F (36.5 °C), Max:101.1 °F (38.4 °C)    Oxygen Therapy  O2 Sat (%): 92 % (17 1101)  Pulse via Oximetry: 109 beats per minute (17 1101)  O2 Device: Heated; Hi flow nasal cannula (17 1101)  O2 Flow Rate (L/min): 50 l/min (17 1101)  FIO2 (%): 60 % (17 1101)    Intake/Output Summary (Last 24 hours) at 17 1131  Last data filed at 17 1057   Gross per 24 hour   Intake             1702 ml   Output             2650 ml   Net             -948 ml          General: No acute distress. Head:  Atraumatic Normocephalic.   ENT:  High flow NC in place  Lungs:  Coarse lung sounds in upper airways  CVS:  RRR  Abdomen: Soft, ND/NTTP, +BS  MSK:  Boots on feet bilaterally  Neurologic:  Follows commands x 4      Recent Results (from the past 24 hour(s))   GLUCOSE, POC    Collection Time: 12/19/17 11:41 AM   Result Value Ref Range    Glucose (POC) 218 (H) 65 - 100 mg/dL   GLUCOSE, POC    Collection Time: 12/19/17  4:43 PM   Result Value Ref Range    Glucose (POC) 249 (H) 65 - 100 mg/dL   GLUCOSE, POC    Collection Time: 12/19/17  7:50 PM   Result Value Ref Range    Glucose (POC) 223 (H) 65 - 100 mg/dL   BLOOD GAS, ARTERIAL    Collection Time: 12/19/17  8:20 PM   Result Value Ref Range    pH 7.55 (H) 7.35 - 7.45      PCO2 28 (L) 35 - 45 mmHg    PO2 55 (L) 80 - 105 mmHg    BICARBONATE 24 22 - 26 mmol/L    BASE EXCESS 2.0 0 - 3 mmol/L    TOTAL HEMOGLOBIN 9.3 (L) 11.7 - 15.0 GM/DL    O2 SAT 91 (L) 92 - 98.5 %    Arterial O2 Hgb 89.1 (L) 94 - 97 %    CARBOXYHEMOGLOBIN 1.7 (H) 0.5 - 1.5 %    METHEMOGLOBIN 0.3 0.0 - 1.5 %    DEOXYHEMOGLOBIN 9 (H) 0.0 - 5.0 %    SITE RR     ALLENS TEST POSITIVE      MODE NC     O2 FLOW 10.00 L/min    Respiratory comment: Nurse notified at . Read back. GLUCOSE, POC    Collection Time: 12/19/17 11:15 PM   Result Value Ref Range    Glucose (POC) 300 (H) 65 - 100 mg/dL   GLUCOSE, POC    Collection Time: 12/20/17  3:24 AM   Result Value Ref Range    Glucose (POC) 198 (H) 65 - 100 mg/dL   CBC WITH AUTOMATED DIFF    Collection Time: 12/20/17  4:55 AM   Result Value Ref Range    WBC 10.6 4.3 - 11.1 K/uL    RBC 2.85 (L) 4.23 - 5.67 M/uL    HGB 8.0 (L) 13.6 - 17.2 g/dL    HCT 25.4 (L) 41.1 - 50.3 %    MCV 89.1 79.6 - 97.8 FL    MCH 28.1 26.1 - 32.9 PG    MCHC 31.5 31.4 - 35.0 g/dL    RDW 15.5 (H) 11.9 - 14.6 %    PLATELET 517 744 - 311 K/uL    MPV 11.1 10.8 - 14.1 FL    DF AUTOMATED      NEUTROPHILS 76 43 - 78 %    LYMPHOCYTES 14 13 - 44 %    MONOCYTES 9 4.0 - 12.0 %    EOSINOPHILS 0 (L) 0.5 - 7.8 %    BASOPHILS 0 0.0 - 2.0 %    IMMATURE GRANULOCYTES 1 0.0 - 5.0 %    ABS. NEUTROPHILS 8.0 1.7 - 8.2 K/UL    ABS. LYMPHOCYTES 1.5 0.5 - 4.6 K/UL    ABS. MONOCYTES 1.0 0.1 - 1.3 K/UL    ABS. EOSINOPHILS 0.0 0.0 - 0.8 K/UL    ABS. BASOPHILS 0.0 0.0 - 0.2 K/UL    ABS. IMM.  GRANS. 0.1 0.0 - 0.5 K/UL   METABOLIC PANEL, BASIC    Collection Time: 12/20/17  4:55 AM   Result Value Ref Range    Sodium 142 136 - 145 mmol/L    Potassium 3.2 (L) 3.5 - 5.1 mmol/L    Chloride 105 98 - 107 mmol/L    CO2 29 21 - 32 mmol/L    Anion gap 8 7 - 16 mmol/L    Glucose 204 (H) 65 - 100 mg/dL    BUN 21 8 - 23 MG/DL    Creatinine 1.03 0.8 - 1.5 MG/DL    GFR est AA >60 >60 ml/min/1.73m2    GFR est non-AA >60 >60 ml/min/1.73m2    Calcium 8.0 (L) 8.3 - 10.4 MG/DL   GLUCOSE, POC    Collection Time: 12/20/17  7:50 AM   Result Value Ref Range    Glucose (POC) 174 (H) 65 - 100 mg/dL   PLEASE READ & DOCUMENT PPD TEST IN 24 HRS    Collection Time: 12/20/17  8:03 AM   Result Value Ref Range    PPD Negative Negative    mm Induration 0 mm         Imaging /Procedures /Studies   XR CHEST SNGL V   Final Result   IMPRESSION:    1. Persisting left basilar infiltrate and probable small pleural effusion. 2. Minimal right basilar infiltrate increased since prior. XR CHEST SNGL V   Final Result   Impression:  Increased small left pleural effusion and associated left basilar   atelectasis and or consolidation. XR ABD (KUB)   Final Result   Impression: No acute pathology identified. XR CHEST SNGL V   Final Result   IMPRESSION: No acute findings      XR CHEST SNGL V   Final Result   IMPRESSION: Mild worsening left lower lobe atelectasis or pneumonia      XR CHEST SNGL V   Final Result   IMPRESSION: No acute findings in the chest         XR CHEST SNGL V   Final Result   IMPRESSION: ET tube has been repositioned and is now 9 cm above the cathy, at   the level of the thoracic inlet. This could be advanced 4 to 5 cm. The lungs are   clear. XR CHEST SNGL V   Final Result   IMPRESSION: Some left infrahilar atelectasis or infiltrate now present. XR CHEST SNGL V   Final Result   IMPRESSION: Bibasilar atelectasis or pneumonia significantly improved.             XR CHEST SNGL V   Final Result   IMPRESSION: Bibasilar atelectasis or pneumonia worse in the interval.            XR CHEST SNGL V   Final Result   IMPRESSION: Left basilar atelectasis improved. XR CHEST SNGL V   Final Result   IMPRESSION: New left lower lobe atelectasis or pneumonia. XR ABD (KUB)   Final Result   IMPRESSION: OG tube further in the stomach. XR CHEST SNGL V   Final Result   IMPRESSION: No acute cardiopulmonary disease. Recommend advancing the orogastric tube further into the stomach. XR PELV AP ONLY   Final Result   IMPRESSION:   1. 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 This is very poorly   assessed and characterized by portable plain film imaging. XR CHEST PORT   Final Result   IMPRESSION:    1. Improved although slight low position of endotracheal tube. Retraction of   this by 1 to 2 cm complex cyst and more ideal position. 2.  Persistent high position of an nasogastric tube. Advancement of this by 4 cm   would place this in a more ideal position. XR CHEST SNGL V   Final Result   IMPRESSION:    1. Low position of endotracheal tube tip overlying the most proximal right   mainstem bronchus. Retraction of this by 3 cm should place this at a more   acceptable position. 2.  Slight high position of feeding tube with the tip overlying the stomach   although the side-port likely overlies the distal esophagus. Advancement of this   by 4 cm should ensure that the side port is below the gastroesophageal junction. 3. No evolving acute cardiopulmonary changes. The abnormal endotracheal tube position was discussed with nursing staff a   muscle personally at 10:15 PM on 12/1/2017. XR CHEST PA LAT   Final Result   IMPRESSION:    1. No acute cardiopulmonary process evident by plain film imaging.                CT HEAD WO CONT   Final Result   IMPRESSION: 1.  Small right subdural hematoma measuring 4 mm which demonstrates intermediate   attenuation suggesting a subacute although recent subdural hematoma. The subacute appearing right subdural hematoma was discussed with Dr. Gage Mendez by   myself personally at 5:05 PM on 12/1/2017.                ASSESSMENT      Hospital Problems as of 12/20/2017  Date Reviewed: 12/17/2017          Codes Class Noted - Resolved POA    Delirium ICD-10-CM: R41.0  ICD-9-CM: 780.09  12/19/2017 - Present No        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  ICD-9-CM: 608.83  12/17/2017 - Present Unknown        Subdural hematoma ([facility] ICD-10-CM: I62.00  ICD-9-CM: 432.1  12/1/2017 - Present Yes        Type II diabetes mellitus with complication (HCC) (Chronic) ICD-10-CM: E11.8  ICD-9-CM: 250.90  12/1/2017 - Present Yes        Syncope ICD-10-CM: R55  ICD-9-CM: 780.2  12/1/2017 - Present Yes        Fasciitis ICD-10-CM: M72.9  ICD-9-CM: 729.4  12/1/2017 - Present Yes        RESOLVED: Acute respiratory failure ([facility] ICD-10-CM: J96.00  ICD-9-CM: 518.81  12/8/2017 - 12/15/2017 Yes        RESOLVED: Hypernatremia ICD-10-CM: E87.0  ICD-9-CM: 276.0  12/6/2017 - 12/15/2017 Yes        RESOLVED: Lactic acidosis ICD-10-CM: E87.2  ICD-9-CM: 276.2  12/2/2017 - 12/6/2017 Yes        RESOLVED: LAUREANO (acute kidney injury) ([facility] ICD-10-CM: N17.9  ICD-9-CM: 584.9  12/1/2017 - 12/8/2017 Yes        RESOLVED: Leukocytosis ICD-10-CM: D20.278  ICD-9-CM: 288.60  12/1/2017 - 12/8/2017 Yes        * (Principal)RESOLVED: Severe sepsis with septic shock ([facility] ICD-10-CM: A41.9, R65.21  ICD-9-CM: 038.9, 995.92, 785.52  12/1/2017 - 12/15/2017 Yes        RESOLVED: Electrolyte abnormality ICD-10-CM: E87.8  ICD-9-CM: 276.9  12/1/2017 - 12/8/2017 Yes                  Plan:  - HAP:  CXR with persistent L basilar infiltrate and new R infiltrate  Restart broad spectrum abx    - Acute hypoxic resp failure:  Placed on high flow NC overnight  Will ask pulm to re-eval as they have previously been involved in his case  Check BNP and procal    - 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 [place] for further debridement  Plan for skin graft today, but likely to be delayed 2/2 resp status  Has completed 2 weeks of IV abx    - Subdural hematoma:  Non-surgical per NSGY    - Delirium:  Likely related to prolonged ICU stay  Continue to monitor closely  Appears to be a little improved today    - DM2:  BS uncontrolled  Half dose of lantus this AM as NPO for possible surgery  Continue SSI ACHS    - Dysphagia:  SLP following  MBS when appropriate  Pulled out NGT, will replace this afternoon if he does not go to surgery    DVT Prophylaxis: Lovenox  Dispo: PT/OT following, PPD placed; CM following. Patient is an optimal candidate for LTACH as he will not only require wound care, colostomy management, PT/OT/SLP; but he will also continue to require daily input by pulmonology.     Gilbert Masters MD

## 2017-12-20 NOTE — PROGRESS NOTES
TRANSFER - OUT REPORT:    Verbal report given to [facility], RN on [facility]  being transferred to Pre-OP for ordered procedure       Report consisted of patients Situation, Background, Assessment and   Recommendations(SBAR). Information from the following report(s) SBAR, Intake/Output, MAR and Recent Results was reviewed with the receiving nurse. Lines:   Quad Lumen placed in procedure, documented incorrectly as a double lumen 12/01/17 Right Internal jugular (Active)   Central Line Being Utilized Yes 12/19/2017  3:17 PM   Criteria for Appropriate Use Limited/no vessel suitable for conventional peripheral access 12/19/2017  3:17 PM   Site Assessment Clean, dry, & intact 12/19/2017  3:17 PM   Infiltration Assessment 0 12/19/2017  3:17 PM   Affected Extremity/Extremities Color distal to insertion site pink (or appropriate for race) 12/19/2017  3:17 PM   Date of Last Dressing Change 12/18/17 12/19/2017  3:17 PM   Dressing Status Clean, dry, & intact 12/19/2017  3:17 PM   Dressing Type Disk with Chlorhexadine gluconate (CHG); Other (comments); Transparent 12/19/2017  3:17 PM   Action Taken Dressing changed 12/18/2017  3:28 PM   Proximal Hub Color/Line Status Patent;Capped 12/19/2017  3:17 PM   Positive Blood Return (Medial Site) Yes 12/19/2017  3:17 PM   Medial 1 Hub Color/Line Status Patent;Capped 12/19/2017  3:17 PM   Positive Blood Return (Lateral Site) Yes 12/19/2017  3:17 PM   Medial 2 Hub Color/Line Status Patent;Capped 12/19/2017  3:17 PM   Positive Blood Return (Site #3) Yes 12/19/2017  3:17 PM   Distal Hub Color/Line Status Patent;Capped 12/19/2017  3:17 PM   Positive Blood Return (Site #4) Yes 12/19/2017  3:17 PM   Alcohol Cap Used No 12/16/2017  6:59 AM        Opportunity for questions and clarification was provided.       Patient transported with:   [facility]

## 2017-12-20 NOTE — PROGRESS NOTES
Problem: Falls - Risk of  Goal: *Absence of Falls  Document Larry Fall Risk and appropriate interventions in the flowsheet.    Outcome: Progressing Towards Goal  Fall Risk Interventions:  Mobility Interventions: Bed/chair exit alarm, Communicate number of staff needed for ambulation/transfer, OT consult for ADLs, Patient to call before getting OOB, PT Consult for mobility concerns, PT Consult for assist device competence, Strengthening exercises (ROM-active/passive)    Mentation Interventions: Bed/chair exit alarm, Door open when patient unattended, More frequent rounding, Reorient patient    Medication Interventions: Bed/chair exit alarm    Elimination Interventions: Bed/chair exit alarm, Patient to call for help with toileting needs, Call light in reach    History of Falls Interventions: Bed/chair exit alarm, Consult care management for discharge planning, Door open when patient unattended

## 2017-12-20 NOTE — ROUTINE PROCESS
Is alert, nods & shakes head appropriately to simple questions. Respirations improved, less labored. Continues on airvo with stat at 92. Gerardo catheter continues to drain clear yellow urine. Has been repositioned frequently & HOB elevation maintained between 30-45 degrees. Smiles on approach. Skin warm & dry to touch. Report given to oncoming nurse at bedside.

## 2017-12-20 NOTE — PROGRESS NOTES
LTG: Patient will tolerate least restrictive diet without overt signs or symptoms of airway compromise. STG: Patient will tolerate po trials with speech therapy only. STG: Patient will participate in modified barium swallow study as clinically indicated. Speech language pathology: bedside swallow note: Daily Note: 3    NAME/AGE/GENDER: Roverto Yancey is a 79 y.o. 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 INTERDISCIPLINARY COLLABORATION: Registered Nurse and Physician  PRECAUTIONS/ALLERGIES: Review of patient's allergies indicates no known allergies. ASSESSMENT:Patient seen for dysphagia therapy. No po trials this date. Pt NPO for surgery this date. Also do not feel pt is appropriate for po trials at this time as he has been  exhibiting + clinical signs/sx aspiration last several sessions with po. Noted he pulled his NG tube out. Pt able to complete 5 effortful swallows this date. Unable to produce any voicing with vocalization tasks which is a change since Monday. No voicing in spontaneous speech either even with effort. Feel pt would benefit from an ENT consult due to aphonia. Patient will benefit from skilled intervention to address the below impairments. ?????? ? ? This section established at most recent assessment??????????  PROBLEM LIST (Impairments causing functional limitations):  1. Oropharyngeal dysphagia  REHABILITATION POTENTIAL FOR STATED GOALS: Good  PLAN OF CARE:   Patient will benefit from skilled intervention to address the following impairments.   RECOMMENDATIONS AND PLANNED INTERVENTIONS (Benefits and precautions of therapy have been discussed with the patient.):  · NPO with alternative means of nutrition  MEDICATIONS:  · Non-oral  COMPENSATORY STRATEGIES/MODIFICATIONS INCLUDING:  · None  OTHER RECOMMENDATIONS (including follow up treatment recommendations):   · Laryngeal exercises  · Patient education  RECOMMENDED DIET MODIFICATIONS DISCUSSED WITH:  · Medical Sub-Specialist  · Nursing  · Patient  FREQUENCY/DURATION: Continue to follow patient 3 times a week for duration of hospital stay to address above goals. RECOMMENDED REHABILITATION/EQUIPMENT: (at time of discharge pending progress): Due to the probability of continued deficits (see above) this patient will likely need continued skilled speech therapy after discharge. SUBJECTIVE:   Pt cooperative. Pt's son in law present. History of Present Injury/Illness: Mr. Ho Salmeron  has a past medical history of Diabetes ([facility] [address] .  He also  has no past surgical history on file. Present Symptoms: oropharyngeal dysphagia   Pain Intensity 1: 0  Pain Location 1: Buttocks  Pain Orientation 1: Right  Pain Intervention(s) 1: Nurse notified  Current Medications:   No current facility-administered medications on file prior to encounter. No current outpatient prescriptions on file prior to encounter. Current Dietary Status:  NPO- tube feedings. Social History/Home Situation:    Home Environment: Apartment  # Steps to Enter: 0  One/Two Story Residence: One story  Living Alone: Yes  Support Systems: Family member(s)  Patient Expects to be Discharged to[de-identified] Unknown  Current DME Used/Available at Home: Walker, rollator, Cane, straight  OBJECTIVE:   Respiratory Status:        CXR Results:No acute findings  MRI/CT Results:1.   Small right subdural hematoma measuring 4 mm which demonstrates intermediate  attenuation suggesting a subacute although recent subdural hematoma  Oral Motor Structure/Speech:  Oral-Motor Structure/Motor Speech  Labial: No impairment  Dentition: Edentulous  Oral Hygiene: dry  Lingual: No impairment    Cognitive and Communication Status:  Neurologic State: Drowsy; Eyes open to voice  Orientation Level: Unable to verbalize  Cognition: Decreased attention/concentration             BEDSIDE SWALLOW EVALUATION  Oral Assessment:     P.O. Trials: The patient was given tsp amounts of the following:           ORAL PHASE:                   PHARYNGEAL PHASE:                            OTHER OBSERVATIONS:  Rate/bite size: Impaired   Endurance:  Impaired   Comments: Tool Used: Dysphagia Outcome and Severity Scale (ANGEL)    Score Comments   Normal Diet  [] 7 With no strategies or extra time needed   Functional Swallow  [] 6 May have mild oral or pharyngeal delay       Mild Dysphagia    [] 5 Which may require one diet consistency restricted (those who demonstrate penetration which is entirely cleared on MBS would be included)   Mild-Moderate Dysphagia  [] 4 With 1-2 diet consistencies restricted       Moderate Dysphagia  [] 3 With 2 or more diet consistencies restricted       Moderately Severe Dysphagia  [] 2 With partial PO strategies (trials with ST only)       Severe Dysphagia  [x] 1 With inability to tolerate any PO safely          Score:  Initial: 1 Most Recent: X (Date: -- )   Interpretation of Tool: The Dysphagia Outcome and Severity Scale (ANGEL) is a simple, easy-to-use, 7-point scale developed to systematically rate the functional severity of dysphagia based on objective assessment and make recommendations for diet level, independence level, and type of nutrition. Score 7 6 5 4 3 2 1   Modifier CH CI CJ CK CL CM CN   ?  Swallowing:     - CURRENT STATUS: CN - 100% impaired, limited or restricted    - GOAL STATUS:  CI - 1%-19% impaired, limited or restricted    - D/C STATUS:  ---------------To be determined---------------  Payor: [employer] MEDICARE / Plan: [address] [place] [address] HMO / Product Type: Managed Care Medicare /     TREATMENT:    (In addition to Assessment/Re-Assessment sessions the following treatments were rendered)  Dysphagia Activities: Activities/Procedures listed utilized to improve progress in swallow strength. Required moderate cueing to decrease aspiration risk. LARYNGEAL / PHARYNGEAL EXERCISES:           Effortful Swallow: Yes  Reps : 5  Sets : 1  Hard Glottal Attack: Yes  Reps : 5 (unable to produce any voicing)  Sets : 1                                                                                     Sustained \"ah\": Yes  Sets : 1  Reps : 5 (unable to produce any voicing)                  __________________________________________________________________________________________________  Safety:   After treatment position/precautions:  · Nurse at bedside  · Upright in Bed. Progression/Medical Necessity:   · Patient is expected to demonstrate progress in swallow strength, swallow timeliness, swallow function and diet tolerance to improve swallow safety, work toward diet advancement and decrease aspiration risk. Compliance with Program/Exercises: Will assess as treatment progresses. Reason for Continuation of Services/Other Comments:  · Patient continues to require skilled intervention due to oropharyngeal dysphagia. Recommendations/Intent for next treatment session: \"Treatment next visit will focus on laryngeal exercises, ST evaluation, po trials if appropriate\".     Total Treatment Duration:Time In: 1027  Time Out: Ashlee 86, MSP, CCC-SLP

## 2017-12-20 NOTE — PROGRESS NOTES
Problem: Nutrition Deficit  Goal: *Optimize nutritional status  Nutrition F/U:  TF Management for [facility]. Assessment:  Remains NPO and TF dependent d/t dysphagia and recommendation for NPO status per SLP evaluation. TF was held last night for planned skin graft surgery today. Pt then pulled FTout last night. Surgery was cancelled d/t respiratory status. FT has not been replaced yet. RN reprots no plan for IVF d/t volume overload status at this time. Prior to this, pt was tolerating TF at goal rate of 75 ml/hr with 45 ml water flush every 1 hr  K 3.2-receiving 50 meq KCl bolus today  POC Glucoses: 154-300 mg/dl for the past week. Has received 14-30 units SSI/d for the past week. Lantus dose was 10 units daily except 15 units given yesterday. Pt will be at risk for hypoglycemia while not receiving any kcal from TF or IVF. Macronutrient Needs:  Estimated calorie needs - 3114-3282 sunni/day (25-28 sunni/kg/day)   Estimated protein needs - 101-117 gm pro/day (1.3-1.5 gm pro/kgIBW/day)   Max CHO/day - 294 gm CHO/day (50% sunni/day)   Fluid/day - 2.1-2.4 liters/day (1 ml/sunni/day)  Intake/Comparative Standards: Current NPO status and no TF wright snot meet kcal or protein needs. Intervention:   Meals and Snacks: NPO. Enteral Nutrition: Once FT replaced, suggest resume TF of Glucerna 1.2 @ 75 ml/hr but decrease water flush to 25 ml/hr to provide 160 calories/day (100% calorie goal), 108 grams protein/day (100% protein goal), 207 grams CHO/day (does not exceed max CHO limit) and ~2050 ml of free water/d (98% fluid needs). Mineral Supplement Therapy: Nutrition Support Orders/Electrolyte Management Replacement Protocols are active on the MAR.   Add Phos and Mg MWF and Phos for tomorrow AM   Coordination of Nutrition Care by a Nutrition Professional: Discussed with [facility] [place], [address], [address], [address], [address] [facility]

## 2017-12-20 NOTE — PROGRESS NOTES
Dispo update: Additional clinicals for Mr. Camacho of room 209 provided to Ms. Lewie Severe, 4th floor [facility] liaison, who will send to [facility]. Await [employer] review.

## 2017-12-20 NOTE — PROGRESS NOTES
Nurse asked me to assess patient because of Low SAT on increased FiO2. Increased from 2L to 10L SAT at 92%. RR 30, has weak congested cough.

## 2017-12-20 NOTE — PROGRESS NOTES
Patient has been having low o2 sats on and off all morning. 85-87% on 15L highflow. The highest o2 sat on the patient as been 92%. Respiratory Therapy has seen him to ensure the highflow is working properly. Dr. Raffy Crisostomo, Primary MD aware of the situation. Reached out to ICU clinical coordinator, Jayson Scott, as the patient was previously in the ICU. She is currently sitting with a patient on another floor but will come assess the patient as soon as possible. I've also spoken with the Pre-Op RN about the patient potentially have surgery today. She is attempting to get the Anesthesiologist to come to the floor to assess the patient before transporting them down for surgery. Also have informed my supervisor of the patient's condition. Will continue to monitor.

## 2017-12-21 ENCOUNTER — APPOINTMENT (OUTPATIENT)
Dept: GENERAL RADIOLOGY | Age: 67
DRG: 853 | End: 2017-12-21
Attending: INTERNAL MEDICINE
Payer: MEDICARE

## 2017-12-21 PROBLEM — T17.500A MUCUS PLUGGING OF BRONCHI: Status: ACTIVE | Noted: 2017-12-21

## 2017-12-21 PROBLEM — J98.11 ATELECTASIS: Status: ACTIVE | Noted: 2017-12-21

## 2017-12-21 LAB
ALBUMIN SERPL-MCNC: 1.6 G/DL (ref 3.2–4.6)
ALBUMIN/GLOB SERPL: 0.3 {RATIO} (ref 1.2–3.5)
ALP SERPL-CCNC: 65 U/L (ref 50–136)
ALT SERPL-CCNC: 11 U/L (ref 12–65)
ANION GAP SERPL CALC-SCNC: 7 MMOL/L (ref 7–16)
AST SERPL-CCNC: 14 U/L (ref 15–37)
BASOPHILS # BLD: 0 K/UL (ref 0–0.2)
BASOPHILS NFR BLD: 0 % (ref 0–2)
BILIRUB SERPL-MCNC: 0.8 MG/DL (ref 0.2–1.1)
BUN SERPL-MCNC: 19 MG/DL (ref 8–23)
CALCIUM SERPL-MCNC: 8.1 MG/DL (ref 8.3–10.4)
CHLORIDE SERPL-SCNC: 113 MMOL/L (ref 98–107)
CO2 SERPL-SCNC: 27 MMOL/L (ref 21–32)
CREAT SERPL-MCNC: 0.78 MG/DL (ref 0.8–1.5)
DIFFERENTIAL METHOD BLD: ABNORMAL
EOSINOPHIL # BLD: 0 K/UL (ref 0–0.8)
EOSINOPHIL NFR BLD: 0 % (ref 0.5–7.8)
ERYTHROCYTE [DISTWIDTH] IN BLOOD BY AUTOMATED COUNT: 15.6 % (ref 11.9–14.6)
GLOBULIN SER CALC-MCNC: 5.7 G/DL (ref 2.3–3.5)
GLUCOSE BLD STRIP.AUTO-MCNC: 130 MG/DL (ref 65–100)
GLUCOSE BLD STRIP.AUTO-MCNC: 140 MG/DL (ref 65–100)
GLUCOSE BLD STRIP.AUTO-MCNC: 145 MG/DL (ref 65–100)
GLUCOSE BLD STRIP.AUTO-MCNC: 149 MG/DL (ref 65–100)
GLUCOSE BLD STRIP.AUTO-MCNC: 162 MG/DL (ref 65–100)
GLUCOSE BLD STRIP.AUTO-MCNC: 195 MG/DL (ref 65–100)
GLUCOSE SERPL-MCNC: 159 MG/DL (ref 65–100)
HCT VFR BLD AUTO: 24.5 % (ref 41.1–50.3)
HGB BLD-MCNC: 7.4 G/DL (ref 13.6–17.2)
IMM GRANULOCYTES # BLD: 0.1 K/UL (ref 0–0.5)
IMM GRANULOCYTES NFR BLD AUTO: 1 % (ref 0–5)
LYMPHOCYTES # BLD: 0.9 K/UL (ref 0.5–4.6)
LYMPHOCYTES NFR BLD: 9 % (ref 13–44)
MCH RBC QN AUTO: 27.6 PG (ref 26.1–32.9)
MCHC RBC AUTO-ENTMCNC: 30.2 G/DL (ref 31.4–35)
MCV RBC AUTO: 91.4 FL (ref 79.6–97.8)
MM INDURATION POC: NORMAL MM (ref 0–5)
MONOCYTES # BLD: 0.7 K/UL (ref 0.1–1.3)
MONOCYTES NFR BLD: 7 % (ref 4–12)
NEUTS SEG # BLD: 8.4 K/UL (ref 1.7–8.2)
NEUTS SEG NFR BLD: 83 % (ref 43–78)
PHOSPHATE SERPL-MCNC: 2.3 MG/DL (ref 2.3–3.7)
PLATELET # BLD AUTO: 278 K/UL (ref 150–450)
PMV BLD AUTO: 11.3 FL (ref 10.8–14.1)
POTASSIUM SERPL-SCNC: 3.8 MMOL/L (ref 3.5–5.1)
PPD POC: NORMAL NEGATIVE
PROT SERPL-MCNC: 7.3 G/DL (ref 6.3–8.2)
RBC # BLD AUTO: 2.68 M/UL (ref 4.23–5.67)
SODIUM SERPL-SCNC: 147 MMOL/L (ref 136–145)
WBC # BLD AUTO: 10.2 K/UL (ref 4.3–11.1)

## 2017-12-21 PROCEDURE — 74011636637 HC RX REV CODE- 636/637: Performed by: INTERNAL MEDICINE

## 2017-12-21 PROCEDURE — 74011250636 HC RX REV CODE- 250/636: Performed by: INTERNAL MEDICINE

## 2017-12-21 PROCEDURE — 71010 XR CHEST PORT: CPT

## 2017-12-21 PROCEDURE — 74011250637 HC RX REV CODE- 250/637: Performed by: SURGERY

## 2017-12-21 PROCEDURE — 31645 BRNCHSC W/THER ASPIR 1ST: CPT | Performed by: INTERNAL MEDICINE

## 2017-12-21 PROCEDURE — 84100 ASSAY OF PHOSPHORUS: CPT | Performed by: INTERNAL MEDICINE

## 2017-12-21 PROCEDURE — 87116 MYCOBACTERIA CULTURE: CPT | Performed by: INTERNAL MEDICINE

## 2017-12-21 PROCEDURE — 74750000023 HC WOUND THERAPY

## 2017-12-21 PROCEDURE — 99152 MOD SED SAME PHYS/QHP 5/>YRS: CPT | Performed by: INTERNAL MEDICINE

## 2017-12-21 PROCEDURE — 74011250636 HC RX REV CODE- 250/636

## 2017-12-21 PROCEDURE — 76040000025: Performed by: INTERNAL MEDICINE

## 2017-12-21 PROCEDURE — 82962 GLUCOSE BLOOD TEST: CPT

## 2017-12-21 PROCEDURE — 87106 FUNGI IDENTIFICATION YEAST: CPT | Performed by: INTERNAL MEDICINE

## 2017-12-21 PROCEDURE — 74011250636 HC RX REV CODE- 250/636: Performed by: HOSPITALIST

## 2017-12-21 PROCEDURE — 74011000250 HC RX REV CODE- 250: Performed by: INTERNAL MEDICINE

## 2017-12-21 PROCEDURE — 80053 COMPREHEN METABOLIC PANEL: CPT | Performed by: INTERNAL MEDICINE

## 2017-12-21 PROCEDURE — 74011250636 HC RX REV CODE- 250/636: Performed by: SURGERY

## 2017-12-21 PROCEDURE — 85025 COMPLETE CBC W/AUTO DIFF WBC: CPT | Performed by: INTERNAL MEDICINE

## 2017-12-21 PROCEDURE — 94760 N-INVAS EAR/PLS OXIMETRY 1: CPT

## 2017-12-21 PROCEDURE — 94640 AIRWAY INHALATION TREATMENT: CPT

## 2017-12-21 PROCEDURE — 87206 SMEAR FLUORESCENT/ACID STAI: CPT | Performed by: INTERNAL MEDICINE

## 2017-12-21 PROCEDURE — 87077 CULTURE AEROBIC IDENTIFY: CPT | Performed by: INTERNAL MEDICINE

## 2017-12-21 PROCEDURE — 87186 SC STD MICRODIL/AGAR DIL: CPT | Performed by: INTERNAL MEDICINE

## 2017-12-21 PROCEDURE — 74011000258 HC RX REV CODE- 258: Performed by: INTERNAL MEDICINE

## 2017-12-21 PROCEDURE — 97110 THERAPEUTIC EXERCISES: CPT

## 2017-12-21 PROCEDURE — C9113 INJ PANTOPRAZOLE SODIUM, VIA: HCPCS | Performed by: SURGERY

## 2017-12-21 PROCEDURE — 74011636637 HC RX REV CODE- 636/637: Performed by: HOSPITALIST

## 2017-12-21 PROCEDURE — 74011000250 HC RX REV CODE- 250: Performed by: SURGERY

## 2017-12-21 PROCEDURE — 77030019605

## 2017-12-21 PROCEDURE — 77010033678 HC OXYGEN DAILY

## 2017-12-21 PROCEDURE — 3E1F88Z IRRIGATION OF RESPIRATORY TRACT USING IRRIGATING SUBSTANCE, VIA NATURAL OR ARTIFICIAL OPENING ENDOSCOPIC: ICD-10-PCS | Performed by: INTERNAL MEDICINE

## 2017-12-21 PROCEDURE — 65270000029 HC RM PRIVATE

## 2017-12-21 PROCEDURE — 99232 SBSQ HOSP IP/OBS MODERATE 35: CPT | Performed by: INTERNAL MEDICINE

## 2017-12-21 PROCEDURE — 87070 CULTURE OTHR SPECIMN AEROBIC: CPT | Performed by: INTERNAL MEDICINE

## 2017-12-21 PROCEDURE — 77030012699 HC VLV SUC CNTRL OCOA -A: Performed by: INTERNAL MEDICINE

## 2017-12-21 PROCEDURE — 36591 DRAW BLOOD OFF VENOUS DEVICE: CPT

## 2017-12-21 RX ORDER — LIDOCAINE HYDROCHLORIDE 40 MG/ML
SOLUTION TOPICAL AS NEEDED
Status: DISCONTINUED | OUTPATIENT
Start: 2017-12-21 | End: 2017-12-29 | Stop reason: HOSPADM

## 2017-12-21 RX ORDER — LIDOCAINE HYDROCHLORIDE 20 MG/ML
JELLY TOPICAL AS NEEDED
Status: DISCONTINUED | OUTPATIENT
Start: 2017-12-21 | End: 2017-12-29 | Stop reason: HOSPADM

## 2017-12-21 RX ORDER — SODIUM CHLORIDE 9 MG/ML
1000 INJECTION, SOLUTION INTRAVENOUS CONTINUOUS
Status: DISCONTINUED | OUTPATIENT
Start: 2017-12-21 | End: 2017-12-21 | Stop reason: HOSPADM

## 2017-12-21 RX ORDER — MIDAZOLAM HYDROCHLORIDE 1 MG/ML
.25-5 INJECTION, SOLUTION INTRAMUSCULAR; INTRAVENOUS
Status: DISCONTINUED | OUTPATIENT
Start: 2017-12-21 | End: 2017-12-21 | Stop reason: HOSPADM

## 2017-12-21 RX ORDER — FENTANYL CITRATE 50 UG/ML
50 INJECTION, SOLUTION INTRAMUSCULAR; INTRAVENOUS
Status: DISCONTINUED | OUTPATIENT
Start: 2017-12-21 | End: 2017-12-21 | Stop reason: HOSPADM

## 2017-12-21 RX ADMIN — Medication 10 ML: at 06:26

## 2017-12-21 RX ADMIN — SODIUM CHLORIDE 2 G: 900 INJECTION, SOLUTION INTRAVENOUS at 20:48

## 2017-12-21 RX ADMIN — Medication 10 ML: at 20:50

## 2017-12-21 RX ADMIN — LIDOCAINE HYDROCHLORIDE: 40 SOLUTION TOPICAL at 11:02

## 2017-12-21 RX ADMIN — VANCOMYCIN HYDROCHLORIDE 1000 MG: 1 INJECTION, POWDER, LYOPHILIZED, FOR SOLUTION INTRAVENOUS at 14:57

## 2017-12-21 RX ADMIN — ALBUTEROL SULFATE 2.5 MG: 2.5 SOLUTION RESPIRATORY (INHALATION) at 20:49

## 2017-12-21 RX ADMIN — INSULIN GLARGINE 20 UNITS: 100 INJECTION, SOLUTION SUBCUTANEOUS at 09:00

## 2017-12-21 RX ADMIN — INSULIN LISPRO 2 UNITS: 100 INJECTION, SOLUTION INTRAVENOUS; SUBCUTANEOUS at 04:24

## 2017-12-21 RX ADMIN — ENOXAPARIN SODIUM 40 MG: 40 INJECTION SUBCUTANEOUS at 13:52

## 2017-12-21 RX ADMIN — Medication 1 AMPULE: at 09:00

## 2017-12-21 RX ADMIN — ALBUTEROL SULFATE 2.5 MG: 2.5 SOLUTION RESPIRATORY (INHALATION) at 01:32

## 2017-12-21 RX ADMIN — HYDROMORPHONE HYDROCHLORIDE 1 MG: 2 INJECTION, SOLUTION INTRAMUSCULAR; INTRAVENOUS; SUBCUTANEOUS at 13:53

## 2017-12-21 RX ADMIN — Medication 1 AMPULE: at 20:48

## 2017-12-21 RX ADMIN — Medication 10 ML: at 13:52

## 2017-12-21 RX ADMIN — ALBUTEROL SULFATE 2.5 MG: 2.5 SOLUTION RESPIRATORY (INHALATION) at 07:15

## 2017-12-21 RX ADMIN — LIDOCAINE HYDROCHLORIDE: 20 JELLY TOPICAL at 11:02

## 2017-12-21 RX ADMIN — MIDAZOLAM HYDROCHLORIDE 1 MG: 1 INJECTION, SOLUTION INTRAMUSCULAR; INTRAVENOUS at 10:52

## 2017-12-21 RX ADMIN — SODIUM CHLORIDE 40 MG: 9 INJECTION INTRAMUSCULAR; INTRAVENOUS; SUBCUTANEOUS at 14:57

## 2017-12-21 RX ADMIN — ALBUTEROL SULFATE 2.5 MG: 2.5 SOLUTION RESPIRATORY (INHALATION) at 14:46

## 2017-12-21 RX ADMIN — SODIUM CHLORIDE 2 G: 900 INJECTION, SOLUTION INTRAVENOUS at 13:53

## 2017-12-21 RX ADMIN — INSULIN LISPRO 2 UNITS: 100 INJECTION, SOLUTION INTRAVENOUS; SUBCUTANEOUS at 13:52

## 2017-12-21 RX ADMIN — FENTANYL CITRATE 25 MCG: 50 INJECTION, SOLUTION INTRAMUSCULAR; INTRAVENOUS at 10:53

## 2017-12-21 RX ADMIN — VANCOMYCIN HYDROCHLORIDE 1500 MG: 10 INJECTION, POWDER, LYOPHILIZED, FOR SOLUTION INTRAVENOUS at 02:07

## 2017-12-21 RX ADMIN — SODIUM CHLORIDE 1 G: 900 INJECTION, SOLUTION INTRAVENOUS at 03:21

## 2017-12-21 RX ADMIN — SODIUM CHLORIDE 1000 ML: 900 INJECTION, SOLUTION INTRAVENOUS at 11:04

## 2017-12-21 RX ADMIN — ACETYLCYSTEINE 300 MG: 100 SOLUTION ORAL; RESPIRATORY (INHALATION) at 20:49

## 2017-12-21 RX ADMIN — ALBUTEROL SULFATE 2.5 MG: 2.5 SOLUTION RESPIRATORY (INHALATION) at 04:25

## 2017-12-21 NOTE — PROGRESS NOTES
Physical therapy note: AM pt had just completed OT treatment. In PM he was getting Bronc. Will continue to follow and treat as pt is able and time/schedule permit.

## 2017-12-21 NOTE — WOUND CARE
Patient seen for colostomy. Pouch intact and soft brown stool noted. Stoma pink. No family present to teach. Patient not teachable today. Will monitor.

## 2017-12-21 NOTE — PROGRESS NOTES
TRANSFER - OUT REPORT:    Verbal report given to Cresencio JUAREZ on [place]  being transferred to  for routine post - op bronchoscopy. Bronch wash sent to the lab x1 and labeled appropriately. Report consisted of patients Situation, Background, Assessment and   Recommendations(SBAR). Information from the following report(s) SBAR, Procedure Summary and Recent Results was reviewed with the receiving nurse. Lines:   Quad Lumen placed in procedure, documented incorrectly as a double lumen 12/01/17 Right Internal jugular (Active)   Central Line Being Utilized Yes 12/21/2017  9:00 AM   Criteria for Appropriate Use Limited/no vessel suitable for conventional peripheral access 12/21/2017  9:00 AM   Site Assessment Clean, dry, & intact 12/21/2017  9:00 AM   Infiltration Assessment 0 12/21/2017  9:00 AM   Affected Extremity/Extremities Color distal to insertion site pink (or appropriate for race) 12/21/2017  9:00 AM   Date of Last Dressing Change 12/20/17 12/20/2017 11:43 PM   Dressing Status Clean, dry, & intact 12/21/2017  9:00 AM   Dressing Type Disk with Chlorhexadine gluconate (CHG) 12/21/2017  9:00 AM   Action Taken Dressing changed 12/20/2017  2:50 PM   Proximal Hub Color/Line Status Capped 12/20/2017 11:43 PM   Positive Blood Return (Medial Site) No 12/20/2017  2:50 PM   Medial 1 Hub Color/Line Status Capped 12/20/2017 11:43 PM   Positive Blood Return (Lateral Site) No 12/20/2017  2:50 PM   Medial 2 Hub Color/Line Status Capped 12/20/2017 11:43 PM   Positive Blood Return (Site #3) No 12/20/2017  2:50 PM   Distal Hub Color/Line Status Capped 12/20/2017 11:43 PM   Positive Blood Return (Site #4) Yes 12/20/2017  2:50 PM   Alcohol Cap Used No 12/16/2017  6:59 AM    Opportunity for questions and clarification was provided.     Patient transported with:   O2 @ 10 liters

## 2017-12-21 NOTE — PROGRESS NOTES
Had issues with maintaining saturations earlier and is now on facemask, 15L O2. Able to communicate, but confused. Denies any current leg pain. Ostomy productive. Tolerating tube feeds. Visit Vitals    /67    Pulse 92    Temp 100.2 °F (37.9 °C)    Resp 16    Ht 5' 11\" (1.803 m)    Wt 75.5 kg (166 lb 6.4 oz)    SpO2 94%    BMI 23.21 kg/m2     A&O x 2  RRR  Resp somewhat labored, coarse BS bilaterally  Abd soft, ostomy productive. VAC in place, sealed. 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 Surgery for today cancelled will await resolution of breathing issues vs bronch but VAC does need to be changed at a minimum by the end of the week.

## 2017-12-21 NOTE — PROGRESS NOTES
Hospitalist Progress Note    2017  Admit Date: 2017  2:54 PM   NAME: Rene Lin   :  1950   MRN:  [id number]   Attending: Qi Akhtar MD  PCP:  None    SUBJECTIVE:     Rene Lin is a 59KLM with no previous known medical hx who was admitted on  with a syncopal episode. CT head with a SDH, no intervention per NSGY. 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 Was taken to the OR for debridement on  Carmel Muro) and has been followed by [facility] for further wound debridement. Extubated on  and transferred out of ICU. Was supposed to have skin grafting yesterday with Dr. Em Jones, but had to be cancelled 2/2 worsening resp status. Pulm following, possible bronch today    : afebrile overnight. Hypoxia improving, but remains on high flow. Still without NGT as he was intermittently on a mask last night. He remains confused, but can still follow commands. Review of Systems negative with exception of pertinent positives noted above      PHYSICAL EXAM       Visit Vitals    /69    Pulse 99    Temp 98.2 °F (36.8 °C)    Resp 18    Ht 5' 11\" (1.803 m)    Wt 75.5 kg (166 lb 6.4 oz)    SpO2 98%    BMI 23.21 kg/m2      Temp (24hrs), Av.2 °F (37.3 °C), Min:98.2 °F (36.8 °C), Max:100.3 °F (37.9 °C)    Oxygen Therapy  O2 Sat (%): 98 % (17)  Pulse via Oximetry: 90 beats per minute (17)  O2 Device: Heated; Hi flow nasal cannula (17)  O2 Flow Rate (L/min): 50 l/min (17)  O2 Temperature: 87.8 °F (31 °C) (17)  FIO2 (%): 75 % (17)    Intake/Output Summary (Last 24 hours) at 17  Last data filed at 12/21/17 0729   Gross per 24 hour   Intake              832 ml   Output             1535 ml   Net             -703 ml          General: No acute distress. Head:  Atraumatic Normocephalic.   ENT:  High flow NC in place  Lungs:  Coarse lung sounds in upper airways  CVS:  RRR  Abdomen: Soft, ND/NTTP, +BS  MSK:  Boots on feet bilaterally  Neurologic:  Follows commands x 4      Recent Results (from the past 24 hour(s))   GLUCOSE, POC    Collection Time: 12/20/17 11:44 AM   Result Value Ref Range    Glucose (POC) 154 (H) 65 - 100 mg/dL   BLOOD GAS, ARTERIAL    Collection Time: 12/20/17  1:50 PM   Result Value Ref Range    pH 7.53 (H) 7.35 - 7.45      PCO2 34 (L) 35 - 45 mmHg    PO2 59 (L) 80 - 105 mmHg    BICARBONATE 28 (H) 22 - 26 mmol/L    BASE EXCESS 5.3 (H) 0 - 3 mmol/L    SITE LR     ALLENS TEST POSITIVE      FIO2 50.0 %    O2 FLOW 15.00 L/min   PROCALCITONIN    Collection Time: 12/20/17  2:38 PM   Result Value Ref Range    Procalcitonin 0.4 ng/mL   GLUCOSE, POC    Collection Time: 12/20/17  4:45 PM   Result Value Ref Range    Glucose (POC) 167 (H) 65 - 100 mg/dL   LACTIC ACID    Collection Time: 12/20/17  6:52 PM   Result Value Ref Range    Lactic acid 1.7 0.4 - 2.0 MMOL/L   GLUCOSE, POC    Collection Time: 12/20/17  7:35 PM   Result Value Ref Range    Glucose (POC) 163 (H) 65 - 100 mg/dL   GLUCOSE, POC    Collection Time: 12/20/17 11:53 PM   Result Value Ref Range    Glucose (POC) 149 (H) 65 - 100 mg/dL   GLUCOSE, POC    Collection Time: 12/21/17  4:06 AM   Result Value Ref Range    Glucose (POC) 195 (H) 65 - 100 mg/dL   CBC WITH AUTOMATED DIFF    Collection Time: 12/21/17  5:10 AM   Result Value Ref Range    WBC 10.2 4.3 - 11.1 K/uL    RBC 2.68 (L) 4.23 - 5.67 M/uL    HGB 7.4 (L) 13.6 - 17.2 g/dL    HCT 24.5 (L) 41.1 - 50.3 %    MCV 91.4 79.6 - 97.8 FL    MCH 27.6 26.1 - 32.9 PG    MCHC 30.2 (L) 31.4 - 35.0 g/dL    RDW 15.6 (H) 11.9 - 14.6 %    PLATELET 219 852 - 270 K/uL    MPV 11.3 10.8 - 14.1 FL    DF AUTOMATED      NEUTROPHILS 83 (H) 43 - 78 %    LYMPHOCYTES 9 (L) 13 - 44 %    MONOCYTES 7 4.0 - 12.0 %    EOSINOPHILS 0 (L) 0.5 - 7.8 %    BASOPHILS 0 0.0 - 2.0 %    IMMATURE GRANULOCYTES 1 0.0 - 5.0 %    ABS. NEUTROPHILS 8.4 (H) 1.7 - 8.2 K/UL    ABS.  LYMPHOCYTES 0.9 0.5 - 4.6 K/UL    ABS. MONOCYTES 0.7 0.1 - 1.3 K/UL    ABS. EOSINOPHILS 0.0 0.0 - 0.8 K/UL    ABS. BASOPHILS 0.0 0.0 - 0.2 K/UL    ABS. IMM. GRANS. 0.1 0.0 - 0.5 K/UL   METABOLIC PANEL, COMPREHENSIVE    Collection Time: 12/21/17  5:10 AM   Result Value Ref Range    Sodium 147 (H) 136 - 145 mmol/L    Potassium 3.8 3.5 - 5.1 mmol/L    Chloride 113 (H) 98 - 107 mmol/L    CO2 27 21 - 32 mmol/L    Anion gap 7 7 - 16 mmol/L    Glucose 159 (H) 65 - 100 mg/dL    BUN 19 8 - 23 MG/DL    Creatinine 0.78 (L) 0.8 - 1.5 MG/DL    GFR est AA >60 >60 ml/min/1.73m2    GFR est non-AA >60 >60 ml/min/1.73m2    Calcium 8.1 (L) 8.3 - 10.4 MG/DL    Bilirubin, total 0.8 0.2 - 1.1 MG/DL    ALT (SGPT) 11 (L) 12 - 65 U/L    AST (SGOT) 14 (L) 15 - 37 U/L    Alk. phosphatase 65 50 - 136 U/L    Protein, total 7.3 6.3 - 8.2 g/dL    Albumin 1.6 (L) 3.2 - 4.6 g/dL    Globulin 5.7 (H) 2.3 - 3.5 g/dL    A-G Ratio 0.3 (L) 1.2 - 3.5     PHOSPHORUS    Collection Time: 12/21/17  5:10 AM   Result Value Ref Range    Phosphorus 2.3 2.3 - 3.7 MG/DL   GLUCOSE, POC    Collection Time: 12/21/17  8:06 AM   Result Value Ref Range    Glucose (POC) 145 (H) 65 - 100 mg/dL         Imaging /Procedures /Studies   XR CHEST PORT   Final Result   IMPRESSION:      No significant change compared to prior exam.      XR CHEST SNGL V   Final Result   IMPRESSION:    1. Persisting left basilar infiltrate and probable small pleural effusion. 2. Minimal right basilar infiltrate increased since prior. XR CHEST SNGL V   Final Result   Impression:  Increased small left pleural effusion and associated left basilar   atelectasis and or consolidation. XR ABD (KUB)   Final Result   Impression: No acute pathology identified.                   XR CHEST SNGL V   Final Result   IMPRESSION: No acute findings      XR CHEST SNGL V   Final Result   IMPRESSION: Mild worsening left lower lobe atelectasis or pneumonia      XR CHEST SNGL V   Final Result   IMPRESSION: No acute findings in the chest         XR CHEST SNGL V   Final Result   IMPRESSION: ET tube has been repositioned and is now 9 cm above the cathy, at   the level of the thoracic inlet. This could be advanced 4 to 5 cm. The lungs are   clear. XR CHEST SNGL V   Final Result   IMPRESSION: Some left infrahilar atelectasis or infiltrate now present. XR CHEST SNGL V   Final Result   IMPRESSION: Bibasilar atelectasis or pneumonia significantly improved. XR CHEST SNGL V   Final Result   IMPRESSION: Bibasilar atelectasis or pneumonia worse in the interval.            XR CHEST SNGL V   Final Result   IMPRESSION: Left basilar atelectasis improved. XR CHEST SNGL V   Final Result   IMPRESSION: New left lower lobe atelectasis or pneumonia. XR ABD (KUB)   Final Result   IMPRESSION: OG tube further in the stomach. XR CHEST SNGL V   Final Result   IMPRESSION: No acute cardiopulmonary disease. Recommend advancing the orogastric tube further into the stomach. XR PELV AP ONLY   Final Result   IMPRESSION:   1. 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 This is very poorly   assessed and characterized by portable plain film imaging. XR CHEST PORT   Final Result   IMPRESSION:    1. Improved although slight low position of endotracheal tube. Retraction of   this by 1 to 2 cm complex cyst and more ideal position. 2.  Persistent high position of an nasogastric tube. Advancement of this by 4 cm   would place this in a more ideal position. XR CHEST SNGL V   Final Result   IMPRESSION:    1. Low position of endotracheal tube tip overlying the most proximal right   mainstem bronchus. Retraction of this by 3 cm should place this at a more   acceptable position.       2.  Slight high position of feeding tube with the tip overlying the stomach   although the side-port likely overlies the distal esophagus. Advancement of this   by 4 cm should ensure that the side port is below the gastroesophageal junction. 3. No evolving acute cardiopulmonary changes. The abnormal endotracheal tube position was discussed with nursing staff a   muscle personally at 10:15 PM on 12/1/2017. XR CHEST PA LAT   Final Result   IMPRESSION:    1. No acute cardiopulmonary process evident by plain film imaging. CT HEAD WO CONT   Final Result   IMPRESSION:     1. Small right subdural hematoma measuring 4 mm which demonstrates intermediate   attenuation suggesting a subacute although recent subdural hematoma. The subacute appearing right subdural hematoma was discussed with Dr. Bradley Schmitz by   myself personally at 5:05 PM on 12/1/2017.                ASSESSMENT      Hospital Problems as of 12/21/2017  Date Reviewed: 12/20/2017          Codes Class Noted - Resolved POA    Acute hypoxemic respiratory failure ([facility] ICD-10-CM: J96.01  ICD-9-CM: 518.81  12/20/2017 - Present Unknown        Pneumonia due to infectious organism ICD-10-CM: J18.9  ICD-9-CM: 136.9, 484.8  12/20/2017 - Present Unknown        Delirium ICD-10-CM: R41.0  ICD-9-CM: 780.09  12/19/2017 - Present No        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  ICD-9-CM: 608.83  12/17/2017 - Present Unknown        Subdural hematoma ([facility] ICD-10-CM: I62.00  ICD-9-CM: 432.1  12/1/2017 - Present Yes        Type II diabetes mellitus with complication (HCC) (Chronic) ICD-10-CM: E11.8  ICD-9-CM: 250.90  12/1/2017 - Present Yes        Syncope ICD-10-CM: R55  ICD-9-CM: 780.2  12/1/2017 - Present Yes        Fasciitis ICD-10-CM: M72.9  ICD-9-CM: 729.4  12/1/2017 - Present Yes        RESOLVED: Acute respiratory failure ([facility] ICD-10-CM: J96.00  ICD-9-CM: 518.81  12/8/2017 - 12/15/2017 Yes        RESOLVED: Hypernatremia ICD-10-CM: E87.0  ICD-9-CM: 276.0  12/6/2017 - 12/15/2017 Yes        RESOLVED: Lactic acidosis ICD-10-CM: E87.2  ICD-9-CM: 276.2  12/2/2017 - 12/6/2017 Yes        RESOLVED: LAUREANO (acute kidney injury) ([facility] ICD-10-CM: N17.9  ICD-9-CM: 584.9  12/1/2017 - 12/8/2017 Yes        RESOLVED: Leukocytosis ICD-10-CM: M82.735  ICD-9-CM: 288.60  12/1/2017 - 12/8/2017 Yes        * (Principal)RESOLVED: Severe sepsis with septic shock [facility]) ICD-10-CM: A41.9, R65.21  ICD-9-CM: 038.9, 995.92, 785.52  12/1/2017 - 12/15/2017 Yes        RESOLVED: Electrolyte abnormality ICD-10-CM: E87.8  ICD-9-CM: 276.9  12/1/2017 - 12/8/2017 Yes                  Plan:  - HAP:  CXR with persistent L basilar infiltrate and new R infiltrate  Restarted broad spectrum abx on 12/20    - Acute hypoxic resp failure:  Remains on high flow NC  Pulm following, may need bronch today    - 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:  Initially debrided by Albert Dominique 12/1  Followed by [facility] for further debridement  Skin graft delayed, tentatively planned for tomorrow  Has completed 2 weeks of IV abx    - Subdural hematoma:  Non-surgical per NSGY    - Delirium:  Likely related to prolonged ICU stay  Continue to monitor closely  Appears to be slowly improving    - DM2:  BS uncontrolled  Continue half dose of lantus this AM as NPO  Continue SSI ACHS    - Dysphagia:  SLP following  MBS when appropriate  Pulled out NGT, will replace today after bronch    DVT Prophylaxis: Lovenox  Dispo: PT/OT following, PPD placed; CM following. Patient is an optimal candidate for LTACH as he will not only require wound care, colostomy management, PT/OT/SLP; but he will also continue to require daily input by pulmonology.     Paris Fletcher MD

## 2017-12-21 NOTE — ROUTINE PROCESS
END OF SHIFT NOTE:    INTAKE/OUTPUT  12/20 0701 - 12/21 0700  In: 557 [I.V.:832]  Out: 1585 [Urine:1025; Drains:500]  Voiding: YES  Catheter: YES  Drain:   Vacuum Assisted Closure Right Thigh (Active)   Vac Status Suction, continuous 12/20/2017 11:43 PM   Suction (mmHg) 125 12/20/2017 11:43 PM   Site Assessment Clean, dry, & intact 12/20/2017 11:43 PM   Dressing Status Clean, dry, & intact 12/20/2017 11:43 PM   Drainage Description Serosanguinous 12/20/2017 11:43 PM   Drainage Chamber Level (ml) 500 ml 12/21/2017  3:45 AM   Cannister Changed Yes 12/21/2017  3:45 AM   Output (ml) 50 ml 12/21/2017  3:45 AM       Colostomy 12/02/17 Upper Abdomen (Active)   Drainage Color Brown 12/20/2017 10:43 PM   Site Assessment Clean, dry, intact 12/20/2017 10:43 PM   Treatment Bag change; Wafer changed 12/19/2017 12:40 AM   Output (ml) 0 mL 12/21/2017  4:00 AM               Flatus: Patient does have flatus present. Stool:  1 occurrences. Characteristics:  Stool Assessment  Stool Color: Brown  Stool Appearance: Soft  Stool Amount: Smear  Stool Source/Status: Colostomy    Emesis: 0 occurrences. Characteristics:        VITAL SIGNS  Patient Vitals for the past 12 hrs:   Temp Pulse Resp BP SpO2   12/21/17 0715 - - - - 94 %   12/21/17 0425 - - - - 97 %   12/21/17 0400 98.6 °F (37 °C) 92 18 112/55 97 %   12/21/17 0132 - - - - 98 %   12/21/17 0000 98.9 °F (37.2 °C) 92 20 119/65 100 %       Pain Assessment  Pain Intensity 1: 0 (12/20/17 2342)  Pain Location 1: Buttocks  Pain Intervention(s) 1: Nurse notified  Patient Stated Pain Goal: 0    Ambulating  No    Shift report given to oncoming nurse at the bedside.     Belén Arnold LPN

## 2017-12-21 NOTE — PROCEDURES
Fiberoptic Bronchoscopy Procedure Note    IProcedure/Operative Note:    Procedure:  Fiberoptic Bronchoscopy (Therapeutic)    Indication:  atelectasis and mucous plugging    Summary:    After informed consent was obtained, the patient underwent fiberoptic bronchoscopy in the bronchoscopy suite. Versed 1 mg and fentanyl 25ugm was given to achieve adequate conscious sedation. Lidiocaine 100 mg was given to anesthetize the airways. The bronchoscope was advanced via mouth through an oral airway and advanced to the level of the vocal cords. Massive thick white/green secretions were present about the glottis which had to be extracted out. The vocal cords appeared normal.  Lidocaine 2% solution was used to further anesthetize the vocal cords. The bronchoscope was then passed through the cords into the trachea which appeared severely mucus impacted. The mucus had to be extracted from the airway and a long cast from the L lung was removed. The cathy was sharp. The segments of the RUL, RML, RLL, BARBARA, and LLL were sequentially inspected with the instillation 2% Lidocaine solution to minimize cough. Endobronchial anatomy was unremarkable with the exception of scattered bronchitis. Severe mucous plugging was evident primarily in the LLL and L mainstem with complete airway obstruction. Copious saline and Mucomyst was required to clear the airways. Biopsies were not obtained. Bronchial washings were sent for Gram stain with culture and sensitivity, Acid-fast bacillus smear and culture, fungal smear and culture and cytology. Oxygen saturations remained stable during the procedure. Blood pressure and pulse remained stable. Overall, the patient tolerated the procedure satisfactorily. EBL: none    Post Procedure Dx: Pneumonia with massive mucus plugging of LLL, L mainstem, trachea, and supraglottic structures.               Signed By: Mali Mederos MD

## 2017-12-21 NOTE — PROGRESS NOTES
Problem: Falls - Risk of  Goal: *Absence of Falls  Document Larry Fall Risk and appropriate interventions in the flowsheet.    Outcome: Progressing Towards Goal  Fall Risk Interventions:  Mobility Interventions: Communicate number of staff needed for ambulation/transfer, PT Consult for mobility concerns    Mentation Interventions: Adequate sleep, hydration, pain control, Reorient patient    Medication Interventions: Evaluate medications/consider consulting pharmacy    Elimination Interventions: Call light in reach    History of Falls Interventions: Door open when patient unattended

## 2017-12-21 NOTE — PROGRESS NOTES
Speech Pathology  ST attempted. However, pt off the floor for a bronch. Will continue to follow. Thank you.     [address], [address] CCC-SLP

## 2017-12-21 NOTE — PROGRESS NOTES
Date of Surgery Update:  Tiago Rosenthal was seen and examined. History and physical has been reviewed. The patient has been examined.  There have been no significant clinical changes since the completion of the originally dated progress note from earlier today    Signed By: Jamaal Boykin MD     December 21, 2017 10:45 AM

## 2017-12-21 NOTE — OP NOTES
OPERATIVE NOTE    Date of Procedure: 12/13/2017   Preoperative Diagnosis: gangrene right leg  Postoperative Diagnosis: gangrene right leg    Procedure(s):  Irrigation and DEBRIDEMENT RIGHT LEG 40 x 60 cm     Surgeon(s) and Role:     * Sarah Salinas MD - Primary      Prior to procedure informed consent obtained from patient's daughter follow discussion of risks, benefits and alternatives.  Patient taken to OR and position left lateral decubitus position. Surgical timeout performed.  Dressing removed and patient prepped. No additional areas of questionable skin. Some additional fascia excised to healthier appearing tissue. No overt purulence present. Wound irrigated with pulsavac and OR scrub brush.  Wound vac applied and secured with staples.      Pre Debridment Dimensions:             60x 40 cm x 6 cm deep  Post Debridement Dimensions:                    Same  Dimensions of material removed: 15 x 2 x 3-4 mm  Percentage of Wound:                                            100%  Material Removed:                                      Skin, subq, fascia  Frequency of Debridements:            To be determined by clinical response     Surgical Staff:  Circ-1: Norberto Cushing, RN  Scrub Tech-1: Cocopah Duke  Scrub Tech-2: Rin Calderon  Event Time In   Incision Start 1822   Incision Close 1858     Anesthesia: General   Estimated Blood Loss: 30 mls

## 2017-12-21 NOTE — PROGRESS NOTES
Mortimer Beal  Admission Date: 12/1/2017             Daily Progress Note: 12/21/2017    The patient's chart is reviewed and the patient is discussed with the staff. 80 yo WM known to [facility] Pulmonary from earlier this admission when admitted to ICU as follows:    Patient is a 79 y.o.  male seen and evaluated at the request of Dr. Tobi Kwok. This patient was admitted to the hospital after he had a syncopal episode. Upon evaluation in the emergency room he was found to have a small subdural hematoma on head CT but he was found to have very large abscess in the right thigh area extended from his right groin all the way to the right popliteal area. This required urgent surgical consultation. Patient was taken to the operating room and underwent drainage of the abscess area. Postoperatively he returned back to the intensive care unit intubated and mechanically ventilated. Currently he is sedated and on Levophed drip because of severe hypotension. Patient is does not see a primary care physician on a regular basis. No family is available at this point for any further history. 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 He underwent multiple debridements including a complex debridement of the R hip region. He was able to be extubated 12/14 and was transferred to the floor. Remained confused and new fever noted yesterday/last PM with CXR concerning for LLL pneumonia. He had been weaned to 2L but his oxygen requirement has now increased to 60%. Subjective:     Comfortable on Optiflow 75% with 50L flow. More awake and reports he is expectorating gray secretions. Bronch this AM pending. Afebrile overnight.      Current Facility-Administered Medications   Medication Dose Route Frequency    vancomycin (VANCOCIN) 1,000 mg in 0.9% sodium chloride (MBP/ADV) 250 mL  1 g IntraVENous Q12H    cefepime (MAXIPIME) 2 g in 0.9% sodium chloride (MBP/ADV) 100 mL ADV  2 g IntraVENous Q8H    [START ON 12/22/2017] vanc trough reminder   Other ONCE    albuterol (PROVENTIL VENTOLIN) nebulizer solution 2.5 mg  2.5 mg Nebulization Q4H RT    acetylcysteine (MUCOMYST) 100 mg/mL (10 %) nebulizer solution 300 mg  3 mL Nebulization QID RT    insulin glargine (LANTUS) injection 20 Units  20 Units SubCUTAneous DAILY    lip protectant (BLISTEX) ointment   Topical PRN    dextrose (D50W) injection syrg 12.5 g  25 mL IntraVENous PRN    HYDROmorphone (PF) (DILAUDID) injection 1 mg  1 mg IntraVENous Q4H PRN    enoxaparin (LOVENOX) injection 40 mg  40 mg SubCUTAneous Q24H    hydrALAZINE (APRESOLINE) 20 mg/mL injection 10 mg  10 mg IntraVENous Q4H PRN    alcohol 62% (NOZIN) nasal  1 Ampule  1 Ampule Topical Q12H    NUTRITIONAL SUPPORT ELECTROLYTE PRN ORDERS   Does Not Apply PRN    influenza vaccine 2017-18 (3 yrs+)(PF) (FLUZONE QUAD/FLUARIX QUAD) injection 0.5 mL  0.5 mL IntraMUSCular PRIOR TO DISCHARGE    insulin lispro (HUMALOG) injection   SubCUTAneous Q4H    pantoprazole (PROTONIX) 40 mg in sodium chloride 0.9 % 10 mL injection  40 mg IntraVENous Q24H    sodium chloride (NS) flush 5-10 mL  5-10 mL IntraVENous Q8H    sodium chloride (NS) flush 5-10 mL  5-10 mL IntraVENous PRN       Review of Systems    Constitutional: negative for fever, chills, sweats  Cardiovascular: negative for chest pain, palpitations, syncope, edema  Gastrointestinal:  negative for dysphagia, reflux, vomiting, diarrhea, abdominal pain, or melena  Neurologic:  negative for focal weakness, numbness, headache    Objective:     Vitals:    12/21/17 0400 12/21/17 0425 12/21/17 0715 12/21/17 0729   BP: 112/55   116/69   Pulse: 92   99   Resp: 18   18   Temp: 98.6 °F (37 °C)   98.2 °F (36.8 °C)   SpO2: 97% 97% 94% 98%   Weight:       Height:         Intake and Output:   12/19 1901 - 12/21 0700  In: 2534 [I.V.:1568]  Out: 3760 [Urine:2600; Drains:700]  12/21 0701 - 12/21 1900  In: -   Out: 200 [Urine:200]    Physical Exam:   Constitution:  the patient is well developed and in no acute distress  EENMT:  Sclera clear, pupils equal, oral mucosa moist  Respiratory: decreased BS bilaterally with rhonchi L> R  Cardiovascular:  RRR without M,G,R  Gastrointestinal: soft and non-tender; with positive bowel sounds. Musculoskeletal: warm without cyanosis. There is no lower leg edema. Skin:  no jaundice or rashes, legs wrapped. Neurologic: no gross neuro deficits     Psychiatric:  Arouses but not very interactive    CXR:         Rotated with evidence of LLL infiltrate and possibly some effusion    LAB  Recent Labs      12/21/17   0806  12/21/17   0406  12/20/17   2353  12/20/17   1935  12/20/17   1645   GLUCPOC  145*  195*  149*  163*  167*      Recent Labs      12/21/17   0510  12/20/17   0455  12/19/17   1007  12/19/17   0445   WBC  10.2  10.6  11.6*   --    HGB  7.4*  8.0*  10.1*  8.6*   HCT  24.5*  25.4*  32.5*   --    PLT  278  250  318   --      Recent Labs      12/21/17   0510  12/20/17   0455   NA  147*  142   K  3.8  3.2*   CL  113*  105   CO2  27  29   GLU  159*  204*   BUN  19  21   CREA  0.78*  1.03   MG   --   2.2   CA  8.1*  8.0*   PHOS  2.3   --    ALB  1.6*   --    TBILI  0.8   --    ALT  11*   --    SGOT  14*   --      Recent Labs      12/20/17   1350  12/19/17 2020   PH  7.53*  7.55*   PCO2  34*  28*   PO2  59*  55*   HCO3  28*  24     Recent Labs      12/20/17   1852   LAC  1.7     PCT yesterday: 0.4    Assessment:  (Medical Decision Making)     Hospital Problems  Date Reviewed: 12/17/2017          Codes Class Noted POA    Acute hypoxemic respiratory failure ([facility] [address] ICD-10-CM: J96.01  ICD-9-CM: 518.81  12/20/2017 Unknown    On 75% O2. Suspect obstruction to LLL with secretions.      Pneumonia due to infectious organism ICD-10-CM: J18.9  ICD-9-CM: 136.9, 484.8  12/20/2017 Unknown    HAP coverage in place    Delirium ICD-10-CM: R41.0  ICD-9-CM: 780.09  12/19/2017 No Multifactorial.     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  ICD-9-CM: 608.83  12/17/2017 Unknown    S/P multiple debridements. PCT low. Subdural hematoma (HCC) ICD-10-CM: I62.00  ICD-9-CM: 432.1  12/1/2017 Yes        Type II diabetes mellitus with complication (HCC) (Chronic) ICD-10-CM: E11.8  ICD-9-CM: 250.90  12/1/2017 Yes    Poor control at present    Syncope ICD-10-CM: R55  ICD-9-CM: 780.2  12/1/2017 Yes        Fasciitis ICD-10-CM: M72.9  ICD-9-CM: 729.4  12/1/2017 Yes              Plan:  (Medical Decision Making)     Bronch today. Wean oxygen as sats allow. Continue mucolytics and Vibralung. Further surgery planned. --    More than 50% of the time documented was spent in face-to-face contact with the patient and in the care of the patient on the floor/unit where the patient is located.     Valeria Noguera MD

## 2017-12-22 ENCOUNTER — APPOINTMENT (OUTPATIENT)
Dept: GENERAL RADIOLOGY | Age: 67
DRG: 853 | End: 2017-12-22
Attending: INTERNAL MEDICINE
Payer: MEDICARE

## 2017-12-22 PROBLEM — E87.0 HYPERNATREMIA: Status: ACTIVE | Noted: 2017-12-22

## 2017-12-22 LAB
ANION GAP SERPL CALC-SCNC: 6 MMOL/L (ref 7–16)
ARTERIAL PATENCY WRIST A: POSITIVE
BASE EXCESS BLDA CALC-SCNC: 0.8 MMOL/L (ref 0–3)
BASOPHILS # BLD: 0 K/UL (ref 0–0.2)
BASOPHILS NFR BLD: 0 % (ref 0–2)
BDY SITE: ABNORMAL
BUN SERPL-MCNC: 21 MG/DL (ref 8–23)
CALCIUM SERPL-MCNC: 8.3 MG/DL (ref 8.3–10.4)
CHLORIDE SERPL-SCNC: 114 MMOL/L (ref 98–107)
CO2 SERPL-SCNC: 29 MMOL/L (ref 21–32)
COHGB MFR BLD: 0.9 % (ref 0.5–1.5)
CREAT SERPL-MCNC: 0.69 MG/DL (ref 0.8–1.5)
DIFFERENTIAL METHOD BLD: ABNORMAL
DO-HGB BLD-MCNC: 6 % (ref 0–5)
EOSINOPHIL # BLD: 0 K/UL (ref 0–0.8)
EOSINOPHIL NFR BLD: 0 % (ref 0.5–7.8)
ERYTHROCYTE [DISTWIDTH] IN BLOOD BY AUTOMATED COUNT: 15.4 % (ref 11.9–14.6)
FIO2 ON VENT: 70 %
GAS FLOW.O2 O2 DELIVERY SYS: 50 L/MIN
GLUCOSE BLD STRIP.AUTO-MCNC: 134 MG/DL (ref 65–100)
GLUCOSE BLD STRIP.AUTO-MCNC: 134 MG/DL (ref 65–100)
GLUCOSE BLD STRIP.AUTO-MCNC: 142 MG/DL (ref 65–100)
GLUCOSE BLD STRIP.AUTO-MCNC: 152 MG/DL (ref 65–100)
GLUCOSE BLD STRIP.AUTO-MCNC: 188 MG/DL (ref 65–100)
GLUCOSE BLD STRIP.AUTO-MCNC: 192 MG/DL (ref 65–100)
GLUCOSE SERPL-MCNC: 121 MG/DL (ref 65–100)
HCO3 BLDA-SCNC: 24 MMOL/L (ref 22–26)
HCT VFR BLD AUTO: 29.9 % (ref 41.1–50.3)
HGB BLD-MCNC: 9 G/DL (ref 13.6–17.2)
HGB BLDMV-MCNC: 8.3 GM/DL (ref 11.7–15)
IMM GRANULOCYTES # BLD: 0 K/UL (ref 0–0.5)
IMM GRANULOCYTES NFR BLD AUTO: 0 % (ref 0–5)
LYMPHOCYTES # BLD: 1 K/UL (ref 0.5–4.6)
LYMPHOCYTES NFR BLD: 10 % (ref 13–44)
MAGNESIUM SERPL-MCNC: 2.3 MG/DL (ref 1.8–2.4)
MCH RBC QN AUTO: 27.8 PG (ref 26.1–32.9)
MCHC RBC AUTO-ENTMCNC: 30.1 G/DL (ref 31.4–35)
MCV RBC AUTO: 92.3 FL (ref 79.6–97.8)
METHGB MFR BLD: 0.7 % (ref 0–1.5)
MONOCYTES # BLD: 0.5 K/UL (ref 0.1–1.3)
MONOCYTES NFR BLD: 5 % (ref 4–12)
NEUTS SEG # BLD: 7.9 K/UL (ref 1.7–8.2)
NEUTS SEG NFR BLD: 85 % (ref 43–78)
OXYHGB MFR BLDA: 92.1 % (ref 94–97)
PCO2 BLDA: 30 MMHG (ref 35–45)
PH BLDA: 7.51 [PH] (ref 7.35–7.45)
PHOSPHATE SERPL-MCNC: 2.1 MG/DL (ref 2.3–3.7)
PLATELET # BLD AUTO: 263 K/UL (ref 150–450)
PMV BLD AUTO: 11.5 FL (ref 10.8–14.1)
PO2 BLDA: 68 MMHG (ref 80–105)
POTASSIUM SERPL-SCNC: 3.4 MMOL/L (ref 3.5–5.1)
RBC # BLD AUTO: 3.24 M/UL (ref 4.23–5.67)
SAO2 % BLD: 94 % (ref 92–98.5)
SODIUM SERPL-SCNC: 149 MMOL/L (ref 136–145)
VANCOMYCIN TROUGH SERPL-MCNC: 16.8 UG/ML (ref 5–20)
VENTILATION MODE VENT: ABNORMAL
WBC # BLD AUTO: 9.5 K/UL (ref 4.3–11.1)

## 2017-12-22 PROCEDURE — 74011636637 HC RX REV CODE- 636/637: Performed by: HOSPITALIST

## 2017-12-22 PROCEDURE — 77030018836 HC SOL IRR NACL ICUM -A: Performed by: SURGERY

## 2017-12-22 PROCEDURE — 74011250636 HC RX REV CODE- 250/636: Performed by: SURGERY

## 2017-12-22 PROCEDURE — P9047 ALBUMIN (HUMAN), 25%, 50ML: HCPCS | Performed by: INTERNAL MEDICINE

## 2017-12-22 PROCEDURE — 77030011640 HC PAD GRND REM COVD -A: Performed by: SURGERY

## 2017-12-22 PROCEDURE — 80202 ASSAY OF VANCOMYCIN: CPT | Performed by: INTERNAL MEDICINE

## 2017-12-22 PROCEDURE — 74011000250 HC RX REV CODE- 250: Performed by: INTERNAL MEDICINE

## 2017-12-22 PROCEDURE — 71010 XR CHEST PORT: CPT

## 2017-12-22 PROCEDURE — 74011250637 HC RX REV CODE- 250/637: Performed by: SURGERY

## 2017-12-22 PROCEDURE — 74011000250 HC RX REV CODE- 250

## 2017-12-22 PROCEDURE — 74011000258 HC RX REV CODE- 258: Performed by: INTERNAL MEDICINE

## 2017-12-22 PROCEDURE — 76010000149 HC OR TIME 1 TO 1.5 HR: Performed by: SURGERY

## 2017-12-22 PROCEDURE — 76210000017 HC OR PH I REC 1.5 TO 2 HR: Performed by: SURGERY

## 2017-12-22 PROCEDURE — 74011250636 HC RX REV CODE- 250/636: Performed by: INTERNAL MEDICINE

## 2017-12-22 PROCEDURE — 82803 BLOOD GASES ANY COMBINATION: CPT

## 2017-12-22 PROCEDURE — 99232 SBSQ HOSP IP/OBS MODERATE 35: CPT | Performed by: INTERNAL MEDICINE

## 2017-12-22 PROCEDURE — 36600 WITHDRAWAL OF ARTERIAL BLOOD: CPT

## 2017-12-22 PROCEDURE — 74011250636 HC RX REV CODE- 250/636

## 2017-12-22 PROCEDURE — 77030019952 HC CANSTR VAC ASST KCON -B

## 2017-12-22 PROCEDURE — 82962 GLUCOSE BLOOD TEST: CPT

## 2017-12-22 PROCEDURE — 77030008477 HC STYL SATN SLP COVD -A: Performed by: ANESTHESIOLOGY

## 2017-12-22 PROCEDURE — 74011000250 HC RX REV CODE- 250: Performed by: SURGERY

## 2017-12-22 PROCEDURE — 83735 ASSAY OF MAGNESIUM: CPT | Performed by: INTERNAL MEDICINE

## 2017-12-22 PROCEDURE — 65270000029 HC RM PRIVATE

## 2017-12-22 PROCEDURE — C9113 INJ PANTOPRAZOLE SODIUM, VIA: HCPCS | Performed by: SURGERY

## 2017-12-22 PROCEDURE — 0JBL0ZZ EXCISION OF RIGHT UPPER LEG SUBCUTANEOUS TISSUE AND FASCIA, OPEN APPROACH: ICD-10-PCS | Performed by: SURGERY

## 2017-12-22 PROCEDURE — 97530 THERAPEUTIC ACTIVITIES: CPT

## 2017-12-22 PROCEDURE — 74750000023 HC WOUND THERAPY

## 2017-12-22 PROCEDURE — 80048 BASIC METABOLIC PNL TOTAL CA: CPT | Performed by: INTERNAL MEDICINE

## 2017-12-22 PROCEDURE — 76060000033 HC ANESTHESIA 1 TO 1.5 HR: Performed by: SURGERY

## 2017-12-22 PROCEDURE — 77030008703 HC TU ET UNCUF COVD -A: Performed by: ANESTHESIOLOGY

## 2017-12-22 PROCEDURE — 84100 ASSAY OF PHOSPHORUS: CPT | Performed by: INTERNAL MEDICINE

## 2017-12-22 PROCEDURE — 85025 COMPLETE CBC W/AUTO DIFF WBC: CPT | Performed by: INTERNAL MEDICINE

## 2017-12-22 PROCEDURE — 77010033711 HC HIGH FLOW OXYGEN

## 2017-12-22 PROCEDURE — 74011250637 HC RX REV CODE- 250/637

## 2017-12-22 PROCEDURE — 94640 AIRWAY INHALATION TREATMENT: CPT

## 2017-12-22 PROCEDURE — 94760 N-INVAS EAR/PLS OXIMETRY 1: CPT

## 2017-12-22 RX ORDER — PROPOFOL 10 MG/ML
INJECTION, EMULSION INTRAVENOUS AS NEEDED
Status: DISCONTINUED | OUTPATIENT
Start: 2017-12-22 | End: 2017-12-22 | Stop reason: HOSPADM

## 2017-12-22 RX ORDER — DEXTROSE MONOHYDRATE 50 MG/ML
75 INJECTION, SOLUTION INTRAVENOUS CONTINUOUS
Status: DISCONTINUED | OUTPATIENT
Start: 2017-12-22 | End: 2017-12-23

## 2017-12-22 RX ORDER — LIDOCAINE HYDROCHLORIDE 20 MG/ML
INJECTION, SOLUTION EPIDURAL; INFILTRATION; INTRACAUDAL; PERINEURAL AS NEEDED
Status: DISCONTINUED | OUTPATIENT
Start: 2017-12-22 | End: 2017-12-22 | Stop reason: HOSPADM

## 2017-12-22 RX ORDER — FENTANYL CITRATE 50 UG/ML
INJECTION, SOLUTION INTRAMUSCULAR; INTRAVENOUS AS NEEDED
Status: DISCONTINUED | OUTPATIENT
Start: 2017-12-22 | End: 2017-12-22 | Stop reason: HOSPADM

## 2017-12-22 RX ORDER — ALBUTEROL SULFATE 90 UG/1
AEROSOL, METERED RESPIRATORY (INHALATION) AS NEEDED
Status: DISCONTINUED | OUTPATIENT
Start: 2017-12-22 | End: 2017-12-22 | Stop reason: HOSPADM

## 2017-12-22 RX ORDER — ALBUMIN HUMAN 250 G/1000ML
25 SOLUTION INTRAVENOUS ONCE
Status: COMPLETED | OUTPATIENT
Start: 2017-12-22 | End: 2017-12-28

## 2017-12-22 RX ORDER — GLYCOPYRROLATE 0.2 MG/ML
INJECTION INTRAMUSCULAR; INTRAVENOUS AS NEEDED
Status: DISCONTINUED | OUTPATIENT
Start: 2017-12-22 | End: 2017-12-22 | Stop reason: HOSPADM

## 2017-12-22 RX ORDER — SODIUM CHLORIDE, SODIUM LACTATE, POTASSIUM CHLORIDE, CALCIUM CHLORIDE 600; 310; 30; 20 MG/100ML; MG/100ML; MG/100ML; MG/100ML
INJECTION, SOLUTION INTRAVENOUS
Status: DISCONTINUED | OUTPATIENT
Start: 2017-12-22 | End: 2017-12-22 | Stop reason: HOSPADM

## 2017-12-22 RX ORDER — NEOSTIGMINE METHYLSULFATE 1 MG/ML
INJECTION INTRAVENOUS AS NEEDED
Status: DISCONTINUED | OUTPATIENT
Start: 2017-12-22 | End: 2017-12-22 | Stop reason: HOSPADM

## 2017-12-22 RX ORDER — ROCURONIUM BROMIDE 10 MG/ML
INJECTION, SOLUTION INTRAVENOUS AS NEEDED
Status: DISCONTINUED | OUTPATIENT
Start: 2017-12-22 | End: 2017-12-22 | Stop reason: HOSPADM

## 2017-12-22 RX ORDER — EPHEDRINE SULFATE 50 MG/ML
INJECTION, SOLUTION INTRAVENOUS AS NEEDED
Status: DISCONTINUED | OUTPATIENT
Start: 2017-12-22 | End: 2017-12-22 | Stop reason: HOSPADM

## 2017-12-22 RX ADMIN — VANCOMYCIN HYDROCHLORIDE 1000 MG: 1 INJECTION, POWDER, LYOPHILIZED, FOR SOLUTION INTRAVENOUS at 14:09

## 2017-12-22 RX ADMIN — INSULIN LISPRO 2 UNITS: 100 INJECTION, SOLUTION INTRAVENOUS; SUBCUTANEOUS at 20:35

## 2017-12-22 RX ADMIN — ROCURONIUM BROMIDE 10 MG: 10 INJECTION, SOLUTION INTRAVENOUS at 14:34

## 2017-12-22 RX ADMIN — Medication 10 ML: at 04:15

## 2017-12-22 RX ADMIN — ACETYLCYSTEINE 300 MG: 100 SOLUTION ORAL; RESPIRATORY (INHALATION) at 12:15

## 2017-12-22 RX ADMIN — FENTANYL CITRATE 50 MCG: 50 INJECTION, SOLUTION INTRAMUSCULAR; INTRAVENOUS at 14:34

## 2017-12-22 RX ADMIN — ALBUMIN (HUMAN) 25 G: 0.25 INJECTION, SOLUTION INTRAVENOUS at 11:45

## 2017-12-22 RX ADMIN — SODIUM CHLORIDE 40 MG: 9 INJECTION INTRAMUSCULAR; INTRAVENOUS; SUBCUTANEOUS at 17:30

## 2017-12-22 RX ADMIN — ALBUTEROL SULFATE 8 PUFF: 90 AEROSOL, METERED RESPIRATORY (INHALATION) at 14:51

## 2017-12-22 RX ADMIN — INSULIN LISPRO 2 UNITS: 100 INJECTION, SOLUTION INTRAVENOUS; SUBCUTANEOUS at 18:01

## 2017-12-22 RX ADMIN — DEXTROSE MONOHYDRATE 75 ML/HR: 5 INJECTION, SOLUTION INTRAVENOUS at 11:43

## 2017-12-22 RX ADMIN — EPHEDRINE SULFATE 10 MG: 50 INJECTION, SOLUTION INTRAVENOUS at 14:42

## 2017-12-22 RX ADMIN — GLYCOPYRROLATE 0.4 MG: 0.2 INJECTION INTRAMUSCULAR; INTRAVENOUS at 15:03

## 2017-12-22 RX ADMIN — SODIUM CHLORIDE 2 G: 900 INJECTION, SOLUTION INTRAVENOUS at 04:15

## 2017-12-22 RX ADMIN — NEOSTIGMINE METHYLSULFATE 3 MG: 1 INJECTION INTRAVENOUS at 15:03

## 2017-12-22 RX ADMIN — SODIUM CHLORIDE 2 G: 900 INJECTION, SOLUTION INTRAVENOUS at 20:34

## 2017-12-22 RX ADMIN — LIDOCAINE HYDROCHLORIDE 100 MG: 20 INJECTION, SOLUTION EPIDURAL; INFILTRATION; INTRACAUDAL; PERINEURAL at 14:34

## 2017-12-22 RX ADMIN — ACETYLCYSTEINE 300 MG: 100 SOLUTION ORAL; RESPIRATORY (INHALATION) at 07:28

## 2017-12-22 RX ADMIN — SODIUM CHLORIDE, SODIUM LACTATE, POTASSIUM CHLORIDE, CALCIUM CHLORIDE: 600; 310; 30; 20 INJECTION, SOLUTION INTRAVENOUS at 14:14

## 2017-12-22 RX ADMIN — Medication 1 AMPULE: at 20:34

## 2017-12-22 RX ADMIN — PROPOFOL 90 MG: 10 INJECTION, EMULSION INTRAVENOUS at 14:34

## 2017-12-22 RX ADMIN — ALBUTEROL SULFATE 2.5 MG: 2.5 SOLUTION RESPIRATORY (INHALATION) at 04:00

## 2017-12-22 RX ADMIN — Medication 1 AMPULE: at 09:17

## 2017-12-22 RX ADMIN — ALBUTEROL SULFATE 2.5 MG: 2.5 SOLUTION RESPIRATORY (INHALATION) at 00:24

## 2017-12-22 RX ADMIN — ALBUTEROL SULFATE 2.5 MG: 2.5 SOLUTION RESPIRATORY (INHALATION) at 07:28

## 2017-12-22 RX ADMIN — ALBUTEROL SULFATE 2.5 MG: 2.5 SOLUTION RESPIRATORY (INHALATION) at 19:32

## 2017-12-22 RX ADMIN — Medication 10 ML: at 20:36

## 2017-12-22 RX ADMIN — SODIUM CHLORIDE 2 G: 900 INJECTION, SOLUTION INTRAVENOUS at 11:46

## 2017-12-22 RX ADMIN — ACETYLCYSTEINE 300 MG: 100 SOLUTION ORAL; RESPIRATORY (INHALATION) at 19:32

## 2017-12-22 RX ADMIN — VANCOMYCIN HYDROCHLORIDE 1000 MG: 1 INJECTION, POWDER, LYOPHILIZED, FOR SOLUTION INTRAVENOUS at 01:53

## 2017-12-22 RX ADMIN — Medication 10 ML: at 17:30

## 2017-12-22 RX ADMIN — ALBUTEROL SULFATE 2.5 MG: 2.5 SOLUTION RESPIRATORY (INHALATION) at 12:15

## 2017-12-22 NOTE — PROGRESS NOTES
Will Gutierrez  Admission Date: 12/1/2017             Daily Progress Note: 12/22/2017    The patient's chart is reviewed and the patient is discussed with the staff. 78 yo WM known to [facility] Pulmonary from earlier this admission when admitted to ICU as follows:    Patient is a 79 y.o.  male seen and evaluated at the request of Dr. John Lynn. This patient was admitted to the hospital after he had a syncopal episode. Upon evaluation in the emergency room he was found to have a small subdural hematoma on head CT but he was found to have very large abscess in the right thigh area extended from his right groin all the way to the right popliteal area. This required urgent surgical consultation. Patient was taken to the operating room and underwent drainage of the abscess area. Postoperatively he returned back to the intensive care unit intubated and mechanically ventilated. Currently he is sedated and on Levophed drip because of severe hypotension. Patient is does not see a primary care physician on a regular basis. No family is available at this point for any further history. 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 He underwent multiple debridements including a complex debridement of the R hip region. He was able to be extubated 12/14 and was transferred to the floor. Remained confused and new fever noted yesterday/last PM with CXR concerning for LLL pneumonia. He had been weaned to 2L but his oxygen requirement has now increased to 60%. Subjective: Weaned to 35% O2.on Airvo. Cannula not even in nares. Will change to a HFNC. Surgery planned for today.     Current Facility-Administered Medications   Medication Dose Route Frequency    vancomycin (VANCOCIN) 1,000 mg in 0.9% sodium chloride (MBP/ADV) 250 mL  1 g IntraVENous Q12H    cefepime (MAXIPIME) 2 g in 0.9% sodium chloride (MBP/ADV) 100 mL ADV  2 g IntraVENous Q8H    vanc trough reminder   Other ONCE    lidocaine (XYLOCAINE) 4 % (40 mg/mL) topical solution   Topical PRN    lidocaine (XYLOCAINE) 2 % jelly   Mucous Membrane PRN    albuterol (PROVENTIL VENTOLIN) nebulizer solution 2.5 mg  2.5 mg Nebulization Q4H RT    acetylcysteine (MUCOMYST) 100 mg/mL (10 %) nebulizer solution 300 mg  3 mL Nebulization QID RT    insulin glargine (LANTUS) injection 20 Units  20 Units SubCUTAneous DAILY    lip protectant (BLISTEX) ointment   Topical PRN    dextrose (D50W) injection syrg 12.5 g  25 mL IntraVENous PRN    HYDROmorphone (PF) (DILAUDID) injection 1 mg  1 mg IntraVENous Q4H PRN    enoxaparin (LOVENOX) injection 40 mg  40 mg SubCUTAneous Q24H    hydrALAZINE (APRESOLINE) 20 mg/mL injection 10 mg  10 mg IntraVENous Q4H PRN    alcohol 62% (NOZIN) nasal  1 Ampule  1 Ampule Topical Q12H    NUTRITIONAL SUPPORT ELECTROLYTE PRN ORDERS   Does Not Apply PRN    influenza vaccine 2017-18 (3 yrs+)(PF) (FLUZONE QUAD/FLUARIX QUAD) injection 0.5 mL  0.5 mL IntraMUSCular PRIOR TO DISCHARGE    insulin lispro (HUMALOG) injection   SubCUTAneous Q4H    pantoprazole (PROTONIX) 40 mg in sodium chloride 0.9 % 10 mL injection  40 mg IntraVENous Q24H    sodium chloride (NS) flush 5-10 mL  5-10 mL IntraVENous Q8H    sodium chloride (NS) flush 5-10 mL  5-10 mL IntraVENous PRN       Review of Systems    Constitutional: negative for fever, chills, sweats  Cardiovascular: negative for chest pain, palpitations, syncope, edema  Gastrointestinal:  negative for dysphagia, reflux, vomiting, diarrhea, abdominal pain, or melena  Neurologic:  negative for focal weakness, numbness, headache    Objective:     Vitals:    12/22/17 0030 12/22/17 0400 12/22/17 0728 12/22/17 0753   BP: 129/78   126/74   Pulse: (!) 109   100   Resp: 18   20   Temp: 98.2 °F (36.8 °C)   99.4 °F (37.4 °C)   SpO2: (!) 85% 94% 94% 98%   Weight:       Height:         Intake and Output:   12/20 1901 - 12/22 0700  In: 832 [I.V.:832]  Out: 1975 [Urine:1250; Drains:400]       Physical Exam:   Constitution:  the patient is well developed and in no acute distress  EENMT:  Sclera clear, pupils equal, oral mucosa moist  Respiratory: decreased BS bilaterally L > R; minimal rhonchi  Cardiovascular:  RRR without M,G,R  Gastrointestinal: soft and non-tender; with positive bowel sounds. Musculoskeletal: warm without cyanosis. There is no lower leg edema. Skin:  no jaundice or rashes, legs wrapped. Neurologic: no gross neuro deficits     Psychiatric:  Awake but not very interactive    CXR:     12/22:        Improved aeration to LLL    12/20:        Rotated with evidence of LLL infiltrate and possibly some effusion    LAB  Recent Labs      12/22/17   0608  12/22/17   0413  12/22/17   0007  12/21/17 2010 12/21/17   1615   GLUCPOC  134*  134*  142*  130*  140*      Recent Labs      12/22/17   0447  12/21/17   0510  12/20/17   0455  12/19/17   1007   WBC  9.5  10.2  10.6  11.6*   HGB  9.0*  7.4*  8.0*  10.1*   HCT  29.9*  24.5*  25.4*  32.5*   PLT  263  278  250  318     Recent Labs      12/22/17   0447  12/21/17   0510  12/20/17   0455   NA  149*  147*  142   K  3.4*  3.8  3.2*   CL  114*  113*  105   CO2  29  27  29   GLU  121*  159*  204*   BUN  21  19  21   CREA  0.69*  0.78*  1.03   MG  2.3   --   2.2   CA  8.3  8.1*  8.0*   PHOS  2.1*  2.3   --    ALB   --   1.6*   --    TBILI   --   0.8   --    ALT   --   11*   --    SGOT   --   14*   --      Recent Labs      12/20/17   1350  12/19/17 2020   PH  7.53*  7.55*   PCO2  34*  28*   PO2  59*  55*   HCO3  28*  24     Recent Labs      12/20/17   1852   LAC  1.7     PCT last: 0.4    Bronch wash: light GNR so far    Assessment:  (Medical Decision Making)     Hospital Problems  Date Reviewed: 12/20/2017          Codes Class Noted POA    Mucus plugging of bronchi ICD-10-CM: J98.09  ICD-9-CM: 519.19  12/21/2017 Unknown    Severe but cleared with bronchoscopy 12/21.     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 Surgery today. Needs free water; D5W. Continue ATB; await cx results   Mobilize when able. --    More than 50% of the time documented was spent in face-to-face contact with the patient and in the care of the patient on the floor/unit where the patient is located.     Savanna Camacho MD

## 2017-12-22 NOTE — ANESTHESIA POSTPROCEDURE EVALUATION
Post-Anesthesia Evaluation and Assessment    Patient: Aline Carrero MRN: [id number]  SSN: [id number]    YOB: 1950  Age: 79 y.o. Sex: male       Cardiovascular Function/Vital Signs  Visit Vitals    /64    Pulse 90    Temp 36.6 °C (97.9 °F)    Resp 27    Ht 5' 11\" (1.803 m)    Wt 75.5 kg (166 lb 6.4 oz)    SpO2 96%    BMI 23.21 kg/m2       Patient is status post general anesthesia for Procedure(s):   DEBRIDEMENT RIGHT LEG WOUND. Nausea/Vomiting: None    Postoperative hydration reviewed and adequate. Pain:  Pain Scale 1: Visual (12/22/17 1532)  Pain Intensity 1: 0 (12/22/17 1532)   Managed    Neurological Status:   Neuro  Neurologic State: Drowsy; Eyes open to voice (12/22/17 1532)  Orientation Level: Oriented to person (12/22/17 1532)  Cognition: Follows commands (12/22/17 1532)  Speech:  (Barely audible) (12/22/17 1532)  Assessment L Pupil: Round;Sluggish (12/16/17 0659)  Size L Pupil (mm): 3 (12/16/17 0659)  Assessment R Pupil: Round;Sluggish (12/16/17 0659)  Size R Pupil (mm): 3 (12/16/17 0659)  LUE Motor Response: Purposeful;Weak (12/22/17 1532)  LLE Motor Response: Purposeful;Weak (wiggles toes) (12/22/17 1532)  RUE Motor Response: Purposeful;Weak (12/22/17 1532)  RLE Motor Response: Purposeful;Weak (wiggles toes) (12/22/17 1532)   At baseline    Mental Status and Level of Consciousness: Arousable    Pulmonary Status:   O2 Device: Oxygen mask (12/22/17 1532)   Adequate oxygenation and airway patent    Complications related to anesthesia: None    Post-anesthesia assessment completed.  No concerns    Signed By: Paul Masters MD     December 22, 2017

## 2017-12-22 NOTE — PROGRESS NOTES
TRANSFER - OUT REPORT:    Verbal report given to Melina JUAREZ(name) on [facility]  being transferred to [facility](unit) for ordered procedure       Report consisted of patients Situation, Background, Assessment and   Recommendations(SBAR). Information from the following report(s) SBAR was reviewed with the receiving nurse. Lines:   Quad Lumen placed in procedure, documented incorrectly as a double lumen 12/01/17 Right Internal jugular (Active)   Central Line Being Utilized Yes 12/21/2017  9:00 PM   Criteria for Appropriate Use Limited/no vessel suitable for conventional peripheral access 12/21/2017  9:00 PM   Site Assessment Clean, dry, & intact 12/21/2017  9:00 PM   Infiltration Assessment 0 12/21/2017  9:00 PM   Affected Extremity/Extremities Color distal to insertion site pink (or appropriate for race) 12/21/2017  9:00 AM   Date of Last Dressing Change 12/20/17 12/21/2017  9:00 PM   Dressing Status Dry;Loose 12/21/2017  9:00 PM   Dressing Type Disk with Chlorhexadine gluconate (CHG); Stabilization/securement device;Transparent; Other (comments) 12/21/2017  9:00 PM   Action Taken Dressing changed 12/20/2017  2:50 PM   Proximal Hub Color/Line Status Capped 12/20/2017 11:43 PM   Positive Blood Return (Medial Site) Yes 12/21/2017  9:00 PM   Medial 1 Hub Color/Line Status Capped 12/20/2017 11:43 PM   Positive Blood Return (Lateral Site) Yes 12/21/2017  9:00 PM   Medial 2 Hub Color/Line Status Capped 12/20/2017 11:43 PM   Positive Blood Return (Site #3) Yes 12/21/2017  9:00 PM   Distal Hub Color/Line Status Capped 12/20/2017 11:43 PM   Positive Blood Return (Site #4) Yes 12/21/2017  9:00 PM   Alcohol Cap Used No 12/16/2017  6:59 AM        Opportunity for questions and clarification was provided.       Patient transported with:   O2 @ 8 liters  Tech

## 2017-12-22 NOTE — PROGRESS NOTES
Problem: Mobility Impaired (Adult and Pediatric)  Goal: *Acute Goals and Plan of Care (Insert Text)  STG:  (1.)Mr. Andreea [place] will move from supine to sit and sit to supine , scoot up and down and roll side to side with MAXIMAL ASSIST within 3 day(s). (2.)Mr. Andreea [place] will transfer from bed to chair and chair to bed with MINIMAL ASSIST using the least restrictive device within 3 day(s). (3.)Mr. Andreea [place] will ambulate with MAXIMAL ASSIST for 15 feet with the least restrictive device within 3 day(s). (4.)Mr. Andreea [place] will participate in therapeutic activity/exerices x 12 minutes for increased strength within 3 days. LTG:  (1.)Mr. Andreea [place] will move from supine to sit and sit to supine , scoot up and down and roll side to side in bed with MINIMAL ASSIST within 7 day(s). (2.)Mr. Andreea [place] will transfer from bed to chair and chair to bed with MINIMAL ASSIST using the least restrictive device within 7 day(s). (3.)Mr. Andreea [place] will ambulate with MINIMAL ASSIST for 75 feet with the least restrictive device within 7 day(s). (4.)Mr. Andreea [place] will participate in therapeutic activity/exerices x 23 minutes for increased strength within 7 days.     ________________________________________________________________________________________________      PHYSICAL THERAPY: Daily Note, Treatment Day: 2nd, AM 12/22/2017  INPATIENT: Hospital Day: 22  Payor: Mariely Villalta / Plan: [address] [place] [address] HMO / Product Type: [employer] Care Medicare /    *SEE ASSESSMENT NOTE*     NAME/AGE/GENDER: Sukhi Hernández is a 79 y.o. 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 ASSESSMENT:     Mr. Nikki Beck is supine in bed on arrival.  He is agreeable to PT by shaking his head yes. Pt sat EOB with  mod assist for balance. Pt had posterior lean while sitting. Pt tolerated sitting x 13 minutes. Pt returned to supine with pillows in place for comfort. Main limitation is poor sitting balance. Poor awarenness pt is leaning bwd, but was able to hold self in balanced position x3 for at most 40sec. Pt was unable to perform there ex with R LE. Pt did not speak, only nod head and had eyes closed most of treatment, despite cues to keep open. Pt left supine with HOB elevated due to tube feeding. Slow progression towards goals. This section established at most recent assessment   PROBLEM LIST (Impairments causing functional limitations):  1. Decreased Strength  2. Decreased ADL/Functional Activities  3. Decreased Transfer Abilities  4. Decreased Ambulation Ability/Technique  5. Decreased Balance  6. Increased Pain  7. Decreased Activity Tolerance   INTERVENTIONS PLANNED: (Benefits and precautions of physical therapy have been discussed with the patient.)  1. Balance Exercise  2. Bed Mobility  3. Family Education  4. Gait Training  5. Neuromuscular Re-education/Strengthening  6. Range of Motion (ROM)  7. Therapeutic Activites  8. Therapeutic Exercise/Strengthening  9. Transfer Training  10. Group Therapy     TREATMENT PLAN: Frequency/Duration: 3 times a week for duration of hospital stay  Rehabilitation Potential For Stated Goals: Good     RECOMMENDED REHABILITATION/EQUIPMENT: (at time of discharge pending progress): Due to the probability of continued deficits (see above) this patient will likely need continued skilled physical therapy after discharge.   Equipment:    None at this time              HISTORY:   History of Present Injury/Illness (Reason for Referral):  Per H&P: \"  Past Medical History/Comorbidities:   Mr. Octaviano Combs  has a past medical history of Diabetes ([facility] [address] Mr. Octaviano Combs  has no past surgical history on file. Social History/Living Environment:   Home Environment: Apartment  # Steps to Enter: 0  One/Two Story Residence: One story  Living Alone: Yes  Support Systems: Family member(s)  Patient Expects to be Discharged to[de-identified] Unknown  Current DME Used/Available at Home: Walker, rollator, Cane, straight  Prior Level of Function/Work/Activity:  Per granddaughter he was independent with ADLs and used a cane for ambulation with recent history of falls. Also pt was living alone. Number of Personal Factors/Comorbidities that affect the Plan of Care:  Diabetes 1-2: MODERATE COMPLEXITY   EXAMINATION:   Most Recent Physical Functioning:   Gross Assessment:                  Posture:     Balance:  Sitting: Impaired  Sitting - Static: Poor (constant support)  Sitting - Dynamic: Poor (constant support) Bed Mobility:  Rolling: Maximum assistance  Supine to Sit: Maximum assistance  Sit to Supine: Maximum assistance  Scooting: Maximum assistance  Wheelchair Mobility:     Transfers:     Gait:            Body Structures Involved:  1. Nerves  2. Voice/Speech  3. Metabolic  4. Muscles Body Functions Affected:  1. Mental  2. Sensory/Pain  3. Voice and Speech  4. Neuromusculoskeletal  5. Movement Related  6. Skin Related  7. Metobolic/Endocrine Activities and Participation Affected:  1. General Tasks and Demands  2. Communication  3. Mobility  4. Self Care  5. Domestic Life  6. Interpersonal Interactions and Relationships  7.  Community, Social and [place]   Number of elements that affect the Plan of Care: 4+: HIGH COMPLEXITY   CLINICAL PRESENTATION:   Presentation: Evolving clinical presentation with changing clinical characteristics: MODERATE COMPLEXITY   CLINICAL DECISION MAKIN [facility] Inpatient Short Form  How much difficulty does the patient currently have... Unable A Lot A Little None   1. Turning over in bed (including adjusting bedclothes, sheets and blankets)? [] 1   [x] 2   [] 3   [] 4   2. Sitting down on and standing up from a chair with arms ( e.g., wheelchair, bedside commode, etc.)   [x] 1   [] 2   [] 3   [] 4   3. Moving from lying on back to sitting on the side of the bed? [x] 1   [] 2   [] 3   [] 4   How much help from another person does the patient currently need. .. Total A Lot A Little None   4. Moving to and from a bed to a chair (including a wheelchair)? [x] 1   [] 2   [] 3   [] 4   5. Need to walk in hospital room? [x] 1   [] 2   [] 3   [] 4   6. Climbing 3-5 steps with a railing? [x] 1   [] 2   [] 3   [] 4   © 2007, Trustees of [address] [place] [address] [address] [address], under license to [employer]. All rights reserved      Score:  Initial: 7 Most Recent: X (Date: -- )    Interpretation of Tool:  Represents activities that are increasingly more difficult (i.e. Bed mobility, Transfers, Gait). Score 24 23 22-20 19-15 14-10 9-7 6     Modifier CH CI CJ CK CL CM CN      ? Mobility - Walking and Moving Around:     - CURRENT STATUS: CM - 80%-99% impaired, limited or restricted    - GOAL STATUS: CL - 60%-79% impaired, limited or restricted    - D/C STATUS:  ---------------To be determined---------------  Payor: [employer] MEDICARE / Plan: [address] [place] [address] HMO / Product Type: Managed Care Medicare /      Medical Necessity:     · Patient demonstrates good rehab potential due to higher previous functional level. Reason for Services/Other Comments:  · Patient continues to require skilled intervention due to decreased strength, balance, and activity tolerance.    Use of outcome tool(s) and clinical judgement create a POC that gives a: Questionable prediction of patient's progress: MODERATE COMPLEXITY            TREATMENT:   (In addition to Assessment/Re-Assessment sessions the following treatments were rendered)   Pre-treatment Symptoms/Complaints:  No complaints of pain  Pain: Initial:   Pain Intensity 1: 0  Post Session:  0     Therapeutic Activity: (    25 minutes): Therapeutic activities including Bed transfers to improve mobility, strength and balance. Required minimal   to promote static balance in sitting. Braces/Orthotics/Lines/Etc:   · IV  · kathleen catheter  · wound vac  · O2 Device: Nasal cannula  Treatment/Session Assessment:    · Response to Treatment:  Pt tolerated poorly due to increased pain with movement. · Interdisciplinary Collaboration:   o Registered Nurse  o Rehabilitation Attendant  · After treatment position/precautions:   o Supine in bed  o Bed/Chair-wheels locked  o Bed in low position  o RN notified  o Side rails x 3   · Compliance with Program/Exercises: Will assess as treatment progresses. · Recommendations/Intent for next treatment session: \"Next visit will focus on advancements to more challenging activities and reduction in assistance provided\".   Total Treatment Duration:PT Patient Time In/Time Out  Time In: 0339  Time Out: [address] [facility], DPT

## 2017-12-22 NOTE — PERIOP NOTES
TRANSFER - IN REPORT:    Verbal report received from Dayna Miller RN on [facility]  being received from [facility](unit) for routine progression of care      Report consisted of patients Situation, Background, Assessment and   Recommendations(SBAR). Information from the following report(s) SBAR was reviewed with the receiving nurse. Opportunity for questions and clarification was provided. Assessment completed upon patients arrival to unit and care assumed.

## 2017-12-22 NOTE — PROGRESS NOTES
Spoke with Yolanda Avelar 81Karina in preop, will hold Lantus per Dr. Taj Barrett. Will continue to monitor and assess.

## 2017-12-22 NOTE — PROGRESS NOTES
Pharmacokinetic Consult to Pharmacist    Emilee Arnold is a 79 y.o. male being treated for HAP with cefepime and vancomycin. Height: 5' 11\" (180.3 cm)  Weight: 75.5 kg (166 lb 6.4 oz)  Lab Results   Component Value Date/Time    BUN 21 12/22/2017 04:47 AM    Creatinine 0.69 12/22/2017 04:47 AM    WBC 9.5 12/22/2017 04:47 AM    Procalcitonin 0.4 12/20/2017 02:38 PM    Lactic acid 1.7 12/20/2017 06:52 PM    Lactic Acid (POC) 4.8 12/01/2017 06:10 PM      Estimated Creatinine Clearance: 110.6 mL/min (based on Cr of 0.69). Lab Results   Component Value Date/Time    Vancomycin,trough 16.8 12/22/2017 01:09 PM       Day 4 of vancomycin. Goal trough is 15-20. Trough is therapeutic. Continue current dose of 1000 mg IV q12h. Further levels will be ordered as clinically indicated. Pharmacy will continue to follow. Please call with any questions.     Thank you,  Porfirio Stack, PharmD  Clinical Pharmacist  [phone]

## 2017-12-22 NOTE — PROGRESS NOTES
Problem: Falls - Risk of  Goal: *Absence of Falls  Document Larry Fall Risk and appropriate interventions in the flowsheet.    Outcome: Progressing Towards Goal  Fall Risk Interventions:  Mobility Interventions: Communicate number of staff needed for ambulation/transfer    Mentation Interventions: Adequate sleep, hydration, pain control    Medication Interventions: Evaluate medications/consider consulting pharmacy    Elimination Interventions: Call light in reach    History of Falls Interventions: Door open when patient unattended

## 2017-12-22 NOTE — PERIOP NOTES
TRANSFER - OUT REPORT:    Verbal report given to Mallory Leon on [facility]  being transferred to [facility] for routine post - op       Report consisted of patients Situation, Background, Assessment and   Recommendations(SBAR). Information from the following report(s) Procedure Summary, Intake/Output, MAR and Recent Results was reviewed with the receiving nurse. Lines:   Quad Lumen placed in procedure, documented incorrectly as a double lumen 12/01/17 Right Internal jugular (Active)   Central Line Being Utilized Yes 12/22/2017  3:59 PM   Criteria for Appropriate Use Limited/no vessel suitable for conventional peripheral access 12/21/2017  9:00 PM   Site Assessment Clean, dry, & intact 12/22/2017  3:59 PM   Infiltration Assessment 0 12/22/2017  3:59 PM   Affected Extremity/Extremities Color distal to insertion site pink (or appropriate for race) 12/22/2017  3:59 PM   Date of Last Dressing Change 12/20/17 12/21/2017  9:00 PM   Dressing Status Clean, dry, & intact 12/22/2017  3:59 PM   Dressing Type Tape;Transparent 12/22/2017  3:59 PM   Action Taken Dressing changed 12/20/2017  2:50 PM   Proximal Hub Color/Line Status Capped 12/20/2017 11:43 PM   Positive Blood Return (Medial Site) Yes 12/21/2017  9:00 PM   Medial 1 Hub Color/Line Status Capped 12/20/2017 11:43 PM   Positive Blood Return (Lateral Site) Yes 12/21/2017  9:00 PM   Medial 2 Hub Color/Line Status Capped 12/20/2017 11:43 PM   Positive Blood Return (Site #3) Yes 12/21/2017  9:00 PM   Distal Hub Color/Line Status Capped 12/20/2017 11:43 PM   Positive Blood Return (Site #4) Yes 12/21/2017  9:00 PM   Alcohol Cap Used No 12/16/2017  6:59 AM        Opportunity for questions and clarification was provided. Patient transported with:   O2 @ 8 liters  Tech    VTE prophylaxis orders have been written for [facility].    Patient and family given floor number and nurses name. Family updated re: pt status after security code verified.

## 2017-12-22 NOTE — PROGRESS NOTES
Speech Pathology  ST attempted. However, pt going off the floor for surgery. Will continue to follow. Thank you.     [address], [address] CCC-SLP

## 2017-12-22 NOTE — PROGRESS NOTES
Surgery planned for today. Has had increasing 02 requirements, CXR and exam suggestive of pneumonia. VAC has been in place for a week and patient unable to tolerate change on floor. Visit Vitals    /64    Pulse 99    Temp 98.8 °F (37.1 °C)    Resp 24    Ht 5' 11\" (1.803 m)    Wt 75.5 kg (166 lb 6.4 oz)    SpO2 99%    BMI 23.21 kg/m2     Confused, responds to stimulus and follows commands. Tachy  Resp labored, coarse bilaterally. Abd soft, ostomy productive    Will plan for OR today to change dressing but given pulm issues, need for prone positioning to SG will delay grafting.

## 2017-12-22 NOTE — PROGRESS NOTES
Hospitalist Progress Note    2017  Admit Date: 2017  2:54 PM   NAME: Lazarus North   :  1950   MRN:  [id number]   Attending: Carlos Enrique Barbosa MD  PCP:  None    SUBJECTIVE:     Lazarus North is a 84YTE with no previous known medical hx who was admitted on  with a syncopal episode. CT head with a SDH, no intervention per NSGY. 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 Was taken to the OR for debridement on  Mamadou Matta) and has been followed by [facility] for further wound debridement. Extubated on  and transferred out of ICU. Was supposed to have skin grafting with Dr. Oli Fisher, but had to be cancelled 2/2 worsening resp status. Pulm following, s/p bronch. : afebrile overnight. Hypoxia improving, but remains on high flow. follows commands. Review of Systems negative with exception of pertinent positives noted above      PHYSICAL EXAM       Visit Vitals    /74    Pulse 100    Temp 99.4 °F (37.4 °C)    Resp 20    Ht 5' 11\" (1.803 m)    Wt 75.5 kg (166 lb 6.4 oz)    SpO2 98%    BMI 23.21 kg/m2      Temp (24hrs), Av.4 °F (36.9 °C), Min:97.7 °F (36.5 °C), Max:99.4 °F (37.4 °C)    Oxygen Therapy  O2 Sat (%): 98 % (17 0753)  Pulse via Oximetry: 88 beats per minute (17 0400)  O2 Device: Heated; Hi flow nasal cannula (17)  O2 Flow Rate (L/min): 45 l/min (17)  O2 Temperature: 87.8 °F (31 °C) (17 0024)  FIO2 (%): 35 % (17)    Intake/Output Summary (Last 24 hours) at 17 0966  Last data filed at 17 1900   Gross per 24 hour   Intake                0 ml   Output              925 ml   Net             -925 ml          General: No acute distress. Head:  Atraumatic Normocephalic.   ENT:  High flow NC in place  Lungs:  Coarse lung sounds in upper airways  CVS:  RRR  Abdomen: Soft, ND/NTTP, +BS  MSK:  Boots on feet bilaterally  Neurologic:  Follows commands x 4      Recent Results (from the past 24 hour(s))   CULTURE, RESPIRATORY/SPUTUM/BRONCH W GRAM STAIN    Collection Time: 12/21/17 11:30 AM   Result Value Ref Range    Special Requests: NO SPECIAL REQUESTS      GRAM STAIN PENDING     Culture result: LIGHT GRAM NEGATIVE RODS SUBCULTURE IN PROGRESS (A)     GLUCOSE, POC    Collection Time: 12/21/17  1:20 PM   Result Value Ref Range    Glucose (POC) 162 (H) 65 - 100 mg/dL   GLUCOSE, POC    Collection Time: 12/21/17  4:15 PM   Result Value Ref Range    Glucose (POC) 140 (H) 65 - 100 mg/dL   GLUCOSE, POC    Collection Time: 12/21/17  8:10 PM   Result Value Ref Range    Glucose (POC) 130 (H) 65 - 100 mg/dL   GLUCOSE, POC    Collection Time: 12/22/17 12:07 AM   Result Value Ref Range    Glucose (POC) 142 (H) 65 - 100 mg/dL   GLUCOSE, POC    Collection Time: 12/22/17  4:13 AM   Result Value Ref Range    Glucose (POC) 134 (H) 65 - 100 mg/dL   CBC WITH AUTOMATED DIFF    Collection Time: 12/22/17  4:47 AM   Result Value Ref Range    WBC 9.5 4.3 - 11.1 K/uL    RBC 3.24 (L) 4.23 - 5.67 M/uL    HGB 9.0 (L) 13.6 - 17.2 g/dL    HCT 29.9 (L) 41.1 - 50.3 %    MCV 92.3 79.6 - 97.8 FL    MCH 27.8 26.1 - 32.9 PG    MCHC 30.1 (L) 31.4 - 35.0 g/dL    RDW 15.4 (H) 11.9 - 14.6 %    PLATELET 490 674 - 716 K/uL    MPV 11.5 10.8 - 14.1 FL    DF AUTOMATED      NEUTROPHILS 85 (H) 43 - 78 %    LYMPHOCYTES 10 (L) 13 - 44 %    MONOCYTES 5 4.0 - 12.0 %    EOSINOPHILS 0 (L) 0.5 - 7.8 %    BASOPHILS 0 0.0 - 2.0 %    IMMATURE GRANULOCYTES 0 0.0 - 5.0 %    ABS. NEUTROPHILS 7.9 1.7 - 8.2 K/UL    ABS. LYMPHOCYTES 1.0 0.5 - 4.6 K/UL    ABS. MONOCYTES 0.5 0.1 - 1.3 K/UL    ABS. EOSINOPHILS 0.0 0.0 - 0.8 K/UL    ABS. BASOPHILS 0.0 0.0 - 0.2 K/UL    ABS. IMM.  GRANS. 0.0 0.0 - 0.5 K/UL   METABOLIC PANEL, BASIC    Collection Time: 12/22/17  4:47 AM   Result Value Ref Range    Sodium 149 (H) 136 - 145 mmol/L    Potassium 3.4 (L) 3.5 - 5.1 mmol/L    Chloride 114 (H) 98 - 107 mmol/L    CO2 29 21 - 32 mmol/L    Anion gap 6 (L) 7 - 16 mmol/L    Glucose 121 (H) 65 - 100 mg/dL    BUN 21 8 - 23 MG/DL    Creatinine 0.69 (L) 0.8 - 1.5 MG/DL    GFR est AA >60 >60 ml/min/1.73m2    GFR est non-AA >60 >60 ml/min/1.73m2    Calcium 8.3 8.3 - 10.4 MG/DL   PHOSPHORUS    Collection Time: 12/22/17  4:47 AM   Result Value Ref Range    Phosphorus 2.1 (L) 2.3 - 3.7 MG/DL   MAGNESIUM    Collection Time: 12/22/17  4:47 AM   Result Value Ref Range    Magnesium 2.3 1.8 - 2.4 mg/dL   GLUCOSE, POC    Collection Time: 12/22/17  6:08 AM   Result Value Ref Range    Glucose (POC) 134 (H) 65 - 100 mg/dL         Imaging /Procedures /Studies   XR CHEST PORT   Final Result   IMPRESSION:      Persistent left basilar density, likely atelectasis or pneumonia. XR CHEST PORT   Final Result   IMPRESSION:      No significant change compared to prior exam.      XR CHEST SNGL V   Final Result   IMPRESSION:    1. Persisting left basilar infiltrate and probable small pleural effusion. 2. Minimal right basilar infiltrate increased since prior. XR CHEST SNGL V   Final Result   Impression:  Increased small left pleural effusion and associated left basilar   atelectasis and or consolidation. XR ABD (KUB)   Final Result   Impression: No acute pathology identified. XR CHEST SNGL V   Final Result   IMPRESSION: No acute findings      XR CHEST SNGL V   Final Result   IMPRESSION: Mild worsening left lower lobe atelectasis or pneumonia      XR CHEST SNGL V   Final Result   IMPRESSION: No acute findings in the chest         XR CHEST SNGL V   Final Result   IMPRESSION: ET tube has been repositioned and is now 9 cm above the cathy, at   the level of the thoracic inlet. This could be advanced 4 to 5 cm. The lungs are   clear. XR CHEST SNGL V   Final Result   IMPRESSION: Some left infrahilar atelectasis or infiltrate now present. XR CHEST SNGL V   Final Result   IMPRESSION: Bibasilar atelectasis or pneumonia significantly improved. XR CHEST SNGL V   Final Result   IMPRESSION: Bibasilar atelectasis or pneumonia worse in the interval.            XR CHEST SNGL V   Final Result   IMPRESSION: Left basilar atelectasis improved. XR CHEST SNGL V   Final Result   IMPRESSION: New left lower lobe atelectasis or pneumonia. XR ABD (KUB)   Final Result   IMPRESSION: OG tube further in the stomach. XR CHEST SNGL V   Final Result   IMPRESSION: No acute cardiopulmonary disease. Recommend advancing the orogastric tube further into the stomach. XR PELV AP ONLY   Final Result   IMPRESSION:   1. 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 This is very poorly   assessed and characterized by portable plain film imaging. XR CHEST PORT   Final Result   IMPRESSION:    1. Improved although slight low position of endotracheal tube. Retraction of   this by 1 to 2 cm complex cyst and more ideal position. 2.  Persistent high position of an nasogastric tube. Advancement of this by 4 cm   would place this in a more ideal position. XR CHEST SNGL V   Final Result   IMPRESSION:    1. Low position of endotracheal tube tip overlying the most proximal right   mainstem bronchus. Retraction of this by 3 cm should place this at a more   acceptable position. 2.  Slight high position of feeding tube with the tip overlying the stomach   although the side-port likely overlies the distal esophagus. Advancement of this   by 4 cm should ensure that the side port is below the gastroesophageal junction. 3. No evolving acute cardiopulmonary changes. The abnormal endotracheal tube position was discussed with nursing staff a   muscle personally at 10:15 PM on 12/1/2017. XR CHEST PA LAT   Final Result   IMPRESSION:    1. No acute cardiopulmonary process evident by plain film imaging. CT HEAD WO CONT   Final Result   IMPRESSION:     1. Small right subdural hematoma measuring 4 mm which demonstrates intermediate   attenuation suggesting a subacute although recent subdural hematoma. The subacute appearing right subdural hematoma was discussed with Dr. Harley Johnson by   myself personally at 5:05 PM on 12/1/2017.                ASSESSMENT      Hospital Problems as of 12/22/2017  Date Reviewed: 12/20/2017          Codes Class Noted - Resolved POA    Mucus plugging of bronchi ICD-10-CM: J98.09  ICD-9-CM: 519.19  12/21/2017 - Present Unknown        Atelectasis ICD-10-CM: J98.11  ICD-9-CM: 518.0  12/21/2017 - Present Unknown        Acute hypoxemic respiratory failure ([facility] ICD-10-CM: J96.01  ICD-9-CM: 518.81  12/20/2017 - Present Unknown        Pneumonia due to infectious organism ICD-10-CM: J18.9  ICD-9-CM: 136.9, 484.8  12/20/2017 - Present Unknown        Delirium ICD-10-CM: R41.0  ICD-9-CM: 780.09  12/19/2017 - Present No        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  ICD-9-CM: 608.83  12/17/2017 - Present Unknown        Subd[facility] hematoma ([facility] ICD-10-CM: I62.00  ICD-9-CM: 432.1  12/1/2017 - Present Yes        Type II diabetes mellitus with complication (HCC) (Chronic) ICD-10-CM: E11.8  ICD-9-CM: 250.90  12/1/2017 - Present Yes        Syncope ICD-10-CM: R55  ICD-9-CM: 780.2  12/1/2017 - Present Yes        Fasciitis ICD-10-CM: M72.9  ICD-9-CM: 729.4  12/1/2017 - Present Yes        RESOLVED: Acute respiratory failure ([facility] [facility] ICD-10-CM: J96.00  ICD-9-CM: 518.81  12/8/2017 - 12/15/2017 Yes        RESOLVED: Hypernatremia ICD-10-CM: E87.0  ICD-9-CM: 276.0  12/6/2017 - 12/15/2017 Yes        RESOLVED: Lactic acidosis ICD-10-CM: E87.2  ICD-9-CM: 276.2  12/2/2017 - 12/6/2017 Yes        RESOLVED: LAUREANO (acute kidney injury) ([facility] ICD-10-CM: N17.9  ICD-9-CM: 584.9  12/1/2017 - 12/8/2017 Yes        RESOLVED: Leukocytosis ICD-10-CM: M44.848  ICD-9-CM: 288.60  12/1/2017 - 12/8/2017 Yes        * (Principal)RESOLVED: Severe sepsis with septic shock [facility]) ICD-10-CM: A41.9, R65.21  ICD-9-CM: 038.9, 995.92, 785.52  12/1/2017 - 12/15/2017 Yes        RESOLVED: Electrolyte abnormality ICD-10-CM: E87.8  ICD-9-CM: 276.9  12/1/2017 - 12/8/2017 Yes                  Plan:    - HAP:  CXR with persistent L basilar infiltrate and new R infiltrate  Restarted broad spectrum abx on 12/20    - Acute hypoxic resp failure:  Remains on high flow NC  Pulm following, improved after bronch. 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 Patient is an optimal candidate for LTACH as he will not only require wound care, colostomy management, PT/OT/SLP; but he will also continue to require daily input by pulmonology.     Rao Lopez MD

## 2017-12-22 NOTE — PROGRESS NOTES
Dispo update:  Per call from Ms. Alejandra Fox, liaison for 4th floor LTACH, Mr. Gaitan still does not have [employer] pre-cert for transfer. Updated Dr. Yassine Worrell.

## 2017-12-23 LAB
ALBUMIN SERPL-MCNC: 1.7 G/DL (ref 3.2–4.6)
ALBUMIN/GLOB SERPL: 0.3 {RATIO} (ref 1.2–3.5)
ALP SERPL-CCNC: 78 U/L (ref 50–136)
ALT SERPL-CCNC: 11 U/L (ref 12–65)
AMORPH CRY URNS QL MICRO: ABNORMAL
ANION GAP SERPL CALC-SCNC: 7 MMOL/L (ref 7–16)
APPEARANCE UR: ABNORMAL
AST SERPL-CCNC: 14 U/L (ref 15–37)
BACTERIA URNS QL MICRO: 0 /HPF
BASOPHILS # BLD: 0 K/UL (ref 0–0.2)
BASOPHILS NFR BLD: 0 % (ref 0–2)
BILIRUB DIRECT SERPL-MCNC: 0.2 MG/DL
BILIRUB SERPL-MCNC: 0.6 MG/DL (ref 0.2–1.1)
BILIRUB UR QL: NEGATIVE
BUN SERPL-MCNC: 19 MG/DL (ref 8–23)
CALCIUM SERPL-MCNC: 7.6 MG/DL (ref 8.3–10.4)
CASTS URNS QL MICRO: ABNORMAL /LPF
CHLORIDE SERPL-SCNC: 113 MMOL/L (ref 98–107)
CO2 SERPL-SCNC: 28 MMOL/L (ref 21–32)
COLOR UR: YELLOW
CREAT SERPL-MCNC: 0.67 MG/DL (ref 0.8–1.5)
DIFFERENTIAL METHOD BLD: ABNORMAL
EOSINOPHIL # BLD: 0.1 K/UL (ref 0–0.8)
EOSINOPHIL NFR BLD: 1 % (ref 0.5–7.8)
ERYTHROCYTE [DISTWIDTH] IN BLOOD BY AUTOMATED COUNT: 15.4 % (ref 11.9–14.6)
GLOBULIN SER CALC-MCNC: 5.2 G/DL (ref 2.3–3.5)
GLUCOSE BLD STRIP.AUTO-MCNC: 129 MG/DL (ref 65–100)
GLUCOSE BLD STRIP.AUTO-MCNC: 151 MG/DL (ref 65–100)
GLUCOSE BLD STRIP.AUTO-MCNC: 166 MG/DL (ref 65–100)
GLUCOSE BLD STRIP.AUTO-MCNC: 169 MG/DL (ref 65–100)
GLUCOSE BLD STRIP.AUTO-MCNC: 173 MG/DL (ref 65–100)
GLUCOSE BLD STRIP.AUTO-MCNC: 198 MG/DL (ref 65–100)
GLUCOSE SERPL-MCNC: 142 MG/DL (ref 65–100)
GLUCOSE UR STRIP.AUTO-MCNC: NEGATIVE MG/DL
HCT VFR BLD AUTO: 23.1 % (ref 41.1–50.3)
HCT VFR BLD AUTO: 23.3 % (ref 41.1–50.3)
HGB BLD-MCNC: 7 G/DL (ref 13.6–17.2)
HGB BLD-MCNC: 7 G/DL (ref 13.6–17.2)
HGB UR QL STRIP: ABNORMAL
IMM GRANULOCYTES # BLD: 0.1 K/UL (ref 0–0.5)
IMM GRANULOCYTES NFR BLD AUTO: 1 % (ref 0–5)
KETONES UR QL STRIP.AUTO: ABNORMAL MG/DL
LEUKOCYTE ESTERASE UR QL STRIP.AUTO: ABNORMAL
LYMPHOCYTES # BLD: 1.2 K/UL (ref 0.5–4.6)
LYMPHOCYTES NFR BLD: 13 % (ref 13–44)
MCH RBC QN AUTO: 27.4 PG (ref 26.1–32.9)
MCHC RBC AUTO-ENTMCNC: 29.6 G/DL (ref 31.4–35)
MCV RBC AUTO: 92.5 FL (ref 79.6–97.8)
MONOCYTES # BLD: 0.5 K/UL (ref 0.1–1.3)
MONOCYTES NFR BLD: 6 % (ref 4–12)
NEUTS SEG # BLD: 7.4 K/UL (ref 1.7–8.2)
NEUTS SEG NFR BLD: 79 % (ref 43–78)
NITRITE UR QL STRIP.AUTO: NEGATIVE
PH UR STRIP: 6 [PH] (ref 5–9)
PLATELET # BLD AUTO: 274 K/UL (ref 150–450)
PMV BLD AUTO: 11.3 FL (ref 10.8–14.1)
POTASSIUM SERPL-SCNC: 3 MMOL/L (ref 3.5–5.1)
PROT SERPL-MCNC: 6.9 G/DL (ref 6.3–8.2)
PROT UR STRIP-MCNC: 30 MG/DL
RBC # BLD AUTO: 2.52 M/UL (ref 4.23–5.67)
RBC #/AREA URNS HPF: ABNORMAL /HPF
SODIUM SERPL-SCNC: 148 MMOL/L (ref 136–145)
SP GR UR REFRACTOMETRY: 1.02 (ref 1–1.02)
UROBILINOGEN UR QL STRIP.AUTO: 0.2 EU/DL (ref 0.2–1)
WBC # BLD AUTO: 9.2 K/UL (ref 4.3–11.1)
WBC URNS QL MICRO: ABNORMAL /HPF
YEAST URNS QL MICRO: ABNORMAL

## 2017-12-23 PROCEDURE — 74011250637 HC RX REV CODE- 250/637: Performed by: SURGERY

## 2017-12-23 PROCEDURE — 74011636637 HC RX REV CODE- 636/637: Performed by: HOSPITALIST

## 2017-12-23 PROCEDURE — 74011000258 HC RX REV CODE- 258: Performed by: INTERNAL MEDICINE

## 2017-12-23 PROCEDURE — 87086 URINE CULTURE/COLONY COUNT: CPT | Performed by: INTERNAL MEDICINE

## 2017-12-23 PROCEDURE — 74011636637 HC RX REV CODE- 636/637: Performed by: INTERNAL MEDICINE

## 2017-12-23 PROCEDURE — 92526 ORAL FUNCTION THERAPY: CPT

## 2017-12-23 PROCEDURE — 77010033678 HC OXYGEN DAILY

## 2017-12-23 PROCEDURE — 81001 URINALYSIS AUTO W/SCOPE: CPT | Performed by: INTERNAL MEDICINE

## 2017-12-23 PROCEDURE — 99232 SBSQ HOSP IP/OBS MODERATE 35: CPT | Performed by: INTERNAL MEDICINE

## 2017-12-23 PROCEDURE — 80076 HEPATIC FUNCTION PANEL: CPT | Performed by: INTERNAL MEDICINE

## 2017-12-23 PROCEDURE — 74011250636 HC RX REV CODE- 250/636: Performed by: SURGERY

## 2017-12-23 PROCEDURE — 82962 GLUCOSE BLOOD TEST: CPT

## 2017-12-23 PROCEDURE — 74011000250 HC RX REV CODE- 250: Performed by: SURGERY

## 2017-12-23 PROCEDURE — C9113 INJ PANTOPRAZOLE SODIUM, VIA: HCPCS | Performed by: SURGERY

## 2017-12-23 PROCEDURE — 74011250636 HC RX REV CODE- 250/636: Performed by: INTERNAL MEDICINE

## 2017-12-23 PROCEDURE — 94760 N-INVAS EAR/PLS OXIMETRY 1: CPT

## 2017-12-23 PROCEDURE — 65270000029 HC RM PRIVATE

## 2017-12-23 PROCEDURE — 85025 COMPLETE CBC W/AUTO DIFF WBC: CPT | Performed by: INTERNAL MEDICINE

## 2017-12-23 PROCEDURE — 74011000250 HC RX REV CODE- 250: Performed by: INTERNAL MEDICINE

## 2017-12-23 PROCEDURE — 80048 BASIC METABOLIC PNL TOTAL CA: CPT | Performed by: INTERNAL MEDICINE

## 2017-12-23 PROCEDURE — 94640 AIRWAY INHALATION TREATMENT: CPT

## 2017-12-23 PROCEDURE — 85018 HEMOGLOBIN: CPT | Performed by: INTERNAL MEDICINE

## 2017-12-23 PROCEDURE — 87106 FUNGI IDENTIFICATION YEAST: CPT | Performed by: INTERNAL MEDICINE

## 2017-12-23 RX ADMIN — ALBUTEROL SULFATE 2.5 MG: 2.5 SOLUTION RESPIRATORY (INHALATION) at 19:36

## 2017-12-23 RX ADMIN — INSULIN LISPRO 2 UNITS: 100 INJECTION, SOLUTION INTRAVENOUS; SUBCUTANEOUS at 09:01

## 2017-12-23 RX ADMIN — DEXTROSE MONOHYDRATE 75 ML/HR: 5 INJECTION, SOLUTION INTRAVENOUS at 09:11

## 2017-12-23 RX ADMIN — ACETYLCYSTEINE 300 MG: 100 SOLUTION ORAL; RESPIRATORY (INHALATION) at 11:44

## 2017-12-23 RX ADMIN — VANCOMYCIN HYDROCHLORIDE 1000 MG: 1 INJECTION, POWDER, LYOPHILIZED, FOR SOLUTION INTRAVENOUS at 01:22

## 2017-12-23 RX ADMIN — ENOXAPARIN SODIUM 40 MG: 40 INJECTION SUBCUTANEOUS at 11:47

## 2017-12-23 RX ADMIN — VANCOMYCIN HYDROCHLORIDE 1000 MG: 1 INJECTION, POWDER, LYOPHILIZED, FOR SOLUTION INTRAVENOUS at 13:24

## 2017-12-23 RX ADMIN — ALBUTEROL SULFATE 2.5 MG: 2.5 SOLUTION RESPIRATORY (INHALATION) at 16:18

## 2017-12-23 RX ADMIN — SODIUM CHLORIDE 2 G: 900 INJECTION, SOLUTION INTRAVENOUS at 20:28

## 2017-12-23 RX ADMIN — INSULIN GLARGINE 20 UNITS: 100 INJECTION, SOLUTION SUBCUTANEOUS at 09:02

## 2017-12-23 RX ADMIN — Medication 10 ML: at 04:11

## 2017-12-23 RX ADMIN — Medication 1 AMPULE: at 20:41

## 2017-12-23 RX ADMIN — ALBUTEROL SULFATE 2.5 MG: 2.5 SOLUTION RESPIRATORY (INHALATION) at 04:00

## 2017-12-23 RX ADMIN — INSULIN LISPRO 2 UNITS: 100 INJECTION, SOLUTION INTRAVENOUS; SUBCUTANEOUS at 01:23

## 2017-12-23 RX ADMIN — ALBUTEROL SULFATE 2.5 MG: 2.5 SOLUTION RESPIRATORY (INHALATION) at 00:00

## 2017-12-23 RX ADMIN — SODIUM CHLORIDE 2 G: 900 INJECTION, SOLUTION INTRAVENOUS at 04:11

## 2017-12-23 RX ADMIN — ALBUTEROL SULFATE 2.5 MG: 2.5 SOLUTION RESPIRATORY (INHALATION) at 11:44

## 2017-12-23 RX ADMIN — ACETYLCYSTEINE 300 MG: 100 SOLUTION ORAL; RESPIRATORY (INHALATION) at 16:18

## 2017-12-23 RX ADMIN — SODIUM CHLORIDE 2 G: 900 INJECTION, SOLUTION INTRAVENOUS at 11:47

## 2017-12-23 RX ADMIN — ALBUTEROL SULFATE 2.5 MG: 2.5 SOLUTION RESPIRATORY (INHALATION) at 07:48

## 2017-12-23 RX ADMIN — INSULIN LISPRO 2 UNITS: 100 INJECTION, SOLUTION INTRAVENOUS; SUBCUTANEOUS at 18:04

## 2017-12-23 RX ADMIN — Medication 5 ML: at 13:24

## 2017-12-23 RX ADMIN — INSULIN LISPRO 2 UNITS: 100 INJECTION, SOLUTION INTRAVENOUS; SUBCUTANEOUS at 11:47

## 2017-12-23 RX ADMIN — ACETYLCYSTEINE 300 MG: 100 SOLUTION ORAL; RESPIRATORY (INHALATION) at 07:48

## 2017-12-23 RX ADMIN — ACETYLCYSTEINE 300 MG: 100 SOLUTION ORAL; RESPIRATORY (INHALATION) at 19:36

## 2017-12-23 RX ADMIN — INSULIN LISPRO 2 UNITS: 100 INJECTION, SOLUTION INTRAVENOUS; SUBCUTANEOUS at 04:11

## 2017-12-23 RX ADMIN — Medication 1 AMPULE: at 09:01

## 2017-12-23 RX ADMIN — Medication 10 ML: at 20:28

## 2017-12-23 RX ADMIN — SODIUM CHLORIDE 40 MG: 9 INJECTION INTRAMUSCULAR; INTRAVENOUS; SUBCUTANEOUS at 16:05

## 2017-12-23 NOTE — PROGRESS NOTES
Removed patient from [employer] heated High Flow at [address] [facility]. Was on 50L 40%. Now on 5L high Flow Nasal Cannula SAT 99% will continue to wean as tolerated. Patient appears calm and resting comfortably with normal respirations.

## 2017-12-23 NOTE — PROGRESS NOTES
Kayce Chin  Admission Date: 12/1/2017             Daily Progress Note: 12/23/2017    The patient's chart is reviewed and the patient is discussed with the staff. 78 yo WM known to [facility] Pulmonary from earlier this admission when admitted to ICU as follows:    Patient is a 79 y.o.  male seen and evaluated at the request of Dr. Sara Buchanan. This patient was admitted to the hospital after he had a syncopal episode. Upon evaluation in the emergency room he was found to have a small subdural hematoma on head CT but he was found to have very large abscess in the right thigh area extended from his right groin all the way to the right popliteal area. This required urgent surgical consultation. Patient was taken to the operating room and underwent drainage of the abscess area. Postoperatively he returned back to the intensive care unit intubated and mechanically ventilated. Currently he is sedated and on Levophed drip because of severe hypotension. Patient is does not see a primary care physician on a regular basis. No family is available at this point for any further history. 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 He underwent multiple debridements including a complex debridement of the R hip region. He was able to be extubated 12/14 and was transferred to the floor. Remained confused and new fever noted yesterday/last PM with CXR concerning for LLL pneumonia. He had been weaned to 2L but his oxygen requirement has now increased to 60%. Subjective:      Weaned to NC 2 LPM   Wants to eat and drink    Current Facility-Administered Medications   Medication Dose Route Frequency    dextrose 5% infusion  75 mL/hr IntraVENous CONTINUOUS    vancomycin (VANCOCIN) 1,000 mg in 0.9% sodium chloride (MBP/ADV) 250 mL  1 g IntraVENous Q12H    cefepime (MAXIPIME) 2 g in 0.9% sodium chloride (MBP/ADV) 100 mL ADV  2 g IntraVENous Q8H    lidocaine (XYLOCAINE) 4 % (40 mg/mL) topical solution   Topical PRN    lidocaine (XYLOCAINE) 2 % jelly   Mucous Membrane PRN    albuterol (PROVENTIL VENTOLIN) nebulizer solution 2.5 mg  2.5 mg Nebulization Q4H RT    acetylcysteine (MUCOMYST) 100 mg/mL (10 %) nebulizer solution 300 mg  3 mL Nebulization QID RT    insulin glargine (LANTUS) injection 20 Units  20 Units SubCUTAneous DAILY    lip protectant (BLISTEX) ointment   Topical PRN    dextrose (D50W) injection syrg 12.5 g  25 mL IntraVENous PRN    HYDROmorphone (PF) (DILAUDID) injection 1 mg  1 mg IntraVENous Q4H PRN    enoxaparin (LOVENOX) injection 40 mg  40 mg SubCUTAneous Q24H    hydrALAZINE (APRESOLINE) 20 mg/mL injection 10 mg  10 mg IntraVENous Q4H PRN    alcohol 62% (NOZIN) nasal  1 Ampule  1 Ampule Topical Q12H    NUTRITIONAL SUPPORT ELECTROLYTE PRN ORDERS   Does Not Apply PRN    influenza vaccine 2017-18 (3 yrs+)(PF) (FLUZONE QUAD/FLUARIX QUAD) injection 0.5 mL  0.5 mL IntraMUSCular PRIOR TO DISCHARGE    insulin lispro (HUMALOG) injection   SubCUTAneous Q4H    pantoprazole (PROTONIX) 40 mg in sodium chloride 0.9 % 10 mL injection  40 mg IntraVENous Q24H    sodium chloride (NS) flush 5-10 mL  5-10 mL IntraVENous Q8H    sodium chloride (NS) flush 5-10 mL  5-10 mL IntraVENous PRN       Review of Systems    Constitutional: negative for fever, chills, sweats  Cardiovascular: negative for chest pain, palpitations, syncope, edema  Gastrointestinal:  negative for dysphagia, reflux, vomiting, diarrhea, abdominal pain, or melena  Neurologic:  negative for focal weakness, numbness, headache    Objective:     Vitals:    12/23/17 0421 12/23/17 0732 12/23/17 0748 12/23/17 1046   BP:  121/63  121/65   Pulse:  82  91   Resp:  20  20   Temp:  98.1 °F (36.7 °C)  97.5 °F (36.4 °C)   SpO2: 98% 98% 98% 99%   Weight:       Height:         Intake and Output:   12/21 1901 - 12/23 0700  In: 1606 [I.V.:1606]  Out: 1577 [BXM:8530] Physical Exam:   Constitution:  the patient is well developed and in no acute distress  EENMT:  Sclera clear, pupils equal, oral mucosa moist  Respiratory: decreased BS bilaterally L > R; minimal rhonchi  Cardiovascular:  RRR without M,G,R  Gastrointestinal: soft and non-tender; with positive bowel sounds. Musculoskeletal: warm without cyanosis. There is no lower leg edema. Skin:  no jaundice or rashes, legs wrapped. Neurologic: no gross neuro deficits     Psychiatric:  Awake but not very interactive    CXR:     12/22:        Improved aeration to LLL    12/20:        Rotated with evidence of LLL infiltrate and possibly some effusion    LAB  Recent Labs      12/23/17   1124  12/23/17   0841  12/23/17   0342  12/23/17   0018  12/22/17 2006   GLUCPOC  166*  173*  151*  198*  188*      Recent Labs      12/23/17   0911  12/23/17   0356  12/22/17   0447  12/21/17   0510   WBC   --   9.2  9.5  10.2   HGB  7.0*  7.0*  9.0*  7.4*   HCT  23.1*  23.3*  29.9*  24.5*   PLT   --   274  263  278     Recent Labs      12/23/17   0356  12/22/17   0447  12/21/17   0510   NA  148*  149*  147*   K  3.0*  3.4*  3.8   CL  113*  114*  113*   CO2  28  29  27   GLU  142*  121*  159*   BUN  19  21  19   CREA  0.67*  0.69*  0.78*   MG   --   2.3   --    CA  7.6*  8.3  8.1*   PHOS   --   2.1*  2.3   ALB  1.7*   --   1.6*   TBILI  0.6   --   0.8   ALT  11*   --   11*   SGOT  14*   --   14*     Recent Labs      12/22/17   1132  12/20/17   1350   PH  7.51*  7.53*   PCO2  30*  34*   PO2  68*  59*   HCO3  24  28*     Recent Labs      12/20/17   1852   LAC  1.7     PCT last: 0.4    Bronch wash: light GNR so far    Assessment:  (Medical Decision Making)     Hospital Problems  Date Reviewed: 12/20/2017          Codes Class Noted POA    Mucus plugging of bronchi ICD-10-CM: J98.09  ICD-9-CM: 519.19  12/21/2017 Unknown    Severe but cleared with bronchoscopy 12/21.     Atelectasis ICD-10-CM: J98.11  ICD-9-CM: 518.0  12/21/2017 Unknown    Improved Acute hypoxemic respiratory failure (HCC) ICD-10-CM: J96.01  ICD-9-CM: 518.81  12/20/2017 Unknown    Wean oxygen     Pneumonia due to infectious organism ICD-10-CM: J18.9  ICD-9-CM: 136.9, 484.8  12/20/2017 Unknown    GNR in sputum    Delirium ICD-10-CM: R41.0  ICD-9-CM: 780.09  12/19/2017 No        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  ICD-9-CM: 608.83  12/17/2017 Unknown    Per surgery and hospitalists    Subdural hematoma ([facility] ICD-10-CM: I62.00  ICD-9-CM: 432.1  12/1/2017 Yes        Type II diabetes mellitus with complication (HCC) (Chronic) ICD-10-CM: E11.8  ICD-9-CM: 250.90  12/1/2017 Yes        Syncope ICD-10-CM: R55  ICD-9-CM: 780.2  12/1/2017 Yes        Fasciitis ICD-10-CM: M72.9  ICD-9-CM: 729.4  12/1/2017 Yes                Plan:  (Medical Decision Making)     Wean O2  Needs free water; cont. D5W. Continue ATB; await cx results   Mobilize when able. -speech to see for his diet    More than 50% of the time documented was spent in face-to-face contact with the patient and in the care of the patient on the floor/unit where the patient is located.     Riki Hallman MD

## 2017-12-23 NOTE — OP NOTES
OPERATIVE NOTE    Date of Procedure: 12/22/2017   Preoperative Diagnosis: 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]  Postoperative Diagnosis: 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]    Procedure(s):   DEBRIDEMENT RIGHT LEG WOUND  Surgeon(s) and Role:     * Marsha Isaacs MD - Primary         Prior to procedure informed consent obtained from patient's daughter follow discussion of risks, benefits and alternatives.  Patient taken to OR and position left lateral decubitus position. Surgical timeout performed.  Wound vac removed and patient prepped with betadine. No additional areas of questionable skin. Some additional fascia excised. No purulent just not vascular.  Wound irrigated and scrubbed with OR scrub brush.       Pre Debridment Dimensions:             60x 40 cm x 6 cm deep  Post Debridement Dimensions:                    Same  Dimensions of material removed: 15 x 2 x 3-4 mm  Percentage of Wound:                                            100%  Material Removed:                                      Subq, fascia  Frequency of Debridements:            To be determined by clinical response        Surgical Staff:  Circ-1: Jessica Ibarra  Circ-2: Germania Lezama RN  Scrub Tech-1: Eli Anna  Scrub Tech-2: Brijesh Bonilla  Event Time In   Incision Start 1458   Incision Close 1507     Anesthesia: General   Estimated Blood Loss: 10 ml  Findings: Per tolerance of lateral positioning

## 2017-12-23 NOTE — PROGRESS NOTES
Problem: Breathing Pattern - Ineffective  Goal: *Absence of hypoxia  Outcome: Progressing Towards Goal  Pt on 2L NC, SAT 98%

## 2017-12-23 NOTE — PROGRESS NOTES
Hospitalist Progress Note    2017  Admit Date: 2017  2:54 PM   NAME: Princess Blanton   :  1950   MRN:  [id number]   Attending: Jesica García MD  PCP:  None    SUBJECTIVE:     Princess Blatnon is a 21VJN with no previous known medical hx who was admitted on  with a syncopal episode. CT head with a SDH, no intervention per NSGY. 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 Was taken to the OR for debridement on  Erika Rivera) and has been followed by [facility] for further wound debridement. Extubated on  and transferred out of ICU. Was supposed to have skin grafting with Dr. Doc Spaulding, but had to be cancelled 2/2 worsening resp status. Pulm following, s/p bronch. : More alert,  afebrile overnight. Hypoxia improving, but remains on high flow. follows commands. Urine looks cloudy/Pink    Review of Systems negative with exception of pertinent positives noted above      PHYSICAL EXAM       Visit Vitals    /63 (BP 1 Location: Left arm, BP Patient Position: At rest)    Pulse 82    Temp 98.1 °F (36.7 °C)    Resp 20    Ht 5' 11\" (1.803 m)    Wt 75.5 kg (166 lb 6.4 oz)    SpO2 98%    BMI 23.21 kg/m2      Temp (24hrs), Av.2 °F (36.8 °C), Min:97.5 °F (36.4 °C), Max:99.1 °F (37.3 °C)    Oxygen Therapy  O2 Sat (%): 98 % (17)  Pulse via Oximetry: 80 beats per minute (17)  O2 Device: Nasal cannula (17)  O2 Flow Rate (L/min): 2 l/min (17)  O2 Temperature: 87.8 °F (31 °C) (17 0024)  FIO2 (%): 40 % (17 1938)    Intake/Output Summary (Last 24 hours) at 17 0842  Last data filed at 17 0538   Gross per 24 hour   Intake             1606 ml   Output             1577 ml   Net               29 ml          General: No acute distress. Head:  Atraumatic Normocephalic.   ENT:  High flow NC in place  Lungs:  Coarse lung sounds in upper airways  CVS:  RRR  Abdomen: Soft, ND/NTTP, +BS  MSK:  Boots on feet bilaterally  Neurologic:  Follows commands x 4      Recent Results (from the past 24 hour(s))   BLOOD GAS, ARTERIAL    Collection Time: 12/22/17 11:32 AM   Result Value Ref Range    pH 7.51 (H) 7.35 - 7.45      PCO2 30 (L) 35 - 45 mmHg    PO2 68 (L) 80 - 105 mmHg    BICARBONATE 24 22 - 26 mmol/L    BASE EXCESS 0.8 0 - 3 mmol/L    TOTAL HEMOGLOBIN 8.3 (L) 11.7 - 15.0 GM/DL    O2 SAT 94 92 - 98.5 %    Arterial O2 Hgb 92.1 (L) 94 - 97 %    CARBOXYHEMOGLOBIN 0.9 0.5 - 1.5 %    METHEMOGLOBIN 0.7 0.0 - 1.5 %    DEOXYHEMOGLOBIN 6 (H) 0.0 - 5.0 %    SITE LR     ALLENS TEST POSITIVE      MODE AIRVO     FIO2 70.0 %    O2 FLOW 50.00 L/min   GLUCOSE, POC    Collection Time: 12/22/17 11:34 AM   Result Value Ref Range    Glucose (POC) 152 (H) 65 - 100 mg/dL   Gargara, TROUGH    Collection Time: 12/22/17  1:09 PM   Result Value Ref Range    Vancomycin,trough 16.8 5 - 20 ug/mL   GLUCOSE, POC    Collection Time: 12/22/17  5:43 PM   Result Value Ref Range    Glucose (POC) 192 (H) 65 - 100 mg/dL   GLUCOSE, POC    Collection Time: 12/22/17  8:06 PM   Result Value Ref Range    Glucose (POC) 188 (H) 65 - 100 mg/dL   GLUCOSE, POC    Collection Time: 12/23/17 12:18 AM   Result Value Ref Range    Glucose (POC) 198 (H) 65 - 100 mg/dL   GLUCOSE, POC    Collection Time: 12/23/17  3:42 AM   Result Value Ref Range    Glucose (POC) 151 (H) 65 - 100 mg/dL   CBC WITH AUTOMATED DIFF    Collection Time: 12/23/17  3:56 AM   Result Value Ref Range    WBC 9.2 4.3 - 11.1 K/uL    RBC 2.52 (L) 4.23 - 5.67 M/uL    HGB 7.0 (L) 13.6 - 17.2 g/dL    HCT 23.3 (L) 41.1 - 50.3 %    MCV 92.5 79.6 - 97.8 FL    MCH 27.4 26.1 - 32.9 PG    MCHC 29.6 (L) 31.4 - 35.0 g/dL    RDW 15.4 (H) 11.9 - 14.6 %    PLATELET 790 434 - 950 K/uL    MPV 11.3 10.8 - 14.1 FL    DF AUTOMATED      NEUTROPHILS 79 (H) 43 - 78 %    LYMPHOCYTES 13 13 - 44 %    MONOCYTES 6 4.0 - 12.0 %    EOSINOPHILS 1 0.5 - 7.8 %    BASOPHILS 0 0.0 - 2.0 %    IMMATURE GRANULOCYTES 1 0.0 - 5.0 %    ABS. NEUTROPHILS 7.4 1.7 - 8.2 K/UL    ABS. LYMPHOCYTES 1.2 0.5 - 4.6 K/UL    ABS. MONOCYTES 0.5 0.1 - 1.3 K/UL    ABS. EOSINOPHILS 0.1 0.0 - 0.8 K/UL    ABS. BASOPHILS 0.0 0.0 - 0.2 K/UL    ABS. IMM. GRANS. 0.1 0.0 - 0.5 K/UL   METABOLIC PANEL, BASIC    Collection Time: 12/23/17  3:56 AM   Result Value Ref Range    Sodium 148 (H) 136 - 145 mmol/L    Potassium 3.0 (L) 3.5 - 5.1 mmol/L    Chloride 113 (H) 98 - 107 mmol/L    CO2 28 21 - 32 mmol/L    Anion gap 7 7 - 16 mmol/L    Glucose 142 (H) 65 - 100 mg/dL    BUN 19 8 - 23 MG/DL    Creatinine 0.67 (L) 0.8 - 1.5 MG/DL    GFR est AA >60 >60 ml/min/1.73m2    GFR est non-AA >60 >60 ml/min/1.73m2    Calcium 7.6 (L) 8.3 - 10.4 MG/DL   HEPATIC FUNCTION PANEL    Collection Time: 12/23/17  3:56 AM   Result Value Ref Range    Protein, total 6.9 6.3 - 8.2 g/dL    Albumin 1.7 (L) 3.2 - 4.6 g/dL    Globulin 5.2 (H) 2.3 - 3.5 g/dL    A-G Ratio 0.3 (L) 1.2 - 3.5      Bilirubin, total 0.6 0.2 - 1.1 MG/DL    Bilirubin, direct 0.2 <0.4 MG/DL    Alk. phosphatase 78 50 - 136 U/L    AST (SGOT) 14 (L) 15 - 37 U/L    ALT (SGPT) 11 (L) 12 - 65 U/L         Imaging /Procedures /Studies   XR CHEST PORT   Final Result   IMPRESSION:      Persistent left basilar density, likely atelectasis or pneumonia. XR CHEST PORT   Final Result   IMPRESSION:      No significant change compared to prior exam.      XR CHEST SNGL V   Final Result   IMPRESSION:    1. Persisting left basilar infiltrate and probable small pleural effusion. 2. Minimal right basilar infiltrate increased since prior. XR CHEST SNGL V   Final Result   Impression:  Increased small left pleural effusion and associated left basilar   atelectasis and or consolidation. XR ABD (KUB)   Final Result   Impression: No acute pathology identified.                   XR CHEST SNGL V   Final Result   IMPRESSION: No acute findings      XR CHEST SNGL V   Final Result   IMPRESSION: Mild worsening left lower lobe atelectasis or pneumonia      XR CHEST SNGL V   Final Result   IMPRESSION: No acute findings in the chest         XR CHEST SNGL V   Final Result   IMPRESSION: ET tube has been repositioned and is now 9 cm above the cathy, at   the level of the thoracic inlet. This could be advanced 4 to 5 cm. The lungs are   clear. XR CHEST SNGL V   Final Result   IMPRESSION: Some left infrahilar atelectasis or infiltrate now present. XR CHEST SNGL V   Final Result   IMPRESSION: Bibasilar atelectasis or pneumonia significantly improved. XR CHEST SNGL V   Final Result   IMPRESSION: Bibasilar atelectasis or pneumonia worse in the interval.            XR CHEST SNGL V   Final Result   IMPRESSION: Left basilar atelectasis improved. XR CHEST SNGL V   Final Result   IMPRESSION: New left lower lobe atelectasis or pneumonia. XR ABD (KUB)   Final Result   IMPRESSION: OG tube further in the stomach. XR CHEST SNGL V   Final Result   IMPRESSION: No acute cardiopulmonary disease. Recommend advancing the orogastric tube further into the stomach. XR PELV AP ONLY   Final Result   IMPRESSION:   1. 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 This is very poorly   assessed and characterized by portable plain film imaging. XR CHEST PORT   Final Result   IMPRESSION:    1. Improved although slight low position of endotracheal tube. Retraction of   this by 1 to 2 cm complex cyst and more ideal position. 2.  Persistent high position of an nasogastric tube. Advancement of this by 4 cm   would place this in a more ideal position. XR CHEST SNGL V   Final Result   IMPRESSION:    1. Low position of endotracheal tube tip overlying the most proximal right   mainstem bronchus. Retraction of this by 3 cm should place this at a more   acceptable position.       2.  Slight high position of feeding tube with the tip overlying the stomach   although the side-port likely overlies the distal esophagus. Advancement of this   by 4 cm should ensure that the side port is below the gastroesophageal junction. 3. No evolving acute cardiopulmonary changes. The abnormal endotracheal tube position was discussed with nursing staff a   muscle personally at 10:15 PM on 12/1/2017. XR CHEST PA LAT   Final Result   IMPRESSION:    1. No acute cardiopulmonary process evident by plain film imaging. CT HEAD WO CONT   Final Result   IMPRESSION:     1. Small right subdural hematoma measuring 4 mm which demonstrates intermediate   attenuation suggesting a subacute although recent subdural hematoma. The subacute appearing right subdural hematoma was discussed with Dr. Alexandria Traore by   myself personally at 5:05 PM on 12/1/2017.                ASSESSMENT      Hospital Problems as of 12/23/2017  Date Reviewed: 12/22/2017          Codes Class Noted - Resolved POA    Hypernatremia ICD-10-CM: E87.0  ICD-9-CM: 276.0  12/22/2017 - Present Unknown        Mucus plugging of bronchi ICD-10-CM: J98.09  ICD-9-CM: 519.19  12/21/2017 - Present Unknown        Atelectasis ICD-10-CM: J98.11  ICD-9-CM: 518.0  12/21/2017 - Present Unknown        Acute hypoxemic respiratory failure (HCC) ICD-10-CM: J96.01  ICD-9-CM: 518.81  12/20/2017 - Present Unknown        Pneumonia due to infectious organism ICD-10-CM: J18.9  ICD-9-CM: 136.9, 484.8  12/20/2017 - Present Unknown        Delirium ICD-10-CM: R41.0  ICD-9-CM: 780.09  12/19/2017 - Present No        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  ICD-9-CM: 608.83  12/17/2017 - Present Unknown        Subdural hematoma ([address] ICD-10-CM: I62.00  ICD-9-CM: 432.1  12/1/2017 - Present Yes        Type II diabetes mellitus with complication (HCC) (Chronic) ICD-10-CM: E11.8  ICD-9-CM: 250.90  12/1/2017 - Present Yes        Syncope ICD-10-CM: R55  ICD-9-CM: 780.2  12/1/2017 - Present Yes        Fasciitis ICD-10-CM: M72.9  ICD-9-CM: 729.4  12/1/2017 - Present Yes        RESOLVED: Acute respiratory failure ([facility] ICD-10-CM: J96.00  ICD-9-CM: 518.81  12/8/2017 - 12/15/2017 Yes        RESOLVED: Hypernatremia ICD-10-CM: E87.0  ICD-9-CM: 276.0  12/6/2017 - 12/15/2017 Yes        RESOLVED: Lactic acidosis ICD-10-CM: E87.2  ICD-9-CM: 276.2  12/2/2017 - 12/6/2017 Yes        RESOLVED: LAUREANO (acute kidney injury) ([facility] ICD-10-CM: N17.9  ICD-9-CM: 584.9  12/1/2017 - 12/8/2017 Yes        RESOLVED: Leukocytosis ICD-10-CM: S29.905  ICD-9-CM: 288.60  12/1/2017 - 12/8/2017 Yes        * (Principal)RESOLVED: Severe sepsis with septic shock [facility]) ICD-10-CM: A41.9, R65.21  ICD-9-CM: 038.9, 995.92, 785.52  12/1/2017 - 12/15/2017 Yes        RESOLVED: Electrolyte abnormality ICD-10-CM: E87.8  ICD-9-CM: 276.9  12/1/2017 - 12/8/2017 Yes                  Plan:    - HAP:  CXR with persistent L basilar infiltrate and new R infiltrate  Restarted broad spectrum abx on 12/20    - Acute hypoxic resp failure:  Remains on high flow NC  Pulm following, improved after bronch. - 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:  Initially debrided by Silvano Loera 12/1  Followed by [facility] for further debridement  Skin graft delayed, wound Vac out. Has completed 2 weeks of IV abx  Send UCx/UA    - Subdural hematoma:  Non-surgical per NSGY    - Delirium:  Likely related to prolonged ICU stay  Appears to be slowly improving    - DM2:  BS uncontrolled  Continue half dose of lantus this AM as NPO  Continue SSI ACHS    - Dysphagia:  SLP following, Soft mech with nectar thicks. DVT Prophylaxis: Lovenox  Dispo: PT/OT following, PPD placed; CM following. Patient is an optimal candidate for LTACH as he will not only require wound care, colostomy management, PT/OT/SLP; but he will also continue to require daily input by pulmonology.     Latha Fatima MD

## 2017-12-23 NOTE — PROGRESS NOTES
Washout/debridement performed yesterday. Had low tolerance of anesthesia with left lateral positioning in the OR. Extubated post op without difficulty.     Oriented to name but still confused  Tachy  Resp unlabored, coarse bilaterally. Abd soft, ostomy productive  Dressing in place, c/d/i       Wound appeared healthy. No need for further antibiotic coverage for the leg. Changed to simple wet to dry dressing changes to facilitate wound care on the floor and avoid dressing changes in the OR. Will plan for skin graft once respiratory status will tolerate well as he will need to be prone. Working towards placement at [facility] and would not delay placement due to surgery plans as will plan to continue supervising wound care at [facility] and can perform skin graft once he is there.

## 2017-12-23 NOTE — PROGRESS NOTES
Family member approached nurse asking if doctors were going to start patient on diet or feedings back. Spoke with hospitalist on call, Dr. Mary Wilkinson, and she said she will make aware to team following patient tomorrow. She is okay with patient being on D5 IV fluids at this time until then.

## 2017-12-23 NOTE — PROGRESS NOTES
LTG: Patient will tolerate least restrictive diet without overt signs or symptoms of airway compromise. STG: Patient will tolerate chopped mechanical soft diet with no mixed consistencies without overt signs or symptoms of aspiration 100% of the time. STG: Patient will tolerate honey-thick liquids without overt signs or symptoms of aspiration 10% of the time. STG: Patient will perform laryngeal strengthening exercises x10 each with 80% accuracy. STG: Patient will participate in modified barium swallow study as clinically indicated. Speech language pathology: bedside swallow note: Re-evaluation    NAME/AGE/GENDER: Jose Schmidt is a 79 y.o. 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 INTERDISCIPLINARY COLLABORATION: Registered Nurse and Physician  PRECAUTIONS/ALLERGIES: Review of patient's allergies indicates no known allergies. ASSESSMENT:Patient seen for swallowing reassessment. Initial trials of thin liquids and nectar liquids by spoon swallowed without overt signs or symptoms of aspiration, however, immediate strong cough observed with thin and nectar liquids by cup. Attempted trials of thin liquid using Provale cup to meter 5cc of liquid with resulting strong cough. NO over signs or symptoms of aspiration observed with honey-thick liquids by spoon or cup, puree, pudding or cracker. Patient with moderately increased mastication time for cracker due to lack of dentition. Recommend initiate chopped mechanical soft diet with no mixed consistencies. Thicken all liquids to honey consistency. Give meds whole with applesauce. Patient still exhibiting intermittent aphonia and may benefit from ENT consult for laryngoscopy.       Patient will benefit from skilled intervention to address the below impairments. ?????? ? ? This section established at most recent assessment??????????  PROBLEM LIST (Impairments causing functional limitations):  1. Oropharyngeal dysphagia  REHABILITATION POTENTIAL FOR STATED GOALS: Good  PLAN OF CARE:   Patient will benefit from skilled intervention to address the following impairments. RECOMMENDATIONS AND PLANNED INTERVENTIONS (Benefits and precautions of therapy have been discussed with the patient.):  · PO:  Mechanical soft with chopped meat and vegetables and no mixed consistencies  · Liquids:  honey  MEDICATIONS:  · whole in puree  COMPENSATORY STRATEGIES/MODIFICATIONS INCLUDING:  · None  OTHER RECOMMENDATIONS (including follow up treatment recommendations):   · Laryngeal exercises  · Patient education  RECOMMENDED DIET MODIFICATIONS DISCUSSED WITH:  · Family  · Nursing  · Patient  FREQUENCY/DURATION: Continue to follow patient 3 times a week for duration of hospital stay to address above goals. RECOMMENDED REHABILITATION/EQUIPMENT: (at time of discharge pending progress): Due to the probability of continued deficits (see above) this patient will likely need continued skilled speech therapy after discharge. SUBJECTIVE:   Pt cooperative and alert. Son present. History of Present Injury/Illness: Mr. Cirilo Aviles  has a past medical history of Diabetes ([facility] .  He also  has no past surgical history on file. Present Symptoms: oropharyngeal dysphagia   Pain Intensity 1: 0  Pain Location 1: Throat  Pain Orientation 1: Right  Pain Intervention(s) 1: Medication (see MAR)  Current Medications:   No current facility-administered medications on file prior to encounter. No current outpatient prescriptions on file prior to encounter.      Current Dietary Status:  NPO      Social History/Home Situation:    Home Environment: Apartment  # Steps to Enter: 0  One/Two Story Residence: One story  Living Alone: Yes  Support Systems: Family member(s)  Patient Expects to be Discharged to[de-identified] Unknown  Current DME Used/Available at Home: Walker, rollator, Cane, straight  OBJECTIVE:   Respiratory Status:  Room air  2 l/min  CXR Results:No acute findings  MRI/CT Results:1. Small right subdural hematoma measuring 4 mm which demonstrates intermediate  attenuation suggesting a subacute although recent subdural hematoma    Cognitive and Communication Status:  Neurologic State: Alert  Orientation Level: Oriented to person;Oriented to place  Cognition: Follows commands  Perception: Appears intact  Perseveration: No perseveration noted  Safety/Judgement: Awareness of environment    BEDSIDE SWALLOW EVALUATION  Oral Assessment:  Oral Assessment  Labial: No impairment  Dentition: Edentulous  Oral Hygiene: oral care provided prior to PO trials  Lingual: No impairment  Velum: No impairment  P.O. Trials:  Patient Position: upright in bed    The patient was given tsp amounts of the following:   Consistency Presented: Thin liquid; Nectar thick liquid;Honey thick liquid;Puree;Pudding; Solid  How Presented: Self-fed/presented;SLP-fed/presented;Cup/sip;Cup/gulp; Spoon    ORAL PHASE:  Bolus Acceptance: Impaired  Bolus Formation/Control: Impaired  Propulsion: Delayed (# of seconds)  Type of Impairment: Delayed;Mastication  Oral Residue: None    PHARYNGEAL PHASE:  Initiation of Swallow: No impairment  Laryngeal Elevation: Functional  Aspiration Signs/Symptoms: Strong cough  Vocal Quality: Low volume     Effective Modifications: Spoon     Pharyngeal Phase Characteristics: Double swallow    OTHER OBSERVATIONS:  Rate/bite size: Impaired   Endurance:  Impaired   Comments:       Tool Used: Dysphagia Outcome and Severity Scale (ANGEL)    Score Comments   Normal Diet  [] 7 With no strategies or extra time needed   Functional Swallow  [] 6 May have mild oral or pharyngeal delay       Mild Dysphagia    [] 5 Which may require one diet consistency restricted (those who demonstrate penetration which is entirely cleared on MBS would be included)   Mild-Moderate Dysphagia  [] 4 With 1-2 diet consistencies restricted       Moderate Dysphagia  [x] 3 With 2 or more diet consistencies restricted       Moderately Severe Dysphagia  [] 2 With partial PO strategies (trials with ST only)       Severe Dysphagia  [] 1 With inability to tolerate any PO safely          Score:  Initial: 1 Most Recent: 3 (Date: 12/23/17 )   Interpretation of Tool: The Dysphagia Outcome and Severity Scale (ANGEL) is a simple, easy-to-use, 7-point scale developed to systematically rate the functional severity of dysphagia based on objective assessment and make recommendations for diet level, independence level, and type of nutrition. Score 7 6 5 4 3 2 1   Modifier CH CI CJ CK CL CM CN   ? Swallowing:     - CURRENT STATUS: CL - 60%-79% impaired, limited or restricted    - GOAL STATUS:  CI - 1%-19% impaired, limited or restricted    - D/C STATUS:  ---------------To be determined---------------  Payor: [employer] MEDICARE / Plan: [address] [place] [address] HMO / Product Type: Managed Care Medicare /     TREATMENT:    (In addition to Assessment/Re-Assessment sessions the following treatments were rendered)  Dysphagia Activities: Activities/Procedures listed utilized to improve progress in swallow strength. Required moderate cueing to decrease aspiration risk. LARYNGEAL / PHARYNGEAL EXERCISES:                                                                                                                                         __________________________________________________________________________________________________  Safety:   After treatment position/precautions:  · RN notified  · Family at bedside  · Upright in Bed.   Progression/Medical Necessity:   · Patient is expected to demonstrate progress in swallow strength, swallow timeliness, swallow function and diet tolerance to improve swallow safety, work toward diet advancement and decrease aspiration risk. Compliance with Program/Exercises: Will assess as treatment progresses. Reason for Continuation of Services/Other Comments:  · Patient continues to require skilled intervention due to oropharyngeal dysphagia. Recommendations/Intent for next treatment session: \"Treatment next visit will focus on diet tolerance\".     Total Treatment Duration  Time In: 1530   Time Out: Zeenat Flores, [place], CCC-SLP

## 2017-12-24 LAB
ANION GAP SERPL CALC-SCNC: 9 MMOL/L (ref 7–16)
BACTERIA SPEC CULT: ABNORMAL
BACTERIA SPEC CULT: NORMAL
BACTERIA SPEC CULT: NORMAL
BASOPHILS # BLD: 0 K/UL (ref 0–0.2)
BASOPHILS NFR BLD: 0 % (ref 0–2)
BUN SERPL-MCNC: 15 MG/DL (ref 8–23)
CALCIUM SERPL-MCNC: 7.6 MG/DL (ref 8.3–10.4)
CHLORIDE SERPL-SCNC: 111 MMOL/L (ref 98–107)
CO2 SERPL-SCNC: 27 MMOL/L (ref 21–32)
CREAT SERPL-MCNC: 0.54 MG/DL (ref 0.8–1.5)
DIFFERENTIAL METHOD BLD: ABNORMAL
EOSINOPHIL # BLD: 0.1 K/UL (ref 0–0.8)
EOSINOPHIL NFR BLD: 2 % (ref 0.5–7.8)
ERYTHROCYTE [DISTWIDTH] IN BLOOD BY AUTOMATED COUNT: 15.1 % (ref 11.9–14.6)
GLUCOSE BLD STRIP.AUTO-MCNC: 111 MG/DL (ref 65–100)
GLUCOSE BLD STRIP.AUTO-MCNC: 137 MG/DL (ref 65–100)
GLUCOSE BLD STRIP.AUTO-MCNC: 141 MG/DL (ref 65–100)
GLUCOSE BLD STRIP.AUTO-MCNC: 146 MG/DL (ref 65–100)
GLUCOSE BLD STRIP.AUTO-MCNC: 147 MG/DL (ref 65–100)
GLUCOSE BLD STRIP.AUTO-MCNC: 163 MG/DL (ref 65–100)
GLUCOSE SERPL-MCNC: 124 MG/DL (ref 65–100)
GRAM STN SPEC: ABNORMAL
HCT VFR BLD AUTO: 24 % (ref 41.1–50.3)
HGB BLD-MCNC: 7.2 G/DL (ref 13.6–17.2)
IMM GRANULOCYTES # BLD: 0.1 K/UL (ref 0–0.5)
IMM GRANULOCYTES NFR BLD AUTO: 1 % (ref 0–5)
LYMPHOCYTES # BLD: 1.1 K/UL (ref 0.5–4.6)
LYMPHOCYTES NFR BLD: 13 % (ref 13–44)
MCH RBC QN AUTO: 27.3 PG (ref 26.1–32.9)
MCHC RBC AUTO-ENTMCNC: 30 G/DL (ref 31.4–35)
MCV RBC AUTO: 90.9 FL (ref 79.6–97.8)
MONOCYTES # BLD: 0.3 K/UL (ref 0.1–1.3)
MONOCYTES NFR BLD: 4 % (ref 4–12)
NEUTS SEG # BLD: 6.7 K/UL (ref 1.7–8.2)
NEUTS SEG NFR BLD: 80 % (ref 43–78)
PLATELET # BLD AUTO: 299 K/UL (ref 150–450)
PMV BLD AUTO: 10.9 FL (ref 10.8–14.1)
POTASSIUM SERPL-SCNC: 2.8 MMOL/L (ref 3.5–5.1)
RBC # BLD AUTO: 2.64 M/UL (ref 4.23–5.67)
SERVICE CMNT-IMP: ABNORMAL
SERVICE CMNT-IMP: NORMAL
SERVICE CMNT-IMP: NORMAL
SODIUM SERPL-SCNC: 147 MMOL/L (ref 136–145)
WBC # BLD AUTO: 8.3 K/UL (ref 4.3–11.1)

## 2017-12-24 PROCEDURE — 86900 BLOOD TYPING SEROLOGIC ABO: CPT | Performed by: INTERNAL MEDICINE

## 2017-12-24 PROCEDURE — 36430 TRANSFUSION BLD/BLD COMPNT: CPT

## 2017-12-24 PROCEDURE — 74011250636 HC RX REV CODE- 250/636: Performed by: SURGERY

## 2017-12-24 PROCEDURE — 74011000258 HC RX REV CODE- 258: Performed by: INTERNAL MEDICINE

## 2017-12-24 PROCEDURE — 74011250637 HC RX REV CODE- 250/637: Performed by: INTERNAL MEDICINE

## 2017-12-24 PROCEDURE — 85025 COMPLETE CBC W/AUTO DIFF WBC: CPT | Performed by: INTERNAL MEDICINE

## 2017-12-24 PROCEDURE — 80048 BASIC METABOLIC PNL TOTAL CA: CPT | Performed by: INTERNAL MEDICINE

## 2017-12-24 PROCEDURE — 74011250636 HC RX REV CODE- 250/636: Performed by: INTERNAL MEDICINE

## 2017-12-24 PROCEDURE — 74011000250 HC RX REV CODE- 250: Performed by: NURSE PRACTITIONER

## 2017-12-24 PROCEDURE — 74011250636 HC RX REV CODE- 250/636: Performed by: NURSE PRACTITIONER

## 2017-12-24 PROCEDURE — 65270000029 HC RM PRIVATE

## 2017-12-24 PROCEDURE — 99232 SBSQ HOSP IP/OBS MODERATE 35: CPT | Performed by: INTERNAL MEDICINE

## 2017-12-24 PROCEDURE — 36415 COLL VENOUS BLD VENIPUNCTURE: CPT | Performed by: INTERNAL MEDICINE

## 2017-12-24 PROCEDURE — P9016 RBC LEUKOCYTES REDUCED: HCPCS | Performed by: INTERNAL MEDICINE

## 2017-12-24 PROCEDURE — C9113 INJ PANTOPRAZOLE SODIUM, VIA: HCPCS | Performed by: SURGERY

## 2017-12-24 PROCEDURE — 77030034849

## 2017-12-24 PROCEDURE — 74011000250 HC RX REV CODE- 250: Performed by: SURGERY

## 2017-12-24 PROCEDURE — 74011000258 HC RX REV CODE- 258: Performed by: NURSE PRACTITIONER

## 2017-12-24 PROCEDURE — 74011250637 HC RX REV CODE- 250/637: Performed by: SURGERY

## 2017-12-24 PROCEDURE — 94760 N-INVAS EAR/PLS OXIMETRY 1: CPT

## 2017-12-24 PROCEDURE — 74011636637 HC RX REV CODE- 636/637: Performed by: HOSPITALIST

## 2017-12-24 PROCEDURE — 74011636637 HC RX REV CODE- 636/637: Performed by: INTERNAL MEDICINE

## 2017-12-24 PROCEDURE — 82962 GLUCOSE BLOOD TEST: CPT

## 2017-12-24 PROCEDURE — 74011000250 HC RX REV CODE- 250: Performed by: INTERNAL MEDICINE

## 2017-12-24 PROCEDURE — 86923 COMPATIBILITY TEST ELECTRIC: CPT | Performed by: INTERNAL MEDICINE

## 2017-12-24 PROCEDURE — 94640 AIRWAY INHALATION TREATMENT: CPT

## 2017-12-24 RX ORDER — SODIUM CHLORIDE 9 MG/ML
250 INJECTION, SOLUTION INTRAVENOUS AS NEEDED
Status: DISCONTINUED | OUTPATIENT
Start: 2017-12-24 | End: 2017-12-29

## 2017-12-24 RX ORDER — POTASSIUM CHLORIDE 20MEQ/15ML
40 LIQUID (ML) ORAL ONCE
Status: COMPLETED | OUTPATIENT
Start: 2017-12-24 | End: 2017-12-24

## 2017-12-24 RX ORDER — POTASSIUM CHLORIDE 20MEQ/15ML
40 LIQUID (ML) ORAL
Status: COMPLETED | OUTPATIENT
Start: 2017-12-24 | End: 2017-12-24

## 2017-12-24 RX ORDER — ALBUTEROL SULFATE 0.83 MG/ML
2.5 SOLUTION RESPIRATORY (INHALATION)
Status: DISCONTINUED | OUTPATIENT
Start: 2017-12-24 | End: 2017-12-29 | Stop reason: HOSPADM

## 2017-12-24 RX ORDER — ACETYLCYSTEINE 200 MG/ML
1.5 SOLUTION ORAL; RESPIRATORY (INHALATION)
Status: DISCONTINUED | OUTPATIENT
Start: 2017-12-24 | End: 2017-12-29 | Stop reason: HOSPADM

## 2017-12-24 RX ADMIN — Medication 10 ML: at 23:51

## 2017-12-24 RX ADMIN — ALBUTEROL SULFATE 2.5 MG: 2.5 SOLUTION RESPIRATORY (INHALATION) at 08:42

## 2017-12-24 RX ADMIN — POTASSIUM CHLORIDE 40 MEQ: 20 SOLUTION ORAL at 09:50

## 2017-12-24 RX ADMIN — SODIUM CHLORIDE 40 MG: 9 INJECTION INTRAMUSCULAR; INTRAVENOUS; SUBCUTANEOUS at 14:06

## 2017-12-24 RX ADMIN — HYDROMORPHONE HYDROCHLORIDE 1 MG: 2 INJECTION, SOLUTION INTRAMUSCULAR; INTRAVENOUS; SUBCUTANEOUS at 14:15

## 2017-12-24 RX ADMIN — VANCOMYCIN HYDROCHLORIDE 1000 MG: 1 INJECTION, POWDER, LYOPHILIZED, FOR SOLUTION INTRAVENOUS at 00:59

## 2017-12-24 RX ADMIN — Medication 10 ML: at 05:20

## 2017-12-24 RX ADMIN — ACETYLCYSTEINE 300 MG: 200 SOLUTION ORAL; RESPIRATORY (INHALATION) at 19:17

## 2017-12-24 RX ADMIN — ALBUTEROL SULFATE 2.5 MG: 2.5 SOLUTION RESPIRATORY (INHALATION) at 12:06

## 2017-12-24 RX ADMIN — INSULIN LISPRO 2 UNITS: 100 INJECTION, SOLUTION INTRAVENOUS; SUBCUTANEOUS at 17:08

## 2017-12-24 RX ADMIN — POTASSIUM CHLORIDE 40 MEQ: 20 SOLUTION ORAL at 17:15

## 2017-12-24 RX ADMIN — ACETYLCYSTEINE 300 MG: 100 SOLUTION ORAL; RESPIRATORY (INHALATION) at 08:42

## 2017-12-24 RX ADMIN — HYDROMORPHONE HYDROCHLORIDE 1 MG: 2 INJECTION, SOLUTION INTRAMUSCULAR; INTRAVENOUS; SUBCUTANEOUS at 17:56

## 2017-12-24 RX ADMIN — Medication 1 AMPULE: at 20:29

## 2017-12-24 RX ADMIN — Medication 1 AMPULE: at 08:21

## 2017-12-24 RX ADMIN — SODIUM CHLORIDE 2 G: 900 INJECTION, SOLUTION INTRAVENOUS at 05:20

## 2017-12-24 RX ADMIN — ALBUTEROL SULFATE 2.5 MG: 2.5 SOLUTION RESPIRATORY (INHALATION) at 15:34

## 2017-12-24 RX ADMIN — INSULIN GLARGINE 20 UNITS: 100 INJECTION, SOLUTION SUBCUTANEOUS at 08:21

## 2017-12-24 RX ADMIN — SODIUM CHLORIDE 2 G: 900 INJECTION, SOLUTION INTRAVENOUS at 12:23

## 2017-12-24 RX ADMIN — ENOXAPARIN SODIUM 40 MG: 40 INJECTION SUBCUTANEOUS at 12:22

## 2017-12-24 RX ADMIN — ALBUTEROL SULFATE 2.5 MG: 2.5 SOLUTION RESPIRATORY (INHALATION) at 00:21

## 2017-12-24 RX ADMIN — SODIUM CHLORIDE 2 G: 900 INJECTION, SOLUTION INTRAVENOUS at 20:07

## 2017-12-24 RX ADMIN — ALBUTEROL SULFATE 2.5 MG: 2.5 SOLUTION RESPIRATORY (INHALATION) at 04:11

## 2017-12-24 RX ADMIN — ALBUTEROL SULFATE 2.5 MG: 2.5 SOLUTION RESPIRATORY (INHALATION) at 19:17

## 2017-12-24 RX ADMIN — HYDROMORPHONE HYDROCHLORIDE 1 MG: 2 INJECTION, SOLUTION INTRAMUSCULAR; INTRAVENOUS; SUBCUTANEOUS at 10:45

## 2017-12-24 RX ADMIN — Medication 10 ML: at 14:06

## 2017-12-24 NOTE — PROGRESS NOTES
Dr Randy Tran paged regarding K+ of 2.8.  Orders received to give 40 meq oral liquid K+ now and 40 meq oral this evening

## 2017-12-24 NOTE — PROGRESS NOTES
Rosa Block  Admission Date: 12/1/2017             Daily Progress Note: 12/24/2017    The patient's chart is reviewed and the patient is discussed with the staff. Admitted with abscess right leg - s/p multiple debridements with plans for skin graft. We are following for acute respiratory failure/hypoxia. Bronch on 12/21. Culture reported Enterobacter. Plans to transfer to [facility] for wound care. Subjective:     Brother helping him with breakfast.  Has not been out of bed - doctor won't let him stand on his leg.       Current Facility-Administered Medications   Medication Dose Route Frequency    potassium chloride (KAON 10%) 20 mEq/15 mL oral liquid 40 mEq  40 mEq Oral ONCE    vancomycin (VANCOCIN) 1,000 mg in 0.9% sodium chloride (MBP/ADV) 250 mL  1 g IntraVENous Q12H    cefepime (MAXIPIME) 2 g in 0.9% sodium chloride (MBP/ADV) 100 mL ADV  2 g IntraVENous Q8H    lidocaine (XYLOCAINE) 4 % (40 mg/mL) topical solution   Topical PRN    lidocaine (XYLOCAINE) 2 % jelly   Mucous Membrane PRN    albuterol (PROVENTIL VENTOLIN) nebulizer solution 2.5 mg  2.5 mg Nebulization Q4H RT    acetylcysteine (MUCOMYST) 100 mg/mL (10 %) nebulizer solution 300 mg  3 mL Nebulization QID RT    insulin glargine (LANTUS) injection 20 Units  20 Units SubCUTAneous DAILY    lip protectant (BLISTEX) ointment   Topical PRN    dextrose (D50W) injection syrg 12.5 g  25 mL IntraVENous PRN    HYDROmorphone (PF) (DILAUDID) injection 1 mg  1 mg IntraVENous Q4H PRN    enoxaparin (LOVENOX) injection 40 mg  40 mg SubCUTAneous Q24H    hydrALAZINE (APRESOLINE) 20 mg/mL injection 10 mg  10 mg IntraVENous Q4H PRN    alcohol 62% (NOZIN) nasal  1 Ampule  1 Ampule Topical Q12H    NUTRITIONAL SUPPORT ELECTROLYTE PRN ORDERS   Does Not Apply PRN    influenza vaccine 2017-18 (3 yrs+)(PF) (FLUZONE QUAD/FLUARIX QUAD) injection 0.5 mL  0.5 mL IntraMUSCular PRIOR TO DISCHARGE    insulin lispro (HUMALOG) injection SubCUTAneous Q4H    pantoprazole (PROTONIX) 40 mg in sodium chloride 0.9 % 10 mL injection  40 mg IntraVENous Q24H    sodium chloride (NS) flush 5-10 mL  5-10 mL IntraVENous Q8H    sodium chloride (NS) flush 5-10 mL  5-10 mL IntraVENous PRN         Objective:     Vitals:    12/24/17 0047 12/24/17 0300 12/24/17 0725 12/24/17 0847   BP: 144/76 126/75 106/63    Pulse: 80 77 72    Resp: 20  20    Temp: 98.4 °F (36.9 °C) 98.3 °F (36.8 °C) 98.2 °F (36.8 °C)    SpO2: 94% (!) 14% 92% 93%   Weight:       Height:         Intake and Output:   12/22 1901 - 12/24 0700  In: 1856 [I.V.:1856]  Out: 650 [Urine:300]       Physical Exam:   Constitutional:  the patient is well developed and in no acute distress  HEENT:  Sclera clear, pupils equal, oral mucosa moist  Lungs: clear bilaterally. Now on room air. Denies cough or congestion  Cardiovascular:  RRR without M,G,R  Abd/GI: soft and non-tender; with positive bowel sounds. Ext: warm without cyanosis. There is no lower leg edema. Musculoskeletal: moves all four extremities but weak right leg. Skin:  no jaundice or rashes, right leg wrapped with dressing   Neuro: no gross neuro deficits   Musculoskeletal: can't ambulate. No deformity  Psychiatric: Calm.      ROS:  Fair appetite, denies congestion  Lines: right neck central line, kathleen    CHEST XRAY:       LAB  Recent Labs      12/24/17   0514  12/23/17   0911  12/23/17   0356  12/22/17   0447   WBC  8.3   --   9.2  9.5   HGB  7.2*  7.0*  7.0*  9.0*   HCT  24.0*  23.1*  23.3*  29.9*   PLT  299   --   274  263     Recent Labs      12/24/17   0514  12/23/17   0356  12/22/17   0447   NA  147*  148*  149*   K  2.8*  3.0*  3.4*   CL  111*  113*  114*   CO2  27  28  29   GLU  124*  142*  121*   BUN  15  19  21   CREA  0.54*  0.67*  0.69*   MG   --    --   2.3   PHOS   --    --   2.1*   ALB   --   1.7*   --    SGOT   --   14*   --      Recent Labs      12/22/17   1132   PH  7.51*   PCO2  30*   PO2  68*   HCO3  24         Assessment: (Medical Decision Making)     Hospital Problems  Date Reviewed: 12/22/2017          Codes Class Noted POA    Hypernatremia ICD-10-CM: E87.0  ICD-9-CM: 276.0  12/22/2017 Unknown    slightly better but still elevated    Mucus plugging of bronchi ICD-10-CM: J98.09  ICD-9-CM: 519.19  12/21/2017 Unknown    S/p bronch with improvement    Atelectasis ICD-10-CM: J98.11  ICD-9-CM: 518.0  12/21/2017 Unknown    As above    Acute hypoxemic respiratory failure (HCC) ICD-10-CM: J96.01  ICD-9-CM: 518.81  12/20/2017 Unknown    Now on room air    Pneumonia due to infectious organism ICD-10-CM: J18.9  ICD-9-CM: 136.9, 484.8  12/20/2017 Unknown    Enterobacter per culture    Delirium ICD-10-CM: R41.0  ICD-9-CM: 780.09  12/19/2017 No    This is my first time meeting him but seems clear today    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 Will decrease frequency of mucolytics and bronchodilators  2. 7 day course Maxipeme for Enterobacter pneumonia  3. Will ask Dr. Cirilo Zaidi if patient can get up  4. ST following - now on mechanical soft diet with honey thick liquids    Trude Orlando, NP    More than 50% of time documented was spent face-to-face contact with the patient and in the care of the patient on the floor/unit where the patient is located. Lungs:  clear  Heart:  RRR with no Murmur/Rubs/Gallops    Additional Comments: Will finish 7 days of Cefepime and mobilize    I have spoken with and examined the patient.  I agree with the above assessment and plan as documented.     Nyla Martinez MD

## 2017-12-24 NOTE — PROGRESS NOTES
Hospitalist Progress Note    2017  Admit Date: 2017  2:54 PM   NAME: Mortimer Beal   :  1950   MRN:  [id number]   Attending: Orly Henderson MD  PCP:  None    SUBJECTIVE:     Mortimer Beal is a 80BDD with no previous known medical hx who was admitted on  with a syncopal episode. CT head with a SDH, no intervention per NSGY. 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 Was taken to the OR for debridement on  Yolie Cortes and has been followed by [facility] for further wound debridement. Extubated on  and transferred out of ICU. Was supposed to have skin grafting with Dr. Damaris Baugh, but had to be cancelled 2/2 worsening resp status. Pulm following, s/p bronch. : More alert,  afebrile overnight. Hypoxia improving, follows commands. UA showed Yeast, Cx pending, Not Aphonic anymore. Tolerating diet. Review of Systems negative with exception of pertinent positives noted above      PHYSICAL EXAM       Visit Vitals    /63 (BP 1 Location: Right arm, BP Patient Position: At rest)    Pulse 72    Temp 98.2 °F (36.8 °C)    Resp 20    Ht 5' 11\" (1.803 m)    Wt 75.5 kg (166 lb 6.4 oz)    SpO2 92%    BMI 23.21 kg/m2      Temp (24hrs), Av.1 °F (36.7 °C), Min:97.3 °F (36.3 °C), Max:98.7 °F (37.1 °C)    Oxygen Therapy  O2 Sat (%): 92 % (17 0725)  Pulse via Oximetry: 74 beats per minute (17)  O2 Device: Room air (17)  O2 Flow Rate (L/min): 2 l/min (17 0748)  O2 Temperature: 87.8 °F (31 °C) (17 0024)  FIO2 (%): 40 % (17 193)    Intake/Output Summary (Last 24 hours) at 17 0819  Last data filed at 17 1900   Gross per 24 hour   Intake             1122 ml   Output                0 ml   Net             1122 ml          General: No acute distress. Head:  Atraumatic Normocephalic.   ENT:  High flow NC in place  Lungs:  Coarse lung sounds in upper airways  CVS:  RRR  Abdomen: Soft, ND/NTTP, +BS  MSK:  Boots on feet bilaterally  Neurologic:  AAO X 3, moves all extremities.        Recent Results (from the past 24 hour(s))   GLUCOSE, POC    Collection Time: 12/23/17  8:41 AM   Result Value Ref Range    Glucose (POC) 173 (H) 65 - 100 mg/dL   HGB & HCT    Collection Time: 12/23/17  9:11 AM   Result Value Ref Range    HGB 7.0 (L) 13.6 - 17.2 g/dL    HCT 23.1 (L) 41.1 - 50.3 %   GLUCOSE, POC    Collection Time: 12/23/17 11:24 AM   Result Value Ref Range    Glucose (POC) 166 (H) 65 - 100 mg/dL   URINALYSIS W/ RFLX MICROSCOPIC    Collection Time: 12/23/17  1:29 PM   Result Value Ref Range    Color YELLOW      Appearance HAZY      Specific gravity 1.020 1.001 - 1.023      pH (UA) 6.0 5.0 - 9.0      Protein 30 (A) NEG mg/dL    Glucose NEGATIVE  mg/dL    Ketone TRACE (A) NEG mg/dL    Bilirubin NEGATIVE  NEG      Blood LARGE (A) NEG      Urobilinogen 0.2 0.2 - 1.0 EU/dL    Nitrites NEGATIVE  NEG      Leukocyte Esterase MODERATE (A) NEG      WBC 10-20 0 /hpf    RBC 5-10 0 /hpf    Bacteria 0 0 /hpf    Casts GRANULAR 0 /lpf    Amorphous Crystals TRACE 0      Yeast MARKED     GLUCOSE, POC    Collection Time: 12/23/17  4:46 PM   Result Value Ref Range    Glucose (POC) 169 (H) 65 - 100 mg/dL   GLUCOSE, POC    Collection Time: 12/23/17  9:02 PM   Result Value Ref Range    Glucose (POC) 129 (H) 65 - 100 mg/dL   GLUCOSE, POC    Collection Time: 12/24/17 12:33 AM   Result Value Ref Range    Glucose (POC) 111 (H) 65 - 100 mg/dL   GLUCOSE, POC    Collection Time: 12/24/17  4:34 AM   Result Value Ref Range    Glucose (POC) 147 (H) 65 - 100 mg/dL   CBC WITH AUTOMATED DIFF    Collection Time: 12/24/17  5:14 AM   Result Value Ref Range    WBC 8.3 4.3 - 11.1 K/uL    RBC 2.64 (L) 4.23 - 5.67 M/uL    HGB 7.2 (L) 13.6 - 17.2 g/dL    HCT 24.0 (L) 41.1 - 50.3 %    MCV 90.9 79.6 - 97.8 FL    MCH 27.3 26.1 - 32.9 PG    MCHC 30.0 (L) 31.4 - 35.0 g/dL    RDW 15.1 (H) 11.9 - 14.6 %    PLATELET 714 449 - 499 K/uL    MPV 10.9 10.8 - 14.1 FL    DF AUTOMATED      NEUTROPHILS 80 (H) 43 - 78 %    LYMPHOCYTES 13 13 - 44 %    MONOCYTES 4 4.0 - 12.0 %    EOSINOPHILS 2 0.5 - 7.8 %    BASOPHILS 0 0.0 - 2.0 %    IMMATURE GRANULOCYTES 1 0.0 - 5.0 %    ABS. NEUTROPHILS 6.7 1.7 - 8.2 K/UL    ABS. LYMPHOCYTES 1.1 0.5 - 4.6 K/UL    ABS. MONOCYTES 0.3 0.1 - 1.3 K/UL    ABS. EOSINOPHILS 0.1 0.0 - 0.8 K/UL    ABS. BASOPHILS 0.0 0.0 - 0.2 K/UL    ABS. IMM. GRANS. 0.1 0.0 - 0.5 K/UL   GLUCOSE, POC    Collection Time: 12/24/17  7:44 AM   Result Value Ref Range    Glucose (POC) 141 (H) 65 - 100 mg/dL         Imaging /Procedures /Studies   XR CHEST PORT   Final Result   IMPRESSION:      Persistent left basilar density, likely atelectasis or pneumonia. XR CHEST PORT   Final Result   IMPRESSION:      No significant change compared to prior exam.      XR CHEST SNGL V   Final Result   IMPRESSION:    1. Persisting left basilar infiltrate and probable small pleural effusion. 2. Minimal right basilar infiltrate increased since prior. XR CHEST SNGL V   Final Result   Impression:  Increased small left pleural effusion and associated left basilar   atelectasis and or consolidation. XR ABD (KUB)   Final Result   Impression: No acute pathology identified. XR CHEST SNGL V   Final Result   IMPRESSION: No acute findings      XR CHEST SNGL V   Final Result   IMPRESSION: Mild worsening left lower lobe atelectasis or pneumonia      XR CHEST SNGL V   Final Result   IMPRESSION: No acute findings in the chest         XR CHEST SNGL V   Final Result   IMPRESSION: ET tube has been repositioned and is now 9 cm above the cathy, at   the level of the thoracic inlet. This could be advanced 4 to 5 cm. The lungs are   clear. XR CHEST SNGL V   Final Result   IMPRESSION: Some left infrahilar atelectasis or infiltrate now present. XR CHEST SNGL V   Final Result   IMPRESSION: Bibasilar atelectasis or pneumonia significantly improved.             XR CHEST SNGL V   Final Result   IMPRESSION: Bibasilar atelectasis or pneumonia worse in the interval.            XR CHEST SNGL V   Final Result   IMPRESSION: Left basilar atelectasis improved. XR CHEST SNGL V   Final Result   IMPRESSION: New left lower lobe atelectasis or pneumonia. XR ABD (KUB)   Final Result   IMPRESSION: OG tube further in the stomach. XR CHEST SNGL V   Final Result   IMPRESSION: No acute cardiopulmonary disease. Recommend advancing the orogastric tube further into the stomach. XR PELV AP ONLY   Final Result   IMPRESSION:   1. 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 This is very poorly   assessed and characterized by portable plain film imaging. XR CHEST PORT   Final Result   IMPRESSION:    1. Improved although slight low position of endotracheal tube. Retraction of   this by 1 to 2 cm complex cyst and more ideal position. 2.  Persistent high position of an nasogastric tube. Advancement of this by 4 cm   would place this in a more ideal position. XR CHEST SNGL V   Final Result   IMPRESSION:    1. Low position of endotracheal tube tip overlying the most proximal right   mainstem bronchus. Retraction of this by 3 cm should place this at a more   acceptable position. 2.  Slight high position of feeding tube with the tip overlying the stomach   although the side-port likely overlies the distal esophagus. Advancement of this   by 4 cm should ensure that the side port is below the gastroesophageal junction. 3. No evolving acute cardiopulmonary changes. The abnormal endotracheal tube position was discussed with nursing staff a   muscle personally at 10:15 PM on 12/1/2017. XR CHEST PA LAT   Final Result   IMPRESSION:    1. No acute cardiopulmonary process evident by plain film imaging.                CT HEAD WO CONT   Final Result   IMPRESSION:     1. Small right subdural hematoma measuring 4 mm which demonstrates intermediate   attenuation suggesting a subacute although recent subdural hematoma. The subacute appearing right subdural hematoma was discussed with Dr. Harley Johnson by   myself personally at 5:05 PM on 12/1/2017.                ASSESSMENT      Hospital Problems as of 12/24/2017  Date Reviewed: 12/22/2017          Codes Class Noted - Resolved POA    Hypernatremia ICD-10-CM: E87.0  ICD-9-CM: 276.0  12/22/2017 - Present Unknown        Mucus plugging of bronchi ICD-10-CM: J98.09  ICD-9-CM: 519.19  12/21/2017 - Present Unknown        Atelectasis ICD-10-CM: J98.11  ICD-9-CM: 518.0  12/21/2017 - Present Unknown        Acute hypoxemic respiratory failure ([facility] ICD-10-CM: J96.01  ICD-9-CM: 518.81  12/20/2017 - Present Unknown        Pneumonia due to infectious organism ICD-10-CM: J18.9  ICD-9-CM: 136.9, 484.8  12/20/2017 - Present Unknown        Delirium ICD-10-CM: R41.0  ICD-9-CM: 780.09  12/19/2017 - Present No        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  ICD-9-CM: 608.83  12/17/2017 - Present Unknown        Subdural hematoma ([facility] ICD-10-CM: I62.00  ICD-9-CM: 432.1  12/1/2017 - Present Yes        Type II diabetes mellitus with complication (HCC) (Chronic) ICD-10-CM: E11.8  ICD-9-CM: 250.90  12/1/2017 - Present Yes        Syncope ICD-10-CM: R55  ICD-9-CM: 780.2  12/1/2017 - Present Yes        Fasciitis ICD-10-CM: M72.9  ICD-9-CM: 729.4  12/1/2017 - Present Yes        RESOLVED: Acute respiratory failure ([facility] ICD-10-CM: J96.00  ICD-9-CM: 518.81  12/8/2017 - 12/15/2017 Yes        RESOLVED: Hypernatremia ICD-10-CM: E87.0  ICD-9-CM: 276.0  12/6/2017 - 12/15/2017 Yes        RESOLVED: Lactic acidosis ICD-10-CM: E87.2  ICD-9-CM: 276.2  12/2/2017 - 12/6/2017 Yes        RESOLVED: LA[facility]ANO (acute kidney injury) ([facility] ICD-10-CM: N17.9  ICD-9-CM: 584.9  12/1/2017 - 12/8/2017 Yes        RESOLVED: Leukocytosis ICD-10-CM: S31.710  ICD-9-CM: 288.60  12/1/2017 - 12/8/2017 Yes        * (Principal)RESOLVED: Severe sepsis with septic shock [facility]) ICD-10-CM: A41.9, R65.21  ICD-9-CM: 038.9, 995.92, 785.52  12/1/2017 - 12/15/2017 Yes        RESOLVED: Electrolyte abnormality ICD-10-CM: E87.8  ICD-9-CM: 276.9  12/1/2017 - 12/8/2017 Yes                  Plan:    - HAP:  CXR with persistent L basilar infiltrate and new R infiltrate  Restarted broad spectrum abx on 12/20    - Acute hypoxic resp failure:  Remains on high flow NC  Pulm following, improved after bronch. 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 Has completed 2 weeks of IV abx  Follow UCx    - Subdural hematoma:  Non-surgical per NSGY    - Delirium:  Likely related to prolonged ICU stay  Appears to be slowly improving    - DM2:  BS uncontrolled  Continue lantus and ISS    - Dysphagia:  SLP following, Soft mech with nectar thicks. DVT Prophylaxis: Lovenox  Dispo: PT/OT following, PPD placed; CM following. Patient is an optimal candidate for LTACH as he will not only require wound care, colostomy management, PT/OT/SLP; but he will also continue to require daily input by pulmonology.     Milagro Carrillo MD

## 2017-12-25 LAB
ABO + RH BLD: NORMAL
ANION GAP SERPL CALC-SCNC: 8 MMOL/L (ref 7–16)
BASOPHILS # BLD: 0 K/UL (ref 0–0.2)
BASOPHILS NFR BLD: 0 % (ref 0–2)
BLD PROD TYP BPU: NORMAL
BLOOD GROUP ANTIBODIES SERPL: NORMAL
BPU ID: NORMAL
BUN SERPL-MCNC: 14 MG/DL (ref 8–23)
CALCIUM SERPL-MCNC: 7.3 MG/DL (ref 8.3–10.4)
CHLORIDE SERPL-SCNC: 111 MMOL/L (ref 98–107)
CO2 SERPL-SCNC: 27 MMOL/L (ref 21–32)
CREAT SERPL-MCNC: 0.5 MG/DL (ref 0.8–1.5)
CROSSMATCH RESULT,%XM: NORMAL
DIFFERENTIAL METHOD BLD: ABNORMAL
EOSINOPHIL # BLD: 0.2 K/UL (ref 0–0.8)
EOSINOPHIL NFR BLD: 2 % (ref 0.5–7.8)
ERYTHROCYTE [DISTWIDTH] IN BLOOD BY AUTOMATED COUNT: 15.4 % (ref 11.9–14.6)
GLUCOSE BLD STRIP.AUTO-MCNC: 117 MG/DL (ref 65–100)
GLUCOSE BLD STRIP.AUTO-MCNC: 123 MG/DL (ref 65–100)
GLUCOSE BLD STRIP.AUTO-MCNC: 124 MG/DL (ref 65–100)
GLUCOSE BLD STRIP.AUTO-MCNC: 132 MG/DL (ref 65–100)
GLUCOSE BLD STRIP.AUTO-MCNC: 134 MG/DL (ref 65–100)
GLUCOSE BLD STRIP.AUTO-MCNC: 144 MG/DL (ref 65–100)
GLUCOSE BLD STRIP.AUTO-MCNC: 168 MG/DL (ref 65–100)
GLUCOSE SERPL-MCNC: 133 MG/DL (ref 65–100)
HCT VFR BLD AUTO: 28.2 % (ref 41.1–50.3)
HGB BLD-MCNC: 8.6 G/DL (ref 13.6–17.2)
IMM GRANULOCYTES # BLD: 0.1 K/UL (ref 0–0.5)
IMM GRANULOCYTES NFR BLD AUTO: 1 % (ref 0–5)
LYMPHOCYTES # BLD: 0.8 K/UL (ref 0.5–4.6)
LYMPHOCYTES NFR BLD: 9 % (ref 13–44)
MAGNESIUM SERPL-MCNC: 1.9 MG/DL (ref 1.8–2.4)
MCH RBC QN AUTO: 27.1 PG (ref 26.1–32.9)
MCHC RBC AUTO-ENTMCNC: 30.5 G/DL (ref 31.4–35)
MCV RBC AUTO: 89 FL (ref 79.6–97.8)
MONOCYTES # BLD: 0.5 K/UL (ref 0.1–1.3)
MONOCYTES NFR BLD: 5 % (ref 4–12)
NEUTS SEG # BLD: 7.3 K/UL (ref 1.7–8.2)
NEUTS SEG NFR BLD: 83 % (ref 43–78)
PHOSPHATE SERPL-MCNC: 1.7 MG/DL (ref 2.3–3.7)
PLATELET # BLD AUTO: 285 K/UL (ref 150–450)
PMV BLD AUTO: 10.9 FL (ref 10.8–14.1)
POTASSIUM SERPL-SCNC: 2.9 MMOL/L (ref 3.5–5.1)
RBC # BLD AUTO: 3.17 M/UL (ref 4.23–5.67)
SODIUM SERPL-SCNC: 146 MMOL/L (ref 136–145)
SPECIMEN EXP DATE BLD: NORMAL
STATUS OF UNIT,%ST: NORMAL
UNIT DIVISION, %UDIV: 0
WBC # BLD AUTO: 8.8 K/UL (ref 4.3–11.1)

## 2017-12-25 PROCEDURE — 74011250636 HC RX REV CODE- 250/636: Performed by: INTERNAL MEDICINE

## 2017-12-25 PROCEDURE — 65270000029 HC RM PRIVATE

## 2017-12-25 PROCEDURE — 74011250636 HC RX REV CODE- 250/636: Performed by: HOSPITALIST

## 2017-12-25 PROCEDURE — 99232 SBSQ HOSP IP/OBS MODERATE 35: CPT | Performed by: INTERNAL MEDICINE

## 2017-12-25 PROCEDURE — 94760 N-INVAS EAR/PLS OXIMETRY 1: CPT

## 2017-12-25 PROCEDURE — 74011250636 HC RX REV CODE- 250/636: Performed by: SURGERY

## 2017-12-25 PROCEDURE — 74011000250 HC RX REV CODE- 250: Performed by: SURGERY

## 2017-12-25 PROCEDURE — C9113 INJ PANTOPRAZOLE SODIUM, VIA: HCPCS | Performed by: SURGERY

## 2017-12-25 PROCEDURE — 82962 GLUCOSE BLOOD TEST: CPT

## 2017-12-25 PROCEDURE — 85025 COMPLETE CBC W/AUTO DIFF WBC: CPT | Performed by: INTERNAL MEDICINE

## 2017-12-25 PROCEDURE — 83735 ASSAY OF MAGNESIUM: CPT | Performed by: INTERNAL MEDICINE

## 2017-12-25 PROCEDURE — 77030019605

## 2017-12-25 PROCEDURE — 94640 AIRWAY INHALATION TREATMENT: CPT

## 2017-12-25 PROCEDURE — 74011636637 HC RX REV CODE- 636/637: Performed by: HOSPITALIST

## 2017-12-25 PROCEDURE — 74011250636 HC RX REV CODE- 250/636: Performed by: NURSE PRACTITIONER

## 2017-12-25 PROCEDURE — 74011636637 HC RX REV CODE- 636/637: Performed by: INTERNAL MEDICINE

## 2017-12-25 PROCEDURE — 74011000258 HC RX REV CODE- 258: Performed by: NURSE PRACTITIONER

## 2017-12-25 PROCEDURE — 80048 BASIC METABOLIC PNL TOTAL CA: CPT | Performed by: INTERNAL MEDICINE

## 2017-12-25 PROCEDURE — 74011250637 HC RX REV CODE- 250/637: Performed by: SURGERY

## 2017-12-25 PROCEDURE — 84100 ASSAY OF PHOSPHORUS: CPT | Performed by: INTERNAL MEDICINE

## 2017-12-25 PROCEDURE — 74011000250 HC RX REV CODE- 250: Performed by: INTERNAL MEDICINE

## 2017-12-25 PROCEDURE — 74011000250 HC RX REV CODE- 250: Performed by: NURSE PRACTITIONER

## 2017-12-25 RX ORDER — POTASSIUM CHLORIDE 29.8 MG/ML
20 INJECTION INTRAVENOUS
Status: COMPLETED | OUTPATIENT
Start: 2017-12-25 | End: 2017-12-28

## 2017-12-25 RX ORDER — POTASSIUM CHLORIDE 29.8 MG/ML
40 INJECTION INTRAVENOUS ONCE
Status: DISCONTINUED | OUTPATIENT
Start: 2017-12-25 | End: 2017-12-25 | Stop reason: RX

## 2017-12-25 RX ORDER — FLUCONAZOLE 2 MG/ML
200 INJECTION, SOLUTION INTRAVENOUS ONCE
Status: COMPLETED | OUTPATIENT
Start: 2017-12-25 | End: 2017-12-28

## 2017-12-25 RX ORDER — MAGNESIUM SULFATE 1 G/100ML
1 INJECTION INTRAVENOUS ONCE
Status: COMPLETED | OUTPATIENT
Start: 2017-12-25 | End: 2017-12-28

## 2017-12-25 RX ADMIN — ALTEPLASE 2 MG: KIT at 12:44

## 2017-12-25 RX ADMIN — ALBUTEROL SULFATE 2.5 MG: 2.5 SOLUTION RESPIRATORY (INHALATION) at 08:03

## 2017-12-25 RX ADMIN — SODIUM CHLORIDE 2 G: 900 INJECTION, SOLUTION INTRAVENOUS at 03:25

## 2017-12-25 RX ADMIN — ACETYLCYSTEINE 300 MG: 200 SOLUTION ORAL; RESPIRATORY (INHALATION) at 08:04

## 2017-12-25 RX ADMIN — HYDROMORPHONE HYDROCHLORIDE 1 MG: 2 INJECTION, SOLUTION INTRAMUSCULAR; INTRAVENOUS; SUBCUTANEOUS at 01:46

## 2017-12-25 RX ADMIN — ALTEPLASE 2 MG: 2.2 INJECTION, POWDER, LYOPHILIZED, FOR SOLUTION INTRAVENOUS at 12:43

## 2017-12-25 RX ADMIN — ENOXAPARIN SODIUM 40 MG: 40 INJECTION SUBCUTANEOUS at 12:14

## 2017-12-25 RX ADMIN — MAGNESIUM SULFATE HEPTAHYDRATE 1 G: 1 INJECTION, SOLUTION INTRAVENOUS at 09:19

## 2017-12-25 RX ADMIN — HYDROMORPHONE HYDROCHLORIDE 1 MG: 2 INJECTION, SOLUTION INTRAMUSCULAR; INTRAVENOUS; SUBCUTANEOUS at 10:05

## 2017-12-25 RX ADMIN — INSULIN LISPRO 2 UNITS: 100 INJECTION, SOLUTION INTRAVENOUS; SUBCUTANEOUS at 12:17

## 2017-12-25 RX ADMIN — ALBUTEROL SULFATE 2.5 MG: 2.5 SOLUTION RESPIRATORY (INHALATION) at 19:56

## 2017-12-25 RX ADMIN — Medication 1 AMPULE: at 08:32

## 2017-12-25 RX ADMIN — POTASSIUM CHLORIDE 20 MEQ: 400 INJECTION, SOLUTION INTRAVENOUS at 08:33

## 2017-12-25 RX ADMIN — Medication 10 ML: at 20:08

## 2017-12-25 RX ADMIN — ACETYLCYSTEINE 300 MG: 200 SOLUTION ORAL; RESPIRATORY (INHALATION) at 20:00

## 2017-12-25 RX ADMIN — Medication 10 ML: at 05:31

## 2017-12-25 RX ADMIN — INSULIN GLARGINE 20 UNITS: 100 INJECTION, SOLUTION SUBCUTANEOUS at 08:32

## 2017-12-25 RX ADMIN — POTASSIUM CHLORIDE 20 MEQ: 400 INJECTION, SOLUTION INTRAVENOUS at 05:44

## 2017-12-25 RX ADMIN — SODIUM CHLORIDE 2 G: 900 INJECTION, SOLUTION INTRAVENOUS at 12:13

## 2017-12-25 RX ADMIN — FLUCONAZOLE 200 MG: 2 INJECTION INTRAVENOUS at 09:19

## 2017-12-25 RX ADMIN — HYDROMORPHONE HYDROCHLORIDE 1 MG: 2 INJECTION, SOLUTION INTRAMUSCULAR; INTRAVENOUS; SUBCUTANEOUS at 16:08

## 2017-12-25 RX ADMIN — SODIUM CHLORIDE 2 G: 900 INJECTION, SOLUTION INTRAVENOUS at 20:07

## 2017-12-25 RX ADMIN — Medication 5 ML: at 13:37

## 2017-12-25 RX ADMIN — SODIUM CHLORIDE 40 MG: 9 INJECTION INTRAMUSCULAR; INTRAVENOUS; SUBCUTANEOUS at 16:06

## 2017-12-25 RX ADMIN — ALBUTEROL SULFATE 2.5 MG: 2.5 SOLUTION RESPIRATORY (INHALATION) at 12:34

## 2017-12-25 RX ADMIN — Medication 1 AMPULE: at 20:08

## 2017-12-25 NOTE — PROGRESS NOTES
END OF SHIFT NOTE:    INTAKE/OUTPUT   0701 -  0700  In: 82.3 [I.V.:82.3]  Out: 5955 [[facility]]  Voiding: YES  Catheter: YES  Drain:   Colostomy 17 Upper Abdomen (Active)   Drainage Color Brown 2017 12:09 AM   Site Assessment Clean, dry, intact 2017 12:09 AM   Treatment Bag change; Wafer changed 2017 12:40 AM   Output (ml) 50 mL 2017  5:38 AM               Flatus: Patient does not have flatus present. Stool:  0 occurrences. Characteristics:  Stool Assessment  Stool Color: Brown  Stool Appearance: Loose  Stool Amount: Smear  Stool Source/Status: Colostomy    Emesis: 0 occurrences. Characteristics:        VITAL SIGNS  Patient Vitals for the past 12 hrs:   Temp Pulse Resp BP SpO2   17 0318 97.9 °F (36.6 °C) 81 17 127/67 90 %   17 2300 97.8 °F (36.6 °C) 80 17 154/79 92 %   17 1955 98.5 °F (36.9 °C) 76 17 124/66 98 %   17 1919 - - - - 94 %       Pain Assessment  Pain Intensity 1: 0 (17 0235)  Pain Location 1: Arm  Pain Intervention(s) 1: Medication (see MAR)  Patient Stated Pain Goal: 0    Ambulating  No    Shift report given to oncoming nurse at the bedside.     Pee Monroy RN

## 2017-12-25 NOTE — PROGRESS NOTES
Hospitalist Progress Note    2017  Admit Date: 2017  2:54 PM   NAME: Shanda Kidd   :  1950   MRN:  [id number]   Attending: Lauro Bui MD  PCP:  None    SUBJECTIVE:     Shanda Kidd is a 29ZQM with no previous known medical hx who was admitted on  with a syncopal episode. CT head with a SDH, no intervention per NSGY. 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 Was taken to the OR for debridement on  Nimo White) and has been followed by [facility] for further wound debridement. Extubated on  and transferred out of ICU. Was supposed to have skin grafting with Dr. lOu Liang, but had to be cancelled 2/2 worsening resp status. Pulm following, s/p bronch. : More alert,  afebrile overnight. Hypoxia improving, follows commands. Not Aphonic anymore. Tolerating diet. Review of Systems negative with exception of pertinent positives noted above      PHYSICAL EXAM       Visit Vitals    /69    Pulse 71    Temp 98 °F (36.7 °C)    Resp 18    Ht 5' 11\" (1.803 m)    Wt 75.5 kg (166 lb 6.4 oz)    SpO2 93%    BMI 23.21 kg/m2      Temp (24hrs), Av °F (36.7 °C), Min:97.7 °F (36.5 °C), Max:98.5 °F (36.9 °C)    Oxygen Therapy  O2 Sat (%): 93 % (17)  Pulse via Oximetry: 78 beats per minute (17)  O2 Device: Room air (17)  O2 Flow Rate (L/min): 2 l/min (17)  O2 Temperature: 87.8 °F (31 °C) (17)  FIO2 (%): 40 % (17)    Intake/Output Summary (Last 24 hours) at 17 0853  Last data filed at 17 07   Gross per 24 hour   Intake            124.6 ml   Output             1725 ml   Net          -1600.4 ml          General: No acute distress. Head:  Atraumatic Normocephalic. ENT:  High flow NC in place  Lungs:  Coarse lung sounds in upper airways  CVS:  RRR  Abdomen: Soft, ND/NTTP, +BS  MSK:  RLE wrapped in bandage  Neurologic:  AAO X 3, moves all extremities. Recent Results (from the past 24 hour(s))   GLUCOSE, POC    Collection Time: 12/24/17 11:30 AM   Result Value Ref Range    Glucose (POC) 137 (H) 65 - 100 mg/dL   TYPE + CROSSMATCH    Collection Time: 12/24/17  3:17 PM   Result Value Ref Range    Crossmatch Expiration 12/27/2017     ABO/Rh(D) O NEGATIVE     Antibody screen NEG     Unit number [id number]     Blood component type RC LR AS5     Unit division 00     Status of unit TRANSFUSED     Crossmatch result Compatible    GLUCOSE, POC    Collection Time: 12/24/17  4:48 PM   Result Value Ref Range    Glucose (POC) 163 (H) 65 - 100 mg/dL   GLUCOSE, POC    Collection Time: 12/24/17  8:18 PM   Result Value Ref Range    Glucose (POC) 146 (H) 65 - 100 mg/dL   GLUCOSE, POC    Collection Time: 12/25/17 12:00 AM   Result Value Ref Range    Glucose (POC) 144 (H) 65 - 100 mg/dL   GLUCOSE, POC    Collection Time: 12/25/17  4:15 AM   Result Value Ref Range    Glucose (POC) 134 (H) 65 - 100 mg/dL   PHOSPHORUS    Collection Time: 12/25/17  4:18 AM   Result Value Ref Range    Phosphorus 1.7 (L) 2.3 - 3.7 MG/DL   MAGNESIUM    Collection Time: 12/25/17  4:18 AM   Result Value Ref Range    Magnesium 1.9 1.8 - 2.4 mg/dL   CBC WITH AUTOMATED DIFF    Collection Time: 12/25/17  4:18 AM   Result Value Ref Range    WBC 8.8 4.3 - 11.1 K/uL    RBC 3.17 (L) 4.23 - 5.67 M/uL    HGB 8.6 (L) 13.6 - 17.2 g/dL    HCT 28.2 (L) 41.1 - 50.3 %    MCV 89.0 79.6 - 97.8 FL    MCH 27.1 26.1 - 32.9 PG    MCHC 30.5 (L) 31.4 - 35.0 g/dL    RDW 15.4 (H) 11.9 - 14.6 %    PLATELET 368 616 - 275 K/uL    MPV 10.9 10.8 - 14.1 FL    DF AUTOMATED      NEUTROPHILS 83 (H) 43 - 78 %    LYMPHOCYTES 9 (L) 13 - 44 %    MONOCYTES 5 4.0 - 12.0 %    EOSINOPHILS 2 0.5 - 7.8 %    BASOPHILS 0 0.0 - 2.0 %    IMMATURE GRANULOCYTES 1 0.0 - 5.0 %    ABS. NEUTROPHILS 7.3 1.7 - 8.2 K/UL    ABS. LYMPHOCYTES 0.8 0.5 - 4.6 K/UL    ABS. MONOCYTES 0.5 0.1 - 1.3 K/UL    ABS. EOSINOPHILS 0.2 0.0 - 0.8 K/UL    ABS.  BASOPHILS 0.0 0.0 - 0.2 K/UL    ABS. IMM. GRANS. 0.1 0.0 - 0.5 K/UL   METABOLIC PANEL, BASIC    Collection Time: 12/25/17  4:18 AM   Result Value Ref Range    Sodium 146 (H) 136 - 145 mmol/L    Potassium 2.9 (LL) 3.5 - 5.1 mmol/L    Chloride 111 (H) 98 - 107 mmol/L    CO2 27 21 - 32 mmol/L    Anion gap 8 7 - 16 mmol/L    Glucose 133 (H) 65 - 100 mg/dL    BUN 14 8 - 23 MG/DL    Creatinine 0.50 (L) 0.8 - 1.5 MG/DL    GFR est AA >60 >60 ml/min/1.73m2    GFR est non-AA >60 >60 ml/min/1.73m2    Calcium 7.3 (L) 8.3 - 10.4 MG/DL   GLUCOSE, POC    Collection Time: 12/25/17  7:56 AM   Result Value Ref Range    Glucose (POC) 123 (H) 65 - 100 mg/dL         Imaging /Procedures /Studies   XR CHEST PORT   Final Result   IMPRESSION:      Persistent left basilar density, likely atelectasis or pneumonia. XR CHEST PORT   Final Result   IMPRESSION:      No significant change compared to prior exam.      XR CHEST SNGL V   Final Result   IMPRESSION:    1. Persisting left basilar infiltrate and probable small pleural effusion. 2. Minimal right basilar infiltrate increased since prior. XR CHEST SNGL V   Final Result   Impression:  Increased small left pleural effusion and associated left basilar   atelectasis and or consolidation. XR ABD (KUB)   Final Result   Impression: No acute pathology identified. XR CHEST SNGL V   Final Result   IMPRESSION: No acute findings      XR CHEST SNGL V   Final Result   IMPRESSION: Mild worsening left lower lobe atelectasis or pneumonia      XR CHEST SNGL V   Final Result   IMPRESSION: No acute findings in the chest         XR CHEST SNGL V   Final Result   IMPRESSION: ET tube has been repositioned and is now 9 cm above the cathy, at   the level of the thoracic inlet. This could be advanced 4 to 5 cm. The lungs are   clear. XR CHEST SNGL V   Final Result   IMPRESSION: Some left infrahilar atelectasis or infiltrate now present.       XR CHEST SNGL V   Final Result   IMPRESSION: Bibasilar atelectasis or pneumonia significantly improved. XR CHEST SNGL V   Final Result   IMPRESSION: Bibasilar atelectasis or pneumonia worse in the interval.            XR CHEST SNGL V   Final Result   IMPRESSION: Left basilar atelectasis improved. XR CHEST SNGL V   Final Result   IMPRESSION: New left lower lobe atelectasis or pneumonia. XR ABD (KUB)   Final Result   IMPRESSION: OG tube further in the stomach. XR CHEST SNGL V   Final Result   IMPRESSION: No acute cardiopulmonary disease. Recommend advancing the orogastric tube further into the stomach. XR PELV AP ONLY   Final Result   IMPRESSION:   1. 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 This is very poorly   assessed and characterized by portable plain film imaging. XR CHEST PORT   Final Result   IMPRESSION:    1. Improved although slight low position of endotracheal tube. Retraction of   this by 1 to 2 cm complex cyst and more ideal position. 2.  Persistent high position of an nasogastric tube. Advancement of this by 4 cm   would place this in a more ideal position. XR CHEST SNGL V   Final Result   IMPRESSION:    1. Low position of endotracheal tube tip overlying the most proximal right   mainstem bronchus. Retraction of this by 3 cm should place this at a more   acceptable position. 2.  Slight high position of feeding tube with the tip overlying the stomach   although the side-port likely overlies the distal esophagus. Advancement of this   by 4 cm should ensure that the side port is below the gastroesophageal junction. 3. No evolving acute cardiopulmonary changes. The abnormal endotracheal tube position was discussed with nursing staff a   muscle personally at 10:15 PM on 12/1/2017. XR CHEST PA LAT   Final Result   IMPRESSION:    1.   No acute cardiopulmonary process evident by plain film imaging. CT HEAD WO CONT   Final Result   IMPRESSION:     1. Small right subdural hematoma measuring 4 mm which demonstrates intermediate   attenuation suggesting a subacute although recent subdural hematoma. The subacute appearing right subdural hematoma was discussed with Dr. Tiffanie Sosa by   myself personally at 5:05 PM on 12/1/2017.                ASSESSMENT      Hospital Problems as of 12/25/2017  Date Reviewed: 12/22/2017          Codes Class Noted - Resolved POA    Hypernatremia ICD-10-CM: E87.0  ICD-9-CM: 276.0  12/22/2017 - Present Unknown        Mucus plugging of bronchi ICD-10-CM: J98.09  ICD-9-CM: 519.19  12/21/2017 - Present Unknown        Atelectasis ICD-10-CM: J98.11  ICD-9-CM: 518.0  12/21/2017 - Present Unknown        Acute hypoxemic respiratory failure ([facility] ICD-10-CM: J96.01  ICD-9-CM: 518.81  12/20/2017 - Present Unknown        Pneumonia due to infectious organism ICD-10-CM: J18.9  ICD-9-CM: 136.9, 484.8  12/20/2017 - Present Unknown        Delirium ICD-10-CM: R41.0  ICD-9-CM: 780.09  12/19/2017 - Present No        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  ICD-9-CM: 608.83  12/17/2017 - Present Unknown        Subdural hematoma ([facility] ICD-10-CM: I62.00  ICD-9-CM: 432.1  12/1/2017 - Present Yes        Type II diabetes mellitus with complication (HCC) (Chronic) ICD-10-CM: E11.8  ICD-9-CM: 250.90  12/1/2017 - Present Yes        Syncope ICD-10-CM: R55  ICD-9-CM: 780.2  12/1/2017 - Present Yes        Fasciitis ICD-10-CM: M72.9  ICD-9-CM: 729.4  12/1/2017 - Present Yes        RESOLVED: Acute respiratory failure ([facility] ICD-10-CM: J96.00  ICD-9-CM: 518.81  12/8/2017 - 12/15/2017 Yes        RESOLVED: Hypernatremia ICD-10-CM: E87.0  ICD-9-CM: 276.0  12/6/2017 - 12/15/2017 Yes        RESOLVED: Lactic acidosis ICD-10-CM: A86.6  ICD-9-CM: 276.2  12/2/2017 - 12/6/2017 Yes        RESOLVED: LAUREANO (acute kidney injury) ([facility] ICD-10-CM: N17.9  ICD-9-CM: 584.9 12/1/2017 - 12/8/2017 Yes        RESOLVED: Leukocytosis ICD-10-CM: O32.254  ICD-9-CM: 288.60  12/1/2017 - 12/8/2017 Yes        * (Principal)RESOLVED: Severe sepsis with septic shock [facility]) ICD-10-CM: A41.9, R65.21  ICD-9-CM: 038.9, 995.92, 785.52  12/1/2017 - 12/15/2017 Yes        RESOLVED: Electrolyte abnormality ICD-10-CM: E87.8  ICD-9-CM: 276.9  12/1/2017 - 12/8/2017 Yes                  Plan:    - HAP:  CXR with persistent L basilar infiltrate and new R infiltrate  Restarted broad spectrum abx on 12/20    - Acute hypoxic resp failure:  Remains on high flow NC  Pulm following, improved after bronch. - 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:  Initially debrided by Eric Perez 12/1  Followed by [facility] for further debridement  Skin graft delayed, wound Vac removed. Has completed 2 weeks of IV abx    - Fungal UTI: Follow culture, started on diflucan. - Subdural hematoma:  Non-surgical per NSGY    - Delirium:  Likely related to prolonged ICU stay  Appears to be slowly improving    - DM2:  BS uncontrolled  Continue lantus and ISS    - Dysphagia:  SLP following, Soft mech with nectar thicks. DVT Prophylaxis: Lovenox  Dispo: PT/OT following, PPD placed; CM following. Patient is an optimal candidate for LTACH as he will not only require wound care, colostomy management, PT/OT/SLP; but he will also continue to require daily input by pulmonology.     Rao Lopez MD

## 2017-12-25 NOTE — PROGRESS NOTES
Jose Schmidt  Admission Date: 12/1/2017             Daily Progress Note: 12/25/2017    The patient's chart is reviewed and the patient is discussed with the staff. Subjective:     78 y/o Admitted with abscess right leg - s/p multiple debridements with plans for skin graft. We are following for acute respiratory failure/hypoxia. Bronch on 12/21. Culture reported Enterobacter. Plans to transfer to [facility] for wound care.   Now off O2    Current Facility-Administered Medications   Medication Dose Route Frequency    potassium chloride 20 mEq in 50 ml IVPB  20 mEq IntraVENous Q2H    acetylcysteine (MUCOMYST) 200 mg/mL (20 %) solution 300 mg  1.5 mL Nebulization BID RT    albuterol (PROVENTIL VENTOLIN) nebulizer solution 2.5 mg  2.5 mg Nebulization QID RT    0.9% sodium chloride infusion 250 mL  250 mL IntraVENous PRN    cefepime (MAXIPIME) 2 g in 0.9% sodium chloride (MBP/ADV) 100 mL ADV  2 g IntraVENous Q8H    lidocaine (XYLOCAINE) 4 % (40 mg/mL) topical solution   Topical PRN    lidocaine (XYLOCAINE) 2 % jelly   Mucous Membrane PRN    insulin glargine (LANTUS) injection 20 Units  20 Units SubCUTAneous DAILY    lip protectant (BLISTEX) ointment   Topical PRN    dextrose (D50W) injection syrg 12.5 g  25 mL IntraVENous PRN    HYDROmorphone (PF) (DILAUDID) injection 1 mg  1 mg IntraVENous Q4H PRN    enoxaparin (LOVENOX) injection 40 mg  40 mg SubCUTAneous Q24H    hydrALAZINE (APRESOLINE) 20 mg/mL injection 10 mg  10 mg IntraVENous Q4H PRN    alcohol 62% (NOZIN) nasal  1 Ampule  1 Ampule Topical Q12H    NUTRITIONAL SUPPORT ELECTROLYTE PRN ORDERS   Does Not Apply PRN    influenza vaccine 2017-18 (3 yrs+)(PF) (FLUZONE QUAD/FLUARIX QUAD) injection 0.5 mL  0.5 mL IntraMUSCular PRIOR TO DISCHARGE    insulin lispro (HUMALOG) injection   SubCUTAneous Q4H    pantoprazole (PROTONIX) 40 mg in sodium chloride 0.9 % 10 mL injection  40 mg IntraVENous Q24H    sodium chloride (NS) flush 5-10 mL  5-10 mL IntraVENous Q8H    sodium chloride (NS) flush 5-10 mL  5-10 mL IntraVENous PRN       Review of Systems    Constitutional: negative for fever, chills, sweats  Cardiovascular: negative for chest pain, palpitations, syncope, edema  Gastrointestinal:  negative for dysphagia, reflux, vomiting, diarrhea, abdominal pain, or melena  Neurologic:  negative for focal weakness, numbness, headache    Objective:     Vitals:    12/24/17 1955 12/24/17 2300 12/25/17 0318 12/25/17 0726   BP: 124/66 154/79 127/67 116/69   Pulse: 76 80 81 71   Resp: 17 17 17 18   Temp: 98.5 °F (36.9 °C) 97.8 °F (36.6 °C) 97.9 °F (36.6 °C) 98 °F (36.7 °C)   SpO2: 98% 92% 90% 93%   Weight:       Height:         Intake and Output:   12/23 1901 - 12/25 0700  In: 82.3 [I.V.:82.3]  Out: 1757 [Urine:1375]  12/25 0701 - 12/25 1900  In: 42.3 [I.V.:42.3]  Out: 350 [Urine:350]    Physical Exam:   Constitution:  the patient is well developed and in no acute distress  EENMT:  Sclera clear, pupils equal, oral mucosa moist  Respiratory:  Crackles bilateral  Cardiovascular:  RRR without M,G,R  Gastrointestinal: soft and non-tender; with positive bowel sounds. Musculoskeletal: warm without cyanosis. There is no lower leg edema.   Skin:  no jaundice or rashes, no wounds   Neurologic: no gross neuro deficits     Psychiatric:  alert     CXR:       LAB  Recent Labs      12/25/17   0756  12/25/17   0415  12/25/17   0000  12/24/17   2018  12/24/17   1648   GLUCPOC  123*  134*  144*  146*  163*      Recent Labs      12/25/17   0418  12/24/17   0514  12/23/17   0911  12/23/17   0356   WBC  8.8  8.3   --   9.2   HGB  8.6*  7.2*  7.0*  7.0*   HCT  28.2*  24.0*  23.1*  23.3*   PLT  285  299   --   274     Recent Labs      12/25/17   0418  12/24/17   0514  12/23/17   0356   NA  146*  147*  148*   K  2.9*  2.8*  3.0*   CL  111*  111*  113*   CO2  27  27  28   GLU  133*  124*  142*   BUN  14  15  19   CREA  0.50*  0.54*  0.67*   MG  1.9   --    --    CA  7.3*  7.6* 7.6*   PHOS  1.7*   --    --    ALB   --    --   1.7*   TBILI   --    --   0.6   ALT   --    --   11*   SGOT   --    --   14*     Recent Labs      12/22/17   1132   PH  7.51*   PCO2  30*   PO2  68*   HCO3  24     No results for input(s): LCAD, LAC in the last 72 hours. Assessment:  (Medical Decision Making)     Hospital Problems  Date Reviewed: 12/22/2017          Codes Class Noted POA    Hypernatremia ICD-10-CM: E87.0  ICD-9-CM: 276.0  12/22/2017 Unknown    146    Mucus plugging of bronchi ICD-10-CM: J98.09  ICD-9-CM: 519.19  12/21/2017 Unknown        Atelectasis ICD-10-CM: J98.11  ICD-9-CM: 518.0  12/21/2017 Unknown        Acute hypoxemic respiratory failure (HCC) ICD-10-CM: J96.01  ICD-9-CM: 518.81  12/20/2017 Unknown    better    Pneumonia due to infectious organism ICD-10-CM: J18.9  ICD-9-CM: 136.9, 484.8  12/20/2017 Unknown    enterobacter    Delirium ICD-10-CM: R41.0  ICD-9-CM: 780.09  12/19/2017 No        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  ICD-9-CM: 608.83  12/17/2017 Unknown        Subd[facility] hematoma ([facility] ICD-10-CM: I62.00  ICD-9-CM: 432.1  12/1/2017 Yes        Type II diabetes mellitus with complication (HCC) (Chronic) ICD-10-CM: E11.8  ICD-9-CM: 250.90  12/1/2017 Yes        Syncope ICD-10-CM: R55  ICD-9-CM: 780.2  12/1/2017 Yes        Fasciitis ICD-10-CM: M72.9  ICD-9-CM: 729.4  12/1/2017 Yes              Plan:  (Medical Decision Making)   1    Iv maxipime day 5  2    [facility]  --    More than 50% of the time documented was spent in face-to-face contact with the patient and in the care of the patient on the floor/unit where the patient is located.     Yolanda Guadalupe MD

## 2017-12-25 NOTE — PROGRESS NOTES
Problem: Falls - Risk of  Goal: *Absence of Falls  Document Larry Fall Risk and appropriate interventions in the flowsheet.    Outcome: Progressing Towards Goal  Fall Risk Interventions:  Mobility Interventions: Communicate number of staff needed for ambulation/transfer, Patient to call before getting OOB, PT Consult for mobility concerns    Mentation Interventions: Adequate sleep, hydration, pain control, Bed/chair exit alarm, Evaluate medications/consider consulting pharmacy    Medication Interventions: Patient to call before getting OOB, Teach patient to arise slowly    Elimination Interventions: Call light in reach, Patient to call for help with toileting needs, Toileting schedule/hourly rounds    History of Falls Interventions: Door open when patient unattended, Evaluate medications/consider consulting pharmacy

## 2017-12-26 LAB
ANION GAP SERPL CALC-SCNC: 8 MMOL/L (ref 7–16)
BASOPHILS # BLD: 0 K/UL (ref 0–0.2)
BASOPHILS NFR BLD: 0 % (ref 0–2)
BUN SERPL-MCNC: 12 MG/DL (ref 8–23)
CALCIUM SERPL-MCNC: 7.1 MG/DL (ref 8.3–10.4)
CHLORIDE SERPL-SCNC: 109 MMOL/L (ref 98–107)
CO2 SERPL-SCNC: 26 MMOL/L (ref 21–32)
CREAT SERPL-MCNC: 0.51 MG/DL (ref 0.8–1.5)
DIFFERENTIAL METHOD BLD: ABNORMAL
EOSINOPHIL # BLD: 0.3 K/UL (ref 0–0.8)
EOSINOPHIL NFR BLD: 2 % (ref 0.5–7.8)
ERYTHROCYTE [DISTWIDTH] IN BLOOD BY AUTOMATED COUNT: 15.7 % (ref 11.9–14.6)
GLUCOSE BLD STRIP.AUTO-MCNC: 118 MG/DL (ref 65–100)
GLUCOSE BLD STRIP.AUTO-MCNC: 121 MG/DL (ref 65–100)
GLUCOSE BLD STRIP.AUTO-MCNC: 129 MG/DL (ref 65–100)
GLUCOSE BLD STRIP.AUTO-MCNC: 138 MG/DL (ref 65–100)
GLUCOSE BLD STRIP.AUTO-MCNC: 150 MG/DL (ref 65–100)
GLUCOSE BLD STRIP.AUTO-MCNC: 164 MG/DL (ref 65–100)
GLUCOSE SERPL-MCNC: 131 MG/DL (ref 65–100)
HCT VFR BLD AUTO: 30.7 % (ref 41.1–50.3)
HGB BLD-MCNC: 9.4 G/DL (ref 13.6–17.2)
IMM GRANULOCYTES # BLD: 0.2 K/UL (ref 0–0.5)
IMM GRANULOCYTES NFR BLD AUTO: 2 % (ref 0–5)
LYMPHOCYTES # BLD: 1 K/UL (ref 0.5–4.6)
LYMPHOCYTES NFR BLD: 9 % (ref 13–44)
MCH RBC QN AUTO: 27.2 PG (ref 26.1–32.9)
MCHC RBC AUTO-ENTMCNC: 30.6 G/DL (ref 31.4–35)
MCV RBC AUTO: 89 FL (ref 79.6–97.8)
MONOCYTES # BLD: 0.4 K/UL (ref 0.1–1.3)
MONOCYTES NFR BLD: 3 % (ref 4–12)
NEUTS SEG # BLD: 9.9 K/UL (ref 1.7–8.2)
NEUTS SEG NFR BLD: 84 % (ref 43–78)
PLATELET # BLD AUTO: 261 K/UL (ref 150–450)
PMV BLD AUTO: 10.8 FL (ref 10.8–14.1)
POTASSIUM SERPL-SCNC: 3.1 MMOL/L (ref 3.5–5.1)
RBC # BLD AUTO: 3.45 M/UL (ref 4.23–5.67)
SODIUM SERPL-SCNC: 143 MMOL/L (ref 136–145)
WBC # BLD AUTO: 11.7 K/UL (ref 4.3–11.1)

## 2017-12-26 PROCEDURE — 77030019605

## 2017-12-26 PROCEDURE — 36592 COLLECT BLOOD FROM PICC: CPT

## 2017-12-26 PROCEDURE — 85025 COMPLETE CBC W/AUTO DIFF WBC: CPT | Performed by: INTERNAL MEDICINE

## 2017-12-26 PROCEDURE — 74011000250 HC RX REV CODE- 250: Performed by: INTERNAL MEDICINE

## 2017-12-26 PROCEDURE — 74011000250 HC RX REV CODE- 250: Performed by: NURSE PRACTITIONER

## 2017-12-26 PROCEDURE — 74011636637 HC RX REV CODE- 636/637: Performed by: HOSPITALIST

## 2017-12-26 PROCEDURE — 74011250637 HC RX REV CODE- 250/637: Performed by: INTERNAL MEDICINE

## 2017-12-26 PROCEDURE — 94640 AIRWAY INHALATION TREATMENT: CPT

## 2017-12-26 PROCEDURE — 97530 THERAPEUTIC ACTIVITIES: CPT

## 2017-12-26 PROCEDURE — 74011000258 HC RX REV CODE- 258: Performed by: NURSE PRACTITIONER

## 2017-12-26 PROCEDURE — 94760 N-INVAS EAR/PLS OXIMETRY 1: CPT

## 2017-12-26 PROCEDURE — 74011250636 HC RX REV CODE- 250/636: Performed by: NURSE PRACTITIONER

## 2017-12-26 PROCEDURE — 65270000029 HC RM PRIVATE

## 2017-12-26 PROCEDURE — 97535 SELF CARE MNGMENT TRAINING: CPT

## 2017-12-26 PROCEDURE — 74011250636 HC RX REV CODE- 250/636: Performed by: INTERNAL MEDICINE

## 2017-12-26 PROCEDURE — 74011000250 HC RX REV CODE- 250: Performed by: SURGERY

## 2017-12-26 PROCEDURE — 82962 GLUCOSE BLOOD TEST: CPT

## 2017-12-26 PROCEDURE — C9113 INJ PANTOPRAZOLE SODIUM, VIA: HCPCS | Performed by: SURGERY

## 2017-12-26 PROCEDURE — 74011250636 HC RX REV CODE- 250/636: Performed by: SURGERY

## 2017-12-26 PROCEDURE — 80048 BASIC METABOLIC PNL TOTAL CA: CPT | Performed by: INTERNAL MEDICINE

## 2017-12-26 PROCEDURE — 74011250637 HC RX REV CODE- 250/637: Performed by: SURGERY

## 2017-12-26 PROCEDURE — 74011636637 HC RX REV CODE- 636/637: Performed by: INTERNAL MEDICINE

## 2017-12-26 RX ORDER — ACETAMINOPHEN 325 MG/1
650 TABLET ORAL
Status: DISCONTINUED | OUTPATIENT
Start: 2017-12-26 | End: 2017-12-29 | Stop reason: HOSPADM

## 2017-12-26 RX ORDER — SODIUM,POTASSIUM PHOSPHATES 280-250MG
1 POWDER IN PACKET (EA) ORAL 4 TIMES DAILY
Status: DISCONTINUED | OUTPATIENT
Start: 2017-12-26 | End: 2017-12-29

## 2017-12-26 RX ADMIN — POTASSIUM & SODIUM PHOSPHATES POWDER PACK 280-160-250 MG 1 PACKET: 280-160-250 PACK at 17:14

## 2017-12-26 RX ADMIN — POTASSIUM & SODIUM PHOSPHATES POWDER PACK 280-160-250 MG 1 PACKET: 280-160-250 PACK at 11:18

## 2017-12-26 RX ADMIN — ACETYLCYSTEINE 300 MG: 200 SOLUTION ORAL; RESPIRATORY (INHALATION) at 20:17

## 2017-12-26 RX ADMIN — HYDROMORPHONE HYDROCHLORIDE 1 MG: 2 INJECTION, SOLUTION INTRAMUSCULAR; INTRAVENOUS; SUBCUTANEOUS at 13:34

## 2017-12-26 RX ADMIN — POTASSIUM & SODIUM PHOSPHATES POWDER PACK 280-160-250 MG 1 PACKET: 280-160-250 PACK at 21:52

## 2017-12-26 RX ADMIN — Medication 10 ML: at 17:09

## 2017-12-26 RX ADMIN — SODIUM CHLORIDE 2 G: 900 INJECTION, SOLUTION INTRAVENOUS at 21:51

## 2017-12-26 RX ADMIN — INSULIN GLARGINE 20 UNITS: 100 INJECTION, SOLUTION SUBCUTANEOUS at 08:03

## 2017-12-26 RX ADMIN — SODIUM CHLORIDE 2 G: 900 INJECTION, SOLUTION INTRAVENOUS at 11:18

## 2017-12-26 RX ADMIN — Medication 1 AMPULE: at 08:04

## 2017-12-26 RX ADMIN — Medication 10 ML: at 04:35

## 2017-12-26 RX ADMIN — HYDROMORPHONE HYDROCHLORIDE 1 MG: 2 INJECTION, SOLUTION INTRAMUSCULAR; INTRAVENOUS; SUBCUTANEOUS at 06:57

## 2017-12-26 RX ADMIN — POTASSIUM & SODIUM PHOSPHATES POWDER PACK 280-160-250 MG 1 PACKET: 280-160-250 PACK at 12:19

## 2017-12-26 RX ADMIN — SODIUM CHLORIDE 40 MG: 9 INJECTION INTRAMUSCULAR; INTRAVENOUS; SUBCUTANEOUS at 14:22

## 2017-12-26 RX ADMIN — Medication 1 AMPULE: at 21:51

## 2017-12-26 RX ADMIN — ALBUTEROL SULFATE 2.5 MG: 2.5 SOLUTION RESPIRATORY (INHALATION) at 11:22

## 2017-12-26 RX ADMIN — ACETAMINOPHEN 650 MG: 325 TABLET ORAL at 12:18

## 2017-12-26 RX ADMIN — INSULIN LISPRO 2 UNITS: 100 INJECTION, SOLUTION INTRAVENOUS; SUBCUTANEOUS at 21:52

## 2017-12-26 RX ADMIN — INSULIN LISPRO 2 UNITS: 100 INJECTION, SOLUTION INTRAVENOUS; SUBCUTANEOUS at 17:08

## 2017-12-26 RX ADMIN — ENOXAPARIN SODIUM 40 MG: 40 INJECTION SUBCUTANEOUS at 12:02

## 2017-12-26 RX ADMIN — ALBUTEROL SULFATE 2.5 MG: 2.5 SOLUTION RESPIRATORY (INHALATION) at 20:17

## 2017-12-26 RX ADMIN — DAKIN'S SOLUTION 0.125% (QUARTER STRENGTH): 0.12 SOLUTION at 13:39

## 2017-12-26 RX ADMIN — POTASSIUM PHOSPHATE, MONOBASIC AND POTASSIUM PHOSPHATE, DIBASIC: 224; 236 INJECTION, SOLUTION INTRAVENOUS at 11:07

## 2017-12-26 RX ADMIN — ACETAMINOPHEN 650 MG: 325 TABLET ORAL at 02:20

## 2017-12-26 RX ADMIN — ACETYLCYSTEINE 300 MG: 200 SOLUTION ORAL; RESPIRATORY (INHALATION) at 07:36

## 2017-12-26 RX ADMIN — ALBUTEROL SULFATE 2.5 MG: 2.5 SOLUTION RESPIRATORY (INHALATION) at 07:35

## 2017-12-26 RX ADMIN — ACETAMINOPHEN 650 MG: 325 TABLET ORAL at 21:50

## 2017-12-26 RX ADMIN — Medication 10 ML: at 21:53

## 2017-12-26 RX ADMIN — SODIUM CHLORIDE 2 G: 900 INJECTION, SOLUTION INTRAVENOUS at 04:36

## 2017-12-26 RX ADMIN — ALBUTEROL SULFATE 2.5 MG: 2.5 SOLUTION RESPIRATORY (INHALATION) at 15:22

## 2017-12-26 NOTE — PROGRESS NOTES
Discussed kathleen to be discontinued with Dr Kelsie Villanueva. Because of the location of the wound, and pt's poor hand coordination, pt is unable to handle the urinal without spilling urine onto wound bed.   Will leave kathleen catheter in at this time

## 2017-12-26 NOTE — PROGRESS NOTES
Dispo update:  Per Mr. Fletcherrenny Vinicius, 4th floor [facility] liaison, will need to submit updated clinical information for [facility] insurance pre-cert. Clinical information provided, await [employer] pre-cert.

## 2017-12-26 NOTE — PROGRESS NOTES
Hospitalist Progress Note    2017  Admit Date: 2017  2:54 PM   NAME: Will Gutierrez   :  1950   MRN:  [id number]   Attending: Becca Crain MD  PCP:  None    SUBJECTIVE:     Will Gutierrez is a 39QXN with no previous known medical hx who was admitted on  with a syncopal episode. CT head with a SDH, no intervention per NSGY. 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 Was taken to the OR for debridement on  Jill Chapman) and has been followed by [facility] for further wound debridement. Extubated on  and transferred out of ICU. Was supposed to have skin grafting with Dr. Xander Funez, but had to be cancelled 2/2 worsening resp status. Pulm following, s/p bronch. He is [facility]     : More alert,  afebrile overnight. Hypoxia improving, follows commands. Not Aphonic anymore. Tolerating diet. Review of Systems negative with exception of pertinent positives noted above      PHYSICAL EXAM       Visit Vitals    /69    Pulse 73    Temp 97.6 °F (36.4 °C)    Resp 18    Ht 5' 11\" (1.803 m)    Wt 75.5 kg (166 lb 6.4 oz)    SpO2 94%    BMI 23.21 kg/m2      Temp (24hrs), Av.8 °F (36.6 °C), Min:97.5 °F (36.4 °C), Max:98.5 °F (36.9 °C)    Oxygen Therapy  O2 Sat (%): 94 % (17 0736)  Pulse via Oximetry: 80 beats per minute (17 0736)  O2 Device: Room air (17)  O2 Flow Rate (L/min): 2 l/min (17 0748)  O2 Temperature: 87.8 °F (31 °C) (17 0024)  FIO2 (%): 40 % (17 1938)    Intake/Output Summary (Last 24 hours) at 17 0931  Last data filed at 17 0300   Gross per 24 hour   Intake                0 ml   Output              975 ml   Net             -975 ml          General: No acute distress. Head:  Atraumatic Normocephalic.   ENT:  High flow NC in place  Lungs:  Coarse lung sounds in upper airways  CVS:  RRR  Abdomen: Soft, ND/NTTP, +BS  MSK:  RLE wrapped in bandage  Neurologic:  AAO X 3, moves all extremities. Recent Results (from the past 24 hour(s))   GLUCOSE, POC    Collection Time: 12/25/17 11:54 AM   Result Value Ref Range    Glucose (POC) 168 (H) 65 - 100 mg/dL   GLUCOSE, POC    Collection Time: 12/25/17  5:20 PM   Result Value Ref Range    Glucose (POC) 117 (H) 65 - 100 mg/dL   GLUCOSE, POC    Collection Time: 12/25/17  7:53 PM   Result Value Ref Range    Glucose (POC) 124 (H) 65 - 100 mg/dL   GLUCOSE, POC    Collection Time: 12/25/17 10:58 PM   Result Value Ref Range    Glucose (POC) 132 (H) 65 - 100 mg/dL   GLUCOSE, POC    Collection Time: 12/26/17  4:03 AM   Result Value Ref Range    Glucose (POC) 118 (H) 65 - 395 mg/dL   METABOLIC PANEL, BASIC    Collection Time: 12/26/17  4:38 AM   Result Value Ref Range    Sodium 143 136 - 145 mmol/L    Potassium 3.1 (L) 3.5 - 5.1 mmol/L    Chloride 109 (H) 98 - 107 mmol/L    CO2 26 21 - 32 mmol/L    Anion gap 8 7 - 16 mmol/L    Glucose 131 (H) 65 - 100 mg/dL    BUN 12 8 - 23 MG/DL    Creatinine 0.51 (L) 0.8 - 1.5 MG/DL    GFR est AA >60 >60 ml/min/1.73m2    GFR est non-AA >60 >60 ml/min/1.73m2    Calcium 7.1 (L) 8.3 - 10.4 MG/DL   GLUCOSE, POC    Collection Time: 12/26/17  7:52 AM   Result Value Ref Range    Glucose (POC) 129 (H) 65 - 100 mg/dL   GLUCOSE, POC    Collection Time: 12/26/17  8:33 AM   Result Value Ref Range    Glucose (POC) 121 (H) 65 - 100 mg/dL         Imaging /Procedures /Studies   XR CHEST PORT   Final Result   IMPRESSION:      Persistent left basilar density, likely atelectasis or pneumonia. XR CHEST PORT   Final Result   IMPRESSION:      No significant change compared to prior exam.      XR CHEST SNGL V   Final Result   IMPRESSION:    1. Persisting left basilar infiltrate and probable small pleural effusion. 2. Minimal right basilar infiltrate increased since prior.          XR CHEST SNGL V   Final Result   Impression:  Increased small left pleural effusion and associated left basilar atelectasis and or consolidation. XR ABD (KUB)   Final Result   Impression: No acute pathology identified. XR CHEST SNGL V   Final Result   IMPRESSION: No acute findings      XR CHEST SNGL V   Final Result   IMPRESSION: Mild worsening left lower lobe atelectasis or pneumonia      XR CHEST SNGL V   Final Result   IMPRESSION: No acute findings in the chest         XR CHEST SNGL V   Final Result   IMPRESSION: ET tube has been repositioned and is now 9 cm above the cathy, at   the level of the thoracic inlet. This could be advanced 4 to 5 cm. The lungs are   clear. XR CHEST SNGL V   Final Result   IMPRESSION: Some left infrahilar atelectasis or infiltrate now present. XR CHEST SNGL V   Final Result   IMPRESSION: Bibasilar atelectasis or pneumonia significantly improved. XR CHEST SNGL V   Final Result   IMPRESSION: Bibasilar atelectasis or pneumonia worse in the interval.            XR CHEST SNGL V   Final Result   IMPRESSION: Left basilar atelectasis improved. XR CHEST SNGL V   Final Result   IMPRESSION: New left lower lobe atelectasis or pneumonia. XR ABD (KUB)   Final Result   IMPRESSION: OG tube further in the stomach. XR CHEST SNGL V   Final Result   IMPRESSION: No acute cardiopulmonary disease. Recommend advancing the orogastric tube further into the stomach. XR PELV AP ONLY   Final Result   IMPRESSION:   1. 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 This is very poorly   assessed and characterized by portable plain film imaging. XR CHEST PORT   Final Result   IMPRESSION:    1. Improved although slight low position of endotracheal tube. Retraction of   this by 1 to 2 cm complex cyst and more ideal position. 2.  Persistent high position of an nasogastric tube.  Advancement of this by 4 cm   would place this in a more ideal position. XR CHEST SNGL V   Final Result   IMPRESSION:    1. Low position of endotracheal tube tip overlying the most proximal right   mainstem bronchus. Retraction of this by 3 cm should place this at a more   acceptable position. 2.  Slight high position of feeding tube with the tip overlying the stomach   although the side-port likely overlies the distal esophagus. Advancement of this   by 4 cm should ensure that the side port is below the gastroesophageal junction. 3. No evolving acute cardiopulmonary changes. The abnormal endotracheal tube position was discussed with nursing staff a   muscle personally at 10:15 PM on 12/1/2017. XR CHEST PA LAT   Final Result   IMPRESSION:    1. No acute cardiopulmonary process evident by plain film imaging. CT HEAD WO CONT   Final Result   IMPRESSION:     1. Small right subdural hematoma measuring 4 mm which demonstrates intermediate   attenuation suggesting a subacute although recent subdural hematoma. The subacute appearing right subdural hematoma was discussed with Dr. Kelli Simmons by   myself personally at 5:05 PM on 12/1/2017.                ASSESSMENT      Hospital Problems as of 12/26/2017  Date Reviewed: 12/22/2017          Codes Class Noted - Resolved POA    Hypernatremia ICD-10-CM: E87.0  ICD-9-CM: 276.0  12/22/2017 - Present Unknown        Mucus plugging of bronchi ICD-10-CM: J98.09  ICD-9-CM: 519.19  12/21/2017 - Present Unknown        Atelectasis ICD-10-CM: J98.11  ICD-9-CM: 518.0  12/21/2017 - Present Unknown        Acute hypoxemic respiratory failure (HCC) ICD-10-CM: J96.01  ICD-9-CM: 518.81  12/20/2017 - Present Unknown        Pneumonia due to infectious organism ICD-10-CM: J18.9  ICD-9-CM: 136.9, 484.8  12/20/2017 - Present Unknown        Delirium ICD-10-CM: R41.0  ICD-9-CM: 780.09  12/19/2017 - Present No        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  ICD-9-CM: 608.83  12/17/2017 - Present Unknown Subdural hematoma (HCC) ICD-10-CM: I62.00  ICD-9-CM: 432.1  12/1/2017 - Present Yes        Type II diabetes mellitus with complication (HCC) (Chronic) ICD-10-CM: E11.8  ICD-9-CM: 250.90  12/1/2017 - Present Yes        Syncope ICD-10-CM: R55  ICD-9-CM: 780.2  12/1/2017 - Present Yes        Fasciitis ICD-10-CM: M72.9  ICD-9-CM: 729.4  12/1/2017 - Present Yes        RESOLVED: Acute respiratory failure ([facility] ICD-10-CM: J96.00  ICD-9-CM: 518.81  12/8/2017 - 12/15/2017 Yes        RESOLVED: Hypernatremia ICD-10-CM: E87.0  ICD-9-CM: 276.0  12/6/2017 - 12/15/2017 Yes        RESOLVED: Lactic acidosis ICD-10-CM: E87.2  ICD-9-CM: 276.2  12/2/2017 - 12/6/2017 Yes        RESOLVED: LAUREANO (acute kidney injury) ([facility] ICD-10-CM: N17.9  ICD-9-CM: 584.9  12/1/2017 - 12/8/2017 Yes        RESOLVED: Leukocytosis ICD-10-CM: D91.320  ICD-9-CM: 288.60  12/1/2017 - 12/8/2017 Yes        * (Principal)RESOLVED: Severe sepsis with septic shock ([facility] ICD-10-CM: A41.9, R65.21  ICD-9-CM: 038.9, 995.92, 785.52  12/1/2017 - 12/15/2017 Yes        RESOLVED: Electrolyte abnormality ICD-10-CM: E87.8  ICD-9-CM: 276.9  12/1/2017 - 12/8/2017 Yes                  Plan:    - HAP:  CXR with persistent L basilar infiltrate and new R infiltrate  Restarted broad spectrum abx on 12/20, pulm following.    - Acute hypoxic resp failure:  Remains on high flow NC  Pulm following, improved after bronch. - 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:  Initially debrided by Marlen Garcia 12/1  Followed by [facility] for further debridement  Skin graft delayed for now, wound Vac removed. Has completed 2 weeks of IV abx    - Fungal UTI: Follow culture, started on diflucan. Keep kathleen in to avoid groin wound contamination.    - Subdural hematoma:  Non-surgical per NSGY    - Delirium:  Likely related to prolonged ICU stay  Appears to be slowly improving    - DM2:  BS uncontrolled  Continue lantus and ISS    - Dysphagia:  SLP following, Soft mech with nectar thicks.     DVT Prophylaxis: Lovenox  Dispo: PT/OT following, PPD placed; CM following. Patient is an optimal candidate for LTACH as he will not only require wound care, colostomy management, PT/OT/SLP; but he will also continue to require daily input by pulmonology.     Kody Koo MD

## 2017-12-26 NOTE — PROGRESS NOTES
Problem: Self Care Deficits Care Plan (Adult)  Goal: *Acute Goals and Plan of Care (Insert Text)  1. Rafael Delarosa will complete BUE strengthening 15-20 minutes in prep for ADL. Tex Garcia will complete grooming/oral care with minimal assistance. Tex Garcia will roll L/R with moderate assistance or less as needed for toileting/kim-care. Time frame: 4 - 7 visits    OCCUPATIONAL THERAPY: Daily Note, Treatment Day: 4th and AM 12/26/2017  INPATIENT: Hospital Day: 32  Payor: Randall Baird / Plan: [address] [place] [address] HMO / Product Type: [employer] /      [employer] Batch: Rafael Delarosa is a 79 y.o. 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 Andreea Abrams, plastic surgery, who reported pt has no activity or weight bearing restriction but if excess posterior drainage noted (especially with R hip abduction) to 'back off.'\" ; Review of patient's allergies indicates no known allergies. ASSESSMENT:   Mr. Sullivan Homans was admitted for the above diagnosis. Pt presents supine upon arrival. Pt more alert and talkative today. Performed some self care tasks (in grid below) with very little assistance. Pt required min/mod assistance for supine to sit transfer. Pt continue to have poor sitting balance at edge of bed. Worked on core strengthening. Pt became fatigued and wanted to returned to supine. Required assistance for sit to supine for LEs. Pt left with all needs in reach. Will continue to benefit from skilled OT during stay.   This section established at most recent assessment   PROBLEM LIST (Impairments causing functional limitations):  1. Decreased Strength  2. Decreased ADL/Functional Activities  3. Decreased Transfer Abilities  4. Decreased Balance  5. Increased Pain  6. Decreased Activity Tolerance  7. Decreased Flexibility/Joint Mobility  8. Edema/Girth  9. Decreased [place] with Home Exercise Program   INTERVENTIONS PLANNED: (Benefits and precautions of occupational therapy have been discussed with the patient.)  1. Activities of daily living training  2. Therapeutic activity  3. Therapeutic exercise     TREATMENT PLAN: Frequency/Duration: Follow patient 3 times/week to address above goals. Rehabilitation Potential For Stated Goals: Good     RECOMMENDED REHABILITATION/EQUIPMENT: (at time of discharge pending progress): Due to the probability of continued deficits (see above) this patient will likely need continued skilled occupational therapy after discharge. Equipment:    None at this time          OCCUPATIONAL PROFILE AND HISTORY:   History of Present Injury/Illness (Reason for Referral):  Per MD H & P: Patient 67M with no known PMHx - has never seen a doctor as an adult. Per EMS report was brought to ED after syncopal episode. Patient opened the door for a friend and passed out falling backwards. Patient says he is here because he thinks he has pneumonia due to a cough (cxr clear). Patient is adentuous and hard to understand, poor historian. Multiple other issues revealed during ED course. Subdural hematoma on CT head - reviewed by neurosx (no intervention). WBC 21K, , Cr 1.5 and found to have large foul smelling abscess of right inner thigh with purulent drainage. Surgery has seen with plans for operation tonight. Past Medical History/Comorbidities:   Mr. Robel Sorenson  has a past medical history of Diabetes ([facility] Mr. Robel Sorenson  has no past surgical history on file. Social History/Living Environment: Lives alone.    Home Environment: Apartment  # Steps to Enter: 0  One/Two Story Residence: One story  Living Alone: Yes  Support Systems: Family member(s)  Patient Expects to be Discharged to[de-identified] Unknown  Current DME Used/Available at Home: Walker, rollator, Cane, straight  Prior Level of Function/Work/Activity:  Patient typically independent with all; however, recently using a cane to walk due to falls over a period of 2 weeks. Dominant Side:         LEFT   Number of Personal Factors/Comorbidities that affect the Plan of Care: Brief history (0):  LOW COMPLEXITY   ASSESSMENT OF OCCUPATIONAL PERFORMANCE[de-identified]   Activities of Daily Living:           Basic ADLs (From Assessment) Complex ADLs (From Assessment)   Basic ADL  Feeding: Total assistance  Oral Facial Hygiene/Grooming: Maximum assistance  Bathing: Total assistance  Upper Body Dressing: Total assistance  Lower Body Dressing: Total assistance  Toileting: Total assistance Instrumental ADL  Meal Preparation: Total assistance  Homemaking: Total assistance  Medication Management: Total assistance  Financial Management: Total assistance   Grooming/Bathing/Dressing Activities of Daily Living   Grooming  Washing Face: Supervision/set-up Cognitive Retraining  Safety/Judgement: Decreased awareness of environment     Feeding  Container Management: Supervision/set-up  Drink to Mouth: Supervision/set-up                 Bed/Mat Mobility  Rolling: Supervision  Supine to Sit: Moderate assistance  Sit to Supine: Minimum assistance; Moderate assistance       Most Recent Physical Functioning:   Gross Assessment:                  Posture:     Balance:  Sitting: Impaired  Sitting - Static: Poor (constant support)  Sitting - Dynamic: Poor (constant support) Bed Mobility:  Rolling: Supervision  Supine to Sit: Moderate assistance  Sit to Supine: Minimum assistance; Moderate assistance  Wheelchair Mobility:     Transfers:                   Patient Vitals for the past 6 hrs:   BP SpO2 Pulse   12/26/17 0736 108/69 94 % 73   12/26/17 1122 - 97 % -       Mental Status  Neurologic State: Alert  Orientation Level: Oriented to person, Oriented to place  Cognition: Follows commands  Perception: Appears intact  Perseveration: No perseveration noted  Safety/Judgement: Decreased awareness of environment                          Physical Skills Involved:  1. Range of Motion  2. Balance  3. Strength  4. Activity Tolerance  5. Sensation  6. Fine Motor Control  7. Gross Motor Control  8. Pain (Chronic)  9. Edema  10. Skin Integrity Cognitive Skills Affected (resulting in the inability to perform in a timely and safe manner):  1. Executive Function  2. Divided Attention Psychosocial Skills Affected:  1. Habits/Routines  2. Environmental Adaptation  3. Social Interaction  4. Social Roles   Number of elements that affect the Plan of Care: 1-3:  LOW COMPLEXITY   CLINICAL DECISION MAKIN[address] [place] [address] AM-PAC 6 Clicks   Daily Activity Inpatient Short Form  How much help from another person does the patient currently need. .. Total A Lot A Little None   1. Putting on and taking off regular lower body clothing? [x] 1   [] 2   [] 3   [] 4   2. Bathing (including washing, rinsing, drying)? [x] 1   [] 2   [] 3   [] 4   3. Toileting, which includes using toilet, bedpan or urinal?   [x] 1   [] 2   [] 3   [] 4   4. Putting on and taking off regular upper body clothing? [x] 1   [] 2   [] 3   [] 4   5. Taking care of personal grooming such as brushing teeth? [x] 1   [] 2   [] 3   [] 4   6. Eating meals? [x] 1   [] 2   [] 3   [] 4   © , Trustees of [address] [place], [place] [address], under license to [employer]. All rights reserved      Score:  Initial: 6 Most Recent: X (Date: -- )    Interpretation of Tool:  Represents activities that are increasingly more difficult (i.e. Bed mobility, Transfers, Gait). Score 24 23 22-20 19-15 14-10 9-7 6     Modifier CH CI CJ CK CL CM CN      ?  Self Care:     - CURRENT STATUS: CN - 100% impaired, limited or restricted    - GOAL STATUS: CL - 60%-79% impaired, limited or restricted    - D/C STATUS:  ---------------To be determined---------------  Payor: Rodrigo Diego / Plan: [address] [place] [address] HMO / Product Type: Managed Care Medicare /      Medical Necessity:     · Patient demonstrates fair rehab potential due to higher previous functional level. Reason for Services/Other Comments:  · Patient continues to require skilled intervention due to decreased independence with activities of daily living and instrumental activities of daily living. Use of outcome tool(s) and clinical judgement create a POC that gives a: MODERATE COMPLEXITY         TREATMENT:   (In addition to Assessment/Re-Assessment sessions the following treatments were rendered)     Pre-treatment Symptoms/Complaints:    Pain: Initial:   Pain Intensity 1: 0  Post Session:  Less after repositioned. Self Care: (10 minutes): Procedure(s) (per grid) utilized to improve and/or restore self-care/home management as related to grooming and self feeding. Required minimal verbal cueing to facilitate activities of daily living skills. Therapeutic Activity: (15 reps): Therapeutic activities including Bed transfers and sitting edge of bed to improve mobility, strength, balance and activity tolerance. Required moderate to promote static and dynamic balance in sitting. Date:  12/15/17 Date:  12/21/517 Date:     Activity/Exercise Parameters Parameters Parameters   Shoulder elevation 2 sets 5-10 reps (however, pt.  Compensating by extending elbows)     Hands to head with shoulders supported at ~90° 2 sets 5 reps     Gripping 10 reps     Shoulder Abd/Adduction  10 reps (assistance needed with LUE)    Shoulder Flexion  10 reps AAROM    Elbow Flexion  10 reps with yellow theraband    Punches  10 reps AAROM          Braces/Orthotics/Lines/Etc:   · IV  · kathleen catheter  · nasogastric tube  · O2 Device: Nasal cannula  Treatment/Session Assessment:    Response to Treatment:  Tolerated well without complications. · Interdisciplinary Collaboration:   o Certified Occupational Therapy Assistant  o Registered Nurse  · After treatment position/precautions:   o Supine in bed  o Bed/Chair-wheels locked  o Bed in low position  o Call light within reach   · Compliance with Program/Exercises: Will assess as treatment progresses. · Recommendations/Intent for next treatment session: \"Next visit will focus on advancements to more challenging activities\".   Total Treatment Duration:OT Patient Time In/Time Out  Time In: 0930  Time Out: [phone]     Jarred Morales

## 2017-12-26 NOTE — PROGRESS NOTES
Seen by speech and tolerating some PO. Bronch grew Enterobacter. Resp status slowly improving.       Visit Vitals    /71 (BP 1 Location: Left arm, BP Patient Position: At rest)    Pulse 71    Temp 97.6 °F (36.4 °C)    Resp 16    Ht 5' 11\" (1.803 m)    Wt 75.5 kg (166 lb 6.4 oz)    SpO2 94%    BMI 23.21 kg/m2     Oriented to name and more alert  RRR  Resp unlabored, coarse bilaterally. Abd soft, ostomy productive  Dressing in place with some serous strikethrough.         - No need for further antibiotic coverage for the leg.   - Dakin's wet to dry dressings  - Will wait for resp status to be optmized as well as nutrition to improve to skin graft. Will follow at [facility] and can skin graft there. - No PT limitations with respect to leg.

## 2017-12-26 NOTE — PROGRESS NOTES
Problem: Falls - Risk of  Goal: *Absence of Falls  Document Larry Fall Risk and appropriate interventions in the flowsheet.    Outcome: Progressing Towards Goal  Fall Risk Interventions:  Mobility Interventions: Communicate number of staff needed for ambulation/transfer, PT Consult for mobility concerns    Mentation Interventions: Adequate sleep, hydration, pain control, More frequent rounding    Medication Interventions: Evaluate medications/consider consulting pharmacy    Elimination Interventions: Call light in reach    History of Falls Interventions: Door open when patient unattended

## 2017-12-26 NOTE — WOUND CARE
Patient seen for buttock ulcers that are shallow and close to gluteal fold. (not over bony prominence). Friction and shear in character. Right side is 2x2 cm irregular in shape, pink. Left side is 3.5cm irregular with shallow purple. Patient wearing brief for incontinence and has large wound to right leg/groin being managed by plastic surgeon. Awaiting skin graft to this site. Noted VAC dc and start of Dakin's soaks. Ordered turning wedge to aid in keeping patient turned. Is on [place] bed. No family present to teach ostomy cares. Waiting for possible [facility] transfer. Discussed with Joe Bray, .

## 2017-12-27 PROBLEM — J96.01 ACUTE HYPOXEMIC RESPIRATORY FAILURE (HCC): Status: RESOLVED | Noted: 2017-12-20 | Resolved: 2017-12-27

## 2017-12-27 PROBLEM — T17.500A MUCUS PLUGGING OF BRONCHI: Status: RESOLVED | Noted: 2017-12-21 | Resolved: 2017-12-27

## 2017-12-27 PROBLEM — J18.9 PNEUMONIA DUE TO INFECTIOUS ORGANISM: Status: RESOLVED | Noted: 2017-12-20 | Resolved: 2017-12-27

## 2017-12-27 PROBLEM — J98.11 ATELECTASIS: Status: RESOLVED | Noted: 2017-12-21 | Resolved: 2017-12-27

## 2017-12-27 PROBLEM — E87.0 HYPERNATREMIA: Status: RESOLVED | Noted: 2017-12-22 | Resolved: 2017-12-27

## 2017-12-27 LAB
ANION GAP SERPL CALC-SCNC: 5 MMOL/L (ref 7–16)
BUN SERPL-MCNC: 12 MG/DL (ref 8–23)
CALCIUM SERPL-MCNC: 7.3 MG/DL (ref 8.3–10.4)
CHLORIDE SERPL-SCNC: 110 MMOL/L (ref 98–107)
CO2 SERPL-SCNC: 29 MMOL/L (ref 21–32)
CREAT SERPL-MCNC: 0.49 MG/DL (ref 0.8–1.5)
FUNGUS CULTURE, RFCO2T: POSITIVE
FUNGUS SMEAR, RFCO1T: ABNORMAL
FUNGUS SPEC CULT: NORMAL
FUNGUS STAIN, 188244: NORMAL
GLUCOSE BLD STRIP.AUTO-MCNC: 123 MG/DL (ref 65–100)
GLUCOSE BLD STRIP.AUTO-MCNC: 152 MG/DL (ref 65–100)
GLUCOSE BLD STRIP.AUTO-MCNC: 153 MG/DL (ref 65–100)
GLUCOSE BLD STRIP.AUTO-MCNC: 167 MG/DL (ref 65–100)
GLUCOSE BLD STRIP.AUTO-MCNC: 297 MG/DL (ref 65–100)
GLUCOSE BLD STRIP.AUTO-MCNC: 88 MG/DL (ref 65–100)
GLUCOSE SERPL-MCNC: 99 MG/DL (ref 65–100)
MAGNESIUM SERPL-MCNC: 2.1 MG/DL (ref 1.8–2.4)
PHOSPHATE SERPL-MCNC: 2.4 MG/DL (ref 2.3–3.7)
POTASSIUM SERPL-SCNC: 3.6 MMOL/L (ref 3.5–5.1)
REFLEX TO ID, RFCO3T: ABNORMAL
SODIUM SERPL-SCNC: 144 MMOL/L (ref 136–145)
SPECIMEN SOURCE: ABNORMAL
SPECIMEN SOURCE: NORMAL

## 2017-12-27 PROCEDURE — 84100 ASSAY OF PHOSPHORUS: CPT | Performed by: INTERNAL MEDICINE

## 2017-12-27 PROCEDURE — 74011250637 HC RX REV CODE- 250/637: Performed by: HOSPITALIST

## 2017-12-27 PROCEDURE — 74011636637 HC RX REV CODE- 636/637: Performed by: INTERNAL MEDICINE

## 2017-12-27 PROCEDURE — 74011250636 HC RX REV CODE- 250/636: Performed by: INTERNAL MEDICINE

## 2017-12-27 PROCEDURE — 97530 THERAPEUTIC ACTIVITIES: CPT

## 2017-12-27 PROCEDURE — 92526 ORAL FUNCTION THERAPY: CPT

## 2017-12-27 PROCEDURE — 74011250637 HC RX REV CODE- 250/637: Performed by: SURGERY

## 2017-12-27 PROCEDURE — 74011250636 HC RX REV CODE- 250/636: Performed by: NURSE PRACTITIONER

## 2017-12-27 PROCEDURE — 74011000258 HC RX REV CODE- 258: Performed by: NURSE PRACTITIONER

## 2017-12-27 PROCEDURE — 94760 N-INVAS EAR/PLS OXIMETRY 1: CPT

## 2017-12-27 PROCEDURE — 74011250637 HC RX REV CODE- 250/637: Performed by: INTERNAL MEDICINE

## 2017-12-27 PROCEDURE — 94640 AIRWAY INHALATION TREATMENT: CPT

## 2017-12-27 PROCEDURE — 74011000250 HC RX REV CODE- 250: Performed by: NURSE PRACTITIONER

## 2017-12-27 PROCEDURE — 74011000258 HC RX REV CODE- 258: Performed by: HOSPITALIST

## 2017-12-27 PROCEDURE — 65270000029 HC RM PRIVATE

## 2017-12-27 PROCEDURE — 74011636637 HC RX REV CODE- 636/637: Performed by: HOSPITALIST

## 2017-12-27 PROCEDURE — 83735 ASSAY OF MAGNESIUM: CPT | Performed by: INTERNAL MEDICINE

## 2017-12-27 PROCEDURE — 80048 BASIC METABOLIC PNL TOTAL CA: CPT | Performed by: INTERNAL MEDICINE

## 2017-12-27 RX ORDER — PANTOPRAZOLE SODIUM 40 MG/1
40 TABLET, DELAYED RELEASE ORAL
Status: DISCONTINUED | OUTPATIENT
Start: 2017-12-27 | End: 2017-12-29 | Stop reason: HOSPADM

## 2017-12-27 RX ORDER — DEXTROSE MONOHYDRATE AND SODIUM CHLORIDE 5; .45 G/100ML; G/100ML
75 INJECTION, SOLUTION INTRAVENOUS CONTINUOUS
Status: DISCONTINUED | OUTPATIENT
Start: 2017-12-27 | End: 2017-12-28

## 2017-12-27 RX ORDER — FLUCONAZOLE 100 MG/1
200 TABLET ORAL DAILY
Status: DISCONTINUED | OUTPATIENT
Start: 2017-12-27 | End: 2017-12-29 | Stop reason: HOSPADM

## 2017-12-27 RX ADMIN — ACETYLCYSTEINE 300 MG: 200 SOLUTION ORAL; RESPIRATORY (INHALATION) at 07:25

## 2017-12-27 RX ADMIN — DEXTROSE MONOHYDRATE AND SODIUM CHLORIDE 75 ML/HR: 5; .45 INJECTION, SOLUTION INTRAVENOUS at 19:12

## 2017-12-27 RX ADMIN — Medication 10 ML: at 20:25

## 2017-12-27 RX ADMIN — HYDROMORPHONE HYDROCHLORIDE 1 MG: 2 INJECTION, SOLUTION INTRAMUSCULAR; INTRAVENOUS; SUBCUTANEOUS at 05:33

## 2017-12-27 RX ADMIN — ENOXAPARIN SODIUM 40 MG: 40 INJECTION SUBCUTANEOUS at 12:53

## 2017-12-27 RX ADMIN — PANTOPRAZOLE SODIUM 40 MG: 40 TABLET, DELAYED RELEASE ORAL at 12:52

## 2017-12-27 RX ADMIN — Medication 10 ML: at 05:16

## 2017-12-27 RX ADMIN — POTASSIUM & SODIUM PHOSPHATES POWDER PACK 280-160-250 MG 1 PACKET: 280-160-250 PACK at 12:52

## 2017-12-27 RX ADMIN — SODIUM CHLORIDE 2 G: 900 INJECTION, SOLUTION INTRAVENOUS at 05:17

## 2017-12-27 RX ADMIN — HYDROMORPHONE HYDROCHLORIDE 1 MG: 2 INJECTION, SOLUTION INTRAMUSCULAR; INTRAVENOUS; SUBCUTANEOUS at 13:51

## 2017-12-27 RX ADMIN — Medication 10 ML: at 20:24

## 2017-12-27 RX ADMIN — ACETYLCYSTEINE: 200 SOLUTION ORAL; RESPIRATORY (INHALATION) at 19:28

## 2017-12-27 RX ADMIN — Medication 10 ML: at 14:03

## 2017-12-27 RX ADMIN — Medication 1 AMPULE: at 20:21

## 2017-12-27 RX ADMIN — ALBUTEROL SULFATE 2.5 MG: 2.5 SOLUTION RESPIRATORY (INHALATION) at 19:28

## 2017-12-27 RX ADMIN — INSULIN LISPRO 6 UNITS: 100 INJECTION, SOLUTION INTRAVENOUS; SUBCUTANEOUS at 20:20

## 2017-12-27 RX ADMIN — ALBUTEROL SULFATE 2.5 MG: 2.5 SOLUTION RESPIRATORY (INHALATION) at 07:25

## 2017-12-27 RX ADMIN — INSULIN LISPRO 2 UNITS: 100 INJECTION, SOLUTION INTRAVENOUS; SUBCUTANEOUS at 17:35

## 2017-12-27 RX ADMIN — INSULIN GLARGINE 20 UNITS: 100 INJECTION, SOLUTION SUBCUTANEOUS at 09:17

## 2017-12-27 RX ADMIN — SODIUM CHLORIDE 2 G: 900 INJECTION, SOLUTION INTRAVENOUS at 12:53

## 2017-12-27 RX ADMIN — Medication 10 ML: at 20:23

## 2017-12-27 RX ADMIN — DAKIN'S SOLUTION 0.125% (QUARTER STRENGTH): 0.12 SOLUTION at 09:19

## 2017-12-27 RX ADMIN — ALBUTEROL SULFATE 2.5 MG: 2.5 SOLUTION RESPIRATORY (INHALATION) at 15:52

## 2017-12-27 RX ADMIN — POTASSIUM & SODIUM PHOSPHATES POWDER PACK 280-160-250 MG 1 PACKET: 280-160-250 PACK at 09:18

## 2017-12-27 RX ADMIN — POTASSIUM & SODIUM PHOSPHATES POWDER PACK 280-160-250 MG 1 PACKET: 280-160-250 PACK at 20:21

## 2017-12-27 RX ADMIN — Medication 1 AMPULE: at 09:18

## 2017-12-27 RX ADMIN — FLUCONAZOLE 200 MG: 100 TABLET ORAL at 12:52

## 2017-12-27 RX ADMIN — ALBUTEROL SULFATE 2.5 MG: 2.5 SOLUTION RESPIRATORY (INHALATION) at 11:04

## 2017-12-27 RX ADMIN — SODIUM CHLORIDE 2 G: 900 INJECTION, SOLUTION INTRAVENOUS at 20:03

## 2017-12-27 RX ADMIN — POTASSIUM & SODIUM PHOSPHATES POWDER PACK 280-160-250 MG 1 PACKET: 280-160-250 PACK at 17:35

## 2017-12-27 NOTE — PROGRESS NOTES
Pt's dressing changed per MD orders. Skin graft on hold at this time. Colostomy wafer and bag changed. Pt took small amount of his meals and medications taken with honey thickened liquids. Gerrado removal deferred because of wound location and pt's inability to handle urinal.  Wedge ordered and pt turned and repositioned throughout the day. Family visited.   Awaiting [facility] and insurance decision

## 2017-12-27 NOTE — PROGRESS NOTES
LTG: Patient will tolerate least restrictive diet without overt signs or symptoms of airway compromise. STG: Patient will tolerate chopped mechanical soft diet with no mixed consistencies without overt signs or symptoms of aspiration 100% of the time. STG: Patient will tolerate honey-thick liquids without overt signs or symptoms of aspiration 10% of the time. STG: Patient will perform laryngeal strengthening exercises x10 each with 80% accuracy. STG: Patient will participate in modified barium swallow study as clinically indicated. Speech language pathology: bedside swallow note: Daily Note 1    NAME/AGE/GENDER: Diogenes Steven is a 79 y.o. 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 INTERDISCIPLINARY COLLABORATION: Registered Nurse and Physician  PRECAUTIONS/ALLERGIES: Review of patient's allergies indicates no known allergies. ASSESSMENT:Patient seen for diet tolerance this AM. Found sitting upright in bed with noontime meal in front of him, but sleeping soundly. Easily woke to verbal stim. Wet vocal quality at baseline, which cleared with cues throat clear. Self-fed trials of mechanical soft. Large bolus size and prolonged mastication. Moderate oral residue remaining after swallows of soft solids. When consuming honey thick liquids, patient consuming approximately 3 ounces via serial cup sips. Wet vocal quality following honey thick sip with subsequent weak cough. Wet vocal quality also present with single nectar thick trials. Recommend NPO due to overt signs and symptoms of airway compromise with honey thick liquids. Medications non-oral or crushed in puree. Recommend modified barium swallow study to objectively assess swallow function.  Hospitalist in agreement with plan. Dysphonia persists and may benefit from ENT consult for laryngoscopy. Patient will benefit from skilled intervention to address the below impairments. ?????? ? ? This section established at most recent assessment??????????  PROBLEM LIST (Impairments causing functional limitations):  1. Oropharyngeal dysphagia  REHABILITATION POTENTIAL FOR STATED GOALS: Good  PLAN OF CARE:   Patient will benefit from skilled intervention to address the following impairments. RECOMMENDATIONS AND PLANNED INTERVENTIONS (Benefits and precautions of therapy have been discussed with the patient.):  · NPO  MEDICATIONS:  · Crushed in puree  COMPENSATORY STRATEGIES/MODIFICATIONS INCLUDING:  · None  OTHER RECOMMENDATIONS (including follow up treatment recommendations):   · Laryngeal exercises  · Patient education  RECOMMENDED DIET MODIFICATIONS DISCUSSED WITH:  · Family  · Nursing  · Patient  FREQUENCY/DURATION: Continue to follow patient 3 times a week for duration of hospital stay to address above goals. RECOMMENDED REHABILITATION/EQUIPMENT: (at time of discharge pending progress): Due to the probability of continued deficits (see above) this patient will likely need continued skilled speech therapy after discharge. SUBJECTIVE:   Initially sleeping sounding, but easily woke. Wet vocal quality at baseline. Remains dysphonic    History of Present Injury/Illness: Mr. Miller  has a past medical history of Diabetes ([facility] [address] .  He also  has no past surgical history on file. Present Symptoms: oropharyngeal dysphagia   Pain Intensity 1: 0  Pain Location 1: Hip  Pain Orientation 1: Right  Pain Intervention(s) 1: Medication (see MAR)  Current Medications:   No current facility-administered medications on file prior to encounter. No current outpatient prescriptions on file prior to encounter.      Current Dietary Status:  NPO      Social History/Home Situation:    Home Environment: Apartment  # Steps to Enter: 0  One/Two Story Residence: One story  Living Alone: Yes  Support Systems: Family member(s)  Patient Expects to be Discharged to[de-identified] Unknown  Current DME Used/Available at Home: Walker, rollator, Cane, straight  OBJECTIVE:   Respiratory Status:  Room air     CXR Results:No acute findings  MRI/CT Results:1. Small right subdural hematoma measuring 4 mm which demonstrates intermediate  attenuation suggesting a subacute although recent subdural hematoma    Cognitive and Communication Status:  Neurologic State: Alert  Orientation Level: Oriented to person;Oriented to place  Cognition: Decreased attention/concentration; Follows commands  Perception: Appears intact  Perseveration: No perseveration noted  Safety/Judgement: Decreased awareness of environment;Decreased insight into deficits    BEDSIDE SWALLOW EVALUATION  Oral Assessment:  Oral Assessment  Labial: No impairment  Dentition: Edentulous  Oral Hygiene: Adequate  Lingual: No impairment  P.O. Trials:  Patient Position: upright in bed    The patient was given tsp amounts of the following:   Consistency Presented: Mechanical soft; Honey thick liquid  How Presented: Self-fed/presented;Cup/sip; Successive swallows;Spoon    ORAL PHASE:  Bolus Acceptance: Impaired  Bolus Formation/Control: Impaired  Propulsion: Delayed (# of seconds)  Type of Impairment: Piecemeal;Mastication  Oral Residue: 10-50% of bolus    PHARYNGEAL PHASE:  Initiation of Swallow: Delayed (# of seconds)  Laryngeal Elevation: Functional  Aspiration Signs/Symptoms: Weak cough;Strong cough  Vocal Quality: Wet           Pharyngeal Phase Characteristics: Double swallow    OTHER OBSERVATIONS:  Rate/bite size: Impaired   Endurance:  Impaired   Comments:       Tool Used: Dysphagia Outcome and Severity Scale (ANGEL)    Score Comments   Normal Diet  [] 7 With no strategies or extra time needed   Functional Swallow  [] 6 May have mild oral or pharyngeal delay       Mild Dysphagia    [] 5 Which may require one diet consistency restricted (those who demonstrate penetration which is entirely cleared on MBS would be included)   Mild-Moderate Dysphagia  [] 4 With 1-2 diet consistencies restricted       Moderate Dysphagia  [x] 3 With 2 or more diet consistencies restricted       Moderately Severe Dysphagia  [] 2 With partial PO strategies (trials with ST only)       Severe Dysphagia  [] 1 With inability to tolerate any PO safely          Score:  Initial: 1 Most Recent: 3 (Date: 12/23/17 )   Interpretation of Tool: The Dysphagia Outcome and Severity Scale (ANGEL) is a simple, easy-to-use, 7-point scale developed to systematically rate the functional severity of dysphagia based on objective assessment and make recommendations for diet level, independence level, and type of nutrition. Score 7 6 5 4 3 2 1   Modifier CH CI CJ CK CL CM CN   ? Swallowing:     - CURRENT STATUS: CL - 60%-79% impaired, limited or restricted    - GOAL STATUS:  CI - 1%-19% impaired, limited or restricted    - D/C STATUS:  ---------------To be determined---------------  Payor: [employer] MEDICARE / Plan: [address] [place] [address] HMO / Product Type: Managed Care Medicare /     TREATMENT:    (In addition to Assessment/Re-Assessment sessions the following treatments were rendered)  Dysphagia Activities: Activities/Procedures listed utilized to improve progress in swallow strength. Required moderate cueing to decrease aspiration risk. LARYNGEAL / PHARYNGEAL EXERCISES:                                                                                                                                         __________________________________________________________________________________________________  Safety:   After treatment position/precautions:  · RN notified  · Family at bedside  · Upright in Bed.   Progression/Medical Necessity:   · Patient is expected to demonstrate progress in swallow strength, swallow timeliness, swallow function and diet tolerance to improve swallow safety, work toward diet advancement and decrease aspiration risk. Compliance with Program/Exercises: Will assess as treatment progresses. Reason for Continuation of Services/Other Comments:  · Patient continues to require skilled intervention due to oropharyngeal dysphagia. Recommendations/Intent for next treatment session: \"Treatment next visit will focus on modified barium swallow study\".     Total Treatment Duration  Time In: 1232   Time Out: 1250    Ruthy Kessler, MSP, CCC-SLP, CBIS

## 2017-12-27 NOTE — PROGRESS NOTES
Hospitalist Progress Note    2017  Admit Date: 2017  2:54 PM   NAME: [facility] HARVINDER   :  1950   DOS:              17  MRN:  [id number]   Attending: Hannah Dunbar MD  PCP:  None  Treatment Team: Attending Provider: Orly Henderson MD; Consulting Provider: Linford Sicard, MD; Consulting Provider: Kristine Barclay MD; Consulting Provider: Iris Scruggs MD; Care Manager: Fortunato Hudson RN; Consulting Provider: dEda Murillo MD    Full Code     SUBJECTIVE:   As previously documented: \" Mr. Irma Sanabria is a 78 yo M with unknown PMHx - never seen a healthcare provider as an adult who was admitted on  after an syncopal episode. CT head with small right subdural hematoma - discussed with neurosurgery by Dr. Ross Bach - nonsurgical. 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 Placed on broad spectrum antibiotics, aggressive IV hydration. General surgery consulted and had emergent surgery on  that showed gangrene of subcutaneus fat and fascia of posterior compartment from groin to popliteal fossa, extending along margin of buttock. Had re-exploration of right tight fasciitis on . orthopedics, surgery and plastic surgery consulted. Extubated on  and transferred out of ICU. Was supposed to have skin grafting with Dr. Damaris Baugh, but had to be cancelled 2/2 worsening resp status. Pulm following, s/p bronch. He is awaiting insurance approval for LTACH. \"        17    [facility] HARVINDER denies any SOB, CP or abdominal pain. Afebrile. On RA.             10+ ROS reviewed and negative except for positive in HPI.    No Known Allergies  Current Facility-Administered Medications   Medication Dose Route Frequency    acetaminophen (TYLENOL) tablet 650 mg  650 mg Oral Q6H PRN    potassium, sodium phosphates (NEUTRA-PHOS) packet 1 Packet  1 Packet Oral QID    sodium hypochlorite (QUARTER STRENGTH DAKIN'S) 0.125% irrigation (bottle)   Topical DAILY    acetylcysteine (MUCOMYST) 200 mg/mL (20 %) solution 300 mg  1.5 mL Nebulization BID RT    albuterol (PROVENTIL VENTOLIN) nebulizer solution 2.5 mg  2.5 mg Nebulization QID RT    0.9% sodium chloride infusion 250 mL  250 mL IntraVENous PRN    cefepime (MAXIPIME) 2 g in 0.9% sodium chloride (MBP/ADV) 100 mL ADV  2 g IntraVENous Q8H    lidocaine (XYLOCAINE) 4 % (40 mg/mL) topical solution   Topical PRN    lidocaine (XYLOCAINE) 2 % jelly   Mucous Membrane PRN    insulin glargine (LANTUS) injection 20 Units  20 Units SubCUTAneous DAILY    lip protectant (BLISTEX) ointment   Topical PRN    dextrose (D50W) injection syrg 12.5 g  25 mL IntraVENous PRN    HYDROmorphone (PF) (DILAUDID) injection 1 mg  1 mg IntraVENous Q4H PRN    enoxaparin (LOVENOX) injection 40 mg  40 mg SubCUTAneous Q24H    hydrALAZINE (APRESOLINE) 20 mg/mL injection 10 mg  10 mg IntraVENous Q4H PRN    alcohol 62% (NOZIN) nasal  1 Ampule  1 Ampule Topical Q12H    NUTRITIONAL SUPPORT ELECTROLYTE PRN ORDERS   Does Not Apply PRN    influenza vaccine 2017-18 (3 yrs+)(PF) (FLUZONE QUAD/FLUARIX QUAD) injection 0.5 mL  0.5 mL IntraMUSCular PRIOR TO DISCHARGE    insulin lispro (HUMALOG) injection   SubCUTAneous Q4H    pantoprazole (PROTONIX) 40 mg in sodium chloride 0.9 % 10 mL injection  40 mg IntraVENous Q24H    sodium chloride (NS) flush 5-10 mL  5-10 mL IntraVENous Q8H    sodium chloride (NS) flush 5-10 mL  5-10 mL IntraVENous PRN         Immunization History   Administered Date(s) Administered    TB Skin Test (PPD) Intradermal 12/19/2017     Objective:     Patient Vitals for the past 24 hrs:   Temp Pulse Resp BP SpO2   12/27/17 0728 - - - - 96 %   12/27/17 0727 97.3 °F (36.3 °C) 65 18 102/68 94 %   12/27/17 0300 97.5 °F (36.4 °C) 76 16 130/73 96 %   12/26/17 2300 97.8 °F (36.6 °C) 78 16 112/66 94 %   12/26/17 2022 - - - - 94 %   12/26/17 1900 97.9 °F (36.6 °C) 87 18 103/67 99 % 17 1522 97.9 °F (36.6 °C) 84 18 111/68 94 %   17 1122 - - - - 97 %   17 1107 97.4 °F (36.3 °C) 76 16 123/72 92 %     Temp (24hrs), Av.6 °F (36.4 °C), Min:97.3 °F (36.3 °C), Max:97.9 °F (36.6 °C)    Oxygen Therapy  O2 Sat (%): 96 % (17)  Pulse via Oximetry: 89 beats per minute (17)  O2 Device: Room air (17)  O2 Flow Rate (L/min): 2 l/min (17)  O2 Temperature: 87.8 °F (31 °C) (17 0024)  FIO2 (%): 40 % (17)    Physical Exam:  General:         Alert, cooperative, no acute distress. HEENT:               NCAT. No obvious deformity. Nares normal. No drainage  Lungs:               CTABL. No wheezing/rhonchi/rales RA  Cardiovascular:   RRR. No m/r/g. No pedal edema b/l. +2 PT/DT pulses b/l. Abdomen:       S/nt/nd. Bowel sounds normal. Colostomy bag in place with brown stool. Skin:          Not Jaundiced  Musculoskeletal: RLE wrapped on bandage. Neurologic:    AAOx3. Chelita Greener No gross focal deficit. Moves all extremities. Psychiatric:         Good mood. Normal affect. Denies any SI/HI. DIAGNOSTIC STUDIES      Data Review:   Recent Results (from the past 24 hour(s))   GLUCOSE, POC    Collection Time: 17 11:59 AM   Result Value Ref Range    Glucose (POC) 138 (H) 65 - 100 mg/dL   CBC WITH AUTOMATED DIFF    Collection Time: 17  2:53 PM   Result Value Ref Range    WBC 11.7 (H) 4.3 - 11.1 K/uL    RBC 3.45 (L) 4.23 - 5.67 M/uL    HGB 9.4 (L) 13.6 - 17.2 g/dL    HCT 30.7 (L) 41.1 - 50.3 %    MCV 89.0 79.6 - 97.8 FL    MCH 27.2 26.1 - 32.9 PG    MCHC 30.6 (L) 31.4 - 35.0 g/dL    RDW 15.7 (H) 11.9 - 14.6 %    PLATELET 332 458 - 342 K/uL    MPV 10.8 10.8 - 14.1 FL    DF AUTOMATED      NEUTROPHILS 84 (H) 43 - 78 %    LYMPHOCYTES 9 (L) 13 - 44 %    MONOCYTES 3 (L) 4.0 - 12.0 %    EOSINOPHILS 2 0.5 - 7.8 %    BASOPHILS 0 0.0 - 2.0 %    IMMATURE GRANULOCYTES 2 0.0 - 5.0 %    ABS. NEUTROPHILS 9.9 (H) 1.7 - 8.2 K/UL    ABS. LYMPHOCYTES 1.0 0.5 - 4.6 K/UL    ABS. MONOCYTES 0.4 0.1 - 1.3 K/UL    ABS. EOSINOPHILS 0.3 0.0 - 0.8 K/UL    ABS. BASOPHILS 0.0 0.0 - 0.2 K/UL    ABS. IMM.  GRANS. 0.2 0.0 - 0.5 K/UL   GLUCOSE, POC    Collection Time: 12/26/17  4:39 PM   Result Value Ref Range    Glucose (POC) 150 (H) 65 - 100 mg/dL   GLUCOSE, POC    Collection Time: 12/26/17  7:57 PM   Result Value Ref Range    Glucose (POC) 164 (H) 65 - 100 mg/dL   GLUCOSE, POC    Collection Time: 12/26/17 11:47 PM   Result Value Ref Range    Glucose (POC) 153 (H) 65 - 100 mg/dL   GLUCOSE, POC    Collection Time: 12/27/17  5:08 AM   Result Value Ref Range    Glucose (POC) 123 (H) 65 - 100 mg/dL   PHOSPHORUS    Collection Time: 12/27/17  6:14 AM   Result Value Ref Range    Phosphorus 2.4 2.3 - 3.7 MG/DL   MAGNESIUM    Collection Time: 12/27/17  6:14 AM   Result Value Ref Range    Magnesium 2.1 1.8 - 2.4 mg/dL   METABOLIC PANEL, BASIC    Collection Time: 12/27/17  6:14 AM   Result Value Ref Range    Sodium 144 136 - 145 mmol/L    Potassium 3.6 3.5 - 5.1 mmol/L    Chloride 110 (H) 98 - 107 mmol/L    CO2 29 21 - 32 mmol/L    Anion gap 5 (L) 7 - 16 mmol/L    Glucose 99 65 - 100 mg/dL    BUN 12 8 - 23 MG/DL    Creatinine 0.49 (L) 0.8 - 1.5 MG/DL    GFR est AA >60 >60 ml/min/1.73m2    GFR est non-AA >60 >60 ml/min/1.73m2    Calcium 7.3 (L) 8.3 - 10.4 MG/DL       All Micro Results     Procedure Component Value Units Date/Time    CULTURE, URINE [[id number]]  (Abnormal) Collected:  12/23/17 1327    Order Status:  Completed Specimen:  Urine from Gerardo Specimen Updated:  12/27/17 0424     Special Requests: NO SPECIAL REQUESTS        Culture result:         >100,000 COLONIES/mL CANDIDA ALBICANS (A)              CULTURE IN PROGRESS,FURTHER UPDATES TO FOLLOW    CULTURE, RESPIRATORY/SPUTUM/BRONCH Halima Hilts STAIN [[id number]]  (Abnormal)  (Susceptibility) Collected:  12/21/17 1130    Order Status:  Completed Specimen:  Sputum from Bronchial Washing Updated:  12/24/17 0903     Special Requests: NO SPECIAL REQUESTS        GRAM STAIN 15 TO 30 WBC'S/OIF      0 TO 2  EPITHELIAL CELLS SEEN /OIF      RARE GRAM POSITIVE RODS         RARE GRAM NEGATIVE RODS        Culture result:         LIGHT ENTEROBACTER CLOACAE (A)              MODERATE ROLAND ALBICANS (A)              SCANT NORMAL RESPIRATORY TEJA    CULTURE, BLOOD [[id number]] Collected:  12/19/17 1013    Order Status:  Completed Specimen:  Blood from Blood Updated:  12/24/17 0723     Special Requests: --        LEFT  FOREARM       Culture result: NO GROWTH 5 DAYS       CULTURE, BLOOD [[id number]] Collected:  12/19/17 1007    Order Status:  Completed Specimen:  Blood from Blood Updated:  12/24/17 0723     Special Requests: RIGHT ANTECUBITAL        Culture result: NO GROWTH 5 DAYS       FUNGUS CULTURE AND SMEAR - INCLUDES MUCOR [[id number]] Collected:  12/21/17 1130    Order Status:  Completed Specimen:  Other from Miscellaneous sample Updated:  12/23/17 1836     Source BRONCHIAL WASHING        Fungus stain Direct Inoculation     Fungus (Mycology) Culture Other source received      (NOTE)  Performed At: [address] [id number]  Kain Keith MD [id number]         AFB (MYCOBACTERIUM) CULTURE & SMEAR W/REFLEX ID Antoine Baljitkeerthi [[id number]] Collected:  12/21/17 1130    Order Status:  Completed Specimen:  Miscellaneous sample Updated:  12/22/17 1538     Source BRONCHIAL WASHING        AFB Specimen processing Concentration     Acid Fast Smear NEGATIVE          (NOTE)  Performed At: [address] [id number]  Kain Keith MD BS:[id number]          Acid Fast Culture PENDING    CULTURE, RESPIRATORY/SPUTUM/BRONCH Rody Shires STAIN [[id number]]  (Abnormal) Collected:  12/12/17 1234    Order Status:  Completed Specimen:  Sputum from Sputum Updated:  12/16/17 0750     Special Requests: NO SPECIAL REQUESTS        GRAM STAIN 10 TO 45 WBC'S/OIF      NO EPITHELIAL CELLS SEEN FEW YEAST         4+ MUCUS PRESENT        Culture result:         MODERATE ROLAND ALBICANS (A)      NO GROWTH OF  NORMAL RESPIRATORY TEJA    CULTURE, ANAEROBIC [[id number]]  (Abnormal) Collected:  12/06/17 2013    Order Status:  Completed Specimen:  Wound Drainage Updated:  12/15/17 1212     Special Requests: RIGHT LEG     Culture result:         SCANT ANAEROBIC GRAM POSITIVE COCCI (A)      ISOLATE FORWARD TO [facility] FOR ID AND SUSCEPTIBILITY ON 12/15/17      REFER TO ACC [id number]    CULTURE, BLOOD [[id number]] Collected:  12/01/17 1805    Order Status:  Completed Specimen:  Whole Blood from Blood Updated:  12/14/17 1030     Special Requests: --        RIGHT  FOREARM       GRAM STAIN GRAM POSITIVE COCCI         ANAEROBIC BOTTLE POSITIVE                 RESULTS VERIFIED, PHONED TO AND READ BACK BY HOANG De La Cruz RN AT [facility] [place] [id number] BY MPJ     Culture result: [facility]            RESULTS VERIFIED, PHONED TO AND READ BACK BY  [address] ON 12/08/17 @ 7340, ADS        SUSCEPTIBILITY RESULTS SCANNED IN [facility] 12/14/17, ADS    CULTURE, ANAEROBIC [[id number]] Collected:  12/06/17 2013    Order Status:  Completed Specimen:  Tissue Updated:  12/13/17 1113     Special Requests: RIGHT LATERAL FASCIA     Culture result:         SUBCULTURE IS NECESSARY TO DETERMINE PRESENCE OR ABSENCE OF ANAEROBIC BACTERIA IN THIS CULTURE. FURTHER REPORT TO FOLLOW AFTER INCUBATION OF SUBCULTURE.     Royal Barkerell STAIN [[id number]] Collected:  12/06/17 2013    Order Status:  Completed Specimen:  Wound Updated:  12/09/17 0527     Special Requests: RIGHT LEG     GRAM STAIN 0 TO 5 WBC'S/OIF      FEW GRAM POSITIVE COCCI        Culture result: NO GROWTH 2 DAYS       CULTURE, TISSUE [facility] STAIN [[id number]] Collected:  12/06/17 2013    Order Status:  Completed Specimen:  Tissue Updated:  12/09/17 0520     Special Requests: RIGHT LATERAL COMPARTMENT FASCIA     GRAM STAIN 0 TO 25 WBC'S/OIF      FEW Vanlue Sark POSITIVE COCCI        Culture result: NO GROWTH 2 DAYS       CULTURE, ANAEROBIC [[id number]] Collected:  12/03/17 1215    Order Status:  Completed Specimen:  Leg Updated:  12/08/17 0943     Special Requests: NO SPECIAL REQUESTS        Culture result:         MULTIPLE ANAEROBES ISOLATED, SUSCEPTIBILITY AND IDENTIFICATION AVAILABLE UPON REQUEST. ORGANISM WILL BE HELD FOR 5 DAYS. CULTURE, ANAEROBIC [[id number]] Collected:  12/01/17 2010    Order Status:  Completed Specimen:  Groin Updated:  12/08/17 0943     Special Requests: NO SPECIAL REQUESTS        Culture result:         MULTIPLE ANAEROBES ISOLATED, SUSCEPTIBILITY AND IDENTIFICATION AVAILABLE UPON REQUEST. ORGANISM WILL BE HELD FOR 5 DAYS.     CULTURE, Usman Fulling STAIN [[id number]]  (Abnormal) Collected:  12/01/17 2010    Order Status:  Completed Specimen:  Groin Updated:  12/07/17 0707     Special Requests: NO SPECIAL REQUESTS        GRAM STAIN 0 TO 1 WBC'S/OIF              MODERATE GRAM POSITIVE COCCI              MODERATE GRAM NEGATIVE RODS              MODERATE GRAM POSITIVE RODS     Culture result:         MODERATE GRAM NEGATIVE RODS (A)      MODERATE  NORMAL SKIN TEJA ISOLATED               REFER TO SPECIMEN NUMBER  [id number] FOR ID AND SUSCEPTIBILITY      CULTURE, WOUND Ancel Fore STAIN [[id number]]  (Abnormal)  (Susceptibility) Collected:  12/01/17 2010    Order Status:  Completed Specimen:  Groin Updated:  12/07/17 0705     Special Requests: NO SPECIAL REQUESTS        GRAM STAIN 0 TO 1 WBC'S/OIF              MODERATE GRAM POSITIVE COCCI              MODERATE GRAM NEGATIVE RODS              MODERATE GRAM POSITIVE RODS     Culture result:         MODERATE PROTEUS VULGARIS/PENNERI (A)      LIGHT  NORMAL SKIN TEJA ISOLATED       CULTURE, ANAEROBIC [[id number]] Collected:  12/01/17 2010    Order Status:  Completed Specimen:  Groin Updated:  12/06/17 1148     Special Requests: NO SPECIAL REQUESTS        Culture result:         MULTIPLE ANAEROBES ISOLATED, SUSCEPTIBILITY AND IDENTIFICATION AVAILABLE UPON REQUEST. ORGANISM WILL BE HELD FOR 5 DAYS. CULTURE, BLOOD [[id number]] Collected:  12/01/17 1804    Order Status:  Completed Specimen:  Whole Blood from Blood Updated:  12/06/17 0617     Special Requests: --        LEFT  HAND       Culture result: NO GROWTH 5 DAYS       CULTURE, Tristan Pedersen STAIN [[id number]] Collected:  12/03/17 1215    Order Status:  Completed Specimen:  Wound from Leg Updated:  12/05/17 0656     Special Requests: NO SPECIAL REQUESTS        GRAM STAIN NO WBCS SEEN         RARE GRAM POSITIVE COCCI         FEW GRAM NEGATIVE RODS        Culture result: NO GROWTH 2 DAYS       CULTURE, ANAEROBIC [[id number]]     Order Status:  Canceled Specimen:  Wound Drainage     CULTURE, WOUND Destiny Shakeel STAIN [[id number]]     Order Status:  Canceled Specimen:  Wound from Groin     CULTURE, Jaunita Roller [[id number]]     Order Status:  Canceled Specimen:  Wound from Groin     CULTURE, ANAEROBIC [[id number]]     Order Status:  Canceled Specimen:  Wound Drainage           Imaging /Procedures /Studies:    XR CHEST PORT   Final Result   IMPRESSION:       Persistent left basilar density, likely atelectasis or pneumonia.       XR CHEST PORT   Final Result   IMPRESSION:       No significant change compared to prior exam.       XR CHEST SNGL V   Final Result   IMPRESSION:    1. Persisting left basilar infiltrate and probable small pleural effusion.    2. Minimal right basilar infiltrate increased since prior.           XR CHEST SNGL V   Final Result   Impression:  Increased small left pleural effusion and associated left basilar   atelectasis and or consolidation.           XR ABD (KUB)   Final Result   Impression: No acute pathology identified.                       XR CHEST SNGL V   Final Result   IMPRESSION: No acute findings       XR CHEST SNGL V   Final Result   IMPRESSION: Mild worsening left lower lobe atelectasis or pneumonia       XR CHEST SNGL V   Final Result IMPRESSION: No acute findings in the chest           XR CHEST SNGL V   Final Result   IMPRESSION: ET tube has been repositioned and is now 9 cm above the cathy, at   the level of the thoracic inlet. This could be advanced 4 to 5 cm. The lungs are   clear.       XR CHEST SNGL V   Final Result   IMPRESSION: Some left infrahilar atelectasis or infiltrate now present.       XR CHEST SNGL V   Final Result   IMPRESSION: Bibasilar atelectasis or pneumonia significantly improved.               XR CHEST SNGL V   Final Result   IMPRESSION: Bibasilar atelectasis or pneumonia worse in the interval.               XR CHEST SNGL V   Final Result   IMPRESSION: Left basilar atelectasis improved.               XR CHEST SNGL V   Final Result   IMPRESSION: New left lower lobe atelectasis or pneumonia.               XR ABD (KUB)   Final Result   IMPRESSION: OG tube further in the stomach.       XR CHEST SNGL V   Final Result   IMPRESSION: No acute cardiopulmonary disease.               Recommend advancing the orogastric tube further into the stomach.               XR PELV AP ONLY   Final Result   IMPRESSION:   1. 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 This is very poorly   assessed and characterized by portable plain film imaging.           XR CHEST PORT   Final Result   IMPRESSION:    1. Improved although slight low position of endotracheal tube. Retraction of   this by 1 to 2 cm complex cyst and more ideal position.        2.  Persistent high position of an nasogastric tube. Advancement of this by 4 cm   would place this in a more ideal position.                   XR CHEST SNGL V   Final Result   IMPRESSION:    1. Low position of endotracheal tube tip overlying the most proximal right   mainstem bronchus.  Retraction of this by 3 cm should place this at a more   acceptable position.       2.  Slight high position of feeding tube with the tip overlying the stomach   although the side-port likely overlies the distal esophagus. Advancement of this   by 4 cm should ensure that the side port is below the gastroesophageal junction.       3. No evolving acute cardiopulmonary changes.       The abnormal endotracheal tube position was discussed with nursing staff a   muscle personally at 10:15 PM on 12/1/2017.           XR CHEST PA LAT   Final Result   IMPRESSION:    1. No acute cardiopulmonary process evident by plain film imaging.                   CT HEAD WO CONT   Final Result   IMPRESSION:     1. Small right subdural hematoma measuring 4 mm which demonstrates intermediate   attenuation suggesting a subacute although recent subdural hematoma.        The subacute appearing right subdural hematoma was discussed with Dr. Kelli Simmons by   myself personally at 5:05 PM on 12/1/2017.        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  ICD-9-CM: 608.83  12/17/2017 - Present Yes        Subdural hematoma ([address] ICD-10-CM: I62.00  ICD-9-CM: 432.1  12/1/2017 - Present Yes        Type II diabetes mellitus with complication (HCC) (Chronic) ICD-10-CM: E11.8  ICD-9-CM: 250.90  12/1/2017 - Present Yes        Syncope ICD-10-CM: R55  ICD-9-CM: 780.2  12/1/2017 - Present Yes        Fasciitis ICD-10-CM: M72.9  ICD-9-CM: 729.4  12/1/2017 - Present Yes        RESOLVED: Hypernatremia ICD-10-CM: E87.0  ICD-9-CM: 276.0  12/22/2017 - 12/27/2017 Unknown        RESOLVED: Mucus plugging of bronchi ICD-10-CM: J98.09  ICD-9-CM: 519.19  12/21/2017 - 12/27/2017 No        RESOLVED: Atelectasis ICD-10-CM: J98.11  ICD-9-CM: 518.0  12/21/2017 - 12/27/2017 No        RESOLVED: Acute hypoxemic respiratory failure ([facility] [address] ICD-10-CM: J96.01  ICD-9-CM: 518.81  12/20/2017 - 12/27/2017 No        RESOLVED: Pneumonia due to infectious organism ICD-10-CM: J18.9  ICD-9-CM: 136.9, 484.8  12/20/2017 - 12/27/2017 No        RESOLVED: Acute respiratory failure (HCC) ICD-10-CM: J96.00  ICD-9-CM: 518.81  12/8/2017 - 12/15/2017 Yes        RESOLVED: Hypernatremia ICD-10-CM: E87.0  ICD-9-CM: 276.0  12/6/2017 - 12/15/2017 Yes        RESOLVED: Lactic acidosis ICD-10-CM: E87.2  ICD-9-CM: 276.2  12/2/2017 - 12/6/2017 Yes        RESOLVED: LAUREANO (acute kidney injury) ([facility] [address] ICD-10-CM: N17.9  ICD-9-CM: 584.9  12/1/2017 - 12/8/2017 Yes        RESOLVED: Leukocytosis ICD-10-CM: X68.027  ICD-9-CM: 288.60  12/1/2017 - 12/8/2017 Yes        * (Principal)RESOLVED: Severe sepsis with septic shock [facility]) ICD-10-CM: A41.9, R65.21  ICD-9-CM: 038.9, 995.92, 785.52  12/1/2017 - 12/15/2017 Yes        RESOLVED: Electrolyte abnormality ICD-10-CM: E87.8  ICD-9-CM: 276.9  12/1/2017 - 12/8/2017 Yes              Plan:  HAP:  CXR with persistent L basilar infiltrate and new R infiltrate Restarted broad spectrum abx on 12/20, pulm following. Improving.       Acute hypoxic resp failure: Pulm following, resolved after bronch.     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 Kelly Menjivar 12/1  Followed by [facility] for further debridement  Skin graft delayed for now, wound Vac removed. Has completed 2 weeks of IV abx     Fungal UTI: urine culture with candida albicans. On on diflucan. Keep kathleen in to avoid groin wound contamination.     Subdural hematoma: Non-surgical per neurosurgery     Delirium: Likely related to prolonged ICU stay. resolved       DM2: On ISS and Lantus      Dysphagia: SLP following, Soft mechanical food with nectar thicks. Debility: PT/OT/PPD/CM. Waiting for [facility] approval    DVT Prophylaxis: Lovenox SQ  CODE Status: Full CODE  Plan of Care Discussed with: patient.  Care team.  Medical Risk: moderate  Disposition: pending    Mckenna Perez MD  12/27/17

## 2017-12-27 NOTE — PROGRESS NOTES
Problem: Mobility Impaired (Adult and Pediatric)  Goal: *Acute Goals and Plan of Care (Insert Text)  STG:  (1.)Mr. Nikki Beck will move from supine to sit and sit to supine , scoot up and down and roll side to side with MAXIMAL ASSIST within 3 day(s). (2.)Mr. Nikki Beck will transfer from bed to chair and chair to bed with MINIMAL ASSIST using the least restrictive device within 3 day(s). (3.)Mr. Nikki Beck will ambulate with MAXIMAL ASSIST for 15 feet with the least restrictive device within 3 day(s). (4.)Mr. Nikki Beck will participate in therapeutic activity/exerices x 12 minutes for increased strength within 3 days. LTG:  (1.)Mr. Nikki Beck will move from supine to sit and sit to supine , scoot up and down and roll side to side in bed with MINIMAL ASSIST within 7 day(s). (2.)Mr. Nikki Beck will transfer from bed to chair and chair to bed with MINIMAL ASSIST using the least restrictive device within 7 day(s). (3.)Mr. Nikki Beck will ambulate with MINIMAL ASSIST for 75 feet with the least restrictive device within 7 day(s). (4.)Mr. Nikki Beck will participate in therapeutic activity/exerices x 23 minutes for increased strength within 7 days.     ________________________________________________________________________________________________      PHYSICAL THERAPY: Daily Note, Treatment Day: 3rd, AM 12/27/2017  INPATIENT: Hospital Day: 32  Payor: Consuelo Guillen / Plan: [address] [place] [address] HMO / Product Type: Managed Care Medicare /    *SEE ASSESSMENT NOTE*     NAME/AGE/GENDER: Cuong Robledo is a 79 y.o. 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 ASSESSMENT:     Mr. Jacklyn Angel is supine in bed on arrival.  Pt sat EOB with  max assist x 2. Once he was up he required max of 1-2 to maintain most of the time especially initially but was able to maintain his own balance x 60 sec and again x 30 sec with VC's and SBA. Pt had posterior lean while sitting. He performed bilateral AP's and LAQ's bilaterally. He performed balance activity and was able to reach forward and touch my hands at different levels while maintaining balance with VC's. Pt returned to supine with total assist x 2. Pt left supine with HOB elevated. Slow progression towards goals. This section established at most recent assessment   PROBLEM LIST (Impairments causing functional limitations):  1. Decreased Strength  2. Decreased ADL/Functional Activities  3. Decreased Transfer Abilities  4. Decreased Ambulation Ability/Technique  5. Decreased Balance  6. Increased Pain  7. Decreased Activity Tolerance   INTERVENTIONS PLANNED: (Benefits and precautions of physical therapy have been discussed with the patient.)  1. Balance Exercise  2. Bed Mobility  3. Family Education  4. Gait Training  5. Neuromuscular Re-education/Strengthening  6. Range of Motion (ROM)  7. Therapeutic Activites  8. Therapeutic Exercise/Strengthening  9. Transfer Training  10. Group Therapy     TREATMENT PLAN: Frequency/Duration: 3 times a week for duration of hospital stay  Rehabilitation Potential For Stated Goals: Good     RECOMMENDED REHABILITATION/EQUIPMENT: (at time of discharge pending progress): Due to the probability of continued deficits (see above) this patient will likely need continued skilled physical therapy after discharge.   Equipment:    None at this time              HISTORY:   History of Present Injury/Illness (Reason for Referral):  Per H&P: \"  Past Medical History/Comorbidities:   Mr. Jacklyn Angel  has a past medical history of Diabetes ([facility] [address] Mr. Irma Sanabria  has no past surgical history on file. Social History/Living Environment:   Home Environment: Apartment  # Steps to Enter: 0  One/Two Story Residence: One story  Living Alone: Yes  Support Systems: Family member(s)  Patient Expects to be Discharged to[de-identified] Unknown  Current DME Used/Available at Home: Walker, rollator, Cane, straight  Prior Level of Function/Work/Activity:  Per granddaughter he was independent with ADLs and used a cane for ambulation with recent history of falls. Also pt was living alone. Number of Personal Factors/Comorbidities that affect the Plan of Care:  Diabetes 1-2: MODERATE COMPLEXITY   EXAMINATION:   Most Recent Physical Functioning:   Gross Assessment:                  Posture:     Balance:  Sitting: Impaired  Sitting - Static: Poor (constant support)  Sitting - Dynamic: Poor (constant support)  Standing:  (not attempted) Bed Mobility:  Supine to Sit: Maximum assistance;Assist x2  Sit to Supine: Total assistance;Assist x2  Wheelchair Mobility:     Transfers:     Gait:            Body Structures Involved:  1. Nerves  2. Voice/Speech  3. Metabolic  4. Muscles Body Functions Affected:  1. Mental  2. Sensory/Pain  3. Voice and Speech  4. Neuromusculoskeletal  5. Movement Related  6. Skin Related  7. Metobolic/Endocrine Activities and Participation Affected:  1. General Tasks and Demands  2. Communication  3. Mobility  4. Self Care  5. Domestic Life  6. Interpersonal Interactions and Relationships  7. Community, Social and [place]   Number of elements that affect the Plan of Care: 4+: HIGH COMPLEXITY   CLINICAL PRESENTATION:   Presentation: Evolving clinical presentation with changing clinical characteristics: MODERATE COMPLEXITY   CLINICAL DECISION MAKIN [facility] Mobility Inpatient Short Form  How much difficulty does the patient currently have. .. Unable A Lot A Little None   1.   Turning over in bed (including adjusting bedclothes, sheets and blankets)? [] 1   [x] 2   [] 3   [] 4   2. Sitting down on and standing up from a chair with arms ( e.g., wheelchair, bedside commode, etc.)   [x] 1   [] 2   [] 3   [] 4   3. Moving from lying on back to sitting on the side of the bed? [x] 1   [] 2   [] 3   [] 4   How much help from another person does the patient currently need. .. Total A Lot A Little None   4. Moving to and from a bed to a chair (including a wheelchair)? [x] 1   [] 2   [] 3   [] 4   5. Need to walk in hospital room? [x] 1   [] 2   [] 3   [] 4   6. Climbing 3-5 steps with a railing? [x] 1   [] 2   [] 3   [] 4   © 2007, Trustees of [address] [place] [address], under license to [employer]. All rights reserved      Score:  Initial: 7 Most Recent: X (Date: -- )    Interpretation of Tool:  Represents activities that are increasingly more difficult (i.e. Bed mobility, Transfers, Gait). Score 24 23 22-20 19-15 14-10 9-7 6     Modifier CH CI CJ CK CL CM CN      ? Mobility - Walking and Moving Around:     - CURRENT STATUS: CM - 80%-99% impaired, limited or restricted    - GOAL STATUS: CL - 60%-79% impaired, limited or restricted    - D/C STATUS:  ---------------To be determined---------------  Payor: [employer] MEDICARE / Plan: [address] [place] [address] HMO / Product Type: Managed Care Medicare /      Medical Necessity:     · Patient demonstrates good rehab potential due to higher previous functional level. Reason for Services/Other Comments:  · Patient continues to require skilled intervention due to decreased strength, balance, and activity tolerance. Use of outcome tool(s) and clinical judgement create a POC that gives a: Questionable prediction of patient's progress: MODERATE COMPLEXITY            TREATMENT:   (In addition to Assessment/Re-Assessment sessions the following treatments were rendered)   Pre-treatment Symptoms/Complaints:  Difficult to understand.   Pain: Initial:     R LE pain with movement. Post Session:  0     Therapeutic Activity: (    23 minutes): Therapeutic activities including Bed transfers, sitting balance, and LE ex to improve mobility, strength and balance. Required max  to promote static balance in sitting. Braces/Orthotics/Lines/Etc:   · room air  Treatment/Session Assessment:    · Response to Treatment:  Good effort  · Interdisciplinary Collaboration:   o Registered Nurse  o Rehabilitation Attendant  · After treatment position/precautions:   o Supine in bed  o Bed/Chair-wheels locked  o Bed in low position  o RN notified  o Side rails x 3   · Compliance with Program/Exercises: Will assess as treatment progresses. · Recommendations/Intent for next treatment session: \"Next visit will focus on advancements to more challenging activities and reduction in assistance provided\".   Total Treatment Duration:PT Patient Time In/Time Out  Time In: 1157  Time Out: Ej [address] [place] [address]

## 2017-12-28 ENCOUNTER — APPOINTMENT (OUTPATIENT)
Dept: GENERAL RADIOLOGY | Age: 67
DRG: 853 | End: 2017-12-28
Attending: HOSPITALIST
Payer: MEDICARE

## 2017-12-28 LAB
ANION GAP SERPL CALC-SCNC: 8 MMOL/L (ref 7–16)
BACTERIA SPEC CULT: ABNORMAL
BACTERIA SPEC CULT: NORMAL
BUN SERPL-MCNC: 12 MG/DL (ref 8–23)
CALCIUM SERPL-MCNC: 7.1 MG/DL (ref 8.3–10.4)
CHLORIDE SERPL-SCNC: 108 MMOL/L (ref 98–107)
CO2 SERPL-SCNC: 26 MMOL/L (ref 21–32)
CREAT SERPL-MCNC: 0.44 MG/DL (ref 0.8–1.5)
ERYTHROCYTE [DISTWIDTH] IN BLOOD BY AUTOMATED COUNT: 16.2 % (ref 11.9–14.6)
FUNGUS ID 1, (DID), RFIM1T: NORMAL
GLUCOSE BLD STRIP.AUTO-MCNC: 110 MG/DL (ref 65–100)
GLUCOSE BLD STRIP.AUTO-MCNC: 114 MG/DL (ref 65–100)
GLUCOSE BLD STRIP.AUTO-MCNC: 119 MG/DL (ref 65–100)
GLUCOSE BLD STRIP.AUTO-MCNC: 171 MG/DL (ref 65–100)
GLUCOSE BLD STRIP.AUTO-MCNC: 173 MG/DL (ref 65–100)
GLUCOSE BLD STRIP.AUTO-MCNC: 209 MG/DL (ref 65–100)
GLUCOSE SERPL-MCNC: 103 MG/DL (ref 65–100)
HCT VFR BLD AUTO: 29.8 % (ref 41.1–50.3)
HGB BLD-MCNC: 9.2 G/DL (ref 13.6–17.2)
MAGNESIUM SERPL-MCNC: 1.9 MG/DL (ref 1.8–2.4)
MCH RBC QN AUTO: 27.5 PG (ref 26.1–32.9)
MCHC RBC AUTO-ENTMCNC: 30.9 G/DL (ref 31.4–35)
MCV RBC AUTO: 89 FL (ref 79.6–97.8)
PLATELET # BLD AUTO: 278 K/UL (ref 150–450)
PMV BLD AUTO: 10.8 FL (ref 10.8–14.1)
POTASSIUM SERPL-SCNC: 3.4 MMOL/L (ref 3.5–5.1)
RBC # BLD AUTO: 3.35 M/UL (ref 4.23–5.67)
SERVICE CMNT-IMP: ABNORMAL
SERVICE CMNT-IMP: NORMAL
SODIUM SERPL-SCNC: 142 MMOL/L (ref 136–145)
SPECIMEN SOURCE: NORMAL
WBC # BLD AUTO: 11.8 K/UL (ref 4.3–11.1)

## 2017-12-28 PROCEDURE — 74230 X-RAY XM SWLNG FUNCJ C+: CPT

## 2017-12-28 PROCEDURE — 74011250637 HC RX REV CODE- 250/637: Performed by: INTERNAL MEDICINE

## 2017-12-28 PROCEDURE — 94760 N-INVAS EAR/PLS OXIMETRY 1: CPT

## 2017-12-28 PROCEDURE — 85027 COMPLETE CBC AUTOMATED: CPT | Performed by: INTERNAL MEDICINE

## 2017-12-28 PROCEDURE — 74011000258 HC RX REV CODE- 258: Performed by: NURSE PRACTITIONER

## 2017-12-28 PROCEDURE — 74011250636 HC RX REV CODE- 250/636: Performed by: HOSPITALIST

## 2017-12-28 PROCEDURE — 74011250636 HC RX REV CODE- 250/636: Performed by: INTERNAL MEDICINE

## 2017-12-28 PROCEDURE — 74011250637 HC RX REV CODE- 250/637: Performed by: HOSPITALIST

## 2017-12-28 PROCEDURE — 80048 BASIC METABOLIC PNL TOTAL CA: CPT | Performed by: INTERNAL MEDICINE

## 2017-12-28 PROCEDURE — 82962 GLUCOSE BLOOD TEST: CPT

## 2017-12-28 PROCEDURE — 74011636637 HC RX REV CODE- 636/637: Performed by: HOSPITALIST

## 2017-12-28 PROCEDURE — 74011000250 HC RX REV CODE- 250: Performed by: NURSE PRACTITIONER

## 2017-12-28 PROCEDURE — 36591 DRAW BLOOD OFF VENOUS DEVICE: CPT

## 2017-12-28 PROCEDURE — 83735 ASSAY OF MAGNESIUM: CPT | Performed by: HOSPITALIST

## 2017-12-28 PROCEDURE — 97110 THERAPEUTIC EXERCISES: CPT

## 2017-12-28 PROCEDURE — 92611 MOTION FLUOROSCOPY/SWALLOW: CPT

## 2017-12-28 PROCEDURE — 74011000258 HC RX REV CODE- 258: Performed by: HOSPITALIST

## 2017-12-28 PROCEDURE — 65270000029 HC RM PRIVATE

## 2017-12-28 PROCEDURE — 77030019605

## 2017-12-28 PROCEDURE — 74011000255 HC RX REV CODE- 255: Performed by: HOSPITALIST

## 2017-12-28 PROCEDURE — 94640 AIRWAY INHALATION TREATMENT: CPT

## 2017-12-28 PROCEDURE — 74011250636 HC RX REV CODE- 250/636: Performed by: NURSE PRACTITIONER

## 2017-12-28 PROCEDURE — 74011250637 HC RX REV CODE- 250/637: Performed by: SURGERY

## 2017-12-28 PROCEDURE — 74011636637 HC RX REV CODE- 636/637: Performed by: INTERNAL MEDICINE

## 2017-12-28 RX ORDER — POTASSIUM CHLORIDE 14.9 MG/ML
20 INJECTION INTRAVENOUS ONCE
Status: COMPLETED | OUTPATIENT
Start: 2017-12-28 | End: 2017-12-28

## 2017-12-28 RX ORDER — GUAIFENESIN 600 MG/1
1200 TABLET, EXTENDED RELEASE ORAL EVERY 12 HOURS
Status: DISCONTINUED | OUTPATIENT
Start: 2017-12-28 | End: 2017-12-29 | Stop reason: HOSPADM

## 2017-12-28 RX ADMIN — HYDROMORPHONE HYDROCHLORIDE 1 MG: 2 INJECTION, SOLUTION INTRAMUSCULAR; INTRAVENOUS; SUBCUTANEOUS at 17:07

## 2017-12-28 RX ADMIN — BARIUM SULFATE 15 ML: 400 SUSPENSION ORAL at 09:28

## 2017-12-28 RX ADMIN — Medication 10 ML: at 12:24

## 2017-12-28 RX ADMIN — Medication 10 ML: at 21:10

## 2017-12-28 RX ADMIN — POTASSIUM & SODIUM PHOSPHATES POWDER PACK 280-160-250 MG 1 PACKET: 280-160-250 PACK at 12:24

## 2017-12-28 RX ADMIN — Medication 1 AMPULE: at 21:10

## 2017-12-28 RX ADMIN — ALBUTEROL SULFATE 2.5 MG: 2.5 SOLUTION RESPIRATORY (INHALATION) at 17:20

## 2017-12-28 RX ADMIN — Medication 1 AMPULE: at 08:35

## 2017-12-28 RX ADMIN — SODIUM CHLORIDE 2 G: 900 INJECTION, SOLUTION INTRAVENOUS at 12:24

## 2017-12-28 RX ADMIN — ENOXAPARIN SODIUM 40 MG: 40 INJECTION SUBCUTANEOUS at 12:25

## 2017-12-28 RX ADMIN — PANTOPRAZOLE SODIUM 40 MG: 40 TABLET, DELAYED RELEASE ORAL at 09:53

## 2017-12-28 RX ADMIN — POTASSIUM & SODIUM PHOSPHATES POWDER PACK 280-160-250 MG 1 PACKET: 280-160-250 PACK at 17:07

## 2017-12-28 RX ADMIN — POTASSIUM CHLORIDE 20 MEQ: 14.9 INJECTION, SOLUTION INTRAVENOUS at 10:33

## 2017-12-28 RX ADMIN — HYDROMORPHONE HYDROCHLORIDE 1 MG: 2 INJECTION, SOLUTION INTRAMUSCULAR; INTRAVENOUS; SUBCUTANEOUS at 04:58

## 2017-12-28 RX ADMIN — ALBUTEROL SULFATE 2.5 MG: 2.5 SOLUTION RESPIRATORY (INHALATION) at 11:49

## 2017-12-28 RX ADMIN — GUAIFENESIN 1200 MG: 600 TABLET, EXTENDED RELEASE ORAL at 21:11

## 2017-12-28 RX ADMIN — ALBUTEROL SULFATE 2.5 MG: 2.5 SOLUTION RESPIRATORY (INHALATION) at 20:00

## 2017-12-28 RX ADMIN — FLUCONAZOLE 200 MG: 100 TABLET ORAL at 09:53

## 2017-12-28 RX ADMIN — DEXTROSE MONOHYDRATE AND SODIUM CHLORIDE 75 ML/HR: 5; .45 INJECTION, SOLUTION INTRAVENOUS at 10:04

## 2017-12-28 RX ADMIN — HYDROMORPHONE HYDROCHLORIDE 1 MG: 2 INJECTION, SOLUTION INTRAMUSCULAR; INTRAVENOUS; SUBCUTANEOUS at 10:33

## 2017-12-28 RX ADMIN — DAKIN'S SOLUTION 0.125% (QUARTER STRENGTH): 0.12 SOLUTION at 11:05

## 2017-12-28 RX ADMIN — BARIUM SULFATE 15 ML: 400 PASTE ORAL at 09:28

## 2017-12-28 RX ADMIN — INSULIN GLARGINE 20 UNITS: 100 INJECTION, SOLUTION SUBCUTANEOUS at 09:54

## 2017-12-28 RX ADMIN — ALBUTEROL SULFATE 2.5 MG: 2.5 SOLUTION RESPIRATORY (INHALATION) at 07:20

## 2017-12-28 RX ADMIN — GUAIFENESIN 1200 MG: 600 TABLET, EXTENDED RELEASE ORAL at 12:23

## 2017-12-28 RX ADMIN — ACETYLCYSTEINE 300 MG: 200 SOLUTION ORAL; RESPIRATORY (INHALATION) at 20:00

## 2017-12-28 RX ADMIN — BARIUM SULFATE 30 ML: 980 POWDER, FOR SUSPENSION ORAL at 09:29

## 2017-12-28 RX ADMIN — SODIUM CHLORIDE 2 G: 900 INJECTION, SOLUTION INTRAVENOUS at 21:11

## 2017-12-28 RX ADMIN — Medication 10 ML: at 05:01

## 2017-12-28 RX ADMIN — INSULIN LISPRO 2 UNITS: 100 INJECTION, SOLUTION INTRAVENOUS; SUBCUTANEOUS at 12:25

## 2017-12-28 RX ADMIN — SODIUM CHLORIDE 2 G: 900 INJECTION, SOLUTION INTRAVENOUS at 03:19

## 2017-12-28 RX ADMIN — BARIUM SULFATE 30 ML: 400 SUSPENSION ORAL at 09:27

## 2017-12-28 RX ADMIN — POTASSIUM & SODIUM PHOSPHATES POWDER PACK 280-160-250 MG 1 PACKET: 280-160-250 PACK at 21:11

## 2017-12-28 RX ADMIN — INSULIN LISPRO 4 UNITS: 100 INJECTION, SOLUTION INTRAVENOUS; SUBCUTANEOUS at 21:09

## 2017-12-28 NOTE — WOUND CARE
Patient see for wound/ostomy follow up. Colostomy pouch continues to put out soft semi formed brown stool. Pouch intact. Shear/friction areas to buttocks near gluteal fold improving. Left side still has some purple areas but right side pink and very superficial. Assisted primary nurse with surgical dressing. Large area affected of right thigh , right buttock and into groin. Base is granulation, muscle and tendon/fascia. Small area of tendon starting to dry out, minor at this point. ACE wrap used to keep dressing in place with small area of paper tape needed on buttock to keep this area in place. Patient premedicated for pain by primary nurse and he tolerated dressing well. Wound team will continue to follow while in acute care.

## 2017-12-28 NOTE — PROGRESS NOTES
OT note: Attempted to see pt for treatment this morning. Pt was just beginning a RT treatment. Will re-attempt as schedule permits.   Dairl Later

## 2017-12-28 NOTE — PROGRESS NOTES
SPEECH PATHOLOGY NOTE:    Modified barium swallow study completed. Patient exhibited aspiration of thin liquids and deep penetration of nectar thick liquids. Improved safety with honey by cup- no penetration or aspiration observed on exam. Increased mastication time with soft solids due to edentulous state. Solids deferred. Recommend MECHANICAL SOFT DIET and HONEY thick liquids. Full report to follow.      DEAN Valladares, CCC-SLP, CBIS

## 2017-12-28 NOTE — PROGRESS NOTES
Dispo update:  Provided updated clinicals (physician notes, PT, SLP, wound care, RD) to Mr. Arabella Amanda, 4th floor [facility] liaison.   Human Medicare insurance pre-cert is still pending for [employer].

## 2017-12-28 NOTE — PROGRESS NOTES
END OF SHIFT NOTE:    INTAKE/OUTPUT  12/27 0701 - 12/28 0700  In: 805 [I.V.:805]  Out: 700 [Urine:600]  Voiding: YES  Catheter: YES  Drain:   Colostomy 12/02/17 Upper Abdomen (Active)   Drainage Color Brown 12/28/2017 12:14 AM   Site Assessment Pink 12/28/2017 12:14 AM   Treatment Wafer changed;Site care; Bag change 12/26/2017  2:00 PM   Output (ml) 100 mL 12/27/2017 11:25 PM               Flatus: Patient does have flatus present. Stool:  Ostomy  occurrences. Characteristics:  Stool Assessment  Stool Color: Brown  Stool Appearance: Soft  Stool Amount: Medium  Stool Source/Status: Colostomy    Emesis: 0 occurrences. Characteristics:        VITAL SIGNS  Patient Vitals for the past 12 hrs:   Temp Pulse Resp BP SpO2   12/28/17 0300 98.6 °F (37 °C) 87 19 115/80 95 %   12/27/17 2325 98.5 °F (36.9 °C) 83 18 115/76 95 %   12/27/17 2000 98 °F (36.7 °C) 86 18 110/74 95 %   12/27/17 1933 - - - - 93 %       Pain Assessment  Pain Intensity 1: 0 (12/28/17 0536)  Pain Location 1: Hip, Leg  Pain Intervention(s) 1: Medication (see MAR)  Patient Stated Pain Goal: 0    Ambulating  No    Shift report given to oncoming nurse at the bedside.     Mio Correa RN

## 2017-12-28 NOTE — PROGRESS NOTES
LTG: Patient will tolerate least restrictive diet without overt signs or symptoms of airway compromise. STG: Patient will tolerate chopped mechanical soft diet with no mixed consistencies without overt signs or symptoms of aspiration 100% of the time. STG: Patient will tolerate honey-thick liquids without overt signs or symptoms of aspiration 10% of the time. STG: Patient will perform laryngeal strengthening exercises x10 each with 80% accuracy. STG: Patient will participate in modified barium swallow study as clinically indicated. - MET 12/28/17    Speech language pathology: modified barium swallow study: Initial Assessment    NAME/AGE/GENDER: Benjy Green is a 79 y.o. 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 INTERDISCIPLINARY COLLABORATION: Registered Nurse and Physician  PRECAUTIONS/ALLERGIES: Review of patient's allergies indicates no known allergies. ASSESSMENT/PLAN OF CARE:Based on the objective data described below, Mr. Octaviano Combs presents with moderate oropharyngeal dysphagia. Patient exhibits decreased base of tongue retraction, reduced epiglottic inversion, and reduced hyolaryngeal elevation/excursion, resulting in premature spillage of thin and nectar liquids to the vallecula prior to initiation of swallow. Aspiration of thin liquids during this swallow with patient exhibiting strong cough to expel aspirated materials. Deep penetration of nectar thick during the swallow, requiring verbal cues for double swallow and throat clear in attempt to clear residue; however mild residue remained in laryngeal vestibule. Pharyngeal residue of all liquid consistencies also noted in vallecula and pyriforms after the swallow, which cleared with double swallow.  No penetration or aspiration observed with honey, puree, or mechanical soft diet. Straws deferred due to viscosity of honey thick liquids. Solids also deferred as patient is edentulous and required increased mastication time with soft solids. Recommend Mechanical soft diet and HONEY thick. Will continue to follow for diet tolerance and laryngeal exercises, as well as speech-language/cognitive assessment. Patient will benefit from skilled intervention to address the below impairments. ?????? ? ? This section established at most recent assessment??????????  RECOMMENDATIONS AND PLANNED INTERVENTIONS (Benefits and precautions of therapy have been discussed with the patient.):  · PO:  Mashed mechanical soft with ground meats  · Liquids:  honey  MEDICATIONS:  · whole in puree  COMPENSATORY STRATEGIES/MODIFICATIONS INCLUDING:  · Alternate liquids/solids  · Cup/sip  · Double swallow  · Small sips and bites  OTHER RECOMMENDATIONS (including follow up treatment recommendations):   · Laryngeal exercises  · Patient education  FREQUENCY/DURATION: Continue to follow patient 3 times a week for duration of hospital stay to address above goals. RECOMMENDED REHABILITATION/EQUIPMENT: (at time of discharge pending progress): Due to the probability of continued deficits (see above) this patient will likely need continued skilled speech therapy after discharge. SUBJECTIVE:   Alert, cooperative  History of Present Injury/Illness: Mr. Riley Reich  has a past medical history of Diabetes ([facility] [address] .  He also  has no past surgical history on file.    Present Symptoms: oropharyngeal dysphagia   Pain Intensity 1: 0  Pain Location 1: Hip, Leg  Pain Orientation 1: Right  Pain Intervention(s) 1: Medication (see MAR)    Current Dietary Status:  NPO   Radiologist: Mike  Social History/Home Situation:    Home Environment: Apartment  # Steps to Enter: 0  One/Two Story Residence: One story  Living Alone: Yes  Support Systems: Family member(s)  Patient Expects to be Discharged to[de-identified] Unknown  Current DME Used/Available at Home: Ashly Angel, linda, nick Franco  OBJECTIVE:     Cognitive/Communication Status:  Mental Status  Neurologic State: Alert  Orientation Level: Oriented to place, Oriented to person  Cognition: Follows commands  Perception: Appears intact  Perseveration: No perseveration noted  Safety/Judgement: Fall prevention, Decreased insight into deficits    Oral Assessment:  Oral Assessment  Labial: No impairment  Dentition: Edentulous  Oral Hygiene: adequate  Lingual: No impairment  Velum: No impairment    Patient Viewed:    Film Views: Lateral, Fluoro    Oral Prepatory:  The patient was given the following: Consistency Presented: Puree, Solid, Honey thick liquid, Nectar thick liquid, Mechanical soft, Thin liquid  How Presented: Self-fed/presented, SLP-fed/presented, Cup/sip, Spoon, Straw    Oral Phase:  Bolus Acceptance: No impairment  Bolus Formation/Control: Impaired  Propulsion: Delayed (# of seconds), Discoordination  Type of Impairment: Delayed, Premature spillage, Mastication  Oral Residue: None  Initiation of Swallow: Triggered at vallecula, Absent  Oral Phase Severity: Mild-moderate    Pharyngeal Phase:  Timing: Pooling 1-5 sec, Vallecular  Decreased Tongue Base Retraction?: No  Laryngeal Elevation: Inadequate epiglottic inversion, Incomplete laryngeal closure, Minimal movement of larynx/epiglottis, Reduced excursion with laryngeal vestibule gap  Penetration: During swallow, To laryngeal vestibule, From initial swallow  Aspiration/Timing: During, From initial swallow  Aspiration/Penetration Score: 6 (Aspiration-Contrast passes below the cords/glottis, and is cleared)   Pharyngeal Symmetry: Not assessed  Pharyngeal Dysfunction: Decreased tongue base retraction, Decreased strength, Decreased elevation/closure  Pharyngeal Phase Severity: Moderate  Pharyngeal-Esophageal Segment: No impairment    Assessment/Reassessment only, no treatment provided today    Tool Used: Dysphagia Outcome and Severity Scale (ANGEL)    Score Comments   Normal Diet  [] 7 With no strategies or extra time needed       Functional Swallow  [] 6 May have mild oral or pharyngeal delay       Mild Dysphagia    [] 5 Which may require one diet consistency restricted (those who demonstrate penetration which is entirely cleared on MBS would be included)   Mild-Moderate Dysphagia  [] 4 With 1-2 diet consistencies restricted       Moderate Dysphagia  [x] 3 With 2 or more diet consistencies restricted       Moderately Severe Dysphagia  [] 2 With partial PO strategies (trials with ST only)       Severe Dysphagia  [] 1 With inability to tolerate any PO safely          Score:  Initial: 3 Most Recent: X (Date: -- )   Interpretation of Tool: The Dysphagia Outcome and Severity Scale (ANGEL) is a simple, easy-to-use, 7-point scale developed to systematically rate the functional severity of dysphagia based on objective assessment and make recommendations for diet level, independence level, and type of nutrition. Score 7 6 5 4 3 2 1   Modifier CH CI CJ CK CL CM CN   ? Swallowing:     - CURRENT STATUS: CL - 60%-79% impaired, limited or restricted    - GOAL STATUS:  CJ - 20%-39% impaired, limited or restricted    - D/C STATUS:  ---------------To be determined---------------  Payor: Azam Alston / Plan: [address] [place] [address] HMO / Product Type: Managed Care Medicare /   __________________________________________________________________________________________________  Safety:   After treatment position/precautions:  · In holding bay awaiting transport back to room  · Upright in Bed  Recommendations for treatment: Mechanical soft diet and HONEY thick liquids.  Speech to follow for laryngeal exercises, po trials, and speech-language/cognitive evaluation    Total Treatment Duration:  Time In: 0915   Time Out: 0945    Hesham Garcia, MSP, CCC-SLP, CBIS

## 2017-12-28 NOTE — PROGRESS NOTES
Problem: Self Care Deficits Care Plan (Adult)  Goal: *Acute Goals and Plan of Care (Insert Text)  1. Dot Decant will complete BUE strengthening 15-20 minutes in prep for ADL. [address] Haleigh Garcia will complete grooming/oral care with minimal assistance. 107 Haleigh Garcia will roll L/R with moderate assistance or less as needed for toileting/kim-care. Time frame: 4 - 7 visits    OCCUPATIONAL THERAPY: Daily Note, Treatment Day: 5th and PM    12/28/2017  INPATIENT: Hospital Day: 28  Payor: Rodrigo Diego / Plan: [address] [place] [address] HMO / Product Type: [employer] Medicare /      [employer] Ada: Brianna Giron is a 79 y.o. 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 Tremaine Matthew, plastic surgery, who reported pt has no activity or weight bearing restriction but if excess posterior drainage noted (especially with R hip abduction) to 'back off.'\" ; Review of patient's allergies indicates no known allergies. ASSESSMENT:   Mr. Aaron Viera was admitted for the above diagnosis. 12/28/2017 Pt presents in supine upon arrival and agreeable to treatment. Pt completed UE exercises with constant cues to stay on task as pt is very talkative and difficult to understand. Pt left in supine with belongings in reach. Continue OT POC. This section established at most recent assessment   PROBLEM LIST (Impairments causing functional limitations):  1. Decreased Strength  2. Decreased ADL/Functional Activities  3. Decreased Transfer Abilities  4. Decreased Balance  5. Increased Pain  6. Decreased Activity Tolerance  7.  Decreased Flexibility/Joint Mobility  8. Edema/Girth  9. Decreased Ogema with Home Exercise Program   INTERVENTIONS PLANNED: (Benefits and precautions of occupational therapy have been discussed with the patient.)  1. Activities of daily living training  2. Therapeutic activity  3. Therapeutic exercise     TREATMENT PLAN: Frequency/Duration: Follow patient 3 times/week to address above goals. Rehabilitation Potential For Stated Goals: Good     RECOMMENDED REHABILITATION/EQUIPMENT: (at time of discharge pending progress): Due to the probability of continued deficits (see above) this patient will likely need continued skilled occupational therapy after discharge. Equipment:    None at this time          OCCUPATIONAL PROFILE AND HISTORY:   History of Present Injury/Illness (Reason for Referral):  Per MD H & P: Patient 67M with no known PMHx - has never seen a doctor as an adult. Per EMS report was brought to ED after syncopal episode. Patient opened the door for a friend and passed out falling backwards. Patient says he is here because he thinks he has pneumonia due to a cough (cxr clear). Patient is adentuous and hard to understand, poor historian. Multiple other issues revealed during ED course. Subdural hematoma on CT head - reviewed by neurosx (no intervention). WBC 21K, , Cr 1.5 and found to have large foul smelling abscess of right inner thigh with purulent drainage. Surgery has seen with plans for operation tonight. Past Medical History/Comorbidities:   Mr. Radha Rosario  has a past medical history of Diabetes ([facility] [address] Mr. Radha Rosario  has no past surgical history on file. Social History/Living Environment: Lives alone.    Home Environment: Apartment  # Steps to Enter: 0  One/Two Story Residence: One story  Living Alone: Yes  Support Systems: Family member(s)  Patient Expects to be Discharged to[de-identified] Unknown  Current DME Used/Available at Home: linda York, [address] [facility], [place], [place]  Prior Level of Function/Work/Activity:  Patient typically independent with all; however, recently using a cane to walk due to falls over a period of 2 weeks. Dominant Side:         LEFT   Number of Personal Factors/Comorbidities that affect the Plan of Care: Brief history (0):  LOW COMPLEXITY   ASSESSMENT OF OCCUPATIONAL PERFORMANCE[de-identified]   Activities of Daily Living:           Basic ADLs (From Assessment) Complex ADLs (From Assessment)   Basic ADL  Feeding: Total assistance  Oral Facial Hygiene/Grooming: Maximum assistance  Bathing: Total assistance  Upper Body Dressing: Total assistance  Lower Body Dressing: Total assistance  Toileting: Total assistance Instrumental ADL  Meal Preparation: Total assistance  Homemaking: Total assistance  Medication Management: Total assistance  Financial Management: Total assistance   Grooming/Bathing/Dressing Activities of Daily Living     Cognitive Retraining  Safety/Judgement: Fall prevention;Decreased insight into deficits                               Most Recent Physical Functioning:   Gross Assessment:                  Posture:     Balance:    Bed Mobility:     Wheelchair Mobility:     Transfers:                   Patient Vitals for the past 6 hrs:   BP SpO2 Pulse   12/28/17 1145 99/58 99 % 87   12/28/17 1149 - 91 % -       Mental Status  Neurologic State: Alert  Orientation Level: Oriented to place, Oriented to person  Cognition: Follows commands  Perception: Appears intact  Perseveration: No perseveration noted  Safety/Judgement: Fall prevention, Decreased insight into deficits                          Physical Skills Involved:  1. Range of Motion  2. Balance  3. Strength  4. Activity Tolerance  5. Sensation  6. Fine Motor Control  7. Gross Motor Control  8. Pain (Chronic)  9. Edema  10. Skin Integrity Cognitive Skills Affected (resulting in the inability to perform in a timely and safe manner):  1. Executive Function  2.  Divided Attention Psychosocial Skills Affected:  1. Habits/Routines  2. Environmental Adaptation  3. Social Interaction  4. Social Roles   Number of elements that affect the Plan of Care: 1-3:  LOW COMPLEXITY   CLINICAL DECISION MAKIN[address] [place] [address] AM-PAC 6 Clicks   Daily Activity Inpatient Short Form  How much help from another person does the patient currently need. .. Total A Lot A Little None   1. Putting on and taking off regular lower body clothing? [x] 1   [] 2   [] 3   [] 4   2. Bathing (including washing, rinsing, drying)? [x] 1   [] 2   [] 3   [] 4   3. Toileting, which includes using toilet, bedpan or urinal?   [x] 1   [] 2   [] 3   [] 4   4. Putting on and taking off regular upper body clothing? [x] 1   [] 2   [] 3   [] 4   5. Taking care of personal grooming such as brushing teeth? [x] 1   [] 2   [] 3   [] 4   6. Eating meals? [x] 1   [] 2   [] 3   [] 4   © , Trustees of [address] [place] [id number], under license to [employer]. All rights reserved      Score:  Initial: 6 Most Recent: X (Date: -- )    Interpretation of Tool:  Represents activities that are increasingly more difficult (i.e. Bed mobility, Transfers, Gait). Score 24 23 22-20 19-15 14-10 9-7 6     Modifier CH CI CJ CK CL CM CN      ? Self Care:     - CURRENT STATUS: CN - 100% impaired, limited or restricted    - GOAL STATUS: CL - 60%-79% impaired, limited or restricted    - D/C STATUS:  ---------------To be determined---------------  Payor: [employer] MEDICARE / Plan: [address] [place] [address] HMO / Product Type: Managed Care Medicare /      Medical Necessity:     · Patient demonstrates fair rehab potential due to higher previous functional level. Reason for Services/Other Comments:  · Patient continues to require skilled intervention due to decreased independence with activities of daily living and instrumental activities of daily living.    Use of outcome tool(s) and clinical judgement create a POC that gives a: MODERATE COMPLEXITY TREATMENT:   (In addition to Assessment/Re-Assessment sessions the following treatments were rendered)     Pre-treatment Symptoms/Complaints:    Pain: Initial:   Pain Intensity 1: 0  Post Session:  Same     Therapeutic Exercise: (15 minutes):  Exercises per grid below to improve mobility and strength. Required moderate visual, verbal and tactile cues to promote proper body mechanics. Progressed repetitions as indicated. Date:  12/28/17 Date:   Date:     Activity/Exercise Parameters Parameters Parameters   Shoulder flexion/extension 3 sets of 20 reps with yellow theraband     Chest presses 3 sets of 20 reps with yellow theraband     Shoulder protraction/retraction 3 sets of 20 reps with yellow theraband     Shoulder horizontal add/abd 3 sets of 20 reps with yellow theraband     Elbow flexion/extension 3 sets of 20 reps with yellow theraband     Tricep extension 3 sets of 20 reps with yellow theraband                   Braces/Orthotics/Lines/Etc:   · IV  · kathleen catheter  · nasogastric tube  · O2 Device: Nasal cannula  Treatment/Session Assessment:    Response to Treatment:  Tolerated well without complications. · Interdisciplinary Collaboration:   o Certified Occupational Therapy Assistant  o Registered Nurse  · After treatment position/precautions:   o Supine in bed  o Bed/Chair-wheels locked  o Bed in low position  o Call light within reach   · Compliance with Program/Exercises: Will assess as treatment progresses. · Recommendations/Intent for next treatment session: \"Next visit will focus on advancements to more challenging activities\".   Total Treatment Duration:OT Patient Time In/Time Out  Time In: 1520  Time Out: [facility]

## 2017-12-28 NOTE — PROGRESS NOTES
Hospitalist Progress Note    2017  Admit Date: 2017  2:54 PM   NAME: Rosa Block   :  1950   DOS:              17  MRN:  [id number]   Attending: Mirta Mckinley MD  PCP:  None  Treatment Team: Attending Provider: Jenn Jarrell MD; Consulting Provider: Francie Bosch MD; Consulting Provider: Mel Salter MD; Consulting Provider: Lauren Chan MD; Care Manager: Luís Cunha RN; Consulting Provider: Luke Zhang MD    Full Code     SUBJECTIVE:   As previously documented: \" Mr. Jenn Johnson is a 80 yo M with unknown PMHx - never seen a healthcare provider as an adult who was admitted on  after an syncopal episode. CT head with small right subdural hematoma - discussed with neurosurgery by Dr. Ita Mcgrath - nonsurgical. 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 Placed on broad spectrum antibiotics, aggressive IV hydration. General surgery consulted and had emergent surgery on  that showed gangrene of subcutaneus fat and fascia of posterior compartment from groin to popliteal fossa, extending along margin of buttock. Had re-exploration of right tight fasciitis on . orthopedics, surgery and plastic surgery consulted. Extubated on  and transferred out of ICU. Was supposed to have skin grafting with Dr. Lottie Weber, but had to be cancelled 2/ worsening resp status. Pulm following, s/p bronch. He is awaiting insurance approval for LTACH. \"        17   Mr. Rosa Bolck has no new complains. Denies SOB, CP or abdominal pain. On RA. Had barium swallowing this AM - SP recommends MECHANICAL SOFT DIET and HONEY thick liquids            10+ ROS reviewed and negative except for positive in HPI.    No Known Allergies  Current Facility-Administered Medications   Medication Dose Route Frequency    potassium chloride 20 mEq in 100 ml IVPB  20 mEq IntraVENous ONCE    fluconazole (DIFLUCAN) tablet 200 mg  200 mg Oral DAILY    pantoprazole (PROTONIX) tablet 40 mg  40 mg Oral ACB    dextrose 5 % - 0.45% NaCl infusion  75 mL/hr IntraVENous CONTINUOUS    acetaminophen (TYLENOL) tablet 650 mg  650 mg Oral Q6H PRN    potassium, sodium phosphates (NEUTRA-PHOS) packet 1 Packet  1 Packet Oral QID    sodium hypochlorite (QUARTER STRENGTH DAKIN'S) 0.125% irrigation (bottle)   Topical DAILY    acetylcysteine (MUCOMYST) 200 mg/mL (20 %) solution 300 mg  1.5 mL Nebulization BID RT    albuterol (PROVENTIL VENTOLIN) nebulizer solution 2.5 mg  2.5 mg Nebulization QID RT    0.9% sodium chloride infusion 250 mL  250 mL IntraVENous PRN    cefepime (MAXIPIME) 2 g in 0.9% sodium chloride (MBP/ADV) 100 mL ADV  2 g IntraVENous Q8H    lidocaine (XYLOCAINE) 4 % (40 mg/mL) topical solution   Topical PRN    lidocaine (XYLOCAINE) 2 % jelly   Mucous Membrane PRN    insulin glargine (LANTUS) injection 20 Units  20 Units SubCUTAneous DAILY    lip protectant (BLISTEX) ointment   Topical PRN    dextrose (D50W) injection syrg 12.5 g  25 mL IntraVENous PRN    HYDROmorphone (PF) (DILAUDID) injection 1 mg  1 mg IntraVENous Q4H PRN    enoxaparin (LOVENOX) injection 40 mg  40 mg SubCUTAneous Q24H    hydrALAZINE (APRESOLINE) 20 mg/mL injection 10 mg  10 mg IntraVENous Q4H PRN    alcohol 62% (NOZIN) nasal  1 Ampule  1 Ampule Topical Q12H    NUTRITIONAL SUPPORT ELECTROLYTE PRN ORDERS   Does Not Apply PRN    influenza vaccine 2017-18 (3 yrs+)(PF) (FLUZONE QUAD/FLUARIX QUAD) injection 0.5 mL  0.5 mL IntraMUSCular PRIOR TO DISCHARGE    insulin lispro (HUMALOG) injection   SubCUTAneous Q4H    sodium chloride (NS) flush 5-10 mL  5-10 mL IntraVENous Q8H    sodium chloride (NS) flush 5-10 mL  5-10 mL IntraVENous PRN         Immunization History   Administered Date(s) Administered    TB Skin Test (PPD) Intradermal 12/19/2017     Objective:     Patient Vitals for the past 24 hrs:   Temp Pulse Resp BP SpO2   12/28/17 0730 97.5 °F (36.4 °C) 82 18 139/75 95 %   17 0720 - - - - 94 %   17 0300 98.6 °F (37 °C) 87 19 115/80 95 %   17 2325 98.5 °F (36.9 °C) 83 18 115/76 95 %   17 2000 98 °F (36.7 °C) 86 18 110/74 95 %   17 1933 - - - - 93 %   17 1629 97.7 °F (36.5 °C) 88 18 107/69 96 %   17 1554 - - - - 96 %   17 1136 98 °F (36.7 °C) 73 18 123/87 97 %   17 1105 - - - - 97 %     Temp (24hrs), Av.1 °F (36.7 °C), Min:97.5 °F (36.4 °C), Max:98.6 °F (37 °C)    Oxygen Therapy  O2 Sat (%): 95 % (17 0730)  Pulse via Oximetry: 86 beats per minute (17 0720)  O2 Device: Room air (17 0826)  O2 Flow Rate (L/min): 2 l/min (17 0748)  O2 Temperature: 87.8 °F (31 °C) (17 0024)  FIO2 (%): 40 % (178)    Physical Exam:  General:         Alert, cooperative, no acute distress. HEENT:               NCAT. No obvious deformity. Nares normal. No drainage  Lungs:               CTABL. No wheezing/rhonchi/rales RA  Cardiovascular:   RRR. No m/r/g. No pedal edema b/l. +2 PT/DT pulses b/l. Abdomen:       S/nt/nd. Bowel sounds normal. Colostomy bag in place with brown stool. Skin:          Not Jaundiced  Musculoskeletal: RLE wrapped on bandage. Neurologic:    AAOx3. Dave Deaner No gross focal deficit. Moves all extremities. Psychiatric:         Good mood. Normal affect. Denies any SI/HI.                DIAGNOSTIC STUDIES      Data Review:   Recent Results (from the past 24 hour(s))   GLUCOSE, POC    Collection Time: 17 11:28 AM   Result Value Ref Range    Glucose (POC) 152 (H) 65 - 100 mg/dL   GLUCOSE, POC    Collection Time: 17  5:04 PM   Result Value Ref Range    Glucose (POC) 167 (H) 65 - 100 mg/dL   GLUCOSE, POC    Collection Time: 17  8:06 PM   Result Value Ref Range    Glucose (POC) 297 (H) 65 - 100 mg/dL   GLUCOSE, POC    Collection Time: 17 11:25 PM   Result Value Ref Range    Glucose (POC) 88 65 - 206 mg/dL   METABOLIC PANEL, BASIC Collection Time: 12/28/17  4:02 AM   Result Value Ref Range    Sodium 142 136 - 145 mmol/L    Potassium 3.4 (L) 3.5 - 5.1 mmol/L    Chloride 108 (H) 98 - 107 mmol/L    CO2 26 21 - 32 mmol/L    Anion gap 8 7 - 16 mmol/L    Glucose 103 (H) 65 - 100 mg/dL    BUN 12 8 - 23 MG/DL    Creatinine 0.44 (L) 0.8 - 1.5 MG/DL    GFR est AA >60 >60 ml/min/1.73m2    GFR est non-AA >60 >60 ml/min/1.73m2    Calcium 7.1 (L) 8.3 - 10.4 MG/DL   CBC W/O DIFF    Collection Time: 12/28/17  4:02 AM   Result Value Ref Range    WBC 11.8 (H) 4.3 - 11.1 K/uL    RBC 3.35 (L) 4.23 - 5.67 M/uL    HGB 9.2 (L) 13.6 - 17.2 g/dL    HCT 29.8 (L) 41.1 - 50.3 %    MCV 89.0 79.6 - 97.8 FL    MCH 27.5 26.1 - 32.9 PG    MCHC 30.9 (L) 31.4 - 35.0 g/dL    RDW 16.2 (H) 11.9 - 14.6 %    PLATELET 372 812 - 985 K/uL    MPV 10.8 10.8 - 14.1 FL   MAGNESIUM    Collection Time: 12/28/17  4:02 AM   Result Value Ref Range    Magnesium 1.9 1.8 - 2.4 mg/dL   GLUCOSE, POC    Collection Time: 12/28/17  4:23 AM   Result Value Ref Range    Glucose (POC) 114 (H) 65 - 100 mg/dL   GLUCOSE, POC    Collection Time: 12/28/17  6:44 AM   Result Value Ref Range    Glucose (POC) 110 (H) 65 - 100 mg/dL       All Micro Results     Procedure Component Value Units Date/Time    CULTURE, URINE [[id number]]  (Abnormal) Collected:  12/23/17 1329    Order Status:  Completed Specimen:  Urine from Gerardo Specimen Updated:  12/28/17 0156     Special Requests: NO SPECIAL REQUESTS        Culture result:         >100,000 COLONIES/mL CANDIDA ALBICANS (A)    FUNGUS CULTURE AND SMEAR Albin Spatz [[id number]] Collected:  12/21/17 1130    Order Status:  Completed Specimen:  Other from Miscellaneous sample Updated:  12/27/17 1834     Source BRONCHIAL WASHING        Fungus stain Direct Inoculation     Fungus (Mycology) Culture Other source received      (NOTE)  Performed At: [address] [id number]  Debi Kolb MD [id number]         CULTURE, RESPIRATORY/SPUTUM/BRONCH Sherre Jose STAIN [[id number]]  (Abnormal)  (Susceptibility) Collected:  12/21/17 1130    Order Status:  Completed Specimen:  Sputum from Bronchial Washing Updated:  12/24/17 0903     Special Requests: NO SPECIAL REQUESTS        GRAM STAIN 15 TO 30 WBC'S/OIF      0 TO 2  EPITHELIAL CELLS SEEN /OIF      RARE GRAM POSITIVE RODS         RARE GRAM NEGATIVE RODS        Culture result:         LIGHT ENTEROBACTER CLOACAE (A)              MODERATE ROLAND ALBICANS (A)              SCANT NORMAL RESPIRATORY TEJA    CULTURE, BLOOD [[id number]] Collected:  12/19/17 1013    Order Status:  Completed Specimen:  Blood from Blood Updated:  12/24/17 0723     Special Requests: --        LEFT  FOREARM       Culture result: NO GROWTH 5 DAYS       CULTURE, BLOOD [[id number]] Collected:  12/19/17 1007    Order Status:  Completed Specimen:  Blood from Blood Updated:  12/24/17 0723     Special Requests: RIGHT ANTECUBITAL        Culture result: NO GROWTH 5 DAYS       AFB (MYCOBACTERIUM) CULTURE & SMEAR W/REFLEX ID Marilia Presume NOCARDIA [[id number]] Collected:  12/21/17 1130    Order Status:  Completed Specimen:  Miscellaneous sample Updated:  12/22/17 1538     Source BRONCHIAL WASHING        AFB Specimen processing Concentration     Acid Fast Smear NEGATIVE          (NOTE)  Performed At: [address] [id number]  Jaclyn Grande MD NF:[id number]          Acid Fast Culture PENDING    CULTURE, RESPIRATORY/SPUTUM/BRONCH Sherre Jose STAIN [[id number]]  (Abnormal) Collected:  12/12/17 1234    Order Status:  Completed Specimen:  Sputum from Sputum Updated:  12/16/17 0750     Special Requests: NO SPECIAL REQUESTS        GRAM STAIN 10 TO 45 WBC'S/OIF      NO EPITHELIAL CELLS SEEN      FEW YEAST         4+ MUCUS PRESENT        Culture result:         MODERATE ROLAND ALBICANS (A)      NO GROWTH OF  NORMAL RESPIRATORY TEJA    CULTURE, ANAEROBIC [[id number]]  (Abnormal) Collected:  12/06/17 2013 Order Status:  Completed Specimen:  Wound Drainage Updated:  12/15/17 1212     Special Requests: RIGHT LEG     Culture result:         SCANT ANAEROBIC GRAM POSITIVE COCCI (A)      ISOLATE FORWARD TO [facility] FOR ID AND SUSCEPTIBILITY ON 12/15/17      REFER TO [address] [id number]    CULTURE, BLOOD [[id number]] Collected:  12/01/17 1805    Order Status:  Completed Specimen:  Whole Blood from Blood Updated:  12/14/17 1030     Special Requests: --        RIGHT  FOREARM       GRAM STAIN GRAM POSITIVE COCCI         ANAEROBIC BOTTLE POSITIVE                 RESULTS VERIFIED, PHONED TO AND READ BACK BY HOANG De La Cruz RN AT [address] [facility] ON [id number] BY MPJ     Culture result: PARVIMONAS MICRA [facility]            RESULTS VERIFIED, PHONED TO AND READ BACK BY  ALEXIS [address] [place] [place] [address] ON 12/08/17 @ 1072, ADS        SUSCEPTIBILITY RESULTS SCANNED IN [facility] 12/14/17, ADS    CULTURE, ANAEROBIC [[id number]] Collected:  12/06/17 2013    Order Status:  Completed Specimen:  Tissue Updated:  12/13/17 1113     Special Requests: RIGHT LATERAL FASCIA     Culture result:         SUBCULTURE IS NECESSARY TO DETERMINE PRESENCE OR ABSENCE OF ANAEROBIC BACTERIA IN THIS CULTURE. FURTHER REPORT TO FOLLOW AFTER INCUBATION OF SUBCULTURE.     CULTURE, Meghan Galindo STAIN [[id number]] Collected:  12/06/17 2013    Order Status:  Completed Specimen:  Wound Updated:  12/09/17 0527     Special Requests: RIGHT LEG     GRAM STAIN 0 TO 5 WBC'S/OIF      FEW GRAM POSITIVE COCCI        Culture result: NO GROWTH 2 DAYS       CULTURE, TISSUE Bernie Villalba STAIN [[id number]] Collected:  12/06/17 2013    Order Status:  Completed Specimen:  Tissue Updated:  12/09/17 0520     Special Requests: RIGHT LATERAL COMPARTMENT FASCIA     GRAM STAIN 0 TO 25 WBC'S/OIF      FEW GRAM POSITIVE COCCI        Culture result: NO GROWTH 2 DAYS       CULTURE, ANAEROBIC [[id number]] Collected:  12/03/17 1215    Order Status:  Completed Specimen:  Leg Updated:  12/08/17 0943     Special Requests: NO SPECIAL REQUESTS        Culture result:         MULTIPLE ANAEROBES ISOLATED, SUSCEPTIBILITY AND IDENTIFICATION AVAILABLE UPON REQUEST. ORGANISM WILL BE HELD FOR 5 DAYS. CULTURE, ANAEROBIC [[id number]] Collected:  12/01/17 2010    Order Status:  Completed Specimen:  Groin Updated:  12/08/17 0943     Special Requests: NO SPECIAL REQUESTS        Culture result:         MULTIPLE ANAEROBES ISOLATED, SUSCEPTIBILITY AND IDENTIFICATION AVAILABLE UPON REQUEST. ORGANISM WILL BE HELD FOR 5 DAYS. CULTURE, Jonh Gunner STAIN [[id number]]  (Abnormal) Collected:  12/01/17 2010    Order Status:  Completed Specimen:  Groin Updated:  12/07/17 0707     Special Requests: NO SPECIAL REQUESTS        GRAM STAIN 0 TO 1 WBC'S/OIF              MODERATE GRAM POSITIVE COCCI              MODERATE GRAM NEGATIVE RODS              MODERATE GRAM POSITIVE RODS     Culture result:         MODERATE GRAM NEGATIVE RODS (A)      MODERATE  NORMAL SKIN TEJA ISOLATED               REFER TO SPECIMEN NUMBER  [id number] FOR ID AND SUSCEPTIBILITY      CULTURE, WOUND Rich Medico STAIN [[id number]]  (Abnormal)  (Susceptibility) Collected:  12/01/17 2010    Order Status:  Completed Specimen:  Groin Updated:  12/07/17 0705     Special Requests: NO SPECIAL REQUESTS        GRAM STAIN 0 TO 1 WBC'S/OIF              MODERATE GRAM POSITIVE COCCI              MODERATE GRAM NEGATIVE RODS              MODERATE GRAM POSITIVE RODS     Culture result:         MODERATE PROTEUS VULGARIS/PENNERI (A)      LIGHT  NORMAL SKIN TEJA ISOLATED       CULTURE, ANAEROBIC [[id number]] Collected:  12/01/17 2010    Order Status:  Completed Specimen:  Groin Updated:  12/06/17 1148     Special Requests: NO SPECIAL REQUESTS        Culture result:         MULTIPLE ANAEROBES ISOLATED, SUSCEPTIBILITY AND IDENTIFICATION AVAILABLE UPON REQUEST. ORGANISM WILL BE HELD FOR 5 DAYS.     CULTURE, BLOOD [[id number]] Collected:  12/01/17 1804    Order Status:  Completed Specimen:  Whole Blood from Blood Updated: 12/06/17 0617     Special Requests: --        LEFT  HAND       Culture result: NO GROWTH 5 DAYS       CULTURE, Germán Stevens STAIN [[id number]] Collected:  12/03/17 1215    Order Status:  Completed Specimen:  Wound from Leg Updated:  12/05/17 0656     Special Requests: NO SPECIAL REQUESTS        GRAM STAIN NO WBCS SEEN         RARE GRAM POSITIVE COCCI         FEW GRAM NEGATIVE RODS        Culture result: NO GROWTH 2 DAYS       CULTURE, ANAEROBIC [[id number]]     Order Status:  Canceled Specimen:  Wound Drainage     CULTURE, WOUND Rody Shires STAIN [[id number]]     Order Status:  Canceled Specimen:  Wound from Groin     CULTURE, Asim Chance [[id number]]     Order Status:  Canceled Specimen:  Wound from Groin     CULTURE, ANAEROBIC [[id number]]     Order Status:  Canceled Specimen:  Wound Drainage           Imaging /Procedures /Studies:    XR CHEST PORT   Final Result   IMPRESSION:       Persistent left basilar density, likely atelectasis or pneumonia.       XR CHEST PORT   Final Result   IMPRESSION:       No significant change compared to prior exam.       XR CHEST SNGL V   Final Result   IMPRESSION:    1. Persisting left basilar infiltrate and probable small pleural effusion. 2. Minimal right basilar infiltrate increased since prior.           XR CHEST SNGL V   Final Result   Impression:  Increased small left pleural effusion and associated left basilar   atelectasis and or consolidation.           XR ABD (KUB)   Final Result   Impression: No acute pathology identified.                       XR CHEST SNGL V   Final Result   IMPRESSION: No acute findings       XR CHEST SNGL V   Final Result   IMPRESSION: Mild worsening left lower lobe atelectasis or pneumonia       XR CHEST SNGL V   Final Result   IMPRESSION: No acute findings in the chest           XR CHEST SNGL V   Final Result   IMPRESSION: ET tube has been repositioned and is now 9 cm above the cathy, at   the level of the thoracic inlet.  This could be advanced 4 to 5 cm. The lungs are   clear.       XR CHEST SNGL V   Final Result   IMPRESSION: Some left infrahilar atelectasis or infiltrate now present.       XR CHEST SNGL V   Final Result   IMPRESSION: Bibasilar atelectasis or pneumonia significantly improved.               XR CHEST SNGL V   Final Result   IMPRESSION: Bibasilar atelectasis or pneumonia worse in the interval.               XR CHEST SNGL V   Final Result   IMPRESSION: Left basilar atelectasis improved.               XR CHEST SNGL V   Final Result   IMPRESSION: New left lower lobe atelectasis or pneumonia.               XR ABD (KUB)   Final Result   IMPRESSION: OG tube further in the stomach.       XR CHEST SNGL V   Final Result   IMPRESSION: No acute cardiopulmonary disease.               Recommend advancing the orogastric tube further into the stomach.               XR PELV AP ONLY   Final Result   IMPRESSION:   1. 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 This is very poorly   assessed and characterized by portable plain film imaging.           XR CHEST PORT   Final Result   IMPRESSION:    1. Improved although slight low position of endotracheal tube. Retraction of   this by 1 to 2 cm complex cyst and more ideal position.        2.  Persistent high position of an nasogastric tube. Advancement of this by 4 cm   would place this in a more ideal position.                   XR CHEST SNGL V   Final Result   IMPRESSION:    1. Low position of endotracheal tube tip overlying the most proximal right   mainstem bronchus. Retraction of this by 3 cm should place this at a more   acceptable position.       2.  Slight high position of feeding tube with the tip overlying the stomach   although the side-port likely overlies the distal esophagus. Advancement of this   by 4 cm should ensure that the side port is below the gastroesophageal junction.       3.  No evolving acute cardiopulmonary changes.       The abnormal endotracheal tube position was discussed with nursing staff a   muscle personally at 10:15 PM on 12/1/2017.           XR CHEST PA LAT   Final Result   IMPRESSION:    1. No acute cardiopulmonary process evident by plain film imaging.                   CT HEAD WO CONT   Final Result   IMPRESSION:     1. Small right subdural hematoma measuring 4 mm which demonstrates intermediate   attenuation suggesting a subacute although recent subdural hematoma.        The subacute appearing right subdural hematoma was discussed with Dr. Jaciel To by   myself personally at 5:05 PM on 12/1/2017.        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  ICD-9-CM: 608.83  12/17/2017 - Present Yes        Subdural hematoma ([address] ICD-10-CM: I62.00  ICD-9-CM: 432.1  12/1/2017 - Present Yes        Type II diabetes mellitus with complication (HCC) (Chronic) ICD-10-CM: E11.8  ICD-9-CM: 250.90  12/1/2017 - Present Yes        Syncope ICD-10-CM: R55  ICD-9-CM: 780.2  12/1/2017 - Present Yes        Fasciitis ICD-10-CM: M72.9  ICD-9-CM: 729.4  12/1/2017 - Present Yes        RESOLVED: Hypernatremia ICD-10-CM: E87.0  ICD-9-CM: 276.0  12/22/2017 - 12/27/2017 Unknown        RESOLVED: Mucus plugging of bronchi ICD-10-CM: J98.09  ICD-9-CM: 519.19  12/21/2017 - 12/27/2017 No        RESOLVED: Atelectasis ICD-10-CM: J98.11  ICD-9-CM: 518.0  12/21/2017 - 12/27/2017 No        RESOLVED: Acute hypoxemic respiratory failure ([address] ICD-10-CM: J96.01  ICD-9-CM: 518.81  12/20/2017 - 12/27/2017 No        RESOLVED: Pneumonia due to infectious organism ICD-10-CM: J18.9  ICD-9-CM: 136.9, 484.8  12/20/2017 - 12/27/2017 No        RESOLVED: Acute respiratory failure [facility]) ICD-10-CM: J96.00  ICD-9-CM: 518.81  12/8/2017 - 12/15/2017 Yes        RESOLVED: Hypernatremia ICD-10-CM: E87.0  ICD-9-CM: 276.0  12/6/2017 - 12/15/2017 Yes        RESOLVED: Lactic acidosis ICD-10-CM: E87.2  ICD-9-CM: 276.2  12/2/2017 - 12/6/2017 Yes        RESOLVED: LAUREANO (acute kidney injury) ([facility] [address] ICD-10-CM: N17.9  ICD-9-CM: 584.9  12/1/2017 - 12/8/2017 Yes        RESOLVED: Leukocytosis ICD-10-CM: B77.676  ICD-9-CM: 288.60  12/1/2017 - 12/8/2017 Yes        * (Principal)RESOLVED: Severe sepsis with septic shock [facility]) ICD-10-CM: A41.9, R65.21  ICD-9-CM: 038.9, 995.92, 785.52  12/1/2017 - 12/15/2017 Yes        RESOLVED: Electrolyte abnormality ICD-10-CM: E87.8  ICD-9-CM: 276.9  12/1/2017 - 12/8/2017 Yes              Plan:  HAP:  CXR with persistent L basilar infiltrate and new R infiltrate Restarted broad spectrum abx on 12/20, pulm following. Improving.       Acute hypoxic resp failure: Pulm following, resolved after bronch.     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 Patsy Deluca 12/1  Followed by Em Jones for further debridement  Skin graft delayed for now, wound Vac removed. Has completed 2 weeks of IV abx     Fungal UTI: urine culture with candida albicans. On on diflucan. Keep kathleen in to avoid groin wound contamination.     Subdural hematoma: Non-surgical per neurosurgery     Delirium: Likely related to prolonged ICU stay. resolved       DM2: On ISS and Lantus      Dysphagia: SLP following, Soft mechanical food with nectar thicks. Debility: PT/OT/PPD/CM. Waiting for [facility] approval    DVT Prophylaxis: Lovenox SQ  CODE Status: Full CODE  Plan of Care Discussed with: patient.  Care team.  Medical Risk: moderate  Disposition: pending    Orlando Beatty MD  12/28/17

## 2017-12-29 ENCOUNTER — HOSPITAL ENCOUNTER (OUTPATIENT)
Age: 67
Discharge: NURSING, REHAB OR LONG-TERM CARE | End: 2018-01-22
Attending: INTERNAL MEDICINE | Admitting: INTERNAL MEDICINE

## 2017-12-29 VITALS
DIASTOLIC BLOOD PRESSURE: 62 MMHG | WEIGHT: 166.4 LBS | RESPIRATION RATE: 17 BRPM | OXYGEN SATURATION: 95 % | SYSTOLIC BLOOD PRESSURE: 100 MMHG | TEMPERATURE: 98.7 F | HEIGHT: 71 IN | HEART RATE: 99 BPM | BODY MASS INDEX: 23.3 KG/M2

## 2017-12-29 DIAGNOSIS — R13.10 DYSPHAGIA: ICD-10-CM

## 2017-12-29 PROBLEM — R55 SYNCOPE: Status: RESOLVED | Noted: 2017-12-01 | Resolved: 2017-12-29

## 2017-12-29 LAB
ANION GAP SERPL CALC-SCNC: 8 MMOL/L (ref 7–16)
BUN SERPL-MCNC: 9 MG/DL (ref 8–23)
CALCIUM SERPL-MCNC: 7.4 MG/DL (ref 8.3–10.4)
CHLORIDE SERPL-SCNC: 108 MMOL/L (ref 98–107)
CO2 SERPL-SCNC: 25 MMOL/L (ref 21–32)
CREAT SERPL-MCNC: 0.44 MG/DL (ref 0.8–1.5)
GLUCOSE BLD STRIP.AUTO-MCNC: 100 MG/DL (ref 65–100)
GLUCOSE BLD STRIP.AUTO-MCNC: 130 MG/DL (ref 65–100)
GLUCOSE BLD STRIP.AUTO-MCNC: 133 MG/DL (ref 65–100)
GLUCOSE SERPL-MCNC: 112 MG/DL (ref 65–100)
Lab: NORMAL
MAGNESIUM SERPL-MCNC: 1.7 MG/DL (ref 1.8–2.4)
PHOSPHATE SERPL-MCNC: 2.2 MG/DL (ref 2.3–3.7)
POTASSIUM SERPL-SCNC: 3.5 MMOL/L (ref 3.5–5.1)
REFERENCE LAB,REFLB: NORMAL
SODIUM SERPL-SCNC: 141 MMOL/L (ref 136–145)
TEST DESCRIPTION:,ATST: NORMAL

## 2017-12-29 PROCEDURE — 94760 N-INVAS EAR/PLS OXIMETRY 1: CPT

## 2017-12-29 PROCEDURE — 74011250637 HC RX REV CODE- 250/637: Performed by: INTERNAL MEDICINE

## 2017-12-29 PROCEDURE — 92526 ORAL FUNCTION THERAPY: CPT

## 2017-12-29 PROCEDURE — 74011636637 HC RX REV CODE- 636/637: Performed by: HOSPITALIST

## 2017-12-29 PROCEDURE — 74011000250 HC RX REV CODE- 250: Performed by: NURSE PRACTITIONER

## 2017-12-29 PROCEDURE — 74011250636 HC RX REV CODE- 250/636: Performed by: INTERNAL MEDICINE

## 2017-12-29 PROCEDURE — 83735 ASSAY OF MAGNESIUM: CPT | Performed by: INTERNAL MEDICINE

## 2017-12-29 PROCEDURE — 80048 BASIC METABOLIC PNL TOTAL CA: CPT | Performed by: INTERNAL MEDICINE

## 2017-12-29 PROCEDURE — 82962 GLUCOSE BLOOD TEST: CPT

## 2017-12-29 PROCEDURE — 94640 AIRWAY INHALATION TREATMENT: CPT

## 2017-12-29 PROCEDURE — 74011000258 HC RX REV CODE- 258: Performed by: NURSE PRACTITIONER

## 2017-12-29 PROCEDURE — 74011636637 HC RX REV CODE- 636/637: Performed by: INTERNAL MEDICINE

## 2017-12-29 PROCEDURE — 84100 ASSAY OF PHOSPHORUS: CPT | Performed by: INTERNAL MEDICINE

## 2017-12-29 PROCEDURE — 74011250637 HC RX REV CODE- 250/637: Performed by: HOSPITALIST

## 2017-12-29 PROCEDURE — 74011250636 HC RX REV CODE- 250/636: Performed by: HOSPITALIST

## 2017-12-29 PROCEDURE — 74011250636 HC RX REV CODE- 250/636: Performed by: NURSE PRACTITIONER

## 2017-12-29 PROCEDURE — 74011250637 HC RX REV CODE- 250/637: Performed by: SURGERY

## 2017-12-29 RX ORDER — SODIUM,POTASSIUM PHOSPHATES 280-250MG
1 POWDER IN PACKET (EA) ORAL 4 TIMES DAILY
Qty: 8 PACKET | Refills: 0 | Status: SHIPPED | OUTPATIENT
Start: 2017-12-29 | End: 2017-12-31

## 2017-12-29 RX ORDER — INSULIN GLARGINE 100 [IU]/ML
20 INJECTION, SOLUTION SUBCUTANEOUS DAILY
Qty: 6 ML | Refills: 0 | Status: SHIPPED
Start: 2017-12-29 | End: 2017-12-29

## 2017-12-29 RX ORDER — HYDROMORPHONE HYDROCHLORIDE 2 MG/ML
0.5 INJECTION, SOLUTION INTRAMUSCULAR; INTRAVENOUS; SUBCUTANEOUS
Status: DISCONTINUED | OUTPATIENT
Start: 2017-12-29 | End: 2017-12-29 | Stop reason: HOSPADM

## 2017-12-29 RX ORDER — SODIUM,POTASSIUM PHOSPHATES 280-250MG
1 POWDER IN PACKET (EA) ORAL 4 TIMES DAILY
Status: DISCONTINUED | OUTPATIENT
Start: 2017-12-29 | End: 2017-12-29 | Stop reason: HOSPADM

## 2017-12-29 RX ORDER — ALBUTEROL SULFATE 0.83 MG/ML
2.5 SOLUTION RESPIRATORY (INHALATION)
Qty: 1 EACH | Refills: 0 | Status: SHIPPED
Start: 2017-12-29

## 2017-12-29 RX ORDER — FLUCONAZOLE 200 MG/1
200 TABLET ORAL DAILY
Qty: 3 TAB | Refills: 0 | Status: SHIPPED
Start: 2017-12-30 | End: 2018-01-02

## 2017-12-29 RX ORDER — PANTOPRAZOLE SODIUM 40 MG/1
40 TABLET, DELAYED RELEASE ORAL
Qty: 14 TAB | Refills: 0 | Status: SHIPPED
Start: 2017-12-30

## 2017-12-29 RX ORDER — INSULIN PUMP SYRINGE, 3 ML
EACH MISCELLANEOUS
Qty: 1 KIT | Refills: 0 | Status: SHIPPED | OUTPATIENT
Start: 2017-12-29

## 2017-12-29 RX ORDER — INSULIN ASPART 100 [IU]/ML
5 INJECTION, SOLUTION INTRAVENOUS; SUBCUTANEOUS
Qty: 4.5 ML | Refills: 0 | Status: SHIPPED | OUTPATIENT
Start: 2017-12-29 | End: 2018-01-28

## 2017-12-29 RX ORDER — MAGNESIUM SULFATE HEPTAHYDRATE 40 MG/ML
2 INJECTION, SOLUTION INTRAVENOUS ONCE
Status: COMPLETED | OUTPATIENT
Start: 2017-12-29 | End: 2017-12-29

## 2017-12-29 RX ORDER — INSULIN GLARGINE 100 [IU]/ML
20 INJECTION, SOLUTION SUBCUTANEOUS DAILY
Qty: 6 ML | Refills: 0 | Status: SHIPPED | OUTPATIENT
Start: 2017-12-29 | End: 2018-01-28

## 2017-12-29 RX ORDER — INSULIN GLARGINE 100 [IU]/ML
20 INJECTION, SOLUTION SUBCUTANEOUS DAILY
Qty: 6 ML | Refills: 0 | Status: SHIPPED | OUTPATIENT
Start: 2017-12-29 | End: 2017-12-29

## 2017-12-29 RX ADMIN — Medication 10 ML: at 14:00

## 2017-12-29 RX ADMIN — HYDROMORPHONE HYDROCHLORIDE 0.5 MG: 2 INJECTION, SOLUTION INTRAMUSCULAR; INTRAVENOUS; SUBCUTANEOUS at 11:49

## 2017-12-29 RX ADMIN — GUAIFENESIN 1200 MG: 600 TABLET, EXTENDED RELEASE ORAL at 08:12

## 2017-12-29 RX ADMIN — SODIUM CHLORIDE 2 G: 900 INJECTION, SOLUTION INTRAVENOUS at 11:14

## 2017-12-29 RX ADMIN — MAGNESIUM SULFATE HEPTAHYDRATE 2 G: 40 INJECTION, SOLUTION INTRAVENOUS at 10:07

## 2017-12-29 RX ADMIN — INSULIN LISPRO 2 UNITS: 100 INJECTION, SOLUTION INTRAVENOUS; SUBCUTANEOUS at 00:18

## 2017-12-29 RX ADMIN — ENOXAPARIN SODIUM 40 MG: 40 INJECTION SUBCUTANEOUS at 12:51

## 2017-12-29 RX ADMIN — ALBUTEROL SULFATE 2.5 MG: 2.5 SOLUTION RESPIRATORY (INHALATION) at 07:21

## 2017-12-29 RX ADMIN — Medication 10 ML: at 04:13

## 2017-12-29 RX ADMIN — Medication 1 AMPULE: at 08:11

## 2017-12-29 RX ADMIN — HYDROMORPHONE HYDROCHLORIDE 1 MG: 2 INJECTION, SOLUTION INTRAMUSCULAR; INTRAVENOUS; SUBCUTANEOUS at 07:28

## 2017-12-29 RX ADMIN — FLUCONAZOLE 200 MG: 100 TABLET ORAL at 08:12

## 2017-12-29 RX ADMIN — PANTOPRAZOLE SODIUM 40 MG: 40 TABLET, DELAYED RELEASE ORAL at 08:12

## 2017-12-29 RX ADMIN — SODIUM CHLORIDE 2 G: 900 INJECTION, SOLUTION INTRAVENOUS at 03:24

## 2017-12-29 RX ADMIN — INSULIN GLARGINE 20 UNITS: 100 INJECTION, SOLUTION SUBCUTANEOUS at 08:12

## 2017-12-29 RX ADMIN — POTASSIUM & SODIUM PHOSPHATES POWDER PACK 280-160-250 MG 1 PACKET: 280-160-250 PACK at 12:51

## 2017-12-29 RX ADMIN — POTASSIUM & SODIUM PHOSPHATES POWDER PACK 280-160-250 MG 1 PACKET: 280-160-250 PACK at 08:11

## 2017-12-29 RX ADMIN — POTASSIUM & SODIUM PHOSPHATES POWDER PACK 280-160-250 MG 1 PACKET: 280-160-250 PACK at 10:02

## 2017-12-29 RX ADMIN — ACETYLCYSTEINE 300 MG: 200 SOLUTION ORAL; RESPIRATORY (INHALATION) at 07:21

## 2017-12-29 RX ADMIN — ALBUTEROL SULFATE 2.5 MG: 2.5 SOLUTION RESPIRATORY (INHALATION) at 11:30

## 2017-12-29 NOTE — PROGRESS NOTES
Problem: Falls - Risk of  Goal: *Absence of Falls  Document Larry Fall Risk and appropriate interventions in the flowsheet.    Outcome: Progressing Towards Goal  Fall Risk Interventions:  Mobility Interventions: Communicate number of staff needed for ambulation/transfer, PT Consult for mobility concerns    Mentation Interventions: Adequate sleep, hydration, pain control, Door open when patient unattended, Evaluate medications/consider consulting pharmacy, More frequent rounding    Medication Interventions: Evaluate medications/consider consulting pharmacy    Elimination Interventions: Call light in reach    History of Falls Interventions: Consult care management for discharge planning, Evaluate medications/consider consulting pharmacy

## 2017-12-29 NOTE — PROGRESS NOTES
LTG: Patient will tolerate least restrictive diet without overt signs or symptoms of airway compromise. STG: Patient will tolerate chopped mechanical soft diet with no mixed consistencies without overt signs or symptoms of aspiration 100% of the time. STG: Patient will tolerate honey-thick liquids without overt signs or symptoms of aspiration 10% of the time. STG: Patient will perform laryngeal strengthening exercises x10 each with 80% accuracy. STG: Patient will participate in modified barium swallow study as clinically indicated. Goal met 12/28. Speech language pathology: bedside swallow note: Daily Note 1    NAME/AGE/GENDER: Tiago Rosenthal is a 79 y.o. 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 INTERDISCIPLINARY COLLABORATION: Registered Nurse and Physician  PRECAUTIONS/ALLERGIES: Review of patient's allergies indicates no known allergies. ASSESSMENT:Patient seen for laryngeal exercises. Pt did well with effortful swallows. Moderate difficulty with the Jailyn as he would often retract his tongue prior to swallowing. He consumed honey thick liquids as needed during therapy without signs/sx aspiration. Dysphonia persists and may benefit from ENT consult for laryngoscopy. Patient will benefit from skilled intervention to address the below impairments. ?????? ? ? This section established at most recent assessment??????????  PROBLEM LIST (Impairments causing functional limitations):  1. Oropharyngeal dysphagia  REHABILITATION POTENTIAL FOR STATED GOALS: Good  PLAN OF CARE:   Patient will benefit from skilled intervention to address the following impairments.   RECOMMENDATIONS AND PLANNED INTERVENTIONS (Benefits and precautions of therapy have been discussed with the patient.):  · mech soft with ground meats  · Honey thick liquids   MEDICATIONS:  · Crushed in puree  COMPENSATORY STRATEGIES/MODIFICATIONS INCLUDING:  · Small bites/sips  · Double swallow   · Alternate solids/liquids  · Cup sips  OTHER RECOMMENDATIONS (including follow up treatment recommendations):   · Laryngeal exercises  · Patient education  RECOMMENDED DIET MODIFICATIONS DISCUSSED WITH:  · Family  · Nursing  · Patient  FREQUENCY/DURATION: Continue to follow patient 3 times a week for duration of hospital stay to address above goals. RECOMMENDED REHABILITATION/EQUIPMENT: (at time of discharge pending progress): Due to the probability of continued deficits (see above) this patient will likely need continued skilled speech therapy after discharge. SUBJECTIVE:   Pt cooperative. Remains dysphonic. History of Present Injury/Illness: Mr. Radha Rosario  has a past medical history of Diabetes ([facility] [address] .  He also  has no past surgical history on file. Present Symptoms: oropharyngeal dysphagia   Pain Intensity 1: 0  Pain Location 1: Hip  Pain Orientation 1: Right  Pain Intervention(s) 1: Medication (see MAR)  Current Medications:   No current facility-administered medications on file prior to encounter. No current outpatient prescriptions on file prior to encounter. Current Dietary Status:  NPO   Radiologist: Mike  Social History/Home Situation:    Home Environment: Apartment  # Steps to Enter: 0  One/Two Story Residence: One story  Living Alone: Yes  Support Systems: Family member(s)  Patient Expects to be Discharged to[de-identified] Unknown  Current DME Used/Available at Home: Walker, rollator, Cane, straight  OBJECTIVE:   Respiratory Status:  Room air     CXR Results:No acute findings  MRI/CT Results:1.   Small right subdural hematoma measuring 4 mm which demonstrates intermediate  attenuation suggesting a subacute although recent subdural hematoma    Cognitive and Communication Status:  Neurologic State: Alert  Orientation Level: Disoriented to time;Oriented to person;Oriented to place;Oriented to situation  Cognition: Follows commands             BEDSIDE SWALLOW EVALUATION  Oral Assessment:     P.O. Trials: The patient was given tsp amounts of the following:           ORAL PHASE:                   PHARYNGEAL PHASE:                            OTHER OBSERVATIONS:  Rate/bite size: Impaired   Endurance:  Impaired   Comments: Tool Used: Dysphagia Outcome and Severity Scale (ANGEL)    Score Comments   Normal Diet  [] 7 With no strategies or extra time needed   Functional Swallow  [] 6 May have mild oral or pharyngeal delay       Mild Dysphagia    [] 5 Which may require one diet consistency restricted (those who demonstrate penetration which is entirely cleared on MBS would be included)   Mild-Moderate Dysphagia  [] 4 With 1-2 diet consistencies restricted       Moderate Dysphagia  [x] 3 With 2 or more diet consistencies restricted       Moderately Severe Dysphagia  [] 2 With partial PO strategies (trials with ST only)       Severe Dysphagia  [] 1 With inability to tolerate any PO safely          Score:  Initial: 1 Most Recent: 3 (Date: 12/23/17 )   Interpretation of Tool: The Dysphagia Outcome and Severity Scale (ANGEL) is a simple, easy-to-use, 7-point scale developed to systematically rate the functional severity of dysphagia based on objective assessment and make recommendations for diet level, independence level, and type of nutrition. Score 7 6 5 4 3 2 1   Modifier CH CI CJ CK CL CM CN   ?  Swallowing:     - CURRENT STATUS: CL - 60%-79% impaired, limited or restricted    - GOAL STATUS:  CI - 1%-19% impaired, limited or restricted    - D/C STATUS:  ---------------To be determined---------------  Payor: [employer] MEDICARE / Plan: [address] [place] [address] HMO / Product Type: Managed Care Medicare /     TREATMENT:    (In addition to Assessment/Re-Assessment sessions the following treatments were rendered)  Dysphagia Activities: Activities/Procedures listed utilized to improve progress in swallow strength. Required moderate cueing to decrease aspiration risk. LARYNGEAL / PHARYNGEAL EXERCISES:           Effortful Swallow: Yes  Reps : 10  Sets : 1  Hard Glottal Attack: Yes  Reps : 10 (50% forceful)  Sets : 1              Look Up at Ceiling/Gargle: Yes  Reps : Other (comment) (0/2 as pt vocalized instead)  Sets : 1  Jailyn: Yes  Reps : Other (comment) (2/6 as pt retracted his tongue 4/6)  Sets : 1          Open Mouth Wide/Yawn: Yes  Reps : 5 (curled tongue x1; otherwise no lingual movement)  Sets : 1           Sing \"EEE\": Yes  Reps : 10 (unable to achieve high pitch)  Sets : 1                    Sustained \"ah\": Yes  Sets : 1  Reps : 10 (significant hoarseness)                  __________________________________________________________________________________________________  Safety:   After treatment position/precautions:  · RN notified  · Family at bedside  · Upright in Bed. Progression/Medical Necessity:   · Patient is expected to demonstrate progress in swallow strength, swallow timeliness, swallow function and diet tolerance to improve swallow safety, work toward diet advancement and decrease aspiration risk. Compliance with Program/Exercises: Will assess as treatment progresses. Reason for Continuation of Services/Other Comments:  · Patient continues to require skilled intervention due to oropharyngeal dysphagia. Recommendations/Intent for next treatment session: \"Treatment next visit will focus on laryngeal exercises\".     Total Treatment Duration  Time In: 1026   Time Out: [facility], DEAN, CCC-SLP

## 2017-12-29 NOTE — PROGRESS NOTES
Dispo update:  Per call from Natalya Thompson of 4th [facility], Mr. Sullivan Homans approved by [facility] [place] to room 409, report . Updated Dr. Francisca Hall and RN Luz Osuna.

## 2017-12-29 NOTE — PROGRESS NOTES
TRANSFER - OUT REPORT:    Verbal report given to John Rollins on [facility]  being transferred to [facility] for routine progression of care       Report consisted of patients Situation, Background, Assessment and   Recommendations(SBAR). Information from the following report(s) SBAR, Kardex, OR Summary, Procedure Summary, Intake/Output, MAR, Recent Results and Med Rec Status was reviewed with the receiving nurse. Lines:   Quad Lumen placed in procedure, documented incorrectly as a double lumen 12/01/17 Right Internal jugular (Active)   Central Line Being Utilized Yes 12/29/2017 11:20 AM   Criteria for Appropriate Use Limited/no vessel suitable for conventional peripheral access 12/29/2017 11:20 AM   Site Assessment Clean, dry, & intact 12/29/2017 11:20 AM   Infiltration Assessment 0 12/29/2017 11:20 AM   Affected Extremity/Extremities Color distal to insertion site pink (or appropriate for race) 12/29/2017 11:20 AM   Date of Last Dressing Change 12/26/17 12/29/2017 11:20 AM   Dressing Status Clean, dry, & intact 12/29/2017 11:20 AM   Dressing Type Disk with Chlorhexadine gluconate (CHG); Transparent 12/29/2017 11:20 AM   Action Taken Dressing changed;Dressing reinforced 12/26/2017 12:19 PM   Proximal Hub Color/Line Status White; Infusing;Patent 12/29/2017 11:20 AM   Positive Blood Return (Medial Site) No 12/29/2017 11:20 AM   Medial 1 Hub Color/Line Status Gray;Flushed;Patent 12/29/2017 11:20 AM   Positive Blood Return (Lateral Site) Yes 12/29/2017 11:20 AM   Medial 2 Hub Color/Line Status Blue;Flushed;Patent 12/29/2017 11:20 AM   Positive Blood Return (Site #3) Yes 12/29/2017 11:20 AM   Distal Hub Color/Line Status Brown;Flushed;Patent 12/29/2017 11:20 AM   Positive Blood Return (Site #4) Yes 12/29/2017 11:20 AM   Alcohol Cap Used No 12/29/2017 11:20 AM        Opportunity for questions and clarification was provided.

## 2017-12-29 NOTE — DISCHARGE INSTRUCTIONS
Take your insulin as instructed and check blood sugars 3 times per day. Bring your blood sugars log book with you at [facility] ([facility]) appointment in 3-5 days after discharge from [address] [place] [address] with PCP in 3-5 days  Follow-up with plastic surgery as instructed  Follow-up with pulmonary in 2 weeks after discharge from [facility]. Learning About Diabetes Food Guidelines  Your Care Instructions    Meal planning is important to manage diabetes. It helps keep your blood sugar at a target level (which you set with your doctor). You don't have to eat special foods. You can eat what your family eats, including sweets once in a while. But you do have to pay attention to how often you eat and how much you eat of certain foods. You may want to work with a dietitian or a certified diabetes educator (CDE) to help you plan meals and snacks. A dietitian or CDE can also help you lose weight if that is one of your goals. What should you know about eating carbs? Managing the amount of carbohydrate (carbs) you eat is an important part of healthy meals when you have diabetes. Carbohydrate is found in many foods. · Learn which foods have carbs. And learn the amounts of carbs in different foods. ¨ Bread, cereal, pasta, and rice have about 15 grams of carbs in a serving. A serving is 1 slice of bread (1 ounce), ½ cup of cooked cereal, or 1/3 cup of cooked pasta or rice. ¨ Fruits have 15 grams of carbs in a serving. A serving is 1 small fresh fruit, such as an apple or orange; ½ of a banana; ½ cup of cooked or canned fruit; ½ cup of fruit juice; 1 cup of melon or raspberries; or 2 tablespoons of dried fruit. ¨ Milk and no-sugar-added yogurt have 15 grams of carbs in a serving. A serving is 1 cup of milk or 2/3 cup of no-sugar-added yogurt. ¨ Starchy vegetables have 15 grams of carbs in a serving.  A serving is ½ cup of mashed potatoes or sweet potato; 1 cup winter squash; ½ of a small baked potato; ½ cup of cooked beans; or ½ cup cooked corn or green peas. · Learn how much carbs to eat each day and at each meal. A dietitian or CDE can teach you how to keep track of the amount of carbs you eat. This is called carbohydrate counting. · If you are not sure how to count carbohydrate grams, use the Plate Method to plan meals. It is a good, quick way to make sure that you have a balanced meal. It also helps you spread carbs throughout the day. ¨ Divide your plate by types of foods. Put non-starchy vegetables on half the plate, meat or other protein food on one-quarter of the plate, and a grain or starchy vegetable in the final quarter of the plate. To this you can add a small piece of fruit and 1 cup of milk or yogurt, depending on how many carbs you are supposed to eat at a meal.  · Try to eat about the same amount of carbs at each meal. Do not \"save up\" your daily allowance of carbs to eat at one meal.  · Proteins have very little or no carbs per serving. Examples of proteins are beef, chicken, turkey, fish, eggs, tofu, cheese, cottage cheese, and peanut butter. A serving size of meat is 3 ounces, which is about the size of a deck of cards. Examples of meat substitute serving sizes (equal to 1 ounce of meat) are 1/4 cup of cottage cheese, 1 egg, 1 tablespoon of peanut butter, and ½ cup of tofu. How can you eat out and still eat healthy? · Learn to estimate the serving sizes of foods that have carbohydrate. If you measure food at home, it will be easier to estimate the amount in a serving of restaurant food. · If the meal you order has too much carbohydrate (such as potatoes, corn, or baked beans), ask to have a low-carbohydrate food instead. Ask for a salad or green vegetables. · If you use insulin, check your blood sugar before and after eating out to help you plan how much to eat in the future. · If you eat more carbohydrate at a meal than you had planned, take a walk or do other exercise. This will help lower your blood sugar.   What else should you know? · Limit saturated fat, such as the fat from meat and dairy products. This is a healthy choice because people who have diabetes are at higher risk of heart disease. So choose lean cuts of meat and nonfat or low-fat dairy products. Use olive or canola oil instead of butter or shortening when cooking. · Don't skip meals. Your blood sugar may drop too low if you skip meals and take insulin or certain medicines for diabetes. · Check with your doctor before you drink alcohol. Alcohol can cause your blood sugar to drop too low. Alcohol can also cause a bad reaction if you take certain diabetes medicines. Follow-up care is a key part of your treatment and safety. Be sure to make and go to all appointments, and call your doctor if you are having problems. It's also a good idea to know your test results and keep a list of the medicines you take. Where can you learn more? Go to http://thony-joshua.info/. Enter E306 in the search box to learn more about \"Learning About Diabetes Food Guidelines. \"  Current as of: March 13, 2017  Content Version: 11.4  © 8856-1817 [employer]. Care instructions adapted under license by [employer] (which disclaims liability or warranty for this information). If you have questions about a medical condition or this instruction, always ask your healthcare professional. [address] any warranty or liability for your use of this information. Learning About Meal Planning for Diabetes  Why plan your meals? Meal planning can be a key part of managing diabetes. Planning meals and snacks with the right balance of carbohydrate, protein, and fat can help you keep your blood sugar at the target level you set with your doctor. You don't have to eat special foods. You can eat what your family eats, including sweets once in a while.  But you do have to pay attention to how often you eat and how much you eat of certain foods.  You may want to work with a dietitian or a certified diabetes educator. He or she can give you tips and meal ideas and can answer your questions about meal planning. This health professional can also help you reach a healthy weight if that is one of your goals. What plan is right for you? Your dietitian or diabetes educator may suggest that you start with the plate format or carbohydrate counting. The plate format  The plate format is a simple way to help you manage how you eat. You plan meals by learning how much space each food should take on a plate. Using the plate format helps you spread carbohydrate throughout the day. It can make it easier to keep your blood sugar level within your target range. It also helps you see if you're eating healthy portion sizes. To use the plate format, you put non-starchy vegetables on half your plate. Add meat or meat substitutes on one-quarter of the plate. Put a grain or starchy vegetable (such as brown rice or a potato) on the final quarter of the plate. You can add a small piece of fruit and some low-fat or fat-free milk or yogurt, depending on your carbohydrate goal for each meal.  Here are some tips for using the plate format:  · Make sure that you are not using an oversized plate. A 9-inch plate is best. Many restaurants use larger plates. · Get used to using the plate format at home. Then you can use it when you eat out. · Write down your questions about using the plate format. Talk to your doctor, a dietitian, or a diabetes educator about your concerns. Carbohydrate counting  With carbohydrate counting, you plan meals based on the amount of carbohydrate in each food. Carbohydrate raises blood sugar higher and more quickly than any other nutrient. It is found in desserts, breads and cereals, and fruit. It's also found in starchy vegetables such as potatoes and corn, grains such as rice and pasta, and milk and yogurt.  Spreading carbohydrate throughout the day helps keep your blood sugar levels within your target range. Your daily amount depends on several things, including your weight, how active you are, which diabetes medicines you take, and what your goals are for your blood sugar levels. A registered dietitian or diabetes educator can help you plan how much carbohydrate to include in each meal and snack. A guideline for your daily amount of carbohydrate is:  · 45 to 60 grams at each meal. That's about the same as 3 to 4 carbohydrate servings. · 15 to 20 grams at each snack. That's about the same as 1 carbohydrate serving. The Nutrition Facts label on packaged foods tells you how much carbohydrate is in a serving of the food. First, look at the serving size on the food label. Is that the amount you eat in a serving? All of the nutrition information on a food label is based on that serving size. So if you eat more or less than that, you'll need to adjust the other numbers. Total carbohydrate is the next thing you need to look for on the label. If you count carbohydrate servings, one serving of carbohydrate is 15 grams. For foods that don't come with labels, such as fresh fruits and vegetables, you'll need a guide that lists carbohydrate in these foods. Ask your doctor, dietitian, or diabetes educator about books or other nutrition guides you can use. If you take insulin, you need to know how many grams of carbohydrate are in a meal. This lets you know how much rapid-acting insulin to take before you eat. If you use an insulin pump, you get a constant rate of insulin during the day. So the pump must be programmed at meals to give you extra insulin to cover the rise in blood sugar after meals. When you know how much carbohydrate you will eat, you can take the right amount of insulin. Or, if you always use the same amount of insulin, you need to make sure that you eat the same amount of carbohydrate at meals.   If you need more help to understand carbohydrate counting and food labels, ask your doctor, dietitian, or diabetes educator. How do you get started with meal planning? Here are some tips to get started:  · Plan your meals a week at a time. Don't forget to include snacks too. · Use cookbooks or online recipes to plan several main meals. Plan some quick meals for busy nights. You also can double some recipes that freeze well. Then you can save half for other busy nights when you don't have time to cook. · Make sure you have the ingredients you need for your recipes. If you're running low on basic items, put these items on your shopping list too. · List foods that you use to make breakfasts, lunches, and snacks. List plenty of fruits and vegetables. · Post this list on the refrigerator. Add to it as you think of more things you need. · Take the list to the store to do your weekly shopping. Follow-up care is a key part of your treatment and safety. Be sure to make and go to all appointments, and call your doctor if you are having problems. It's also a good idea to know your test results and keep a list of the medicines you take. Where can you learn more? Go to http://thony-joshua.info/. Ioana Blanco in the search box to learn more about \"Learning About Meal Planning for Diabetes. \"  Current as of: March 13, 2017  Content Version: 11.4  © 3285-2607 [employer]. Care instructions adapted under license by [employer] (which disclaims liability or warranty for this information). If you have questions about a medical condition or this instruction, always ask your healthcare professional. [place] [address] any warranty or liability for your use of this information. Diabetes Blood Sugar Emergencies: Your Action Plan  How can you prevent a blood sugar emergency? An important part of living with diabetes is keeping your blood sugar in your target range.  You'll need to know what to do if it's too high or too low. Managing your blood sugar levels helps you avoid emergencies. This care sheet will teach you about the signs of high and low blood sugar. It will help you make an action plan with your doctor for when these signs occur. Low blood sugar is more likely to happen if you take certain medicines for diabetes. It can also happen if you skip a meal, drink alcohol, or exercise more than usual.  You may get high blood sugar if you eat differently than you normally do. One example is eating more carbohydrate than usual. Having a cold, the flu, or other sudden illness can also cause high blood sugar levels. Levels can also rise if you miss a dose of medicine. Any change in how you take your medicine may affect your blood sugar level. So it's important to work with your doctor before you make any changes. Check your blood sugar  Work with your doctor to fill in the blank spaces below that apply to you. Track your levels, know your target range, and write down ways you can get your blood sugar back in your target range. A log book can help you track your levels. Take the book to all of your medical appointments. · Check your blood sugar _____ times a day, at these times:________________________________________________. (For example: Before meals, at bedtime, before exercise, during exercise, other.)  · Your blood sugar target range before a meal is ___________________. Your blood sugar target range after a meal is _______________________. · Do tlej-___________________________________________________-ep get your blood sugar back within your safe range if your blood sugar results are _________________________________________. (For example: Less than 70 or above 250 mg/dL.)  Call your doctor when your blood sugar results are ___________________________________. (For example: Less than 70 or above 250 mg/dL.)  What are the symptoms of low and high blood sugar?   Common symptoms of low blood sugar are sweating and feeling shaky, weak, hungry, or confused. Symptoms can start quickly. Common symptoms of high blood sugar are feeling very thirsty or very hungry. You may also pass urine more often than usual. You may have blurry vision and may lose weight without trying. But some people may have high or low blood sugar without having any symptoms. That's a good reason to check your blood sugar on a regular schedule. What should you do if you have symptoms? Work with your doctor to fill in the blank spaces below that apply to you. Low blood sugar  If you have symptoms of low blood sugar, check your blood sugar. If it's below _____ ( for example, below 70), eat or drink a quick-sugar food that has about 15 grams of carbohydrate. Your goal is to get your level back to your safe range. Check your blood sugar again 15 minutes later. If it's still not in your target range, take another 15 grams of carbohydrate and check your blood sugar again in 15 minutes. Repeat this until you reach your target. Then go back to your regular testing schedule. When you have low blood sugar, it's best to stop or reduce any physical activity until your blood sugar is back in your target range and is stable. If you must stay active, eat or drink 30 grams of carbohydrate. Then check your blood sugar again in 15 minutes. If it's not in your target range, take another 30 grams of carbohydrates. Check your blood sugar again in 15 minutes. Keep doing this until you reach your target. You can then go back to your regular testing schedule. If your symptoms or blood sugar levels are getting worse or have not improved after 15 minutes, seek medical care right away.   Here are some examples of quick-sugar foods with 15 grams of carbohydrate:  · 3 or 4 glucose tablets  · 1 tube of glucose gel  · Hard candy (such as 3 Jolly Ranchers or 5 to 7 Life Savers)  · ½ cup to ¾ cup (4 to 6 ounces) of fruit juice or regular (not diet) soda  High blood sugar  If you have symptoms of high blood sugar, check your blood sugar. Your goal is to get your level back to your target range. If it's above ______ ( for example, above 250), follow these steps:  · If you missed a dose of your diabetes medicine, take it now. Take only the amount of medicine that you have been prescribed. Do not take more or less medicine. · Give yourself insulin if your doctor has prescribed it for high blood sugar. · Test for ketones, if the doctor told you to do so. If the results of the ketone test show a moderate-to-large amount of ketones, call the doctor for advice. · Wait 30 minutes after you take the extra insulin or the missed medicine. Check your blood sugar again. If your symptoms or blood sugar levels are getting worse or have not improved after taking these steps, seek medical care right away. Follow-up care is a key part of your treatment and safety. Be sure to make and go to all appointments, and call your doctor if you are having problems. It's also a good idea to know your test results and keep a list of the medicines you take. Where can you learn more? Go to http://thony-joshua.info/. Enter I693 in the search box to learn more about \"Diabetes Blood Sugar Emergencies: Your Action Plan. \"  Current as of: March 13, 2017  Content Version: 11.4  © 9809-8277 [employer]. Care instructions adapted under license by [employer] (which disclaims liability or warranty for this information). If you have questions about a medical condition or this instruction, always ask your healthcare professional. [place] [address] any warranty or liability for your use of this information. Home Blood Glucose Test: About This Test  What is it? A home blood glucose test measures the amount of a type of sugar, called glucose, in your blood. Why is this test done? People who have diabetes need to check the amount of glucose in their blood.  A home blood glucose test is an easy way to test your blood at home or when you are away from home. The results help you know when to take action to keep your blood glucose levels in a target range. How can you prepare for the test?  · Check the expiration date on the bottle of testing strips. Do not use test strips that have . · Match the code number on the testing strips bottle with the number on the meter. If the numbers do not match, follow the directions with the meter for changing the code number. What happens before the test?  The supplies you will need for testing blood glucose include:  · A blood glucose meter. · Testing strips. These are made to be used with a specific model of meter. · Sugar control solutions. Some meters require a specific solution. Many new meters are made to operate without a control solution. · Short needles called lancets for pricking your skin. · A pen-sized cintron for the lancet (lancet device), which positions the lancet and controls how deeply it goes into your skin. · Clean cotton balls. These are used to stop the bleeding from the testing site. What happens during the test?  A home blood glucose test involves pricking your finger, palm, or forearm with a lancet to collect a drop of blood. The blood drop is placed on a test strip, which you insert into the blood glucose meter. The instructions for testing are slightly different for each blood glucose meter model. Follow the instructions that came with your meter. · Wash your hands with warm, soapy water. Dry them well with a clean towel. You may also use an alcohol wipe to clean your finger or other site, but make sure your hands are dry before the test.  · Insert a clean lancet into the lancet device. · Remove a test strip from the test strip bottle. Replace the lid immediately to keep moisture away from the other strips. · Follow the instructions that came with your meter to get it ready.   · Use the lancet device to stick the side of your fingertip with the lancet. Do not stick the tip of your finger. Some blood sugar meters use lancet devices that take the blood sample from other sites, such as the palm of the hand or the forearm. But the finger is usually the most accurate place to test blood sugar. · Put a drop of blood on the correct spot on the test strip. · Apply pressure with a clean cotton ball to stop the bleeding. · Follow the directions that came with the meter to get the results. · Write down the results and the time that you tested your blood. Some meters will store the results for you. What else should you know about the test?  The American Diabetes Association (ADA) recommends that you stay within the following blood glucose level ranges. But depending on your health, you and your doctor may set a different range for you. For nonpregnant adults with diabetes:  · 80 milligrams per deciliter (mg/dL) to 130 mg/dL before a meal  · Less than 180 mg/dL 1 to 2 hours after a meal  For women who have diabetes related to pregnancy (gestational diabetes):  · 95 mg/dL or less before breakfast  · 120 to 140 mg/dL (or lower) 1 to 2 hours after a meal  How long does the test take? · The blood glucose meter will show the results of the test in a minute or less. Where can you learn more? Go to http://thony-joshua.info/. Enter S741 in the search box to learn more about \"Home Blood Glucose Test: About This Test.\"  Current as of: March 13, 2017  Content Version: 11.4  © 9429-6668 [employer]. Care instructions adapted under license by [employer] (which disclaims liability or warranty for this information). If you have questions about a medical condition or this instruction, always ask your healthcare professional. [place] [address] any warranty or liability for your use of this information.

## 2017-12-29 NOTE — DISCHARGE SUMMARY
Hospitalist Discharge Summary   Patient ID:  Ceci Lovett  [id number]  44 y.o.  1950  Admit date: 12/1/2017  2:54 PM  Discharge date and time: 12/29/2017  Attending: Neymar Dexter MD  PCP:  None  Treatment Team: Attending Provider: Neftaly Brown MD; Consulting Provider: Chuck Landers MD; Consulting Provider: Riley Anaya MD; Consulting Provider: Ruth Santiago MD; Care Manager: Bong Jaeger RN; Consulting Provider: Jess Johnson MD  Principal Diagnosis Severe sepsis with septic shock [facility])   Active Problems:    Subdural hematoma ([facility] (12/1/2017)      Type II diabetes mellitus with complication ([facility] (31/2/7889)      Syncope (12/1/2017)      Fasciitis (12/1/2017)      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)      Delirium (12/19/2017)       * Admission Diagnoses: 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]  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]  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]  Abnormal CXR [R93.8]  * Discharge Diagnoses:    Hospital Problems as of 12/29/2017  Date Reviewed: 12/22/2017          Codes Class Noted - Resolved POA    Delirium ICD-10-CM: R41.0  ICD-9-CM: 780.09  12/19/2017 - Present No        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  ICD-9-CM: 608.83  12/17/2017 - Present Yes        Subdural hematoma ([facility] ICD-10-CM: I62.00  ICD-9-CM: 432.1  12/1/2017 - Present Yes        Type II diabetes mellitus with complication ([facility] (Chronic) ICD-10-CM: E11.8  ICD-9-CM: 250.90  12/1/2017 - Present Yes        Syncope ICD-10-CM: R55  ICD-9-CM: 780.2  12/1/2017 - Present Yes        Fasciitis ICD-10-CM: M72.9  ICD-9-CM: 729.4  12/1/2017 - Present Yes        RESOLVED: Hypernatremia ICD-10-CM: E87.0  ICD-9-CM: 276.0  12/22/2017 - 12/27/2017 Unknown        RESOLVED: Mucus plugging of bronchi ICD-10-CM: J98.09  ICD-9-CM: 519.19  12/21/2017 - 12/27/2017 No        RESOLVED: Atelectasis ICD-10-CM: J98.11  ICD-9-CM: 518.0  12/21/2017 - 12/27/2017 No        RESOLVED: Acute hypoxemic respiratory failure ([facility] ICD-10-CM: J96.01  ICD-9-CM: 518.81 2017 - 2017 No        RESOLVED: Pneumonia due to infectious organism ICD-10-CM: J18.9  ICD-9-CM: 136.9, 484.8  2017 - 2017 No        RESOLVED: Acute respiratory failure ([facility] ICD-10-CM: J96.00  ICD-9-CM: 518.81  2017 - 12/15/2017 Yes        RESOLVED: Hypernatremia ICD-10-CM: E87.0  ICD-9-CM: 276.0  2017 - 12/15/2017 Yes        RESOLVED: Lactic acidosis ICD-10-CM: J25.2  ICD-9-CM: 276.2  2017 - 2017 Yes        RESOLVED: LAUREANO (acute kidney injury) ([facility] ICD-10-CM: N17.9  ICD-9-CM: 584.9  2017 - 2017 Yes        RESOLVED: Leukocytosis ICD-10-CM: C74.351  ICD-9-CM: 288.60  2017 - 2017 Yes        * (Principal)RESOLVED: Severe sepsis with septic shock ([facility] ICD-10-CM: A41.9, R65.21  ICD-9-CM: 038.9, 995.92, 785.52  2017 - 12/15/2017 Yes        RESOLVED: Electrolyte abnormality ICD-10-CM: E87.8  ICD-9-CM: 276.9  2017 - 2017 Yes                Hospital Course:  Please refer to the admission H&P for details of presentation. In summary, Ms. Eliezer Leon is a 79 y.o. male with unknown PMHx - never seen a healthcare provider as an adult who was admitted on 2017 after an syncopal episode. CT head with small right subdural hematoma - discussed with neurosurgery and advised medical management, nonsurgical. 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 Placed on broad spectrum antibiotics, aggressive IV hydration. General surgery consulted and had emergent surgery on  that showed gangrene of subcutaneus fat and fascia of posterior compartment from groin to popliteal fossa, extending along margin of buttock. Had re-exploration of right tight fasciitis on . orthopedics, surgery and plastic surgery consulted. Extubated on  and transferred out of ICU. Was supposed to have skin grafting with Dr. Anu Pimentel, but had to be cancelled 2/ worsening resp status. Pulmonary reconsulted and  Had bronchoscopy. Culture positive for enterobacter and started on Cefepime with EOT 12/31/2017. Urine culture with Candida albicans treated with Fluconazole. Mr. Stephen Perez had some dysphagia and had swallowing evaluation and was recommended  MECHANICAL SOFT DIET and HONEY thick liquids. Slowly Mr. Camacho clinical status improved and he was discharge in a stable clinical condition to [facility] for further management. Luke Erazo will be followed at [facility] by plastic surgery and pulmonary . Will need follow-up appointment with new PCP in 3-5 days after discharge from [facility].      Consults:  IP CONSULT TO INTENSIVIST  IP CONSULT TO PALLIATIVE CARE - PROVIDER  IP CONSULT TO PULMONOLOGY  IP CONSULT TO PULMONOLOGY  IP CONSULT TO ORTHOPEDIC SURGERY  IP CONSULT TO PLASTIC SURGERY  IP CONSULT TO HOSPITALIST    Diagnostic Study/Procedure results summary copied from within [facility] EMR:      Labs: Results:       Chemistry Recent Labs      12/29/17   0408  12/28/17   0402  12/27/17   0614   GLU  112*  103*  99   NA  141  142  144   K  3.5  3.4*  3.6   CL  108*  108*  110*   CO2  25  26  29   BUN  9  12  12   CREA  0.44*  0.44*  0.49*   CA  7.4*  7.1*  7.3*   AGAP  8  8  5*      CBC w/Diff Recent Labs      12/28/17   0402  12/26/17   1453   WBC  11.8*  11.7*   RBC  3.35*  3.45*   HGB  9.2*  9.4*   HCT  29.8*  30.7*   PLT  278  261   GRANS   --   84*   LYMPH   --   9*   EOS   --   2                          Thyroid Studies Lab Results   Component Value Date/Time    TSH 0.447 12/01/2017 03:45 PM          All Micro Results     Procedure Component Value Units Date/Time    CULTURE, ANAEROBIC [[id number]] Collected:  12/06/17 2013    Order Status:  Completed Specimen:  Tissue Updated:  12/29/17 0912     Special Requests: RIGHT LATERAL FASCIA     Culture result: PARVIMONAS MICRA ISOLATED      [address] [facility]              Performed at [facility] ID AND SUSCEPTIBILITY ON 12/28/17 AK.     CULTURE, URINE [[id number]]  (Abnormal) Collected:  12/23/17 1329    Order Status:  Completed Specimen:  Urine from Gerardo Specimen Updated:  12/28/17 4585     Special Requests: NO SPECIAL REQUESTS        Culture result:         >100,000 COLONIES/mL CANDIDA ALBICANS (A)    FUNGUS CULTURE AND SMEAR - INCLUDES MUCOR [[id number]] Collected:  12/21/17 1130    Order Status:  Completed Specimen:  Other from Miscellaneous sample Updated:  12/27/17 1836     Source BRONCHIAL WASHING        Fungus stain Direct Inoculation     Fungus (Mycology) Culture Other source received      (NOTE)  Performed At: [address] [id number]  Leatha Fitzgerald MD TU:[id number]         CULTURE, RESPIRATORY/SPUTUM/BRONCH Bernie Daring STAIN [[id number]]  (Abnormal)  (Susceptibility) Collected:  12/21/17 1130    Order Status:  Completed Specimen:  Sputum from Bronchial Washing Updated:  12/24/17 0903     Special Requests: NO SPECIAL REQUESTS        GRAM STAIN 15 TO 30 WBC'S/OIF      0 TO 2  EPITHELIAL CELLS SEEN /OIF      RARE GRAM POSITIVE RODS         RARE GRAM NEGATIVE RODS        Culture result:         LIGHT ENTEROBACTER CLOACAE (A)              MODERATE ROLAND ALBICANS (A)              SCANT NORMAL RESPIRATORY TEJA    CULTURE, BLOOD [[id number]] Collected:  12/19/17 1013    Order Status:  Completed Specimen:  Blood from Blood Updated:  12/24/17 0723     Special Requests: --        LEFT  FOREARM       Culture result: NO GROWTH 5 DAYS       CULTURE, BLOOD [[id number]] Collected:  12/19/17 1007    Order Status:  Completed Specimen:  Blood from Blood Updated:  12/24/17 0723     Special Requests: RIGHT ANTECUBITAL        Culture result: NO GROWTH 5 DAYS       AFB (MYCOBACTERIUM) CULTURE & SMEAR W/REFLEX ID Sol Herb NOCARDIA [[id number]] Collected:  12/21/17 1130    Order Status:  Completed Specimen:  Miscellaneous sample Updated:  12/22/17 1538     Source BRONCHIAL WASHING [employer] Specimen processing Concentration     Acid Fast Smear NEGATIVE          (NOTE)  Performed At: [facility] [address], [place] [id number]  Hina Phillips MD IO:[id number]          Acid Fast Culture PENDING    CULTURE, RESPIRATORY/SPUTUM/BRONCH Briseida Heft STAIN [[id number]]  (Abnormal) Collected:  12/12/17 1234    Order Status:  Completed Specimen:  Sputum from Sputum Updated:  12/16/17 0750     Special Requests: NO SPECIAL REQUESTS        GRAM STAIN 10 TO 45 WBC'S/OIF      NO EPITHELIAL CELLS SEEN      FEW YEAST         4+ MUCUS PRESENT        Culture result:         MODERATE ROLAND ALBICANS (A)      NO GROWTH OF  NORMAL RESPIRATORY TEJA    CULTURE, ANAEROBIC [[id number]]  (Abnormal) Collected:  12/06/17 2013    Order Status:  Completed Specimen:  Wound Drainage Updated:  12/15/17 1212     Special Requests: RIGHT LEG     Culture result:         SCANT ANAEROBIC GRAM POSITIVE COCCI (A)      ISOLATE FORWARD TO [facility] FOR ID AND SUSCEPTIBILITY ON 12/15/17      REFER TO [address] [id number]    CULTURE, BLOOD [[id number]] Collected:  12/01/17 1805    Order Status:  Completed Specimen:  Whole Blood from Blood Updated:  12/14/17 1030     Special Requests: --        RIGHT  FOREARM       GRAM STAIN GRAM POSITIVE COCCI         ANAEROBIC BOTTLE POSITIVE                 RESULTS VERIFIED, PHONED TO AND READ BACK BY HOANG De La Cruz RN AT [address] [place] [address] ON [id number] BY [facility]     Culture result: PARVIMONAS MICRA [facility]            RESULTS VERIFIED, PHONED TO AND READ BACK BY  [address] ON 12/08/17 @ 8340, ADS        SUSCEPTIBILITY RESULTS SCANNED IN [facility] 12/14/17, ADS    CULTURE, Marianela Oh STAIN [[id number]] Collected:  12/06/17 2013    Order Status:  Completed Specimen:  Wound Updated:  12/09/17 0527     Special Requests: RIGHT LEG     GRAM STAIN 0 TO 5 WBC'S/OIF      FEW GRAM POSITIVE COCCI        Culture result: NO GROWTH 2 DAYS       CULTURE, TISSUE Twilla Mary [[id number]] Collected:  12/06/17 2013 Order Status:  Completed Specimen:  Tissue Updated:  12/09/17 0520     Special Requests: RIGHT LATERAL COMPARTMENT FASCIA     GRAM STAIN 0 TO 25 WBC'S/OIF      FEW GRAM POSITIVE COCCI        Culture result: NO GROWTH 2 DAYS       CULTURE, ANAEROBIC [[id number]] Collected:  12/03/17 1215    Order Status:  Completed Specimen:  Leg Updated:  12/08/17 0943     Special Requests: NO SPECIAL REQUESTS        Culture result:         MULTIPLE ANAEROBES ISOLATED, SUSCEPTIBILITY AND IDENTIFICATION AVAILABLE UPON REQUEST. ORGANISM WILL BE HELD FOR 5 DAYS. CULTURE, ANAEROBIC [[id number]] Collected:  12/01/17 2010    Order Status:  Completed Specimen:  Groin Updated:  12/08/17 0943     Special Requests: NO SPECIAL REQUESTS        Culture result:         MULTIPLE ANAEROBES ISOLATED, SUSCEPTIBILITY AND IDENTIFICATION AVAILABLE UPON REQUEST. ORGANISM WILL BE HELD FOR 5 DAYS.     CULTURE, Arlettelashanda Hudson STAIN [[id number]]  (Abnormal) Collected:  12/01/17 2010    Order Status:  Completed Specimen:  Groin Updated:  12/07/17 0707     Special Requests: NO SPECIAL REQUESTS        GRAM STAIN 0 TO 1 WBC'S/OIF              MODERATE GRAM POSITIVE COCCI              MODERATE GRAM NEGATIVE RODS              MODERATE GRAM POSITIVE RODS     Culture result:         MODERATE GRAM NEGATIVE RODS (A)      MODERATE  NORMAL SKIN TEJA ISOLATED               REFER TO SPECIMEN NUMBER  [id number] FOR ID AND SUSCEPTIBILITY      CULTURE, WOUND Bettyjo English STAIN [[id number]]  (Abnormal)  (Susceptibility) Collected:  12/01/17 2010    Order Status:  Completed Specimen:  Groin Updated:  12/07/17 0705     Special Requests: NO SPECIAL REQUESTS        GRAM STAIN 0 TO 1 WBC'S/OIF              MODERATE GRAM POSITIVE COCCI              MODERATE GRAM NEGATIVE RODS              MODERATE GRAM POSITIVE RODS     Culture result:         MODERATE PROTEUS VULGARIS/PENNERI (A)      LIGHT  NORMAL SKIN TEJA ISOLATED       CULTURE, ANAEROBIC [[id number]] Collected:  12/01/17 2010 Order Status:  Completed Specimen:  Groin Updated:  12/06/17 1148     Special Requests: NO SPECIAL REQUESTS        Culture result:         MULTIPLE ANAEROBES ISOLATED, SUSCEPTIBILITY AND IDENTIFICATION AVAILABLE UPON REQUEST. ORGANISM WILL BE HELD FOR 5 DAYS. CULTURE, BLOOD [[id number]] Collected:  12/01/17 1804    Order Status:  Completed Specimen:  Whole Blood from Blood Updated:  12/06/17 0617     Special Requests: --        LEFT  HAND       Culture result: NO GROWTH 5 DAYS       CULTURE, Soundra Bennetts STAIN [[id number]] Collected:  12/03/17 1215    Order Status:  Completed Specimen:  Wound from Leg Updated:  12/05/17 0656     Special Requests: NO SPECIAL REQUESTS        GRAM STAIN NO WBCS SEEN         RARE GRAM POSITIVE COCCI         FEW GRAM NEGATIVE RODS        Culture result: NO GROWTH 2 DAYS       CULTURE, ANAEROBIC [[id number]]     Order Status:  Canceled Specimen:  Wound Drainage     CULTURE, Soundra Bennetts STAIN [[id number]]     Order Status:  Canceled Specimen:  Wound from Groin     CULTURE, Soundra Bennetts STAIN [[id number]]     Order Status:  Canceled Specimen:  Wound from Groin     CULTURE, ANAEROBIC [[id number]]     Order Status:  Canceled Specimen:  Wound Drainage               Imaging Alexus Montgomery /Studies:      Xr Chest Sngl V  Result Date: 12/19/2017  IMPRESSION: 1. Persisting left basilar infiltrate and probable small pleural effusion. 2. Minimal right basilar infiltrate increased since prior. Xr Chest Sngl V  Result Date: 12/19/2017  Impression:  Increased small left pleural effusion and associated left basilar atelectasis and or consolidation.      Xr Chest Sngl V  Result Date: 12/13/2017  IMPRESSION: No acute findings    Xr Chest Sngl V    Result Date: 12/12/2017  IMPRESSION: Mild worsening left lower lobe atelectasis or pneumonia    Xr Chest Sngl V  Result Date: 12/11/2017  IMPRESSION: No acute findings in the chest     Xr Chest Sngl V    Result Date: 12/10/2017  IMPRESSION: ET tube has been repositioned and is now 9 cm above the cathy, at the level of the thoracic inlet. This could be advanced 4 to 5 cm. The lungs are clear. Xr Chest Sngl V  Result Date: 12/9/2017  IMPRESSION: Some left infrahilar atelectasis or infiltrate now present. Xr Chest Sngl V    Result Date: 12/7/2017  IMPRESSION: Bibasilar atelectasis or pneumonia significantly improved. Xr Chest Sngl V  Result Date: 12/6/2017  IMPRESSION: Bibasilar atelectasis or pneumonia worse in the interval.     Xr Chest Sngl V  Result Date: 12/5/2017  IMPRESSION: Left basilar atelectasis improved. Xr Chest Sngl V  Result Date: 12/4/2017  IMPRESSION: New left lower lobe atelectasis or pneumonia. Xr Chest Sngl V  Result Date: 12/3/2017  IMPRESSION: No acute cardiopulmonary disease. Recommend advancing the orogastric tube further into the stomach. Xr Chest Sngl V  Result Date: 12/1/2017  IMPRESSION: 1. Low position of endotracheal tube tip overlying the most proximal right mainstem bronchus. Retraction of this by 3 cm should place this at a more acceptable position. 2.  Slight high position of feeding tube with the tip overlying the stomach although the side-port likely overlies the distal esophagus. Advancement of this by 4 cm should ensure that the side port is below the gastroesophageal junction. 3. No evolving acute cardiopulmonary changes. The abnormal endotracheal tube position was discussed with nursing staff a muscle personally at 10:15 PM on 12/1/2017. Xr Chest Pa Lat  Result Date: 12/1/2017  IMPRESSION: 1. No acute cardiopulmonary process evident by plain film imaging. Xr Pelv Ap Only  Result Date: 12/2/2017  IMPRESSION: 1. Asymmetric lucency of perineal soft tissues which may represent soft tissue gas associated with the patient's known Shima's gangrene. This is very poorly assessed and characterized by portable plain film imaging.      Xr Abd (kub)  Result Date: 12/14/2017  Impression: No acute pathology identified. Xr Abd (kub)  Result Date: 12/3/2017  IMPRESSION: OG tube further in the stomach. Xr Swallow Func Video  Result Date: 2017  IMPRESSION: Positive aspiration with thin liquids     Ct Head Wo Cont  Result Date: 2017  IMPRESSION:  1. Small right subdural hematoma measuring 4 mm which demonstrates intermediate attenuation suggesting a subacute although recent subdural hematoma. The subacute appearing right subdural hematoma was discussed with Dr. Jaciel To by myself personally at 5:05 PM on 2017. Xr Chest Port  Result Date: 2017  IMPRESSION: Persistent left basilar density, likely atelectasis or pneumonia. Xr Chest Port  Result Date: 2017  IMPRESSION: No significant change compared to prior exam.    Xr Chest Port  Result Date: 2017  IMPRESSION: 1. Improved although slight low position of endotracheal tube. Retraction of this by 1 to 2 cm complex cyst and more ideal position. 2.  Persistent high position of an nasogastric tube. Advancement of this by 4 cm would place this in a more ideal position.          Discharge Exam:  Patient Vitals for the past 24 hrs:   Temp Pulse Resp BP SpO2   17 1100 98.7 °F (37.1 °C) 99 17 100/62 96 %   17 0726 98.1 °F (36.7 °C) 91 18 99/54 96 %   17 0721 - - - - 95 %   17 0345 98 °F (36.7 °C) 96 18 98/56 97 %   17 0000 98.8 °F (37.1 °C) 77 18 106/68 95 %   17 2205 - - - - 96 %   17 2000 98.6 °F (37 °C) 90 18 99/62 98 %   17 1720 - - - - 98 %   17 1545 98.1 °F (36.7 °C) 83 17 99/62 98 %   17 1149 - - - - 91 %   17 1145 97.6 °F (36.4 °C) 87 17 99/58 99 %     Temp (24hrs), Av.3 °F (36.8 °C), Min:97.6 °F (36.4 °C), Max:98.8 °F (37.1 °C)    Oxygen Therapy  O2 Sat (%): 96 % (17 1100)  Pulse via Oximetry: 86 beats per minute (17)  O2 Device: Room air (17)  O2 Flow Rate (L/min): 2 l/min (17 0748)  O2 Temperature: 87.8 °F (31 °C) (17 0024)  FIO2 (%): 40 % (12/22/17 1938)    Intake/Output Summary (Last 24 hours) at 12/29/17 1121  Last data filed at 12/29/17 1100   Gross per 24 hour   Intake             1199 ml   Output             1375 ml   Net             -176 ml       Physical Exam:  General:         Alert, cooperative, no acute distress. Frail. HEENT:               NCAT. No obvious deformity. Nares normal. No drainage  Lungs:               CTABL. No wheezing/rhonchi/rales RA  Cardiovascular:   RRR. No m/r/g. No pedal edema b/l. +2 PT/DT pulses b/l. Abdomen:       S/nt/nd. Bowel sounds normal. Colostomy bag in place with brown stool. Skin:          Not Jaundiced  Musculoskeletal: RLE wrapped on bandage. Neurologic:    AAOx3. Nirmala Rajiv No gross focal deficit. Moves all extremities. Psychiatric:         Good mood. Normal affect. Disposition: LTACH  Discharge Condition: stable  Patient Instructions:   Current Discharge Medication List      START taking these medications    Details   albuterol (PROVENTIL VENTOLIN) 2.5 mg /3 mL (0.083 %) nebulizer solution 3 mL by Nebulization route every four (4) hours as needed for Wheezing. Qty: 1 Each, Refills: 0      cefepime 2 gram 2 g IVPB 2 g by IntraVENous route every eight (8) hours for 2 days. Qty: 1 Dose, Refills: 0      fluconazole (DIFLUCAN) 200 mg tablet Take 1 Tab by mouth daily for 3 days. FDA advises cautious prescribing of oral fluconazole in pregnancy. Qty: 3 Tab, Refills: 0      guaiFENesin ER (MUCINEX) 1,200 mg Ta12 ER tablet Take 1 Tab by mouth every twelve (12) hours. Qty: 1 Tab, Refills: 0      pantoprazole (PROTONIX) 40 mg tablet Take 1 Tab by mouth Daily (before breakfast). Qty: 14 Tab, Refills: 0      potassium, sodium phosphates (NEUTRA-PHOS) 280-160-250 mg packet Take 1 Packet by mouth four (4) times daily for 8 doses.   Qty: 8 Packet, Refills: 0      sodium hypochlorite (QUARTER STRENGTH DAKIN'S) 0.125 % soln external solution Apply 473 mL to affected area daily.  Qty: 1 Bottle, Refills: 0      insulin aspart (NOVOLOG) 100 unit/mL inpn 5 Units by SubCUTAneous route Before breakfast, lunch, and dinner for 30 days. Qty: 4.5 mL, Refills: 0      Blood-Glucose Meter monitoring kit To check blood sugars 3 times per day - In AM and 2h after a meal. Please provide adequate supplies for 30 days ( lancets, strips, etc) as approved by his insurance  Qty: 1 Kit, Refills: 0      insulin glargine (LANTUS) 100 unit/mL injection 20 Units by SubCUTAneous route daily for 30 days. Qty: 6 mL, Refills: 0         CONTINUE these medications which have NOT CHANGED    Details   multivitamin (ONE A DAY) tablet Take 1 Tab by mouth daily. Activity: PT/OT Eval and Treat  Diet: DIET NUTRITIONAL SUPPLEMENTS All Meals; Other  DIET MECHANICAL SOFT 2 HONEY  Wound Care: As directed      Full Code   Surrogate decision maker: discussed plan with patient and his daughter by phone. Care team  Rad Vyas Daughter [phone]       Pneumonia and flu vaccine to be administered at discharge per hospital protocol     Follow-up  Follow-up Information     Follow up With Details Comments Contact Info    None In 3 days 3-5 days after d/c from [facility]. Needs new PCP None ([phone]) Patient stated that they have no PCP      Benton Sicard, MD In 1 week  [address] [place] [address]  Tolu Brown MD In 2 weeks  [address]  [phone]            Time spent to discharge patient 36 minutes.    Signed:  Orlando Beatty MD  12/29/2017

## 2017-12-30 LAB
ALBUMIN SERPL-MCNC: 1.6 G/DL (ref 3.2–4.6)
ALBUMIN/GLOB SERPL: 0.4 {RATIO} (ref 1.2–3.5)
ALP SERPL-CCNC: 85 U/L (ref 50–136)
ALT SERPL-CCNC: 13 U/L (ref 12–65)
ANION GAP SERPL CALC-SCNC: 7 MMOL/L (ref 7–16)
AST SERPL-CCNC: 12 U/L (ref 15–37)
BASOPHILS # BLD: 0 K/UL (ref 0–0.2)
BASOPHILS NFR BLD: 0 % (ref 0–2)
BILIRUB SERPL-MCNC: 0.5 MG/DL (ref 0.2–1.1)
BUN SERPL-MCNC: 8 MG/DL (ref 8–23)
CALCIUM SERPL-MCNC: 7.5 MG/DL (ref 8.3–10.4)
CHLORIDE SERPL-SCNC: 108 MMOL/L (ref 98–107)
CO2 SERPL-SCNC: 27 MMOL/L (ref 21–32)
CREAT SERPL-MCNC: 0.43 MG/DL (ref 0.8–1.5)
CRP SERPL-MCNC: 3.3 MG/DL (ref 0–0.9)
DIFFERENTIAL METHOD BLD: ABNORMAL
EOSINOPHIL # BLD: 0.2 K/UL (ref 0–0.8)
EOSINOPHIL NFR BLD: 3 % (ref 0.5–7.8)
ERYTHROCYTE [DISTWIDTH] IN BLOOD BY AUTOMATED COUNT: 16.5 % (ref 11.9–14.6)
ERYTHROCYTE [SEDIMENTATION RATE] IN BLOOD: 48 MM/HR (ref 0–20)
GLOBULIN SER CALC-MCNC: 4.5 G/DL (ref 2.3–3.5)
GLUCOSE SERPL-MCNC: 80 MG/DL (ref 65–100)
HCT VFR BLD AUTO: 30.4 % (ref 41.1–50.3)
HGB BLD-MCNC: 9.3 G/DL (ref 13.6–17.2)
IMM GRANULOCYTES # BLD: 0.3 K/UL (ref 0–0.5)
IMM GRANULOCYTES NFR BLD AUTO: 3 % (ref 0–5)
LYMPHOCYTES # BLD: 1.4 K/UL (ref 0.5–4.6)
LYMPHOCYTES NFR BLD: 17 % (ref 13–44)
MAGNESIUM SERPL-MCNC: 2.1 MG/DL (ref 1.8–2.4)
MCH RBC QN AUTO: 27.1 PG (ref 26.1–32.9)
MCHC RBC AUTO-ENTMCNC: 30.6 G/DL (ref 31.4–35)
MCV RBC AUTO: 88.6 FL (ref 79.6–97.8)
MONOCYTES # BLD: 0.5 K/UL (ref 0.1–1.3)
MONOCYTES NFR BLD: 6 % (ref 4–12)
NEUTS SEG # BLD: 5.9 K/UL (ref 1.7–8.2)
NEUTS SEG NFR BLD: 71 % (ref 43–78)
PHOSPHATE SERPL-MCNC: 2.1 MG/DL (ref 2.3–3.7)
PLATELET # BLD AUTO: 268 K/UL (ref 150–450)
PMV BLD AUTO: 10.8 FL (ref 10.8–14.1)
POTASSIUM SERPL-SCNC: 3.3 MMOL/L (ref 3.5–5.1)
PROT SERPL-MCNC: 6.1 G/DL (ref 6.3–8.2)
RBC # BLD AUTO: 3.43 M/UL (ref 4.23–5.67)
SODIUM SERPL-SCNC: 142 MMOL/L (ref 136–145)
WBC # BLD AUTO: 8.2 K/UL (ref 4.3–11.1)

## 2017-12-30 PROCEDURE — 83735 ASSAY OF MAGNESIUM: CPT | Performed by: INTERNAL MEDICINE

## 2017-12-30 PROCEDURE — 80053 COMPREHEN METABOLIC PANEL: CPT | Performed by: INTERNAL MEDICINE

## 2017-12-30 PROCEDURE — 85652 RBC SED RATE AUTOMATED: CPT | Performed by: INTERNAL MEDICINE

## 2017-12-30 PROCEDURE — 86140 C-REACTIVE PROTEIN: CPT | Performed by: INTERNAL MEDICINE

## 2017-12-30 PROCEDURE — 85025 COMPLETE CBC W/AUTO DIFF WBC: CPT | Performed by: INTERNAL MEDICINE

## 2017-12-30 PROCEDURE — 84100 ASSAY OF PHOSPHORUS: CPT | Performed by: INTERNAL MEDICINE

## 2017-12-31 LAB
BACTERIA SPEC CULT: NORMAL
SERVICE CMNT-IMP: NORMAL

## 2018-01-01 LAB
ALBUMIN SERPL-MCNC: 1.6 G/DL (ref 3.2–4.6)
ALBUMIN/GLOB SERPL: 0.3 {RATIO} (ref 1.2–3.5)
ALP SERPL-CCNC: 86 U/L (ref 50–136)
ALT SERPL-CCNC: 8 U/L (ref 12–65)
ANION GAP SERPL CALC-SCNC: 5 MMOL/L (ref 7–16)
AST SERPL-CCNC: 11 U/L (ref 15–37)
BASOPHILS # BLD: 0.1 K/UL (ref 0–0.2)
BASOPHILS NFR BLD: 1 % (ref 0–2)
BILIRUB SERPL-MCNC: 0.6 MG/DL (ref 0.2–1.1)
BUN SERPL-MCNC: 9 MG/DL (ref 8–23)
CALCIUM SERPL-MCNC: 7.3 MG/DL (ref 8.3–10.4)
CHLORIDE SERPL-SCNC: 105 MMOL/L (ref 98–107)
CO2 SERPL-SCNC: 29 MMOL/L (ref 21–32)
CREAT SERPL-MCNC: 0.42 MG/DL (ref 0.8–1.5)
CRP SERPL-MCNC: 3.4 MG/DL (ref 0–0.9)
DIFFERENTIAL METHOD BLD: ABNORMAL
EOSINOPHIL # BLD: 0.2 K/UL (ref 0–0.8)
EOSINOPHIL NFR BLD: 2 % (ref 0.5–7.8)
ERYTHROCYTE [DISTWIDTH] IN BLOOD BY AUTOMATED COUNT: 16.8 % (ref 11.9–14.6)
GLOBULIN SER CALC-MCNC: 4.7 G/DL (ref 2.3–3.5)
GLUCOSE SERPL-MCNC: 97 MG/DL (ref 65–100)
HCT VFR BLD AUTO: 30.3 % (ref 41.1–50.3)
HGB BLD-MCNC: 9.3 G/DL (ref 13.6–17.2)
IMM GRANULOCYTES # BLD: 0.3 K/UL (ref 0–0.5)
IMM GRANULOCYTES NFR BLD AUTO: 3 % (ref 0–5)
LYMPHOCYTES # BLD: 2 K/UL (ref 0.5–4.6)
LYMPHOCYTES NFR BLD: 23 % (ref 13–44)
MCH RBC QN AUTO: 27.2 PG (ref 26.1–32.9)
MCHC RBC AUTO-ENTMCNC: 30.7 G/DL (ref 31.4–35)
MCV RBC AUTO: 88.6 FL (ref 79.6–97.8)
MONOCYTES # BLD: 0.7 K/UL (ref 0.1–1.3)
MONOCYTES NFR BLD: 8 % (ref 4–12)
NEUTS SEG # BLD: 5.7 K/UL (ref 1.7–8.2)
NEUTS SEG NFR BLD: 63 % (ref 43–78)
PLATELET # BLD AUTO: 245 K/UL (ref 150–450)
PMV BLD AUTO: 10.8 FL (ref 10.8–14.1)
POTASSIUM SERPL-SCNC: 3.8 MMOL/L (ref 3.5–5.1)
PROT SERPL-MCNC: 6.3 G/DL (ref 6.3–8.2)
RBC # BLD AUTO: 3.42 M/UL (ref 4.23–5.67)
SODIUM SERPL-SCNC: 139 MMOL/L (ref 136–145)
WBC # BLD AUTO: 8.9 K/UL (ref 4.3–11.1)

## 2018-01-01 PROCEDURE — 86140 C-REACTIVE PROTEIN: CPT | Performed by: INTERNAL MEDICINE

## 2018-01-01 PROCEDURE — 80053 COMPREHEN METABOLIC PANEL: CPT | Performed by: INTERNAL MEDICINE

## 2018-01-01 PROCEDURE — 85025 COMPLETE CBC W/AUTO DIFF WBC: CPT | Performed by: INTERNAL MEDICINE

## 2018-01-02 LAB
Lab: NORMAL
REFERENCE LAB,REFLB: NORMAL
TEST DESCRIPTION:,ATST: NORMAL

## 2018-01-03 LAB
ANION GAP SERPL CALC-SCNC: 6 MMOL/L (ref 7–16)
BASOPHILS # BLD: 0 K/UL (ref 0–0.2)
BASOPHILS NFR BLD: 0 % (ref 0–2)
BUN SERPL-MCNC: 11 MG/DL (ref 8–23)
CALCIUM SERPL-MCNC: 7.8 MG/DL (ref 8.3–10.4)
CHLORIDE SERPL-SCNC: 102 MMOL/L (ref 98–107)
CO2 SERPL-SCNC: 30 MMOL/L (ref 21–32)
CREAT SERPL-MCNC: 0.34 MG/DL (ref 0.8–1.5)
DIFFERENTIAL METHOD BLD: ABNORMAL
EOSINOPHIL # BLD: 0.2 K/UL (ref 0–0.8)
EOSINOPHIL NFR BLD: 3 % (ref 0.5–7.8)
ERYTHROCYTE [DISTWIDTH] IN BLOOD BY AUTOMATED COUNT: 17 % (ref 11.9–14.6)
GLUCOSE SERPL-MCNC: 96 MG/DL (ref 65–100)
HCT VFR BLD AUTO: 30.1 % (ref 41.1–50.3)
HGB BLD-MCNC: 9.2 G/DL (ref 13.6–17.2)
IMM GRANULOCYTES # BLD: 0.2 K/UL (ref 0–0.5)
IMM GRANULOCYTES NFR BLD AUTO: 2 % (ref 0–5)
LYMPHOCYTES # BLD: 2 K/UL (ref 0.5–4.6)
LYMPHOCYTES NFR BLD: 25 % (ref 13–44)
MCH RBC QN AUTO: 26.9 PG (ref 26.1–32.9)
MCHC RBC AUTO-ENTMCNC: 30.6 G/DL (ref 31.4–35)
MCV RBC AUTO: 88 FL (ref 79.6–97.8)
MONOCYTES # BLD: 0.7 K/UL (ref 0.1–1.3)
MONOCYTES NFR BLD: 8 % (ref 4–12)
NEUTS SEG # BLD: 5 K/UL (ref 1.7–8.2)
NEUTS SEG NFR BLD: 62 % (ref 43–78)
PLATELET # BLD AUTO: 235 K/UL (ref 150–450)
PMV BLD AUTO: 11 FL (ref 10.8–14.1)
POTASSIUM SERPL-SCNC: 4 MMOL/L (ref 3.5–5.1)
RBC # BLD AUTO: 3.42 M/UL (ref 4.23–5.67)
SODIUM SERPL-SCNC: 138 MMOL/L (ref 136–145)
WBC # BLD AUTO: 8 K/UL (ref 4.3–11.1)

## 2018-01-03 PROCEDURE — 80048 BASIC METABOLIC PNL TOTAL CA: CPT | Performed by: INTERNAL MEDICINE

## 2018-01-03 PROCEDURE — 85025 COMPLETE CBC W/AUTO DIFF WBC: CPT | Performed by: INTERNAL MEDICINE

## 2018-01-08 LAB
ALBUMIN SERPL-MCNC: 1.7 G/DL (ref 3.2–4.6)
ALBUMIN/GLOB SERPL: 0.3 {RATIO} (ref 1.2–3.5)
ALP SERPL-CCNC: 94 U/L (ref 50–136)
ALT SERPL-CCNC: 7 U/L (ref 12–65)
ANION GAP SERPL CALC-SCNC: 6 MMOL/L (ref 7–16)
AST SERPL-CCNC: 11 U/L (ref 15–37)
BASOPHILS # BLD: 0 K/UL (ref 0–0.2)
BASOPHILS NFR BLD: 0 % (ref 0–2)
BILIRUB SERPL-MCNC: 0.8 MG/DL (ref 0.2–1.1)
BUN SERPL-MCNC: 16 MG/DL (ref 8–23)
CALCIUM SERPL-MCNC: 7.7 MG/DL (ref 8.3–10.4)
CHLORIDE SERPL-SCNC: 104 MMOL/L (ref 98–107)
CO2 SERPL-SCNC: 30 MMOL/L (ref 21–32)
CREAT SERPL-MCNC: 0.33 MG/DL (ref 0.8–1.5)
CRP SERPL-MCNC: 5.8 MG/DL (ref 0–0.9)
DIFFERENTIAL METHOD BLD: ABNORMAL
EOSINOPHIL # BLD: 0.2 K/UL (ref 0–0.8)
EOSINOPHIL NFR BLD: 3 % (ref 0.5–7.8)
ERYTHROCYTE [DISTWIDTH] IN BLOOD BY AUTOMATED COUNT: 16.9 % (ref 11.9–14.6)
GLOBULIN SER CALC-MCNC: 4.9 G/DL (ref 2.3–3.5)
GLUCOSE SERPL-MCNC: 70 MG/DL (ref 65–100)
HCT VFR BLD AUTO: 29.7 % (ref 41.1–50.3)
HGB BLD-MCNC: 9.2 G/DL (ref 13.6–17.2)
IMM GRANULOCYTES # BLD: 0.1 K/UL (ref 0–0.5)
IMM GRANULOCYTES NFR BLD AUTO: 1 % (ref 0–5)
LYMPHOCYTES # BLD: 2.6 K/UL (ref 0.5–4.6)
LYMPHOCYTES NFR BLD: 36 % (ref 13–44)
MCH RBC QN AUTO: 27.1 PG (ref 26.1–32.9)
MCHC RBC AUTO-ENTMCNC: 31 G/DL (ref 31.4–35)
MCV RBC AUTO: 87.4 FL (ref 79.6–97.8)
MONOCYTES # BLD: 0.6 K/UL (ref 0.1–1.3)
MONOCYTES NFR BLD: 9 % (ref 4–12)
NEUTS SEG # BLD: 3.6 K/UL (ref 1.7–8.2)
NEUTS SEG NFR BLD: 51 % (ref 43–78)
PLATELET # BLD AUTO: 188 K/UL (ref 150–450)
PMV BLD AUTO: 10.4 FL (ref 10.8–14.1)
POTASSIUM SERPL-SCNC: 3.5 MMOL/L (ref 3.5–5.1)
PROT SERPL-MCNC: 6.6 G/DL (ref 6.3–8.2)
RBC # BLD AUTO: 3.4 M/UL (ref 4.23–5.67)
SODIUM SERPL-SCNC: 140 MMOL/L (ref 136–145)
WBC # BLD AUTO: 7 K/UL (ref 4.3–11.1)

## 2018-01-08 PROCEDURE — 80053 COMPREHEN METABOLIC PANEL: CPT | Performed by: INTERNAL MEDICINE

## 2018-01-08 PROCEDURE — 86140 C-REACTIVE PROTEIN: CPT | Performed by: INTERNAL MEDICINE

## 2018-01-08 PROCEDURE — 85025 COMPLETE CBC W/AUTO DIFF WBC: CPT | Performed by: INTERNAL MEDICINE

## 2018-01-12 ENCOUNTER — APPOINTMENT (OUTPATIENT)
Dept: GENERAL RADIOLOGY | Age: 68
End: 2018-01-12
Attending: INTERNAL MEDICINE

## 2018-01-12 LAB
ANION GAP SERPL CALC-SCNC: 9 MMOL/L (ref 7–16)
BUN SERPL-MCNC: 21 MG/DL (ref 8–23)
CALCIUM SERPL-MCNC: 8.3 MG/DL (ref 8.3–10.4)
CHLORIDE SERPL-SCNC: 110 MMOL/L (ref 98–107)
CO2 SERPL-SCNC: 28 MMOL/L (ref 21–32)
CREAT SERPL-MCNC: 0.43 MG/DL (ref 0.8–1.5)
GLUCOSE SERPL-MCNC: 66 MG/DL (ref 65–100)
POTASSIUM SERPL-SCNC: 3.3 MMOL/L (ref 3.5–5.1)
SODIUM SERPL-SCNC: 147 MMOL/L (ref 136–145)

## 2018-01-12 PROCEDURE — 80048 BASIC METABOLIC PNL TOTAL CA: CPT | Performed by: INTERNAL MEDICINE

## 2018-01-12 PROCEDURE — 74018 RADEX ABDOMEN 1 VIEW: CPT

## 2018-01-13 LAB
ANION GAP SERPL CALC-SCNC: 7 MMOL/L (ref 7–16)
BUN SERPL-MCNC: 25 MG/DL (ref 8–23)
CALCIUM SERPL-MCNC: 7.7 MG/DL (ref 8.3–10.4)
CHLORIDE SERPL-SCNC: 109 MMOL/L (ref 98–107)
CO2 SERPL-SCNC: 28 MMOL/L (ref 21–32)
CREAT SERPL-MCNC: 0.53 MG/DL (ref 0.8–1.5)
GLUCOSE SERPL-MCNC: 210 MG/DL (ref 65–100)
POTASSIUM SERPL-SCNC: 3.5 MMOL/L (ref 3.5–5.1)
SODIUM SERPL-SCNC: 144 MMOL/L (ref 136–145)

## 2018-01-13 PROCEDURE — 80048 BASIC METABOLIC PNL TOTAL CA: CPT | Performed by: INTERNAL MEDICINE

## 2018-01-14 LAB
ANION GAP SERPL CALC-SCNC: 6 MMOL/L (ref 7–16)
BUN SERPL-MCNC: 20 MG/DL (ref 8–23)
CALCIUM SERPL-MCNC: 7.5 MG/DL (ref 8.3–10.4)
CHLORIDE SERPL-SCNC: 105 MMOL/L (ref 98–107)
CO2 SERPL-SCNC: 29 MMOL/L (ref 21–32)
CREAT SERPL-MCNC: 0.48 MG/DL (ref 0.8–1.5)
GLUCOSE SERPL-MCNC: 245 MG/DL (ref 65–100)
MAGNESIUM SERPL-MCNC: 1.8 MG/DL (ref 1.8–2.4)
POTASSIUM SERPL-SCNC: 4 MMOL/L (ref 3.5–5.1)
SODIUM SERPL-SCNC: 140 MMOL/L (ref 136–145)

## 2018-01-14 PROCEDURE — 83735 ASSAY OF MAGNESIUM: CPT | Performed by: INTERNAL MEDICINE

## 2018-01-14 PROCEDURE — 80048 BASIC METABOLIC PNL TOTAL CA: CPT | Performed by: INTERNAL MEDICINE

## 2018-01-15 LAB
ALBUMIN SERPL-MCNC: 1.5 G/DL (ref 3.2–4.6)
ALBUMIN/GLOB SERPL: 0.3 {RATIO} (ref 1.2–3.5)
ALP SERPL-CCNC: 94 U/L (ref 50–136)
ALT SERPL-CCNC: 9 U/L (ref 12–65)
ANION GAP SERPL CALC-SCNC: 8 MMOL/L (ref 7–16)
AST SERPL-CCNC: 21 U/L (ref 15–37)
BASOPHILS # BLD: 0 K/UL (ref 0–0.2)
BASOPHILS NFR BLD: 0 % (ref 0–2)
BILIRUB SERPL-MCNC: 0.4 MG/DL (ref 0.2–1.1)
BUN SERPL-MCNC: 17 MG/DL (ref 8–23)
CALCIUM SERPL-MCNC: 7.4 MG/DL (ref 8.3–10.4)
CHLORIDE SERPL-SCNC: 106 MMOL/L (ref 98–107)
CO2 SERPL-SCNC: 24 MMOL/L (ref 21–32)
CREAT SERPL-MCNC: 0.43 MG/DL (ref 0.8–1.5)
CRP SERPL-MCNC: 8.4 MG/DL (ref 0–0.9)
DIFFERENTIAL METHOD BLD: ABNORMAL
EOSINOPHIL # BLD: 0.3 K/UL (ref 0–0.8)
EOSINOPHIL NFR BLD: 3 % (ref 0.5–7.8)
ERYTHROCYTE [DISTWIDTH] IN BLOOD BY AUTOMATED COUNT: 17.4 % (ref 11.9–14.6)
GLOBULIN SER CALC-MCNC: 5 G/DL (ref 2.3–3.5)
GLUCOSE SERPL-MCNC: 215 MG/DL (ref 65–100)
HCT VFR BLD AUTO: 29.4 % (ref 41.1–50.3)
HGB BLD-MCNC: 8.8 G/DL (ref 13.6–17.2)
IMM GRANULOCYTES # BLD: 0.1 K/UL (ref 0–0.5)
IMM GRANULOCYTES NFR BLD AUTO: 1 % (ref 0–5)
LYMPHOCYTES # BLD: 4 K/UL (ref 0.5–4.6)
LYMPHOCYTES NFR BLD: 49 % (ref 13–44)
MCH RBC QN AUTO: 26.3 PG (ref 26.1–32.9)
MCHC RBC AUTO-ENTMCNC: 29.9 G/DL (ref 31.4–35)
MCV RBC AUTO: 87.8 FL (ref 79.6–97.8)
MONOCYTES # BLD: 0.4 K/UL (ref 0.1–1.3)
MONOCYTES NFR BLD: 5 % (ref 4–12)
NEUTS SEG # BLD: 3.5 K/UL (ref 1.7–8.2)
NEUTS SEG NFR BLD: 42 % (ref 43–78)
PLATELET # BLD AUTO: 214 K/UL (ref 150–450)
PMV BLD AUTO: 11.3 FL (ref 10.8–14.1)
POTASSIUM SERPL-SCNC: 4.2 MMOL/L (ref 3.5–5.1)
PROT SERPL-MCNC: 6.5 G/DL (ref 6.3–8.2)
RBC # BLD AUTO: 3.35 M/UL (ref 4.23–5.67)
SODIUM SERPL-SCNC: 138 MMOL/L (ref 136–145)
WBC # BLD AUTO: 8.3 K/UL (ref 4.3–11.1)

## 2018-01-15 PROCEDURE — 86140 C-REACTIVE PROTEIN: CPT | Performed by: INTERNAL MEDICINE

## 2018-01-15 PROCEDURE — 80053 COMPREHEN METABOLIC PANEL: CPT | Performed by: INTERNAL MEDICINE

## 2018-01-15 PROCEDURE — 85025 COMPLETE CBC W/AUTO DIFF WBC: CPT | Performed by: INTERNAL MEDICINE

## 2018-01-16 ENCOUNTER — HOSPITAL ENCOUNTER (OUTPATIENT)
Dept: CT IMAGING | Age: 68
Discharge: HOME | End: 2018-01-16
Attending: RADIOLOGY

## 2018-01-16 LAB — CREAT SERPL-MCNC: 0.33 MG/DL (ref 0.8–1.5)

## 2018-01-16 PROCEDURE — 74011000258 HC RX REV CODE- 258: Performed by: RADIOLOGY

## 2018-01-16 PROCEDURE — 82565 ASSAY OF CREATININE: CPT | Performed by: RADIOLOGY

## 2018-01-16 PROCEDURE — 74160 CT ABDOMEN W/CONTRAST: CPT

## 2018-01-16 PROCEDURE — 74011636320 HC RX REV CODE- 636/320: Performed by: RADIOLOGY

## 2018-01-16 RX ORDER — SODIUM CHLORIDE 0.9 % (FLUSH) 0.9 %
10 SYRINGE (ML) INJECTION
Status: COMPLETED | OUTPATIENT
Start: 2018-01-16 | End: 2018-01-16

## 2018-01-16 RX ADMIN — IOPAMIDOL 100 ML: 755 INJECTION, SOLUTION INTRAVENOUS at 21:35

## 2018-01-16 RX ADMIN — Medication 10 ML: at 21:35

## 2018-01-16 RX ADMIN — SODIUM CHLORIDE 100 ML: 900 INJECTION, SOLUTION INTRAVENOUS at 21:35

## 2018-01-17 ENCOUNTER — HOSPITAL ENCOUNTER (OUTPATIENT)
Dept: INTERVENTIONAL RADIOLOGY/VASCULAR | Age: 68
Discharge: HOME | End: 2018-01-17
Attending: SURGERY

## 2018-01-17 VITALS
HEART RATE: 106 BPM | WEIGHT: 166 LBS | SYSTOLIC BLOOD PRESSURE: 114 MMHG | DIASTOLIC BLOOD PRESSURE: 62 MMHG | BODY MASS INDEX: 23.24 KG/M2 | RESPIRATION RATE: 16 BRPM | HEIGHT: 71 IN | OXYGEN SATURATION: 95 % | TEMPERATURE: 99.6 F

## 2018-01-17 LAB
GLUCOSE BLD STRIP.AUTO-MCNC: 102 MG/DL (ref 65–100)
INR BLD: 1.2 (ref 0.9–1.2)
PT BLD: 14.3 SECS (ref 9.6–11.6)

## 2018-01-17 PROCEDURE — 99152 MOD SED SAME PHYS/QHP 5/>YRS: CPT

## 2018-01-17 PROCEDURE — 85610 PROTHROMBIN TIME: CPT

## 2018-01-17 PROCEDURE — 77030018846 HC SOL IRR STRL H20 ICUM -A

## 2018-01-17 PROCEDURE — 99153 MOD SED SAME PHYS/QHP EA: CPT

## 2018-01-17 PROCEDURE — 74011000250 HC RX REV CODE- 250: Performed by: RADIOLOGY

## 2018-01-17 PROCEDURE — 82962 GLUCOSE BLOOD TEST: CPT

## 2018-01-17 PROCEDURE — C1769 GUIDE WIRE: HCPCS

## 2018-01-17 PROCEDURE — 74011636320 HC RX REV CODE- 636/320: Performed by: RADIOLOGY

## 2018-01-17 PROCEDURE — 74011250636 HC RX REV CODE- 250/636

## 2018-01-17 PROCEDURE — 77030011230 HC DIL VESL COON COOK -B

## 2018-01-17 PROCEDURE — C1894 INTRO/SHEATH, NON-LASER: HCPCS

## 2018-01-17 PROCEDURE — 49440 PLACE GASTROSTOMY TUBE PERC: CPT

## 2018-01-17 PROCEDURE — 77030011229 HC DIL VESL COON COOK -A

## 2018-01-17 PROCEDURE — 74011250636 HC RX REV CODE- 250/636: Performed by: RADIOLOGY

## 2018-01-17 PROCEDURE — 77030005103 HC CATH GASTMY BLN HALY -B

## 2018-01-17 RX ORDER — OXYCODONE HCL 5 MG/5 ML
5 SOLUTION, ORAL ORAL
Status: DISCONTINUED | OUTPATIENT
Start: 2018-01-17 | End: 2018-01-21 | Stop reason: HOSPADM

## 2018-01-17 RX ORDER — LIDOCAINE HYDROCHLORIDE 20 MG/ML
1-20 INJECTION, SOLUTION INFILTRATION; PERINEURAL
Status: DISCONTINUED | OUTPATIENT
Start: 2018-01-17 | End: 2018-01-21 | Stop reason: HOSPADM

## 2018-01-17 RX ORDER — CEFAZOLIN SODIUM IN 0.9 % NACL 2 G/50 ML
2 INTRAVENOUS SOLUTION, PIGGYBACK (ML) INTRAVENOUS ONCE
Status: COMPLETED | OUTPATIENT
Start: 2018-01-17 | End: 2018-01-17

## 2018-01-17 RX ORDER — DIPHENHYDRAMINE HYDROCHLORIDE 50 MG/ML
25-50 INJECTION, SOLUTION INTRAMUSCULAR; INTRAVENOUS ONCE
Status: ACTIVE | OUTPATIENT
Start: 2018-01-17 | End: 2018-01-17

## 2018-01-17 RX ORDER — MIDAZOLAM HYDROCHLORIDE 1 MG/ML
.5-2 INJECTION, SOLUTION INTRAMUSCULAR; INTRAVENOUS
Status: DISCONTINUED | OUTPATIENT
Start: 2018-01-17 | End: 2018-01-21 | Stop reason: HOSPADM

## 2018-01-17 RX ORDER — SODIUM CHLORIDE 9 MG/ML
25 INJECTION, SOLUTION INTRAVENOUS ONCE
Status: COMPLETED | OUTPATIENT
Start: 2018-01-17 | End: 2018-01-17

## 2018-01-17 RX ORDER — FENTANYL CITRATE 50 UG/ML
25-100 INJECTION, SOLUTION INTRAMUSCULAR; INTRAVENOUS
Status: DISCONTINUED | OUTPATIENT
Start: 2018-01-17 | End: 2018-01-21 | Stop reason: HOSPADM

## 2018-01-17 RX ADMIN — FENTANYL CITRATE 50 MCG: 50 INJECTION, SOLUTION INTRAMUSCULAR; INTRAVENOUS at 10:13

## 2018-01-17 RX ADMIN — FENTANYL CITRATE 25 MCG: 50 INJECTION, SOLUTION INTRAMUSCULAR; INTRAVENOUS at 10:27

## 2018-01-17 RX ADMIN — IOPAMIDOL 10 ML: 612 INJECTION, SOLUTION INTRAVENOUS at 10:32

## 2018-01-17 RX ADMIN — MIDAZOLAM HYDROCHLORIDE 1 MG: 1 INJECTION, SOLUTION INTRAMUSCULAR; INTRAVENOUS at 10:15

## 2018-01-17 RX ADMIN — CEFAZOLIN 2 G: 1 INJECTION, POWDER, FOR SOLUTION INTRAMUSCULAR; INTRAVENOUS; PARENTERAL at 09:52

## 2018-01-17 RX ADMIN — LIDOCAINE HYDROCHLORIDE 200 MG: 20 INJECTION, SOLUTION INFILTRATION; PERINEURAL at 10:20

## 2018-01-17 RX ADMIN — MIDAZOLAM HYDROCHLORIDE 0.5 MG: 1 INJECTION, SOLUTION INTRAMUSCULAR; INTRAVENOUS at 10:27

## 2018-01-17 RX ADMIN — SODIUM CHLORIDE 25 ML/HR: 900 INJECTION, SOLUTION INTRAVENOUS at 10:13

## 2018-01-17 NOTE — PROCEDURES
Department of Interventional Radiology  [phone]        Interventional Radiology Brief Procedure Note    Patient: Stefan Lamar MRN: [id number]  SSN: [id number]    YOB: 1950  Age: 79 y.o. Sex: male      Date of Procedure: 1/17/2018    Pre-Procedure Diagnosis: Protein Calorie Malnutrition. Post-Procedure Diagnosis: SAME    Procedure(s): Gastrostomy Feeding Tube placement. Brief Description of Procedure: as aobve. Performed By: Anila Bucio MD     Assistants: None    Anesthesia:Moderate Sedation    Estimated Blood Loss: None    Specimens: None    Implants: See Above    Findings: Double barrel colostomy in the central epigastric region. Complications: None    Recommendations: G-Tube is ready to use. NG removed. Follow Up: Exchange in 4 months.       Signed By: Anila Bucio MD     January 17, 2018

## 2018-01-17 NOTE — DISCHARGE INSTRUCTIONS
[address]  Department of Interventional Radiology  [phone] Office  [phone] Fax       FEEDING TUBE DISCHARGE INSTRUCTIONS    General Information:      Your doctor has asked us to put a feeding tube in your abdomen. The tube can be used to give you medications or supplemental nutrition. Home Care Instructions: You can resume your regular diet and medication regimen. Do not drink alcohol, drive, or make any important legal decisions in the next 24 hours. Do not lift anything heavier than a gallon of milk, or do anything strenuous for the next 24 hours. You will notice a dressing on your abdomen. Keep the site covered for 24-48 hours. Do no immerse yourself in water while the tube is in place. You will be asked to flush the feeding tube through the gastric and/or jejunal port daily after the first two days. You will flush the  port with 20ml of tap water. You must also flush the tube before and after any medication administration. ALWAYS check with your physician or pharmacist before crushing or dissolving medications. Clean around the tube with warm water or hydrogen peroxide diluted with warm water using a Q-tip. Always rinse the area with warm water after cleaning and pat dry. Call If:     You should call your Physician and/or the Radiology Nurse if you have any bleeding other than a small spot on your bandage. Call if you have any signs of infection including fever, swelling, or increased pain at the site. Call if you have any questions. Follow-Up Instructions: Please see your ordering doctor as he/she has requested. To Reach Us: If you have any questions about your procedure, please call the Interventional Radiology department at [phone]. After business hours (5pm) and weekends, call the answering service at [phone] and ask for the Radiologist on call to be paged.      Interventional Radiology General Nurse Discharge    After general anesthesia or intravenous sedation, for 24 hours or while taking prescription Narcotics:  · Limit your activities  · Do not drive and operate hazardous machinery  · Do not make important personal or business decisions  · Do  not drink alcoholic beverages  · If you have not urinated within 8 hours after discharge, please contact your surgeon on call. * Please give a list of your current medications to your Primary Care Provider. * Please update this list whenever your medications are discontinued, doses are     changed, or new medications (including over-the-counter products) are added. * Please carry medication information at all times in case of emergency situations. These are general instructions for a healthy lifestyle:    No smoking/ No tobacco products/ Avoid exposure to second hand smoke  Surgeon General's Warning:  Quitting smoking now greatly reduces serious risk to your health. Obesity, smoking, and sedentary lifestyle greatly increases your risk for illness  A healthy diet, regular physical exercise & weight monitoring are important for maintaining a healthy lifestyle    You may be retaining fluid if you have a history of heart failure or if you experience any of the following symptoms:  Weight gain of 3 pounds or more overnight or 5 pounds in a week, increased swelling in our hands or feet or shortness of breath while lying flat in bed. Please call your doctor as soon as you notice any of these symptoms; do not wait until your next office visit. Recognize signs and symptoms of STROKE:  F-face looks uneven    A-arms unable to move or move unevenly    S-speech slurred or non-existent    T-time-call 911 as soon as signs and symptoms begin-DO NOT go       Back to bed or wait to see if you get better-TIME IS BRAIN.

## 2018-01-17 NOTE — PROGRESS NOTES
IR Nurse Pre-Procedure Checklist Part 2          Consent signed: Yes    H&P complete:  Yes    Antibiotics: Not applicable    Airway/Mallampati Done: Yes    Shaved: Yes    Pregnancy Form:Not applicable    Patient Position: Yes    MD Side: Yes     Biopsy Worksheet: Not applicable    Specimen Medium: Not applicable

## 2018-01-17 NOTE — PROGRESS NOTES
TRANSFER - OUT REPORT:    Verbal report given to Stacia Helton RN on [facility]  being transferred to IR recovery for routine progression of care       Report consisted of patients Situation, Background, Assessment and   Recommendations(SBAR). Information from the following report(s) Procedure Summary and MAR was reviewed with the receiving nurse. Opportunity for questions and clarification was provided. Conscious Sedation:   75 Mcg of Fentanyl administered  1.5 Mg of Versed administered    Pt tolerated procedure well.      Visit Vitals    /72    Pulse (!) 104    Temp 99.6 °F (37.6 °C)    Resp 16    Ht 5' 11\" (1.803 m)    Wt 75.3 kg (166 lb)    SpO2 95%    BMI 23.15 kg/m2     Past Medical History:   Diagnosis Date    Diabetes ([facility] [address]                     PEG/Gastrostomy Tube 01/17/18 (Active)

## 2018-01-17 NOTE — PROGRESS NOTES
TRANSFER - OUT REPORT:    Verbal report given to Fiorella Mckeon RN on [facility]  being transferred to [facility] for routine progression of care       Report consisted of patients Situation, Background, Assessment and   Recommendations(SBAR). Information from the following report(s) Procedure Summary and MAR was reviewed with the receiving nurse. Lines:       Opportunity for questions and clarification was provided.       Patient transported with:   [employer]

## 2018-01-17 NOTE — IP AVS SNAPSHOT
Summary of Care Report The Summary of Care report has been created to help improve care coordination. Users with access to [facility] or [address] (Web-based application) may access additional patient information including the Discharge Summary. If you are not currently a [address] user and need more information, please call the number listed below in the [place] [address] section and ask to be connected with Medical Records. Facility Information Name Address Phone [address] [address] [address] [phone] Patient Information Patient Name Sex MAKAYLA Leblanc ([id number]) Male 1950 Discharge Information Admitting Provider Service Area Unit  
 (none) [address] [facility],[address] Floor Radiology Specials / [phone] Discharge Provider Discharge Date/Time Discharge Disposition Destination (none) (none) (none) (none) Patient Language Language ENGLISH [13] Hospital Problems as of 2018  Reviewed: 2017  1:04 PM by Felix Smiley MD  
 None Non-Hospital Problems as of 2018  Reviewed: 2017  1:04 PM by Felix Smiley MD  
  
  
  
 Class Noted - Resolved Last Modified Active Problems Subdural hematoma ([facility] [address]  2017 - Present 2017 by Alex Hamilton MD  
  Entered by [employer], DO Type II diabetes mellitus with complication ([facility] [address] (Chronic)  2017 - Present 12/15/2017 by Junior Armas NP Entered by [employer], DO Fasciitis  2017 - Present 2017 by Kandice Cleary MD  
  Entered by [employer] Megan, DO Shima gangrene  2017 - Present 2017 by Kandice Cleary MD  
  Entered by KASSI Gunter  2017 - Present 2017 by Gudelia Davis MD  
  Entered by Gudelia Davis MD  
  
You are allergic to the following No active allergies Current Discharge Medication List  
  
ASK your doctor about these medications Dose & Instructions Dispensing Information Comments  
 albuterol 2.5 mg /3 mL (0.083 %) nebulizer solution Commonly known as:  PROVENTIL VENTOLIN Dose:  2.5 mg  
3 mL by Nebulization route every four (4) hours as needed for Wheezing. Quantity:  1 Each Refills:  0 Blood-Glucose Meter monitoring kit To check blood sugars 3 times per day - In AM and 2h after a meal. Please provide adequate supplies for 30 days ( lancets, strips, etc) as approved by his insurance Quantity:  1 Kit Refills:  0  
   
 guaiFENesin 1,200 mg Ta12 ER tablet Commonly known as:  Quentin & Quentin Dose:  1200 mg Take 1 Tab by mouth every twelve (12) hours. Quantity:  1 Tab Refills:  0  
   
 insulin aspart 100 unit/mL Inpn Commonly known as:  Beba Dy Dose:  5 Units 5 Units by SubCUTAneous route Before breakfast, lunch, and dinner for 30 days. Quantity:  4.5 mL Refills:  0  
   
 insulin glargine 100 unit/mL injection Commonly known as:  LANTUS Dose:  20 Units 20 Units by SubCUTAneous route daily for 30 days. Quantity:  6 mL Refills:  0  
   
 multivitamin tablet Commonly known as:  ONE A DAY Dose:  1 Tab Take 1 Tab by mouth daily. Refills:  0  
   
 pantoprazole 40 mg tablet Commonly known as:  PROTONIX Dose:  40 mg Take 1 Tab by mouth Daily (before breakfast). Quantity:  14 Tab Refills:  0  
   
 sodium hypochlorite 0.125 % Soln external solution Commonly known as:  [address] Dose:  1 Bottle Apply 473 mL to affected area daily. Quantity:  1 Bottle Refills:  0 Current Immunizations Name Date  
 TB Skin Test (PPD) Intradermal 12/19/2017 Surgery Information ID Date/Time Status Primary Surgeon All Procedures Location  [id number] 1/17/2018 Unposted   [facility] IR OR-DO NOT SCHEDULE    
  
 Follow-up Information None Discharge Instructions [address] [facility] [address] Department of Interventional Radiology 
[phone] Office 
[phone] Fax FEEDING TUBE DISCHARGE INSTRUCTIONS General Information: 
    Your doctor has asked us to put a feeding tube in your abdomen. The tube can be used to give you medications or supplemental nutrition. Home Care Instructions: You can resume your regular diet and medication regimen. Do not drink alcohol, drive, or make any important legal decisions in the next 24 hours. Do not lift anything heavier than a gallon of milk, or do anything strenuous for the next 24 hours. You will notice a dressing on your abdomen. Keep the site covered for 24-48 hours. Do no immerse yourself in water while the tube is in place. You will be asked to flush the feeding tube through the gastric and/or jejunal port daily after the first two days. You will flush the  port with 20ml of tap water. You must also flush the tube before and after any medication administration. ALWAYS check with your physician or pharmacist before crushing or dissolving medications. Clean around the tube with warm water or hydrogen peroxide diluted with warm water using a Q-tip. Always rinse the area with warm water after cleaning and pat dry. Call If: 
   You should call your Physician and/or the Radiology Nurse if you have any bleeding other than a small spot on your bandage. Call if you have any signs of infection including fever, swelling, or increased pain at the site. Call if you have any questions. Follow-Up Instructions: Please see your ordering doctor as he/she has requested. To Reach Us: If you have any questions about your procedure, please call the Interventional Radiology department at [phone].  After business hours (5pm) and weekends, call the answering service at [phone] and ask for the Radiologist on call to be paged. Interventional Radiology General Nurse Discharge After general anesthesia or intravenous sedation, for 24 hours or while taking prescription Narcotics: · Limit your activities · Do not drive and operate hazardous machinery · Do not make important personal or business decisions · Do  not drink alcoholic beverages · If you have not urinated within 8 hours after discharge, please contact your surgeon on call. * Please give a list of your current medications to your Primary Care Provider. * Please update this list whenever your medications are discontinued, doses are 
   changed, or new medications (including over-the-counter products) are added. * Please carry medication information at all times in case of emergency situations. These are general instructions for a healthy lifestyle: No smoking/ No tobacco products/ Avoid exposure to second hand smoke Surgeon General's Warning:  Quitting smoking now greatly reduces serious risk to your health. Obesity, smoking, and sedentary lifestyle greatly increases your risk for illness A healthy diet, regular physical exercise & weight monitoring are important for maintaining a healthy lifestyle You may be retaining fluid if you have a history of heart failure or if you experience any of the following symptoms:  Weight gain of 3 pounds or more overnight or 5 pounds in a week, increased swelling in our hands or feet or shortness of breath while lying flat in bed. Please call your doctor as soon as you notice any of these symptoms; do not wait until your next office visit. Recognize signs and symptoms of STROKE: 
F-face looks uneven A-arms unable to move or move unevenly S-speech slurred or non-existent T-time-call 911 as soon as signs and symptoms begin-DO NOT go Back to bed or wait to see if you get better-TIME IS BRAIN. Chart Review Routing History Recipient Method Report Sent By Vann Jesus Marrie Sacks, DO Fax: [phone] Phone: [phone] Fax IP Auto Routed [address] [facility],[address] [[id number]] 12/3/2017 10:09 AM 12/03/2017 Hair Cunha MD  
Phone: [phone] In Basket IP Auto Routed [address] [facility] [address] [[id number]] 12/3/2017 10:09 AM 12/03/2017 Marrie Sacks, DO Fax: [phone] Phone: [phone] Fax IP Auto Routed [address] [facility] [address] [[id number]] 12/4/2017  8:13 AM 12/04/2017 Hair Cunha MD  
Phone: [phone] In Basket IP Auto Routed [address] [facility],Velasquez Nicholas MD [[id number]] 12/4/2017  8:13 AM 12/04/2017

## 2018-01-17 NOTE — H&P
Department of Interventional Radiology  [phone]    History and Physical    Patient:  Hannah Driver MRN:  [id number]  SSN:  [id number]    YOB: 1950  Age:  79 y.o. Sex:  male      Primary Care Provider:  None  Referring Physician:  Wendy Quiles MD    Subjective:     Chief Complaint: malnutrition    History of the Present Illness: The patient is a 79 y.o. male who presents for feeding tube placement. Originally presented to [facility] with syncopal episode. Found to have necrotizing fasciitis. S/p multiple debridement surgeries and diverting colostomy. Also with recent septic shock acute respiratory failure, HAP, LAUREANO, dysphagia. DM. Remote smoking hx. NPO. Past Medical History:   Diagnosis Date    Diabetes ([facility] [address]      No past surgical history on file. Review of Systems:    Pertinent items are noted in the History of Present Illness. Current Outpatient Prescriptions   Medication Sig    albuterol (PROVENTIL VENTOLIN) 2.5 mg /3 mL (0.083 %) nebulizer solution 3 mL by Nebulization route every four (4) hours as needed for Wheezing.  guaiFENesin ER (MUCINEX) 1,200 mg Ta12 ER tablet Take 1 Tab by mouth every twelve (12) hours.  pantoprazole (PROTONIX) 40 mg tablet Take 1 Tab by mouth Daily (before breakfast).  sodium hypochlorite (QUARTER STRENGTH DAKIN'S) 0.125 % soln external solution Apply 473 mL to affected area daily.  insulin aspart (NOVOLOG) 100 unit/mL inpn 5 Units by SubCUTAneous route Before breakfast, lunch, and dinner for 30 days.  Blood-Glucose Meter monitoring kit To check blood sugars 3 times per day - In AM and 2h after a meal. Please provide adequate supplies for 30 days ( lancets, strips, etc) as approved by his insurance    insulin glargine (LANTUS) 100 unit/mL injection 20 Units by SubCUTAneous route daily for 30 days.  multivitamin (ONE A DAY) tablet Take 1 Tab by mouth daily.      Current Facility-Administered Medications   Medication Dose Route Frequency    lidocaine (XYLOCAINE) 20 mg/mL (2 %) injection  mg  1-20 mL SubCUTAneous Multiple    0.9% sodium chloride infusion  25 mL/hr IntraVENous ONCE    diphenhydrAMINE (BENADRYL) injection 25-50 mg  25-50 mg IntraVENous ONCE    fentaNYL citrate (PF) injection  mcg   mcg IntraVENous Multiple    midazolam (VERSED) injection 0.5-2 mg  0.5-2 mg IntraVENous Rad Multiple    iopamidol (ISOVUE 300) 61 % contrast injection 1-300 mL  1-300 mL IntraVENous RAD ONCE        No Known Allergies    No family history on file. Social History   Substance Use Topics    Smoking status: Former Smoker    Smokeless tobacco: Not on file    Alcohol use Not on file        Objective:       Physical Examination:    Vitals:    01/17/18 0822   BP: 118/64   Pulse: (!) 102   Resp: 18   Temp: 99.6 °F (37.6 °C)   SpO2: 93%   Weight: 75.3 kg (166 lb)   Height: 5' 11\" (1.803 m)     Blood pressure 118/64, pulse (!) 102, temperature 99.6 °F (37.6 °C), resp. rate 18, height 5' 11\" (1.803 m), weight 75.3 kg (166 lb), SpO2 93 %.    Gen: awake, alert  HEART: regular rate and rhythm  LUNG: clear to auscultation bilaterally  ABDOMEN: soft, nontender, ostomy  EXTREMITIES: warm, wasted    Laboratory:     Lab Results   Component Value Date/Time    Sodium 138 01/15/2018 06:00 AM    Sodium 140 01/14/2018 06:00 AM    Potassium 4.2 01/15/2018 06:00 AM    Potassium 4.0 01/14/2018 06:00 AM    Chloride 106 01/15/2018 06:00 AM    Chloride 105 01/14/2018 06:00 AM    CO2 24 01/15/2018 06:00 AM    CO2 29 01/14/2018 06:00 AM    Anion gap 8 01/15/2018 06:00 AM    Anion gap 6 01/14/2018 06:00 AM    Glucose 215 01/15/2018 06:00 AM    Glucose 245 01/14/2018 06:00 AM    BUN 17 01/15/2018 06:00 AM    BUN 20 01/14/2018 06:00 AM    Creatinine 0.33 01/16/2018 06:50 PM    Creatinine 0.43 01/15/2018 06:00 AM    GFR est AA >60 01/15/2018 06:00 AM    GFR est AA >60 01/14/2018 06:00 AM    GFR est non-AA >60 01/15/2018 06:00 AM    GFR est non-AA >60 01/14/2018 06:00 AM    Calcium 7.4 01/15/2018 06:00 AM    Calcium 7.5 01/14/2018 06:00 AM    Magnesium 1.8 01/14/2018 06:00 AM    Magnesium 2.1 12/30/2017 06:00 AM    Phosphorus 2.1 12/30/2017 06:00 AM    Phosphorus 2.2 12/29/2017 04:08 AM    Albumin 1.5 01/15/2018 06:00 AM    Albumin 1.7 01/08/2018 06:00 AM    Protein, total 6.5 01/15/2018 06:00 AM    Protein, total 6.6 01/08/2018 06:00 AM    Globulin 5.0 01/15/2018 06:00 AM    Globulin 4.9 01/08/2018 06:00 AM    A-G Ratio 0.3 01/15/2018 06:00 AM    A-G Ratio 0.3 01/08/2018 06:00 AM    AST (SGOT) 21 01/15/2018 06:00 AM    AST (SGOT) 11 01/08/2018 06:00 AM    ALT (SGPT) 9 01/15/2018 06:00 AM    ALT (SGPT) 7 01/08/2018 06:00 AM     Lab Results   Component Value Date/Time    WBC 8.3 01/15/2018 06:00 AM    WBC 7.0 01/08/2018 06:00 AM    HGB 8.8 01/15/2018 06:00 AM    HGB 9.2 01/08/2018 06:00 AM    HCT 29.4 01/15/2018 06:00 AM    HCT 29.7 01/08/2018 06:00 AM    PLATELET 315 53/94/1861 06:00 AM    PLATELET 938 47/70/5584 06:00 AM     No results found for: APTT, PTP, INR    Assessment:     Dysphagia, malnutrition    Plan:     Planned Procedure:  G-tube placement    Risks, benefits, and alternatives reviewed with patient and he agrees to proceed with the procedure.       Signed By: Chhaya Brar PA-C     January 17, 2018

## 2018-01-17 NOTE — IP AVS SNAPSHOT
[facility] Narendra 
 
 
 [address] [facility] 
[phone] Patient: Clayton Crawford 
MRN: [id number] DCM:[id number] About your hospitalization You were admitted on:  January 17, 2018 You last received care in the:  [facility] Radiology Specials You were discharged on:  January 17, 2018 Why you were hospitalized Your primary diagnosis was:  Not on File Follow-up Information None Your Scheduled Appointments Wednesday January 17, 2018 11:00 AM EST  
IR INSERT GASTRO TUBE PERC with SFD IR UNIT 2, SFD IR ANES NOT REQUIRED, SFD IR PA RESOURCE 2  
D Radiology Specials ([address] [place] [address]) [address] [facility]  
[phone] Interventional Radiology Procedure Referring Physicians: 1) Fax H&P/recent office notes and lab work, no older than 30 days (CBC, BMP, PT/PTT) to Interventional Radiology to facilitate prompt scheduling. 2)Patients with contrast dye allergies must be pre medicated prior to arrival. 3) Obtain clearance to hold blood thinners from prescribing Physician and give Patient instructions prior to arrival. 4)Hold oral diabetic medications the day of the procedure. If Insulin is required, take 1/2 dose the day of the procedure. 5) Pt should not eat or drink anything past midnight 6) Pt to arrive 1 hour to 1.5 hours early depending upon sedation method. 7) Responsible adult  required to drive Patient home after recovery period. Recovery period can vary depending on sedation and patient condition. 8) Requires approval from Radiologist prior to scheduling 9) Interventional Radiology Scheduling can be contacted at [phone] Discharge Orders None A check hamilton indicates which time of day the medication should be taken. My Medications ASK your doctor about these medications Instructions Each Dose to Equal  
 Morning Noon Evening Bedtime albuterol 2.5 mg /3 mL (0.083 %) nebulizer solution Commonly known as:  PROVENTIL VENTOLIN Your last dose was: Your next dose is:    
   
   
 3 mL by Nebulization route every four (4) hours as needed for Wheezing. 2.5 mg Blood-Glucose Meter monitoring kit Your last dose was: Your next dose is: To check blood sugars 3 times per day - In AM and 2h after a meal. Please provide adequate supplies for 30 days ( lancets, strips, etc) as approved by his insurance  
     
   
   
   
  
 guaiFENesin 1,200 mg Ta12 ER tablet Commonly known as:  Quentin & Quentin Your last dose was: Your next dose is: Take 1 Tab by mouth every twelve (12) hours. 1200 mg  
    
   
   
   
  
 insulin aspart 100 unit/mL Inpn Commonly known as:  Estuardo Danas Your last dose was: Your next dose is:    
   
   
 5 Units by SubCUTAneous route Before breakfast, lunch, and dinner for 30 days. 5 Units  
    
   
   
   
  
 insulin glargine 100 unit/mL injection Commonly known as:  LANTUS Your last dose was: Your next dose is:    
   
   
 20 Units by SubCUTAneous route daily for 30 days. 20 Units  
    
   
   
   
  
 multivitamin tablet Commonly known as:  ONE A DAY Your last dose was: Your next dose is: Take 1 Tab by mouth daily. 1 Tab  
    
   
   
   
  
 pantoprazole 40 mg tablet Commonly known as:  PROTONIX Your last dose was: Your next dose is: Take 1 Tab by mouth Daily (before breakfast). 40 mg  
    
   
   
   
  
 sodium hypochlorite 0.125 % Soln external solution Commonly known as:  [address] Your last dose was: Your next dose is:    
   
   
 Apply 473 mL to affected area daily. 1 Bottle Discharge Instructions [facility] [address] Department of Interventional Radiology 
[phone] Office 
[phone] Fax FEEDING TUBE DISCHARGE INSTRUCTIONS General Information: 
    Your doctor has asked us to put a feeding tube in your abdomen. The tube can be used to give you medications or supplemental nutrition. Home Care Instructions: You can resume your regular diet and medication regimen. Do not drink alcohol, drive, or make any important legal decisions in the next 24 hours. Do not lift anything heavier than a gallon of milk, or do anything strenuous for the next 24 hours. You will notice a dressing on your abdomen. Keep the site covered for 24-48 hours. Do no immerse yourself in water while the tube is in place. You will be asked to flush the feeding tube through the gastric and/or jejunal port daily after the first two days. You will flush the  port with 20ml of tap water. You must also flush the tube before and after any medication administration. ALWAYS check with your physician or pharmacist before crushing or dissolving medications. Clean around the tube with warm water or hydrogen peroxide diluted with warm water using a Q-tip. Always rinse the area with warm water after cleaning and pat dry. Call If: 
   You should call your Physician and/or the Radiology Nurse if you have any bleeding other than a small spot on your bandage. Call if you have any signs of infection including fever, swelling, or increased pain at the site. Call if you have any questions. Follow-Up Instructions: Please see your ordering doctor as he/she has requested. To Reach Us: If you have any questions about your procedure, please call the Interventional Radiology department at [phone]. After business hours (5pm) and weekends, call the answering service at [phone] and ask for the Radiologist on call to be paged. Interventional Radiology General Nurse Discharge After general anesthesia or intravenous sedation, for 24 hours or while taking prescription Narcotics: · Limit your activities · Do not drive and operate hazardous machinery · Do not make important personal or business decisions · Do  not drink alcoholic beverages · If you have not urinated within 8 hours after discharge, please contact your surgeon on call. * Please give a list of your current medications to your Primary Care Provider. * Please update this list whenever your medications are discontinued, doses are 
   changed, or new medications (including over-the-counter products) are added. * Please carry medication information at all times in case of emergency situations. These are general instructions for a healthy lifestyle: No smoking/ No tobacco products/ Avoid exposure to second hand smoke Surgeon General's Warning:  Quitting smoking now greatly reduces serious risk to your health. Obesity, smoking, and sedentary lifestyle greatly increases your risk for illness A healthy diet, regular physical exercise & weight monitoring are important for maintaining a healthy lifestyle You may be retaining fluid if you have a history of heart failure or if you experience any of the following symptoms:  Weight gain of 3 pounds or more overnight or 5 pounds in a week, increased swelling in our hands or feet or shortness of breath while lying flat in bed. Please call your doctor as soon as you notice any of these symptoms; do not wait until your next office visit. Recognize signs and symptoms of STROKE: 
F-face looks uneven A-arms unable to move or move unevenly S-speech slurred or non-existent T-time-call 911 as soon as signs and symptoms begin-DO NOT go Back to bed or wait to see if you get better-TIME IS BRAIN. Introducing [facility]!    
 Jabier Musa introduces [employer] patient portal. Now you can access parts of your medical record, email your doctor's office, and request medication refills online. 1. In your internet browser, go to [email or web address] 2. Click on the First Time User? Click Here link in the Sign In box. You will see the New Member Sign Up page. 3. Enter your [email or web address] Access Code exactly as it appears below. You will not need to use this code after youve completed the sign-up process. If you do not sign up before the expiration date, you must request a new code. · [email or web address] Access Code: [email or web address] Expires: 3/1/2018  3:52 PM 
 
4. Enter the last four digits of your Social Security Number (xxxx) and Date of Birth (mm/dd/yyyy) as indicated and click Submit. You will be taken to the next sign-up page. 5. Create a [email or web address] ID. This will be your [email or web address] login ID and cannot be changed, so think of one that is secure and easy to remember. 6. Create a [email or web address] password. You can change your password at any time. 7. Enter your Password Reset Question and Answer. This can be used at a later time if you forget your password. 8. Enter your e-mail address. You will receive e-mail notification when new information is available in [address] 9. Click Sign Up. You can now view and download portions of your medical record. 10. Click the Download Summary menu link to download a portable copy of your medical information. If you have questions, please visit the Frequently Asked Questions section of the [email or web address] website. Remember, [email or web address] is NOT to be used for urgent needs. For medical emergencies, dial 911. Now available from your iPhone and Android! Providers Seen During Your Hospitalization Provider Specialty Primary office phone Evon Herrmann MD General Surgery [phone] Your Primary Care Physician (PCP) Primary Care Physician Office Phone Office Fax NONE ** None ** ** None ** You are allergic to the following No active allergies Recent Documentation Height Weight BMI Smoking Status 1.803 m 75.3 kg 23.15 kg/m2 Former Smoker Emergency Contacts Name Discharge Info Relation Home Work Mobile Gutierrez Welsh  Daughter [21] [phone] Patient Belongings The following personal items are in your possession at time of discharge: 
                             
 
  
  
 Please provide this summary of care documentation to your next provider. Signatures-by signing, you are acknowledging that this After Visit Summary has been reviewed with you and you have received a copy. Patient Signature:  ____________________________________________________________ Date:  ____________________________________________________________  
  
[facility] Provider Signature:  ____________________________________________________________ Date:  ____________________________________________________________

## 2018-01-20 LAB
ANION GAP SERPL CALC-SCNC: 9 MMOL/L (ref 7–16)
BASOPHILS # BLD: 0 K/UL (ref 0–0.2)
BASOPHILS NFR BLD: 0 % (ref 0–2)
BUN SERPL-MCNC: 16 MG/DL (ref 8–23)
CALCIUM SERPL-MCNC: 8.1 MG/DL (ref 8.3–10.4)
CHLORIDE SERPL-SCNC: 102 MMOL/L (ref 98–107)
CO2 SERPL-SCNC: 26 MMOL/L (ref 21–32)
CREAT SERPL-MCNC: 0.38 MG/DL (ref 0.8–1.5)
DIFFERENTIAL METHOD BLD: ABNORMAL
EOSINOPHIL # BLD: 0.2 K/UL (ref 0–0.8)
EOSINOPHIL NFR BLD: 2 % (ref 0.5–7.8)
ERYTHROCYTE [DISTWIDTH] IN BLOOD BY AUTOMATED COUNT: 18 % (ref 11.9–14.6)
GLUCOSE SERPL-MCNC: 178 MG/DL (ref 65–100)
HCT VFR BLD AUTO: 27.7 % (ref 41.1–50.3)
HGB BLD-MCNC: 8.2 G/DL (ref 13.6–17.2)
IMM GRANULOCYTES # BLD: 0.3 K/UL (ref 0–0.5)
IMM GRANULOCYTES NFR BLD AUTO: 2 % (ref 0–5)
LYMPHOCYTES # BLD: 3.9 K/UL (ref 0.5–4.6)
LYMPHOCYTES NFR BLD: 36 % (ref 13–44)
MCH RBC QN AUTO: 25.7 PG (ref 26.1–32.9)
MCHC RBC AUTO-ENTMCNC: 29.6 G/DL (ref 31.4–35)
MCV RBC AUTO: 86.8 FL (ref 79.6–97.8)
MONOCYTES # BLD: 0.9 K/UL (ref 0.1–1.3)
MONOCYTES NFR BLD: 8 % (ref 4–12)
NEUTS SEG # BLD: 5.5 K/UL (ref 1.7–8.2)
NEUTS SEG NFR BLD: 52 % (ref 43–78)
PLATELET # BLD AUTO: 363 K/UL (ref 150–450)
PMV BLD AUTO: 11.2 FL (ref 10.8–14.1)
POTASSIUM SERPL-SCNC: 4.1 MMOL/L (ref 3.5–5.1)
RBC # BLD AUTO: 3.19 M/UL (ref 4.23–5.67)
SODIUM SERPL-SCNC: 137 MMOL/L (ref 136–145)
WBC # BLD AUTO: 10.8 K/UL (ref 4.3–11.1)

## 2018-01-20 PROCEDURE — 85025 COMPLETE CBC W/AUTO DIFF WBC: CPT | Performed by: INTERNAL MEDICINE

## 2018-01-20 PROCEDURE — 80048 BASIC METABOLIC PNL TOTAL CA: CPT | Performed by: INTERNAL MEDICINE

## 2018-01-22 LAB
ALBUMIN SERPL-MCNC: 1.5 G/DL (ref 3.2–4.6)
ALBUMIN/GLOB SERPL: 0.3 {RATIO} (ref 1.2–3.5)
ALP SERPL-CCNC: 122 U/L (ref 50–136)
ALT SERPL-CCNC: 24 U/L (ref 12–65)
ANION GAP SERPL CALC-SCNC: 8 MMOL/L (ref 7–16)
AST SERPL-CCNC: 26 U/L (ref 15–37)
BASOPHILS # BLD: 0.1 K/UL (ref 0–0.2)
BASOPHILS NFR BLD MANUAL: 1 % (ref 0–2)
BILIRUB SERPL-MCNC: 0.3 MG/DL (ref 0.2–1.1)
BUN SERPL-MCNC: 17 MG/DL (ref 8–23)
CALCIUM SERPL-MCNC: 8.9 MG/DL (ref 8.3–10.4)
CHLORIDE SERPL-SCNC: 101 MMOL/L (ref 98–107)
CO2 SERPL-SCNC: 28 MMOL/L (ref 21–32)
CREAT SERPL-MCNC: 0.36 MG/DL (ref 0.8–1.5)
CRP SERPL-MCNC: 10.7 MG/DL (ref 0–0.9)
DIFFERENTIAL METHOD BLD: ABNORMAL
EOSINOPHIL # BLD: 0.2 K/UL (ref 0–0.8)
EOSINOPHIL NFR BLD MANUAL: 2 % (ref 1–8)
ERYTHROCYTE [DISTWIDTH] IN BLOOD BY AUTOMATED COUNT: 17.8 % (ref 11.9–14.6)
GLOBULIN SER CALC-MCNC: 5.4 G/DL (ref 2.3–3.5)
GLUCOSE SERPL-MCNC: 134 MG/DL (ref 65–100)
HCT VFR BLD AUTO: 26 % (ref 41.1–50.3)
HGB BLD-MCNC: 7.8 G/DL (ref 13.6–17.2)
LYMPHOCYTES # BLD: 4.3 K/UL (ref 0.5–4.6)
LYMPHOCYTES NFR BLD MANUAL: 37 % (ref 16–44)
MCH RBC QN AUTO: 26 PG (ref 26.1–32.9)
MCHC RBC AUTO-ENTMCNC: 30 G/DL (ref 31.4–35)
MCV RBC AUTO: 86.7 FL (ref 79.6–97.8)
METAMYELOCYTES NFR BLD MANUAL: 5 %
MONOCYTES # BLD: 0.6 K/UL (ref 0.1–1.3)
MONOCYTES NFR BLD MANUAL: 5 % (ref 3–9)
MYELOCYTES NFR BLD MANUAL: 3 %
NEUTS BAND NFR BLD MANUAL: 4 % (ref 0–10)
NEUTS SEG # BLD: 6.3 K/UL (ref 1.7–8.2)
NEUTS SEG NFR BLD MANUAL: 43 % (ref 47–75)
PLATELET # BLD AUTO: 431 K/UL (ref 150–450)
PLATELET COMMENTS,PCOM: ADEQUATE
PMV BLD AUTO: 11.1 FL (ref 10.8–14.1)
POTASSIUM SERPL-SCNC: 4 MMOL/L (ref 3.5–5.1)
PROT SERPL-MCNC: 6.9 G/DL (ref 6.3–8.2)
RBC # BLD AUTO: 3 M/UL (ref 4.23–5.67)
RBC MORPH BLD: ABNORMAL
SODIUM SERPL-SCNC: 137 MMOL/L (ref 136–145)
WBC # BLD AUTO: 11.5 K/UL (ref 4.3–11.1)
WBC MORPH BLD: ABNORMAL

## 2018-01-22 PROCEDURE — 86140 C-REACTIVE PROTEIN: CPT | Performed by: INTERNAL MEDICINE

## 2018-01-22 PROCEDURE — 85025 COMPLETE CBC W/AUTO DIFF WBC: CPT | Performed by: INTERNAL MEDICINE

## 2018-01-22 PROCEDURE — 80053 COMPREHEN METABOLIC PANEL: CPT | Performed by: INTERNAL MEDICINE

## 2018-01-30 ENCOUNTER — APPOINTMENT (OUTPATIENT)
Dept: CT IMAGING | Age: 68
DRG: 871 | End: 2018-01-30
Attending: STUDENT IN AN ORGANIZED HEALTH CARE EDUCATION/TRAINING PROGRAM
Payer: MEDICARE

## 2018-01-30 ENCOUNTER — HOSPITAL ENCOUNTER (INPATIENT)
Age: 68
LOS: 4 days | Discharge: NURSING, REHAB OR LONG-TERM CARE | DRG: 871 | End: 2018-02-03
Attending: STUDENT IN AN ORGANIZED HEALTH CARE EDUCATION/TRAINING PROGRAM | Admitting: INTERNAL MEDICINE
Payer: MEDICARE

## 2018-01-30 ENCOUNTER — APPOINTMENT (OUTPATIENT)
Dept: GENERAL RADIOLOGY | Age: 68
DRG: 871 | End: 2018-01-30
Attending: EMERGENCY MEDICINE
Payer: MEDICARE

## 2018-01-30 ENCOUNTER — APPOINTMENT (OUTPATIENT)
Dept: GENERAL RADIOLOGY | Age: 68
DRG: 871 | End: 2018-01-30
Attending: STUDENT IN AN ORGANIZED HEALTH CARE EDUCATION/TRAINING PROGRAM
Payer: MEDICARE

## 2018-01-30 DIAGNOSIS — R04.2 HEMOPTYSIS: ICD-10-CM

## 2018-01-30 DIAGNOSIS — Z87.440 HISTORY OF UTI: ICD-10-CM

## 2018-01-30 DIAGNOSIS — J18.9 PNEUMONIA OF LEFT LOWER LOBE DUE TO INFECTIOUS ORGANISM: ICD-10-CM

## 2018-01-30 DIAGNOSIS — L98.429 SKIN ULCER OF SACRUM, UNSPECIFIED ULCER STAGE (HCC): Chronic | ICD-10-CM

## 2018-01-30 DIAGNOSIS — A41.9 SEPSIS, DUE TO UNSPECIFIED ORGANISM: Primary | ICD-10-CM

## 2018-01-30 PROBLEM — R41.0 DELIRIUM: Status: RESOLVED | Noted: 2017-12-19 | Resolved: 2018-01-30

## 2018-01-30 PROBLEM — N49.3 FOURNIER GANGRENE: Status: RESOLVED | Noted: 2017-12-17 | Resolved: 2018-01-30

## 2018-01-30 PROBLEM — F03.918 DEMENTIA WITH BEHAVIORAL DISTURBANCE: Chronic | Status: ACTIVE | Noted: 2018-01-30

## 2018-01-30 PROBLEM — J69.0 ASPIRATION PNEUMONIA (HCC): Status: ACTIVE | Noted: 2018-01-30

## 2018-01-30 PROBLEM — M72.9 FASCIITIS: Status: RESOLVED | Noted: 2017-12-01 | Resolved: 2018-01-30

## 2018-01-30 PROBLEM — S06.5XAA SUBDURAL HEMATOMA: Status: RESOLVED | Noted: 2017-12-01 | Resolved: 2018-01-30

## 2018-01-30 LAB
ABO + RH BLD: NORMAL
ALBUMIN SERPL-MCNC: 1.9 G/DL (ref 3.2–4.6)
ALBUMIN/GLOB SERPL: 0.3 {RATIO} (ref 1.2–3.5)
ALP SERPL-CCNC: 105 U/L (ref 50–136)
ALT SERPL-CCNC: 29 U/L (ref 12–65)
ANION GAP SERPL CALC-SCNC: 9 MMOL/L (ref 7–16)
AST SERPL-CCNC: 22 U/L (ref 15–37)
BILIRUB SERPL-MCNC: 0.5 MG/DL (ref 0.2–1.1)
BLOOD GROUP ANTIBODIES SERPL: NORMAL
BUN SERPL-MCNC: 15 MG/DL (ref 8–23)
CALCIUM SERPL-MCNC: 8.6 MG/DL (ref 8.3–10.4)
CHLORIDE SERPL-SCNC: 103 MMOL/L (ref 98–107)
CO2 SERPL-SCNC: 26 MMOL/L (ref 21–32)
CREAT SERPL-MCNC: 0.53 MG/DL (ref 0.8–1.5)
DIFFERENTIAL METHOD BLD: ABNORMAL
EOSINOPHIL # BLD: 0.2 K/UL (ref 0–0.8)
EOSINOPHIL NFR BLD MANUAL: 2 % (ref 1–8)
ERYTHROCYTE [DISTWIDTH] IN BLOOD BY AUTOMATED COUNT: 18.7 % (ref 11.9–14.6)
FLUAV AG NPH QL IA: NEGATIVE
FLUBV AG NPH QL IA: NEGATIVE
GLOBULIN SER CALC-MCNC: 6.1 G/DL (ref 2.3–3.5)
GLUCOSE SERPL-MCNC: 131 MG/DL (ref 65–100)
HCT VFR BLD AUTO: 27.6 % (ref 41.1–50.3)
HGB BLD-MCNC: 8.2 G/DL (ref 13.6–17.2)
LACTATE BLD-SCNC: 2.6 MMOL/L (ref 0.5–1.9)
LYMPHOCYTES # BLD: 4.9 K/UL (ref 0.5–4.6)
LYMPHOCYTES NFR BLD MANUAL: 43 % (ref 16–44)
MCH RBC QN AUTO: 25.7 PG (ref 26.1–32.9)
MCHC RBC AUTO-ENTMCNC: 29.7 G/DL (ref 31.4–35)
MCV RBC AUTO: 86.5 FL (ref 79.6–97.8)
METAMYELOCYTES NFR BLD MANUAL: 5 %
MONOCYTES # BLD: 1.1 K/UL (ref 0.1–1.3)
MONOCYTES NFR BLD MANUAL: 10 % (ref 3–9)
MYELOCYTES NFR BLD MANUAL: 3 %
NEUTS BAND NFR BLD MANUAL: 5 % (ref 0–10)
NEUTS SEG # BLD: 5.1 K/UL (ref 1.7–8.2)
NEUTS SEG NFR BLD MANUAL: 32 % (ref 47–75)
PLATELET # BLD AUTO: 457 K/UL (ref 150–450)
PLATELET COMMENTS,PCOM: ABNORMAL
PMV BLD AUTO: 9.9 FL (ref 10.8–14.1)
POTASSIUM SERPL-SCNC: 4.2 MMOL/L (ref 3.5–5.1)
PROCALCITONIN SERPL-MCNC: 0.1 NG/ML
PROT SERPL-MCNC: 8 G/DL (ref 6.3–8.2)
RBC # BLD AUTO: 3.19 M/UL (ref 4.23–5.67)
RBC MORPH BLD: ABNORMAL
SODIUM SERPL-SCNC: 138 MMOL/L (ref 136–145)
SPECIMEN EXP DATE BLD: NORMAL
TROPONIN I BLD-MCNC: 0.02 NG/ML (ref 0.02–0.05)
WBC # BLD AUTO: 11.3 K/UL (ref 4.3–11.1)
WBC MORPH BLD: ABNORMAL

## 2018-01-30 PROCEDURE — 80053 COMPREHEN METABOLIC PANEL: CPT | Performed by: EMERGENCY MEDICINE

## 2018-01-30 PROCEDURE — 83605 ASSAY OF LACTIC ACID: CPT

## 2018-01-30 PROCEDURE — 87086 URINE CULTURE/COLONY COUNT: CPT | Performed by: INTERNAL MEDICINE

## 2018-01-30 PROCEDURE — 87205 SMEAR GRAM STAIN: CPT | Performed by: EMERGENCY MEDICINE

## 2018-01-30 PROCEDURE — 93005 ELECTROCARDIOGRAM TRACING: CPT | Performed by: EMERGENCY MEDICINE

## 2018-01-30 PROCEDURE — 87186 SC STD MICRODIL/AGAR DIL: CPT | Performed by: INTERNAL MEDICINE

## 2018-01-30 PROCEDURE — 74011250636 HC RX REV CODE- 250/636: Performed by: INTERNAL MEDICINE

## 2018-01-30 PROCEDURE — 96365 THER/PROPH/DIAG IV INF INIT: CPT | Performed by: STUDENT IN AN ORGANIZED HEALTH CARE EDUCATION/TRAINING PROGRAM

## 2018-01-30 PROCEDURE — 65270000029 HC RM PRIVATE

## 2018-01-30 PROCEDURE — 99285 EMERGENCY DEPT VISIT HI MDM: CPT | Performed by: STUDENT IN AN ORGANIZED HEALTH CARE EDUCATION/TRAINING PROGRAM

## 2018-01-30 PROCEDURE — 87040 BLOOD CULTURE FOR BACTERIA: CPT | Performed by: EMERGENCY MEDICINE

## 2018-01-30 PROCEDURE — 85025 COMPLETE CBC W/AUTO DIFF WBC: CPT | Performed by: EMERGENCY MEDICINE

## 2018-01-30 PROCEDURE — 71260 CT THORAX DX C+: CPT

## 2018-01-30 PROCEDURE — 87186 SC STD MICRODIL/AGAR DIL: CPT | Performed by: EMERGENCY MEDICINE

## 2018-01-30 PROCEDURE — 74011000258 HC RX REV CODE- 258: Performed by: STUDENT IN AN ORGANIZED HEALTH CARE EDUCATION/TRAINING PROGRAM

## 2018-01-30 PROCEDURE — 74011250636 HC RX REV CODE- 250/636: Performed by: STUDENT IN AN ORGANIZED HEALTH CARE EDUCATION/TRAINING PROGRAM

## 2018-01-30 PROCEDURE — 87641 MR-STAPH DNA AMP PROBE: CPT | Performed by: EMERGENCY MEDICINE

## 2018-01-30 PROCEDURE — 96366 THER/PROPH/DIAG IV INF ADDON: CPT | Performed by: STUDENT IN AN ORGANIZED HEALTH CARE EDUCATION/TRAINING PROGRAM

## 2018-01-30 PROCEDURE — 86900 BLOOD TYPING SEROLOGIC ABO: CPT | Performed by: STUDENT IN AN ORGANIZED HEALTH CARE EDUCATION/TRAINING PROGRAM

## 2018-01-30 PROCEDURE — 87077 CULTURE AEROBIC IDENTIFY: CPT | Performed by: EMERGENCY MEDICINE

## 2018-01-30 PROCEDURE — 74011636320 HC RX REV CODE- 636/320: Performed by: STUDENT IN AN ORGANIZED HEALTH CARE EDUCATION/TRAINING PROGRAM

## 2018-01-30 PROCEDURE — 71045 X-RAY EXAM CHEST 1 VIEW: CPT

## 2018-01-30 PROCEDURE — 84484 ASSAY OF TROPONIN QUANT: CPT

## 2018-01-30 PROCEDURE — 74011000250 HC RX REV CODE- 250: Performed by: INTERNAL MEDICINE

## 2018-01-30 PROCEDURE — 87088 URINE BACTERIA CULTURE: CPT | Performed by: INTERNAL MEDICINE

## 2018-01-30 PROCEDURE — 84145 PROCALCITONIN (PCT): CPT | Performed by: EMERGENCY MEDICINE

## 2018-01-30 PROCEDURE — 87804 INFLUENZA ASSAY W/OPTIC: CPT | Performed by: STUDENT IN AN ORGANIZED HEALTH CARE EDUCATION/TRAINING PROGRAM

## 2018-01-30 RX ORDER — VANCOMYCIN/0.9 % SOD CHLORIDE 1.5G/250ML
1500 PLASTIC BAG, INJECTION (ML) INTRAVENOUS ONCE
Status: COMPLETED | OUTPATIENT
Start: 2018-01-30 | End: 2018-01-30

## 2018-01-30 RX ORDER — NALOXONE HYDROCHLORIDE 0.4 MG/ML
0.4 INJECTION, SOLUTION INTRAMUSCULAR; INTRAVENOUS; SUBCUTANEOUS AS NEEDED
Status: DISCONTINUED | OUTPATIENT
Start: 2018-01-30 | End: 2018-02-03 | Stop reason: HOSPADM

## 2018-01-30 RX ORDER — DEXTROSE 50 % IN WATER (D50W) INTRAVENOUS SYRINGE
25-50 AS NEEDED
Status: DISCONTINUED | OUTPATIENT
Start: 2018-01-30 | End: 2018-02-03 | Stop reason: HOSPADM

## 2018-01-30 RX ORDER — ONDANSETRON 2 MG/ML
4 INJECTION INTRAMUSCULAR; INTRAVENOUS
Status: DISCONTINUED | OUTPATIENT
Start: 2018-01-30 | End: 2018-02-03 | Stop reason: HOSPADM

## 2018-01-30 RX ORDER — AMOXICILLIN 250 MG
2 CAPSULE ORAL
Status: DISCONTINUED | OUTPATIENT
Start: 2018-01-30 | End: 2018-02-03 | Stop reason: HOSPADM

## 2018-01-30 RX ORDER — ALBUTEROL SULFATE 0.83 MG/ML
2.5 SOLUTION RESPIRATORY (INHALATION)
Status: DISCONTINUED | OUTPATIENT
Start: 2018-01-30 | End: 2018-02-03 | Stop reason: HOSPADM

## 2018-01-30 RX ORDER — GUAIFENESIN 600 MG/1
600 TABLET, EXTENDED RELEASE ORAL EVERY 12 HOURS
Status: DISCONTINUED | OUTPATIENT
Start: 2018-01-30 | End: 2018-02-03 | Stop reason: HOSPADM

## 2018-01-30 RX ORDER — GUAIFENESIN/DEXTROMETHORPHAN 100-10MG/5
10 SYRUP ORAL
Status: DISCONTINUED | OUTPATIENT
Start: 2018-01-30 | End: 2018-02-03 | Stop reason: HOSPADM

## 2018-01-30 RX ORDER — LORAZEPAM 1 MG/1
1 TABLET ORAL
Status: DISCONTINUED | OUTPATIENT
Start: 2018-01-30 | End: 2018-02-03 | Stop reason: HOSPADM

## 2018-01-30 RX ORDER — SODIUM CHLORIDE 9 MG/ML
25 INJECTION, SOLUTION INTRAVENOUS CONTINUOUS
Status: DISCONTINUED | OUTPATIENT
Start: 2018-01-30 | End: 2018-02-03 | Stop reason: HOSPADM

## 2018-01-30 RX ORDER — HYDROCODONE BITARTRATE AND HOMATROPINE METHYLBROMIDE 1.5; 5 MG/5ML; MG/5ML
5 SYRUP ORAL
Status: DISCONTINUED | OUTPATIENT
Start: 2018-01-30 | End: 2018-02-03 | Stop reason: HOSPADM

## 2018-01-30 RX ORDER — SODIUM CHLORIDE 0.9 % (FLUSH) 0.9 %
10 SYRINGE (ML) INJECTION
Status: COMPLETED | OUTPATIENT
Start: 2018-01-30 | End: 2018-01-30

## 2018-01-30 RX ORDER — DEXTROSE 40 %
15 GEL (GRAM) ORAL AS NEEDED
Status: DISCONTINUED | OUTPATIENT
Start: 2018-01-30 | End: 2018-02-03 | Stop reason: HOSPADM

## 2018-01-30 RX ORDER — ACETAMINOPHEN 325 MG/1
650 TABLET ORAL
Status: DISCONTINUED | OUTPATIENT
Start: 2018-01-30 | End: 2018-02-03 | Stop reason: HOSPADM

## 2018-01-30 RX ORDER — BISACODYL 5 MG
5 TABLET, DELAYED RELEASE (ENTERIC COATED) ORAL DAILY PRN
Status: DISCONTINUED | OUTPATIENT
Start: 2018-01-30 | End: 2018-02-03 | Stop reason: HOSPADM

## 2018-01-30 RX ORDER — POLYETHYLENE GLYCOL 3350 17 G/17G
17 POWDER, FOR SOLUTION ORAL
Status: DISCONTINUED | OUTPATIENT
Start: 2018-01-30 | End: 2018-02-03 | Stop reason: HOSPADM

## 2018-01-30 RX ORDER — HYDROCODONE BITARTRATE AND ACETAMINOPHEN 5; 325 MG/1; MG/1
1 TABLET ORAL
Status: DISCONTINUED | OUTPATIENT
Start: 2018-01-30 | End: 2018-02-03 | Stop reason: HOSPADM

## 2018-01-30 RX ORDER — SODIUM CHLORIDE 0.9 % (FLUSH) 0.9 %
5-10 SYRINGE (ML) INJECTION EVERY 8 HOURS
Status: DISCONTINUED | OUTPATIENT
Start: 2018-01-30 | End: 2018-02-03 | Stop reason: HOSPADM

## 2018-01-30 RX ORDER — HYDROCODONE BITARTRATE AND ACETAMINOPHEN 5; 325 MG/1; MG/1
1 TABLET ORAL ONCE
Status: DISCONTINUED | OUTPATIENT
Start: 2018-01-30 | End: 2018-01-30

## 2018-01-30 RX ORDER — SODIUM CHLORIDE 0.9 % (FLUSH) 0.9 %
5-10 SYRINGE (ML) INJECTION AS NEEDED
Status: DISCONTINUED | OUTPATIENT
Start: 2018-01-30 | End: 2018-02-03 | Stop reason: HOSPADM

## 2018-01-30 RX ORDER — INSULIN LISPRO 100 [IU]/ML
INJECTION, SOLUTION INTRAVENOUS; SUBCUTANEOUS
Status: DISCONTINUED | OUTPATIENT
Start: 2018-01-31 | End: 2018-02-03 | Stop reason: HOSPADM

## 2018-01-30 RX ORDER — ZOLPIDEM TARTRATE 5 MG/1
5 TABLET ORAL
Status: DISCONTINUED | OUTPATIENT
Start: 2018-01-30 | End: 2018-02-03 | Stop reason: HOSPADM

## 2018-01-30 RX ORDER — MAG HYDROX/ALUMINUM HYD/SIMETH 200-200-20
30 SUSPENSION, ORAL (FINAL DOSE FORM) ORAL
Status: DISCONTINUED | OUTPATIENT
Start: 2018-01-30 | End: 2018-02-03 | Stop reason: HOSPADM

## 2018-01-30 RX ORDER — DEXAMETHASONE SODIUM PHOSPHATE 100 MG/10ML
10 INJECTION INTRAMUSCULAR; INTRAVENOUS
Status: DISCONTINUED | OUTPATIENT
Start: 2018-01-30 | End: 2018-01-30

## 2018-01-30 RX ORDER — METRONIDAZOLE 500 MG/100ML
500 INJECTION, SOLUTION INTRAVENOUS EVERY 8 HOURS
Status: DISCONTINUED | OUTPATIENT
Start: 2018-01-30 | End: 2018-02-02

## 2018-01-30 RX ORDER — HYDRALAZINE HYDROCHLORIDE 20 MG/ML
20 INJECTION INTRAMUSCULAR; INTRAVENOUS
Status: DISCONTINUED | OUTPATIENT
Start: 2018-01-30 | End: 2018-02-03 | Stop reason: HOSPADM

## 2018-01-30 RX ORDER — DIPHENHYDRAMINE HYDROCHLORIDE 50 MG/ML
25 INJECTION, SOLUTION INTRAMUSCULAR; INTRAVENOUS
Status: DISCONTINUED | OUTPATIENT
Start: 2018-01-30 | End: 2018-02-03 | Stop reason: HOSPADM

## 2018-01-30 RX ORDER — HALOPERIDOL 5 MG/ML
4 INJECTION INTRAMUSCULAR
Status: DISCONTINUED | OUTPATIENT
Start: 2018-01-30 | End: 2018-02-02

## 2018-01-30 RX ORDER — ENOXAPARIN SODIUM 100 MG/ML
40 INJECTION SUBCUTANEOUS EVERY 24 HOURS
Status: DISCONTINUED | OUTPATIENT
Start: 2018-01-30 | End: 2018-02-03 | Stop reason: HOSPADM

## 2018-01-30 RX ADMIN — Medication 10 ML: at 22:01

## 2018-01-30 RX ADMIN — IOPAMIDOL 100 ML: 755 INJECTION, SOLUTION INTRAVENOUS at 22:00

## 2018-01-30 RX ADMIN — VANCOMYCIN HYDROCHLORIDE 1500 MG: 10 INJECTION, POWDER, LYOPHILIZED, FOR SOLUTION INTRAVENOUS at 21:09

## 2018-01-30 RX ADMIN — WATER 1 G: 1 INJECTION INTRAMUSCULAR; INTRAVENOUS; SUBCUTANEOUS at 23:04

## 2018-01-30 RX ADMIN — SODIUM CHLORIDE 1000 ML: 900 INJECTION, SOLUTION INTRAVENOUS at 21:09

## 2018-01-30 RX ADMIN — Medication 5 ML: at 23:07

## 2018-01-30 RX ADMIN — SODIUM CHLORIDE 125 ML/HR: 900 INJECTION, SOLUTION INTRAVENOUS at 23:07

## 2018-01-30 RX ADMIN — SODIUM CHLORIDE 100 ML: 900 INJECTION, SOLUTION INTRAVENOUS at 22:01

## 2018-01-30 RX ADMIN — AZITHROMYCIN MONOHYDRATE 500 MG: 500 INJECTION, POWDER, LYOPHILIZED, FOR SOLUTION INTRAVENOUS at 23:04

## 2018-01-31 ENCOUNTER — PATIENT OUTREACH (OUTPATIENT)
Dept: CASE MANAGEMENT | Age: 68
End: 2018-01-31

## 2018-01-31 PROBLEM — N39.0 UTI (URINARY TRACT INFECTION): Status: ACTIVE | Noted: 2018-01-31

## 2018-01-31 LAB
ANION GAP SERPL CALC-SCNC: 10 MMOL/L (ref 7–16)
ATRIAL RATE: 92 BPM
BUN SERPL-MCNC: 12 MG/DL (ref 8–23)
CALCIUM SERPL-MCNC: 8 MG/DL (ref 8.3–10.4)
CALCULATED P AXIS, ECG09: 66 DEGREES
CALCULATED R AXIS, ECG10: 29 DEGREES
CALCULATED T AXIS, ECG11: 74 DEGREES
CHLORIDE SERPL-SCNC: 106 MMOL/L (ref 98–107)
CO2 SERPL-SCNC: 24 MMOL/L (ref 21–32)
CREAT SERPL-MCNC: 0.43 MG/DL (ref 0.8–1.5)
DIAGNOSIS, 93000: NORMAL
ERYTHROCYTE [DISTWIDTH] IN BLOOD BY AUTOMATED COUNT: 18.7 % (ref 11.9–14.6)
GLUCOSE BLD STRIP.AUTO-MCNC: 117 MG/DL (ref 65–100)
GLUCOSE BLD STRIP.AUTO-MCNC: 93 MG/DL (ref 65–100)
GLUCOSE BLD STRIP.AUTO-MCNC: 95 MG/DL (ref 65–100)
GLUCOSE BLD STRIP.AUTO-MCNC: 99 MG/DL (ref 65–100)
GLUCOSE SERPL-MCNC: 111 MG/DL (ref 65–100)
HCT VFR BLD AUTO: 25.9 % (ref 41.1–50.3)
HGB BLD-MCNC: 7.7 G/DL (ref 13.6–17.2)
LACTATE SERPL-SCNC: 1.5 MMOL/L (ref 0.4–2)
MCH RBC QN AUTO: 25.6 PG (ref 26.1–32.9)
MCHC RBC AUTO-ENTMCNC: 29.7 G/DL (ref 31.4–35)
MCV RBC AUTO: 86 FL (ref 79.6–97.8)
P-R INTERVAL, ECG05: 116 MS
PLATELET # BLD AUTO: 429 K/UL (ref 150–450)
PMV BLD AUTO: 10.5 FL (ref 10.8–14.1)
POTASSIUM SERPL-SCNC: 3.7 MMOL/L (ref 3.5–5.1)
Q-T INTERVAL, ECG07: 358 MS
QRS DURATION, ECG06: 70 MS
QTC CALCULATION (BEZET), ECG08: 442 MS
RBC # BLD AUTO: 3.01 M/UL (ref 4.23–5.67)
SODIUM SERPL-SCNC: 140 MMOL/L (ref 136–145)
VENTRICULAR RATE, ECG03: 92 BPM
WBC # BLD AUTO: 10.2 K/UL (ref 4.3–11.1)

## 2018-01-31 PROCEDURE — 74011000250 HC RX REV CODE- 250: Performed by: INTERNAL MEDICINE

## 2018-01-31 PROCEDURE — 77030027138 HC INCENT SPIROMETER -A

## 2018-01-31 PROCEDURE — 74011250636 HC RX REV CODE- 250/636: Performed by: INTERNAL MEDICINE

## 2018-01-31 PROCEDURE — 74011250637 HC RX REV CODE- 250/637: Performed by: INTERNAL MEDICINE

## 2018-01-31 PROCEDURE — 83605 ASSAY OF LACTIC ACID: CPT | Performed by: INTERNAL MEDICINE

## 2018-01-31 PROCEDURE — 82962 GLUCOSE BLOOD TEST: CPT

## 2018-01-31 PROCEDURE — 85027 COMPLETE CBC AUTOMATED: CPT | Performed by: INTERNAL MEDICINE

## 2018-01-31 PROCEDURE — 92610 EVALUATE SWALLOWING FUNCTION: CPT

## 2018-01-31 PROCEDURE — 65270000029 HC RM PRIVATE

## 2018-01-31 PROCEDURE — 80048 BASIC METABOLIC PNL TOTAL CA: CPT | Performed by: INTERNAL MEDICINE

## 2018-01-31 RX ORDER — HYDROMORPHONE HYDROCHLORIDE 2 MG/1
1 TABLET ORAL
COMMUNITY
End: 2018-02-03

## 2018-01-31 RX ORDER — ONDANSETRON 4 MG/1
4 TABLET, FILM COATED ORAL
COMMUNITY

## 2018-01-31 RX ORDER — INSULIN LISPRO 100 [IU]/ML
5 INJECTION, SOLUTION INTRAVENOUS; SUBCUTANEOUS
COMMUNITY
End: 2018-02-15

## 2018-01-31 RX ORDER — HYDROXYZINE PAMOATE 25 MG/1
25 CAPSULE ORAL
COMMUNITY

## 2018-01-31 RX ORDER — GUAIFENESIN 100 MG/5ML
81 LIQUID (ML) ORAL DAILY
COMMUNITY

## 2018-01-31 RX ORDER — ASPIRIN 325 MG
1 TABLET, DELAYED RELEASE (ENTERIC COATED) ORAL
COMMUNITY

## 2018-01-31 RX ORDER — RANITIDINE 150 MG/1
150 TABLET, FILM COATED ORAL EVERY 12 HOURS
COMMUNITY
End: 2018-02-03

## 2018-01-31 RX ORDER — ACETAMINOPHEN 325 MG/1
650 TABLET ORAL
COMMUNITY

## 2018-01-31 RX ORDER — AZITHROMYCIN 250 MG/1
250 TABLET, FILM COATED ORAL DAILY
COMMUNITY
Start: 2018-01-29 | End: 2018-02-03

## 2018-01-31 RX ORDER — L. ACIDOPHILUS/L.BULGARICUS 100MM CELL
1 GRANULES IN PACKET (EA) ORAL 2 TIMES DAILY
COMMUNITY

## 2018-01-31 RX ORDER — HYDROMORPHONE HYDROCHLORIDE 2 MG/1
1 TABLET ORAL EVERY 6 HOURS
Status: ON HOLD | COMMUNITY
End: 2018-02-03

## 2018-01-31 RX ORDER — SENNOSIDES 8.6 MG/1
1 TABLET ORAL
COMMUNITY

## 2018-01-31 RX ORDER — INSULIN GLARGINE 100 [IU]/ML
20 INJECTION, SOLUTION SUBCUTANEOUS
COMMUNITY
End: 2018-02-15

## 2018-01-31 RX ORDER — TRAMADOL HYDROCHLORIDE 50 MG/1
50 TABLET ORAL
Status: ON HOLD | COMMUNITY
End: 2018-02-03

## 2018-01-31 RX ORDER — INSULIN LISPRO 100 [IU]/ML
5 INJECTION, SOLUTION INTRAVENOUS; SUBCUTANEOUS
COMMUNITY

## 2018-01-31 RX ORDER — GUAIFENESIN 600 MG/1
600 TABLET, EXTENDED RELEASE ORAL 2 TIMES DAILY
Status: ON HOLD | COMMUNITY
End: 2018-02-08 | Stop reason: DRUGHIGH

## 2018-01-31 RX ADMIN — GUAIFENESIN AND DEXTROMETHORPHAN 10 ML: 100; 10 SYRUP ORAL at 21:15

## 2018-01-31 RX ADMIN — GUAIFENESIN 600 MG: 600 TABLET, EXTENDED RELEASE ORAL at 21:15

## 2018-01-31 RX ADMIN — METRONIDAZOLE 500 MG: 500 INJECTION, SOLUTION INTRAVENOUS at 13:49

## 2018-01-31 RX ADMIN — Medication 1 AMPULE: at 13:50

## 2018-01-31 RX ADMIN — METRONIDAZOLE 500 MG: 500 INJECTION, SOLUTION INTRAVENOUS at 21:15

## 2018-01-31 RX ADMIN — Medication 1 AMPULE: at 21:19

## 2018-01-31 RX ADMIN — WATER 1 G: 1 INJECTION INTRAMUSCULAR; INTRAVENOUS; SUBCUTANEOUS at 22:41

## 2018-01-31 RX ADMIN — HYDROCODONE BITARTRATE AND ACETAMINOPHEN 1 TABLET: 5; 325 TABLET ORAL at 17:41

## 2018-01-31 RX ADMIN — Medication 10 ML: at 13:50

## 2018-01-31 RX ADMIN — GUAIFENESIN AND DEXTROMETHORPHAN 10 ML: 100; 10 SYRUP ORAL at 17:41

## 2018-01-31 RX ADMIN — SODIUM CHLORIDE 125 ML/HR: 900 INJECTION, SOLUTION INTRAVENOUS at 13:54

## 2018-01-31 RX ADMIN — METRONIDAZOLE 500 MG: 500 INJECTION, SOLUTION INTRAVENOUS at 06:00

## 2018-01-31 NOTE — ED PROVIDER NOTES
HPI Comments: 59-year-old male patient presents via EMS as a sepsis alert with reports of recent fever, tachypnea, tachycardia and urinary tract infection. pper EMS patient was found to be febrile yesterday at which time he had labs drawn showing an elevation in his white blood cell count and evidence of bacterial infection in his urine. He reportedly became short of breath today and developed a cough as well. Patient states he's been coughing throughout the day and started coughing up blood this evening. Patient describes bright red blood only with coughing. He reports continued shortness of breath and round. History is limited secondary to chronic dysphagia at baseline. Family at bedside elaborates on patient's recent medical history stating that patient was recently admitted for necrotizing fasciitis undergoing emergent surgery to correct this issue. He has a diverting colostomy in place as well as a feeding tube. Patient's feeding tube site has started to drain around the insertion point. This tube is working normally per reports. Patient is currently taking no blood thinner therapy per EMS and family. Patient is a 79 y.o. male presenting with fever. The history is provided by the patient. Fever    This is a new problem. The current episode started 6 to 12 hours ago. The problem occurs constantly. Patient reports a subjective fever - was not measured. Associated symptoms include diarrhea, vomiting, cough and shortness of breath. Pertinent negatives include no chest pain, no sleepiness, no congestion, no headaches, no sore throat, no muscle aches, no mental status change, no neck pain and no rash. Past Medical History:   Diagnosis Date    Diabetes ([address]        No past surgical history on file. No family history on file.     Social History     Social History    Marital status: SINGLE     Spouse name: N/A    Number of children: N/A    Years of education: N/A     Occupational History    Not on file. Social History Main Topics    Smoking status: Former Smoker    Smokeless tobacco: Not on file    Alcohol use Not on file    Drug use: Not on file    Sexual activity: Not on file     Other Topics Concern    Not on file     Social History Narrative         ALLERGIES: Review of patient's allergies indicates no known allergies. Review of Systems   Constitutional: Positive for fatigue and fever. Negative for chills and diaphoresis. HENT: Negative for congestion, sneezing and sore throat. Eyes: Negative for visual disturbance. Respiratory: Positive for cough and shortness of breath. Negative for chest tightness and wheezing. Cardiovascular: Negative for chest pain and leg swelling. Gastrointestinal: Positive for diarrhea, nausea and vomiting. Negative for abdominal pain and blood in stool. Endocrine: Negative for polyuria. Genitourinary: Negative for difficulty urinating, dysuria, flank pain, hematuria and urgency. Musculoskeletal: Positive for arthralgias. Negative for back pain, myalgias, neck pain and neck stiffness. Skin: Positive for wound. Negative for color change and rash. Neurological: Negative for dizziness, syncope, speech difficulty, weakness, light-headedness, numbness and headaches. Psychiatric/Behavioral: Negative for behavioral problems. All other systems reviewed and are negative. Vitals:    01/30/18 1917   BP: 130/83   Pulse: (!) 111   Resp: 25   Temp: 98.3 °F (36.8 °C)   SpO2: 98%            Physical Exam     MDM  Number of Diagnoses or Management Options  Hemoptysis: new and requires workup  History of UTI: new and requires workup  Pneumonia of left lower lobe due to infectious organism [facility]): new and requires workup  Sepsis, due to unspecified organism [facility]): new and requires workup  Diagnosis management comments: Lactic acid elevated at 2.6.    procalcitonin 0.1.   Patient's laboratory evaluation otherwise appears stable, including hemoglobin which appears slightly improved from yesterday's laboratory evaluation. Urinalysis pending, however urine collection yesterday shows bacteria present. Given patient's recent hospitalization, reports of hemoptysis and tachypnea/shortness of breath on arrival I will obtain CT imaging to rule out pulmonary embolism. Chest x-ray shows slight hazy appearance to the left lower lobe. CT imaging shows no evidence of pulmonary embolus however there is a focal infiltration over the left lower lobe consistent with pneumonia versus aspiration pneumonitis. Patient is received broad-spectrum antibiotics en route by EMS providers as well as shortly after arrival to this department. He remained vitally stable at this time. Spoke with  The on-call hospitalist who agrees to evaluate for admission.        Amount and/or Complexity of Data Reviewed  Clinical lab tests: ordered and reviewed  Tests in the radiology section of CPT®: ordered and reviewed  Tests in the medicine section of CPT®: reviewed and ordered  Discuss the patient with other providers: yes  Independent visualization of images, tracings, or specimens: yes    Risk of Complications, Morbidity, and/or Mortality  Presenting problems: moderate  Diagnostic procedures: low  Management options: moderate    Patient Progress  Patient progress: stable        ED Course       Procedures

## 2018-01-31 NOTE — PROGRESS NOTES
Downtime progress note entry for [facility] : Lindsay Hernandes  Transition of Care Discharge Follow-up Questionnaire   Date/Time of Call: 18  Patient name:  Brianna Giron  : 1950  MRN: [id number]            What was the patient hospitalized for? Non Traumatic Subdural Hemorrhage           Does the patient understand his/her diagnosis and/or treatment and what happened during the hospitalization? Phone # [phone] Is an s office not pts #. # in [facility] [phone] lead to pts VM. Left VM and left call back # and encourage pt to call CC. No PCP appointed in [address]. in [facility] its showing up as pt seeing Ivory Merino at [facility]. [facility] Primary does not see male pts. Will continue to reach out to pt to establish communication on phone # [phone]   Did the patient receive discharge instructions? Review any discharge instructions (see notes in [facility]). Ask patient if they understand these. Do they have any questions? Were home services ordered (nursing, PT, OT, ST, etc.)? If so, has the first visit occurred? If not, why? (Assist with coordination of services if necessary.)          Was any DME ordered? If so, has it been received? If not, why?  (Assist with coordination of arranging DME orders if necessary.)          Complete a review of all medications (new, continued and discontinued meds per the D/C instructions and medication tab in [facility]). Were all new prescriptions filled? If not, why?  (Assist with obtainment of medications if necessary.)          Does the patient understand the purpose and dosing instructions for all medications? (If patient has questions, provide explanation and education.)    Does the patient have any problems in performing ADLs? (If patient is unable to perform ADLs - what is the limiting factor(s)?   Do they have a support system that can assist? If no support system is present, discuss possible assistance that they may be able to obtain.)              Does the patient have all follow-up appointments scheduled? 7 day f/up with PCP?    7-14 day f/up with specialist?    If f/up has not been made - what actions has the care coordinator made to accomplish this? Has transportation been arranged? [facility] Pulmonary follow-up should be within 7 days of discharge; all others should have PCP follow-up within 7 days of discharge; follow-ups with other specialists should be within 7-14 days of discharge.)    Any other questions or concerns expressed by the patient? Schedule next appointment with MARIANNE BABIN Coordinator or refer to RN Case Manager/  per the workflow guidelines. When is care coordinators next follow-up call scheduled? If referred for CCM - what RN care manager was the referral assigned?     LINDA Call Completed By:

## 2018-01-31 NOTE — PROGRESS NOTES
Problem: Nutrition Deficit  Goal: *Optimize nutritional status  Nutrition: Received consult for TF management per nutritional support protocols  EMR and NH records reviewed. Will start Gluerna 1.2 at 75 ml/hr with 35 ml water flush every 1 hr and decrease IVF to 60 ml/hr once TF is started. Full assessment to be completed 2-1-18.   Yuli Oven, [address] [place] [address], [address] [place] [address]

## 2018-01-31 NOTE — PROGRESS NOTES
made initial visit. Pt was alert and verbal.  No pain level was expressed or observed.  welcomed pt to [facility] and shared about  services. Pt shared that his mother was an Independent .  provided spiritual care through presence, pastoral conversation, prayer, and assurance of prayer.

## 2018-01-31 NOTE — PROGRESS NOTES
LTG: Patient will tolerate least restrictive diet without overt signs or symptoms of airway compromise. STG: Patient will tolerate po trials with speech therapy only without overt signs or symptoms of airway compromise. STG: Patient will participate in modified barium swallow study as clinically indicated. Speech language pathology: bedside swallow note: Initial Assessment    NAME/AGE/GENDER: Rafia Lizama is a 79 y.o. male  DATE: 1/31/2018  PRIMARY DIAGNOSIS: Sepsis due to pneumonia ([address]  Aspiration pneumonia ([address]       ICD-10: Treatment Diagnosis: R13.12 Oropharyngeal Dysphagia. INTERDISCIPLINARY COLLABORATION: Registered Nurse and   PRECAUTIONS/ALLERGIES: Review of patient's allergies indicates no known allergies. ASSESSMENT:Based on the objective data described below, Mr. Lon Sood presents with severe oropharyngeal dysphagia. He is well known to this clinic from previous admission in December 2017. He participated in modified barium swallow study on 12/28/17 which revealed exhibited aspiration of thin liquids and deep penetration of nectar thick liquids. Improved safety with honey by cup- no penetration or aspiration observed on exam.  He was started on mechanical soft diet and honey thick liquids. Since that time he has had an admission at [facility] and is currently in [facility]. Previous PEG placement. Patient is poor historian, providing conflicting information regarding po intake (reports both thin liquid and honey thick liquid consumption). Reports are unreliable and no family at bedside, but question compliance with honey thick recommendations. Patient was presented with honey via cup and spoon sip. Incoordinated swallow palpated with audible swallow of honey by cup. Immediate strong cough noted. When taking honey by spoon sip, inconsistent double swallow, throat clears, and wet vocal quality.    Patient presents with overt signs and symptoms of airway compromise with honey thick liquids. Recommend NPO with alternative means of nutrition/hydration/medication. Patient has PEG tube from previous admission. Patient will likely benefit from modified barium swallow study to further assessment of swallow function, but is NOT warranted at this time due to overt signs and symptoms of airway compromise with honey via spoon. Patient will benefit from skilled intervention to address the below impairments. ?????? ? ? This section established at most recent assessment??????????  PROBLEM LIST (Impairments causing functional limitations):  1. Oropharyngeal dysphagia  REHABILITATION POTENTIAL FOR STATED GOALS: Fair  PLAN OF CARE:   Patient will benefit from skilled intervention to address the following impairments. RECOMMENDATIONS AND PLANNED INTERVENTIONS (Benefits and precautions of therapy have been discussed with the patient.):  · NPO with alternative means of nutrition  MEDICATIONS:  · Non-oral  COMPENSATORY STRATEGIES/MODIFICATIONS INCLUDING:  · None  OTHER RECOMMENDATIONS (including follow up treatment recommendations):   · Patient education  RECOMMENDED DIET MODIFICATIONS DISCUSSED WITH:  · Nursing  · Patient  FREQUENCY/DURATION: Continue to follow patient 3 times a week for duration of hospital stay to address above goals. RECOMMENDED REHABILITATION/EQUIPMENT: (at time of discharge pending progress): Due to the probability of continued deficits (see above) this patient will likely need continued skilled speech therapy after discharge. SUBJECTIVE:   \"Just give me regular water and I will be ok\"  History of Present Injury/Illness: Mr. Marcella Anand  has a past medical history of Diabetes ([facility] .  He also  has no past surgical history on file. Present Symptoms: oropharyngeal dysphagia   Pain Intensity 1: 0  Current Medications:   No current facility-administered medications on file prior to encounter.       Current Outpatient Prescriptions on File Prior to Encounter   Medication Sig Dispense Refill    albuterol (PROVENTIL VENTOLIN) 2.5 mg /3 mL (0.083 %) nebulizer solution 3 mL by Nebulization route every four (4) hours as needed for Wheezing. 1 Each 0    guaiFENesin ER (MUCINEX) 1,200 mg Ta12 ER tablet Take 1 Tab by mouth every twelve (12) hours. 1 Tab 0    pantoprazole (PROTONIX) 40 mg tablet Take 1 Tab by mouth Daily (before breakfast). 14 Tab 0    sodium hypochlorite (QUARTER STRENGTH DAKIN'S) 0.125 % soln external solution Apply 473 mL to affected area daily. 1 Bottle 0    Blood-Glucose Meter monitoring kit To check blood sugars 3 times per day - In AM and 2h after a meal. Please provide adequate supplies for 30 days ( lancets, strips, etc) as approved by his insurance 1 Kit 0    multivitamin (ONE A DAY) tablet Take 1 Tab by mouth daily. Current Dietary Status:  NPO           OBJECTIVE:   Respiratory Status:          MRI/CT Results:1. No pulmonary embolism.     2. COPD.     3. Opacified left lower lobe airways with left lower lobe consolidation. This  may be caused by aspiration. Oral Motor Structure/Speech:  Oral-Motor Structure/Motor Speech  Labial: Decreased rate, Decreased seal, Impaired coordination  Dentition: Edentulous  Lingual: Decreased rate, Incoordinated, Decreased strength    BEDSIDE SWALLOW EVALUATION  Oral Assessment:  Oral Assessment  Labial: Decreased rate;Decreased seal;Impaired coordination  Dentition: Edentulous  Lingual: Decreased rate; Incoordinated;Decreased strength  P.O. Trials:        The patient was given tsp-small sips amounts of the following:   Consistency Presented: Honey thick liquid  How Presented: SLP-fed/presented;Self-fed/presented;Cup/sip;Spoon    ORAL PHASE:  Bolus Acceptance: No impairment  Bolus Formation/Control: No impairment  Propulsion: Discoordination     Oral Residue: None    PHARYNGEAL PHASE:  Initiation of Swallow: Delayed (# of seconds)  Laryngeal Elevation: Decreased  Aspiration Signs/Symptoms: Clear throat;Strong cough;Delayed cough/throat clear  Vocal Quality: Other (comment) (Dysarthric)           Pharyngeal Phase Characteristics: Suspected pharyngeal residue    OTHER OBSERVATIONS:  Rate/bite size: Impaired   Endurance:  Impaired   Comments: Tool Used: Dysphagia Outcome and Severity Scale (ANGEL)    Score Comments   Normal Diet  [] 7 With no strategies or extra time needed   Functional Swallow  [] 6 May have mild oral or pharyngeal delay       Mild Dysphagia    [] 5 Which may require one diet consistency restricted (those who demonstrate penetration which is entirely cleared on MBS would be included)   Mild-Moderate Dysphagia  [] 4 With 1-2 diet consistencies restricted       Moderate Dysphagia  [] 3 With 2 or more diet consistencies restricted       Moderately Severe Dysphagia  [] 2 With partial PO strategies (trials with ST only)       Severe Dysphagia  [x] 1 With inability to tolerate any PO safely          Score:  Initial: 1 Most Recent: X (Date: -- )   Interpretation of Tool: The Dysphagia Outcome and Severity Scale (ANGEL) is a simple, easy-to-use, 7-point scale developed to systematically rate the functional severity of dysphagia based on objective assessment and make recommendations for diet level, independence level, and type of nutrition. Score 7 6 5 4 3 2 1   Modifier CH CI CJ CK CL CM CN   ?  Swallowing:     - CURRENT STATUS: CN - 100% impaired, limited or restricted    - GOAL STATUS:  CL - 60%-79% impaired, limited or restricted    - D/C STATUS:  ---------------To be determined---------------  Payor: [place]  / Plan: [address] [place] [address] HMO / Product Type: Managed Care Medicare /     TREATMENT:    (In addition to Assessment/Re-Assessment sessions the following treatments were rendered)  Assessment/Reassessment only, no treatment provided today  MODALITIES:                                                                    ORAL MOTOR  EXERCISES: LARYNGEAL / PHARYNGEAL EXERCISES:                                                                                                                                     __________________________________________________________________________________________________  Safety:   After treatment position/precautions:  · Upright in Bed. Progression/Medical Necessity:   · Patient is expected to demonstrate progress in swallow safety to decrease aspiration risk. Compliance with Program/Exercises: Will assess as treatment progresses. Reason for Continuation of Services/Other Comments:  · Patient continues to require skilled intervention due to oropharyngeal dysphagia. Recommendations/Intent for next treatment session: \"Treatment next visit will focus on po trials\".     Total Treatment Duration:  Time In: 1334  Time Out: 1350    DEAN Paniagua, CCC-SLP, CBIS

## 2018-01-31 NOTE — PROGRESS NOTES
Reviewed medications with patient, however patient appears to only be oriented to person. Periodically, patient seems to be aware of situation and stated he last took medication yesterday.

## 2018-01-31 NOTE — PROGRESS NOTES
Hospitalist Progress Note    Subjective:   Daily Progress Note: 1/31/2018 12:41 PM    Mr. 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 He is on NS, iv rocephin and flagyl. Family still allowing him to have food intake thus predisposing him to recurrent aspiration. He denies uncontrolled pain, nausea, fever, chills currently. No family at bedside.     Current Facility-Administered Medications   Medication Dose Route Frequency    cefTRIAXone (ROCEPHIN) 1 g in sterile water (preservative free) 10 mL IV syringe  1 g IntraVENous Q24H    albuterol (PROVENTIL VENTOLIN) nebulizer solution 2.5 mg  2.5 mg Nebulization Q4H PRN    guaiFENesin ER (MUCINEX) tablet 600 mg  600 mg Oral Q12H    guaiFENesin-dextromethorphan (ROBITUSSIN DM) 100-10 mg/5 mL syrup 10 mL  10 mL Oral Q4HWA    0.9% sodium chloride infusion  125 mL/hr IntraVENous CONTINUOUS    promethazine (PHENERGAN) with saline injection 25 mg  25 mg IntraVENous Q6H PRN    polyethylene glycol (MIRALAX) packet 17 g  17 g Oral DAILY PRN    HYDROcodone-homatropine (HYCODAN) 5-1.5 mg/5 mL (5 mL) syrup 5 mL  5 mL Oral Q4H PRN    hydrALAZINE (APRESOLINE) 20 mg/mL injection 20 mg  20 mg IntraVENous Q6H PRN    haloperidol lactate (HALDOL) injection 4 mg  4 mg IntraVENous Q6H PRN    bisacodyl (DULCOLAX) tablet 5 mg  5 mg Oral DAILY PRN    alum-mag hydroxide-simeth (MYLANTA) oral suspension 30 mL  30 mL Oral Q4H PRN    sodium chloride (NS) flush 5-10 mL  5-10 mL IntraVENous Q8H    sodium chloride (NS) flush 5-10 mL  5-10 mL IntraVENous PRN    acetaminophen (TYLENOL) tablet 650 mg  650 mg Oral Q4H PRN    HYDROcodone-acetaminophen (NORCO) 5-325 mg per tablet 1 Tab  1 Tab Oral Q4H PRN    naloxone (NARCAN) injection 0.4 mg  0.4 mg IntraVENous PRN    diphenhydrAMINE (BENADRYL) injection 25 mg  25 mg IntraVENous Q4H PRN    ondansetron (ZOFRAN) injection 4 mg  4 mg IntraVENous Q4H PRN    senna-docusate (PERICOLACE) 8.6-50 mg per tablet 2 Tab  2 Tab Oral DAILY PRN    LORazepam (ATIVAN) tablet 1 mg  1 mg Oral Q4H PRN    zolpidem (AMBIEN) tablet 5 mg  5 mg Oral QHS PRN    insulin lispro (HUMALOG) injection   SubCUTAneous AC&HS    dextrose 40% (GLUTOSE) oral gel 1 Tube  15 g Oral PRN    glucagon (GLUCAGEN) injection 1 mg  1 mg IntraMUSCular PRN    dextrose (D50W) injection syrg 12.5-25 g  25-50 mL IntraVENous PRN    enoxaparin (LOVENOX) injection 40 mg  40 mg SubCUTAneous Q24H    metroNIDAZOLE (FLAGYL) IVPB premix 500 mg  500 mg IntraVENous Q8H        Review of Systems  A comprehensive review of systems was negative except for that written in the HPI. Objective:     Visit Vitals    /60    Pulse 94    Temp 98.3 °F (36.8 °C)    Resp 20    SpO2 97%           Temp (24hrs), Av.3 °F (36.8 °C), Min:98.3 °F (36.8 °C), Max:98.3 °F (36.8 °C)            General: awake, alert, oriented to person  Eyes; non icteric, EOMI  Neck: supple  CV: RRR  Pulm; diminished and course in bases, no wheezing, non labored  Abd; soft, non distended, PEG in place    Additional comments:I reviewed the patient's new clinical lab test results. j    Data Review    Recent Results (from the past 24 hour(s))   METABOLIC PANEL, COMPREHENSIVE    Collection Time: 18  7:27 PM   Result Value Ref Range    Sodium 138 136 - 145 mmol/L    Potassium 4.2 3.5 - 5.1 mmol/L    Chloride 103 98 - 107 mmol/L    CO2 26 21 - 32 mmol/L    Anion gap 9 7 - 16 mmol/L    Glucose 131 (H) 65 - 100 mg/dL    BUN 15 8 - 23 MG/DL    Creatinine 0.53 (L) 0.8 - 1.5 MG/DL    GFR est AA >60 >60 ml/min/1.73m2    GFR est non-AA >60 >60 ml/min/1.73m2    Calcium 8.6 8.3 - 10.4 MG/DL    Bilirubin, total 0.5 0.2 - 1.1 MG/DL    ALT (SGPT) 29 12 - 65 U/L    AST (SGOT) 22 15 - 37 U/L    Alk.  phosphatase 105 50 - 136 U/L Protein, total 8.0 6.3 - 8.2 g/dL    Albumin 1.9 (L) 3.2 - 4.6 g/dL    Globulin 6.1 (H) 2.3 - 3.5 g/dL    A-G Ratio 0.3 (L) 1.2 - 3.5     CBC WITH AUTOMATED DIFF    Collection Time: 01/30/18  7:27 PM   Result Value Ref Range    WBC 11.3 (H) 4.3 - 11.1 K/uL    RBC 3.19 (L) 4.23 - 5.67 M/uL    HGB 8.2 (L) 13.6 - 17.2 g/dL    HCT 27.6 (L) 41.1 - 50.3 %    MCV 86.5 79.6 - 97.8 FL    MCH 25.7 (L) 26.1 - 32.9 PG    MCHC 29.7 (L) 31.4 - 35.0 g/dL    RDW 18.7 (H) 11.9 - 14.6 %    PLATELET 210 (H) 262 - 450 K/uL    MPV 9.9 (L) 10.8 - 14.1 FL    NEUTROPHILS 32 (L) 47 - 75 %    BAND NEUTROPHILS 5 0 - 10 %    LYMPHOCYTES 43 16 - 44 %    MONOCYTES 10 (H) 3 - 9 %    EOSINOPHILS 2 1 - 8 %    METAMYELOCYTES 5 %    MYELOCYTES 3 %    ABS. NEUTROPHILS 5.1 1.7 - 8.2 K/UL    ABS. LYMPHOCYTES 4.9 (H) 0.5 - 4.6 K/UL    ABS. MONOCYTES 1.1 0.1 - 1.3 K/UL    ABS.  EOSINOPHILS 0.2 0.0 - 0.8 K/UL    RBC COMMENTS SLIGHT  ANISOCYTOSIS + POIKILOCYTOSIS        WBC COMMENTS Result Confirmed By Smear      PLATELET COMMENTS MARKED      DF MANUAL     CULTURE, BLOOD    Collection Time: 01/30/18  7:27 PM   Result Value Ref Range    Special Requests: RIGHT  Antecubital        Culture result: NO GROWTH AFTER 10 HOURS     CULTURE, BLOOD    Collection Time: 01/30/18  7:27 PM   Result Value Ref Range    Special Requests: LEFT  FOREARM        Culture result: NO GROWTH AFTER 10 HOURS     PROCALCITONIN    Collection Time: 01/30/18  7:27 PM   Result Value Ref Range    Procalcitonin 0.1 ng/mL   POC LACTIC ACID    Collection Time: 01/30/18  7:35 PM   Result Value Ref Range    Lactic Acid (POC) 2.6 (H) 0.5 - 1.9 mmol/L   TYPE & SCREEN    Collection Time: 01/30/18  7:41 PM   Result Value Ref Range    Crossmatch Expiration 02/02/2018     ABO/Rh(D) Raheem Chaz NEGATIVE     Antibody screen NEG    POC TROPONIN-I    Collection Time: 01/30/18  8:08 PM   Result Value Ref Range    Troponin-I (POC) 0.02 0.02 - 0.05 ng/ml   INFLUENZA A & B AG (RAPID TEST)    Collection Time: 01/30/18 11:11 PM   Result Value Ref Range    Influenza A Ag NEGATIVE  NEG      Influenza B Ag NEGATIVE  NEG     CULTURE, URINE    Collection Time: 01/30/18 11:11 PM   Result Value Ref Range    Special Requests: NO SPECIAL REQUESTS      Culture result:        NO GROWTH AFTER SHORT PERIOD OF INCUBATION. FURTHER RESULTS TO FOLLOW AFTER OVERNIGHT INCUBATION.    METABOLIC PANEL, BASIC    Collection Time: 01/31/18  4:39 AM   Result Value Ref Range    Sodium 140 136 - 145 mmol/L    Potassium 3.7 3.5 - 5.1 mmol/L    Chloride 106 98 - 107 mmol/L    CO2 24 21 - 32 mmol/L    Anion gap 10 7 - 16 mmol/L    Glucose 111 (H) 65 - 100 mg/dL    BUN 12 8 - 23 MG/DL    Creatinine 0.43 (L) 0.8 - 1.5 MG/DL    GFR est AA >60 >60 ml/min/1.73m2    GFR est non-AA >60 >60 ml/min/1.73m2    Calcium 8.0 (L) 8.3 - 10.4 MG/DL   CBC W/O DIFF    Collection Time: 01/31/18  4:39 AM   Result Value Ref Range    WBC 10.2 4.3 - 11.1 K/uL    RBC 3.01 (L) 4.23 - 5.67 M/uL    HGB 7.7 (L) 13.6 - 17.2 g/dL    HCT 25.9 (L) 41.1 - 50.3 %    MCV 86.0 79.6 - 97.8 FL    MCH 25.6 (L) 26.1 - 32.9 PG    MCHC 29.7 (L) 31.4 - 35.0 g/dL    RDW 18.7 (H) 11.9 - 14.6 %    PLATELET 680 872 - 707 K/uL    MPV 10.5 (L) 10.8 - 14.1 FL   LACTIC ACID    Collection Time: 01/31/18  4:39 AM   Result Value Ref Range    Lactic acid 1.5 0.4 - 2.0 MMOL/L   GLUCOSE, POC    Collection Time: 01/31/18  8:19 AM   Result Value Ref Range    Glucose (POC) 99 65 - 100 mg/dL   GLUCOSE, POC    Collection Time: 01/31/18 11:37 AM   Result Value Ref Range    Glucose (POC) 95 65 - 100 mg/dL   EKG, 12 LEAD, INITIAL    Collection Time: 01/31/18 12:26 PM   Result Value Ref Range    Ventricular Rate 92 BPM    Atrial Rate 92 BPM    P-R Interval 116 ms    QRS Duration 70 ms    Q-T Interval 358 ms    QTC Calculation (Bezet) 442 ms    Calculated P Axis 66 degrees    Calculated R Axis 29 degrees    Calculated T Axis 74 degrees    Diagnosis       Sinus rhythm with Premature atrial complexes  Otherwise normal ECG  When compared with ECG of 01-DEC-2017 15:42,  Premature atrial complexes are now Present           Assessment/Plan:     Principal Problem:    Sepsis due to pneumonia ([facility] [address] (1/30/2018)  Continue rocephin, flagyl. Consult ST. Make NPO and consult nutrition for tube feed management. Active Problems:    Type II diabetes mellitus with complication ([address] (46/8/9648)      Aspiration pneumonia ([address] (1/30/2018)      Dementia with behavioral disturbance (1/30/2018)      Sacral ulcer ([address] (1/30/2018)  Present on admission. Wound care consult pending.        UTI (urinary tract infection) (1/31/2018)  Rocephin IV    Care Plan discussed with: Patient/Family, Nurse and       Signed By: Cristopher Francis MD     January 31, 2018

## 2018-01-31 NOTE — ED TRIAGE NOTES
Pt arrives via EMS from [facility] for possible sepsis. EMS states pt developed cough yesterday and fever. Afebrile today. States had c x ray done which was neg. WBC 14, hgb 7.2 yesterday. EMS states pt tachy at 120, respirations 30, room air o2 98%. Pt currently at baseline with dementia. Pt received 500 ns and 3.375 zosyn in route.

## 2018-01-31 NOTE — PROGRESS NOTES
[facility] to fax [facility] so PTA med list can be updated. Will notify MD once completed.

## 2018-01-31 NOTE — H&P
Hospitalist H&P Note     Admit Date:  2018  7:14 PM   Name:  Kayce Chin   Age:  79 y.o.  :  1950   MRN:  [id number]   PCP:  None  Treatment Team: Attending Provider: Terrell Brown MD; Primary Nurse: Tiffany Vieira    HPI:   Patient is a 78 y/o with hx of TBI with subdural hemorrhage, dementia, bedridden with sacral ulcer and diverting colostomy, dysphagia and aspiration episodes with feeding tube but still on pureed diet per family, who p/w cough with blood streaked sputum from SNF facility. Dementia limits history, family provides. Says he has had worsening cough and SOB for over a week and today noted some blood in his sputum. Had a fever yesterday as well. Denies chills, CP, n/v, diarrhea/change in ostomy output. Noted to have positive UA at SNF with nitrites, LE, and bacteria. Was brought here for eval.    10 systems reviewed and negative except as noted in HPI. Past Medical History:   Diagnosis Date    Diabetes ([facility]       No past surgical history on file. No Known Allergies   Social History   Substance Use Topics    Smoking status: Former Smoker    Smokeless tobacco: Not on file    Alcohol use Not on file      No family history on file. Immunization History   Administered Date(s) Administered    TB Skin Test (PPD) Intradermal 2017     PTA Medications:  Prior to Admission Medications   Prescriptions Last Dose Informant Patient Reported? Taking? Blood-Glucose Meter monitoring kit   No No   Sig: To check blood sugars 3 times per day - In AM and 2h after a meal. Please provide adequate supplies for 30 days ( lancets, strips, etc) as approved by his insurance   albuterol (PROVENTIL VENTOLIN) 2.5 mg /3 mL (0.083 %) nebulizer solution   No No   Sig: 3 mL by Nebulization route every four (4) hours as needed for Wheezing. guaiFENesin ER (MUCINEX) 1,200 mg Ta12 ER tablet   No No   Sig: Take 1 Tab by mouth every twelve (12) hours.    multivitamin (ONE A DAY) tablet   Yes No   Sig: Take 1 Tab by mouth daily. pantoprazole (PROTONIX) 40 mg tablet   No No   Sig: Take 1 Tab by mouth Daily (before breakfast). sodium hypochlorite (QUARTER STRENGTH DAKIN'S) 0.125 % soln external solution   No No   Sig: Apply 473 mL to affected area daily. Facility-Administered Medications: None       Objective:   Patient Vitals for the past 24 hrs:   Temp Pulse Resp BP SpO2   01/30/18 2218 - (!) 101 (!) 31 142/64 100 %   01/30/18 2110 - 100 22 109/67 98 %   01/30/18 1917 98.3 °F (36.8 °C) (!) 111 25 130/83 98 %     Oxygen Therapy  O2 Sat (%): 100 % (01/30/18 2218)  Pulse via Oximetry: 101 beats per minute (01/30/18 2218)  No intake or output data in the 24 hours ending 01/30/18 2314    Physical Exam:  General:    Frail. Alert. Eyes:   Normal sclera. Extraocular movements intact. ENT:  Normocephalic, atraumatic. Moist mucous membranes  CV:   Mild tachy. No m/r/g. Peripheral pulses 2+. Capillary refill <2s. Lungs:  CTAB. No wheezing, rhonchi, or rales. Abdomen: Soft, nontender, nondistended. ostomy  Extremities: Warm and dry. No cyanosis or edema. Neurologic: CN II-XII grossly intact. Sensation intact. Slurred speech, chronic  Skin:     No rashes or jaundice. Normal coloration  Psych:  Normal mood and affect. I reviewed the labs, imaging, EKGs, telemetry, and other studies done this admission.   Data Review:   Recent Results (from the past 24 hour(s))   METABOLIC PANEL, COMPREHENSIVE    Collection Time: 01/30/18  7:27 PM   Result Value Ref Range    Sodium 138 136 - 145 mmol/L    Potassium 4.2 3.5 - 5.1 mmol/L    Chloride 103 98 - 107 mmol/L    CO2 26 21 - 32 mmol/L    Anion gap 9 7 - 16 mmol/L    Glucose 131 (H) 65 - 100 mg/dL    BUN 15 8 - 23 MG/DL    Creatinine 0.53 (L) 0.8 - 1.5 MG/DL    GFR est AA >60 >60 ml/min/1.73m2    GFR est non-AA >60 >60 ml/min/1.73m2    Calcium 8.6 8.3 - 10.4 MG/DL    Bilirubin, total 0.5 0.2 - 1.1 MG/DL    ALT (SGPT) 29 12 - 65 U/L    AST (SGOT) 22 15 - 37 U/L    Alk. phosphatase 105 50 - 136 U/L    Protein, total 8.0 6.3 - 8.2 g/dL    Albumin 1.9 (L) 3.2 - 4.6 g/dL    Globulin 6.1 (H) 2.3 - 3.5 g/dL    A-G Ratio 0.3 (L) 1.2 - 3.5     CBC WITH AUTOMATED DIFF    Collection Time: 01/30/18  7:27 PM   Result Value Ref Range    WBC 11.3 (H) 4.3 - 11.1 K/uL    RBC 3.19 (L) 4.23 - 5.67 M/uL    HGB 8.2 (L) 13.6 - 17.2 g/dL    HCT 27.6 (L) 41.1 - 50.3 %    MCV 86.5 79.6 - 97.8 FL    MCH 25.7 (L) 26.1 - 32.9 PG    MCHC 29.7 (L) 31.4 - 35.0 g/dL    RDW 18.7 (H) 11.9 - 14.6 %    PLATELET 253 (H) 697 - 450 K/uL    MPV 9.9 (L) 10.8 - 14.1 FL    NEUTROPHILS 32 (L) 47 - 75 %    BAND NEUTROPHILS 5 0 - 10 %    LYMPHOCYTES 43 16 - 44 %    MONOCYTES 10 (H) 3 - 9 %    EOSINOPHILS 2 1 - 8 %    METAMYELOCYTES 5 %    MYELOCYTES 3 %    ABS. NEUTROPHILS 5.1 1.7 - 8.2 K/UL    ABS. LYMPHOCYTES 4.9 (H) 0.5 - 4.6 K/UL    ABS. MONOCYTES 1.1 0.1 - 1.3 K/UL    ABS.  EOSINOPHILS 0.2 0.0 - 0.8 K/UL    RBC COMMENTS SLIGHT  ANISOCYTOSIS + POIKILOCYTOSIS        WBC COMMENTS Result Confirmed By Smear      PLATELET COMMENTS MARKED      DF MANUAL     PROCALCITONIN    Collection Time: 01/30/18  7:27 PM   Result Value Ref Range    Procalcitonin 0.1 ng/mL   POC LACTIC ACID    Collection Time: 01/30/18  7:35 PM   Result Value Ref Range    Lactic Acid (POC) 2.6 (H) 0.5 - 1.9 mmol/L   TYPE & SCREEN    Collection Time: 01/30/18  7:41 PM   Result Value Ref Range    Crossmatch Expiration 02/02/2018     ABO/Rh(D) [place] Ends NEGATIVE     Antibody screen NEG    POC TROPONIN-I    Collection Time: 01/30/18  8:08 PM   Result Value Ref Range    Troponin-I (POC) 0.02 0.02 - 0.05 ng/ml       All Micro Results     Procedure Component Value Units Date/Time    CULTURE, URINE [[id number]]     Order Status:  Sent Specimen:  Urine from Urine     INFLUENZA A & B AG (RAPID TEST) [[id number]]     Order Status:  Sent Specimen:  Nasopharyngeal from Nasal washing     CULTURE, BLOOD [[id number]] Collected:  01/30/18 1927    Order Status:  Completed Specimen:  Blood from Blood Updated:  01/30/18 2009    CULTURE, BLOOD [[id number]] Collected:  01/30/18 1927    Order Status:  Completed Specimen:  Blood from Blood Updated:  01/30/18 2009          Other Studies:  Ct Chest W Cont    Result Date: 1/30/2018  CT CHEST WITH CONTRAST 1/30/2018 HISTORY: Hemoptysis and shortness of breath. COMPARISON: Portable chest x-ray from the same day. TECHNIQUE: The patient received 100 mL Isovue-370 nonionic IV contrast and axial images were obtained through the chest. Coronal reformatted images were generated. All CT scans at this facility used dose modulation, interactive reconstruction and/or weight based dosing when appropriate to reduce radiation dose to as low as reasonably achievable. FINDINGS: The thoracic aorta is well-opacified without aneurysm or dissection. There are no filling defects within the main or proximal segmental pulmonary artery suggestive of emboli. The esophagus is distended with fluid and debris. Left lower lobe airways are opacified. This may be caused by aspiration. There is consolidation in the posterior left lower lobe. Emphysematous changes are present in the lungs. Included images of the upper abdomen demonstrate intraluminal gallstones. There are no aggressive osseous lesions. IMPRESSION: 1. No pulmonary embolism. 2. COPD. 3. Opacified left lower lobe airways with left lower lobe consolidation. This may be caused by aspiration. Xr Chest Port    Result Date: 1/30/2018  PORTABLE CHEST X-RAY HISTORY: Chest pain COMPARISON: December 22, 2017 FINDINGS: EKG leads are present. There is no consolidation, pleural effusions, or pulmonary edema. IMPRESSION: No consolidation.       Assessment and Plan:     Hospital Problems as of 1/30/2018  Date Reviewed: 12/22/2017          Codes Class Noted - Resolved POA    Aspiration pneumonia ([facility] ICD-10-CM: J69.0  ICD-9-CM: 507.0  1/30/2018 - Present Yes        * (Principal)Sepsis due to pneumonia [facility]) ICD-10-CM: J18.9, A41.9  ICD-9-CM: 972, 995.91  1/30/2018 - Present Yes        Dementia with behavioral disturbance (Chronic) ICD-10-CM: F03.91  ICD-9-CM: 294.21  1/30/2018 - Present Yes        Type II diabetes mellitus with complication (HCC) (Chronic) ICD-10-CM: E11.8  ICD-9-CM: 250.90  12/1/2017 - Present Yes              PLAN:  · Admit to inpatient  · Rocephin and flagyl to cover UTI and aspiration PNA. Cont home modified diet  · IVF  · ISS for DM  · Wound care for sacral ulcer; defer staging to them  · Asked nursing to update med rec to match SNF records; morning physician can review.     DVT ppx:  lovenox  Anticipated DC needs:  Back to SNF when  Code status:  DNR   Estimated LOS:  Greater than 2 midnights  Risk:  high    Signed:  Piter Umana MD

## 2018-01-31 NOTE — PROGRESS NOTES
Wound on right leg noted. PEG and colostomy on abdomen. Sacral dressing present.  Ulcer per ED report

## 2018-01-31 NOTE — PROGRESS NOTES
Downtime progress note entry for [facility] : Ashley Cruz RN  Care  Follow up Outreach Note   Outreach type: Follow up  Patient name: Tiago Rosenthal  : 50  MRN: [id number]    Date/Time of Outreach: 18     Reason for follow-up:   Continuation of Care   Disease specific complaints/issues:   Non Traumatic Sub-Dural Hematoma     Patient progress towards goals set from last contact:   LVM with pt. left call back # and encourage to call CC. Has patient attended any PCP or specialist follow-up appointments since last contact? What was outcome of appointment? When is next follow-up scheduled? Review medications. Any medication changes since last outreach? Does patient have any questions or issues related to their medications? Home health active? If yes - any issue? Progress? Referrals needed?  (SW, Diabetes education, HH, etc. )    Other issues/Miscellaneous?  (Transportation, access to meals, ability to perform ADLs, adequate caregiver support, etc.)              Next Outreach Scheduled:      Next Steps/Goals:      Care  completing call:

## 2018-01-31 NOTE — ED NOTES
Changed dressing on right leg with wt to dry ns and saline. Wrapped with gauze, ABD pads and kerlix.

## 2018-01-31 NOTE — PROGRESS NOTES
Downtime progress note entry for [facility] : [facility] Follow up Outreach Note   Outreach type: Follow up  Patient name: Benjy Green  : 50  MRN: [id number]    Date/Time of Outreach: 18     Reason for follow-up:   Continuation of Care   Disease specific complaints/issues: Non Traumatic Subdural Hemorrhage       Patient progress towards goals set from last contact:   LVM with pt. Left call Back # and encourage to call CC. Has patient attended any PCP or specialist follow-up appointments since last contact? What was outcome of appointment? When is next follow-up scheduled? Review medications. Any medication changes since last outreach? Does patient have any questions or issues related to their medications? Home health active? If yes - any issue? Progress? Referrals needed?  (SW, Diabetes education, HH, etc. )    Other issues/Miscellaneous?  (Transportation, access to meals, ability to perform ADLs, adequate caregiver support, etc.)              Next Outreach Scheduled:      Next Steps/Goals:      Care  completing call:

## 2018-01-31 NOTE — PROGRESS NOTES
Spoke briefly to . [address] in room 216 about discharge planning. He is alert, but oriented to name only. He was at [facility], then to [facility] skilled nursing facility for short-term rehab (not long-term). Per Ms. Ioana Avelar at [place], . Kelly Garcia is not on a bed hold for rehab, but they will follow along and likely offer his rehab bed back to him if still needed and if still available. Will follow and assist with discharge planning.

## 2018-02-01 PROBLEM — Z93.1 PEG (PERCUTANEOUS ENDOSCOPIC GASTROSTOMY) STATUS (HCC): Status: ACTIVE | Noted: 2018-02-01

## 2018-02-01 LAB
ANION GAP SERPL CALC-SCNC: 10 MMOL/L (ref 7–16)
BUN SERPL-MCNC: 18 MG/DL (ref 8–23)
CALCIUM SERPL-MCNC: 7.9 MG/DL (ref 8.3–10.4)
CHLORIDE SERPL-SCNC: 108 MMOL/L (ref 98–107)
CO2 SERPL-SCNC: 23 MMOL/L (ref 21–32)
CREAT SERPL-MCNC: 0.42 MG/DL (ref 0.8–1.5)
DIFFERENTIAL METHOD BLD: ABNORMAL
EOSINOPHIL # BLD: 0.2 K/UL (ref 0–0.8)
EOSINOPHIL NFR BLD MANUAL: 2 % (ref 1–8)
ERYTHROCYTE [DISTWIDTH] IN BLOOD BY AUTOMATED COUNT: 18.9 % (ref 11.9–14.6)
GLUCOSE BLD STRIP.AUTO-MCNC: 123 MG/DL (ref 65–100)
GLUCOSE BLD STRIP.AUTO-MCNC: 125 MG/DL (ref 65–100)
GLUCOSE BLD STRIP.AUTO-MCNC: 156 MG/DL (ref 65–100)
GLUCOSE BLD STRIP.AUTO-MCNC: 167 MG/DL (ref 65–100)
GLUCOSE SERPL-MCNC: 151 MG/DL (ref 65–100)
HCT VFR BLD AUTO: 23.5 % (ref 41.1–50.3)
HGB BLD-MCNC: 7 G/DL (ref 13.6–17.2)
LYMPHOCYTES # BLD: 2.1 K/UL (ref 0.5–4.6)
LYMPHOCYTES NFR BLD MANUAL: 28 % (ref 16–44)
MAGNESIUM SERPL-MCNC: 2.1 MG/DL (ref 1.8–2.4)
MCH RBC QN AUTO: 25.5 PG (ref 26.1–32.9)
MCHC RBC AUTO-ENTMCNC: 28.9 G/DL (ref 31.4–35)
MCV RBC AUTO: 88 FL (ref 79.6–97.8)
METAMYELOCYTES NFR BLD MANUAL: 2 %
MONOCYTES # BLD: 0.3 K/UL (ref 0.1–1.3)
MONOCYTES NFR BLD MANUAL: 4 % (ref 3–9)
MYELOCYTES NFR BLD MANUAL: 3 %
NEUTS BAND NFR BLD MANUAL: 1 % (ref 0–10)
NEUTS SEG # BLD: 5 K/UL (ref 1.7–8.2)
NEUTS SEG NFR BLD MANUAL: 59 % (ref 47–75)
PHOSPHATE SERPL-MCNC: 2.5 MG/DL (ref 2.3–3.7)
PLATELET # BLD AUTO: 412 K/UL (ref 150–450)
PLATELET COMMENTS,PCOM: ADEQUATE
PMV BLD AUTO: 10.5 FL (ref 10.8–14.1)
POTASSIUM SERPL-SCNC: 3.7 MMOL/L (ref 3.5–5.1)
PROMYELOCYTES NFR BLD MANUAL: 1 %
RBC # BLD AUTO: 2.67 M/UL (ref 4.23–5.67)
RBC MORPH BLD: ABNORMAL
SODIUM SERPL-SCNC: 141 MMOL/L (ref 136–145)
WBC # BLD AUTO: 7.6 K/UL (ref 4.3–11.1)
WBC MORPH BLD: ABNORMAL

## 2018-02-01 PROCEDURE — 74011250637 HC RX REV CODE- 250/637: Performed by: INTERNAL MEDICINE

## 2018-02-01 PROCEDURE — 74011636637 HC RX REV CODE- 636/637: Performed by: INTERNAL MEDICINE

## 2018-02-01 PROCEDURE — 74011250636 HC RX REV CODE- 250/636: Performed by: INTERNAL MEDICINE

## 2018-02-01 PROCEDURE — 65270000029 HC RM PRIVATE

## 2018-02-01 PROCEDURE — 80048 BASIC METABOLIC PNL TOTAL CA: CPT | Performed by: INTERNAL MEDICINE

## 2018-02-01 PROCEDURE — 74011250636 HC RX REV CODE- 250/636: Performed by: FAMILY MEDICINE

## 2018-02-01 PROCEDURE — 74011000250 HC RX REV CODE- 250: Performed by: INTERNAL MEDICINE

## 2018-02-01 PROCEDURE — 36415 COLL VENOUS BLD VENIPUNCTURE: CPT | Performed by: INTERNAL MEDICINE

## 2018-02-01 PROCEDURE — 92526 ORAL FUNCTION THERAPY: CPT

## 2018-02-01 PROCEDURE — 83735 ASSAY OF MAGNESIUM: CPT | Performed by: INTERNAL MEDICINE

## 2018-02-01 PROCEDURE — 85025 COMPLETE CBC W/AUTO DIFF WBC: CPT | Performed by: INTERNAL MEDICINE

## 2018-02-01 PROCEDURE — 82962 GLUCOSE BLOOD TEST: CPT

## 2018-02-01 PROCEDURE — 84100 ASSAY OF PHOSPHORUS: CPT | Performed by: INTERNAL MEDICINE

## 2018-02-01 RX ORDER — VANCOMYCIN HYDROCHLORIDE
1250 EVERY 12 HOURS
Status: DISCONTINUED | OUTPATIENT
Start: 2018-02-01 | End: 2018-02-01

## 2018-02-01 RX ORDER — INSULIN GLARGINE 100 [IU]/ML
20 INJECTION, SOLUTION SUBCUTANEOUS DAILY
Status: DISCONTINUED | OUTPATIENT
Start: 2018-02-02 | End: 2018-02-03 | Stop reason: HOSPADM

## 2018-02-01 RX ORDER — HYDROMORPHONE HYDROCHLORIDE 2 MG/1
1 TABLET ORAL
Status: DISCONTINUED | OUTPATIENT
Start: 2018-02-01 | End: 2018-02-03 | Stop reason: HOSPADM

## 2018-02-01 RX ORDER — GUAIFENESIN 100 MG/5ML
81 LIQUID (ML) ORAL DAILY
Status: DISCONTINUED | OUTPATIENT
Start: 2018-02-02 | End: 2018-02-03 | Stop reason: HOSPADM

## 2018-02-01 RX ADMIN — VANCOMYCIN HYDROCHLORIDE 1000 MG: 1 INJECTION, POWDER, LYOPHILIZED, FOR SOLUTION INTRAVENOUS at 17:27

## 2018-02-01 RX ADMIN — GUAIFENESIN AND DEXTROMETHORPHAN 10 ML: 100; 10 SYRUP ORAL at 13:36

## 2018-02-01 RX ADMIN — HYDROCODONE BITARTRATE AND HOMATROPINE METHYLBROMIDE 5 ML: 5; 1.5 SOLUTION ORAL at 15:29

## 2018-02-01 RX ADMIN — GUAIFENESIN AND DEXTROMETHORPHAN 10 ML: 100; 10 SYRUP ORAL at 21:36

## 2018-02-01 RX ADMIN — METRONIDAZOLE 500 MG: 500 INJECTION, SOLUTION INTRAVENOUS at 23:28

## 2018-02-01 RX ADMIN — ENOXAPARIN SODIUM 40 MG: 40 INJECTION SUBCUTANEOUS at 07:56

## 2018-02-01 RX ADMIN — GUAIFENESIN AND DEXTROMETHORPHAN 10 ML: 100; 10 SYRUP ORAL at 16:32

## 2018-02-01 RX ADMIN — INSULIN LISPRO 2 UNITS: 100 INJECTION, SOLUTION INTRAVENOUS; SUBCUTANEOUS at 07:57

## 2018-02-01 RX ADMIN — HYDROCODONE BITARTRATE AND ACETAMINOPHEN 1 TABLET: 5; 325 TABLET ORAL at 16:32

## 2018-02-01 RX ADMIN — VANCOMYCIN HYDROCHLORIDE 1250 MG: 10 INJECTION, POWDER, LYOPHILIZED, FOR SOLUTION INTRAVENOUS at 06:11

## 2018-02-01 RX ADMIN — METRONIDAZOLE 500 MG: 500 INJECTION, SOLUTION INTRAVENOUS at 05:12

## 2018-02-01 RX ADMIN — GUAIFENESIN AND DEXTROMETHORPHAN 10 ML: 100; 10 SYRUP ORAL at 10:27

## 2018-02-01 RX ADMIN — HYDROMORPHONE HYDROCHLORIDE 1 MG: 2 TABLET ORAL at 18:26

## 2018-02-01 RX ADMIN — Medication 10 ML: at 23:33

## 2018-02-01 RX ADMIN — Medication 1 AMPULE: at 21:40

## 2018-02-01 RX ADMIN — Medication 10 ML: at 13:36

## 2018-02-01 RX ADMIN — GUAIFENESIN AND DEXTROMETHORPHAN 10 ML: 100; 10 SYRUP ORAL at 05:12

## 2018-02-01 RX ADMIN — METRONIDAZOLE 500 MG: 500 INJECTION, SOLUTION INTRAVENOUS at 13:36

## 2018-02-01 RX ADMIN — INSULIN LISPRO 2 UNITS: 100 INJECTION, SOLUTION INTRAVENOUS; SUBCUTANEOUS at 12:14

## 2018-02-01 RX ADMIN — Medication 1 AMPULE: at 07:56

## 2018-02-01 RX ADMIN — HYDROCODONE BITARTRATE AND ACETAMINOPHEN 1 TABLET: 5; 325 TABLET ORAL at 01:12

## 2018-02-01 RX ADMIN — HYDROCODONE BITARTRATE AND ACETAMINOPHEN 1 TABLET: 5; 325 TABLET ORAL at 21:35

## 2018-02-01 RX ADMIN — WATER 1 G: 1 INJECTION INTRAMUSCULAR; INTRAVENOUS; SUBCUTANEOUS at 23:38

## 2018-02-01 NOTE — PROGRESS NOTES
During IDT Rounds, Dr. Chip Kayser said pt will likely be ready tomorrow. Pt came from [facility]. SW contacted [facility] who said they'll take him back pending [employer] approval.  VILMA entered referral into [facility] and ordered PT/OT. VILMA called and notified Janeth Yeboah in Therapy that therapies have been ordered and requested to have them see him this morning.

## 2018-02-01 NOTE — PROGRESS NOTES
Attempted to see Mr. Camacho however Ramandeep Pool had placed him on his right side due to tube feeding residual.  Asked PT to hold off. PT will see him in the morning.   Prince Skaggs, PT

## 2018-02-01 NOTE — PROGRESS NOTES
Tube feeding stopped due to residual being greater than 250 ml, two consecutive times. Patient placed on right side.

## 2018-02-01 NOTE — WOUND CARE
Right groin/ hip/ thigh and buttock with extensive full thickness wound from previous surgical debridements of Byron Velezjuan carlos  2017. Was using moist to dry dressings at rehab, was using Dakin's prior to going to rehab at [facility]. Wounds have large amount of green and serous sanguinous drainage, concern for localized infection. Left buttock with 5x6cm eschar with large amount of green drainage, may benefit from debridement however can be treated with chemical debridement. Recommend Dakin's moist to dry dressings to all areas bid and prnsaturation. Will need these bandages continued at rehab. The plan prior to going to LTAC was for possible skin grafts with Dr. Lenis Apgar, will need follow up with Dr Lenis Apgar after discharge. Will monitor while in acute care.

## 2018-02-01 NOTE — PROGRESS NOTES
Hospitalist Progress Note    Subjective:   Daily Progress Note: 2/1/2018 9:14 AM    Mr. Cirilo Aviles is a 80 yo WM, with a pmh of TBI for which he has chronic PEG due to recurrent aspiration, who presented 1/30 for SOB and blood in his sputum and is found to meet sepsis criteria due to aspiration pneumonia in his left lower lung and UTI. He is on NS, iv rocephin and flagyl. Family still allowing him to have food intake thus predisposing him to recurrent aspiration. No family at bedside. Currently tolerating room air. States he is having hip discomfort but no sob, nausea.      Current Facility-Administered Medications   Medication Dose Route Frequency    vancomycin (VANCOCIN) 1250 mg in  ml infusion  1,250 mg IntraVENous Q12H    alcohol 62% (NOZIN) nasal  1 Ampule  1 Ampule Topical Q12H    cefTRIAXone (ROCEPHIN) 1 g in sterile water (preservative free) 10 mL IV syringe  1 g IntraVENous Q24H    albuterol (PROVENTIL VENTOLIN) nebulizer solution 2.5 mg  2.5 mg Nebulization Q4H PRN    guaiFENesin ER (MUCINEX) tablet 600 mg  600 mg Oral Q12H    guaiFENesin-dextromethorphan (ROBITUSSIN DM) 100-10 mg/5 mL syrup 10 mL  10 mL Oral Q4HWA    0.9% sodium chloride infusion  60 mL/hr IntraVENous CONTINUOUS    promethazine (PHENERGAN) with saline injection 25 mg  25 mg IntraVENous Q6H PRN    polyethylene glycol (MIRALAX) packet 17 g  17 g Oral DAILY PRN    HYDROcodone-homatropine (HYCODAN) 5-1.5 mg/5 mL (5 mL) syrup 5 mL  5 mL Oral Q4H PRN    hydrALAZINE (APRESOLINE) 20 mg/mL injection 20 mg  20 mg IntraVENous Q6H PRN    haloperidol lactate (HALDOL) injection 4 mg  4 mg IntraVENous Q6H PRN    bisacodyl (DULCOLAX) tablet 5 mg  5 mg Oral DAILY PRN    alum-mag hydroxide-simeth (MYLANTA) oral suspension 30 mL  30 mL Oral Q4H PRN    sodium chloride (NS) flush 5-10 mL  5-10 mL IntraVENous Q8H    sodium chloride (NS) flush 5-10 mL  5-10 mL IntraVENous PRN    acetaminophen (TYLENOL) tablet 650 mg  650 mg Oral Q4H PRN    HYDROcodone-acetaminophen (NORCO) 5-325 mg per tablet 1 Tab  1 Tab Oral Q4H PRN    naloxone (NARCAN) injection 0.4 mg  0.4 mg IntraVENous PRN    diphenhydrAMINE (BENADRYL) injection 25 mg  25 mg IntraVENous Q4H PRN    ondansetron (ZOFRAN) injection 4 mg  4 mg IntraVENous Q4H PRN    senna-docusate (PERICOLACE) 8.6-50 mg per tablet 2 Tab  2 Tab Oral DAILY PRN    LORazepam (ATIVAN) tablet 1 mg  1 mg Oral Q4H PRN    zolpidem (AMBIEN) tablet 5 mg  5 mg Oral QHS PRN    insulin lispro (HUMALOG) injection   SubCUTAneous AC&HS    dextrose 40% (GLUTOSE) oral gel 1 Tube  15 g Oral PRN    glucagon (GLUCAGEN) injection 1 mg  1 mg IntraMUSCular PRN    dextrose (D50W) injection syrg 12.5-25 g  25-50 mL IntraVENous PRN    enoxaparin (LOVENOX) injection 40 mg  40 mg SubCUTAneous Q24H    metroNIDAZOLE (FLAGYL) IVPB premix 500 mg  500 mg IntraVENous Q8H        Review of Systems  A comprehensive review of systems was negative except for that written in the HPI.     Objective:     Visit Vitals    BP 96/55    Pulse 88    Temp 97.8 °F (36.6 °C)    Resp 18    Wt 64.7 kg (142 lb 11.2 oz)    SpO2 95%    BMI 19.9 kg/m2           Temp (24hrs), Av.9 °F (36.6 °C), Min:97.4 °F (36.3 °C), Max:98.7 °F (37.1 °C)      701 -  190  In: 287 [I.V.:287]  Out: -   1901 -  0700  In: 353 [I.V.:868]  Out:  [FPBLQ:0084]    General: awake, alert, oriented to person  Eyes; non icteric  Neck; supple  CV: RRR  Pulm; diminished in bases, course, non labored, no wheezing  Abd; soft, non tender, PEG in place    Additional comments:I reviewed the patient's new clinical lab test results. j    Data Review    Recent Results (from the past 24 hour(s))   GLUCOSE, POC    Collection Time: 18 11:37 AM   Result Value Ref Range    Glucose (POC) 95 65 - 100 mg/dL   EKG, 12 LEAD, INITIAL    Collection Time: 18 12:26 PM   Result Value Ref Range    Ventricular Rate 92 BPM    Atrial Rate 92 BPM    P-R Interval 116 ms    QRS Duration 70 ms    Q-T Interval 358 ms    QTC Calculation (Bezet) 442 ms    Calculated P Axis 66 degrees    Calculated R Axis 29 degrees    Calculated T Axis 74 degrees    Diagnosis       Sinus rhythm with Premature atrial complexes  Otherwise normal ECG  When compared with ECG of 01-DEC-2017 15:42,  Premature atrial complexes are now Present  Confirmed by ST TITA CORRAL MD (), NAYAN PRO ([id number]) on 1/31/2018 4:36:59 PM     GLUCOSE, POC    Collection Time: 01/31/18  5:03 PM   Result Value Ref Range    Glucose (POC) 93 65 - 100 mg/dL   GLUCOSE, POC    Collection Time: 01/31/18  9:04 PM   Result Value Ref Range    Glucose (POC) 117 (H) 65 - 100 mg/dL   CBC WITH AUTOMATED DIFF    Collection Time: 02/01/18  4:23 AM   Result Value Ref Range    WBC 7.6 4.3 - 11.1 K/uL    RBC 2.67 (L) 4.23 - 5.67 M/uL    HGB 7.0 (L) 13.6 - 17.2 g/dL    HCT 23.5 (L) 41.1 - 50.3 %    MCV 88.0 79.6 - 97.8 FL    MCH 25.5 (L) 26.1 - 32.9 PG    MCHC 28.9 (L) 31.4 - 35.0 g/dL    RDW 18.9 (H) 11.9 - 14.6 %    PLATELET 975 043 - 442 K/uL    MPV 10.5 (L) 10.8 - 14.1 FL    NEUTROPHILS 59 47 - 75 %    BAND NEUTROPHILS 1 0 - 10 %    LYMPHOCYTES 28 16 - 44 %    MONOCYTES 4 3 - 9 %    EOSINOPHILS 2 1 - 8 %    METAMYELOCYTES 2 %    MYELOCYTES 3 %    PROMYELOCYTES 1 %    ABS. NEUTROPHILS 5.0 1.7 - 8.2 K/UL    ABS. LYMPHOCYTES 2.1 0.5 - 4.6 K/UL    ABS. MONOCYTES 0.3 0.1 - 1.3 K/UL    ABS.  EOSINOPHILS 0.2 0.0 - 0.8 K/UL    RBC COMMENTS SLIGHT  POLYCHROMASIA        RBC COMMENTS ANISOCYTOSIS + POIKILOCYTOSIS      RBC COMMENTS OCCASIONAL  OVALOCYTES        WBC COMMENTS Result Confirmed By Smear      PLATELET COMMENTS ADEQUATE      DF MANUAL     METABOLIC PANEL, BASIC    Collection Time: 02/01/18  4:23 AM   Result Value Ref Range    Sodium 141 136 - 145 mmol/L    Potassium 3.7 3.5 - 5.1 mmol/L    Chloride 108 (H) 98 - 107 mmol/L    CO2 23 21 - 32 mmol/L    Anion gap 10 7 - 16 mmol/L    Glucose 151 (H) 65 - 100 mg/dL    BUN 18 8 - 23 MG/DL    Creatinine 0.42 (L) 0.8 - 1.5 MG/DL    GFR est AA >60 >60 ml/min/1.73m2    GFR est non-AA >60 >60 ml/min/1.73m2    Calcium 7.9 (L) 8.3 - 10.4 MG/DL   MAGNESIUM    Collection Time: 02/01/18  4:23 AM   Result Value Ref Range    Magnesium 2.1 1.8 - 2.4 mg/dL   PHOSPHORUS    Collection Time: 02/01/18  4:23 AM   Result Value Ref Range    Phosphorus 2.5 2.3 - 3.7 MG/DL   GLUCOSE, POC    Collection Time: 02/01/18  5:27 AM   Result Value Ref Range    Glucose (POC) 156 (H) 65 - 100 mg/dL         Assessment/Plan:     Principal Problem:    Sepsis due to pneumonia ([address] (1/30/2018)    Active Problems:    Type II diabetes mellitus with complication ([address] (12/8/6632)      Aspiration pneumonia ([address] (1/30/2018)  Continue rocephin and flagyl. ST made patient completely NPO. Will be high risk for recurrent aspiration pneumonia as long as family allows him to have any po intake. MBS today      Dementia with behavioral disturbance (1/30/2018)      Sacral ulcer ([address] (1/30/2018)  Present on admission. Wound care consulted      UTI (urinary tract infection) (1/31/2018)      PEG (percutaneous endoscopic gastrostomy) status ([address] (2/1/2018)  Had 250 residual this morning. At goal of 75 cc's/hr. Likely will be medically ready for DC back to [facility] tomorrow.      Care Plan discussed with: Patient/Family and     Signed By: Radha Esqueda MD     February 1, 2018

## 2018-02-01 NOTE — PROGRESS NOTES
Carlee Lesches with dietary informed about residual over 250ml consecutively. Order to hold due to residuals.

## 2018-02-01 NOTE — PROGRESS NOTES
Problem: Nutrition Deficit  Goal: *Optimize nutritional status  Nutrition:  Reason for assessment: Consult for TF management per nutritional support protocols. (Dr Lucien Stevenson)   Assessment:   Food/Nutrition History: Hx of TBI with subdural hemorrhage, dementia, bedridden with sacral ulcer and diverting colostomy, dysphagia and aspiration episodes with feeding tube but still on pureed diet per family, who p/w cough with blood streaked sputum from SNF facility. Met sepsis criteria due to aspiration pneumonia in his left lower lung and UTI. Tolerating current TF withbdomen with active bowel sounds, residuals  ml. Date of Last BM: 1-31  Pertinent Medications: Rocpehin, Flagyl, Vancomycin, SSI, Pepcid  Pertinent labs: POC Glucoses:   mg/dl. Received no SSI yesterday and 2 units thus far today  Anthropometrics: Height 5'11\", Weight Source: Lift, Weight: 64.7 kg (142 lb 11.2 oz), Body mass index is 19.9 kg/(m^2). BMI class of underweight. Weight hx per EMR ( Based on connect care functionality, RD cannot know if these weight are actual versus stated):   WT / BMI WEIGHT   1/17/2018 166 lb   12/18/2017 166 lb 6.4 oz    If factual, pt has had a 14.5% change in wt within ~1 month c/w severe change. Macronutrient needs:  EER:  1193-0130 kcal /day (30-35 kcal/kg ABW)  EPR:   grams protein/day (1.5-2 grams/kg IBW)  Max CHO:  283 grams/day (50% kcal)  Fluid:  1ml/kcal  Intake/Comparative standards: Current TF: Glucerna 1.2 at 75 ml/hr with 35 ml water q 1hr to provide 2160 kcal/day (100% of needs), 108 grams protein/day (100% of needs), 207 grams CHO/day (does not exceed max CHO),  and ~ 2289 ml free water/day (100% of needs). Nutrition Diagnosis: Increase kcal and protein need r/t increased metabolic needs as evidenced by low BMI, wounds as above. Intervention:  Meals and snacks: NPO  EN: Continue current TF  IV: Decrease IVF to KVO/25 ml/hr.   Discharge nutrition plan: Plan for discharge back to NH, possible tomorrow. Continue TF as per NH RD. Pt was receiving Prostat AWC tid at [facility].     Tommy Schirmer, [address], [address] [place] [address]

## 2018-02-01 NOTE — PROGRESS NOTES
Pharmacokinetic Consult to Pharmacist    Nga Newiden is a 79 y.o. male being treated for GPC in BCx (1 of 2) with Vancomycin. Already on flagyl and rocephin for aspiration pneumonia and UTI. Lab Results   Component Value Date/Time    BUN 12 01/31/2018 04:39 AM    Creatinine 0.43 01/31/2018 04:39 AM    WBC 10.2 01/31/2018 04:39 AM    Procalcitonin 0.1 01/30/2018 07:27 PM    Lactic acid 1.5 01/31/2018 04:39 AM    Lactic Acid (POC) 2.6 01/30/2018 07:35 PM      CrCl cannot be calculated (Unknown ideal weight. ).  ~76    CULTURES:  1/30 BCx: GPC (1 of 2), pending   UCx: NGTD, pending   Flu: negative    Day 1 of vancomycin. Goal trough is 15-20. Vancomycin dose initiated at 1250mg q 12h. Will continue to follow patient.       Thank you,  Juli Brumfield, PharmD  Clinical Pharmacist  [phone]

## 2018-02-01 NOTE — PROGRESS NOTES
LTG: Patient will tolerate least restrictive diet without overt signs or symptoms of airway compromise. STG: Patient will tolerate po trials with speech therapy only without overt signs or symptoms of airway compromise. STG: Patient will participate in modified barium swallow study as clinically indicated. Speech language pathology: bedside swallow note: Initial Assessment    NAME/AGE/GENDER: Brianna Giron is a 79 y.o. male  DATE: 2/1/2018  PRIMARY DIAGNOSIS: Sepsis due to pneumonia ([address]  Aspiration pneumonia ([facility] [address]       ICD-10: Treatment Diagnosis: R13.12 Oropharyngeal Dysphagia. INTERDISCIPLINARY COLLABORATION: Registered Nurse and   PRECAUTIONS/ALLERGIES: Review of patient's allergies indicates no known allergies. ASSESSMENT:Patient seen for po trials this AM. More alert. Dysarthria improved. When asked about liquid consumption at facility, patient stating \"If I yell loud enough, they give me what I want. I really like the plain water\". Unsure if patient's reports are reliable, but re-educated patient on rationale for honey thick liquid recommendations. He verbalized poor understanding of recommendations. Patient was presented with thin via spoon and cup sip. Double swallows palpated with all trials. No cough or throat clear observed. Vocal quality remained clear. He consumed 4 ounces of honey thick liquids without overt signs or symptoms of airway compromise. Recommend modified barium swallow study to objectively assess swallow function. Continue NPO with tube feedings until study is completed. Plan for study on 02/02/18. Dr. Kristi Nunez in agreement with plan. Also discussed with RN. ?????? ? ? This section established at most recent assessment??????????  PROBLEM LIST (Impairments causing functional limitations):  1.  Oropharyngeal dysphagia  REHABILITATION POTENTIAL FOR STATED GOALS: Fair  PLAN OF CARE:   Patient will benefit from skilled intervention to address the following impairments. RECOMMENDATIONS AND PLANNED INTERVENTIONS (Benefits and precautions of therapy have been discussed with the patient.):  · NPO with alternative means of nutrition  MEDICATIONS:  · Non-oral  COMPENSATORY STRATEGIES/MODIFICATIONS INCLUDING:  · None  OTHER RECOMMENDATIONS (including follow up treatment recommendations):   · Patient education  RECOMMENDED DIET MODIFICATIONS DISCUSSED WITH:  · Nursing  · Patient  FREQUENCY/DURATION: Continue to follow patient 3 times a week for duration of hospital stay to address above goals. RECOMMENDED REHABILITATION/EQUIPMENT: (at time of discharge pending progress): Due to the probability of continued deficits (see above) this pa/tient will likely need continued skilled speech therapy after discharge. SUBJECTIVE:   \"I am feeling so much better today. My chest felt so heavy. \"  History of Present Injury/Illness: Mr. Parmjit Sanchez  has a past medical history of Diabetes ([facility] [address] .  He also  has no past surgical history on file. Present Symptoms: oropharyngeal dysphagia   Pain Intensity 1: 0  Pain Location 1: Hip  Pain Orientation 1: Left  Pain Intervention(s) 1: Medication (see MAR)  Current Medications:   No current facility-administered medications on file prior to encounter. Current Outpatient Prescriptions on File Prior to Encounter   Medication Sig Dispense Refill    albuterol (PROVENTIL VENTOLIN) 2.5 mg /3 mL (0.083 %) nebulizer solution 3 mL by Nebulization route every four (4) hours as needed for Wheezing. (Patient taking differently: 2.5 mg by Nebulization route every two (2) hours as needed for Wheezing.) 1 Each 0    pantoprazole (PROTONIX) 40 mg tablet Take 1 Tab by mouth Daily (before breakfast). 14 Tab 0    sodium hypochlorite (QUARTER STRENGTH DAKIN'S) 0.125 % soln external solution Apply 473 mL to affected area daily.  1 Bottle 0    Blood-Glucose Meter monitoring kit To check blood sugars 3 times per day - In AM and 2h after a meal. Please provide adequate supplies for 30 days ( lancets, strips, etc) as approved by his insurance 1 Kit 0    multivitamin (ONE A DAY) tablet Take 1 Tab by mouth daily. Current Dietary Status:  NPO           OBJECTIVE:   Respiratory Status:          MRI/CT Results:1. No pulmonary embolism.     2. COPD.     3. Opacified left lower lobe airways with left lower lobe consolidation. This  may be caused by aspiration. Oral Motor Structure/Speech:  Oral-Motor Structure/Motor Speech  Labial: Impaired coordination  Dentition: Edentulous  Oral Hygiene: Adequate  Lingual: Decreased strength, Incoordinated    BEDSIDE SWALLOW EVALUATION  Oral Assessment:  Oral Assessment  Labial: Impaired coordination  Dentition: Edentulous  Oral Hygiene: Adequate  Lingual: Decreased strength; Incoordinated  P.O. Trials:  Patient Position: Upright in bed    The patient was given tsp-small sips amounts of the following:   Consistency Presented: Honey thick liquid  How Presented: Self-fed/presented;SLP-fed/presented;Cup/sip;Spoon    ORAL PHASE:  Bolus Acceptance: No impairment  Bolus Formation/Control: No impairment  Propulsion: Discoordination     Oral Residue: None    PHARYNGEAL PHASE:  Initiation of Swallow: No impairment  Laryngeal Elevation: Functional                 Pharyngeal Phase Characteristics: Double swallow    OTHER OBSERVATIONS:  Rate/bite size: Impaired   Endurance:  Impaired   Comments:       Tool Used: Dysphagia Outcome and Severity Scale (ANGEL)    Score Comments   Normal Diet  [] 7 With no strategies or extra time needed   Functional Swallow  [] 6 May have mild oral or pharyngeal delay       Mild Dysphagia    [] 5 Which may require one diet consistency restricted (those who demonstrate penetration which is entirely cleared on MBS would be included)   Mild-Moderate Dysphagia  [] 4 With 1-2 diet consistencies restricted       Moderate Dysphagia  [] 3 With 2 or more diet consistencies restricted       Moderately Severe Dysphagia  [] 2 With partial PO strategies (trials with ST only)       Severe Dysphagia  [x] 1 With inability to tolerate any PO safely          Score:  Initial: 1 Most Recent: X (Date: -- )   Interpretation of Tool: The Dysphagia Outcome and Severity Scale (ANGEL) is a simple, easy-to-use, 7-point scale developed to systematically rate the functional severity of dysphagia based on objective assessment and make recommendations for diet level, independence level, and type of nutrition. Score 7 6 5 4 3 2 1   Modifier CH CI CJ CK CL CM CN   ? Swallowing:     - CURRENT STATUS: CN - 100% impaired, limited or restricted    - GOAL STATUS:  CL - 60%-79% impaired, limited or restricted    - D/C STATUS:  ---------------To be determined---------------  Payor: [employer]  / Plan: [address] [place] [address] HMO / Product Type: Managed Care Medicare /     TREATMENT:    (In addition to Assessment/Re-Assessment sessions the following treatments were rendered)  Assessment/Reassessment only, no treatment provided today  MODALITIES:                                                                    ORAL MOTOR  EXERCISES:                                                                                                                                                                      LARYNGEAL / PHARYNGEAL EXERCISES:                                                                                                                                     __________________________________________________________________________________________________  Safety:   After treatment position/precautions:  · Upright in Bed. Progression/Medical Necessity:   · Patient is expected to demonstrate progress in swallow safety to decrease aspiration risk. Compliance with Program/Exercises: Will assess as treatment progresses.    Reason for Continuation of Services/Other Comments:  · Patient continues to require skilled intervention due to oropharyngeal dysphagia. Recommendations/Intent for next treatment session: \"Treatment next visit will focus on modified barium swallow study\".     Total Treatment Duration:  Time In: 1045  Time Out: 1058    DEAN Paniagua, CCC-SLP, CBIS

## 2018-02-02 ENCOUNTER — PATIENT OUTREACH (OUTPATIENT)
Dept: CASE MANAGEMENT | Age: 68
End: 2018-02-02

## 2018-02-02 ENCOUNTER — APPOINTMENT (OUTPATIENT)
Dept: GENERAL RADIOLOGY | Age: 68
DRG: 871 | End: 2018-02-02
Attending: INTERNAL MEDICINE
Payer: MEDICARE

## 2018-02-02 LAB
ANION GAP SERPL CALC-SCNC: 12 MMOL/L (ref 7–16)
BACTERIA SPEC CULT: ABNORMAL
BASOPHILS # BLD: 0 K/UL (ref 0–0.2)
BASOPHILS NFR BLD: 0 % (ref 0–2)
BUN SERPL-MCNC: 9 MG/DL (ref 8–23)
CALCIUM SERPL-MCNC: 7.8 MG/DL (ref 8.3–10.4)
CHLORIDE SERPL-SCNC: 107 MMOL/L (ref 98–107)
CO2 SERPL-SCNC: 22 MMOL/L (ref 21–32)
CREAT SERPL-MCNC: 0.34 MG/DL (ref 0.8–1.5)
DIFFERENTIAL METHOD BLD: ABNORMAL
EOSINOPHIL # BLD: 0.2 K/UL (ref 0–0.8)
EOSINOPHIL NFR BLD: 2 % (ref 0.5–7.8)
ERYTHROCYTE [DISTWIDTH] IN BLOOD BY AUTOMATED COUNT: 18.5 % (ref 11.9–14.6)
GLUCOSE BLD STRIP.AUTO-MCNC: 110 MG/DL (ref 65–100)
GLUCOSE BLD STRIP.AUTO-MCNC: 144 MG/DL (ref 65–100)
GLUCOSE BLD STRIP.AUTO-MCNC: 145 MG/DL (ref 65–100)
GLUCOSE BLD STRIP.AUTO-MCNC: 146 MG/DL (ref 65–100)
GLUCOSE SERPL-MCNC: 94 MG/DL (ref 65–100)
HCT VFR BLD AUTO: 24.6 % (ref 41.1–50.3)
HGB BLD-MCNC: 7.2 G/DL (ref 13.6–17.2)
IMM GRANULOCYTES # BLD: 0.2 K/UL (ref 0–0.5)
IMM GRANULOCYTES NFR BLD AUTO: 2 % (ref 0–5)
LYMPHOCYTES # BLD: 2.7 K/UL (ref 0.5–4.6)
LYMPHOCYTES NFR BLD: 33 % (ref 13–44)
MAGNESIUM SERPL-MCNC: 1.9 MG/DL (ref 1.8–2.4)
MCH RBC QN AUTO: 25 PG (ref 26.1–32.9)
MCHC RBC AUTO-ENTMCNC: 29.3 G/DL (ref 31.4–35)
MCV RBC AUTO: 85.4 FL (ref 79.6–97.8)
MONOCYTES # BLD: 0.6 K/UL (ref 0.1–1.3)
MONOCYTES NFR BLD: 7 % (ref 4–12)
NEUTS SEG # BLD: 4.6 K/UL (ref 1.7–8.2)
NEUTS SEG NFR BLD: 56 % (ref 43–78)
PHOSPHATE SERPL-MCNC: 2.7 MG/DL (ref 2.3–3.7)
PLATELET # BLD AUTO: 406 K/UL (ref 150–450)
PMV BLD AUTO: 10 FL (ref 10.8–14.1)
POTASSIUM SERPL-SCNC: 3.5 MMOL/L (ref 3.5–5.1)
RBC # BLD AUTO: 2.88 M/UL (ref 4.23–5.67)
SERVICE CMNT-IMP: ABNORMAL
SODIUM SERPL-SCNC: 141 MMOL/L (ref 136–145)
WBC # BLD AUTO: 8.2 K/UL (ref 4.3–11.1)

## 2018-02-02 PROCEDURE — 74011636637 HC RX REV CODE- 636/637: Performed by: INTERNAL MEDICINE

## 2018-02-02 PROCEDURE — 97162 PT EVAL MOD COMPLEX 30 MIN: CPT

## 2018-02-02 PROCEDURE — 92611 MOTION FLUOROSCOPY/SWALLOW: CPT

## 2018-02-02 PROCEDURE — 36415 COLL VENOUS BLD VENIPUNCTURE: CPT | Performed by: INTERNAL MEDICINE

## 2018-02-02 PROCEDURE — 65270000029 HC RM PRIVATE

## 2018-02-02 PROCEDURE — 74230 X-RAY XM SWLNG FUNCJ C+: CPT

## 2018-02-02 PROCEDURE — 97166 OT EVAL MOD COMPLEX 45 MIN: CPT

## 2018-02-02 PROCEDURE — 84100 ASSAY OF PHOSPHORUS: CPT | Performed by: INTERNAL MEDICINE

## 2018-02-02 PROCEDURE — 74011000250 HC RX REV CODE- 250: Performed by: INTERNAL MEDICINE

## 2018-02-02 PROCEDURE — 74011250637 HC RX REV CODE- 250/637: Performed by: INTERNAL MEDICINE

## 2018-02-02 PROCEDURE — 83735 ASSAY OF MAGNESIUM: CPT | Performed by: INTERNAL MEDICINE

## 2018-02-02 PROCEDURE — 77030020120 HC VLV RESP PEP HI -B

## 2018-02-02 PROCEDURE — 82962 GLUCOSE BLOOD TEST: CPT

## 2018-02-02 PROCEDURE — 74011250636 HC RX REV CODE- 250/636: Performed by: INTERNAL MEDICINE

## 2018-02-02 PROCEDURE — 77030018798 HC PMP KT ENTRL FED COVD -A

## 2018-02-02 PROCEDURE — 80048 BASIC METABOLIC PNL TOTAL CA: CPT | Performed by: INTERNAL MEDICINE

## 2018-02-02 PROCEDURE — 85025 COMPLETE CBC W/AUTO DIFF WBC: CPT | Performed by: INTERNAL MEDICINE

## 2018-02-02 PROCEDURE — 74011000255 HC RX REV CODE- 255: Performed by: INTERNAL MEDICINE

## 2018-02-02 RX ORDER — METOCLOPRAMIDE 10 MG/1
5 TABLET ORAL EVERY 6 HOURS
Status: DISCONTINUED | OUTPATIENT
Start: 2018-02-02 | End: 2018-02-03 | Stop reason: HOSPADM

## 2018-02-02 RX ORDER — METRONIDAZOLE 500 MG/1
500 TABLET ORAL EVERY 8 HOURS
Status: DISCONTINUED | OUTPATIENT
Start: 2018-02-02 | End: 2018-02-03 | Stop reason: HOSPADM

## 2018-02-02 RX ORDER — CIPROFLOXACIN 500 MG/1
500 TABLET ORAL EVERY 8 HOURS
Status: DISCONTINUED | OUTPATIENT
Start: 2018-02-02 | End: 2018-02-03 | Stop reason: HOSPADM

## 2018-02-02 RX ADMIN — GUAIFENESIN AND DEXTROMETHORPHAN 10 ML: 100; 10 SYRUP ORAL at 14:57

## 2018-02-02 RX ADMIN — HYDROMORPHONE HYDROCHLORIDE 1 MG: 2 TABLET ORAL at 02:40

## 2018-02-02 RX ADMIN — ENOXAPARIN SODIUM 40 MG: 40 INJECTION SUBCUTANEOUS at 07:44

## 2018-02-02 RX ADMIN — BARIUM SULFATE 15 ML: 400 SUSPENSION ORAL at 08:26

## 2018-02-02 RX ADMIN — METRONIDAZOLE 500 MG: 500 INJECTION, SOLUTION INTRAVENOUS at 06:12

## 2018-02-02 RX ADMIN — INSULIN GLARGINE 20 UNITS: 100 INJECTION, SOLUTION SUBCUTANEOUS at 11:11

## 2018-02-02 RX ADMIN — CIPROFLOXACIN HYDROCHLORIDE 500 MG: 500 TABLET, FILM COATED ORAL at 12:42

## 2018-02-02 RX ADMIN — GUAIFENESIN AND DEXTROMETHORPHAN 10 ML: 100; 10 SYRUP ORAL at 09:47

## 2018-02-02 RX ADMIN — Medication 1 AMPULE: at 21:55

## 2018-02-02 RX ADMIN — GUAIFENESIN AND DEXTROMETHORPHAN 10 ML: 100; 10 SYRUP ORAL at 06:11

## 2018-02-02 RX ADMIN — ASPIRIN 81 MG 81 MG: 81 TABLET ORAL at 09:47

## 2018-02-02 RX ADMIN — HYDROMORPHONE HYDROCHLORIDE 1 MG: 2 TABLET ORAL at 19:36

## 2018-02-02 RX ADMIN — HYDROCODONE BITARTRATE AND ACETAMINOPHEN 1 TABLET: 5; 325 TABLET ORAL at 21:45

## 2018-02-02 RX ADMIN — Medication 1 AMPULE: at 09:48

## 2018-02-02 RX ADMIN — BARIUM SULFATE 15 ML: 400 SUSPENSION ORAL at 08:23

## 2018-02-02 RX ADMIN — METOCLOPRAMIDE HYDROCHLORIDE 5 MG: 10 TABLET ORAL at 12:42

## 2018-02-02 RX ADMIN — Medication 10 ML: at 14:57

## 2018-02-02 RX ADMIN — METOCLOPRAMIDE HYDROCHLORIDE 5 MG: 10 TABLET ORAL at 09:47

## 2018-02-02 RX ADMIN — CIPROFLOXACIN HYDROCHLORIDE 500 MG: 500 TABLET, FILM COATED ORAL at 19:39

## 2018-02-02 RX ADMIN — HYDROCODONE BITARTRATE AND ACETAMINOPHEN 1 TABLET: 5; 325 TABLET ORAL at 14:13

## 2018-02-02 RX ADMIN — METOCLOPRAMIDE HYDROCHLORIDE 5 MG: 10 TABLET ORAL at 17:31

## 2018-02-02 RX ADMIN — DAKIN'S SOLUTION 0.125% (QUARTER STRENGTH): 0.12 SOLUTION at 09:00

## 2018-02-02 RX ADMIN — METRONIDAZOLE 500 MG: 500 TABLET ORAL at 14:57

## 2018-02-02 RX ADMIN — GUAIFENESIN 600 MG: 600 TABLET, EXTENDED RELEASE ORAL at 21:41

## 2018-02-02 RX ADMIN — GUAIFENESIN AND DEXTROMETHORPHAN 10 ML: 100; 10 SYRUP ORAL at 17:30

## 2018-02-02 RX ADMIN — METRONIDAZOLE 500 MG: 500 TABLET ORAL at 21:42

## 2018-02-02 RX ADMIN — GUAIFENESIN 600 MG: 600 TABLET, EXTENDED RELEASE ORAL at 09:47

## 2018-02-02 RX ADMIN — Medication 10 ML: at 06:13

## 2018-02-02 RX ADMIN — HYDROCODONE BITARTRATE AND ACETAMINOPHEN 1 TABLET: 5; 325 TABLET ORAL at 09:50

## 2018-02-02 RX ADMIN — VANCOMYCIN HYDROCHLORIDE 1000 MG: 1 INJECTION, POWDER, LYOPHILIZED, FOR SOLUTION INTRAVENOUS at 06:12

## 2018-02-02 RX ADMIN — BARIUM SULFATE 15 ML: 400 PASTE ORAL at 08:24

## 2018-02-02 RX ADMIN — GUAIFENESIN AND DEXTROMETHORPHAN 10 ML: 100; 10 SYRUP ORAL at 21:41

## 2018-02-02 RX ADMIN — HYDROMORPHONE HYDROCHLORIDE 1 MG: 2 TABLET ORAL at 12:42

## 2018-02-02 RX ADMIN — BARIUM SULFATE 30 ML: 980 POWDER, FOR SUSPENSION ORAL at 08:23
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
## 2018-02-02 NOTE — PROGRESS NOTES
Problem: Mobility Impaired (Adult and Pediatric)  Goal: *Acute Goals and Plan of Care (Insert Text)  STG:  (1.)Mr. Destiny Parham will move from supine to sit and sit to supine  with MINIMAL ASSIST within 7 day(s). (2.)Mr. Destiny Parham will transfer from bed to chair and chair to bed with MODERATE ASSIST using the least restrictive device within 7 day(s). (3.)Mr. Destiny Parham will ambulate with MODERATE ASSIST for 25 feet with the least restrictive device within 7 day(s). LTG:  (1.)Mr. Destiny Parham will move from supine to sit and sit to supine  in bed with CONTACT GUARD ASSIST within 14 day(s). (2.)Mr. Destiny Parham will transfer from bed to chair and chair to bed with CONTACT GUARD ASSIST using the least restrictive device within 14 day(s). (3.)Mr. Destiny Parham will ambulate with CONTACT GUARD ASSIST for 50 feet with the least restrictive device within 14 day(s). ________________________________________________________________________________________________      PHYSICAL THERAPY: Initial Assessment 2/2/2018  INPATIENT: Hospital Day: 4  Payor: Jason Ayala / Plan: [address] [place] [address] HMO / Product Type: Managed Care Medicare /      NAME/AGE/GENDER: Lazarus North is a 79 y.o. male   PRIMARY DIAGNOSIS: Sepsis due to pneumonia ([facility] [address]  Aspiration pneumonia ([facility] [address] Sepsis due to pneumonia ([facility] [address] Sepsis due to pneumonia [facility])        ICD-10: Treatment Diagnosis:   · Generalized Muscle Weakness (M62.81)  · Other lack of cordination (R27.8)  · Difficulty in walking, Not elsewhere classified (R26.2)  · Other abnormalities of gait and mobility (R26.89)   Precaution/Allergies:  Review of patient's allergies indicates no known allergies. ASSESSMENT:     Mr. Destiny Parham presents with impaired bed mobility, transfer ability, and ambulatory function s/p hospitalization for sepsis due to pneumonia. He required mod A for bed mobility. He required total A for STS transfers and bed to chair transfers.   He will benefit from short-term rehab following hospital D/C. This section established at most recent assessment   PROBLEM LIST (Impairments causing functional limitations):  1. Decreased Strength  2. Decreased ADL/Functional Activities  3. Decreased Transfer Abilities  4. Decreased Ambulation Ability/Technique   INTERVENTIONS PLANNED: (Benefits and precautions of physical therapy have been discussed with the patient.)  1. Range of Motion (ROM)  2. Therapeutic Activites  3. Therapeutic Exercise/Strengthening  4. Group Therapy     TREATMENT PLAN: Frequency/Duration: 2 times a week for duration of hospital stay  Rehabilitation Potential For Stated Goals: Excellent     RECOMMENDED REHABILITATION/EQUIPMENT: (at time of discharge pending progress): Due to the probability of continued deficits (see above) this patient will likely need continued skilled physical therapy after discharge. Equipment:    None at this time              HISTORY:   History of Present Injury/Illness (Reason for Referral):  impaired bed mobility, transfer ability, and ambulatory function s/p hospitalization for sepsis due to       pneumonia. Past Medical History/Comorbidities:   Mr. Kristina Schmitz  has a past medical history of Diabetes ([address] Mr. Kristina Schmitz  has no past surgical history on file.   Social History/Living Environment:   Home Environment: [address] [place] [address] Name: [facility]  One/Two Story Residence: One story  Living Alone: No  Support Systems: Child(genaro), Family member(s)  Patient Expects to be Discharged to[de-identified] Skilled nursing facility  Current DME Used/Available at Home: [address] bed, Other (comment) (at [facility])  Prior Level of Function/Work/Activity:  Ambulatory without AD   Number of Personal Factors/Comorbidities that affect the Plan of Care: 1-2: MODERATE COMPLEXITY   EXAMINATION:   Most Recent Physical Functioning:   Gross Assessment:  Strength: Grossly decreased, non-functional               Posture:  Posture (WDL): Within defined limits  Balance:  Sitting: Impaired  Standing: Impaired Bed Mobility:  Rolling: Moderate assistance  Supine to Sit: Moderate assistance  Sit to Supine: Moderate assistance  Scooting: Moderate assistance  Wheelchair Mobility:     Transfers:  Sit to Stand: Total assistance;Assist x2  Stand to Sit: Total assistance;Assist x2  Stand Pivot Transfers: Assist x2  Bed to Chair: Total assistance;Assist x2  Gait:            Body Structures Involved:  1. Bones  2. Joints  3. Muscles  4. Ligaments Body Functions Affected:  1. Neuromusculoskeletal  2. Movement Related Activities and Participation Affected:  1. Mobility   Number of elements that affect the Plan of Care: 3: MODERATE COMPLEXITY   CLINICAL PRESENTATION:   Presentation: Evolving clinical presentation with changing clinical characteristics: MODERATE COMPLEXITY   CLINICAL DECISION MAKIN [facility] Inpatient Short Form  How much difficulty does the patient currently have. .. Unable A Lot A Little None   1. Turning over in bed (including adjusting bedclothes, sheets and blankets)? [] 1   [x] 2   [] 3   [] 4   2. Sitting down on and standing up from a chair with arms ( e.g., wheelchair, bedside commode, etc.)   [] 1   [x] 2   [] 3   [] 4   3. Moving from lying on back to sitting on the side of the bed? [] 1   [x] 2   [] 3   [] 4   How much help from another person does the patient currently need. .. Total A Lot A Little None   4. Moving to and from a bed to a chair (including a wheelchair)? [x] 1   [] 2   [] 3   [] 4   5. Need to walk in hospital room? [x] 1   [] 2   [] 3   [] 4   6. Climbing 3-5 steps with a railing? [x] 1   [] 2   [] 3   [] 4   © , Trustees of [address] [place] [address] [address] [address], under license to [employer].  All rights reserved      Score:  Initial:  9 Most Recent: 9 (Date: 18 )    Interpretation of Tool:  Represents activities that are increasingly more difficult (i.e. Bed mobility, Transfers, Gait). Score 24 23 22-20 19-15 14-10 9-7 6     Modifier CH CI CJ CK CL CM CN      ? Mobility - Walking and Moving Around:     - CURRENT STATUS: CM - 80%-99% impaired, limited or restricted    - GOAL STATUS: CL - 60%-79% impaired, limited or restricted    - D/C STATUS:  ---------------To be determined---------------  Payor: [employer] MEDICARE / Plan: [address] [place] [address] HMO / Product Type: [employer] Medicare /      Medical Necessity:     · Skilled intervention continues to be required due to impaired functional status. Reason for Services/Other Comments:  · Patient continues to require skilled intervention due to medical complications and patient unable to attend/participate in therapy as expected. Use of outcome tool(s) and clinical judgement create a POC that gives a: Questionable prediction of patient's progress: MODERATE COMPLEXITY            TREATMENT:   (In addition to Assessment/Re-Assessment sessions the following treatments were rendered)   Pre-treatment Symptoms/Complaints:    Pain: Initial:   Pain Intensity 1: 0  Post Session:  No c/o pain     Assessment/Reassessment only, no treatment provided today    Braces/Orthotics/Lines/Etc:   · kathleen catheter  Treatment/Session Assessment:    · Response to Treatment:  Somewhat confused; difficult to understand  · Interdisciplinary Collaboration:   o Physical Therapist  o Occupational Therapist  o Registered Nurse  · After treatment position/precautions:   o Up in chair  o Bed alarm/tab alert on  o Bed/Chair-wheels locked  o Bed in low position  o Call light within reach  o RN notified   · Compliance with Program/Exercises: Will assess as treatment progresses. · Recommendations/Intent for next treatment session: \"Next visit will focus on advancements to more challenging activities and reduction in assistance provided\".   Total Treatment Duration:  PT Patient Time In/Time Out  Time In: 0930  Time Out: [address], PT

## 2018-02-02 NOTE — PROGRESS NOTES
Problem: Dysphagia (Adult)  Goal: *Speech Goal: (INSERT TEXT)  LTG: Patient will tolerate least restrictive diet without overt signs or symptoms of airway compromise. STG: Patient will tolerate po trials with speech therapy only without overt signs or symptoms of airway compromise. Goal discharged. STG: Patient will participate in modified barium swallow study as clinically indicated. Goal met. STG: Patient will complete laryngeal exercises with 80% accuracy. Speech language pathology: modified barium swallow study: Initial Assessment    NAME/AGE/GENDER: Emilee Arnold is a 79 y.o. male  DATE: 2/2/2018  PRIMARY DIAGNOSIS: Sepsis due to pneumonia ([facility] [address]  Aspiration pneumonia ([facility] [address]       ICD-10: Treatment Diagnosis: dysphagia, pharyngeal 13.13  INTERDISCIPLINARY COLLABORATION: Speech Therapist  PRECAUTIONS/ALLERGIES: Review of patient's allergies indicates no known allergies. ASSESSMENT/PLAN OF CARE:Based on the objective data described below, Mr. Stephen Perez presents with mod-severe pharyngeal dysphagia characterized by decreased laryngeal elevation and excursion, tongue base retraction and pharyngeal contraction. Premature spillage occurred to the level of the valleculae with all consistencies. Penetration occurred during the swallow with all consistencies. Penetration with honey and pudding was due to severe pharyngeal residue. Had pt use double and effortful swallows in attempts to clear the residue. They were ineffective in clearing the residue and penetration occurred during those swallows as well. Penetration reached the level of the vocal cords with thin and pudding with no cough response. A cued cough was ineffective in clearing the vestibule. Mild-mod vallecular residue observed depending on consistency tested. Recommend continuing with NPO. Patient will benefit from skilled intervention to address the below impairments. ?????? ? ? This section established at most recent assessment??????????  RECOMMENDATIONS AND PLANNED INTERVENTIONS (Benefits and precautions of therapy have been discussed with the patient.):  · NPO  MEDICATIONS:  · Non-oral  COMPENSATORY STRATEGIES/MODIFICATIONS INCLUDING:  · None  OTHER RECOMMENDATIONS (including follow up treatment recommendations):   · Laryngeal exercises  FREQUENCY/DURATION: Continue to follow patient 3 times a week for duration of hospital stay to address above goals. RECOMMENDED REHABILITATION/EQUIPMENT: (at time of discharge pending progress): Due to the probability of continued deficits (see above) this patient will likely need continued skilled speech therapy after discharge. SUBJECTIVE:   Pt cooperative. History of Present Injury/Illness: Mr. Octaviano Combs  has a past medical history of Diabetes ([facility] [address] .   He also  has no past surgical history on file.     Present Symptoms: pna    Pain Intensity 1: 5  Pain Location 1: Foot, Hip  Pain Orientation 1: Left, Right  Pain Intervention(s) 1: Medication (see MAR)  Current Dietary Status:  NPO   Radiologist: Dr. Lizett Ga  History of reflux:     Reflux medication:  Social History/Home Situation: SNF   Home Environment: [address] [place] [address] Name: [facility]  One/Two Story Residence: One story  Living Alone: No  Support Systems: Child(genaro), Family member(s)  Patient Expects to be Discharged to[de-identified] Skilled nursing facility  Current DME Used/Available at Home: Wheelchair, Hospital bed, Other (comment) (at [facility]  OBJECTIVE:   Cognitive/Communication Status:  Mental Status  Neurologic State: Alert    Oral Assessment:  Oral Assessment  Labial: Impaired coordination  Dentition: Edentulous  Oral Hygiene: Adequate  Lingual: Decreased strength, Incoordinated    Vocal Quality: no impairment     Patient Viewed: Patient Position: upright in chair  Film Views: Lateral, Fluoro    Oral Prepatory:  The patient was given the following: Consistency Presented: Honey thick liquid, Nectar thick liquid, Pudding, Thin liquid  How Presented: Self-fed/presented, SLP-fed/presented, Cup/sip, Spoon    Oral Phase:  Bolus Acceptance: No impairment  Bolus Formation/Control: No impairment  Propulsion: No impairment     Oral Residue: None  Initiation of Swallow: Triggered at vallecula       Pharyngeal Phase:  Timing: Pooling 1-5 sec  Decreased Tongue Base Retraction?: Yes  Laryngeal Elevation: Incomplete laryngeal closure, Reduced excursion with laryngeal vestibule gap  Penetration: During swallow  Aspiration/Timing: No evidence of aspiration  Aspiration/Penetration Score: 5 (Penetration/Visible residue-Contrast contacts the folds, but is not ejected)   Pharyngeal Symmetry: Not assessed  Pharyngeal Dysfunction: Decreased tongue base retraction, Decreased elevation/closure, Decreased pharyngeal wall constriction     Pharyngeal-Esophageal Segment: Decreased relaxation of upper esophageal segment    Assessment/Reassessment only, no treatment provided today    Tool Used: Dysphagia Outcome and Severity Scale (ANGEL)    Score Comments   Normal Diet  [] 7 With no strategies or extra time needed       Functional Swallow  [] 6 May have mild oral or pharyngeal delay       Mild Dysphagia    [] 5 Which may require one diet consistency restricted (those who demonstrate penetration which is entirely cleared on MBS would be included)   Mild-Moderate Dysphagia  [] 4 With 1-2 diet consistencies restricted       Moderate Dysphagia  [] 3 With 2 or more diet consistencies restricted       Moderately Severe Dysphagia  [] 2 With partial PO strategies (trials with ST only)       Severe Dysphagia  [] 1 With inability to tolerate any PO safely          Score:  Initial: 1 Most Recent: X (Date: -- )   Interpretation of Tool: The Dysphagia Outcome and Severity Scale (ANGEL) is a simple, easy-to-use, 7-point scale developed to systematically rate the functional severity of dysphagia based on objective assessment and make recommendations for diet level, independence level, and type of nutrition. Score 7 6 5 4 3 2 1   Modifier CH CI CJ CK CL CM CN   ?  Swallowing:     - CURRENT STATUS: CN - 100% impaired, limited or restricted    - GOAL STATUS:  CL - 60%-79% impaired, limited or restricted    - D/C STATUS:  ---------------To be determined---------------  Payor: Kadi Matthews / Plan: [address] [place] [address] HMO / Product Type: Managed Care Medicare /   __________________________________________________________________________________________________  Safety:   After treatment position/precautions:  · Upright in Bed  Recommendations for treatment: laryngeal exercises  Total Treatment Duration:  Time In: 0805   Time Out: [address] CCC-SLP

## 2018-02-02 NOTE — PROGRESS NOTES
END OF SHIFT NOTE:    INTAKE/OUTPUT  02/01 0701 - 02/02 0700  In: 6091 [I.V.:1338]  Out: 2175 [Urine:1900]  Voiding: NO  Catheter: YES  Drain:   PEG/Gastrostomy Tube 01/30/18 (Active)   Site Assessment Clean, dry, & intact 2/2/2018 10:55 AM   Dressing Status Clean, dry, & intact 2/2/2018 10:55 AM   G Port Status Clamped 2/2/2018  8:13 AM   Action Taken Placement verified (comment) 2/2/2018 10:55 AM   Drainage Description Jolynn Chick 2/2/2018  8:13 AM   Gastric Residual (mL) 0 ml 2/2/2018  4:00 PM   Tube Feeding/Formula Options Diabetic (Glucerna 1.2) 2/1/2018  7:55 AM   Tube Feeding/Verify Rate (mL/hr) 75 2/2/2018 10:55 AM   Water Flush Volume (mL) 35 mL 2/2/2018 10:55 AM   Medication Volume 40 ml 2/2/2018  8:13 AM       Colostomy Left Abdomen (Active)   Drainage Color Brown 2/2/2018  8:13 AM   Site Assessment Clean, dry, intact 2/2/2018  8:13 AM   Treatment Other (Comment) 2/1/2018 11:30 PM   Output (ml) 125 mL 2/1/2018 11:30 PM               Flatus: Patient does have flatus present. Stool:  1 occurrences. Characteristics:  Stool Assessment  Stool Color: Brown  Stool Appearance: Soft  Stool Amount: Large  Stool Source/Status: Colostomy    Emesis: 0 occurrences. Characteristics:        VITAL SIGNS  Patient Vitals for the past 12 hrs:   Temp Pulse Resp BP SpO2   02/02/18 1543 97.8 °F (36.6 °C) 81 18 106/65 93 %   02/02/18 0745 97.5 °F (36.4 °C) 100 22 110/63 96 %       Pain Assessment  Pain Intensity 1: 3 (02/02/18 1543)  Pain Location 1: Hip  Pain Intervention(s) 1: Medication (see MAR)  Patient Stated Pain Goal: 0    Ambulating  No. PT and OT following    Shift report given to oncoming nurse at the bedside.     Madeleine Grady RN

## 2018-02-02 NOTE — PROGRESS NOTES
During IDT Rounds, Dr. William Qureshi said pt is ready for d/c today. VILMA spoke with Irma Dubose at [facility] who said we are still awaiting [facility] authorization and they need PT notes, which currently don't show activity. VILMA spoke with Gisselle Rivera in PT and asked them to see pt this morning. She said he is on the list to be seen this morning. VILMA notified ANN Mitchell, to let PT know to see SW once pt has been seen. CM following.

## 2018-02-02 NOTE — PROGRESS NOTES
Speech Pathology  Modified barium swallow study completed. Recommend continuing with NPO. Full report to follow. Thank you.     [address] CCC-SLP

## 2018-02-02 NOTE — PROGRESS NOTES
Downtime progress note entry for [facility] Care : Charlie Hagan RN  Transition of Care Discharge Follow-up Questionnaire   Date/Time of Call: 18  Patient name: Marletta Litten  : 50  MRN: [id number]            What was the patient hospitalized for? Abnormal Gait and mobility. Does the patient understand his/her diagnosis and/or treatment and what happened during the hospitalization? Reached out to pt and LVM. [facility] is only seeing Women. Will continue to reach out to pt in hopes to establish communication and have pt rightly attributed. Did the patient receive discharge instructions? Review any discharge instructions (see notes in [facility]). Ask patient if they understand these. Do they have any questions? Were home services ordered (nursing, PT, OT, ST, etc.)? If so, has the first visit occurred? If not, why? (Assist with coordination of services if necessary.)          Was any DME ordered? If so, has it been received? If not, why?  (Assist with coordination of arranging DME orders if necessary.)          Complete a review of all medications (new, continued and discontinued meds per the D/C instructions and medication tab in [facility]). Were all new prescriptions filled? If not, why?  (Assist with obtainment of medications if necessary.)          Does the patient understand the purpose and dosing instructions for all medications? (If patient has questions, provide explanation and education.)    Does the patient have any problems in performing ADLs? (If patient is unable to perform ADLs - what is the limiting factor(s)? Do they have a support system that can assist? If no support system is present, discuss possible assistance that they may be able to obtain.)              Does the patient have all follow-up appointments scheduled?       7 day f/up with PCP?    7-14 day f/up with specialist?    If f/up has not been made - what actions has the care coordinator made to accomplish this? Has transportation been arranged? [facility]monary follow-up should be within 7 days of discharge; all others should have PCP follow-up within 7 days of discharge; follow-ups with other specialists should be within 7-14 days of discharge.)    Any other questions or concerns expressed by the patient? Schedule next appointment with MARIANNE Barriga or refer to RN Case Manager/  per the workflow guidelines. When is care coordinators next follow-up call scheduled? If referred for CCM - what RN care manager was the referral assigned?     LINDA Call Completed By:

## 2018-02-02 NOTE — PROGRESS NOTES
SW checked with Tim Palacios again at [facility] a couple more times today and they still haven't received authorization.

## 2018-02-02 NOTE — PROGRESS NOTES
Hospitalist Progress Note    Subjective:   Daily Progress Note: 2/2/2018 5:21 PM    Mr. Alfredo Rodriguez is a 80 yo WM, with a pmh of TBI for which he has chronic PEG due to recurrent aspiration, who presented 1/30 for SOB and blood in his sputum and is found to meet sepsis criteria due to aspiration pneumonia in his left lower lung and UTI.  He is on NS, iv rocephin and flagyl.  Family still allowing him to have food intake thus predisposing him to recurrent aspiration. Currently tolerating room air and currently in bedside chair. MBS performed this morning and ST recommends complete NPO status. Urine culture resulting in pseudomonas. No family currently at bedside.     Current Facility-Administered Medications   Medication Dose Route Frequency    metoclopramide HCl (REGLAN) tablet 5 mg  5 mg Oral Q6H    ciprofloxacin HCl (CIPRO) tablet 500 mg  500 mg Per G Tube Q8H    metroNIDAZOLE (FLAGYL) tablet 500 mg  500 mg Per G Tube Q8H    aspirin chewable tablet 81 mg  81 mg Oral DAILY    HYDROmorphone (DILAUDID) tablet 1 mg  1 mg Oral Q6H PRN    insulin glargine (LANTUS) injection 20 Units  20 Units SubCUTAneous DAILY    sodium hypochlorite (QUARTER STRENGTH DAKIN'S) 0.125% irrigation (bottle)   Topical DAILY    alcohol 62% (NOZIN) nasal  1 Ampule  1 Ampule Topical Q12H    albuterol (PROVENTIL VENTOLIN) nebulizer solution 2.5 mg  2.5 mg Nebulization Q4H PRN    guaiFENesin ER (MUCINEX) tablet 600 mg  600 mg Oral Q12H    guaiFENesin-dextromethorphan (ROBITUSSIN DM) 100-10 mg/5 mL syrup 10 mL  10 mL Oral Q4HWA    0.9% sodium chloride infusion  25 mL/hr IntraVENous CONTINUOUS    polyethylene glycol (MIRALAX) packet 17 g  17 g Oral DAILY PRN    HYDROcodone-homatropine (HYCODAN) 5-1.5 mg/5 mL (5 mL) syrup 5 mL  5 mL Oral Q4H PRN    hydrALAZINE (APRESOLINE) 20 mg/mL injection 20 mg  20 mg IntraVENous Q6H PRN    bisacodyl (DULCOLAX) tablet 5 mg  5 mg Oral DAILY PRN    alum-mag hydroxide-simeth (MYLANTA) oral suspension 30 mL  30 mL Oral Q4H PRN    sodium chloride (NS) flush 5-10 mL  5-10 mL IntraVENous Q8H    sodium chloride (NS) flush 5-10 mL  5-10 mL IntraVENous PRN    acetaminophen (TYLENOL) tablet 650 mg  650 mg Oral Q4H PRN    HYDROcodone-acetaminophen (NORCO) 5-325 mg per tablet 1 Tab  1 Tab Oral Q4H PRN    naloxone (NARCAN) injection 0.4 mg  0.4 mg IntraVENous PRN    diphenhydrAMINE (BENADRYL) injection 25 mg  25 mg IntraVENous Q4H PRN    ondansetron (ZOFRAN) injection 4 mg  4 mg IntraVENous Q4H PRN    senna-docusate (PERICOLACE) 8.6-50 mg per tablet 2 Tab  2 Tab Oral DAILY PRN    LORazepam (ATIVAN) tablet 1 mg  1 mg Oral Q4H PRN    zolpidem (AMBIEN) tablet 5 mg  5 mg Oral QHS PRN    insulin lispro (HUMALOG) injection   SubCUTAneous AC&HS    dextrose 40% (GLUTOSE) oral gel 1 Tube  15 g Oral PRN    glucagon (GLUCAGEN) injection 1 mg  1 mg IntraMUSCular PRN    dextrose (D50W) injection syrg 12.5-25 g  25-50 mL IntraVENous PRN    enoxaparin (LOVENOX) injection 40 mg  40 mg SubCUTAneous Q24H        Review of Systems  A comprehensive review of systems was negative except for that written in the HPI.     Objective:     Visit Vitals    /65    Pulse 81    Temp 97.8 °F (36.6 °C)    Resp 18    Ht 5' 11\" (1.803 m)    Wt 64.4 kg (142 lb)    SpO2 93%    BMI 19.8 kg/m2           Temp (24hrs), Av.9 °F (36.6 °C), Min:97.5 °F (36.4 °C), Max:98.4 °F (36.9 °C)      701 -  1900  In: 405 [I.V.:310]  Out: 750 [Urine:750]  1901 -  0700  In: 9033 [I.V.:2206]  Out: 2750 [Urine:2475]    General: awake, alert, oriented to person  Eyes; non icteric, eOMI  Neck; supple  CV: RRR  Pulm; diminished in bases, non labored    Additional comments:I reviewed the patient's new clinical lab test results. j    Data Review    Recent Results (from the past 24 hour(s))   GLUCOSE, POC    Collection Time: 18  9:20 PM   Result Value Ref Range    Glucose (POC) 123 (H) 65 - 100 mg/dL   CBC WITH AUTOMATED DIFF    Collection Time: 02/02/18  5:52 AM   Result Value Ref Range    WBC 8.2 4.3 - 11.1 K/uL    RBC 2.88 (L) 4.23 - 5.67 M/uL    HGB 7.2 (L) 13.6 - 17.2 g/dL    HCT 24.6 (L) 41.1 - 50.3 %    MCV 85.4 79.6 - 97.8 FL    MCH 25.0 (L) 26.1 - 32.9 PG    MCHC 29.3 (L) 31.4 - 35.0 g/dL    RDW 18.5 (H) 11.9 - 14.6 %    PLATELET 283 916 - 027 K/uL    MPV 10.0 (L) 10.8 - 14.1 FL    DF AUTOMATED      NEUTROPHILS 56 43 - 78 %    LYMPHOCYTES 33 13 - 44 %    MONOCYTES 7 4.0 - 12.0 %    EOSINOPHILS 2 0.5 - 7.8 %    BASOPHILS 0 0.0 - 2.0 %    IMMATURE GRANULOCYTES 2 0.0 - 5.0 %    ABS. NEUTROPHILS 4.6 1.7 - 8.2 K/UL    ABS. LYMPHOCYTES 2.7 0.5 - 4.6 K/UL    ABS. MONOCYTES 0.6 0.1 - 1.3 K/UL    ABS. EOSINOPHILS 0.2 0.0 - 0.8 K/UL    ABS. BASOPHILS 0.0 0.0 - 0.2 K/UL    ABS. IMM.  GRANS. 0.2 0.0 - 0.5 K/UL   METABOLIC PANEL, BASIC    Collection Time: 02/02/18  5:52 AM   Result Value Ref Range    Sodium 141 136 - 145 mmol/L    Potassium 3.5 3.5 - 5.1 mmol/L    Chloride 107 98 - 107 mmol/L    CO2 22 21 - 32 mmol/L    Anion gap 12 7 - 16 mmol/L    Glucose 94 65 - 100 mg/dL    BUN 9 8 - 23 MG/DL    Creatinine 0.34 (L) 0.8 - 1.5 MG/DL    GFR est AA >60 >60 ml/min/1.73m2    GFR est non-AA >60 >60 ml/min/1.73m2    Calcium 7.8 (L) 8.3 - 10.4 MG/DL   MAGNESIUM    Collection Time: 02/02/18  5:52 AM   Result Value Ref Range    Magnesium 1.9 1.8 - 2.4 mg/dL   PHOSPHORUS    Collection Time: 02/02/18  5:52 AM   Result Value Ref Range    Phosphorus 2.7 2.3 - 3.7 MG/DL   GLUCOSE, POC    Collection Time: 02/02/18  6:08 AM   Result Value Ref Range    Glucose (POC) 110 (H) 65 - 100 mg/dL   GLUCOSE, POC    Collection Time: 02/02/18 11:01 AM   Result Value Ref Range    Glucose (POC) 145 (H) 65 - 100 mg/dL   GLUCOSE, POC    Collection Time: 02/02/18  4:25 PM   Result Value Ref Range    Glucose (POC) 144 (H) 65 - 100 mg/dL         Assessment/Plan:     Principal Problem:    Sepsis due to pneumonia ([facility] (1/30/2018)    Active Problems:    Type II diabetes mellitus with complication ([facility] (35/8/8064)      Aspiration pneumonia ([facility] (1/30/2018)      Dementia with behavioral disturbance (1/30/2018)      Sacral ulcer ([facility] (1/30/2018)  Wound care, recommendations in chart      UTI (urinary tract infection) (1/31/2018)  Culture growing pseudomonas. Changing antibiotics to ciprofloxacin and flagyl      PEG (percutaneous endoscopic gastrostomy) status ([facility] (2/1/2018)  Patient is completely NPO.   TFs per nutrition recommendation    Ready for discharge back to [facility] but awaiting insurance approval    Care Plan discussed with: Patient/Family and     Signed By: Chasity Foreman MD     February 2, 2018

## 2018-02-03 VITALS
BODY MASS INDEX: 19.88 KG/M2 | HEIGHT: 71 IN | WEIGHT: 142 LBS | RESPIRATION RATE: 18 BRPM | DIASTOLIC BLOOD PRESSURE: 56 MMHG | OXYGEN SATURATION: 95 % | SYSTOLIC BLOOD PRESSURE: 102 MMHG | HEART RATE: 71 BPM | TEMPERATURE: 98.9 F

## 2018-02-03 LAB
ACID FAST STN SPEC: NEGATIVE
BACTERIA SPEC CULT: ABNORMAL
GLUCOSE BLD STRIP.AUTO-MCNC: 140 MG/DL (ref 65–100)
GLUCOSE BLD STRIP.AUTO-MCNC: 141 MG/DL (ref 65–100)
GRAM STN SPEC: ABNORMAL
MYCOBACTERIUM SPEC QL CULT: NEGATIVE
SERVICE CMNT-IMP: ABNORMAL
SPECIMEN PREPARATION: NORMAL
SPECIMEN SOURCE: NORMAL

## 2018-02-03 PROCEDURE — 74011250637 HC RX REV CODE- 250/637: Performed by: INTERNAL MEDICINE

## 2018-02-03 PROCEDURE — 74011636637 HC RX REV CODE- 636/637: Performed by: INTERNAL MEDICINE

## 2018-02-03 PROCEDURE — 74011250636 HC RX REV CODE- 250/636: Performed by: INTERNAL MEDICINE

## 2018-02-03 PROCEDURE — 82962 GLUCOSE BLOOD TEST: CPT

## 2018-02-03 RX ORDER — CIPROFLOXACIN 500 MG/1
500 TABLET ORAL EVERY 8 HOURS
Qty: 21 TAB | Refills: 0 | Status: SHIPPED
Start: 2018-02-03 | End: 2018-02-15

## 2018-02-03 RX ORDER — TRAMADOL HYDROCHLORIDE 50 MG/1
50 TABLET ORAL
Qty: 10 TAB | Refills: 0 | Status: ON HOLD | OUTPATIENT
Start: 2018-02-03 | End: 2018-02-15

## 2018-02-03 RX ORDER — METRONIDAZOLE 500 MG/1
500 TABLET ORAL EVERY 8 HOURS
Qty: 21 TAB | Refills: 0 | Status: SHIPPED
Start: 2018-02-03 | End: 2018-02-15

## 2018-02-03 RX ORDER — HYDROMORPHONE HYDROCHLORIDE 2 MG/1
1 TABLET ORAL EVERY 6 HOURS
Qty: 10 TAB | Refills: 0 | Status: ON HOLD | OUTPATIENT
Start: 2018-02-03 | End: 2018-02-15

## 2018-02-03 RX ADMIN — GUAIFENESIN 600 MG: 600 TABLET, EXTENDED RELEASE ORAL at 08:00

## 2018-02-03 RX ADMIN — Medication 5 ML: at 08:20

## 2018-02-03 RX ADMIN — CIPROFLOXACIN HYDROCHLORIDE 500 MG: 500 TABLET, FILM COATED ORAL at 02:37

## 2018-02-03 RX ADMIN — METRONIDAZOLE 500 MG: 500 TABLET ORAL at 06:31

## 2018-02-03 RX ADMIN — LORAZEPAM 1 MG: 1 TABLET ORAL at 07:59

## 2018-02-03 RX ADMIN — ASPIRIN 81 MG 81 MG: 81 TABLET ORAL at 08:00

## 2018-02-03 RX ADMIN — HYDROCODONE BITARTRATE AND ACETAMINOPHEN 1 TABLET: 5; 325 TABLET ORAL at 03:34

## 2018-02-03 RX ADMIN — METOCLOPRAMIDE HYDROCHLORIDE 5 MG: 10 TABLET ORAL at 02:36

## 2018-02-03 RX ADMIN — DAKIN'S SOLUTION 0.125% (QUARTER STRENGTH): 0.12 SOLUTION at 08:09

## 2018-02-03 RX ADMIN — ENOXAPARIN SODIUM 40 MG: 40 INJECTION SUBCUTANEOUS at 08:03

## 2018-02-03 RX ADMIN — METOCLOPRAMIDE HYDROCHLORIDE 5 MG: 10 TABLET ORAL at 13:08

## 2018-02-03 RX ADMIN — METRONIDAZOLE 500 MG: 500 TABLET ORAL at 13:08

## 2018-02-03 RX ADMIN — INSULIN GLARGINE 20 UNITS: 100 INJECTION, SOLUTION SUBCUTANEOUS at 08:05

## 2018-02-03 RX ADMIN — CIPROFLOXACIN HYDROCHLORIDE 500 MG: 500 TABLET, FILM COATED ORAL at 13:08

## 2018-02-03 RX ADMIN — Medication 1 AMPULE: at 08:04

## 2018-02-03 RX ADMIN — HYDROMORPHONE HYDROCHLORIDE 1 MG: 2 TABLET ORAL at 13:08

## 2018-02-03 RX ADMIN — METOCLOPRAMIDE HYDROCHLORIDE 5 MG: 10 TABLET ORAL at 06:31

## 2018-02-03 NOTE — PROGRESS NOTES
Insurance approved STR placement at [facility]. MD notified. Pt will go to room 427A and the report # is [phone]. SW following to facilitate pt's transfer to rehab at discharge.

## 2018-02-03 NOTE — PROGRESS NOTES
Patient picked up by EMS. Report called in to [facility], RN. Dressing changed to RLE and bilateral hips per orders. Dilaudid PRN given for pain. Patient stable discharged in no distress.

## 2018-02-03 NOTE — DISCHARGE SUMMARY
Physician Discharge Summary       Patient: Froy Yoon MRN: [id number]  SSN: [id number]    YOB: 1950  Age: 79 y.o. Sex: male    PCP: None    Allergies: Review of patient's allergies indicates no known allergies. Admit date: 1/30/2018  Admitting Provider: Kendra Bello MD    Discharge date: 2/3/2018  Discharging Provider: Kristin Olivares MD    * Admission Diagnoses: Sepsis due to pneumonia [facility])  Aspiration pneumonia [facility])    * Discharge Diagnoses:    Hospital Problems as of 2/3/2018  Date Reviewed: 12/22/2017          Codes Class Noted - Resolved POA    PEG (percutaneous endoscopic gastrostomy) status ([facility] ICD-10-CM: Z93.1  ICD-9-CM: V44.1  2/1/2018 - Present Yes        UTI (urinary tract infection) ICD-10-CM: N39.0  ICD-9-CM: 599.0  1/31/2018 - Present Yes        Aspiration pneumonia ([facility] ICD-10-CM: J69.0  ICD-9-CM: 507.0  1/30/2018 - Present Yes        * (Principal)Sepsis due to pneumonia [facility]) ICD-10-CM: J18.9, A41.9  ICD-9-CM: 260, 995.91  1/30/2018 - Present Yes        Dementia with behavioral disturbance (Chronic) ICD-10-CM: F03.91  ICD-9-CM: 294.21  1/30/2018 - Present Yes        Sacral ulcer ([facility] (Chronic) ICD-10-CM: Y80.920  ICD-9-CM: 707.8  1/30/2018 - Present Yes        Type II diabetes mellitus with complication ([facility] (Chronic) ICD-10-CM: E11.8  ICD-9-CM: 250.90  12/1/2017 - Present Yes              * Hospital Course:     Mr. Ana Darden is a 78 yo WM, with a pmh of Traumatic brain injury for which he has chronic PEG due to recurrent aspiration, who presented 1/30 for SOB and blood in his sputum and is found to meet sepsis criteria due to aspiration pneumonia in his left lower lung and UTI.  He is on NS, iv rocephin and flagyl through 2/2 when he was changed to oral flagyl and ciprofloxacin as urine culture grew pseudomonas.  Family still allowing him to have food intake thus predisposing him to recurrent aspiration. Currently tolerating room air.   MBS performed yesterday morning and ST recommends complete NPO status. He is now medically stable for discharge back to [facility] and will finish a course of oral cipro and flagyl. Was ready for DC yesterday but insurance precert not obtained until last night. Significant Diagnostic Studies:     Modified Barium Swallow     INDICATION: Prior brain injury, history of recurrent aspiration pneumonia     Fluoro time: 1.3 minutes  Spot films:  0     Fluoroscopy was used to evaluate pharyngeal function while the patient was given  barium solutions and barium coated solids.     FINDINGS: Minimal aspiration was seen with the first swallow of the thin  solution. Laryngeal penetration was also noted with swallows of the nectar,  honey, and pudding, mostly due to residual in the piriform sinuses. The study  was stopped at this point.     IMPRESSION  IMPRESSION: Multiple episodes of aspiration and deep laryngeal penetration      CT CHEST WITH CONTRAST 1/30/2018     HISTORY: Hemoptysis and shortness of breath.     COMPARISON: Portable chest x-ray from the same day.     TECHNIQUE: The patient received 100 mL Isovue-370 nonionic IV contrast and axial  images were obtained through the chest. Coronal reformatted images were  generated. All CT scans at this facility used dose modulation, interactive  reconstruction and/or weight based dosing when appropriate to reduce radiation  dose to as low as reasonably achievable.     FINDINGS: The thoracic aorta is well-opacified without aneurysm or dissection. There are no filling defects within the main or proximal segmental pulmonary  artery suggestive of emboli.     The esophagus is distended with fluid and debris. Left lower lobe airways are  opacified. This may be caused by aspiration. There is consolidation in the  posterior left lower lobe.     Emphysematous changes are present in the lungs.     Included images of the upper abdomen demonstrate intraluminal gallstones.  There  are no aggressive osseous lesions.        IMPRESSION  IMPRESSION:     1. No pulmonary embolism.     2. COPD.     3. Opacified left lower lobe airways with left lower lobe consolidation. This  may be caused by aspiration.       Discharge Exam:  General: awake, alert, confused  Eyes: non icteric  Neck; supple  CV: RRR  Pulm; course, diminished in bases, no accessory muscle use  Abd; PEG in place, soft    * Discharge Condition: stable  * Disposition: [facility])    Discharge Medications:  Current Discharge Medication List      START taking these medications    Details   ciprofloxacin HCl (CIPRO) 500 mg tablet 1 Tab by Per G Tube route every eight (8) hours for 7 days. Qty: 21 Tab, Refills: 0      metroNIDAZOLE (FLAGYL) 500 mg tablet 1 Tab by Per G Tube route every eight (8) hours for 7 days. Qty: 21 Tab, Refills: 0         CONTINUE these medications which have CHANGED    Details   HYDROmorphone (DILAUDID) 2 mg tablet Take 0.5 Tabs by mouth every six (6) hours. Max Daily Amount: 4 mg. Qty: 10 Tab, Refills: 0    Associated Diagnoses: Skin ulcer of sacrum, unspecified ulcer stage ([facility])      traMADol (ULTRAM) 50 mg tablet 1 Tab by Per G Tube route every six (6) hours as needed for Pain. Max Daily Amount: 200 mg. Qty: 10 Tab, Refills: 0    Associated Diagnoses: Skin ulcer of sacrum, unspecified ulcer stage ([address]         CONTINUE these medications which have NOT CHANGED    Details   aspirin 81 mg chewable tablet 81 mg by PEG Tube route daily. guaiFENesin ER (MUCINEX) 600 mg ER tablet 600 mg by PEG Tube route two (2) times a day. insulin lispro (HUMALOG) 100 unit/mL kwikpen by SubCUTAneous route. Sliding Scale   <60 notify MD  <199 =0 units  200-249 = 2 units  250-299 = 4 units  300-349 = 6 units  350-399 = 8 units  400 -499= 10 units   >450 notify MD      glucagon (GLUCAGEN) 1 mg injection 1 mg by IntraMUSCular route once.       insulin glargine (LANTUS,BASAGLAR) 100 unit/mL (3 mL) inpn 20 Units by SubCUTAneous route.      insulin lispro (HUMALOG) 100 unit/mL injection 5 Units by SubCUTAneous route Before breakfast, lunch, and dinner. senna (SENEXON) 8.6 mg tablet 1 Tab by PEG Tube route daily as needed for Constipation. acetaminophen (MAPAP) 325 mg tablet 650 mg by PEG Tube route every eight (8) hours as needed for Pain. ondansetron hcl (ZOFRAN, AS HYDROCHLORIDE,) 4 mg tablet 4 mg by PEG Tube route every six (6) hours as needed for Nausea. multivitamin, tx-iron-ca-min (THERA-M W/ IRON) 9 mg iron-400 mcg tab tablet 1 Tab by PEG Tube route daily. lactobacillus-acidophilus (LACTINEX) 100 million cell grpk 1 Packet by PEG Tube route two (2) times a day.      hydrOXYzine pamoate (VISTARIL) 25 mg capsule 25 mg by PEG Tube route every six (6) hours as needed for Itching. Cholecalciferol, Vitamin D3, 50,000 unit cap Take 1 Cap by mouth every seven (7) days. albuterol (PROVENTIL VENTOLIN) 2.5 mg /3 mL (0.083 %) nebulizer solution 3 mL by Nebulization route every four (4) hours as needed for Wheezing. Qty: 1 Each, Refills: 0      pantoprazole (PROTONIX) 40 mg tablet Take 1 Tab by mouth Daily (before breakfast). Qty: 14 Tab, Refills: 0      Blood-Glucose Meter monitoring kit To check blood sugars 3 times per day - In AM and 2h after a meal. Please provide adequate supplies for 30 days ( lancets, strips, etc) as approved by his insurance  Qty: 1 Kit, Refills: 0      multivitamin (ONE A DAY) tablet Take 1 Tab by mouth daily. STOP taking these medications       raNITIdine (ZANTAC) 150 mg tablet Comments:   Reason for Stopping:         azithromycin (ZITHROMAX) 250 mg tablet Comments:   Reason for Stopping:               * Follow-up Care/Patient Instructions: Activity: with assistance  Diet: tube feeding. STRICT NPO  Wound Care:     Right groin/ hip/ thigh and buttock with extensive full thickness wound from previous surgical debridements of [facility]  December of 2017.  Was using moist to dry dressings at rehab, was using Dakin's prior to going to rehab at [facility]. Wounds have large amount of green and serous sanguinous drainage, concern for localized infection. Left buttock with 5x6cm eschar with large amount of green drainage, may benefit from debridement however can be treated with chemical debridement. Recommend Dakin's moist to dry dressings to all areas bid and prnsaturation. Will need these bandages continued at rehab. The plan prior to going to LTAC was for possible skin grafts with Dr. Beatrice Ruff, will need follow up with Dr Beatrice Ruff after discharge. Follow-up Information     Follow up With Details Comments Contact Info    PLEASE ASSIST THIS PATIENT WITH ESTABLISHING CARE WITH A PCP UPON DISCHARGE FROM REHAB Schedule an appointment as soon as possible for a visit in 1 week after discharged from rehab         35 minutes spent in discharge planning and coordination of care.      Signed:  Debbie Cantu MD  2/3/2018  11:29 AM

## 2018-02-03 NOTE — PROGRESS NOTES
Drsg change to R leg. Large wound with pink tissue noted. Covered with Dakins,4x4s and kerlix. R hip with pink tissue noted without drainage. Covered with Dakins, 4x4s, abd pad and tape. Wound to sacrum with quarter sized black tissue noted. Covered with dakins,  4x4s and tape.

## 2018-02-03 NOTE — PROGRESS NOTES
Care Management Interventions  Mode of Transport at Discharge: S  Transition of Care Consult (CM Consult): Discharge Planning, SNF  Partner SNF: Yes  Physical Therapy Consult: Yes  Occupational Therapy Consult: Yes  Speech Therapy Consult: Yes  Plan discussed with Pt/Family/Caregiver: Yes  Freedom of Choice Offered: Yes  Discharge Location  Discharge Placement: Rehab Unit Subacute ([address])    Insurance approval received late yesterday. Pt is medically cleared for discharge today and will transfer to [facility] for STR placement. Orders will be forwarded to facility via [facility]. SW spoke with pt's dtr, Michael Quezada, via phone, to notify her of pt's discharge and transport time.

## 2018-02-04 LAB
BACTERIA SPEC CULT: NORMAL
SERVICE CMNT-IMP: NORMAL

## 2018-02-05 ENCOUNTER — PATIENT OUTREACH (OUTPATIENT)
Dept: CASE MANAGEMENT | Age: 68
End: 2018-02-05

## 2018-02-06 NOTE — PROGRESS NOTES
Ness note this patient was d/c to [facility] a Preferred SNF and will be followed by Sol Kawasaki RN until d/c. Rick Mathur LPN/ Care Coordinator  [address], [facility]  [email or web address] [facility] note will not be viewable in [address]

## 2018-02-07 ENCOUNTER — APPOINTMENT (OUTPATIENT)
Dept: GENERAL RADIOLOGY | Age: 68
DRG: 871 | End: 2018-02-07
Attending: EMERGENCY MEDICINE
Payer: MEDICARE

## 2018-02-07 ENCOUNTER — HOSPITAL ENCOUNTER (INPATIENT)
Age: 68
LOS: 8 days | Discharge: NURSING, REHAB OR LONG-TERM CARE | DRG: 871 | End: 2018-02-15
Attending: EMERGENCY MEDICINE | Admitting: INTERNAL MEDICINE
Payer: MEDICARE

## 2018-02-07 DIAGNOSIS — A41.9 SEPTIC SHOCK (HCC): Primary | ICD-10-CM

## 2018-02-07 DIAGNOSIS — L98.429 SKIN ULCER OF SACRUM, UNSPECIFIED ULCER STAGE (HCC): Chronic | ICD-10-CM

## 2018-02-07 DIAGNOSIS — R65.21 SEPTIC SHOCK (HCC): Primary | ICD-10-CM

## 2018-02-07 LAB
ALBUMIN SERPL-MCNC: 2 G/DL (ref 3.2–4.6)
ALBUMIN/GLOB SERPL: 0.4 {RATIO} (ref 1.2–3.5)
ALP SERPL-CCNC: 66 U/L (ref 50–136)
ALT SERPL-CCNC: 8 U/L (ref 12–65)
ANION GAP SERPL CALC-SCNC: 14 MMOL/L (ref 7–16)
APPEARANCE UR: ABNORMAL
AST SERPL-CCNC: 6 U/L (ref 15–37)
BACTERIA URNS QL MICRO: 0 /HPF
BASOPHILS # BLD: 0.3 K/UL (ref 0–0.2)
BASOPHILS NFR BLD: 1 % (ref 0–2)
BILIRUB SERPL-MCNC: 0.6 MG/DL (ref 0.2–1.1)
BILIRUB UR QL: NEGATIVE
BUN SERPL-MCNC: 30 MG/DL (ref 8–23)
CALCIUM SERPL-MCNC: 8 MG/DL (ref 8.3–10.4)
CASTS URNS QL MICRO: 0 /LPF
CHLORIDE SERPL-SCNC: 109 MMOL/L (ref 98–107)
CO2 SERPL-SCNC: 17 MMOL/L (ref 21–32)
COLOR UR: ABNORMAL
CREAT SERPL-MCNC: 1 MG/DL (ref 0.8–1.5)
CRYSTALS URNS QL MICRO: NORMAL /LPF
DIFFERENTIAL METHOD BLD: ABNORMAL
EOSINOPHIL # BLD: 0 K/UL (ref 0–0.8)
EOSINOPHIL NFR BLD: 0 % (ref 0.5–7.8)
EPI CELLS #/AREA URNS HPF: NORMAL /HPF
ERYTHROCYTE [DISTWIDTH] IN BLOOD BY AUTOMATED COUNT: 21.5 % (ref 11.9–14.6)
FLUAV AG NPH QL IA: NEGATIVE
FLUBV AG NPH QL IA: NEGATIVE
GLOBULIN SER CALC-MCNC: 5.5 G/DL (ref 2.3–3.5)
GLUCOSE SERPL-MCNC: 305 MG/DL (ref 65–100)
GLUCOSE UR STRIP.AUTO-MCNC: NEGATIVE MG/DL
HCT VFR BLD AUTO: 29.9 % (ref 41.1–50.3)
HGB BLD-MCNC: 8.7 G/DL (ref 13.6–17.2)
HGB UR QL STRIP: ABNORMAL
IMM GRANULOCYTES # BLD: 0.3 K/UL (ref 0–0.5)
IMM GRANULOCYTES NFR BLD AUTO: 1 % (ref 0–5)
KETONES UR QL STRIP.AUTO: NEGATIVE MG/DL
LACTATE BLD-SCNC: 6 MMOL/L (ref 0.5–1.9)
LACTATE BLD-SCNC: 7.5 MMOL/L (ref 0.5–1.9)
LEUKOCYTE ESTERASE UR QL STRIP.AUTO: ABNORMAL
LYMPHOCYTES # BLD: 2 K/UL (ref 0.5–4.6)
LYMPHOCYTES NFR BLD: 8 % (ref 13–44)
MAGNESIUM SERPL-MCNC: 2 MG/DL (ref 1.8–2.4)
MCH RBC QN AUTO: 26.3 PG (ref 26.1–32.9)
MCHC RBC AUTO-ENTMCNC: 29.1 G/DL (ref 31.4–35)
MCV RBC AUTO: 90.3 FL (ref 79.6–97.8)
MONOCYTES # BLD: 1 K/UL (ref 0.1–1.3)
MONOCYTES NFR BLD: 4 % (ref 4–12)
MUCOUS THREADS URNS QL MICRO: 0 /LPF
NEUTS SEG # BLD: 21.9 K/UL (ref 1.7–8.2)
NEUTS SEG NFR BLD: 87 % (ref 43–78)
NITRITE UR QL STRIP.AUTO: NEGATIVE
PH UR STRIP: 5.5 [PH] (ref 5–9)
PHOSPHATE SERPL-MCNC: 2.8 MG/DL (ref 2.3–3.7)
PLATELET # BLD AUTO: 445 K/UL (ref 150–450)
PLATELET COMMENTS,PCOM: ABNORMAL
PMV BLD AUTO: 10.5 FL (ref 10.8–14.1)
POTASSIUM SERPL-SCNC: 4.3 MMOL/L (ref 3.5–5.1)
PROCALCITONIN SERPL-MCNC: 1.7 NG/ML
PROT SERPL-MCNC: 7.5 G/DL (ref 6.3–8.2)
PROT UR STRIP-MCNC: 100 MG/DL
RBC # BLD AUTO: 3.31 M/UL (ref 4.23–5.67)
RBC #/AREA URNS HPF: NORMAL /HPF
RBC MORPH BLD: ABNORMAL
RBC MORPH BLD: ABNORMAL
SODIUM SERPL-SCNC: 140 MMOL/L (ref 136–145)
SP GR UR REFRACTOMETRY: 1.03 (ref 1–1.02)
UROBILINOGEN UR QL STRIP.AUTO: 0.2 EU/DL (ref 0.2–1)
WBC # BLD AUTO: 25.2 K/UL (ref 4.3–11.1)
WBC URNS QL MICRO: NORMAL /HPF
YEAST URNS QL MICRO: NORMAL

## 2018-02-07 PROCEDURE — 65270000029 HC RM PRIVATE

## 2018-02-07 PROCEDURE — 84100 ASSAY OF PHOSPHORUS: CPT | Performed by: EMERGENCY MEDICINE

## 2018-02-07 PROCEDURE — 81015 MICROSCOPIC EXAM OF URINE: CPT | Performed by: EMERGENCY MEDICINE

## 2018-02-07 PROCEDURE — 87804 INFLUENZA ASSAY W/OPTIC: CPT | Performed by: INTERNAL MEDICINE

## 2018-02-07 PROCEDURE — 74011000258 HC RX REV CODE- 258: Performed by: INTERNAL MEDICINE

## 2018-02-07 PROCEDURE — 85025 COMPLETE CBC W/AUTO DIFF WBC: CPT | Performed by: EMERGENCY MEDICINE

## 2018-02-07 PROCEDURE — 80053 COMPREHEN METABOLIC PANEL: CPT | Performed by: EMERGENCY MEDICINE

## 2018-02-07 PROCEDURE — 81001 URINALYSIS AUTO W/SCOPE: CPT | Performed by: EMERGENCY MEDICINE

## 2018-02-07 PROCEDURE — 87106 FUNGI IDENTIFICATION YEAST: CPT | Performed by: EMERGENCY MEDICINE

## 2018-02-07 PROCEDURE — 87040 BLOOD CULTURE FOR BACTERIA: CPT | Performed by: EMERGENCY MEDICINE

## 2018-02-07 PROCEDURE — 84145 PROCALCITONIN (PCT): CPT | Performed by: EMERGENCY MEDICINE

## 2018-02-07 PROCEDURE — 93005 ELECTROCARDIOGRAM TRACING: CPT | Performed by: EMERGENCY MEDICINE

## 2018-02-07 PROCEDURE — 71045 X-RAY EXAM CHEST 1 VIEW: CPT

## 2018-02-07 PROCEDURE — 83605 ASSAY OF LACTIC ACID: CPT

## 2018-02-07 PROCEDURE — 83735 ASSAY OF MAGNESIUM: CPT | Performed by: EMERGENCY MEDICINE

## 2018-02-07 PROCEDURE — 96365 THER/PROPH/DIAG IV INF INIT: CPT | Performed by: EMERGENCY MEDICINE

## 2018-02-07 PROCEDURE — 99285 EMERGENCY DEPT VISIT HI MDM: CPT | Performed by: EMERGENCY MEDICINE

## 2018-02-07 PROCEDURE — 87086 URINE CULTURE/COLONY COUNT: CPT | Performed by: EMERGENCY MEDICINE

## 2018-02-07 PROCEDURE — 96361 HYDRATE IV INFUSION ADD-ON: CPT | Performed by: EMERGENCY MEDICINE

## 2018-02-07 PROCEDURE — 74011250636 HC RX REV CODE- 250/636: Performed by: EMERGENCY MEDICINE

## 2018-02-07 PROCEDURE — 74011250636 HC RX REV CODE- 250/636: Performed by: INTERNAL MEDICINE

## 2018-02-07 RX ORDER — ENOXAPARIN SODIUM 100 MG/ML
40 INJECTION SUBCUTANEOUS EVERY 24 HOURS
Status: DISCONTINUED | OUTPATIENT
Start: 2018-02-07 | End: 2018-02-15 | Stop reason: HOSPADM

## 2018-02-07 RX ORDER — SODIUM CHLORIDE 0.9 % (FLUSH) 0.9 %
5-10 SYRINGE (ML) INJECTION AS NEEDED
Status: DISCONTINUED | OUTPATIENT
Start: 2018-02-07 | End: 2018-02-15 | Stop reason: HOSPADM

## 2018-02-07 RX ORDER — SODIUM CHLORIDE 9 MG/ML
50 INJECTION, SOLUTION INTRAVENOUS CONTINUOUS
Status: DISCONTINUED | OUTPATIENT
Start: 2018-02-07 | End: 2018-02-13

## 2018-02-07 RX ORDER — CIPROFLOXACIN 2 MG/ML
400 INJECTION, SOLUTION INTRAVENOUS ONCE
Status: COMPLETED | OUTPATIENT
Start: 2018-02-07 | End: 2018-02-08

## 2018-02-07 RX ORDER — VANCOMYCIN HYDROCHLORIDE
1250 ONCE
Status: COMPLETED | OUTPATIENT
Start: 2018-02-07 | End: 2018-02-08

## 2018-02-07 RX ADMIN — VANCOMYCIN HYDROCHLORIDE 1250 MG: 10 INJECTION, POWDER, LYOPHILIZED, FOR SOLUTION INTRAVENOUS at 21:18

## 2018-02-07 RX ADMIN — SODIUM CHLORIDE 1932 ML: 900 INJECTION, SOLUTION INTRAVENOUS at 20:25

## 2018-02-07 RX ADMIN — ENOXAPARIN SODIUM 40 MG: 40 INJECTION SUBCUTANEOUS at 21:58

## 2018-02-07 RX ADMIN — CIPROFLOXACIN 400 MG: 2 INJECTION, SOLUTION INTRAVENOUS at 21:48

## 2018-02-08 ENCOUNTER — APPOINTMENT (OUTPATIENT)
Dept: GENERAL RADIOLOGY | Age: 68
DRG: 871 | End: 2018-02-08
Attending: INTERNAL MEDICINE
Payer: MEDICARE

## 2018-02-08 LAB
ALBUMIN SERPL-MCNC: 1.7 G/DL (ref 3.2–4.6)
ALBUMIN/GLOB SERPL: 0.4 {RATIO} (ref 1.2–3.5)
ALP SERPL-CCNC: 52 U/L (ref 50–136)
ALT SERPL-CCNC: 8 U/L (ref 12–65)
ANION GAP SERPL CALC-SCNC: 8 MMOL/L (ref 7–16)
AST SERPL-CCNC: 8 U/L (ref 15–37)
ATRIAL RATE: 119 BPM
BILIRUB SERPL-MCNC: 0.5 MG/DL (ref 0.2–1.1)
BUN SERPL-MCNC: 22 MG/DL (ref 8–23)
CALCIUM SERPL-MCNC: 7.3 MG/DL (ref 8.3–10.4)
CALCULATED P AXIS, ECG09: 81 DEGREES
CALCULATED R AXIS, ECG10: 60 DEGREES
CALCULATED T AXIS, ECG11: 86 DEGREES
CHLORIDE SERPL-SCNC: 114 MMOL/L (ref 98–107)
CO2 SERPL-SCNC: 22 MMOL/L (ref 21–32)
CREAT SERPL-MCNC: 0.51 MG/DL (ref 0.8–1.5)
DIAGNOSIS, 93000: NORMAL
ERYTHROCYTE [DISTWIDTH] IN BLOOD BY AUTOMATED COUNT: 21.4 % (ref 11.9–14.6)
GLOBULIN SER CALC-MCNC: 4.7 G/DL (ref 2.3–3.5)
GLUCOSE BLD STRIP.AUTO-MCNC: 101 MG/DL (ref 65–100)
GLUCOSE BLD STRIP.AUTO-MCNC: 176 MG/DL (ref 65–100)
GLUCOSE BLD STRIP.AUTO-MCNC: 182 MG/DL (ref 65–100)
GLUCOSE BLD STRIP.AUTO-MCNC: 98 MG/DL (ref 65–100)
GLUCOSE SERPL-MCNC: 184 MG/DL (ref 65–100)
HCT VFR BLD AUTO: 24.4 % (ref 41.1–50.3)
HGB BLD-MCNC: 7 G/DL (ref 13.6–17.2)
LACTATE SERPL-SCNC: 1.2 MMOL/L (ref 0.4–2)
LACTATE SERPL-SCNC: 2.2 MMOL/L (ref 0.4–2)
LACTATE SERPL-SCNC: 4.2 MMOL/L (ref 0.4–2)
MCH RBC QN AUTO: 25.8 PG (ref 26.1–32.9)
MCHC RBC AUTO-ENTMCNC: 28.7 G/DL (ref 31.4–35)
MCV RBC AUTO: 90 FL (ref 79.6–97.8)
P-R INTERVAL, ECG05: 122 MS
PLATELET # BLD AUTO: 355 K/UL (ref 150–450)
PMV BLD AUTO: 10.2 FL (ref 10.8–14.1)
POTASSIUM SERPL-SCNC: 3.3 MMOL/L (ref 3.5–5.1)
PROT SERPL-MCNC: 6.4 G/DL (ref 6.3–8.2)
Q-T INTERVAL, ECG07: 308 MS
QRS DURATION, ECG06: 68 MS
QTC CALCULATION (BEZET), ECG08: 433 MS
RBC # BLD AUTO: 2.71 M/UL (ref 4.23–5.67)
SODIUM SERPL-SCNC: 144 MMOL/L (ref 136–145)
VENTRICULAR RATE, ECG03: 119 BPM
WBC # BLD AUTO: 14.8 K/UL (ref 4.3–11.1)

## 2018-02-08 PROCEDURE — 74011250636 HC RX REV CODE- 250/636: Performed by: INTERNAL MEDICINE

## 2018-02-08 PROCEDURE — 83605 ASSAY OF LACTIC ACID: CPT | Performed by: INTERNAL MEDICINE

## 2018-02-08 PROCEDURE — 77030019605

## 2018-02-08 PROCEDURE — 36415 COLL VENOUS BLD VENIPUNCTURE: CPT | Performed by: INTERNAL MEDICINE

## 2018-02-08 PROCEDURE — 77030010522

## 2018-02-08 PROCEDURE — 74011636637 HC RX REV CODE- 636/637: Performed by: INTERNAL MEDICINE

## 2018-02-08 PROCEDURE — 65270000029 HC RM PRIVATE

## 2018-02-08 PROCEDURE — 77030018798 HC PMP KT ENTRL FED COVD -A

## 2018-02-08 PROCEDURE — 77030018836 HC SOL IRR NACL ICUM -A

## 2018-02-08 PROCEDURE — 85027 COMPLETE CBC AUTOMATED: CPT | Performed by: INTERNAL MEDICINE

## 2018-02-08 PROCEDURE — 77010033678 HC OXYGEN DAILY

## 2018-02-08 PROCEDURE — 74011250637 HC RX REV CODE- 250/637: Performed by: INTERNAL MEDICINE

## 2018-02-08 PROCEDURE — 94760 N-INVAS EAR/PLS OXIMETRY 1: CPT

## 2018-02-08 PROCEDURE — 82962 GLUCOSE BLOOD TEST: CPT

## 2018-02-08 PROCEDURE — 74011000258 HC RX REV CODE- 258: Performed by: INTERNAL MEDICINE

## 2018-02-08 PROCEDURE — 74018 RADEX ABDOMEN 1 VIEW: CPT

## 2018-02-08 PROCEDURE — 77030010541

## 2018-02-08 PROCEDURE — 80053 COMPREHEN METABOLIC PANEL: CPT | Performed by: INTERNAL MEDICINE

## 2018-02-08 PROCEDURE — C9113 INJ PANTOPRAZOLE SODIUM, VIA: HCPCS | Performed by: INTERNAL MEDICINE

## 2018-02-08 PROCEDURE — 77030020186 HC BOOT HL PROTCT SAGE -B

## 2018-02-08 RX ORDER — GUAIFENESIN 100 MG/5ML
81 LIQUID (ML) ORAL DAILY
Status: DISCONTINUED | OUTPATIENT
Start: 2018-02-08 | End: 2018-02-15 | Stop reason: HOSPADM

## 2018-02-08 RX ORDER — POTASSIUM CHLORIDE 20MEQ/15ML
40 LIQUID (ML) ORAL
Status: COMPLETED | OUTPATIENT
Start: 2018-02-08 | End: 2018-02-08

## 2018-02-08 RX ORDER — GUAIFENESIN 600 MG/1
600 TABLET, EXTENDED RELEASE ORAL EVERY 12 HOURS
Status: DISCONTINUED | OUTPATIENT
Start: 2018-02-08 | End: 2018-02-09

## 2018-02-08 RX ORDER — GUAIFENESIN 400 MG/1
800 TABLET ORAL EVERY 12 HOURS
COMMUNITY

## 2018-02-08 RX ORDER — MORPHINE SULFATE 8 MG/ML
2 INJECTION, SOLUTION INTRAMUSCULAR; INTRAVENOUS
Status: DISCONTINUED | OUTPATIENT
Start: 2018-02-08 | End: 2018-02-15 | Stop reason: HOSPADM

## 2018-02-08 RX ORDER — VANCOMYCIN HYDROCHLORIDE
1250 EVERY 12 HOURS
Status: DISCONTINUED | OUTPATIENT
Start: 2018-02-08 | End: 2018-02-09

## 2018-02-08 RX ORDER — POTASSIUM CHLORIDE 14.9 MG/ML
20 INJECTION INTRAVENOUS 2 TIMES DAILY
Status: DISCONTINUED | OUTPATIENT
Start: 2018-02-08 | End: 2018-02-08

## 2018-02-08 RX ORDER — INSULIN LISPRO 100 [IU]/ML
INJECTION, SOLUTION INTRAVENOUS; SUBCUTANEOUS
Status: DISCONTINUED | OUTPATIENT
Start: 2018-02-08 | End: 2018-02-15 | Stop reason: HOSPADM

## 2018-02-08 RX ORDER — INSULIN GLARGINE 100 [IU]/ML
20 INJECTION, SOLUTION SUBCUTANEOUS DAILY
Status: DISCONTINUED | OUTPATIENT
Start: 2018-02-08 | End: 2018-02-13

## 2018-02-08 RX ORDER — INSULIN LISPRO 100 [IU]/ML
5 INJECTION, SOLUTION INTRAVENOUS; SUBCUTANEOUS
Status: DISCONTINUED | OUTPATIENT
Start: 2018-02-08 | End: 2018-02-08

## 2018-02-08 RX ORDER — ONDANSETRON 2 MG/ML
4 INJECTION INTRAMUSCULAR; INTRAVENOUS
Status: DISCONTINUED | OUTPATIENT
Start: 2018-02-08 | End: 2018-02-15 | Stop reason: HOSPADM

## 2018-02-08 RX ORDER — TRAMADOL HYDROCHLORIDE 50 MG/1
50 TABLET ORAL
Status: DISCONTINUED | OUTPATIENT
Start: 2018-02-08 | End: 2018-02-15 | Stop reason: HOSPADM

## 2018-02-08 RX ORDER — ALBUTEROL SULFATE 0.83 MG/ML
2.5 SOLUTION RESPIRATORY (INHALATION)
Status: DISCONTINUED | OUTPATIENT
Start: 2018-02-08 | End: 2018-02-15 | Stop reason: HOSPADM

## 2018-02-08 RX ORDER — PANTOPRAZOLE SODIUM 40 MG/10ML
40 INJECTION, POWDER, LYOPHILIZED, FOR SOLUTION INTRAVENOUS EVERY 24 HOURS
Status: DISCONTINUED | OUTPATIENT
Start: 2018-02-08 | End: 2018-02-15 | Stop reason: HOSPADM

## 2018-02-08 RX ADMIN — Medication 1 AMPULE: at 12:19

## 2018-02-08 RX ADMIN — VANCOMYCIN HYDROCHLORIDE 1250 MG: 10 INJECTION, POWDER, LYOPHILIZED, FOR SOLUTION INTRAVENOUS at 09:21

## 2018-02-08 RX ADMIN — Medication 10 ML: at 12:20

## 2018-02-08 RX ADMIN — Medication 5 ML: at 21:54

## 2018-02-08 RX ADMIN — INSULIN GLARGINE 20 UNITS: 100 INJECTION, SOLUTION SUBCUTANEOUS at 09:22

## 2018-02-08 RX ADMIN — PIPERACILLIN SODIUM,TAZOBACTAM SODIUM 4.5 G: 4; .5 INJECTION, POWDER, FOR SOLUTION INTRAVENOUS at 21:53

## 2018-02-08 RX ADMIN — VANCOMYCIN HYDROCHLORIDE 1250 MG: 10 INJECTION, POWDER, LYOPHILIZED, FOR SOLUTION INTRAVENOUS at 21:53

## 2018-02-08 RX ADMIN — PIPERACILLIN SODIUM,TAZOBACTAM SODIUM 4.5 G: 4; .5 INJECTION, POWDER, FOR SOLUTION INTRAVENOUS at 05:26

## 2018-02-08 RX ADMIN — ASPIRIN 81 MG 81 MG: 81 TABLET ORAL at 09:00

## 2018-02-08 RX ADMIN — TRAMADOL HYDROCHLORIDE 50 MG: 50 TABLET, FILM COATED ORAL at 20:12

## 2018-02-08 RX ADMIN — SODIUM CHLORIDE 100 ML/HR: 900 INJECTION, SOLUTION INTRAVENOUS at 01:15

## 2018-02-08 RX ADMIN — PIPERACILLIN SODIUM,TAZOBACTAM SODIUM 4.5 G: 4; .5 INJECTION, POWDER, FOR SOLUTION INTRAVENOUS at 01:40

## 2018-02-08 RX ADMIN — Medication 2 MG: at 10:00

## 2018-02-08 RX ADMIN — TRAMADOL HYDROCHLORIDE 50 MG: 50 TABLET, FILM COATED ORAL at 15:38

## 2018-02-08 RX ADMIN — INSULIN LISPRO 2 UNITS: 100 INJECTION, SOLUTION INTRAVENOUS; SUBCUTANEOUS at 12:24

## 2018-02-08 RX ADMIN — Medication 5 ML: at 10:00

## 2018-02-08 RX ADMIN — PANTOPRAZOLE SODIUM 40 MG: 40 INJECTION, POWDER, FOR SOLUTION INTRAVENOUS at 12:20

## 2018-02-08 RX ADMIN — POTASSIUM CHLORIDE 40 MEQ: 20 SOLUTION ORAL at 11:00

## 2018-02-08 RX ADMIN — ENOXAPARIN SODIUM 40 MG: 40 INJECTION SUBCUTANEOUS at 21:54

## 2018-02-08 RX ADMIN — ONDANSETRON 4 MG: 2 INJECTION INTRAMUSCULAR; INTRAVENOUS at 01:50

## 2018-02-08 RX ADMIN — PIPERACILLIN SODIUM,TAZOBACTAM SODIUM 4.5 G: 4; .5 INJECTION, POWDER, FOR SOLUTION INTRAVENOUS at 15:15

## 2018-02-08 RX ADMIN — Medication 10 ML: at 14:35

## 2018-02-08 RX ADMIN — Medication 10 ML: at 05:28

## 2018-02-08 RX ADMIN — Medication 1 AMPULE: at 21:54

## 2018-02-08 NOTE — ED NOTES
Pt is alert to person and situation, but some of the things the pt says is infactual. PT states he has not eaten in 8 days because no one has fed him, but then he will tell another HCP that he is tube fed. Pt also states he has the flu, but that is being confused with pneumonia. So pt is a little altered mental status.

## 2018-02-08 NOTE — ED PROVIDER NOTES
HPI Comments: 51-year-old male history of chronic brain injury and frequent aspiration with chronic indwelling Gerardo, colostomy, and PEG, presents with shortness of breath, cough, AMS, and tachycardia from [place]. He was just admitted to the hospital January 30 through February 3 for Pseudomonas UTI, aspiration pneumonia, and sepsis. He is still on Cipro and Flagyl. Of note, the family had still been feeding the patient despite being told he needs strict NPO due to frequent aspiration. Had recent buttocks/leg debridement for gangrene. Given 1L NS and 4.5 Zosyn by EMS. Patient is a 79 y.o. male presenting with shortness of breath. The history is provided by the patient. Shortness of Breath          Past Medical History:   Diagnosis Date    COPD (chronic obstructive [facility] disease) ([facility] [address]     Dementia     Diabetes ([facility] [address]     Necrotizing fasciitis ([facility] [address]        History reviewed. No pertinent surgical history. History reviewed. No pertinent family history. Social History     Social History    Marital status: SINGLE     Spouse name: N/A    Number of children: N/A    Years of education: N/A     Occupational History    Not on file. Social History Main Topics    Smoking status: Former Smoker    Smokeless tobacco: Not on file    Alcohol use No    Drug use: No    Sexual activity: Not on file     Other Topics Concern    Not on file     Social History Narrative         ALLERGIES: Review of patient's allergies indicates no known allergies. Review of Systems   Unable to perform ROS: Other (TBI)   Respiratory: Positive for shortness of breath. Vitals:    02/07/18 1947   Pulse: (!) 124   Resp: 20   SpO2: 94%   Weight: 64.4 kg (142 lb)   Height: 5' 11\" (1.803 m)            Physical Exam   Constitutional: No distress. Chronically ill-appearing   HENT:   Head: Normocephalic and atraumatic.    Right Ear: External ear normal.   Left Ear: External ear normal.   Nose: Nose normal. Mouth/Throat: Mucous membranes are dry. Neck: Neck supple. Cardiovascular: Regular rhythm and normal heart sounds. Tachycardia present. Pulmonary/Chest: Effort normal. No respiratory distress. He has decreased breath sounds. Abdominal: Soft. There is no tenderness. Musculoskeletal: He exhibits no tenderness. contracture   Neurological: He is alert. He exhibits abnormal muscle tone. Generalized weakness   Skin: There is pallor. Psychiatric: His speech is delayed and slurred. He is slowed. Cognition and memory are impaired. Unable to state year. Is able to state that he is at [facility] and the president is [facility]   Nursing note and vitals reviewed. MDM  Number of Diagnoses or Management Options  Diagnosis management comments: Parts of this document were created using dragon voice recognition software. The chart has been reviewed but errors may still be present. Hypotensive and tachycardic. Fluids continued. vanc and cipro added to zosyn given by EMS. Had recent pseudomonas UTI.     9:10 PM  bp improved with fluids. dw hospitalist for admission.    ===================================================================  This patient is critically ill and there is a high probability of of imminent or life threatening deterioration in the patient's condition without immediate management. The nature of the patient's clinical problem is: septic shock    I have spent 30 minutes in direct patient care, documentation, review of labs/xrays/old records, discussion with Staff, Colleague . The time involved in the performance of separately reportable procedures was not counted toward critical care time. Mikal Lee MD; 2/7/2018 @9:11 PM  ===================================================================    This patient has SIRS + Infection and signs of tissue hypoperfusion, therefore they meet criteria for severe sepsis.      I have ordered labs and cultures, Abx 1 and Abx 2 have been given. Their initial lactate was 7.5  and RECHECK lactate after IVF bolus is pending. They therefore MEET criteria for SEPTIC SHOCK. I have ordered additional IVF to bring the total amount to 30ml/kg.      My VOLUME STATUS and TISSUE PERFUSION assessment is documented below    ===================================================================  CARDIOPULMONARY ASSESSMENT (Lactate >4; Septic Shock)    /61  Pulse (!) 120  Temp 98.7 °F (37.1 °C)  Resp 24  Ht 5' 11\" (1.803 m)  Wt 64.4 kg (142 lb)  SpO2 91%  BMI 19.8 kg/m2    Cardiopulmonary Exam   --Lungs - breath sounds clear and equal bilaterally  --Heart -  Sinus Tachycardia 120    Capillary Refill - delayed    Peripheral Pulses- intact    Skin Exam- pale , dry  -- pallor    9:11 PM, 2/7/2018, Anabell Blake MD  ===================================================================             Amount and/or Complexity of Data Reviewed  Clinical lab tests: ordered and reviewed (Results for orders placed or performed during the hospital encounter of 02/07/18  -CBC WITH AUTOMATED DIFF       Result                                            Value                         Ref Range                       WBC                                               25.2 (H)                      4.3 - 11.1 K/uL                 RBC                                               3.31 (L)                      4.23 - 5.67 M/uL                HGB                                               8.7 (L)                       13.6 - 17.2 g/dL                HCT                                               29.9 (L)                      41.1 - 50.3 %                   MCV                                               90.3                          79.6 - 97.8 FL                  MCH                                               26.3                          26.1 - 32.9 PG                  MCHC                                              29.1 (L) 31.4 - 35.0 g/dL                RDW                                               21.5 (H)                      11.9 - 14.6 %                   PLATELET                                          445                           150 - 450 K/uL                  MPV                                               10.5 (L)                      10.8 - 14.1 FL                  DF                                                PENDING                                                  -METABOLIC PANEL, COMPREHENSIVE       Result                                            Value                         Ref Range                       Sodium                                            140                           136 - 145 mmol/L                Potassium                                         4.3                           3.5 - 5.1 mmol/L                Chloride                                          109 (H)                       98 - 107 mmol/L                 CO2                                               17 (L)                        21 - 32 mmol/L                  Anion gap                                         14                            7 - 16 mmol/L                   Glucose                                           305 (H)                       65 - 100 mg/dL                  BUN                                               30 (H)                        8 - 23 MG/DL                    Creatinine                                        1.00                          0.8 - 1.5 MG/DL                 GFR est AA                                        >60                           >60 ml/min/1.73m2               GFR est non-AA                                    >60                           >60 ml/min/1.73m2               Calcium                                           8.0 (L)                       8.3 - 10.4 MG/DL                Bilirubin, total                                  0.6                           0.2 - 1.1 MG/DL                 ALT (SGPT)                                        8 (L)                         12 - 65 U/L                     AST (SGOT)                                        6 (L)                         15 - 37 U/L                     Alk. phosphatase                                  66                            50 - 136 U/L                    Protein, total                                    7.5                           6.3 - 8.2 g/dL                  Albumin                                           2.0 (L)                       3.2 - 4.6 g/dL                  Globulin                                          5.5 (H)                       2.3 - 3.5 g/dL                  A-G Ratio                                         0.4 (L)                       1.2 - 3.5                  -PROCALCITONIN       Result                                            Value                         Ref Range                       Procalcitonin                                     1.7                           ng/mL                      -POC LACTIC ACID       Result                                            Value                         Ref Range                       Lactic Acid (POC)                                 7.5 (H)                       0.5 - 1.9 mmol/L           -EKG, 12 LEAD, INITIAL       Result                                            Value                         Ref Range                       Ventricular Rate                                  119                           BPM                             Atrial Rate                                       119                           BPM                             P-R Interval                                      122                           ms                              QRS Duration                                      68                            ms                              Q-T Interval                                      308 ms                              QTC Calculation (Bezet)                           433                           ms                              Calculated P Axis                                 81                            degrees                         Calculated R Axis                                 60                            degrees                         Calculated T Axis                                 86                            degrees                         Diagnosis                                                                                                   !! AGE AND GENDER SPECIFIC ECG ANALYSIS !! Sinus tachycardia   Low voltage QRS   Borderline ECG   When compared with ECG of 31-JAN-2018 12:26,   Premature atrial complexes are no longer Present   Nonspecific T wave abnormality now evident in Lateral leads     )  Tests in the radiology section of CPT®: ordered and reviewed (Xr Chest Port    Result Date: 2/7/2018  EXAM:  XR CHEST PORT INDICATION:  chest pain COMPARISON:  1/30/2018 FINDINGS: A portable AP radiograph of the chest was obtained at 2036 hours. The patient is on a cardiac monitor. Lower lobe airspace disease. The cardiac and mediastinal contours and pulmonary vascularity are normal.  The bones and soft tissues are grossly within normal limits.      IMPRESSION: Right lower lobe atelectasis or pneumonia.     )  Tests in the medicine section of CPT®: ordered and reviewed          ED Course       Procedures

## 2018-02-08 NOTE — PROGRESS NOTES
Problem: Nutrition Deficit  Goal: *Optimize nutritional status  Nutrition:  Reason for assessment: Consult for TF management per nutritional support protocols. Referral received for EN/TPN PTA  Referral received for pressure ulcer  Assessment:   Food/Nutrition History:   Patient has a PMH of type 2 DM, TBI, sacral ulcer, COPD, dementia, and dysphagia with PEG in place (12/201). Per notes, the patient is confused as he reports that he hasnt eaten in 8 days, but then says he is being tube fed. Chart also indicates that patient has stool like residuals from PEG tube, however he is s/p KUB of abdomen which was negative for a SBO. Skin: gangrene of the right LE, unstageable pressure wound to buttocks  Abdomen soft with normal active bowel sounds. Date of Last BM: 2/8/18  Pertinent Medications: 20 units lantus/day, SSI, Zosyn  IVF: NS 100ml/hr  Pertinent labs: Glucose- 184, K 3.3  POC Glucoses:  182-176 mg/dl  Anthropometrics: Height 511 (1.803 m), weight 64.4 kg (142 lb) (weight source), BMI 19.8  BMI class of underweight. Macronutrient needs:  EER:  4341-8414 kcal /day (30-35kcal/kg  ABW)  EPR:   grams protein/day (1.5-2.0 grams/kg ABW)  Max CHO:  282 grams/day (50% kcal) (55% if non-DM) (50% if pt has DM)  Fluid:  1ml/kcal  Intake/Comparative standards: Current NPO status does not meet kcal or protein needs. Nutrition Diagnosis: Difficulty swallowing r/t dysphagia as evidenced by PEG tube placement. Intervention:  Meals and snacks: NPO  EN:Start TF of Glucerna 1.2 at 10 ml/hr and increase by 10 ml/hr q 4 hrs as tolerated to goal rate of 70ml/hr with 37 ml water q 1hr to provide 2016 kcal/day (100% of needs), 100.8 grams protein/day (100% of needs), 193 grams CHO/day (does not exceed max CHO),  and ~ 1411 ml free water/day for a total of 2299 ml fluid/day (100% of needs). Nutrition Supplement Therapy: Electrolyte replacement per nutritional support protocols are active on MAR.   IV: Adjust IVF ml for TF   (TF + water flush) + IVF =100ml/hr. Nutrition Discharge Plan: Continue TF via PEG at discharge.       Orion Steiner Dietetic Intern

## 2018-02-08 NOTE — PROGRESS NOTES
Hospitalist Progress Note     Admit Date:  2018  7:41 PM   Name:  Hannah Driver   Age:  79 y.o.  :  1950   MRN:  [id number]   PCP:  None  Treatment Team: Attending Provider: Jimena Hurtado MD; Charge Nurse: Brett Murcia RN; Consulting Provider: Hanna Isaacs MD; Utilization Review: Vineet Henry RN    Subjective:     From H&P \"Patient is a 67/M with PMH TBI, DM and indwelling kathleen catheter with PEG tube who presents from NH due to SOB and tachycardia. Patient was admitted one week ago for sepsis secondary to UTI and Aspiration PNA as well as gangrene of right LE. Patient was today found to be hypotensive with significant leukocytosis. He was given Zosyn en route and cultures were collected in the ED. He has been given Vancomycin. Lactic acid is also elevated. CXR shows RLL PNA. BP has improved with fluids. \"     - Pt seen and examined. He was lying in bed. Alert and oriented. Reports being NPO at the NH and complains of cough.          Objective:   Patient Vitals for the past 24 hrs:   Temp Pulse Resp BP SpO2   18 0816 97.7 °F (36.5 °C) 97 16 109/58 93 %   18 0334 97.8 °F (36.6 °C) 78 18 98/60 100 %   18 0205 - - - - 100 %   18 0051 97.3 °F (36.3 °C) 100 22 98/62 93 %   18 2246 - (!) 103 24 - 100 %   18 - (!) 108 23 115/58 99 %   18 - (!) 112 22 113/58 100 %   18 - (!) 120 24 103/61 -   18 98.7 °F (37.1 °C) (!) 125 18 (!) 85/47 91 %   18 - (!) 124 20 - 94 %     Oxygen Therapy  O2 Sat (%): 93 % (18 0816)  Pulse via Oximetry: 98 beats per minute (18 0205)  O2 Device: Nasal cannula (18)  O2 Flow Rate (L/min): 2 l/min (18)    Intake/Output Summary (Last 24 hours) at 18 0946  Last data filed at 18 0651   Gross per 24 hour   Intake               50 ml   Output             1225 ml   Net            -1175 ml           General appearance: NAD, conversant  Eyes: anicteric sclerae, moist conjunctivae; no lid-lag  ENT: Atraumatic; oropharynx clear with moist mucous membranes  Neck: Trachea midline , supple, no thyromegaly or lymphadenopathy  Lungs: Rhonchi bilaterally, Coarse breath sounds. CV: S1,S2 present, no added murmurs  Abdomen: Soft, non-tender; no masses. Peg tube in place with no signs of inflammation, however, stool material in the tube. Colostomy bag with loose brown stool. Extremities: No peripheral edema. Right lateral thigh s/p extensive debridement from the hip area to above the knee with exposure of muscles, however, no signs of active infection. Sacral decubitus ulcer, unstageable. Skin: as above. Psych: Appropriate affect. Data Review:  I have reviewed all labs, meds, telemetry events, and studies from the last 24 hours. Recent Results (from the past 24 hour(s))   EKG, 12 LEAD, INITIAL    Collection Time: 02/07/18  8:10 PM   Result Value Ref Range    Ventricular Rate 119 BPM    Atrial Rate 119 BPM    P-R Interval 122 ms    QRS Duration 68 ms    Q-T Interval 308 ms    QTC Calculation (Bezet) 433 ms    Calculated P Axis 81 degrees    Calculated R Axis 60 degrees    Calculated T Axis 86 degrees    Diagnosis       !! AGE AND GENDER SPECIFIC ECG ANALYSIS !!   Sinus tachycardia  Low voltage QRS  Borderline ECG  When compared with ECG of 31-JAN-2018 12:26,  Premature atrial complexes are no longer Present  Nonspecific T wave abnormality now evident in Lateral leads  Confirmed by LUCAS ABBASI (), Naren Diaz ([id number]) on 2/8/2018 7:51:46 AM     CBC WITH AUTOMATED DIFF    Collection Time: 02/07/18  8:20 PM   Result Value Ref Range    WBC 25.2 (H) 4.3 - 11.1 K/uL    RBC 3.31 (L) 4.23 - 5.67 M/uL    HGB 8.7 (L) 13.6 - 17.2 g/dL    HCT 29.9 (L) 41.1 - 50.3 %    MCV 90.3 79.6 - 97.8 FL    MCH 26.3 26.1 - 32.9 PG    MCHC 29.1 (L) 31.4 - 35.0 g/dL    RDW 21.5 (H) 11.9 - 14.6 %    PLATELET 739 063 - 844 K/uL    MPV 10.5 (L) 10.8 - 14.1 FL    NEUTROPHILS 87 (H) 43 - 78 %    LYMPHOCYTES 8 (L) 13 - 44 %    MONOCYTES 4 4.0 - 12.0 %    EOSINOPHILS 0 (L) 0.5 - 7.8 %    BASOPHILS 1 0.0 - 2.0 %    IMMATURE GRANULOCYTES 1 0.0 - 5.0 %    ABS. NEUTROPHILS 21.9 (H) 1.7 - 8.2 K/UL    ABS. LYMPHOCYTES 2.0 0.5 - 4.6 K/UL    ABS. MONOCYTES 1.0 0.1 - 1.3 K/UL    ABS. EOSINOPHILS 0.0 0.0 - 0.8 K/UL    ABS. BASOPHILS 0.3 (H) 0.0 - 0.2 K/UL    ABS. IMM. GRANS. 0.3 0.0 - 0.5 K/UL    RBC COMMENTS SLIGHT  ANISOCYTOSIS + POIKILOCYTOSIS        RBC COMMENTS SLIGHT  HYPOCHROMIA        PLATELET COMMENTS INCREASED      DF AUTOMATED     METABOLIC PANEL, COMPREHENSIVE    Collection Time: 02/07/18  8:20 PM   Result Value Ref Range    Sodium 140 136 - 145 mmol/L    Potassium 4.3 3.5 - 5.1 mmol/L    Chloride 109 (H) 98 - 107 mmol/L    CO2 17 (L) 21 - 32 mmol/L    Anion gap 14 7 - 16 mmol/L    Glucose 305 (H) 65 - 100 mg/dL    BUN 30 (H) 8 - 23 MG/DL    Creatinine 1.00 0.8 - 1.5 MG/DL    GFR est AA >60 >60 ml/min/1.73m2    GFR est non-AA >60 >60 ml/min/1.73m2    Calcium 8.0 (L) 8.3 - 10.4 MG/DL    Bilirubin, total 0.6 0.2 - 1.1 MG/DL    ALT (SGPT) 8 (L) 12 - 65 U/L    AST (SGOT) 6 (L) 15 - 37 U/L    Alk.  phosphatase 66 50 - 136 U/L    Protein, total 7.5 6.3 - 8.2 g/dL    Albumin 2.0 (L) 3.2 - 4.6 g/dL    Globulin 5.5 (H) 2.3 - 3.5 g/dL    A-G Ratio 0.4 (L) 1.2 - 3.5     PROCALCITONIN    Collection Time: 02/07/18  8:20 PM   Result Value Ref Range    Procalcitonin 1.7 ng/mL   CULTURE, BLOOD    Collection Time: 02/07/18  8:20 PM   Result Value Ref Range    Special Requests: LEFT  Antecubital        Culture result: NO GROWTH AFTER 9 HOURS     MAGNESIUM    Collection Time: 02/07/18  8:20 PM   Result Value Ref Range    Magnesium 2.0 1.8 - 2.4 mg/dL   PHOSPHORUS    Collection Time: 02/07/18  8:20 PM   Result Value Ref Range    Phosphorus 2.8 2.3 - 3.7 MG/DL   POC LACTIC ACID    Collection Time: 02/07/18  8:22 PM   Result Value Ref Range    Lactic Acid (POC) 7.5 (H) 0.5 - 1.9 mmol/L   CULTURE, URINE    Collection Time: 02/07/18  9:28 PM   Result Value Ref Range    Special Requests: NO SPECIAL REQUESTS      Culture result:        NO GROWTH AFTER SHORT PERIOD OF INCUBATION. FURTHER RESULTS TO FOLLOW AFTER OVERNIGHT INCUBATION.    URINALYSIS W/ RFLX MICROSCOPIC    Collection Time: 02/07/18  9:28 PM   Result Value Ref Range    Color ORLY      Appearance CLOUDY      Specific gravity 1.033 (H) 1.001 - 1.023      pH (UA) 5.5 5.0 - 9.0      Protein 100 (A) NEG mg/dL    Glucose NEGATIVE  mg/dL    Ketone NEGATIVE  NEG mg/dL    Bilirubin NEGATIVE  NEG      Blood TRACE (A) NEG      Urobilinogen 0.2 0.2 - 1.0 EU/dL    Nitrites NEGATIVE  NEG      Leukocyte Esterase MODERATE (A) NEG     URINE MICROSCOPIC    Collection Time: 02/07/18  9:28 PM   Result Value Ref Range    WBC  0 /hpf    RBC 10-20 0 /hpf    Epithelial cells 0-3 0 /hpf    Bacteria 0 0 /hpf    Casts 0 0 /lpf    Crystals, urine OCCASIONAL 0 /LPF    Mucus 0 0 /lpf    Yeast MARKED     INFLUENZA A & B AG (RAPID TEST)    Collection Time: 02/07/18 10:02 PM   Result Value Ref Range    Influenza A Ag NEGATIVE  NEG      Influenza B Ag NEGATIVE  NEG     POC LACTIC ACID    Collection Time: 02/07/18 10:25 PM   Result Value Ref Range    Lactic Acid (POC) 6.0 (H) 0.5 - 1.9 mmol/L   LACTIC ACID    Collection Time: 02/08/18  1:13 AM   Result Value Ref Range    Lactic acid 4.2 (HH) 0.4 - 2.0 MMOL/L   CBC W/O DIFF    Collection Time: 02/08/18  6:20 AM   Result Value Ref Range    WBC 14.8 (H) 4.3 - 11.1 K/uL    RBC 2.71 (L) 4.23 - 5.67 M/uL    HGB 7.0 (L) 13.6 - 17.2 g/dL    HCT 24.4 (L) 41.1 - 50.3 %    MCV 90.0 79.6 - 97.8 FL    MCH 25.8 (L) 26.1 - 32.9 PG    MCHC 28.7 (L) 31.4 - 35.0 g/dL    RDW 21.4 (H) 11.9 - 14.6 %    PLATELET 852 575 - 675 K/uL    MPV 10.2 (L) 10.8 - 91.1 FL   METABOLIC PANEL, COMPREHENSIVE    Collection Time: 02/08/18  6:20 AM   Result Value Ref Range    Sodium 144 136 - 145 mmol/L    Potassium 3.3 (L) 3.5 - 5.1 mmol/L    Chloride 114 (H) 98 - 107 mmol/L    CO2 22 21 - 32 mmol/L    Anion gap 8 7 - 16 mmol/L    Glucose 184 (H) 65 - 100 mg/dL    BUN 22 8 - 23 MG/DL    Creatinine 0.51 (L) 0.8 - 1.5 MG/DL    GFR est AA >60 >60 ml/min/1.73m2    GFR est non-AA >60 >60 ml/min/1.73m2    Calcium 7.3 (L) 8.3 - 10.4 MG/DL    Bilirubin, total 0.5 0.2 - 1.1 MG/DL    ALT (SGPT) 8 (L) 12 - 65 U/L    AST (SGOT) 8 (L) 15 - 37 U/L    Alk. phosphatase 52 50 - 136 U/L    Protein, total 6.4 6.3 - 8.2 g/dL    Albumin 1.7 (L) 3.2 - 4.6 g/dL    Globulin 4.7 (H) 2.3 - 3.5 g/dL    A-G Ratio 0.4 (L) 1.2 - 3.5     LACTIC ACID    Collection Time: 02/08/18  6:20 AM   Result Value Ref Range    Lactic acid 2.2 (HH) 0.4 - 2.0 MMOL/L   GLUCOSE, POC    Collection Time: 02/08/18  7:12 AM   Result Value Ref Range    Glucose (POC) 182 (H) 65 - 100 mg/dL        All Micro Results     Procedure Component Value Units Date/Time    CULTURE, URINE [[id number]] Collected:  02/07/18 2128    Order Status:  Completed Specimen:  Urine from Clean catch Updated:  02/08/18 0824     Special Requests: NO SPECIAL REQUESTS        Culture result:         NO GROWTH AFTER SHORT PERIOD OF INCUBATION. FURTHER RESULTS TO FOLLOW AFTER OVERNIGHT INCUBATION. CULTURE, BLOOD [[id number]] Collected:  02/07/18 2020    Order Status:  Completed Specimen:  Blood from Blood Updated:  02/08/18 0629     Special Requests: --        LEFT  Antecubital       Culture result: NO GROWTH AFTER 9 HOURS       INFLUENZA A & B AG (RAPID TEST) [[id number]] Collected:  02/07/18 2202    Order Status:  Completed Specimen:  Nasopharyngeal from Nasal washing Updated:  02/07/18 2223     Influenza A Ag NEGATIVE          NEGATIVE FOR THE PRESENCE OF INFLUENZA A ANTIGEN  INFECTION DUE TO INFLUENZA A CANNOT BE RULED OUT. BECAUSE THE ANTIGEN PRESENT IN THE SAMPLE MAY BE BELOW  THE DETECTION LIMIT OF THE TEST. A NEGATIVE TEST IS PRESUMPTIVE AND IT IS RECOMMENDED THAT THESE RESULTS BE CONFIRMED BY VIRAL CULTURE OR AN FDA-CLEARED INFLUENZA A AND B MOLECULAR ASSAY.           Influenza B Ag NEGATIVE          NEGATIVE FOR THE PRESENCE OF INFLUENZA B ANTIGEN  INFECTION DUE TO INFLUENZA B CANNOT BE RULED OUT. BECAUSE THE ANTIGEN PRESENT IN THE SAMPLE MAY BE BELOW  THE DETECTION LIMIT OF THE TEST. A NEGATIVE TEST IS PRESUMPTIVE AND IT IS RECOMMENDED THAT THESE RESULTS BE CONFIRMED BY VIRAL CULTURE OR AN FDA-CLEARED INFLUENZA A AND B MOLECULAR ASSAY. CULTURE, BLOOD [[id number]]     Order Status:  Sent Specimen:  Blood from Blood           Current Meds:  Current Facility-Administered Medications   Medication Dose Route Frequency    albuterol (PROVENTIL VENTOLIN) nebulizer solution 2.5 mg  2.5 mg Nebulization Q2H PRN    aspirin chewable tablet 81 mg  81 mg Oral DAILY    guaiFENesin ER (MUCINEX) tablet 600 mg  600 mg Oral Q12H    insulin glargine (LANTUS) injection 20 Units  20 Units SubCUTAneous DAILY    insulin lispro (HUMALOG) injection 5 Units  5 Units SubCUTAneous TIDAC    traMADol (ULTRAM) tablet 50 mg  50 mg Per G Tube Q6H PRN    ondansetron (ZOFRAN) injection 4 mg  4 mg IntraVENous Q4H PRN    vancomycin (VANCOCIN) 1250 mg in  ml infusion  1,250 mg IntraVENous Q12H    morphine injection 2 mg  2 mg IntraVENous Q4H PRN    sodium chloride (NS) flush 5-10 mL  5-10 mL IntraVENous PRN    enoxaparin (LOVENOX) injection 40 mg  40 mg SubCUTAneous Q24H    0.9% sodium chloride infusion  100 mL/hr IntraVENous CONTINUOUS    piperacillin-tazobactam (ZOSYN) 4.5 g in 0.9% sodium chloride (MBP/ADV) 100 mL  4.5 g IntraVENous Q8H       Other Studies (last 24 hours):  Xr Chest Port    Result Date: 2/7/2018  EXAM:  XR CHEST PORT INDICATION:  chest pain COMPARISON:  1/30/2018 FINDINGS: A portable AP radiograph of the chest was obtained at 2036 hours. The patient is on a cardiac monitor. Lower lobe airspace disease. The cardiac and mediastinal contours and pulmonary vascularity are normal.  The bones and soft tissues are grossly within normal limits.      IMPRESSION: Right lower lobe atelectasis or pneumonia. Assessment and Plan:     Hospital Problems as of 2/8/2018  Date Reviewed: 2/7/2018          Codes Class Noted - Resolved POA    * (Principal)Sepsis due to pneumonia [facility]) ICD-10-CM: J18.9, A41.9  ICD-9-CM: 001, 995.91  1/30/2018 - Present Unknown            Severe Sepsis secondary to pneumonia, UTI and sacral ulcer: CXR with RLL pneumona, LA 4.2 on admission. Cont IVF, IV abx. Monitor vitals. HCAP: Recent discharge. Cont Vanc, Zosyn and Cipro. F/u Bld Cx. UTI: UA positive. However, pt has kathleen. Cont Abx as above. F/u U Cx. IDDM 2: Will hold Novolog as pt is NPO. Cont Lantus 20 and SSI. 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 Plastic surgery consulted. Decubitus Ulcer: Unstageable. Plastic surgery consulted. Dysphagia, tube feeding: MBS recent previous admission --> NPO. Hx of TBI. Cont tube feeding. Will consult Dietitian as pt has residual. Will also check abd xray. Dementia: stable    TBI- predisposed to aspiration, continue with PEG feeding    Hx of COPD: stable    Anemia: Monitor Hb. Hypokalemia: Replace through PEG tube. Signed:   Aminah Hurtado MD

## 2018-02-08 NOTE — PROGRESS NOTES
Pharmacokinetic Consult to Pharmacist    Brodielatricia Winston is a 79 y.o. male being treated for sepsis secondary to pneumonia with vancomycin and piperacillin-tazobactam.    Height: 5' 11\" (180.3 cm)  Weight: 64.4 kg (142 lb)  Lab Results   Component Value Date/Time    BUN 30 (H) 02/07/2018 08:20 PM    Creatinine 1.00 02/07/2018 08:20 PM    WBC 25.2 (H) 02/07/2018 08:20 PM    Procalcitonin 1.7 02/07/2018 08:20 PM    Lactic acid 4.2 (HH) 02/08/2018 01:13 AM    Lactic Acid (POC) 6.0 (H) 02/07/2018 10:25 PM      Estimated Creatinine Clearance: 65.3 mL/min (based on Cr of 1). CULTURES:  2/7  BCx x 2, UCx pending    Day 1 of vancomycin. Goal trough is 15-20. Vancomycin dose initiated at 1.25 g q12h. Will continue to follow patient.       Thank you,  Daiv Hua, LilianD

## 2018-02-08 NOTE — PROGRESS NOTES
Problem: Nutrition Deficit  Goal: *Optimize nutritional status  Nutrition:  Reason for assessment: Consult for TF management per nutritional support protocols. Referral received for EN/TPN PTA  Referral received for pressure ulcer  Assessment:   Food/Nutrition History:   Patient has a PMH of type 2 DM, TBI, sacral ulcer, COPD, dementia, and dysphagia with PEG in place (12/20/17). Per notes, the patient is confused as he reports that he hasnt eaten in 8 days, but then says he is being tube fed. Chart also indicates that patient has stool like residuals from PEG tube, however he is s/p KUB of abdomen which was negative for a SBO. Skin: gangrene of the right LE, unstageable pressure wound to buttocks  Abdomen soft with normal active bowel sounds. Date of Last BM: 2/8/18  Pertinent Medications: 20 units lantus/day, SSI, Zosyn  IVF: NS 100ml/hr  Pertinent labs: Glucose- 184, K 3.3  POC Glucoses:  182-176 mg/dl  Anthropometrics: Height 511 (1.803 m), weight 64.4 kg (142 lb) (weight source), BMI 19.8  BMI class of underweight. Macronutrient needs:  EER:  7471-9159 kcal /day (30-35kcal/kg  ABW)  EPR:   grams protein/day (1.5-2.0 grams/kg ABW)  Max CHO:  282 grams/day (50% kcal)   Fluid:  1ml/kcal  Intake/Comparative standards: Current NPO status does not meet kcal or protein needs    Nutrition Diagnosis: Inadequate oral intake r/t dysphagia as evidenced by patient with PEG in place for EN with NPO orders, and meeting 0% of needs w/o TF infusing. Intervention:  Meals and snacks: NPO  EN:Start TF of Glucerna 1.2 at 10 ml/hr and increase by 10 ml/hr q 4 hrs as tolerated to goal rate of 70ml/hr with 37 ml water q 1hr to provide 2016 kcal/day (100% of needs), 100.8 grams protein/day (100% of needs), 193 grams CHO/day (does not exceed max CHO),  and ~ 1411 ml free water/day for a total of 2299 ml fluid/day (100% of needs).   Nutrition Supplement Therapy: Replace K per protocal.  IV: Adjust IVF ml for TF   (TF + water flush) + IVF =100ml/hr. Nutrition Discharge Plan: Continue TF via PEG at discharge.     Brandon Willams, Dietetic Intern

## 2018-02-08 NOTE — PROGRESS NOTES
Abdominal xray is normal with no signs of SBO. PEG ok to use. Please call back with any concerns.   Thank you    Jason Rios MD  [facility]

## 2018-02-08 NOTE — ED NOTES
TRANSFER - OUT REPORT:    Verbal report given to ANN Wallace (name) on Froy Yoon  being transferred to [facility](unit) for routine progression of care       Report consisted of patients Situation, Background, Assessment and   Recommendations(SBAR). Information from the following report(s) SBAR, Kardex, ED Summary and Recent Results was reviewed with the receiving nurse. Lines:   Peripheral IV 02/07/18 Right Wrist (Active)   Site Assessment Clean, dry, & intact 2/7/2018  9:24 PM   Phlebitis Assessment 0 2/7/2018  9:24 PM   Infiltration Assessment 0 2/7/2018  9:24 PM   Dressing Status Clean, dry, & intact 2/7/2018  9:24 PM   Hub Color/Line Status Pink 2/7/2018  9:24 PM   Alcohol Cap Used No 2/7/2018  9:24 PM       Peripheral IV 02/07/18 Right Antecubital (Active)   Site Assessment Clean, dry, & intact 2/7/2018  9:25 PM   Phlebitis Assessment 0 2/7/2018  9:25 PM   Infiltration Assessment 0 2/7/2018  9:25 PM   Dressing Status Clean, dry, & intact 2/7/2018  9:25 PM   Hub Color/Line Status Green 2/7/2018  9:25 PM   Alcohol Cap Used No 2/7/2018  9:25 PM        Opportunity for questions and clarification was provided.

## 2018-02-08 NOTE — PROGRESS NOTES
Provided water so that pt could swab his mouth, as he is npo. Caught pt drinking water. Advised pt that he is at risk for worsened condition if he does drink water, as he is already here for pneumonia. Pt said that he would stop drinking water. Pt is aware that water could go in lungs, and cause pneumonia to get worse, possibly cause death.

## 2018-02-08 NOTE — H&P
HOSPITALIST H&P/CONSULT  NAME:  Roverto Yancey   Age:  79 y.o.  :   1950   MRN:   [id number]  PCP: None  Consulting MD:  Treatment Team: Attending Provider: Jethro Maravilla MD; Primary Nurse: Juan F Lord RN  HPI:   Patient is a 67/M with PMH TBI, DM and indwelling kathleen catheter with PEG tube who presents from NH due to SOB and tachycardia. Patient was admitted one week ago for sepsis secondary to UTI and Aspiration PNA as well as gangrene of right LE. Patient was today found to be hypotensive with significant leukocytosis. He was given Zosyn en route and cultures were collected in the ED. He has been given Vancomycin. Lactic acid is also elevated. CXR shows RLL PNA. BP has improved with fluids. Complete ROS done and is as stated in HPI or otherwise negative  Past Medical History:   Diagnosis Date    COPD [facility]chronic obstructive pulmonary disease) ([facility] [address]     Dementia     Diabetes ([facility] [address]     Necrotizing fasciitis ([facility] [address]       History reviewed. No pertinent surgical history. Prior to Admission Medications   Prescriptions Last Dose Informant Patient Reported? Taking? Blood-Glucose Meter monitoring kit   No No   Sig: To check blood sugars 3 times per day - In AM and 2h after a meal. Please provide adequate supplies for 30 days ( lancets, strips, etc) as approved by his insurance   Cholecalciferol, Vitamin D3, 50,000 unit cap   Yes No   Sig: Take 1 Cap by mouth every seven (7) days. HYDROmorphone (DILAUDID) 2 mg tablet   No No   Sig: Take 0.5 Tabs by mouth every six (6) hours. Max Daily Amount: 4 mg.   acetaminophen (MAPAP) 325 mg tablet   Yes No   Si mg by PEG Tube route every eight (8) hours as needed for Pain. albuterol (PROVENTIL VENTOLIN) 2.5 mg /3 mL (0.083 %) nebulizer solution   No No   Sig: 3 mL by Nebulization route every four (4) hours as needed for Wheezing. Patient taking differently: 2.5 mg by Nebulization route every two (2) hours as needed for Wheezing. aspirin 81 mg chewable tablet   Yes No   Si mg by PEG Tube route daily. ciprofloxacin HCl (CIPRO) 500 mg tablet   No No   Si Tab by Per G Tube route every eight (8) hours for 7 days. glucagon (GLUCAGEN) 1 mg injection   Yes No   Si mg by IntraMUSCular route once. guaiFENesin ER (MUCINEX) 600 mg ER tablet   Yes No   Si mg by PEG Tube route two (2) times a day.   hydrOXYzine pamoate (VISTARIL) 25 mg capsule   Yes No   Si mg by PEG Tube route every six (6) hours as needed for Itching. insulin glargine (LANTUS,BASAGLAR) 100 unit/mL (3 mL) inpn   Yes No   Si Units by SubCUTAneous route. insulin lispro (HUMALOG) 100 unit/mL injection   Yes No   Si Units by SubCUTAneous route Before breakfast, lunch, and dinner. insulin lispro (HUMALOG) 100 unit/mL kwikpen   Yes No   Sig: by SubCUTAneous route. Sliding Scale   <60 notify MD  <199 =0 units  200-249 = 2 units  250-299 = 4 units  300-349 = 6 units  350-399 = 8 units  400 -499= 10 units   >450 notify MD   lactobacillus-acidophilus (LACTINEX) 100 million cell grpk   Yes No   Si Packet by PEG Tube route two (2) times a day. metroNIDAZOLE (FLAGYL) 500 mg tablet   No No   Si Tab by Per G Tube route every eight (8) hours for 7 days. multivitamin (ONE A DAY) tablet   Yes No   Sig: Take 1 Tab by mouth daily. multivitamin, tx-iron-ca-min (THERA-M W/ IRON) 9 mg iron-400 mcg tab tablet   Yes No   Si Tab by PEG Tube route daily. ondansetron hcl (ZOFRAN, AS HYDROCHLORIDE,) 4 mg tablet   Yes No   Si mg by PEG Tube route every six (6) hours as needed for Nausea. pantoprazole (PROTONIX) 40 mg tablet   No No   Sig: Take 1 Tab by mouth Daily (before breakfast). senna (SENEXON) 8.6 mg tablet   Yes No   Si Tab by PEG Tube route daily as needed for Constipation. sodium hypochlorite (QUARTER STRENGTH DAKIN'S) 0.125 % soln external solution   No No   Sig: Apply 473 mL to affected area daily.    traMADol (ULTRAM) 50 mg tablet   No No   Si Tab by Per G Tube route every six (6) hours as needed for Pain. Max Daily Amount: 200 mg. Facility-Administered Medications: None     No Known Allergies   Social History   Substance Use Topics    Smoking status: Former Smoker    Smokeless tobacco: Not on file    Alcohol use No      History reviewed. No pertinent family history. Objective:     Visit Vitals    /58    Pulse (!) 112    Temp 98.7 °F (37.1 °C)    Resp 22    Ht 5' 11\" (1.803 m)    Wt 64.4 kg (142 lb)    SpO2 100%    BMI 19.8 kg/m2      Temp (24hrs), Av.7 °F (37.1 °C), Min:98.7 °F (37.1 °C), Max:98.7 °F (37.1 °C)    Oxygen Therapy  O2 Sat (%): 100 % (18)  Pulse via Oximetry: 112 beats per minute (18)  O2 Device: Nasal cannula (18)  O2 Flow Rate (L/min): 4 l/min (18)  Physical Exam:  General:    Alert, cooperative, no distress, appears stated age. Head:   Normocephalic, without obvious abnormality, atraumatic. Nose:  Nares normal. No drainage or sinus tenderness. Lungs:   Clear to auscultation bilaterally. No Wheezing or Rhonchi. No rales. Heart:   Tachycardic,  no murmur, rub or gallop. Abdomen:   Soft, non-tender. Not distended. Bowel sounds normal.   Extremities: No cyanosis. No edema. No clubbing  Skin:     Texture, turgor normal. No rashes or lesions. Not Jaundiced  Neurologic: Alert and oriented x 3, no focal deficits     Data Review:   Recent Results (from the past 24 hour(s))   EKG, 12 LEAD, INITIAL    Collection Time: 18  8:10 PM   Result Value Ref Range    Ventricular Rate 119 BPM    Atrial Rate 119 BPM    P-R Interval 122 ms    QRS Duration 68 ms    Q-T Interval 308 ms    QTC Calculation (Bezet) 433 ms    Calculated P Axis 81 degrees    Calculated R Axis 60 degrees    Calculated T Axis 86 degrees    Diagnosis       !! AGE AND GENDER SPECIFIC ECG ANALYSIS !!   Sinus tachycardia  Low voltage QRS  Borderline ECG  When compared with ECG of 31-JAN-2018 12:26,  Premature atrial complexes are no longer Present  Nonspecific T wave abnormality now evident in Lateral leads     CBC WITH AUTOMATED DIFF    Collection Time: 02/07/18  8:20 PM   Result Value Ref Range    WBC 25.2 (H) 4.3 - 11.1 K/uL    RBC 3.31 (L) 4.23 - 5.67 M/uL    HGB 8.7 (L) 13.6 - 17.2 g/dL    HCT 29.9 (L) 41.1 - 50.3 %    MCV 90.3 79.6 - 97.8 FL    MCH 26.3 26.1 - 32.9 PG    MCHC 29.1 (L) 31.4 - 35.0 g/dL    RDW 21.5 (H) 11.9 - 14.6 %    PLATELET 892 630 - 620 K/uL    MPV 10.5 (L) 10.8 - 14.1 FL    NEUTROPHILS 87 (H) 43 - 78 %    LYMPHOCYTES 8 (L) 13 - 44 %    MONOCYTES 4 4.0 - 12.0 %    EOSINOPHILS 0 (L) 0.5 - 7.8 %    BASOPHILS 1 0.0 - 2.0 %    IMMATURE GRANULOCYTES 1 0.0 - 5.0 %    ABS. NEUTROPHILS 21.9 (H) 1.7 - 8.2 K/UL    ABS. LYMPHOCYTES 2.0 0.5 - 4.6 K/UL    ABS. MONOCYTES 1.0 0.1 - 1.3 K/UL    ABS. EOSINOPHILS 0.0 0.0 - 0.8 K/UL    ABS. BASOPHILS 0.3 (H) 0.0 - 0.2 K/UL    ABS. IMM. GRANS. 0.3 0.0 - 0.5 K/UL    RBC COMMENTS SLIGHT  ANISOCYTOSIS + POIKILOCYTOSIS        RBC COMMENTS SLIGHT  HYPOCHROMIA        PLATELET COMMENTS INCREASED      DF AUTOMATED     METABOLIC PANEL, COMPREHENSIVE    Collection Time: 02/07/18  8:20 PM   Result Value Ref Range    Sodium 140 136 - 145 mmol/L    Potassium 4.3 3.5 - 5.1 mmol/L    Chloride 109 (H) 98 - 107 mmol/L    CO2 17 (L) 21 - 32 mmol/L    Anion gap 14 7 - 16 mmol/L    Glucose 305 (H) 65 - 100 mg/dL    BUN 30 (H) 8 - 23 MG/DL    Creatinine 1.00 0.8 - 1.5 MG/DL    GFR est AA >60 >60 ml/min/1.73m2    GFR est non-AA >60 >60 ml/min/1.73m2    Calcium 8.0 (L) 8.3 - 10.4 MG/DL    Bilirubin, total 0.6 0.2 - 1.1 MG/DL    ALT (SGPT) 8 (L) 12 - 65 U/L    AST (SGOT) 6 (L) 15 - 37 U/L    Alk.  phosphatase 66 50 - 136 U/L    Protein, total 7.5 6.3 - 8.2 g/dL    Albumin 2.0 (L) 3.2 - 4.6 g/dL    Globulin 5.5 (H) 2.3 - 3.5 g/dL    A-G Ratio 0.4 (L) 1.2 - 3.5     PROCALCITONIN    Collection Time: 02/07/18  8:20 PM   Result Value Ref Range    Procalcitonin 1.7 ng/mL   MAGNESIUM    Collection Time: 02/07/18  8:20 PM   Result Value Ref Range    Magnesium 2.0 1.8 - 2.4 mg/dL   PHOSPHORUS    Collection Time: 02/07/18  8:20 PM   Result Value Ref Range    Phosphorus 2.8 2.3 - 3.7 MG/DL   POC LACTIC ACID    Collection Time: 02/07/18  8:22 PM   Result Value Ref Range    Lactic Acid (POC) 7.5 (H) 0.5 - 1.9 mmol/L     Imaging /Procedures /Studies     EXAM:  XR CHEST PORT     INDICATION:  chest pain     COMPARISON:  1/30/2018     FINDINGS: A portable AP radiograph of the chest was obtained at 2036 hours. The  patient is on a cardiac monitor. Lower lobe airspace disease. The cardiac and  mediastinal contours and pulmonary vascularity are normal.  The bones and soft  tissues are grossly within normal limits.      IMPRESSION  IMPRESSION: Right lower lobe atelectasis or pneumonia.     Assessment and Plan: Active Hospital Problems    Diagnosis Date Noted    Sepsis due to pneumonia ([facility] [address] 01/30/2018       PLAN  · Sepsis secondary to PNA- started on vancomycin and zosyn, follow up cultures, continue with IV fluids  · DM- continue with insulin regimen  · TBI- predisposed to aspiration, continue with PEG feeding  · VTE ppx- Lovenox    Code Status: Full Code    Anticipated discharge: 3-4 midnights.     Signed By: Marita Appiah MD     February 7, 2018

## 2018-02-08 NOTE — ED TRIAGE NOTES
Patient arrives via EMS from [facility] for shortness of breath,cough, tachycardia. Nursing facility stated that he was altered. EMS gave 1L NS and 4.5g zosyn.

## 2018-02-08 NOTE — PROGRESS NOTES
Notified hospitalist about >400ml stool like residual from PEG tube. New orders to hold all medication via tube and consult GI for the morning.

## 2018-02-08 NOTE — WOUND CARE
Patient seen for left buttock pressure injury, unstageable. Also has history of gangrene and debridement/management by Dr Beatrice Ruff. Has old colostomy. Wound to groin/thigh on right is mostly clean with viable muscle/fascia and some granulation tissue. Small area of yellow slough. Saline soak dressing placed and silicone foam continued over left buttock wound. Is on [place]den but ordered Envision low air loss mattress to be placed. Primary nurse aware. Dr Devries Finely evaluated wounds and will request Dr Beatrice Ruff come see patient. Both heels were red but blanchable, placed Prevalon boots bilaterally. Wound team will follow and assist as needed.

## 2018-02-08 NOTE — CONSULTS
[facility] Consult Note       Referring Physician:  Dr Abreu Mail Date:  2/8/2018    Admit Date:  2/7/2018    Chief Complaint:  PEG tube output concerning for feces    Subjective:     History of Present Illness:  Patient is a 79 y.o. male with PMH of chronic brain injury s/p repeated aspiration requiring PEG tube placed by IR in dec of 2017, DM and indwelling kathleen catheter  who presents from NH due to SOB and tachycardia.  Patient was admitted one week ago for sepsis secondary to UTI and Aspiration PNA as well as gangrene of right LE.  Patient was yesterday to be hypotensive with significant leukocytosis  CXR shows RLL PNA and clinical status improved with antibiotics and IVF    GI called because yesterday nurse noted output from PEG tube that was concerning for feculent material.    Currently on evaluation of the PEG tube there is minimal residual which is dark brown but not clearly feculent. He has not abdominal pain or distension to suggest SBO. Transverse colostomy is in place with light brown thick stool present. PMH:  Past Medical History:   Diagnosis Date    COPD (chronic obstructive pulmonary disease) ([facility] [address]     Dementia     Diabetes ([facility] [address]     Necrotizing fasciitis (H[facility])        PSH:  History reviewed. No pertinent surgical history. Allergies:  No Known Allergies    Home Medications:  Prior to Admission medications    Medication Sig Start Date End Date Taking? Authorizing Provider   HYDROmorphone (DILAUDID) 2 mg tablet Take 0.5 Tabs by mouth every six (6) hours. Max Daily Amount: 4 mg. 2/3/18  Yes Mildred Garcia MD   traMADol Othel Solders) 50 mg tablet 1 Tab by Per G Tube route every six (6) hours as needed for Pain. Max Daily Amount: 200 mg. 2/3/18  Yes Mildred Garcia MD   ciprofloxacin HCl (CIPRO) 500 mg tablet 1 Tab by Per G Tube route every eight (8) hours for 7 days.  2/3/18 2/10/18 Yes Mildred Garcia MD   metroNIDAZOLE (FLAGYL) 500 mg tablet 1 Tab by Per G Tube route every eight (8) hours for 7 days. 2/3/18 2/10/18 Yes Melida Looney MD   aspirin 81 mg chewable tablet 81 mg by PEG Tube route daily. Yes Historical Provider   guaiFENesin ER (MUCINEX) 600 mg ER tablet 600 mg by PEG Tube route two (2) times a day. Yes Historical Provider   insulin lispro (HUMALOG) 100 unit/mL kwikpen by SubCUTAneous route. Sliding Scale   <60 notify MD  <199 =0 units  200-249 = 2 units  250-299 = 4 units  300-349 = 6 units  350-399 = 8 units  400 -499= 10 units   >450 notify MD   Yes Historical Provider   glucagon (GLUCAGEN) 1 mg injection 1 mg by IntraMUSCular route once. Yes Historical Provider   insulin glargine (LANTUS,BASAGLAR) 100 unit/mL (3 mL) inpn 20 Units by SubCUTAneous route. Yes Historical Provider   insulin lispro (HUMALOG) 100 unit/mL injection 5 Units by SubCUTAneous route Before breakfast, lunch, and dinner. Yes Historical Provider   senna (SENEXON) 8.6 mg tablet 1 Tab by PEG Tube route daily as needed for Constipation. Yes Historical Provider   acetaminophen (MAPAP) 325 mg tablet 650 mg by PEG Tube route every eight (8) hours as needed for Pain. Yes Historical Provider   ondansetron hcl (ZOFRAN, AS HYDROCHLORIDE,) 4 mg tablet 4 mg by PEG Tube route every six (6) hours as needed for Nausea. Yes Historical Provider   multivitamin, tx-iron-ca-min (THERA-M W/ IRON) 9 mg iron-400 mcg tab tablet 1 Tab by PEG Tube route daily. Yes Historical Provider   lactobacillus-acidophilus (LACTINEX) 100 million cell grpk 1 Packet by PEG Tube route two (2) times a day. Yes Historical Provider   hydrOXYzine pamoate (VISTARIL) 25 mg capsule 25 mg by PEG Tube route every six (6) hours as needed for Itching. Yes Historical Provider   Cholecalciferol, Vitamin D3, 50,000 unit cap Take 1 Cap by mouth every seven (7) days.    Yes Historical Provider   albuterol (PROVENTIL VENTOLIN) 2.5 mg /3 mL (0.083 %) nebulizer solution 3 mL by Nebulization route every four (4) hours as needed for Wheezing. Patient taking differently: 2.5 mg by Nebulization route every two (2) hours as needed for Wheezing. 12/29/17  Yes Kandice Cleary MD   pantoprazole (PROTONIX) 40 mg tablet Take 1 Tab by mouth Daily (before breakfast). 12/30/17  Yes Kandice Cleary MD   sodium hypochlorite (QUARTER STRENGTH DAKIN'S) 0.125 % soln external solution Apply 473 mL to affected area daily. 12/30/17  Yes Kandice Cleary MD   Blood-Glucose Meter monitoring kit To check blood sugars 3 times per day - In AM and 2h after a meal. Please provide adequate supplies for 30 days ( lancets, strips, etc) as approved by his insurance 12/29/17  Yes Kandice Cleary MD   multivitamin (ONE A DAY) tablet Take 1 Tab by mouth daily.    Yes Historical Provider       Hospital Medications:  Current Facility-Administered Medications   Medication Dose Route Frequency    albuterol (PROVENTIL VENTOLIN) nebulizer solution 2.5 mg  2.5 mg Nebulization Q2H PRN    aspirin chewable tablet 81 mg  81 mg Oral DAILY    guaiFENesin ER (MUCINEX) tablet 600 mg  600 mg Oral Q12H    insulin glargine (LANTUS) injection 20 Units  20 Units SubCUTAneous DAILY    traMADol (ULTRAM) tablet 50 mg  50 mg Per G Tube Q6H PRN    ondansetron (ZOFRAN) injection 4 mg  4 mg IntraVENous Q4H PRN    vancomycin (VANCOCIN) 1250 mg in  ml infusion  1,250 mg IntraVENous Q12H    morphine injection 2 mg  2 mg IntraVENous Q4H PRN    insulin lispro (HUMALOG) injection   SubCUTAneous AC&HS    potassium chloride (KAON 10%) 20 mEq/15 mL oral liquid 40 mEq  40 mEq Per G Tube NOW    [START ON 2/9/2018] VANCOMYCIN TROUGH LEVEL REMINDER   Other ONCE    pantoprazole (PROTONIX) injection 40 mg  40 mg IntraVENous Q24H    sodium chloride (NS) flush 5-10 mL  5-10 mL IntraVENous PRN    enoxaparin (LOVENOX) injection 40 mg  40 mg SubCUTAneous Q24H    0.9% sodium chloride infusion  100 mL/hr IntraVENous CONTINUOUS    piperacillin-tazobactam (ZOSYN) 4.5 g in 0.9% sodium chloride (MBP/ADV) 100 mL  4.5 g IntraVENous Q8H       Social History:  Social History   Substance Use Topics    Smoking status: Former Smoker    Smokeless tobacco: Not on file    Alcohol use No     Pt denies any history of IV drug use, blood transfusions, or tattoos. Family History:  History reviewed. No pertinent family history. Review of Systems:  A detailed 10 system ROS is obtained, with pertinent positives as listed above. All others are negative. Diet:  NPO, TFs held    Objective:     Physical Exam:  Vitals:  Visit Vitals    /58 (BP 1 Location: Right arm, BP Patient Position: At rest)    Pulse 97    Temp 97.7 °F (36.5 °C)    Resp 16    Ht 5' 11\" (1.803 m)    Wt 64.4 kg (142 lb)    SpO2 93%    BMI 19.8 kg/m2     Gen:  Pt is alert, cooperative, no acute distress  Skin:  Extremities and face reveal no rashes. No quinones erythema. No telangiectasias on the chest wall. HEENT: Sclerae anicteric. Extra-occular muscles are intact. No oral ulcers. No abnormal pigmentation of the lips. The neck is supple. Cardiovascular: Regular rate and rhythm. No murmurs, gallops, or rubs. Respiratory:  Comfortable breathing with no accessory muscle use. Clear breath sounds anteriorly with no wheezes, rales, or rhonchi. GI:  Abdomen nondistended, soft, and nontender. Normal active bowel sounds. Skin intact without erythema but small amount of what appears to be purulent material from around PEG tube raising suspicion for ulceration around internal bumper. PEG flushes wihtout resistance  Colostomy with brown stool. Rectal:  Deferred  Musculoskeletal:  No pitting edema of the lower legs. Extremities have good range of motion. No costovertebral tenderness. Neurological:  Gross memory appears intact. Patient is alert and oriented. Psychiatric:  Mood appears appropriate with judgement intact. Lymphatic:  No cervical or supraclavicular adenopathy.     Laboratory:    Recent Labs      02/08/18   5850 02/07/18 2020   WBC  14.8*  25.2*   HGB  7.0*  8.7*   HCT  24.4*  29.9*   PLT  355  445   MCV  90.0  90.3   NA  144  140   K  3.3*  4.3   CL  114*  109*   CO2  22  17*   BUN  22  30*   CREA  0.51*  1.00   CA  7.3*  8.0*   MG   --   2.0   GLU  184*  305*   AP  52  66   SGOT  8*  6*   ALT  8*  8*   TBILI  0.5  0.6   ALB  1.7*  2.0*   TP  6.4  7.5          Assessment:     Principal Problem:    Sepsis due to pneumonia [facility]) (1/30/2018)      79 yr old male with mutliple medical problems with loop colostomy and PEG tube around 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 Output from PEG tube though darker and thick is not convincing for SBO given rest of abdominal exam and lack of nuasea/vomiting/pain  Plan:     ABdominal xray - if no evidence of SBO can resume use of PEG tube  Would treat with PPI as there could be an ulcer around internal bumper of peg.    DM control as best as possible    Ailyn Leon MD  [facility]

## 2018-02-08 NOTE — PROGRESS NOTES
TRANSFER - IN REPORT:    Verbal report received from [facility], RN(name) on [facility]  being received from ER(unit) for routine progression of care      Report consisted of patients Situation, Background, Assessment and   Recommendations(SBAR). Information from the following report(s) SBAR, Kardex, ED Summary and Intake/Output was reviewed with the receiving nurse. Opportunity for questions and clarification was provided. Assessment to be completed upon patients arrival to unit and care assumed.

## 2018-02-09 ENCOUNTER — APPOINTMENT (OUTPATIENT)
Dept: GENERAL RADIOLOGY | Age: 68
DRG: 871 | End: 2018-02-09
Attending: INTERNAL MEDICINE
Payer: MEDICARE

## 2018-02-09 LAB
ANION GAP SERPL CALC-SCNC: 6 MMOL/L (ref 7–16)
BUN SERPL-MCNC: 13 MG/DL (ref 8–23)
CALCIUM SERPL-MCNC: 7.9 MG/DL (ref 8.3–10.4)
CHLORIDE SERPL-SCNC: 112 MMOL/L (ref 98–107)
CO2 SERPL-SCNC: 24 MMOL/L (ref 21–32)
CREAT SERPL-MCNC: 0.45 MG/DL (ref 0.8–1.5)
ERYTHROCYTE [DISTWIDTH] IN BLOOD BY AUTOMATED COUNT: 21.4 % (ref 11.9–14.6)
GLUCOSE BLD STRIP.AUTO-MCNC: 85 MG/DL (ref 65–100)
GLUCOSE BLD STRIP.AUTO-MCNC: 96 MG/DL (ref 65–100)
GLUCOSE SERPL-MCNC: 76 MG/DL (ref 65–100)
HCT VFR BLD AUTO: 24.4 % (ref 41.1–50.3)
HGB BLD-MCNC: 7 G/DL (ref 13.6–17.2)
MCH RBC QN AUTO: 25.5 PG (ref 26.1–32.9)
MCHC RBC AUTO-ENTMCNC: 28.3 G/DL (ref 31.4–35)
MCV RBC AUTO: 90 FL (ref 79.6–97.8)
PLATELET # BLD AUTO: 367 K/UL (ref 150–450)
PMV BLD AUTO: 10.1 FL (ref 10.8–14.1)
POTASSIUM SERPL-SCNC: 3.4 MMOL/L (ref 3.5–5.1)
RBC # BLD AUTO: 2.71 M/UL (ref 4.23–5.67)
SODIUM SERPL-SCNC: 142 MMOL/L (ref 136–145)
VANCOMYCIN TROUGH SERPL-MCNC: 18.8 UG/ML (ref 5–20)
WBC # BLD AUTO: 11.2 K/UL (ref 4.3–11.1)

## 2018-02-09 PROCEDURE — 74011250636 HC RX REV CODE- 250/636: Performed by: INTERNAL MEDICINE

## 2018-02-09 PROCEDURE — 77030027138 HC INCENT SPIROMETER -A

## 2018-02-09 PROCEDURE — 74011250637 HC RX REV CODE- 250/637: Performed by: INTERNAL MEDICINE

## 2018-02-09 PROCEDURE — 74011636637 HC RX REV CODE- 636/637: Performed by: INTERNAL MEDICINE

## 2018-02-09 PROCEDURE — 71045 X-RAY EXAM CHEST 1 VIEW: CPT

## 2018-02-09 PROCEDURE — 51798 US URINE CAPACITY MEASURE: CPT

## 2018-02-09 PROCEDURE — 77030018798 HC PMP KT ENTRL FED COVD -A

## 2018-02-09 PROCEDURE — 80048 BASIC METABOLIC PNL TOTAL CA: CPT | Performed by: INTERNAL MEDICINE

## 2018-02-09 PROCEDURE — BT40ZZZ ULTRASONOGRAPHY OF BLADDER: ICD-10-PCS | Performed by: INTERNAL MEDICINE

## 2018-02-09 PROCEDURE — 74011000258 HC RX REV CODE- 258: Performed by: INTERNAL MEDICINE

## 2018-02-09 PROCEDURE — C9113 INJ PANTOPRAZOLE SODIUM, VIA: HCPCS | Performed by: INTERNAL MEDICINE

## 2018-02-09 PROCEDURE — 80202 ASSAY OF VANCOMYCIN: CPT | Performed by: INTERNAL MEDICINE

## 2018-02-09 PROCEDURE — 36415 COLL VENOUS BLD VENIPUNCTURE: CPT | Performed by: INTERNAL MEDICINE

## 2018-02-09 PROCEDURE — 65270000029 HC RM PRIVATE

## 2018-02-09 PROCEDURE — 82962 GLUCOSE BLOOD TEST: CPT

## 2018-02-09 PROCEDURE — 85027 COMPLETE CBC AUTOMATED: CPT | Performed by: INTERNAL MEDICINE

## 2018-02-09 RX ORDER — GUAIFENESIN 100 MG/5ML
200 SOLUTION ORAL
Status: DISCONTINUED | OUTPATIENT
Start: 2018-02-09 | End: 2018-02-15 | Stop reason: HOSPADM

## 2018-02-09 RX ORDER — POTASSIUM CHLORIDE 20MEQ/15ML
40 LIQUID (ML) ORAL
Status: COMPLETED | OUTPATIENT
Start: 2018-02-09 | End: 2018-02-09

## 2018-02-09 RX ORDER — LEVOFLOXACIN 5 MG/ML
750 INJECTION, SOLUTION INTRAVENOUS EVERY 24 HOURS
Status: DISCONTINUED | OUTPATIENT
Start: 2018-02-09 | End: 2018-02-13

## 2018-02-09 RX ORDER — POTASSIUM CHLORIDE 20 MEQ/1
40 TABLET, EXTENDED RELEASE ORAL
Status: DISCONTINUED | OUTPATIENT
Start: 2018-02-09 | End: 2018-02-09

## 2018-02-09 RX ADMIN — Medication 1 AMPULE: at 20:39

## 2018-02-09 RX ADMIN — INSULIN GLARGINE 20 UNITS: 100 INJECTION, SOLUTION SUBCUTANEOUS at 09:19

## 2018-02-09 RX ADMIN — VANCOMYCIN HYDROCHLORIDE 1250 MG: 10 INJECTION, POWDER, LYOPHILIZED, FOR SOLUTION INTRAVENOUS at 09:18

## 2018-02-09 RX ADMIN — TRAMADOL HYDROCHLORIDE 50 MG: 50 TABLET, FILM COATED ORAL at 02:31

## 2018-02-09 RX ADMIN — PIPERACILLIN SODIUM,TAZOBACTAM SODIUM 4.5 G: 4; .5 INJECTION, POWDER, FOR SOLUTION INTRAVENOUS at 16:12

## 2018-02-09 RX ADMIN — Medication 2 MG: at 05:47

## 2018-02-09 RX ADMIN — Medication 1 AMPULE: at 09:19

## 2018-02-09 RX ADMIN — ENOXAPARIN SODIUM 40 MG: 40 INJECTION SUBCUTANEOUS at 20:39

## 2018-02-09 RX ADMIN — LEVOFLOXACIN 750 MG: 5 INJECTION, SOLUTION INTRAVENOUS at 16:13

## 2018-02-09 RX ADMIN — PIPERACILLIN SODIUM,TAZOBACTAM SODIUM 4.5 G: 4; .5 INJECTION, POWDER, FOR SOLUTION INTRAVENOUS at 05:47

## 2018-02-09 RX ADMIN — Medication 5 ML: at 05:47

## 2018-02-09 RX ADMIN — SODIUM CHLORIDE 50 ML/HR: 900 INJECTION, SOLUTION INTRAVENOUS at 05:48

## 2018-02-09 RX ADMIN — PANTOPRAZOLE SODIUM 40 MG: 40 INJECTION, POWDER, FOR SOLUTION INTRAVENOUS at 10:57

## 2018-02-09 RX ADMIN — Medication 2 MG: at 12:41

## 2018-02-09 RX ADMIN — PIPERACILLIN SODIUM,TAZOBACTAM SODIUM 4.5 G: 4; .5 INJECTION, POWDER, FOR SOLUTION INTRAVENOUS at 21:27

## 2018-02-09 RX ADMIN — POTASSIUM CHLORIDE 40 MEQ: 20 SOLUTION ORAL at 09:17

## 2018-02-09 RX ADMIN — ASPIRIN 81 MG 81 MG: 81 TABLET ORAL at 09:18

## 2018-02-09 RX ADMIN — TRAMADOL HYDROCHLORIDE 50 MG: 50 TABLET, FILM COATED ORAL at 09:18

## 2018-02-09 NOTE — PROGRESS NOTES
Problem: Falls - Risk of  Goal: *Absence of Falls  Document Larry Fall Risk and appropriate interventions in the flowsheet.    Outcome: Progressing Towards Goal  Fall Risk Interventions:  Mobility Interventions: Bed/chair exit alarm, Communicate number of staff needed for ambulation/transfer, OT consult for ADLs, Patient to call before getting OOB, PT Consult for mobility concerns, Strengthening exercises (ROM-active/passive), Utilize walker, cane, or other assitive device    Mentation Interventions: Adequate sleep, hydration, pain control, Bed/chair exit alarm, Door open when patient unattended, Familiar objects from home, Reorient patient, Update white board, Increase mobility    Medication Interventions: Bed/chair exit alarm, Patient to call before getting OOB, Teach patient to arise slowly    Elimination Interventions: Bed/chair exit alarm, Call light in reach, Patient to call for help with toileting needs

## 2018-02-09 NOTE — PROGRESS NOTES
Problem: Nutrition Deficit  Goal: *Optimize nutritional status  Nutrition:  Reason for assessment: TF management f/u  Assessment:   Food/Nutrition History: The patient's TF was started last night with good tolerance noted today with minimal GRVs noted. Abdomen with active bowel sounds. Date of Last BM: 2/9  Pertinent Medications: 20 units lantus/day, SSI (2 units SSI given 2/8, 0 units today), Zosyn; KCl 40 meq  IVF: NS 50ml/hr  Pertinent labs: Glucose 76, K 3.4  POC Glucose: (2/8) 182, 176, 101, 98 mg/dl, (2/9) 85   Anthropometrics: Height 511 (1.803 m), weight 67.7 kg (2/9, wt source: unknown)  Macronutrient needs:  EER:  6052-1191 kcal /day (30-35kcal/kg  ABW)  EPR:   grams protein/day (1.5-2.0 grams/kg ABW)  Max CHO:  282 grams/day (50% kcal)   Fluid:  1ml/kcal  Intake/Comparative standards:  TF of glucerna 1.2 @ goal rate of 70ml/hr with 37 ml water q 1hr to provide 2016 kcal/day (100% of needs), 100.8 grams protein/day (100% of needs), 193 grams CHO/day (does not exceed max CHO),  and ~ 1411 ml free water/day for a total of 2299 ml fluid/day (100% of needs). Intervention:  Meals and snacks: NPO  EN:Start TF of Glucerna 1.2 at 10 ml/hr and increase by 10 ml/hr q 4 hrs as tolerated to goal rate of 70ml/hr with 37 ml water q 1hr to provide 2016 kcal/day (100% of needs), 100.8 grams protein/day (100% of needs), 193 grams CHO/day (does not exceed max CHO),  and ~ 1411 ml free water/day for a total of 2299 ml fluid/day (100% of needs). IV: Adjust IVF ml for TF                        (TF + water flush) + IVF =100ml/hr. Nutrition Discharge Plan: Continue TF via PEG at discharge. Addendum: Referral received for an unsure amount of weight loss via Malnutrition Screening Tool at admission. WT / BMI 2/9/2018 2/2/2018 1/17/2018 12/18/2017   WEIGHT 149 lb 4.8 oz 142 lb 166 lb 166 lb 6.4 oz   Per weights in the EMR, the patient has had a 17 pound, 10.2% clinically significant weight loss within 1 month. No changes to current intervention deemed necessary.      [place], [place], [address] [place] [address], [phone]

## 2018-02-09 NOTE — CONSULTS
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 Continue daily NS wet to dry dressing changes. Wound bed is ready for grafting, but overall surgical risk with PNA and malnutrition too high. Will continue to follow. Optimize nutrition, get TF to goal. Also Rec PT/OT for ankle extension, conditioning. No limitations on PT aggressiveness from wound perspective. 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 Not surgical candidate for debridement as above. Offloading with level 2 support mattress. q 2 hour position changes.        Subjective:        78 y/o well known to me having been initially admitted in December with left leg gangrene and fourniers. Had a protracted stay including several days in ICU then floor then regency in which he showed slow improvement. Had serial debridements as well as transverse colostomy to void soiling of the wound. Discharged to [facility]. Since discharge has been re-admitted twice with sepsis from pneumonia, UTI. Has a g-tube and failed his last MBS thus is NPO due to aspiration risk. RLL PNA. Developed a new sacral PS since last being seen. Since admission has shown some improvement but continues to take PO when given the opportunity. No pain in the wound. Cannot recall what type of mattress he has at [facility]. Does not know what his tube feed schedule is at [facility].          Patient Active Problem List   Diagnosis Code    Subdural hematoma (HCC) I62.00    Type II diabetes mellitus with complication ([facility]) V97.9    LAUREANO (acute kidney injury) ([facility] N17.9    Syncope R55    Leukocytosis D72.829    Fasciitis M72.9    Severe sepsis with septic shock ([facility] A41.9, R65.21    Encounter for weaning from ventilator ([facility] Z99.11    Electrolyte abnormality E87.8    Lactic acidosis E87.2           Past Medical History:   Diagnosis Date    Diabetes ([facility]        History reviewed. No pertinent family history. Social History   Substance Use Topics    Smoking status: Former Smoker    Smokeless tobacco: Not on file    Alcohol use Not on file      History reviewed. No pertinent surgical history. Prior to Admission medications    Medication Sig Start Date End Date Taking? Authorizing Provider   multivitamin (ONE A DAY) tablet Take 1 Tab by mouth daily.     Yes Historical Provider      No Known Allergies           Objective:         Exam:    Visit Vitals    BP 95/54 (BP 1 Location: Right arm, BP Patient Position: At rest)    Pulse 94    Temp 98.5 °F (36.9 °C)    Resp 16    Ht 5' 11\" (1.803 m)    Wt 67.7 kg (149 lb 4.8 oz)    SpO2 93%    BMI 20.82 kg/m2       Oriented to person, place. appropriate  Trachea midline. G Tube in place TF running at 35  RRR  Corase bilaterally  Abd soft G-tube in place no erythema. Ostomy in place productive  Gerardo in place. Right 2nd and 3rd toes with some distal ischemia. DP/PT weak  Left leg wound with healthy granulation. Most of the fascia is now covered. No purulent drainage. No crepitance  Sacral PS primarily right of midline with eschar in place.  No surrounding erythema, purulence.      Alb 1.7

## 2018-02-09 NOTE — PROGRESS NOTES
Hospitalist Progress Note     Admit Date:  2018  7:41 PM   Name:  Annamaria Armas   Age:  79 y.o.  :  1950   MRN:  [id number]   PCP:  None  Treatment Team: Attending Provider: Carroll Diamond MD; Utilization Review: Ying Fry RN; Consulting Provider: Jey Durant MD; Unit Clerk: Rachele Cook RN    Subjective:     From H&P \"Patient is a 67/M with PMH TBI, DM and indwelling kathleen catheter with PEG tube who presents from NH due to SOB and tachycardia. Patient was admitted one week ago for sepsis secondary to UTI and Aspiration PNA as well as gangrene of right LE. Patient was today found to be hypotensive with significant leukocytosis. He was given Zosyn en route and cultures were collected in the ED. He has been given Vancomycin. Lactic acid is also elevated. CXR shows RLL PNA. BP has improved with fluids. \"     - Pt seen and examined. He was lying in bed. Alert and oriented. Reports being NPO at the NH and complains of cough.  - Continues to have cough and congestion feeling. No fever, chills, CP, abd pain or urinary symptoms. No other symptoms of infection.      Objective:     Patient Vitals for the past 24 hrs:   Temp Pulse Resp BP SpO2   18 1126 98.4 °F (36.9 °C) 88 16 104/64 98 %   18 0723 98.5 °F (36.9 °C) 94 16 95/54 93 %   18 0420 97.7 °F (36.5 °C) 90 17 95/41 95 %   18 2243 98 °F (36.7 °C) 93 17 94/50 95 %   18 1938 97.6 °F (36.4 °C) 91 17 110/57 97 %   18 1521 98.6 °F (37 °C) 100 17 94/53 97 %     Oxygen Therapy  O2 Sat (%): 98 % (18 1126)  Pulse via Oximetry: 98 beats per minute (18 0205)  O2 Device: Nasal cannula (18)  O2 Flow Rate (L/min): 2 l/min (18 0205)    Intake/Output Summary (Last 24 hours) at 18 1209  Last data filed at 18 0422   Gross per 24 hour   Intake              141 ml   Output              425 ml   Net             -284 ml           General appearance: NAD, conversant  Eyes: anicteric sclerae, moist conjunctivae; no lid-lag  ENT: Atraumatic; oropharynx clear with moist mucous membranes  Neck: Trachea midline , supple, no thyromegaly or lymphadenopathy  Lungs: Rhonchi bilaterally, Coarse breath sounds. CV: S1,S2 present, no added murmurs  Abdomen: Soft, non-tender; no masses. Peg tube in place with no signs of inflammation, however, stool material in the tube. Colostomy bag with loose brown stool. Extremities: No peripheral edema. Right lateral thigh s/p extensive debridement from the hip area to above the knee with exposure of muscles, however, no signs of active infection. Sacral decubitus ulcer, unstageable. Skin: as above. Psych: Appropriate affect. Data Review:  I have reviewed all labs, meds, telemetry events, and studies from the last 24 hours.     Recent Results (from the past 24 hour(s))   LACTIC ACID    Collection Time: 02/08/18 12:35 PM   Result Value Ref Range    Lactic acid 1.2 0.4 - 2.0 MMOL/L   GLUCOSE, POC    Collection Time: 02/08/18  4:08 PM   Result Value Ref Range    Glucose (POC) 101 (H) 65 - 100 mg/dL   GLUCOSE, POC    Collection Time: 02/08/18  9:23 PM   Result Value Ref Range    Glucose (POC) 98 65 - 100 mg/dL   CBC W/O DIFF    Collection Time: 02/09/18  6:10 AM   Result Value Ref Range    WBC 11.2 (H) 4.3 - 11.1 K/uL    RBC 2.71 (L) 4.23 - 5.67 M/uL    HGB 7.0 (L) 13.6 - 17.2 g/dL    HCT 24.4 (L) 41.1 - 50.3 %    MCV 90.0 79.6 - 97.8 FL    MCH 25.5 (L) 26.1 - 32.9 PG    MCHC 28.3 (L) 31.4 - 35.0 g/dL    RDW 21.4 (H) 11.9 - 14.6 %    PLATELET 357 834 - 825 K/uL    MPV 10.1 (L) 10.8 - 47.9 FL   METABOLIC PANEL, BASIC    Collection Time: 02/09/18  6:10 AM   Result Value Ref Range    Sodium 142 136 - 145 mmol/L    Potassium 3.4 (L) 3.5 - 5.1 mmol/L    Chloride 112 (H) 98 - 107 mmol/L    CO2 24 21 - 32 mmol/L    Anion gap 6 (L) 7 - 16 mmol/L    Glucose 76 65 - 100 mg/dL    BUN 13 8 - 23 MG/DL    Creatinine 0.45 (L) 0.8 - 1.5 MG/DL    GFR est AA >60 >60 ml/min/1.73m2    GFR est non-AA >60 >60 ml/min/1.73m2    Calcium 7.9 (L) 8.3 - 10.4 MG/DL   GLUCOSE, POC    Collection Time: 02/09/18  7:04 AM   Result Value Ref Range    Glucose (POC) 85 65 - 100 mg/dL   VANCOMYCIN, TROUGH    Collection Time: 02/09/18  8:00 AM   Result Value Ref Range    Vancomycin,trough 18.8 5 - 20 ug/mL        All Micro Results     Procedure Component Value Units Date/Time    CULTURE, URINE [[id number]] Collected:  02/07/18 2128    Order Status:  Completed Specimen:  Urine from Clean catch Updated:  02/09/18 0836     Special Requests: NO SPECIAL REQUESTS        Culture result:         50,000-100,000 COLONIES/mL YEAST SUBCULTURE IN PROGRESS    CULTURE, BLOOD [[id number]] Collected:  02/07/18 2020    Order Status:  Completed Specimen:  Blood from Blood Updated:  02/09/18 0621     Special Requests: --        LEFT  Antecubital       Culture result: NO GROWTH 2 DAYS       INFLUENZA A & B AG (RAPID TEST) [[id number]] Collected:  02/07/18 2202    Order Status:  Completed Specimen:  Nasopharyngeal from Nasal washing Updated:  02/07/18 2223     Influenza A Ag NEGATIVE          NEGATIVE FOR THE PRESENCE OF INFLUENZA A ANTIGEN  INFECTION DUE TO INFLUENZA A CANNOT BE RULED OUT. BECAUSE THE ANTIGEN PRESENT IN THE SAMPLE MAY BE BELOW  THE DETECTION LIMIT OF THE TEST. A NEGATIVE TEST IS PRESUMPTIVE AND IT IS RECOMMENDED THAT THESE RESULTS BE CONFIRMED BY VIRAL CULTURE OR AN FDA-CLEARED INFLUENZA A AND B MOLECULAR ASSAY. Influenza B Ag NEGATIVE          NEGATIVE FOR THE PRESENCE OF INFLUENZA B ANTIGEN  INFECTION DUE TO INFLUENZA B CANNOT BE RULED OUT. BECAUSE THE ANTIGEN PRESENT IN THE SAMPLE MAY BE BELOW  THE DETECTION LIMIT OF THE TEST. A NEGATIVE TEST IS PRESUMPTIVE AND IT IS RECOMMENDED THAT THESE RESULTS BE CONFIRMED BY VIRAL CULTURE OR AN FDA-CLEARED INFLUENZA A AND B MOLECULAR ASSAY.          CULTURE, BLOOD [[id number]]     Order Status:  Sent Specimen:  Blood from Blood Current Meds:  Current Facility-Administered Medications   Medication Dose Route Frequency    guaiFENesin (ROBITUSSIN) 100 mg/5 mL oral liquid 200 mg  200 mg Per G Tube Q4H PRN    albuterol (PROVENTIL VENTOLIN) nebulizer solution 2.5 mg  2.5 mg Nebulization Q2H PRN    aspirin chewable tablet 81 mg  81 mg Oral DAILY    insulin glargine (LANTUS) injection 20 Units  20 Units SubCUTAneous DAILY    traMADol (ULTRAM) tablet 50 mg  50 mg Per G Tube Q6H PRN    ondansetron (ZOFRAN) injection 4 mg  4 mg IntraVENous Q4H PRN    vancomycin (VANCOCIN) 1250 mg in  ml infusion  1,250 mg IntraVENous Q12H    morphine injection 2 mg  2 mg IntraVENous Q4H PRN    insulin lispro (HUMALOG) injection   SubCUTAneous AC&HS    pantoprazole (PROTONIX) injection 40 mg  40 mg IntraVENous Q24H    alcohol 62% (NOZIN) nasal  1 Ampule  1 Ampule Topical Q12H    NUTRITIONAL SUPPORT ELECTROLYTE PRN ORDERS   Does Not Apply PRN    sodium chloride (NS) flush 5-10 mL  5-10 mL IntraVENous PRN    enoxaparin (LOVENOX) injection 40 mg  40 mg SubCUTAneous Q24H    0.9% sodium chloride infusion  50 mL/hr IntraVENous CONTINUOUS    piperacillin-tazobactam (ZOSYN) 4.5 g in 0.9% sodium chloride (MBP/ADV) 100 mL  4.5 g IntraVENous Q8H       Other Studies (last 24 hours):  No results found. Assessment and Plan:     Hospital Problems as of 2/9/2018  Date Reviewed: 2/7/2018          Codes Class Noted - Resolved POA    * (Principal)Sepsis due to pneumonia [facility]) ICD-10-CM: J18.9, A41.9  ICD-9-CM: 667, 995.91  1/30/2018 - Present Unknown            Severe Sepsis secondary to pneumonia, UTI and sacral ulcer: CXR with RLL pneumona, LA 4.2 on admission. Cont IVF, IV abx. Monitor vitals. HCAP: Recent discharge. Cont Zosyn and Levaquin. D/c Vanc. Bld Cx pending. Wbc improving. Clinically improving gradually. Will recheck CXR. UTI: UA positive. However, pt has kathleen. Cont Abx as above. F/u U Cx.      IDDM 2: Will hold Novolog as pt is NPO. Cont Lantus 20 and SSI. 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 Plastic surgery consulted. Decubitus Ulcer: Unstageable. Plastic surgery consulted. Dysphagia, tube feeding: MBS recent previous admission --> NPO. Hx of TBI. Cont tube feeding. Will consult Dietitian as pt has residual. abd xray with no signs of obstruction or free air. Dementia: stable    TBI- predisposed to aspiration, continue with PEG feeding. Cont NPO. Hx of COPD: stable    Anemia: Monitor Hb. Hypokalemia: Replace through PEG tube. Signed:   Evelina Partida MD

## 2018-02-09 NOTE — PROGRESS NOTES
Mario Stanton visited with patient.     Kartik Antoine,  Staff   C: [phone]  /  [email or web address]

## 2018-02-09 NOTE — PROGRESS NOTES
Pharmacokinetic Consult to Pharmacist    Brianna Bree is a 79 y.o. male being treated for sepsis secondary to pneumonia with vancomycin and piperacillin-tazobactam.    Height: 5' 11\" (180.3 cm)  Weight: 67.7 kg (149 lb 4.8 oz)  Lab Results   Component Value Date/Time    BUN 13 02/09/2018 06:10 AM    Creatinine 0.45 (L) 02/09/2018 06:10 AM    WBC 11.2 (H) 02/09/2018 06:10 AM    Procalcitonin 1.7 02/07/2018 08:20 PM    Lactic acid 1.2 02/08/2018 12:35 PM    Lactic Acid (POC) 6.0 (H) 02/07/2018 10:25 PM      Estimated Creatinine Clearance: 152.5 mL/min (based on Cr of 0.45). Lab Results   Component Value Date/Time    Vancomycin,trough 18.8 02/09/2018 08:00 AM       Day 3 of vancomycin. Goal trough is 15-20. Trough therapeutic, continue 1.25g q12h  Will continue to follow patient. Thank you,  Brie Espinoza, Pharm. D.   Clinical Pharmacist  [phone]

## 2018-02-09 NOTE — PROGRESS NOTES
Call placed to Dr. Delfino Foster in regards to Mucinex and potassium ordered but meds cannot be crushed as we are administering medications through peg tube. Orders to reorder potassium as a liquid see EMAR, and Dr. Delfino Foster will review the mucinex order later.

## 2018-02-10 LAB
ANION GAP SERPL CALC-SCNC: 7 MMOL/L (ref 7–16)
BUN SERPL-MCNC: 10 MG/DL (ref 8–23)
CALCIUM SERPL-MCNC: 7.8 MG/DL (ref 8.3–10.4)
CHLORIDE SERPL-SCNC: 110 MMOL/L (ref 98–107)
CO2 SERPL-SCNC: 26 MMOL/L (ref 21–32)
CREAT SERPL-MCNC: 0.54 MG/DL (ref 0.8–1.5)
ERYTHROCYTE [DISTWIDTH] IN BLOOD BY AUTOMATED COUNT: 21.4 % (ref 11.9–14.6)
GLUCOSE BLD STRIP.AUTO-MCNC: 101 MG/DL (ref 65–100)
GLUCOSE BLD STRIP.AUTO-MCNC: 137 MG/DL (ref 65–100)
GLUCOSE BLD STRIP.AUTO-MCNC: 138 MG/DL (ref 65–100)
GLUCOSE BLD STRIP.AUTO-MCNC: 154 MG/DL (ref 65–100)
GLUCOSE SERPL-MCNC: 119 MG/DL (ref 65–100)
HCT VFR BLD AUTO: 26.9 % (ref 41.1–50.3)
HGB BLD-MCNC: 7.7 G/DL (ref 13.6–17.2)
MCH RBC QN AUTO: 25.8 PG (ref 26.1–32.9)
MCHC RBC AUTO-ENTMCNC: 28.6 G/DL (ref 31.4–35)
MCV RBC AUTO: 90.3 FL (ref 79.6–97.8)
PLATELET # BLD AUTO: 390 K/UL (ref 150–450)
PMV BLD AUTO: 10.2 FL (ref 10.8–14.1)
POTASSIUM SERPL-SCNC: 3.6 MMOL/L (ref 3.5–5.1)
RBC # BLD AUTO: 2.98 M/UL (ref 4.23–5.67)
SODIUM SERPL-SCNC: 143 MMOL/L (ref 136–145)
WBC # BLD AUTO: 9 K/UL (ref 4.3–11.1)

## 2018-02-10 PROCEDURE — 94760 N-INVAS EAR/PLS OXIMETRY 1: CPT

## 2018-02-10 PROCEDURE — C9113 INJ PANTOPRAZOLE SODIUM, VIA: HCPCS | Performed by: INTERNAL MEDICINE

## 2018-02-10 PROCEDURE — 36415 COLL VENOUS BLD VENIPUNCTURE: CPT | Performed by: INTERNAL MEDICINE

## 2018-02-10 PROCEDURE — 74011250636 HC RX REV CODE- 250/636: Performed by: INTERNAL MEDICINE

## 2018-02-10 PROCEDURE — 80048 BASIC METABOLIC PNL TOTAL CA: CPT | Performed by: INTERNAL MEDICINE

## 2018-02-10 PROCEDURE — 74011250637 HC RX REV CODE- 250/637: Performed by: INTERNAL MEDICINE

## 2018-02-10 PROCEDURE — 85027 COMPLETE CBC AUTOMATED: CPT | Performed by: INTERNAL MEDICINE

## 2018-02-10 PROCEDURE — 82962 GLUCOSE BLOOD TEST: CPT

## 2018-02-10 PROCEDURE — 65270000029 HC RM PRIVATE

## 2018-02-10 PROCEDURE — 74011636637 HC RX REV CODE- 636/637: Performed by: INTERNAL MEDICINE

## 2018-02-10 PROCEDURE — 77010033678 HC OXYGEN DAILY

## 2018-02-10 PROCEDURE — 74011000258 HC RX REV CODE- 258: Performed by: INTERNAL MEDICINE

## 2018-02-10 RX ADMIN — INSULIN GLARGINE 20 UNITS: 100 INJECTION, SOLUTION SUBCUTANEOUS at 08:05

## 2018-02-10 RX ADMIN — ENOXAPARIN SODIUM 40 MG: 40 INJECTION SUBCUTANEOUS at 23:12

## 2018-02-10 RX ADMIN — Medication 1 AMPULE: at 22:08

## 2018-02-10 RX ADMIN — PIPERACILLIN SODIUM,TAZOBACTAM SODIUM 4.5 G: 4; .5 INJECTION, POWDER, FOR SOLUTION INTRAVENOUS at 16:09

## 2018-02-10 RX ADMIN — Medication 10 ML: at 22:11

## 2018-02-10 RX ADMIN — TRAMADOL HYDROCHLORIDE 50 MG: 50 TABLET, FILM COATED ORAL at 04:54

## 2018-02-10 RX ADMIN — ONDANSETRON 4 MG: 2 INJECTION INTRAMUSCULAR; INTRAVENOUS at 17:18

## 2018-02-10 RX ADMIN — PIPERACILLIN SODIUM,TAZOBACTAM SODIUM 4.5 G: 4; .5 INJECTION, POWDER, FOR SOLUTION INTRAVENOUS at 04:58

## 2018-02-10 RX ADMIN — INSULIN LISPRO 2 UNITS: 100 INJECTION, SOLUTION INTRAVENOUS; SUBCUTANEOUS at 16:45

## 2018-02-10 RX ADMIN — PIPERACILLIN SODIUM,TAZOBACTAM SODIUM 4.5 G: 4; .5 INJECTION, POWDER, FOR SOLUTION INTRAVENOUS at 22:02

## 2018-02-10 RX ADMIN — SODIUM CHLORIDE 50 ML/HR: 900 INJECTION, SOLUTION INTRAVENOUS at 22:05

## 2018-02-10 RX ADMIN — Medication 1 AMPULE: at 08:06

## 2018-02-10 RX ADMIN — TRAMADOL HYDROCHLORIDE 50 MG: 50 TABLET, FILM COATED ORAL at 21:54

## 2018-02-10 RX ADMIN — Medication 5 ML: at 14:37

## 2018-02-10 RX ADMIN — LEVOFLOXACIN 750 MG: 5 INJECTION, SOLUTION INTRAVENOUS at 14:33

## 2018-02-10 RX ADMIN — PANTOPRAZOLE SODIUM 40 MG: 40 INJECTION, POWDER, FOR SOLUTION INTRAVENOUS at 12:21

## 2018-02-10 RX ADMIN — ASPIRIN 81 MG 81 MG: 81 TABLET ORAL at 08:07

## 2018-02-10 NOTE — PROGRESS NOTES
Problem: Falls - Risk of  Goal: *Absence of Falls  Document Larry Fall Risk and appropriate interventions in the flowsheet.    Outcome: Progressing Towards Goal  Fall Risk Interventions:  Mobility Interventions: Assess mobility with egress test, Bed/chair exit alarm, Communicate number of staff needed for ambulation/transfer, Patient to call before getting OOB    Mentation Interventions: Adequate sleep, hydration, pain control, Bed/chair exit alarm, Door open when patient unattended, More frequent rounding, Reorient patient, Update white board    Medication Interventions: Bed/chair exit alarm, Patient to call before getting OOB, Teach patient to arise slowly    Elimination Interventions: Bed/chair exit alarm, Call light in reach, Patient to call for help with toileting needs, Toilet paper/wipes in reach, Toileting schedule/hourly rounds

## 2018-02-10 NOTE — PROGRESS NOTES
Reviewed notes for spiritual concerns before visit. Patient is calm and was receptive to  presence. Acknowledged his  illness and encouraged with presence and assurance of prayers. Patient was thankful. Will continue to follow thru this admission.     Rylan Matthews, staff [address] [facility]  /   [email or web address]

## 2018-02-10 NOTE — PROGRESS NOTES
Problem: Falls - Risk of  Goal: *Absence of Falls  Document Larry Fall Risk and appropriate interventions in the flowsheet.    Outcome: Progressing Towards Goal  Fall Risk Interventions:  Mobility Interventions: Assess mobility with egress test, Bed/chair exit alarm, Communicate number of staff needed for ambulation/transfer, Patient to call before getting OOB, Strengthening exercises (ROM-active/passive)    Mentation Interventions: Adequate sleep, hydration, pain control, Bed/chair exit alarm, Door open when patient unattended, Evaluate medications/consider consulting pharmacy, Eyeglasses and hearing aids, Familiar objects from home, Increase mobility, More frequent rounding, Reorient patient, Toileting rounds, Update white board    Medication Interventions: Bed/chair exit alarm, Evaluate medications/consider consulting pharmacy, Patient to call before getting OOB, Teach patient to arise slowly    Elimination Interventions: Bed/chair exit alarm, Call light in reach, Patient to call for help with toileting needs, Toileting schedule/hourly rounds

## 2018-02-10 NOTE — PROGRESS NOTES
Hospitalist Progress Note     Admit Date:  2018  7:41 PM   Name:  Froy Yoon   Age:  79 y.o.  :  1950   MRN:  [id number]   PCP:  None  Treatment Team: Attending Provider: Ted Fischer MD; Utilization Review: Tomi Mena RN; Consulting Provider: Anne-Marie Barton MD; Charge Nurse: Jumana Barton RN    Subjective:     From H&P \"Patient is a 67/M with PMH TBI, DM and indwelling kathleen catheter with PEG tube who presents from NH due to SOB and tachycardia. Patient was admitted one week ago for sepsis secondary to UTI and Aspiration PNA as well as gangrene of right LE. Patient was today found to be hypotensive with significant leukocytosis. He was given Zosyn en route and cultures were collected in the ED. He has been given Vancomycin. Lactic acid is also elevated. CXR shows RLL PNA. BP has improved with fluids. \"     - Pt seen and examined. He was lying in bed. Alert and oriented. Reports being NPO at the NH and complains of cough.  - Continues to have cough and congestion feeling. No fever, chills, CP, abd pain or urinary symptoms. No other symptoms of infection. 2/10 - Has some cough and congestion, however, improved. Breathing improved. No fever, chills, CP, abd pain or urinary symptoms.      Objective:     Patient Vitals for the past 24 hrs:   Temp Pulse Resp BP SpO2   02/10/18 1124 97.7 °F (36.5 °C) 84 16 131/83 98 %   02/10/18 0713 98 °F (36.7 °C) 95 18 122/70 99 %   02/10/18 0446 97.9 °F (36.6 °C) 95 16 127/74 98 %   18 2257 97.5 °F (36.4 °C) 96 16 123/70 98 %   18 1859 98.4 °F (36.9 °C) (!) 101 16 102/65 99 %   18 1508 98 °F (36.7 °C) 88 16 113/68 96 %     Oxygen Therapy  O2 Sat (%): 98 % (02/10/18 1124)  Pulse via Oximetry: 98 beats per minute (18)  O2 Device: Nasal cannula (18)  O2 Flow Rate (L/min): 2 l/min (18)    Intake/Output Summary (Last 24 hours) at 02/10/18 1505  Last data filed at 02/10/18 0131 Gross per 24 hour   Intake             4547 ml   Output             3700 ml   Net              847 ml           General appearance: NAD, conversant  Eyes: anicteric sclerae, moist conjunctivae; no lid-lag  ENT: Atraumatic; oropharynx clear with moist mucous membranes  Neck: Trachea midline , supple, no thyromegaly or lymphadenopathy  Lungs: Rhonchi bilaterally, Coarse breath sounds. CV: S1,S2 present, no added murmurs  Abdomen: Soft, non-tender; no masses. Peg tube in place with no signs of inflammation, however, stool material in the tube. Colostomy bag with loose brown stool. Extremities: No peripheral edema. Right lateral thigh s/p extensive debridement from the hip area to above the knee with exposure of muscles, however, no signs of active infection. Sacral decubitus ulcer, unstageable. Skin: as above. Psych: Appropriate affect. Data Review:  I have reviewed all labs, meds, telemetry events, and studies from the last 24 hours.     Recent Results (from the past 24 hour(s))   GLUCOSE, POC    Collection Time: 02/09/18  9:13 PM   Result Value Ref Range    Glucose (POC) 96 65 - 100 mg/dL   CBC W/O DIFF    Collection Time: 02/10/18  6:34 AM   Result Value Ref Range    WBC 9.0 4.3 - 11.1 K/uL    RBC 2.98 (L) 4.23 - 5.67 M/uL    HGB 7.7 (L) 13.6 - 17.2 g/dL    HCT 26.9 (L) 41.1 - 50.3 %    MCV 90.3 79.6 - 97.8 FL    MCH 25.8 (L) 26.1 - 32.9 PG    MCHC 28.6 (L) 31.4 - 35.0 g/dL    RDW 21.4 (H) 11.9 - 14.6 %    PLATELET 365 339 - 145 K/uL    MPV 10.2 (L) 10.8 - 51.2 FL   METABOLIC PANEL, BASIC    Collection Time: 02/10/18  6:34 AM   Result Value Ref Range    Sodium 143 136 - 145 mmol/L    Potassium 3.6 3.5 - 5.1 mmol/L    Chloride 110 (H) 98 - 107 mmol/L    CO2 26 21 - 32 mmol/L    Anion gap 7 7 - 16 mmol/L    Glucose 119 (H) 65 - 100 mg/dL    BUN 10 8 - 23 MG/DL    Creatinine 0.54 (L) 0.8 - 1.5 MG/DL    GFR est AA >60 >60 ml/min/1.73m2    GFR est non-AA >60 >60 ml/min/1.73m2    Calcium 7.8 (L) 8.3 - 10.4 MG/DL GLUCOSE, POC    Collection Time: 02/10/18  7:16 AM   Result Value Ref Range    Glucose (POC) 137 (H) 65 - 100 mg/dL   GLUCOSE, POC    Collection Time: 02/10/18 11:32 AM   Result Value Ref Range    Glucose (POC) 138 (H) 65 - 100 mg/dL        All Micro Results     Procedure Component Value Units Date/Time    CULTURE, URINE [[id number]] Collected:  02/07/18 2128    Order Status:  Completed Specimen:  Urine from Clean catch Updated:  02/10/18 0658     Special Requests: NO SPECIAL REQUESTS        Culture result:         >100,000 COLONIES/mL YEAST IDENTIFICATION AND SUSCEPTIBILITY TO FOLLOW    CULTURE, BLOOD [[id number]] Collected:  02/07/18 2020    Order Status:  Completed Specimen:  Blood from Blood Updated:  02/10/18 0545     Special Requests: --        LEFT  Antecubital       Culture result: NO GROWTH 3 DAYS       INFLUENZA A & B AG (RAPID TEST) [[id number]] Collected:  02/07/18 2202    Order Status:  Completed Specimen:  Nasopharyngeal from Nasal washing Updated:  02/07/18 2223     Influenza A Ag NEGATIVE          NEGATIVE FOR THE PRESENCE OF INFLUENZA A ANTIGEN  INFECTION DUE TO INFLUENZA A CANNOT BE RULED OUT. BECAUSE THE ANTIGEN PRESENT IN THE SAMPLE MAY BE BELOW  THE DETECTION LIMIT OF THE TEST. A NEGATIVE TEST IS PRESUMPTIVE AND IT IS RECOMMENDED THAT THESE RESULTS BE CONFIRMED BY VIRAL CULTURE OR AN FDA-CLEARED INFLUENZA A AND B MOLECULAR ASSAY. Influenza B Ag NEGATIVE          NEGATIVE FOR THE PRESENCE OF INFLUENZA B ANTIGEN  INFECTION DUE TO INFLUENZA B CANNOT BE RULED OUT. BECAUSE THE ANTIGEN PRESENT IN THE SAMPLE MAY BE BELOW  THE DETECTION LIMIT OF THE TEST. A NEGATIVE TEST IS PRESUMPTIVE AND IT IS RECOMMENDED THAT THESE RESULTS BE CONFIRMED BY VIRAL CULTURE OR AN FDA-CLEARED INFLUENZA A AND B MOLECULAR ASSAY.          CULTURE, BLOOD [[id number]]     Order Status:  Sent Specimen:  Blood from Blood           Current Meds:  Current Facility-Administered Medications   Medication Dose Route Frequency  guaiFENesin (ROBITUSSIN) 100 mg/5 mL oral liquid 200 mg  200 mg Per G Tube Q4H PRN    levoFLOXacin (LEVAQUIN) 750 mg in D5W IVPB  750 mg IntraVENous Q24H    albuterol (PROVENTIL VENTOLIN) nebulizer solution 2.5 mg  2.5 mg Nebulization Q2H PRN    aspirin chewable tablet 81 mg  81 mg Oral DAILY    insulin glargine (LANTUS) injection 20 Units  20 Units SubCUTAneous DAILY    traMADol (ULTRAM) tablet 50 mg  50 mg Per G Tube Q6H PRN    ondansetron (ZOFRAN) injection 4 mg  4 mg IntraVENous Q4H PRN    morphine injection 2 mg  2 mg IntraVENous Q4H PRN    insulin lispro (HUMALOG) injection   SubCUTAneous AC&HS    pantoprazole (PROTONIX) injection 40 mg  40 mg IntraVENous Q24H    alcohol 62% (NOZIN) nasal  1 Ampule  1 Ampule Topical Q12H    NUTRITIONAL SUPPORT ELECTROLYTE PRN ORDERS   Does Not Apply PRN    sodium chloride (NS) flush 5-10 mL  5-10 mL IntraVENous PRN    enoxaparin (LOVENOX) injection 40 mg  40 mg SubCUTAneous Q24H    0.9% sodium chloride infusion  50 mL/hr IntraVENous CONTINUOUS    piperacillin-tazobactam (ZOSYN) 4.5 g in 0.9% sodium chloride (MBP/ADV) 100 mL  4.5 g IntraVENous Q8H       Other Studies (last 24 hours):  No results found. Assessment and Plan:     Hospital Problems as of 2/10/2018  Date Reviewed: 2/7/2018          Codes Class Noted - Resolved POA    * (Principal)Sepsis due to pneumonia [facility]) ICD-10-CM: J18.9, A41.9  ICD-9-CM: 612, 995.91  1/30/2018 - Present Unknown            Severe Sepsis secondary to pneumonia, UTI and sacral ulcer: CXR with RLL pneumona, LA 4.2 on admission. Cont IVF, IV abx. Monitor vitals. Wbc normalized. HCAP: Recent discharge. Likely aspiration pneumonia. Cont Zosyn and Levaquin. D/c Vanc. Bld Cx NGTD. Wbc normalized. Clinically improving gradually. Repeat CXR with signs of right lung atelectasis. Place on incentive spirometry. Kristine Lay UTI: UA positive. However, pt has kathleen. Cont Abx as above. F/u U Cx.      IDDM 2: Will hold Novolog as pt is NPO. Cont Lantus 20 and SSI. 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 Plastic surgery input appreciated. Sacral pressure Ulcer: Unstageable. Plastic surgery recommendations appreciated --> hold of debridement until optimal medical condition reached and acute illness resolve. Dysphagia, tube feeding: MBS recent previous admission --> NPO. Hx of TBI. Cont tube feeding. Dietitian following. abd xray with no signs of obstruction or free air. Dementia: stable    TBI- predisposed to aspiration, continue with PEG feeding. Cont NPO. Hx of COPD: stable    Anemia: Monitor Hb. Hypokalemia: Replace through PEG tube. Signed:   Stew Michel MD

## 2018-02-11 ENCOUNTER — APPOINTMENT (OUTPATIENT)
Dept: GENERAL RADIOLOGY | Age: 68
DRG: 871 | End: 2018-02-11
Attending: INTERNAL MEDICINE
Payer: MEDICARE

## 2018-02-11 LAB
ANION GAP SERPL CALC-SCNC: 9 MMOL/L (ref 7–16)
BACTERIA SPEC CULT: ABNORMAL
BUN SERPL-MCNC: 7 MG/DL (ref 8–23)
CALCIUM SERPL-MCNC: 8.2 MG/DL (ref 8.3–10.4)
CHLORIDE SERPL-SCNC: 104 MMOL/L (ref 98–107)
CO2 SERPL-SCNC: 27 MMOL/L (ref 21–32)
CREAT SERPL-MCNC: 0.41 MG/DL (ref 0.8–1.5)
ERYTHROCYTE [DISTWIDTH] IN BLOOD BY AUTOMATED COUNT: 20.7 % (ref 11.9–14.6)
GLUCOSE BLD STRIP.AUTO-MCNC: 126 MG/DL (ref 65–100)
GLUCOSE BLD STRIP.AUTO-MCNC: 149 MG/DL (ref 65–100)
GLUCOSE BLD STRIP.AUTO-MCNC: 195 MG/DL (ref 65–100)
GLUCOSE BLD STRIP.AUTO-MCNC: 98 MG/DL (ref 65–100)
GLUCOSE SERPL-MCNC: 131 MG/DL (ref 65–100)
HCT VFR BLD AUTO: 28.8 % (ref 41.1–50.3)
HGB BLD-MCNC: 8.2 G/DL (ref 13.6–17.2)
MCH RBC QN AUTO: 25.3 PG (ref 26.1–32.9)
MCHC RBC AUTO-ENTMCNC: 28.5 G/DL (ref 31.4–35)
MCV RBC AUTO: 88.9 FL (ref 79.6–97.8)
PLATELET # BLD AUTO: 409 K/UL (ref 150–450)
PMV BLD AUTO: 10.6 FL (ref 10.8–14.1)
POTASSIUM SERPL-SCNC: 3.2 MMOL/L (ref 3.5–5.1)
RBC # BLD AUTO: 3.24 M/UL (ref 4.23–5.67)
SERVICE CMNT-IMP: ABNORMAL
SODIUM SERPL-SCNC: 140 MMOL/L (ref 136–145)
WBC # BLD AUTO: 9.1 K/UL (ref 4.3–11.1)

## 2018-02-11 PROCEDURE — 74011000258 HC RX REV CODE- 258: Performed by: INTERNAL MEDICINE

## 2018-02-11 PROCEDURE — 82962 GLUCOSE BLOOD TEST: CPT

## 2018-02-11 PROCEDURE — 74011250637 HC RX REV CODE- 250/637: Performed by: INTERNAL MEDICINE

## 2018-02-11 PROCEDURE — 65270000029 HC RM PRIVATE

## 2018-02-11 PROCEDURE — 71045 X-RAY EXAM CHEST 1 VIEW: CPT

## 2018-02-11 PROCEDURE — 77030019605

## 2018-02-11 PROCEDURE — 80048 BASIC METABOLIC PNL TOTAL CA: CPT | Performed by: INTERNAL MEDICINE

## 2018-02-11 PROCEDURE — 74011636637 HC RX REV CODE- 636/637: Performed by: INTERNAL MEDICINE

## 2018-02-11 PROCEDURE — 74011250636 HC RX REV CODE- 250/636: Performed by: INTERNAL MEDICINE

## 2018-02-11 PROCEDURE — 85027 COMPLETE CBC AUTOMATED: CPT | Performed by: INTERNAL MEDICINE

## 2018-02-11 PROCEDURE — 94760 N-INVAS EAR/PLS OXIMETRY 1: CPT

## 2018-02-11 PROCEDURE — 36415 COLL VENOUS BLD VENIPUNCTURE: CPT | Performed by: INTERNAL MEDICINE

## 2018-02-11 PROCEDURE — 77030032490 HC SLV COMPR SCD KNE COVD -B

## 2018-02-11 PROCEDURE — 77010033678 HC OXYGEN DAILY

## 2018-02-11 PROCEDURE — C9113 INJ PANTOPRAZOLE SODIUM, VIA: HCPCS | Performed by: INTERNAL MEDICINE

## 2018-02-11 PROCEDURE — 77030011943

## 2018-02-11 PROCEDURE — 77030018798 HC PMP KT ENTRL FED COVD -A

## 2018-02-11 RX ORDER — POTASSIUM CHLORIDE 20MEQ/15ML
40 LIQUID (ML) ORAL
Status: COMPLETED | OUTPATIENT
Start: 2018-02-11 | End: 2018-02-11

## 2018-02-11 RX ORDER — POTASSIUM CHLORIDE 20MEQ/15ML
40 LIQUID (ML) ORAL
Status: DISCONTINUED | OUTPATIENT
Start: 2018-02-11 | End: 2018-02-11

## 2018-02-11 RX ORDER — FLUCONAZOLE 100 MG/1
200 TABLET ORAL DAILY
Status: DISCONTINUED | OUTPATIENT
Start: 2018-02-11 | End: 2018-02-15 | Stop reason: HOSPADM

## 2018-02-11 RX ORDER — POTASSIUM CHLORIDE 20 MEQ/1
40 TABLET, EXTENDED RELEASE ORAL
Status: DISCONTINUED | OUTPATIENT
Start: 2018-02-11 | End: 2018-02-11

## 2018-02-11 RX ADMIN — Medication 10 ML: at 05:39

## 2018-02-11 RX ADMIN — POTASSIUM CHLORIDE 40 MEQ: 20 SOLUTION ORAL at 10:31

## 2018-02-11 RX ADMIN — PIPERACILLIN SODIUM,TAZOBACTAM SODIUM 4.5 G: 4; .5 INJECTION, POWDER, FOR SOLUTION INTRAVENOUS at 05:29

## 2018-02-11 RX ADMIN — PIPERACILLIN SODIUM,TAZOBACTAM SODIUM 4.5 G: 4; .5 INJECTION, POWDER, FOR SOLUTION INTRAVENOUS at 16:24

## 2018-02-11 RX ADMIN — INSULIN LISPRO 2 UNITS: 100 INJECTION, SOLUTION INTRAVENOUS; SUBCUTANEOUS at 11:32

## 2018-02-11 RX ADMIN — Medication 1 AMPULE: at 09:16

## 2018-02-11 RX ADMIN — FLUCONAZOLE 200 MG: 100 TABLET ORAL at 10:32

## 2018-02-11 RX ADMIN — Medication 1 AMPULE: at 22:14

## 2018-02-11 RX ADMIN — ASPIRIN 81 MG 81 MG: 81 TABLET ORAL at 09:00

## 2018-02-11 RX ADMIN — LEVOFLOXACIN 750 MG: 5 INJECTION, SOLUTION INTRAVENOUS at 14:38

## 2018-02-11 RX ADMIN — ENOXAPARIN SODIUM 40 MG: 40 INJECTION SUBCUTANEOUS at 22:13

## 2018-02-11 RX ADMIN — PANTOPRAZOLE SODIUM 40 MG: 40 INJECTION, POWDER, FOR SOLUTION INTRAVENOUS at 10:31

## 2018-02-11 RX ADMIN — Medication 10 ML: at 22:15

## 2018-02-11 RX ADMIN — INSULIN GLARGINE 20 UNITS: 100 INJECTION, SOLUTION SUBCUTANEOUS at 09:17

## 2018-02-11 RX ADMIN — Medication 2 MG: at 16:09

## 2018-02-11 RX ADMIN — GUAIFENESIN 200 MG: 100 SOLUTION ORAL at 00:06

## 2018-02-11 RX ADMIN — PIPERACILLIN SODIUM,TAZOBACTAM SODIUM 4.5 G: 4; .5 INJECTION, POWDER, FOR SOLUTION INTRAVENOUS at 22:16

## 2018-02-11 NOTE — PROGRESS NOTES
Problem: Falls - Risk of  Goal: *Absence of Falls  Document Larry Fall Risk and appropriate interventions in the flowsheet.    Outcome: Progressing Towards Goal  Fall Risk Interventions:  Mobility Interventions: Bed/chair exit alarm, Communicate number of staff needed for ambulation/transfer    Mentation Interventions: Bed/chair exit alarm, Door open when patient unattended, Evaluate medications/consider consulting pharmacy, Familiar objects from home, More frequent rounding, Reorient patient, Toileting rounds, Update white board    Medication Interventions: Bed/chair exit alarm, Evaluate medications/consider consulting pharmacy, Patient to call before getting OOB, Teach patient to arise slowly    Elimination Interventions: Bed/chair exit alarm, Call light in reach, Patient to call for help with toileting needs

## 2018-02-11 NOTE — PROGRESS NOTES
Hospitalist Progress Note     Admit Date:  2018  7:41 PM   Name:  Rosa Block   Age:  79 y.o.  :  1950   MRN:  [id number]   PCP:  None  Treatment Team: Attending Provider: Sonam Hickman MD; Utilization Review: Judy Braxton RN; Consulting Provider: Lauren Chan MD; Unit Clerk: Robby Carrera RN; Charge Nurse: Christel Lucero RN    Subjective:     From H&P \"Patient is a 67/M with PMH TBI, DM and indwelling kathleen catheter with PEG tube who presents from NH due to SOB and tachycardia. Patient was admitted one week ago for sepsis secondary to UTI and Aspiration PNA as well as gangrene of right LE. Patient was today found to be hypotensive with significant leukocytosis. He was given Zosyn en route and cultures were collected in the ED. He has been given Vancomycin. Lactic acid is also elevated. CXR shows RLL PNA. BP has improved with fluids. \"     - Pt seen and examined. He was lying in bed. Alert and oriented. Reports being NPO at the NH and complains of cough.  - Continues to have cough and congestion feeling. No fever, chills, CP, abd pain or urinary symptoms. No other symptoms of infection. 2/10 - Has some cough and congestion, however, improved. Breathing improved. No fever, chills, CP, abd pain or urinary symptoms.  - Reports breathing improving. No fever, chills, CP, abd pain, N/V or urinary symptoms.      Objective:     Patient Vitals for the past 24 hrs:   Temp Pulse Resp BP SpO2   18 0855 - - - - 99 %   18 0728 97.6 °F (36.4 °C) 84 18 120/76 95 %   18 0319 97.7 °F (36.5 °C) 79 20 126/82 96 %   02/10/18 2307 98 °F (36.7 °C) 85 18 144/85 94 %   02/10/18 1918 98.8 °F (37.1 °C) 75 18 132/78 97 %   02/10/18 1616 97.6 °F (36.4 °C) 85 17 123/74 99 %   02/10/18 1124 97.7 °F (36.5 °C) 84 16 131/83 98 %     Oxygen Therapy  O2 Sat (%): 99 % (18)  Pulse via Oximetry: 94 beats per minute (18)  O2 Device: Nasal cannula (02/11/18 0855)  O2 Flow Rate (L/min): 1 l/min (02/11/18 0856)    Intake/Output Summary (Last 24 hours) at 02/11/18 0954  Last data filed at 02/11/18 0911   Gross per 24 hour   Intake             1334 ml   Output             4950 ml   Net            -3616 ml           General appearance: NAD, conversant  Eyes: anicteric sclerae, moist conjunctivae; no lid-lag  ENT: Atraumatic; oropharynx clear with moist mucous membranes  Neck: Trachea midline , supple, no thyromegaly or lymphadenopathy  Lungs: Rhonchi bilaterally, Coarse breath sounds. CV: S1,S2 present, no added murmurs  Abdomen: Soft, non-tender; no masses. Peg tube in place with no signs of inflammation, however, stool material in the tube. Colostomy bag with loose brown stool. Extremities: No peripheral edema. Right lateral thigh s/p extensive debridement from the hip area to above the knee with exposure of muscles, however, no signs of active infection. Sacral decubitus ulcer, unstageable. Skin: as above. Psych: Appropriate affect. Data Review:  I have reviewed all labs, meds, telemetry events, and studies from the last 24 hours.     Recent Results (from the past 24 hour(s))   GLUCOSE, POC    Collection Time: 02/10/18 11:32 AM   Result Value Ref Range    Glucose (POC) 138 (H) 65 - 100 mg/dL   GLUCOSE, POC    Collection Time: 02/10/18  4:22 PM   Result Value Ref Range    Glucose (POC) 154 (H) 65 - 100 mg/dL   GLUCOSE, POC    Collection Time: 02/10/18  9:22 PM   Result Value Ref Range    Glucose (POC) 101 (H) 65 - 100 mg/dL   CBC W/O DIFF    Collection Time: 02/11/18  6:05 AM   Result Value Ref Range    WBC 9.1 4.3 - 11.1 K/uL    RBC 3.24 (L) 4.23 - 5.67 M/uL    HGB 8.2 (L) 13.6 - 17.2 g/dL    HCT 28.8 (L) 41.1 - 50.3 %    MCV 88.9 79.6 - 97.8 FL    MCH 25.3 (L) 26.1 - 32.9 PG    MCHC 28.5 (L) 31.4 - 35.0 g/dL    RDW 20.7 (H) 11.9 - 14.6 %    PLATELET 630 164 - 882 K/uL    MPV 10.6 (L) 10.8 - 24.9 FL   METABOLIC PANEL, BASIC    Collection Time: 02/11/18  6:05 AM   Result Value Ref Range    Sodium 140 136 - 145 mmol/L    Potassium 3.2 (L) 3.5 - 5.1 mmol/L    Chloride 104 98 - 107 mmol/L    CO2 27 21 - 32 mmol/L    Anion gap 9 7 - 16 mmol/L    Glucose 131 (H) 65 - 100 mg/dL    BUN 7 (L) 8 - 23 MG/DL    Creatinine 0.41 (L) 0.8 - 1.5 MG/DL    GFR est AA >60 >60 ml/min/1.73m2    GFR est non-AA >60 >60 ml/min/1.73m2    Calcium 8.2 (L) 8.3 - 10.4 MG/DL   GLUCOSE, POC    Collection Time: 02/11/18  7:33 AM   Result Value Ref Range    Glucose (POC) 149 (H) 65 - 100 mg/dL        All Micro Results     Procedure Component Value Units Date/Time    CULTURE, URINE [[id number]]  (Abnormal) Collected:  02/07/18 2128    Order Status:  Completed Specimen:  Urine from Clean catch Updated:  02/11/18 0725     Special Requests: NO SPECIAL REQUESTS        Culture result:         >100,000 COLONIES/mL CANDIDA ALBICANS (A)    CULTURE, BLOOD [[id number]] Collected:  02/07/18 2020    Order Status:  Completed Specimen:  Blood from Blood Updated:  02/11/18 0554     Special Requests: --        LEFT  Antecubital       Culture result: NO GROWTH 4 DAYS       INFLUENZA A & B AG (RAPID TEST) [[id number]] Collected:  02/07/18 2202    Order Status:  Completed Specimen:  Nasopharyngeal from Nasal washing Updated:  02/07/18 2223     Influenza A Ag NEGATIVE          NEGATIVE FOR THE PRESENCE OF INFLUENZA A ANTIGEN  INFECTION DUE TO INFLUENZA A CANNOT BE RULED OUT. BECAUSE THE ANTIGEN PRESENT IN THE SAMPLE MAY BE BELOW  THE DETECTION LIMIT OF THE TEST. A NEGATIVE TEST IS PRESUMPTIVE AND IT IS RECOMMENDED THAT THESE RESULTS BE CONFIRMED BY VIRAL CULTURE OR AN FDA-CLEARED INFLUENZA A AND B MOLECULAR ASSAY. Influenza B Ag NEGATIVE          NEGATIVE FOR THE PRESENCE OF INFLUENZA B ANTIGEN  INFECTION DUE TO INFLUENZA B CANNOT BE RULED OUT. BECAUSE THE ANTIGEN PRESENT IN THE SAMPLE MAY BE BELOW  THE DETECTION LIMIT OF THE TEST.   A NEGATIVE TEST IS PRESUMPTIVE AND IT IS RECOMMENDED THAT THESE RESULTS BE CONFIRMED BY VIRAL CULTURE OR AN FDA-CLEARED INFLUENZA A AND B MOLECULAR ASSAY. CULTURE, BLOOD [[id number]] Collected:  02/07/18 2020    Order Status:  Canceled Specimen:  Blood from Blood           Current Meds:  Current Facility-Administered Medications   Medication Dose Route Frequency    potassium chloride (K-DUR, KLOR-CON) SR tablet 40 mEq  40 mEq Oral NOW    guaiFENesin (ROBITUSSIN) 100 mg/5 mL oral liquid 200 mg  200 mg Per G Tube Q4H PRN    levoFLOXacin (LEVAQUIN) 750 mg in D5W IVPB  750 mg IntraVENous Q24H    albuterol (PROVENTIL VENTOLIN) nebulizer solution 2.5 mg  2.5 mg Nebulization Q2H PRN    aspirin chewable tablet 81 mg  81 mg Oral DAILY    insulin glargine (LANTUS) injection 20 Units  20 Units SubCUTAneous DAILY    traMADol (ULTRAM) tablet 50 mg  50 mg Per G Tube Q6H PRN    ondansetron (ZOFRAN) injection 4 mg  4 mg IntraVENous Q4H PRN    morphine injection 2 mg  2 mg IntraVENous Q4H PRN    insulin lispro (HUMALOG) injection   SubCUTAneous AC&HS    pantoprazole (PROTONIX) injection 40 mg  40 mg IntraVENous Q24H    alcohol 62% (NOZIN) nasal  1 Ampule  1 Ampule Topical Q12H    NUTRITIONAL SUPPORT ELECTROLYTE PRN ORDERS   Does Not Apply PRN    sodium chloride (NS) flush 5-10 mL  5-10 mL IntraVENous PRN    enoxaparin (LOVENOX) injection 40 mg  40 mg SubCUTAneous Q24H    0.9% sodium chloride infusion  50 mL/hr IntraVENous CONTINUOUS    piperacillin-tazobactam (ZOSYN) 4.5 g in 0.9% sodium chloride (MBP/ADV) 100 mL  4.5 g IntraVENous Q8H       Other Studies (last 24 hours):  No results found. Assessment and Plan:     Hospital Problems as of 2/11/2018  Date Reviewed: 2/7/2018          Codes Class Noted - Resolved POA    * (Principal)Sepsis due to pneumonia [facility]) ICD-10-CM: J18.9, A41.9  ICD-9-CM: 192, 995.91  1/30/2018 - Present Unknown            Severe Sepsis secondary to pneumonia, UTI and sacral ulcer: CXR with RLL pneumona, LA 4.2 on admission.   Cont IVF, IV abx. Monitor vitals. Wbc normalized. HCAP: Recent discharge. Likely aspiration pneumonia. Cont Zosyn and Levaquin. D/c Vanc. Bld Cx NGTD. Wbc normalized. Clinically improving gradually. Repeat CXR with signs of right lung atelectasis. Place on incentive spirometry. Will repeat CXR again today. UTI: UA positive. However, pt has kathleen. Cont Abx as above. U Cx pos for candida. Likely due to chronic kathleen and represents candiduria, however, will start on Fluconazole for total of 14 days started 2/11 -->. IDDM 2: Will hold Novolog as pt is NPO. Cont Lantus 20 and SSI. 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 Plastic surgery input appreciated. Sacral pressure Ulcer: Unstageable. Plastic surgery recommendations appreciated --> hold off debridement until optimal medical condition reached and acute illness resolve. Dysphagia, tube feeding: MBS recent previous admission --> NPO. Hx of TBI. Cont tube feeding. Dietitian following. abd xray with no signs of obstruction or free air. Dementia: stable    TBI- predisposed to aspiration, continue with PEG feeding. Cont NPO. Hx of COPD: stable    Anemia: Monitor Hb. Hypokalemia: Replace through PEG tube. Signed:   Stew Michel MD

## 2018-02-12 ENCOUNTER — APPOINTMENT (OUTPATIENT)
Dept: GENERAL RADIOLOGY | Age: 68
DRG: 871 | End: 2018-02-12
Attending: INTERNAL MEDICINE
Payer: MEDICARE

## 2018-02-12 LAB
BACTERIA SPEC CULT: NORMAL
GLUCOSE BLD STRIP.AUTO-MCNC: 101 MG/DL (ref 65–100)
GLUCOSE BLD STRIP.AUTO-MCNC: 101 MG/DL (ref 65–100)
GLUCOSE BLD STRIP.AUTO-MCNC: 102 MG/DL (ref 65–100)
GLUCOSE BLD STRIP.AUTO-MCNC: 103 MG/DL (ref 65–100)
GLUCOSE BLD STRIP.AUTO-MCNC: 109 MG/DL (ref 65–100)
GLUCOSE BLD STRIP.AUTO-MCNC: 68 MG/DL (ref 65–100)
SERVICE CMNT-IMP: NORMAL

## 2018-02-12 PROCEDURE — 74011636320 HC RX REV CODE- 636/320: Performed by: INTERNAL MEDICINE

## 2018-02-12 PROCEDURE — 77030018836 HC SOL IRR NACL ICUM -A

## 2018-02-12 PROCEDURE — 74011636637 HC RX REV CODE- 636/637: Performed by: INTERNAL MEDICINE

## 2018-02-12 PROCEDURE — 74011250636 HC RX REV CODE- 250/636: Performed by: INTERNAL MEDICINE

## 2018-02-12 PROCEDURE — 74011250637 HC RX REV CODE- 250/637: Performed by: INTERNAL MEDICINE

## 2018-02-12 PROCEDURE — 74018 RADEX ABDOMEN 1 VIEW: CPT

## 2018-02-12 PROCEDURE — 77030019605

## 2018-02-12 PROCEDURE — C9113 INJ PANTOPRAZOLE SODIUM, VIA: HCPCS | Performed by: INTERNAL MEDICINE

## 2018-02-12 PROCEDURE — 74011000258 HC RX REV CODE- 258: Performed by: INTERNAL MEDICINE

## 2018-02-12 PROCEDURE — 65270000029 HC RM PRIVATE

## 2018-02-12 PROCEDURE — 82962 GLUCOSE BLOOD TEST: CPT

## 2018-02-12 PROCEDURE — 74011000250 HC RX REV CODE- 250: Performed by: INTERNAL MEDICINE

## 2018-02-12 RX ORDER — DEXTROSE 50 % IN WATER (D50W) INTRAVENOUS SYRINGE
25-50 AS NEEDED
Status: DISCONTINUED | OUTPATIENT
Start: 2018-02-12 | End: 2018-02-15 | Stop reason: HOSPADM

## 2018-02-12 RX ORDER — DEXTROSE 40 %
15 GEL (GRAM) ORAL AS NEEDED
Status: DISCONTINUED | OUTPATIENT
Start: 2018-02-12 | End: 2018-02-15 | Stop reason: HOSPADM

## 2018-02-12 RX ADMIN — PANTOPRAZOLE SODIUM 40 MG: 40 INJECTION, POWDER, FOR SOLUTION INTRAVENOUS at 12:07

## 2018-02-12 RX ADMIN — FLUCONAZOLE 200 MG: 100 TABLET ORAL at 09:16

## 2018-02-12 RX ADMIN — Medication 2 MG: at 12:07

## 2018-02-12 RX ADMIN — PIPERACILLIN SODIUM,TAZOBACTAM SODIUM 4.5 G: 4; .5 INJECTION, POWDER, FOR SOLUTION INTRAVENOUS at 05:54

## 2018-02-12 RX ADMIN — PIPERACILLIN SODIUM,TAZOBACTAM SODIUM 4.5 G: 4; .5 INJECTION, POWDER, FOR SOLUTION INTRAVENOUS at 21:43

## 2018-02-12 RX ADMIN — Medication: at 16:20

## 2018-02-12 RX ADMIN — INSULIN GLARGINE 20 UNITS: 100 INJECTION, SOLUTION SUBCUTANEOUS at 09:16

## 2018-02-12 RX ADMIN — LEVOFLOXACIN 750 MG: 5 INJECTION, SOLUTION INTRAVENOUS at 13:42

## 2018-02-12 RX ADMIN — Medication 10 ML: at 12:08

## 2018-02-12 RX ADMIN — ONDANSETRON 4 MG: 2 INJECTION INTRAMUSCULAR; INTRAVENOUS at 02:36

## 2018-02-12 RX ADMIN — Medication 1 AMPULE: at 09:18

## 2018-02-12 RX ADMIN — DEXTROSE MONOHYDRATE 12.5 G: 25 INJECTION, SOLUTION INTRAVENOUS at 21:38

## 2018-02-12 RX ADMIN — Medication 1 AMPULE: at 21:52

## 2018-02-12 RX ADMIN — ASPIRIN 81 MG 81 MG: 81 TABLET ORAL at 09:16

## 2018-02-12 RX ADMIN — DIATRIZOATE MEGLUMINE AND DIATRIZOATE SODIUM 30 ML: 660; 100 LIQUID ORAL; RECTAL at 02:00

## 2018-02-12 RX ADMIN — ENOXAPARIN SODIUM 40 MG: 40 INJECTION SUBCUTANEOUS at 21:45

## 2018-02-12 RX ADMIN — PIPERACILLIN SODIUM,TAZOBACTAM SODIUM 4.5 G: 4; .5 INJECTION, POWDER, FOR SOLUTION INTRAVENOUS at 15:42

## 2018-02-12 RX ADMIN — SODIUM CHLORIDE 50 ML/HR: 900 INJECTION, SOLUTION INTRAVENOUS at 13:52

## 2018-02-12 RX ADMIN — TRAMADOL HYDROCHLORIDE 50 MG: 50 TABLET, FILM COATED ORAL at 09:16

## 2018-02-12 NOTE — PROGRESS NOTES
Pt from [address] [place] [address], referral made to return when auth obtained and medically ready.

## 2018-02-12 NOTE — ROUTINE PROCESS
Gastric residual was 300 tan, patient complaint of nausea,and coughing up tan, thin sputum. Turned patient on the right side. Rechecked gastric residual within 3 hours was 140 tan residual and fed intake was 148. Notified Justina Vines MD. [place] order to stop feeding and a stat KUB.

## 2018-02-12 NOTE — PROGRESS NOTES
Went to check in on pt. HOB 30 degrees. Pt states that he threw up 3 times. Although there is no vomit in his emesis bag or on him. Also, per nurse aide, pt has not thrown up or been cleaned up due to emesis. Checked residual, 100 ml tan liquid. Decreased feeding rate to 10 ml/hr. Pt does have hx of dementia. Advised pt to call me if it happened again. Notified Dr Erna Trivedi. He said okay to start pt back up at 10ml/hr (and increase per nutrition order) and monitor pt for emesis, also monitor pt for worsening lung condition. If worsening lung condition noted, lung sounds worsen, or pt's oxygen desaturates, notify MD, so that chest x-ray can be ordered.

## 2018-02-12 NOTE — PROGRESS NOTES
Hospitalist Progress Note     Admit Date:  2018  7:41 PM   Name:  Estela Veloz   Age:  79 y.o.  :  1950   MRN:  [id number]   PCP:  None  Treatment Team: Attending Provider: Peter To MD; Utilization Review: Katy Springer RN; Consulting Provider: Debbie Hull MD; Charge Nurse: Leoncio Bravo RN    Subjective:     From H&P \"Patient is a 67/M with PMH TBI, DM and indwelling kathleen catheter with PEG tube who presents from NH due to SOB and tachycardia. Patient was admitted one week ago for sepsis secondary to UTI and Aspiration PNA as well as gangrene of right LE. Patient was today found to be hypotensive with significant leukocytosis. He was given Zosyn en route and cultures were collected in the ED. He has been given Vancomycin. Lactic acid is also elevated. CXR shows RLL PNA. BP has improved with fluids. \"     - Pt seen and examined. He was lying in bed. Alert and oriented. Reports being NPO at the NH and complains of cough.  - Continues to have cough and congestion feeling. No fever, chills, CP, abd pain or urinary symptoms. No other symptoms of infection. 2/10 - Has some cough and congestion, however, improved. Breathing improved. No fever, chills, CP, abd pain or urinary symptoms.  - Reports breathing improving. No fever, chills, CP, abd pain, N/V or urinary symptoms.  - Reports breathing improving. RN reported pt was lying flat and might have aspirated on saliva as he sounded more congested. Elevated head 30 degree. Orders for asp precautions and elevate head placed. No fever, chills, CP, abd pain, N/V or urinary symptoms.        Objective:     Patient Vitals for the past 24 hrs:   Temp Pulse Resp BP SpO2   18 1137 98 °F (36.7 °C) 87 19 104/68 95 %   18 0724 98.1 °F (36.7 °C) 82 16 115/82 93 %   18 0311 97.9 °F (36.6 °C) 91 18 113/74 91 %   18 0234 97.9 °F (36.6 °C) (!) 55 17 132/79 95 %   18 2307 98.3 °F (36.8 °C) (!) 103 18 119/77 94 %   02/11/18 1858 98.2 °F (36.8 °C) 78 18 119/74 94 %   02/11/18 1656 98.2 °F (36.8 °C) (!) 106 18 110/76 94 %     Oxygen Therapy  O2 Sat (%): 95 % (02/12/18 1137)  Pulse via Oximetry: 94 beats per minute (02/11/18 0855)  O2 Device: Nasal cannula (02/11/18 0855)  O2 Flow Rate (L/min): 1 l/min (02/11/18 0856)    Intake/Output Summary (Last 24 hours) at 02/12/18 1346  Last data filed at 02/12/18 1135   Gross per 24 hour   Intake             1020 ml   Output             2302 ml   Net            -1282 ml           General appearance: NAD, conversant  Eyes: anicteric sclerae, moist conjunctivae; no lid-lag  ENT: Atraumatic; oropharynx clear with moist mucous membranes  Neck: Trachea midline , supple, no thyromegaly or lymphadenopathy  Lungs: Rhonchi bilaterally, Coarse breath sounds. CV: S1,S2 present, no added murmurs  Abdomen: Soft, non-tender; no masses. Peg tube in place with no signs of inflammation, however, stool material in the tube. Colostomy bag with loose brown stool. Extremities: No peripheral edema. Right lateral thigh s/p extensive debridement from the hip area to above the knee with exposure of muscles, however, no signs of active infection. Sacral decubitus ulcer, unstageable. Skin: as above. Psych: Appropriate affect. Data Review:  I have reviewed all labs, meds, telemetry events, and studies from the last 24 hours.     Recent Results (from the past 24 hour(s))   GLUCOSE, POC    Collection Time: 02/11/18  4:58 PM   Result Value Ref Range    Glucose (POC) 126 (H) 65 - 100 mg/dL   GLUCOSE, POC    Collection Time: 02/11/18  8:43 PM   Result Value Ref Range    Glucose (POC) 98 65 - 100 mg/dL   GLUCOSE, POC    Collection Time: 02/12/18  6:04 AM   Result Value Ref Range    Glucose (POC) 101 (H) 65 - 100 mg/dL   GLUCOSE, POC    Collection Time: 02/12/18  7:28 AM   Result Value Ref Range    Glucose (POC) 102 (H) 65 - 100 mg/dL   GLUCOSE, POC    Collection Time: 02/12/18 11:37 AM Result Value Ref Range    Glucose (POC) 101 (H) 65 - 100 mg/dL        All Micro Results     Procedure Component Value Units Date/Time    CULTURE, BLOOD [[id number]] Collected:  02/07/18 2020    Order Status:  Completed Specimen:  Blood from Blood Updated:  02/12/18 0604     Special Requests: --        LEFT  Antecubital       Culture result: NO GROWTH 5 DAYS       CULTURE, URINE [[id number]]  (Abnormal) Collected:  02/07/18 2128    Order Status:  Completed Specimen:  Urine from Clean catch Updated:  02/11/18 0725     Special Requests: NO SPECIAL REQUESTS        Culture result:         >100,000 COLONIES/mL CANDIDA ALBICANS (A)    INFLUENZA A & B AG (RAPID TEST) [[id number]] Collected:  02/07/18 2202    Order Status:  Completed Specimen:  Nasopharyngeal from Nasal washing Updated:  02/07/18 2223     Influenza A Ag NEGATIVE          NEGATIVE FOR THE PRESENCE OF INFLUENZA A ANTIGEN  INFECTION DUE TO INFLUENZA A CANNOT BE RULED OUT. BECAUSE THE ANTIGEN PRESENT IN THE SAMPLE MAY BE BELOW  THE DETECTION LIMIT OF THE TEST. A NEGATIVE TEST IS PRESUMPTIVE AND IT IS RECOMMENDED THAT THESE RESULTS BE CONFIRMED BY VIRAL CULTURE OR AN FDA-CLEARED INFLUENZA A AND B MOLECULAR ASSAY. Influenza B Ag NEGATIVE          NEGATIVE FOR THE PRESENCE OF INFLUENZA B ANTIGEN  INFECTION DUE TO INFLUENZA B CANNOT BE RULED OUT. BECAUSE THE ANTIGEN PRESENT IN THE SAMPLE MAY BE BELOW  THE DETECTION LIMIT OF THE TEST. A NEGATIVE TEST IS PRESUMPTIVE AND IT IS RECOMMENDED THAT THESE RESULTS BE CONFIRMED BY VIRAL CULTURE OR AN FDA-CLEARED INFLUENZA A AND B MOLECULAR ASSAY.          CULTURE, BLOOD [[id number]] Collected:  02/07/18 2020    Order Status:  Canceled Specimen:  Blood from Blood           Current Meds:  Current Facility-Administered Medications   Medication Dose Route Frequency    fluconazole (DIFLUCAN) tablet 200 mg  200 mg Per G Tube DAILY    guaiFENesin (ROBITUSSIN) 100 mg/5 mL oral liquid 200 mg  200 mg Per G Tube Q4H PRN    levoFLOXacin (LEVAQUIN) 750 mg in D5W IVPB  750 mg IntraVENous Q24H    albuterol (PROVENTIL VENTOLIN) nebulizer solution 2.5 mg  2.5 mg Nebulization Q2H PRN    aspirin chewable tablet 81 mg  81 mg Oral DAILY    insulin glargine (LANTUS) injection 20 Units  20 Units SubCUTAneous DAILY    traMADol (ULTRAM) tablet 50 mg  50 mg Per G Tube Q6H PRN    ondansetron (ZOFRAN) injection 4 mg  4 mg IntraVENous Q4H PRN    morphine injection 2 mg  2 mg IntraVENous Q4H PRN    insulin lispro (HUMALOG) injection   SubCUTAneous AC&HS    pantoprazole (PROTONIX) injection 40 mg  40 mg IntraVENous Q24H    alcohol 62% (NOZIN) nasal  1 Ampule  1 Ampule Topical Q12H    NUTRITIONAL SUPPORT ELECTROLYTE PRN ORDERS   Does Not Apply PRN    sodium chloride (NS) flush 5-10 mL  5-10 mL IntraVENous PRN    enoxaparin (LOVENOX) injection 40 mg  40 mg SubCUTAneous Q24H    0.9% sodium chloride infusion  50 mL/hr IntraVENous CONTINUOUS    piperacillin-tazobactam (ZOSYN) 4.5 g in 0.9% sodium chloride (MBP/ADV) 100 mL  4.5 g IntraVENous Q8H       Other Studies (last 24 hours):  Xr Abd (kub)    Result Date: 2/12/2018  Supine abdomen INDICATION: PEG tube placement COMPARISON: None FINDINGS:  image as well as an image performed after injection of contrast through a existing PEG tube. Partial opacification of the stomach with no identifiable extraluminal contrast.     IMPRESSION: As above      Assessment and Plan:     Hospital Problems as of 2/12/2018  Date Reviewed: 2/7/2018          Codes Class Noted - Resolved POA    * (Principal)Sepsis due to pneumonia [facility]) ICD-10-CM: J18.9, A41.9  ICD-9-CM: 488, 995.91  1/30/2018 - Present Unknown            Severe Sepsis secondary to pneumonia, UTI and sacral ulcer: CXR with RLL pneumona, LA 4.2 on admission. Cont IVF, IV abx. Monitor vitals. Wbc normalized. HCAP: Recent discharge. Likely aspiration pneumonia. Cont Zosyn and Levaquin. D/c Vanc. Bld Cx NGTD. Wbc normalized.  Clinically improving gradually. Repeat CXR with signs of right lung atelectasis. Place on incentive spirometry. Will repeat CXR again today. UTI: UA positive. However, pt has kathleen. Cont Abx as above. U Cx pos for candida. Likely due to chronic kathleen and represents candiduria, however, will start on Fluconazole for total of 14 days started 2/11 -->. IDDM 2: Will hold Novolog as pt is NPO. Cont Lantus 20 and SSI. 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 Plastic surgery input appreciated. Sacral pressure Ulcer: Unstageable. Plastic surgery recommendations appreciated --> hold off debridement until optimal medical condition reached and acute illness resolve. Dysphagia, tube feeding: MBS recent previous admission --> NPO. Hx of TBI. Cont tube feeding. Dietitian following. abd xray with no signs of obstruction or free air. Dementia: stable    TBI- predisposed to aspiration, continue with PEG feeding. Cont NPO. Hx of COPD: stable    Anemia: Monitor Hb. Hypokalemia: Replace through PEG tube. Signed:   Helen Hagen MD

## 2018-02-13 PROBLEM — J44.9 COPD (CHRONIC OBSTRUCTIVE PULMONARY DISEASE) (HCC): Status: ACTIVE | Noted: 2018-02-13

## 2018-02-13 PROBLEM — B37.49 CANDIDA UTI: Status: ACTIVE | Noted: 2018-01-31

## 2018-02-13 PROBLEM — R13.10 DYSPHAGIA: Status: ACTIVE | Noted: 2018-02-13

## 2018-02-13 LAB
ANION GAP SERPL CALC-SCNC: 9 MMOL/L (ref 7–16)
BUN SERPL-MCNC: 7 MG/DL (ref 8–23)
CALCIUM SERPL-MCNC: 8.2 MG/DL (ref 8.3–10.4)
CHLORIDE SERPL-SCNC: 105 MMOL/L (ref 98–107)
CO2 SERPL-SCNC: 24 MMOL/L (ref 21–32)
CREAT SERPL-MCNC: 0.42 MG/DL (ref 0.8–1.5)
ERYTHROCYTE [DISTWIDTH] IN BLOOD BY AUTOMATED COUNT: 21.4 % (ref 11.9–14.6)
GLUCOSE BLD STRIP.AUTO-MCNC: 67 MG/DL (ref 65–100)
GLUCOSE BLD STRIP.AUTO-MCNC: 71 MG/DL (ref 65–100)
GLUCOSE BLD STRIP.AUTO-MCNC: 73 MG/DL (ref 65–100)
GLUCOSE BLD STRIP.AUTO-MCNC: 74 MG/DL (ref 65–100)
GLUCOSE BLD STRIP.AUTO-MCNC: 84 MG/DL (ref 65–100)
GLUCOSE BLD STRIP.AUTO-MCNC: 89 MG/DL (ref 65–100)
GLUCOSE SERPL-MCNC: 78 MG/DL (ref 65–100)
HCT VFR BLD AUTO: 33.4 % (ref 41.1–50.3)
HGB BLD-MCNC: 9.8 G/DL (ref 13.6–17.2)
MCH RBC QN AUTO: 26.4 PG (ref 26.1–32.9)
MCHC RBC AUTO-ENTMCNC: 29.3 G/DL (ref 31.4–35)
MCV RBC AUTO: 90 FL (ref 79.6–97.8)
PLATELET # BLD AUTO: 380 K/UL (ref 150–450)
PMV BLD AUTO: 10.1 FL (ref 10.8–14.1)
POTASSIUM SERPL-SCNC: 3.6 MMOL/L (ref 3.5–5.1)
RBC # BLD AUTO: 3.71 M/UL (ref 4.23–5.67)
SODIUM SERPL-SCNC: 138 MMOL/L (ref 136–145)
WBC # BLD AUTO: 8.2 K/UL (ref 4.3–11.1)

## 2018-02-13 PROCEDURE — 74011000250 HC RX REV CODE- 250: Performed by: INTERNAL MEDICINE

## 2018-02-13 PROCEDURE — 97162 PT EVAL MOD COMPLEX 30 MIN: CPT

## 2018-02-13 PROCEDURE — 77030019605

## 2018-02-13 PROCEDURE — 74011250637 HC RX REV CODE- 250/637: Performed by: INTERNAL MEDICINE

## 2018-02-13 PROCEDURE — 80048 BASIC METABOLIC PNL TOTAL CA: CPT | Performed by: INTERNAL MEDICINE

## 2018-02-13 PROCEDURE — 85027 COMPLETE CBC AUTOMATED: CPT | Performed by: INTERNAL MEDICINE

## 2018-02-13 PROCEDURE — 77010033678 HC OXYGEN DAILY

## 2018-02-13 PROCEDURE — 74011000258 HC RX REV CODE- 258: Performed by: INTERNAL MEDICINE

## 2018-02-13 PROCEDURE — 97166 OT EVAL MOD COMPLEX 45 MIN: CPT

## 2018-02-13 PROCEDURE — 74011250636 HC RX REV CODE- 250/636: Performed by: INTERNAL MEDICINE

## 2018-02-13 PROCEDURE — 74011636637 HC RX REV CODE- 636/637: Performed by: INTERNAL MEDICINE

## 2018-02-13 PROCEDURE — 82962 GLUCOSE BLOOD TEST: CPT

## 2018-02-13 PROCEDURE — 65270000029 HC RM PRIVATE

## 2018-02-13 PROCEDURE — 77030018798 HC PMP KT ENTRL FED COVD -A

## 2018-02-13 PROCEDURE — 94760 N-INVAS EAR/PLS OXIMETRY 1: CPT

## 2018-02-13 PROCEDURE — 36415 COLL VENOUS BLD VENIPUNCTURE: CPT | Performed by: INTERNAL MEDICINE

## 2018-02-13 PROCEDURE — C9113 INJ PANTOPRAZOLE SODIUM, VIA: HCPCS | Performed by: INTERNAL MEDICINE

## 2018-02-13 RX ORDER — BENZONATATE 100 MG/1
200 CAPSULE ORAL
Status: DISCONTINUED | OUTPATIENT
Start: 2018-02-13 | End: 2018-02-15 | Stop reason: HOSPADM

## 2018-02-13 RX ORDER — INSULIN GLARGINE 100 [IU]/ML
15 INJECTION, SOLUTION SUBCUTANEOUS DAILY
Status: DISCONTINUED | OUTPATIENT
Start: 2018-02-13 | End: 2018-02-14

## 2018-02-13 RX ADMIN — ONDANSETRON 4 MG: 2 INJECTION INTRAMUSCULAR; INTRAVENOUS at 12:01

## 2018-02-13 RX ADMIN — Medication: at 10:21

## 2018-02-13 RX ADMIN — FLUCONAZOLE 200 MG: 100 TABLET ORAL at 09:20

## 2018-02-13 RX ADMIN — Medication 1 AMPULE: at 09:21

## 2018-02-13 RX ADMIN — PIPERACILLIN SODIUM,TAZOBACTAM SODIUM 4.5 G: 4; .5 INJECTION, POWDER, FOR SOLUTION INTRAVENOUS at 14:56

## 2018-02-13 RX ADMIN — PIPERACILLIN SODIUM,TAZOBACTAM SODIUM 4.5 G: 4; .5 INJECTION, POWDER, FOR SOLUTION INTRAVENOUS at 21:59

## 2018-02-13 RX ADMIN — BENZONATATE 200 MG: 100 CAPSULE ORAL at 12:01

## 2018-02-13 RX ADMIN — DEXTROSE MONOHYDRATE 25 G: 25 INJECTION, SOLUTION INTRAVENOUS at 07:58

## 2018-02-13 RX ADMIN — ASPIRIN 81 MG 81 MG: 81 TABLET ORAL at 09:20

## 2018-02-13 RX ADMIN — Medication 1 AMPULE: at 22:01

## 2018-02-13 RX ADMIN — PANTOPRAZOLE SODIUM 40 MG: 40 INJECTION, POWDER, FOR SOLUTION INTRAVENOUS at 12:01

## 2018-02-13 RX ADMIN — ENOXAPARIN SODIUM 40 MG: 40 INJECTION SUBCUTANEOUS at 22:01

## 2018-02-13 RX ADMIN — Medication 10 ML: at 14:56

## 2018-02-13 RX ADMIN — PIPERACILLIN SODIUM,TAZOBACTAM SODIUM 4.5 G: 4; .5 INJECTION, POWDER, FOR SOLUTION INTRAVENOUS at 05:43

## 2018-02-13 RX ADMIN — TRAMADOL HYDROCHLORIDE 50 MG: 50 TABLET, FILM COATED ORAL at 09:45

## 2018-02-13 RX ADMIN — TRAMADOL HYDROCHLORIDE 50 MG: 50 TABLET, FILM COATED ORAL at 17:56

## 2018-02-13 NOTE — PROGRESS NOTES
Pt. BS 68 at 2103. Pt nonsymptomatic,  Hospitalist notified and hypoglycemic protocol ordered. Pt given 25 mL D50 at 2138. BS recheck was 109. Will continue to monitor pt.

## 2018-02-13 NOTE — PROGRESS NOTES
Problem: Interdisciplinary Rounds  Goal: Interdisciplinary Rounds  Outcome: Progressing Towards Goal  Interdisciplinary team rounds were held 2/13/2018 with the following team members:Care Management, Physical Therapy, Physician and . Anticipate discharge to [facility] tomorrow. Plan of care discussed. See clinical pathway and/or care plan for interventions and desired outcomes.

## 2018-02-13 NOTE — PROGRESS NOTES
Problem: Mobility Impaired (Adult and Pediatric)  Goal: *Acute Goals and Plan of Care (Insert Text)  STG:  (1.)Mr. Miller will roll bilaterally with MINIMAL ASSIST within 3 day(s). (2.)Mr. Miller will transfer supine to sitting with MODERATE ASSIST x2 within 3 day(s). (3.)Mr. Camacho demonstrate FAIR STATIC sitting balance within 3 day(s). (4.)Mr. Miller will transfer from bed to chair and chair to bed with MAXIMAL ASSIST x2 within 3 day(s). (5.)Mr. Miller will tolerate 15+ minutes of therapeutic activity/exercise while maintaining stable vitals to improve functional strength and mobility within 3 day(s). LTG:  (1.)Mr. Miller will roll bilaterally with CONTACT GUARD ASSIST within 7 day(s). (2.)Mr. Miller will transfer supine to sitting with MINIMAL ASSIST x2 within 7 day(s). (3.)Mr. Camacho demonstrate [employer] DYNAMIC sitting balance within 7 day(s). (4.)Mr. Miller will transfer sit to stand with MAXIMAL ASSISTANCE within 7 day(s). (5.)Mr. Miller will tolerate 25+ minutes of therapeutic activity/exercise while maintaining stable vitals to improve functional strength and mobility within 7 day(s). ________________________________________________________________________________________________      PHYSICAL THERAPY: Initial Assessment, AM 2/13/2018  INPATIENT: Hospital Day: 7  Payor: Gabe Mata / Plan: [address] [place] [address] HMO / Product Type: Managed Care Medicare /      NAME/AGE/GENDER: Cary Madera is a 79 y.o. male   PRIMARY DIAGNOSIS: Sepsis due to pneumonia ([facility] [address] Sepsis due to pneumonia ([facility] [address] Sepsis due to pneumonia [facility])        ICD-10: Treatment Diagnosis:   · Generalized Muscle Weakness (M62.81)  · Other abnormalities of gait and mobility (R26.89)   Precaution/Allergies:  Review of patient's allergies indicates no known allergies. ASSESSMENT:     Mr. Miller is a 79year old male with history significant for necrotizing fascitis R LE and admitted with sepsis/pneumonia.   Patient seen this AM for initial physical therapy evaluation: presents supine in bed s/p OT assessment and endorses 0/10 pain. Patient with recent hospital admission and s/p LTACH stay and current in STR at SNF where he was requiring assistance with bed mobility and transfers as well as ADLs. Today, L LE proximal strength 3/5, distal strength 4-/5, R LE proximally 3-/5 and 3+/5 distally, decreased R LE AROM, and sensation is diminished to light touch and deep distal B LE R>L. Patient performed rolling bilaterally with minimal to moderate assistance and required maximal assistance for scooting. Worked on bridging and positioning. Education provided to patient on pressure relief. Mr. Jacklyn Angel presents with decreased functional mobility and balance/gait status from baseline. Recommend continued skilled PT services to address stated deficits. Will follow and progress toward stated goals during acute stay. This section established at most recent assessment   PROBLEM LIST (Impairments causing functional limitations):  1. Decreased Strength  2. Decreased ADL/Functional Activities  3. Decreased Transfer Abilities  4. Decreased Ambulation Ability/Technique  5. Decreased Balance  6. Decreased Activity Tolerance  7. Decreased Flexibility/Joint Mobility  8. Decreased [place] with Home Exercise Program   INTERVENTIONS PLANNED: (Benefits and precautions of physical therapy have been discussed with the patient.)  1. Balance Exercise  2. Bed Mobility  3. Family Education  4. Gait Training  5. Home Exercise Program (HEP)  6. Range of Motion (ROM)  7. Therapeutic Activites  8. Therapeutic Exercise/Strengthening  9. Transfer Training  10.  Group Therapy     TREATMENT PLAN: Frequency/Duration: 3 times a week for duration of hospital stay  Rehabilitation Potential For Stated Goals: Kev Felix REHABILITATION/EQUIPMENT: (at time of discharge pending progress): Due to the probability of continued deficits (see above) this patient will likely need continued skilled physical therapy after discharge. Equipment:    TBD              HISTORY:   History of Present Injury/Illness (Reason for Referral):  Weakness  Past Medical History/Comorbidities:   Mr. Stephen Perez  has a past medical history of COPD (chronic obstructive pulmonary disease) ([facility] [address] Dementia; Diabetes ([facility] [address] and Necrotizing fasciitis ([facility] [address] Mr. Stephen Perez  has no past surgical history on file. Social History/Living Environment:   Home Environment: Skilled nursing facility ([facility])  [address] [place] [address] Mahendra Name: Yuliana Isaacs  # Steps to Enter: 0  One/Two Story Residence: One story  Living Alone: No  Support Systems: Skilled nursing facility  Patient Expects to be Discharged to[de-identified] Skilled nursing facility  Current DME Used/Available at Home: None  Prior Level of Function/Work/Activity:  Patient with recent hospital admission and s/p LTACH stay and current in STR at SNF where he was requiring assistance with bed mobility and transfers as well as ADLs. Number of Personal Factors/Comorbidities that affect the Plan of Care: 1-2: MODERATE COMPLEXITY   EXAMINATION:   Most Recent Physical Functioning:   Gross Assessment:  AROM: Generally decreased, functional (Proximal B LE R>L)  Strength: Generally decreased, functional (B LE R>L)  Coordination: Generally decreased, functional (B LE)  Sensation: Impaired (Diminished light touch and deep pressure B LE R>L)               Posture:  Posture (WDL): Exceptions to WDL  Posture Assessment: Forward head, Rounded shoulders  Balance:  Sitting: Impaired; With support Bed Mobility:  Rolling: Moderate assistance;Minimum assistance  Scooting: Maximum assistance  Wheelchair Mobility:     Transfers:     Gait:            Body Structures Involved:  1. Bones  2. Joints  3. Muscles Body Functions Affected:  1. Neuromusculoskeletal  2. Movement Related Activities and Participation Affected:  1. Mobility  2.  Self Care   Number of elements that affect the Plan of Care: 4+: HIGH COMPLEXITY   CLINICAL PRESENTATION:   Presentation: Evolving clinical presentation with changing clinical characteristics: MODERATE COMPLEXITY   CLINICAL DECISION MAKIN [facility] Mobility Inpatient Short Form  How much difficulty does the patient currently have. .. Unable A Lot A Little None   1. Turning over in bed (including adjusting bedclothes, sheets and blankets)? [] 1   [x] 2   [] 3   [] 4   2. Sitting down on and standing up from a chair with arms ( e.g., wheelchair, bedside commode, etc.)   [x] 1   [] 2   [] 3   [] 4   3. Moving from lying on back to sitting on the side of the bed? [] 1   [x] 2   [] 3   [] 4   How much help from another person does the patient currently need. .. Total A Lot A Little None   4. Moving to and from a bed to a chair (including a wheelchair)? [x] 1   [] 2   [] 3   [] 4   5. Need to walk in hospital room? [x] 1   [] 2   [] 3   [] 4   6. Climbing 3-5 steps with a railing? [x] 1   [] 2   [] 3   [] 4   © , Trustees of [address] [place] [address], under license to [employer]. All rights reserved      Score:  Initial: 8 Most Recent: 8 (Date: 18 )    Interpretation of Tool:  Represents activities that are increasingly more difficult (i.e. Bed mobility, Transfers, Gait). Score 24 23 22-20 19-15 14-10 9-7 6     Modifier CH CI CJ CK CL CM CN      ? Mobility - Walking and Moving Around:     - CURRENT STATUS: CM - 80%-99% impaired, limited or restricted    - GOAL STATUS: CL - 60%-79% impaired, limited or restricted    - D/C STATUS:  ---------------To be determined---------------  Payor: [employer]ICARE / Plan: [address] [place] [address] HMO / Product Type: Managed Care Medicare /      Medical Necessity:     · Patient demonstrates good rehab potential due to higher previous functional level.   Reason for Services/Other Comments:  · Patient continues to require skilled intervention due to decreased functional mobility and balance/gait status from baseline. .   Use of outcome tool(s) and clinical judgement create a POC that gives a: Questionable prediction of patient's progress: MODERATE COMPLEXITY            TREATMENT:   (In addition to Assessment/Re-Assessment sessions the following treatments were rendered)   Pre-treatment Symptoms/Complaints:    Pain: Initial:   Pain Intensity 1: 0  Post Session:  0/10     Assessment/Reassessment only, no treatment provided today    Braces/Orthotics/Lines/Etc:   · IV  · kathleen catheter  · PEG  · O2 Device: Nasal cannula  Treatment/Session Assessment:    · Response to Treatment:  See above. · Interdisciplinary Collaboration:   o Physical Therapist  o Occupational Therapist  o Registered Nurse  · After treatment position/precautions:   o Supine in bed  o Bed/Chair-wheels locked  o Bed in low position  o Call light within reach  o RN notified  o Side rails x 3  o Needs met; patient in NAD; HOB elevated   · Compliance with Program/Exercises: Will assess as treatment progresses. · Recommendations/Intent for next treatment session: \"Next visit will focus on advancements to more challenging activities and reduction in assistance provided\".     Total Treatment Duration:  PT Patient Time In/Time Out  Time In: 0950  Time Out: Sofia [address], DPT

## 2018-02-13 NOTE — PROGRESS NOTES
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 · Continue daily NS wet to dry dressing changes. Wound bed is ready for grafting, but overall surgical risk with PNA and malnutrition too high. · Optimize nutrition, get TF to goal suspect gastroparesis with residuals. · PT/OT for ankle extension, conditioning. No limitations on PT aggressiveness from wound perspective. · Sacral pressure sore  · Unstageable likely stage IV and still not a likely infectious source at the moment. · Not surgical candidate for debridement as above. · Offloading with level 2 support mattress. q 2 hour position changes. Will plan to follow in the wound clinic upon discharge         Subjective:          66 y/o well known to me having been initially admitted in December with left leg gangrene and fourniers. Had a protracted stay including several days in ICU then floor then regency in which he showed slow improvement. Had serial debridements as well as transverse colostomy to void soiling of the wound. Discharged to [facility]. Since discharge has been re-admitted twice with sepsis from pneumonia, UTI. Has a g-tube and failed his last MBS thus is NPO due to aspiration risk. RLL PNA. Developed a new sacral PS while at rehab. Continues to be treated aggressively for pneumonia.  Also having difficult time getting TF up to goal. Residuals often > 100 and currently at 30 (has been at 10 for a while after questionable emesis ) with goal being 70.                Patient Active Problem List   Diagnosis Code    Subdural hematoma (HCC) I62.00    Type II diabetes mellitus with complication ([address] N43.4    LAUREANO (acute kidney injury) ([address] N17.9    Syncope R55    Leukocytosis D72.829    Fasciitis M72.9    Severe sepsis with septic shock ([address] A41.9, R65.21    Encounter for weaning from ventilator ([address] Z99.11    Electrolyte abnormality E87.8    Lactic acidosis E87.2               Past Medical History:   Diagnosis Date    Diabetes ([address]         History reviewed. No pertinent family history.           Social History   Substance Use Topics    Smoking status: Former Smoker    Smokeless tobacco: Not on file    Alcohol use Not on file       History reviewed. No pertinent surgical history.                 Prior to Admission medications    Medication Sig Start Date End Date Taking? Authorizing Provider   multivitamin (ONE A DAY) tablet Take 1 Tab by mouth daily.       Yes Historical Provider       No Known Allergies              Objective:          Exam:    Visit Vitals    /73    Pulse 77    Temp 97.7 °F (36.5 °C)    Resp 17    Ht 5' 11\" (1.803 m)    Wt 63.9 kg (140 lb 14.4 oz)    SpO2 94%    BMI 19.65 kg/m2          Oriented to person, place. appropriate  Trachea midline. G Tube in place TF running at 30  RRR  Coarse bilaterally L>R  Abd soft G-tube in place no erythema. Ostomy in place productive  Gerardo in place - clear  Right 2nd and 3rd toes with some distal ischemia. DP/PT weak  Left leg wound with healthy granulation. Most of the fascia is now covered. No purulent drainage. No crepitance  Sacral PS with eschar right of midline some new injury left.  No surrounding erythema, purulence.       Alb 1.7

## 2018-02-13 NOTE — PROGRESS NOTES
Problem: Self Care Deficits Care Plan (Adult)  Goal: *Acute Goals and Plan of Care (Insert Text)  1. Patient will complete self-grooming tasks with set up assistance and as needed adaptive equipment. 2. Patient will complete upper body bathing/dressing with set up assistance and as needed adaptive equipment. 3. Patient will complete rolling L<>R with SBA to decrease risk of skin breakdown. 4. Patient will complete supine to sit, sit to supine to with minimal assistance to increase quality of life. 5. Patient will tolerate sitting at EOB for 10 minutes with good balance for preADLs. 6. Patient will attempt standing for progress towards functional transfers. Timeframe: 7 visits     Comments:     OCCUPATIONAL THERAPY: Initial Assessment and AM 2/13/2018  INPATIENT: Hospital Day: 7  Payor: Thiago Jenkins / Plan: [address] [place] [address] HMO / Product Type: Managed Care Medicare /      NAME/AGE/GENDER: Mortimer Beal is a 79 y.o. male   PRIMARY DIAGNOSIS:  Sepsis due to pneumonia ([address] Sepsis due to pneumonia ([address] Sepsis due to pneumonia ([address]        ICD-10: Treatment Diagnosis:    · Generalized Muscle Weakness (M62.81)   Precautions/Allergies:     Review of patient's allergies indicates no known allergies. ASSESSMENT:     Mr. Irma Sanabria presents to hospital for above. Pt lives alone at baseline and is typically independent to mod I with ADLs. Pt admitted to hospital from SNF following extended stay at [facility]. Today, pt is supine in bed upon, AOX3, and pleasantly agreeable to OT evaluation. Dysarthria noted, most most speech is intelligible today. Pt is able to wash face with set up assistance. Pt completes rolling L<>R with min-mod A to reposition and change bedding. Pt has good insight to deficits and good awareness of need for safety today. However, he is functioning below his baseline and will require continued skilled OT services to maximize safety and independence with ADLs as pt lives alone. Will follow during acute care stay. This section established at most recent assessment   PROBLEM LIST (Impairments causing functional limitations):  1. Decreased Strength  2. Decreased ADL/Functional Activities  3. Decreased Transfer Abilities  4. Decreased Ambulation Ability/Technique  5. Decreased Balance  6. Increased Pain  7. Decreased Activity Tolerance  8. Decreased Skin Integrity/Hygeine   INTERVENTIONS PLANNED: (Benefits and precautions of occupational therapy have been discussed with the patient.)  1. Activities of daily living training  2. Adaptive equipment training  3. Balance training  4. Clothing management  5. Donning&doffing training  6. Therapeutic activity  7. Therapeutic exercise     TREATMENT PLAN: Frequency/Duration: Follow patient 3x/week to address above goals. Rehabilitation Potential For Stated Goals: Fair     RECOMMENDED REHABILITATION/EQUIPMENT: (at time of discharge pending progress): Due to the probability of continued deficits (see above) this patient will likely need continued skilled occupational therapy after discharge. Equipment:    None at this time              OCCUPATIONAL PROFILE AND HISTORY:   History of Present Injury/Illness (Reason for Referral):  See H&P  Past Medical History/Comorbidities:   Mr. Aaron Viera  has a past medical history of COPD (chronic obstructive pulmonary disease) ([facility] [address] Dementia; Diabetes ([facility] [address] and Necrotizing fasciitis ([facility] [address] Mr. Aaron Viera  has no past surgical history on file.   Social History/Living Environment:   Home Environment: Skilled nursing facility ([facility])  [address] [place] [address] Mahendra Name: Urvashi Mccloud  # Steps to Enter: 0  One/Two Story Residence: One story  Living Alone: No  Support Systems: Skilled nursing facility  Patient Expects to be Discharged to[de-identified] Skilled nursing facility  Current DME Used/Available at Home: None  Prior Level of Function/Work/Activity:  [place] to mod I with ADLs prior to hospitalization   Personal Factors:          Sex: male        Age:  79 y.o. Number of Personal Factors/Comorbidities that affect the Plan of Care: Expanded review of therapy/medical records (1-2):  MODERATE COMPLEXITY   ASSESSMENT OF OCCUPATIONAL PERFORMANCE[de-identified]   Activities of Daily Living:           Basic ADLs (From Assessment) Complex ADLs (From Assessment)   Basic ADL  Feeding: Other (comment) (peg tube)  Oral Facial Hygiene/Grooming: Minimum assistance  Bathing: Moderate assistance  Upper Body Dressing: Minimum assistance  Lower Body Dressing: Maximum assistance  Toileting: Maximum assistance Instrumental ADL  Meal Preparation: Total assistance  Homemaking: Total assistance   Grooming/Bathing/Dressing Activities of Daily Living     Cognitive Retraining  Safety/Judgement: Awareness of environment; Insight into deficits                       Bed/Mat Mobility  Rolling: Moderate assistance;Minimum assistance  Scooting: Maximum assistance       Most Recent Physical Functioning:   Gross Assessment:  AROM: Within functional limits  Strength: Generally decreased, functional  Coordination: Generally decreased, functional  Sensation: Intact               Posture:  Posture (WDL): Exceptions to WDL  Posture Assessment: Forward head, Rounded shoulders  Balance:  Sitting: Impaired; With support Bed Mobility:  Rolling: Moderate assistance;Minimum assistance  Scooting: Maximum assistance  Wheelchair Mobility:     Transfers:                   Patient Vitals for the past 6 hrs:   BP SpO2 Pulse   02/13/18 0717 128/73 94 % 77   02/13/18 1052 127/77 95 % 85       Mental Status  Neurologic State: Alert  Orientation Level: Oriented to person, Oriented to place, Oriented to situation (Oriented to month and day but not year)  Cognition: Follows commands  Perception: Appears intact  Perseveration: No perseveration noted  Safety/Judgement: Awareness of environment, Insight into deficits                          Physical Skills Involved:  1. Balance  2. Strength  3.  Activity Tolerance  4. Pain (acute)  5. Skin Integrity Cognitive Skills Affected (resulting in the inability to perform in a timely and safe manner):  1. Executive Function Psychosocial Skills Affected:  1. Habits/Routines  2. Environmental Adaptation   Number of elements that affect the Plan of Care: 5+:  HIGH COMPLEXITY   CLINICAL DECISION MAKIN[address] [place] [address] AM-PAC 6 Clicks   Daily Activity Inpatient Short Form  How much help from another person does the patient currently need. .. Total A Lot A Little None   1. Putting on and taking off regular lower body clothing? [] 1   [x] 2   [] 3   [] 4   2. Bathing (including washing, rinsing, drying)? [] 1   [x] 2   [] 3   [] 4   3. Toileting, which includes using toilet, bedpan or urinal?   [] 1   [x] 2   [] 3   [] 4   4. Putting on and taking off regular upper body clothing? [] 1   [] 2   [x] 3   [] 4   5. Taking care of personal grooming such as brushing teeth? [] 1   [] 2   [x] 3   [] 4   6. Eating meals? [] 1   [] 2   [] 3   [x] 4   © , Trustees of [address] [place] [address], under license to [employer]. All rights reserved      Score:  Initial: 16 Most Recent: X (Date: -- )    Interpretation of Tool:  Represents activities that are increasingly more difficult (i.e. Bed mobility, Transfers, Gait). Score 24 23 22-20 19-15 14-10 9-7 6     Modifier CH CI CJ CK CL CM CN      ? Self Care:     - CURRENT STATUS: CK - 40%-59% impaired, limited or restricted    - GOAL STATUS: CJ - 20%-39% impaired, limited or restricted    - D/C STATUS:  ---------------To be determined---------------  Payor: Jason Cook / Plan: [address] [place] [address] HMO / Product Type: Managed Care Medicare /      Medical Necessity:     · Patient is expected to demonstrate progress in strength, balance, coordination and functional technique to increase independence with ADLs.   Reason for Services/Other Comments:  · Patient continues to require skilled intervention due to medical complications and patient unable to attend/participate in therapy as expected. Use of outcome tool(s) and clinical judgement create a POC that gives a: MODERATE COMPLEXITY         TREATMENT:   (In addition to Assessment/Re-Assessment sessions the following treatments were rendered)     Pre-treatment Symptoms/Complaints:    Pain: Initial:   Pain Intensity 1: 0  Post Session:  same     Assessment/Reassessment only, no treatment provided today    Braces/Orthotics/Lines/Etc:   · IV  · kathleen catheter  · peg tube  · O2 Device: Nasal cannula  Treatment/Session Assessment:    · Response to Treatment:  eval only   · Interdisciplinary Collaboration:   o Physical Therapist  o Occupational Therapist  o Registered Nurse  · After treatment position/precautions:   o with PT   · Compliance with Program/Exercises: compliant all of the time. · Recommendations/Intent for next treatment session: \"Next visit will focus on advancements to more challenging activities and reduction in assistance provided\".   Total Treatment Duration:  OT Patient Time In/Time Out  Time In: 0935  Time Out: 88 Sofia Verdin

## 2018-02-13 NOTE — PROGRESS NOTES
Problem: Falls - Risk of  Goal: *Absence of Falls  Document Larry Fall Risk and appropriate interventions in the flowsheet.    Outcome: Progressing Towards Goal  Fall Risk Interventions:  Mobility Interventions: Bed/chair exit alarm, Communicate number of staff needed for ambulation/transfer, Patient to call before getting OOB    Mentation Interventions: Bed/chair exit alarm, Door open when patient unattended, More frequent rounding    Medication Interventions: Bed/chair exit alarm, Patient to call before getting OOB    Elimination Interventions: Call light in reach, Bed/chair exit alarm, Toileting schedule/hourly rounds

## 2018-02-14 LAB
GLUCOSE BLD STRIP.AUTO-MCNC: 100 MG/DL (ref 65–100)
GLUCOSE BLD STRIP.AUTO-MCNC: 105 MG/DL (ref 65–100)
GLUCOSE BLD STRIP.AUTO-MCNC: 78 MG/DL (ref 65–100)
GLUCOSE BLD STRIP.AUTO-MCNC: 85 MG/DL (ref 65–100)
GLUCOSE BLD STRIP.AUTO-MCNC: 94 MG/DL (ref 65–100)

## 2018-02-14 PROCEDURE — 74011250637 HC RX REV CODE- 250/637: Performed by: INTERNAL MEDICINE

## 2018-02-14 PROCEDURE — 74011000302 HC RX REV CODE- 302: Performed by: INTERNAL MEDICINE

## 2018-02-14 PROCEDURE — C9113 INJ PANTOPRAZOLE SODIUM, VIA: HCPCS | Performed by: INTERNAL MEDICINE

## 2018-02-14 PROCEDURE — 77010033678 HC OXYGEN DAILY

## 2018-02-14 PROCEDURE — 86580 TB INTRADERMAL TEST: CPT | Performed by: INTERNAL MEDICINE

## 2018-02-14 PROCEDURE — 97530 THERAPEUTIC ACTIVITIES: CPT

## 2018-02-14 PROCEDURE — 74011000258 HC RX REV CODE- 258: Performed by: INTERNAL MEDICINE

## 2018-02-14 PROCEDURE — 77030018798 HC PMP KT ENTRL FED COVD -A

## 2018-02-14 PROCEDURE — 77030019605

## 2018-02-14 PROCEDURE — 82962 GLUCOSE BLOOD TEST: CPT

## 2018-02-14 PROCEDURE — 74011250636 HC RX REV CODE- 250/636: Performed by: INTERNAL MEDICINE

## 2018-02-14 PROCEDURE — 65270000029 HC RM PRIVATE

## 2018-02-14 PROCEDURE — 94760 N-INVAS EAR/PLS OXIMETRY 1: CPT

## 2018-02-14 RX ORDER — METOCLOPRAMIDE HYDROCHLORIDE 5 MG/ML
5 INJECTION INTRAMUSCULAR; INTRAVENOUS EVERY 6 HOURS
Status: DISCONTINUED | OUTPATIENT
Start: 2018-02-14 | End: 2018-02-15 | Stop reason: HOSPADM

## 2018-02-14 RX ADMIN — FLUCONAZOLE 200 MG: 100 TABLET ORAL at 08:15

## 2018-02-14 RX ADMIN — Medication 10 ML: at 17:47

## 2018-02-14 RX ADMIN — Medication 1 AMPULE: at 23:02

## 2018-02-14 RX ADMIN — ENOXAPARIN SODIUM 40 MG: 40 INJECTION SUBCUTANEOUS at 23:00

## 2018-02-14 RX ADMIN — Medication 2 MG: at 16:16

## 2018-02-14 RX ADMIN — Medication 10 ML: at 16:16

## 2018-02-14 RX ADMIN — PANTOPRAZOLE SODIUM 40 MG: 40 INJECTION, POWDER, FOR SOLUTION INTRAVENOUS at 12:20

## 2018-02-14 RX ADMIN — TUBERCULIN PURIFIED PROTEIN DERIVATIVE 5 UNITS: 5 INJECTION, SOLUTION INTRADERMAL at 12:20

## 2018-02-14 RX ADMIN — ASPIRIN 81 MG 81 MG: 81 TABLET ORAL at 08:15

## 2018-02-14 RX ADMIN — METOCLOPRAMIDE 5 MG: 5 INJECTION, SOLUTION INTRAMUSCULAR; INTRAVENOUS at 17:47

## 2018-02-14 RX ADMIN — PIPERACILLIN SODIUM,TAZOBACTAM SODIUM 4.5 G: 4; .5 INJECTION, POWDER, FOR SOLUTION INTRAVENOUS at 06:22

## 2018-02-14 RX ADMIN — PIPERACILLIN SODIUM,TAZOBACTAM SODIUM 4.5 G: 4; .5 INJECTION, POWDER, FOR SOLUTION INTRAVENOUS at 14:18

## 2018-02-14 RX ADMIN — Medication 1 AMPULE: at 08:14

## 2018-02-14 RX ADMIN — METOCLOPRAMIDE 5 MG: 5 INJECTION, SOLUTION INTRAMUSCULAR; INTRAVENOUS at 14:17

## 2018-02-14 RX ADMIN — METOCLOPRAMIDE 5 MG: 5 INJECTION, SOLUTION INTRAMUSCULAR; INTRAVENOUS at 23:38

## 2018-02-14 RX ADMIN — Medication 10 ML: at 14:18

## 2018-02-14 NOTE — PROGRESS NOTES
Problem: Self Care Deficits Care Plan (Adult)  Goal: *Acute Goals and Plan of Care (Insert Text)  1. Patient will complete self-grooming tasks with set up assistance and as needed adaptive equipment. 2. Patient will complete upper body bathing/dressing with set up assistance and as needed adaptive equipment. 3. Patient will complete rolling L<>R with SBA to decrease risk of skin breakdown. 4. Patient will complete supine to sit, sit to supine to with minimal assistance to increase quality of life. 5. Patient will tolerate sitting at EOB for 10 minutes with good balance for preADLs. 6. Patient will attempt standing for progress towards functional transfers. Timeframe: 7 visits     Comments:     OCCUPATIONAL THERAPY: Daily Note, Treatment Day: 1st and AM 2/14/2018  INPATIENT: Hospital Day: 8  Payor: Consuelo Wilmer / Plan: [address] [place] [address] HMO / Product Type: Managed Care Medicare /      NAME/AGE/GENDER: Cuong Robledo is a 79 y.o. male   PRIMARY DIAGNOSIS:  Sepsis due to pneumonia ([address] Sepsis due to pneumonia ([address] Sepsis due to pneumonia ([address]        ICD-10: Treatment Diagnosis:    · Generalized Muscle Weakness (M62.81)   Precautions/Allergies:     Review of patient's allergies indicates no known allergies. ASSESSMENT:     Mr. Nikki Beck presents to hospital for above. Pt lives alone at baseline and is typically independent to mod I with ADLs. Pt admitted to hospital from SNF following extended stay at [facility]. Today, pt is supine in bed upon arrival, slightly more confused today, reporting he went to [facility] this AM, but agreeable to OT tx session with encouragement. Pt completes rolling towards L with min-mod A x1, and supine to sit with mod A x2. Pt initially demonstrates poor balance in sitting, eventually improving to fair with additional cueing. Pt is able to sit at EOB for ~10 minutes, thus progressing towards goal #5. He was left at EOB for PT tx session.  Pt gives great effort and is appreciative of therapy visit. Will continue with written goals and POC. This section established at most recent assessment   PROBLEM LIST (Impairments causing functional limitations):  1. Decreased Strength  2. Decreased ADL/Functional Activities  3. Decreased Transfer Abilities  4. Decreased Ambulation Ability/Technique  5. Decreased Balance  6. Increased Pain  7. Decreased Activity Tolerance  8. Decreased Skin Integrity/Hygeine   INTERVENTIONS PLANNED: (Benefits and precautions of occupational therapy have been discussed with the patient.)  1. Activities of daily living training  2. Adaptive equipment training  3. Balance training  4. Clothing management  5. Donning&doffing training  6. Therapeutic activity  7. Therapeutic exercise     TREATMENT PLAN: Frequency/Duration: Follow patient 3x/week to address above goals. Rehabilitation Potential For Stated Goals: Fair     RECOMMENDED REHABILITATION/EQUIPMENT: (at time of discharge pending progress): Due to the probability of continued deficits (see above) this patient will likely need continued skilled occupational therapy after discharge. Equipment:    None at this time              OCCUPATIONAL PROFILE AND HISTORY:   History of Present Injury/Illness (Reason for Referral):  See H&P  Past Medical History/Comorbidities:   Mr. Cirilo Aviles  has a past medical history of COPD (chronic obstructive pulmonary disease) ([facility] Dementia; Diabetes ([facility] and Necrotizing fasciitis ([facility] Mr. Cirilo Aviles  has no past surgical history on file.   Social History/Living Environment:   Home Environment: Skilled nursing facility ([facility])  [address] [place] [address] Mahendra Name: Silvia Harry  # Steps to Enter: 0  One/Two Story Residence: One story  Living Alone: No  Support Systems: Skilled nursing facility  Patient Expects to be Discharged to[de-identified] Skilled nursing facility  Current DME Used/Available at Home: None  Prior Level of Function/Work/Activity:  [place] to mod I with ADLs prior to hospitalization Personal Factors:          Sex:  male        Age:  79 y.o. Number of Personal Factors/Comorbidities that affect the Plan of Care: Expanded review of therapy/medical records (1-2):  MODERATE COMPLEXITY   ASSESSMENT OF OCCUPATIONAL PERFORMANCE[de-identified]   Activities of Daily Living:           Basic ADLs (From Assessment) Complex ADLs (From Assessment)   Basic ADL  Feeding: Other (comment) (peg tube)  Oral Facial Hygiene/Grooming: Minimum assistance  Bathing: Moderate assistance  Upper Body Dressing: Minimum assistance  Lower Body Dressing: Maximum assistance  Toileting: Maximum assistance Instrumental ADL  Meal Preparation: Total assistance  Homemaking: Total assistance   Grooming/Bathing/Dressing Activities of Daily Living     Cognitive Retraining  Safety/Judgement: Fall prevention;Decreased awareness of need for safety;Decreased awareness of need for assistance                       Bed/Mat Mobility  Supine to Sit: Moderate assistance;Assist x2  Sit to Supine: Moderate assistance  Scooting: Maximum assistance       Most Recent Physical Functioning:   Gross Assessment:  AROM: Within functional limits  Strength: Generally decreased, functional  Coordination: Generally decreased, functional  Sensation: Intact               Posture:  Posture (WDL): Exceptions to WDL  Posture Assessment: Forward head, Rounded shoulders  Balance:  Sitting: Impaired  Sitting - Static: Fair (occasional)  Sitting - Dynamic: Fair (occasional) (-)  Standing:  (unable to assess) Bed Mobility:  Supine to Sit: Moderate assistance;Assist x2  Sit to Supine:  Moderate assistance  Scooting: Maximum assistance  Wheelchair Mobility:     Transfers:                   Patient Vitals for the past 6 hrs:   BP BP Patient Position SpO2 O2 Flow Rate (L/min) Pulse   02/14/18 0716 106/68 At rest 96 % - 69   02/14/18 1127 - - 94 % 1 l/min -   02/14/18 1141 106/69 - 99 % - (!) 55       Mental Status  Neurologic State: Alert  Orientation Level: Oriented to person, Oriented to place  Cognition: Follows commands  Perception: Appears intact  Perseveration: No perseveration noted  Safety/Judgement: Fall prevention, Decreased awareness of need for safety, Decreased awareness of need for assistance                          Physical Skills Involved:  1. Balance  2. Strength  3. Activity Tolerance  4. Pain (acute)  5. Skin Integrity Cognitive Skills Affected (resulting in the inability to perform in a timely and safe manner):  1. Executive Function Psychosocial Skills Affected:  1. Habits/Routines  2. Environmental Adaptation   Number of elements that affect the Plan of Care: 5+:  HIGH COMPLEXITY   CLINICAL DECISION MAKIN[address] [place] [address] AM-PAC 6 Clicks   Daily Activity Inpatient Short Form  How much help from another person does the patient currently need. .. Total A Lot A Little None   1. Putting on and taking off regular lower body clothing? [] 1   [x] 2   [] 3   [] 4   2. Bathing (including washing, rinsing, drying)? [] 1   [x] 2   [] 3   [] 4   3. Toileting, which includes using toilet, bedpan or urinal?   [] 1   [x] 2   [] 3   [] 4   4. Putting on and taking off regular upper body clothing? [] 1   [] 2   [x] 3   [] 4   5. Taking care of personal grooming such as brushing teeth? [] 1   [] 2   [x] 3   [] 4   6. Eating meals? [] 1   [] 2   [] 3   [x] 4   © , Trustees of [address] [place] [address], under license to [employer]. All rights reserved      Score:  Initial: 16 Most Recent: X (Date: -- )    Interpretation of Tool:  Represents activities that are increasingly more difficult (i.e. Bed mobility, Transfers, Gait). Score 24 23 22-20 19-15 14-10 9-7 6     Modifier CH CI CJ CK CL CM CN      ?  Self Care:     - CURRENT STATUS: CK - 40%-59% impaired, limited or restricted    - GOAL STATUS: CJ - 20%-39% impaired, limited or restricted    - D/C STATUS:  ---------------To be determined---------------  Payor: Pili  / Plan: Lyric Sanchez MEDICARE HMO / Product Type: Managed Care Medicare /      Medical Necessity:     · Patient is expected to demonstrate progress in strength, balance, coordination and functional technique to increase independence with ADLs. Reason for Services/Other Comments:  · Patient continues to require skilled intervention due to medical complications and patient unable to attend/participate in therapy as expected. Use of outcome tool(s) and clinical judgement create a POC that gives a: MODERATE COMPLEXITY         TREATMENT:   (In addition to Assessment/Re-Assessment sessions the following treatments were rendered)     Pre-treatment Symptoms/Complaints:    Pain: Initial:   Pain Intensity 1: 0  Post Session:  same     Therapeutic Activity: (    12 minutes): Therapeutic activities including Bed transfers and sitting tolerance/balance to improve mobility, strength and balance. Required moderate assistance   to promote static balance in sitting. Braces/Orthotics/Lines/Etc:   · IV  · kathleen catheter  · peg tube  · O2 Device: Nasal cannula  Treatment/Session Assessment:    · Response to Treatment:  Tolerated well w/o complications   · Interdisciplinary Collaboration:   o Physical Therapist  o Occupational Therapist  o Registered Nurse  · After treatment position/precautions:   o with PT   · Compliance with Program/Exercises: compliant all of the time today. · Recommendations/Intent for next treatment session: \"Next visit will focus on advancements to more challenging activities and reduction in assistance provided\".   Total Treatment Duration:  OT Patient Time In/Time Out  Time In: 0910  Time Out: Manny Abraham

## 2018-02-14 NOTE — PROGRESS NOTES
Hospitalist Progress Note    2018  Admit Date: 2018  7:41 PM   NAME: Ceci Lovett   :  1950   MRN:  [id number]   Attending: Hoang Gómez MD  PCP:  None    SUBJECTIVE:     Ceci Lovett is a 34CZH with TBI, DM2, chronic indwelling kathleen, PEG who was admitted from SNF with sepsis 2/2 RLL pneumonia, suspect aspiration. Also with sacral decub ulcer, which is chronic. Has been seen by plastic surgery and not currently a surgical candidate given his poor nutritional status. : seen sitting at bedside with PT. Some increased cough with productive sputum since sitting up. TFs at goal, no abd pain or nausea. Review of Systems negative with exception of pertinent positives noted above      PHYSICAL EXAM       Visit Vitals    /68 (BP 1 Location: Right arm, BP Patient Position: At rest)    Pulse 69    Temp 98.5 °F (36.9 °C)    Resp 18    Ht 5' 11\" (1.803 m)    Wt 63.5 kg (140 lb 1.6 oz)    SpO2 96%    BMI 19.54 kg/m2      Temp (24hrs), Av.1 °F (36.7 °C), Min:97.6 °F (36.4 °C), Max:98.5 °F (36.9 °C)    Oxygen Therapy  O2 Sat (%): 96 % (18 0716)  Pulse via Oximetry: 94 beats per minute (18 0855)  O2 Device: Nasal cannula (18 0855)  O2 Flow Rate (L/min): 1 l/min (18 0856)    Intake/Output Summary (Last 24 hours) at 18 0935  Last data filed at 18 0828   Gross per 24 hour   Intake             9760 ml   Output             2525 ml   Net             7235 ml          General: No acute distress. Head:  Atraumatic Normocephalic. Eyes:  Anicteric.   Lungs:  More coarse at R base, normal resp effort on 2L  CVS:  Regular rate and rhythm, no BLE edema  Abdomen: Soft, NTTP, colostomy bag  Skin:   R lateral thigh with extensive area of debridement from hip to above knee      Recent Results (from the past 24 hour(s))   GLUCOSE, POC    Collection Time: 18 10:52 AM   Result Value Ref Range    Glucose (POC) 84 65 - 100 mg/dL   GLUCOSE, POC    Collection Time: 02/13/18  4:11 PM   Result Value Ref Range    Glucose (POC) 71 65 - 100 mg/dL   GLUCOSE, POC    Collection Time: 02/13/18  8:48 PM   Result Value Ref Range    Glucose (POC) 73 65 - 100 mg/dL   GLUCOSE, POC    Collection Time: 02/13/18  9:56 PM   Result Value Ref Range    Glucose (POC) 74 65 - 100 mg/dL   GLUCOSE, POC    Collection Time: 02/14/18  1:53 AM   Result Value Ref Range    Glucose (POC) 78 65 - 100 mg/dL   GLUCOSE, POC    Collection Time: 02/14/18  7:19 AM   Result Value Ref Range    Glucose (POC) 100 65 - 100 mg/dL         Imaging /Procedures /Studies   XR ABD (KUB)   Final Result   IMPRESSION: As above      XR CHEST SNGL V   Final Result   IMPRESSION:    1.  Stable findings of the chest as described above. XR CHEST SNGL V   Final Result   IMPRESSION:  New bibasilar infiltrates. XR ABD (KUB)   Final Result   IMPRESSION: No findings to suggest free air or obstruction. XR CHEST PORT   Final Result   IMPRESSION: Right lower lobe atelectasis or pneumonia.                   ASSESSMENT      Hospital Problems as of 2/14/2018  Date Reviewed: 2/7/2018          Codes Class Noted - Resolved POA    Dysphagia ICD-10-CM: R13.10  ICD-9-CM: 787.20  2/13/2018 - Present Yes        COPD (chronic obstructive pulmonary disease) ([facility] ICD-10-CM: J44.9  ICD-9-CM: 407  2/13/2018 - Present Yes        PEG (percutaneous endoscopic gastrostomy) status ([facility] ICD-10-CM: Z93.1  ICD-9-CM: V44.1  2/1/2018 - Present Yes        Candida UTI ICD-10-CM: B37.49  ICD-9-CM: 112.2  1/31/2018 - Present Yes        Aspiration pneumonia ([facility] ICD-10-CM: J69.0  ICD-9-CM: 507.0  1/30/2018 - Present Yes        * (Principal)Sepsis due to pneumonia [facility]) ICD-10-CM: J18.9, A41.9  ICD-9-CM: 639, 995.91  1/30/2018 - Present Unknown        Sacral ulcer ([facility] (Chronic) ICD-10-CM: X53.203  ICD-9-CM: 707.8  1/30/2018 - Present Yes        Type II diabetes mellitus with complication ([facility] (Chronic) ICD-10-CM: E11.8  ICD-9-CM: 250.90  12/1/2017 - Present Yes                  Plan:  - Severe Sepsis secondary to pneumonia, UTI and sacral ulcer:   CXR with RLL pneumona, LA 4.2 on admission. Continue zosyn, day 8/8  WBC normalized. - HCAP:   Recent discharge. Likely aspiration pneumonia. Cont Zosyn, day 8  Blood Cx NGTD. Repeat CXR with signs of right lung atelectasis  Continue incentive spirometry.     - UTI:   UA positive. However, pt has kathleen. U Cx pos for candida. Likely due to chronic kathleen and represents candiduria, however, started on Fluconazole, day 2/14    - DM2:   Holding lantus until BS improves  SSI ACHS     - Recent gangrenous necrotizing faciitis:   Wound appears to be stable. Plastic surgery saw inpatient and will re-eval as ab outpatient once nutritional status improved     - Sacral pressure Ulcer:   Unstageable. Plastic surgery recommendations appreciated --> hold off debridement until optimal medical condition reached and acute illness resolve. - Dysphagia, tube feeding:   MBS recent previous admission, continue NPO. Hx of TBI. Cont tube feeding. Dietitian following. Abd xray with no signs of obstruction or free air.      - Dementia:   stable     - TBI:  predisposed to aspiration, continue with PEG feeding. Cont NPO.      - Hx of COPD:   Not in exacerbation  Added tessalon prn to PEG     - Anemia:   Stable, Monitor Hb.      - Hypokalemia:   Replace prn through PEG tube.        DVT Prophylaxis: Lovenox  Dispo: stable for DC to STR today, awaiting insurance pre-cert    Iris Forrester MD

## 2018-02-14 NOTE — PROGRESS NOTES
Problem: Nutrition Deficit  Goal: *Optimize nutritional status  Nutrition:  Reason for assessment: TF management f/u  Assessment:   Food/Nutrition History: The patient's TF was held on 2/12 d/t pt reported emesis and elevated GRV of 300 cc. TF was restarted and is currently at goal with GRVs of 100, 150, 200 cc x last 3 checks. Per RN, they can not reposition the patient to the R side d/t wounds. I suggested initiation of Reglan if GRVs continue to be elevated. Abdomen with active bowel sounds (2/13). Date of Last BM: 2/13-recorded  Pertinent Medications: SSI (none given x 3 days), Zosyn  Anthropometrics: Height 511 (1.803 m), weight 63.5 kg (2/13, wt source: unknown/7th floor)  Macronutrient needs:  EER:  3799-5536 kcal /day (30-35kcal/kg  ABW)  EPR:   grams protein/day (1.5-2.0 grams/kg ABW)  Max CHO:  282 grams/day (50% kcal)   Fluid:  1ml/kcal  Intake/Comparative standards: TF of glucerna 1.2 @ goal rate of 70ml/hr with 37 ml water q 1hr to provide 2016 kcal/day (100% of needs), 100.8 grams protein/day (100% of needs), 193 grams CHO/day (does not exceed max CHO),  and ~ 1411 ml free water/day for a total of 2299 ml fluid/day (100% of needs).    Intervention:  Meals and snacks: NPO  EN:Continue TF of glucerna 1.2 @ goal rate of 70ml/hr with 37 ml water q 1hr to provide 2016 kcal/day (100% of needs), 100.8 grams protein/day (100% of needs), 193 grams CHO/day (does not exceed max CHO),  and ~ 1411 ml free water/day for a total of 2299 ml fluid/day (100% of needs).   Nutrition related medication recommendation: Recommend initiation of Reglan if patient continues to have elevated GRVs  Coordination of care: Phong Ross RN  Nutrition Discharge Plan: Continue TF via PEG at discharge.     Reji Robertson [address], [address], [phone]

## 2018-02-14 NOTE — PROGRESS NOTES
Problem: Falls - Risk of  Goal: *Absence of Falls  Document Larry Fall Risk and appropriate interventions in the flowsheet.    Outcome: Progressing Towards Goal  Fall Risk Interventions:  Mobility Interventions: Bed/chair exit alarm, Communicate number of staff needed for ambulation/transfer    Mentation Interventions: Bed/chair exit alarm, Door open when patient unattended    Medication Interventions: Bed/chair exit alarm, Patient to call before getting OOB    Elimination Interventions: Call light in reach, Bed/chair exit alarm, Toileting schedule/hourly rounds

## 2018-02-15 VITALS
WEIGHT: 135.9 LBS | BODY MASS INDEX: 19.02 KG/M2 | TEMPERATURE: 98 F | OXYGEN SATURATION: 96 % | HEART RATE: 71 BPM | SYSTOLIC BLOOD PRESSURE: 108 MMHG | HEIGHT: 71 IN | RESPIRATION RATE: 15 BRPM | DIASTOLIC BLOOD PRESSURE: 68 MMHG

## 2018-02-15 LAB
GLUCOSE BLD STRIP.AUTO-MCNC: 118 MG/DL (ref 65–100)
GLUCOSE BLD STRIP.AUTO-MCNC: 152 MG/DL (ref 65–100)

## 2018-02-15 PROCEDURE — 74011636637 HC RX REV CODE- 636/637: Performed by: INTERNAL MEDICINE

## 2018-02-15 PROCEDURE — 97530 THERAPEUTIC ACTIVITIES: CPT

## 2018-02-15 PROCEDURE — 74011250636 HC RX REV CODE- 250/636: Performed by: INTERNAL MEDICINE

## 2018-02-15 PROCEDURE — 82962 GLUCOSE BLOOD TEST: CPT

## 2018-02-15 PROCEDURE — 94760 N-INVAS EAR/PLS OXIMETRY 1: CPT

## 2018-02-15 PROCEDURE — 74011250637 HC RX REV CODE- 250/637: Performed by: INTERNAL MEDICINE

## 2018-02-15 PROCEDURE — 77010033678 HC OXYGEN DAILY

## 2018-02-15 PROCEDURE — C9113 INJ PANTOPRAZOLE SODIUM, VIA: HCPCS | Performed by: INTERNAL MEDICINE

## 2018-02-15 RX ORDER — TRAMADOL HYDROCHLORIDE 50 MG/1
50 TABLET ORAL
Qty: 20 TAB | Refills: 0 | Status: SHIPPED | OUTPATIENT
Start: 2018-02-15

## 2018-02-15 RX ORDER — METOCLOPRAMIDE 5 MG/1
10 TABLET ORAL
Qty: 60 TAB | Refills: 0 | Status: SHIPPED
Start: 2018-02-15 | End: 2018-02-25

## 2018-02-15 RX ORDER — HYDROMORPHONE HYDROCHLORIDE 2 MG/1
1 TABLET ORAL EVERY 6 HOURS
Qty: 10 TAB | Refills: 0 | Status: SHIPPED | OUTPATIENT
Start: 2018-02-15

## 2018-02-15 RX ADMIN — TRAMADOL HYDROCHLORIDE 50 MG: 50 TABLET, FILM COATED ORAL at 09:12

## 2018-02-15 RX ADMIN — PANTOPRAZOLE SODIUM 40 MG: 40 INJECTION, POWDER, FOR SOLUTION INTRAVENOUS at 12:26

## 2018-02-15 RX ADMIN — Medication 1 AMPULE: at 09:00

## 2018-02-15 RX ADMIN — FLUCONAZOLE 200 MG: 100 TABLET ORAL at 08:18

## 2018-02-15 RX ADMIN — INSULIN LISPRO 2 UNITS: 100 INJECTION, SOLUTION INTRAVENOUS; SUBCUTANEOUS at 12:26

## 2018-02-15 RX ADMIN — ASPIRIN 81 MG 81 MG: 81 TABLET ORAL at 08:18

## 2018-02-15 RX ADMIN — METOCLOPRAMIDE 5 MG: 5 INJECTION, SOLUTION INTRAMUSCULAR; INTRAVENOUS at 06:34

## 2018-02-15 RX ADMIN — METOCLOPRAMIDE 5 MG: 5 INJECTION, SOLUTION INTRAMUSCULAR; INTRAVENOUS at 12:28

## 2018-02-15 RX ADMIN — Medication 10 ML: at 06:36

## 2018-02-15 NOTE — PROGRESS NOTES
Patient scheduled for transport to [facility] via stretcher by [facility] ambulance today at 1600. Patient and daughter Vijay Iverson notified.       Care Management Interventions  Plan discussed with Pt/Family/Caregiver: Yes  Freedom of Choice Offered: Yes  Discharge Location  Discharge Placement: Rehab Unit Subacute ([facility])

## 2018-02-15 NOTE — DISCHARGE SUMMARY
Hospitalist Discharge Summary     Patient ID:  Kasia Quinones  [id number]  25 y.o.  1950  Admit date: 2/7/2018  7:41 PM  Discharge date and time: 2/15/2018  Attending: Kristene Gottron, MD  PCP:  None  Treatment Team: Attending Provider: Kristene Gottron, MD; Utilization Review: Estrada Bernstein RN; Consulting Provider: Savita Pike MD; Care Manager: Stefani Cardona RN    Principal Diagnosis Sepsis due to pneumonia [facility])   Hospital Problems as of 2/15/2018  Date Reviewed: 2/7/2018          Codes Class Noted - Resolved POA    Dysphagia ICD-10-CM: R13.10  ICD-9-CM: 787.20  2/13/2018 - Present Yes        COPD (chronic obstructive pulmonary disease) ([facility] ICD-10-CM: J44.9  ICD-9-CM: 161  2/13/2018 - Present Yes        PEG (percutaneous endoscopic gastrostomy) status ([facility] ICD-10-CM: Z93.1  ICD-9-CM: V44.1  2/1/2018 - Present Yes        Candida UTI ICD-10-CM: B37.49  ICD-9-CM: 112.2  1/31/2018 - Present Yes        Aspiration pneumonia ([facility] ICD-10-CM: J69.0  ICD-9-CM: 507.0  1/30/2018 - Present Yes        * (Principal)Sepsis due to pneumonia [facility]) ICD-10-CM: J18.9, A41.9  ICD-9-CM: 801, 995.91  1/30/2018 - Present Unknown        Sacral ulcer ([facility] (Chronic) ICD-10-CM: D57.058  ICD-9-CM: 707.8  1/30/2018 - Present Yes        Type II diabetes mellitus with complication ([facility] (Chronic) ICD-10-CM: E11.8  ICD-9-CM: 250.90  12/1/2017 - Present Yes              HPI: 67/M with PMH TBI, DM and indwelling kathleen catheter with PEG tube who presents from NH due to SOB and tachycardia. Patient was admitted one week ago for sepsis secondary to UTI and Aspiration PNA as well as gangrene of right LE. Patient was today found to be hypotensive with significant leukocytosis. He was given Zosyn en route and cultures were collected in the ED. He has been given Vancomycin. Lactic acid is also elevated. CXR shows RLL PNA. BP has improved with fluids.      Hospital Course: 33QJP with TBI, DM2, chronic indwelling kathleen, PEG who was admitted from SNF with sepsis 2/2 RLL pneumonia, suspect aspiration. Also with sacral decub ulcer, which is chronic. Has been seen by plastic surgery and not currently a surgical candidate given his poor nutritional status. Will follow up with Dr. Cirilo Zaidi as an outpatient. Has been hypoglycemic while inpatient, so his Lantus will continue to be held at discharge. Has had increased residuals with his TFs, so started reglan for 10 days to improve gastric motility. Has completed full course of abx at time of discharge. Will be strictly NPO with TFs at discharge. Significant Diagnostic Studies:   Labs: Results:       Chemistry Recent Labs      02/13/18   0605   GLU  78   NA  138   K  3.6   CL  105   CO2  24   BUN  7*   CREA  0.42*   CA  8.2*   AGAP  9      CBC w/Diff Recent Labs      02/13/18   0605   WBC  8.2   RBC  3.71*   HGB  9.8*   HCT  33.4*   PLT  380      Cardiac Enzymes No results for input(s): CPK, CKND1, MIGUEL in the last 72 hours. No lab exists for component: CKRMB, TROIP   Coagulation No results for input(s): PTP, INR, APTT in the last 72 hours. No lab exists for component: INREXT, INREXT    Lipid Panel No results found for: CHOL, CHOLPOCT, CHOLX, CHLST, CHOLV, 481934, HDL, LDL, LDLC, DLDLP, 574513, VLDLC, VLDL, TGLX, TRIGL, TRIGP, TGLPOCT, CHHD, CHHDX   BNP No results for input(s): BNPP in the last 72 hours. Liver Enzymes No results for input(s): TP, ALB, TBIL, AP, SGOT, GPT in the last 72 hours. No lab exists for component: DBIL   Thyroid Studies Lab Results   Component Value Date/Time    TSH 0.447 12/01/2017 03:45 PM            Discharge Exam:  Visit Vitals    /75 (BP 1 Location: Right arm, BP Patient Position: At rest)    Pulse 93    Temp 98 °F (36.7 °C)    Resp 17    Ht 5' 11\" (1.803 m)    Wt 61.6 kg (135 lb 14.4 oz)  Comment: pt no longer wearing prevalon boots; refuses to keep them on    SpO2 96%    BMI 18.95 kg/m2        General: No acute distress.   Head:  Atraumatic Normocephalic. Eyes:  Anicteric. Lungs: Some mild coarseness at R base, normal resp effort on 2L  CVS: Regular rate and rhythm, no BLE edema  Abdomen: Soft, NTTP, colostomy bag  Skin: R lateral thigh with extensive area of debridement from hip to above knee      Disposition: STR  Discharge Condition: stable  Patient Instructions:   Current Discharge Medication List      START taking these medications    Details   metoclopramide HCl (REGLAN) 5 mg tablet Take 2 Tabs by mouth Before breakfast, lunch, and dinner for 10 days. Qty: 60 Tab, Refills: 0         CONTINUE these medications which have CHANGED    Details   traMADol (ULTRAM) 50 mg tablet 1 Tab by Per G Tube route every six (6) hours as needed for Pain. Max Daily Amount: 200 mg. Qty: 20 Tab, Refills: 0    Associated Diagnoses: Skin ulcer of sacrum, unspecified ulcer stage ([facility])      HYDROmorphone (DILAUDID) 2 mg tablet Take 0.5 Tabs by mouth every six (6) hours. Max Daily Amount: 4 mg. Qty: 10 Tab, Refills: 0    Associated Diagnoses: Skin ulcer of sacrum, unspecified ulcer stage ([facility]         CONTINUE these medications which have NOT CHANGED    Details   guaiFENesin (MUCUS RELIEF) 400 mg tablet Take 800 mg by mouth every twelve (12) hours. aspirin 81 mg chewable tablet 81 mg by PEG Tube route daily. insulin lispro (HUMALOG) 100 unit/mL kwikpen 5 Units by SubCUTAneous route Before breakfast, lunch, and dinner. And sliding scale    Sliding Scale   <60 notify MD  <199 =0 units  200-249 = 2 units  250-299 = 4 units  300-349 = 6 units  350-399 = 8 units  400 -499= 10 units   >450 notify MD      senna (SENEXON) 8.6 mg tablet 1 Tab by PEG Tube route daily as needed for Constipation. ondansetron hcl (ZOFRAN, AS HYDROCHLORIDE,) 4 mg tablet 4 mg by PEG Tube route every six (6) hours as needed for Nausea. multivitamin, tx-iron-ca-min (THERA-M W/ IRON) 9 mg iron-400 mcg tab tablet 1 Tab by PEG Tube route daily.       lactobacillus-acidophilus (LACTINEX) 100 million cell grpk 1 Packet by PEG Tube route two (2) times a day.      hydrOXYzine pamoate (VISTARIL) 25 mg capsule 25 mg by PEG Tube route every six (6) hours as needed for Itching. Cholecalciferol, Vitamin D3, 50,000 unit cap Take 1 Cap by mouth every seven (7) days. albuterol (PROVENTIL VENTOLIN) 2.5 mg /3 mL (0.083 %) nebulizer solution 3 mL by Nebulization route every four (4) hours as needed for Wheezing. Qty: 1 Each, Refills: 0      pantoprazole (PROTONIX) 40 mg tablet Take 1 Tab by mouth Daily (before breakfast). Qty: 14 Tab, Refills: 0      sodium hypochlorite (QUARTER STRENGTH DAKIN'S) 0.125 % soln external solution Apply 473 mL to affected area daily. Qty: 1 Bottle, Refills: 0      Blood-Glucose Meter monitoring kit To check blood sugars 3 times per day - In AM and 2h after a meal. Please provide adequate supplies for 30 days ( lancets, strips, etc) as approved by his insurance  Qty: 1 Kit, Refills: 0      multivitamin (ONE A DAY) tablet Take 1 Tab by mouth daily. acetaminophen (MAPAP) 325 mg tablet 650 mg by PEG Tube route every eight (8) hours as needed for Pain.          STOP taking these medications       ciprofloxacin HCl (CIPRO) 500 mg tablet Comments:   Reason for Stopping:         metroNIDAZOLE (FLAGYL) 500 mg tablet Comments:   Reason for Stopping:         insulin glargine (LANTUS,BASAGLAR) 100 unit/mL (3 mL) inpn Comments:   Reason for Stopping:         insulin lispro (HUMALOG) 100 unit/mL injection Comments:   Reason for Stopping:         guaiFENesin ER (MUCINEX) 600 mg ER tablet Comments:   Reason for Stopping:               Activity: PT/OT Eval and Treat  Diet: PEG feeding at 70 ml/hr  Wound Care: Keep wound clean and dry and Reinforce dressing PRN    Follow-up  -   PCP in 1 week  -   Plastics (Dr. Edwin Cantor) as scheduled    Time spent to discharge patient: 35min    Signed:  Jose Hannon MD  2/15/2018  2:37 PM

## 2018-02-15 NOTE — PROGRESS NOTES
TRANSFER - OUT REPORT:    Verbal report given to Felice Feliz rN(name) on [facility]  being transferred to [place](unit) for routine progression of care       Report consisted of patients Situation, Background, Assessment and   Recommendations(SBAR). Information from the following report(s) SBAR, MAR and Recent Results was reviewed with the receiving nurse. Lines:   Peripheral IV 02/07/18 Left Wrist (Active)   Site Assessment Clean, dry, & intact 2/15/2018  3:18 AM   Phlebitis Assessment 0 2/15/2018  3:18 AM   Infiltration Assessment 0 2/15/2018  3:18 AM   Dressing Status Clean, dry, & intact 2/15/2018  3:18 AM   Dressing Type Tape;Transparent 2/15/2018  3:18 AM   Hub Color/Line Status Infusing 2/15/2018  3:18 AM   Action Taken Open ports on tubing capped 2/11/2018  9:23 AM   Alcohol Cap Used No 2/11/2018  9:23 AM       Peripheral IV 02/07/18 Left Antecubital (Active)   Site Assessment Clean, dry, & intact 2/15/2018  3:18 AM   Phlebitis Assessment 0 2/15/2018  3:18 AM   Infiltration Assessment 0 2/15/2018  3:18 AM   Dressing Status Clean, dry, & intact 2/15/2018  3:18 AM   Dressing Type Tape;Transparent 2/15/2018  3:18 AM   Hub Color/Line Status Capped 2/15/2018  3:18 AM   Action Taken Open ports on tubing capped 2/11/2018  9:23 AM   Alcohol Cap Used No 2/11/2018  9:23 AM        Opportunity for questions and clarification was provided.       Patient transported with:   O2 @ 1 liters

## 2018-02-15 NOTE — PROGRESS NOTES
Problem: Mobility Impaired (Adult and Pediatric)  Goal: *Acute Goals and Plan of Care (Insert Text)  STG:  (1.)Mr. Margarita Jose will roll bilaterally with MINIMAL ASSIST within 3 day(s). (2.)Mr. Margarita Jose will transfer supine to sitting with MODERATE ASSIST x2 within 3 day(s). (3.)Mr. Camacho demonstrate FAIR STATIC sitting balance within 3 day(s). (4.)Mr. Margarita Jose will transfer from bed to chair and chair to bed with MAXIMAL ASSIST x2 within 3 day(s). (5.)Mr. Margarita Jose will tolerate 15+ minutes of therapeutic activity/exercise while maintaining stable vitals to improve functional strength and mobility within 3 day(s). LTG:  (1.)Mr. Margarita Jose will roll bilaterally with CONTACT GUARD ASSIST within 7 day(s). (2.)Mr. Margarita Jose will transfer supine to sitting with MINIMAL ASSIST x2 within 7 day(s). (3.)Mr. Camacho demonstrate [employer] DYNAMIC sitting balance within 7 day(s). (4.)Mr. Margarita Jose will transfer sit to stand with MAXIMAL ASSISTANCE within 7 day(s). (5.)Mr. Margarita Jose will tolerate 25+ minutes of therapeutic activity/exercise while maintaining stable vitals to improve functional strength and mobility within 7 day(s). ________________________________________________________________________________________________      PHYSICAL THERAPY: Daily Note, Treatment Day: 2nd, AM 2/15/2018  INPATIENT: Hospital Day: 9  Payor: Lamont Rene / Plan: [address] [place] [address] HMO / Product Type: [employer] Medicare /      NAME/AGE/GENDER: Marco A Johnson is a 79 y.o. male   PRIMARY DIAGNOSIS: Sepsis due to pneumonia ([facility] Sepsis due to pneumonia ([facility] Sepsis due to pneumonia [facility])        ICD-10: Treatment Diagnosis:   · Generalized Muscle Weakness (M62.81)  · Other abnormalities of gait and mobility (R26.89)   Precaution/Allergies:  Review of patient's allergies indicates no known allergies. ASSESSMENT:     Mr. Margarita Jose is a 79year old male with history significant for necrotizing fascitis R LE and admitted with sepsis/pneumonia.   Pt agreeable to work with PT but adamantly stated he did not want to transfer to the chair. He stated he had to sit too long before he could get back to the bed. He sat to EOB with mod assist x 2. He sat ~15 min total. He required max assist initially to maintain sitting but after ~1min he was able to maintain with min to SBA. Addressed static and dynamic sitting balance and postural retraining at edge of bed. He performed bilateral LE ex partial range listed below. He performed sitting balance activities. He returned supine with SBA. Worked on bed mobility to position himself in bed but required maximal assist x 2 for scooting up in bed with cueing to bridge. Good progress today with bed mobility and sitting balance with patient increasing his mobility. Remains with decreased strength and functional activity tolerance. Continue with stated plan of care. This section established at most recent assessment   PROBLEM LIST (Impairments causing functional limitations):  1. Decreased Strength  2. Decreased ADL/Functional Activities  3. Decreased Transfer Abilities  4. Decreased Ambulation Ability/Technique  5. Decreased Balance  6. Decreased Activity Tolerance  7. Decreased Flexibility/Joint Mobility  8. Decreased [place] with Home Exercise Program   INTERVENTIONS PLANNED: (Benefits and precautions of physical therapy have been discussed with the patient.)  1. Balance Exercise  2. Bed Mobility  3. Family Education  4. Gait Training  5. Home Exercise Program (HEP)  6. Range of Motion (ROM)  7. Therapeutic Activites  8. Therapeutic Exercise/Strengthening  9. Transfer Training  10.  Group Therapy     TREATMENT PLAN: Frequency/Duration: 3 times a week for duration of hospital stay  Rehabilitation Potential For Stated Goals: Ирина Ambrosio REHABILITATION/EQUIPMENT: (at time of discharge pending progress): Due to the probability of continued deficits (see above) this patient will likely need continued skilled physical therapy after discharge. Equipment:    TBD              HISTORY:   History of Present Injury/Illness (Reason for Referral):  Weakness  Past Medical History/Comorbidities:   Mr. Ace  has a past medical history of COPD (chronic obstructive pulmonary disease) ([facility] Dementia; Diabetes ([facility] and Necrotizing fasciitis ([facility] Mr. Ace  has no past surgical history on file. Social History/Living Environment:   Home Environment: Skilled nursing facility (STR)  [address] [facility] [address] [facility]  # Steps to Enter: 0  One/Two Story Residence: One story  Living Alone: No  Support Systems: Skilled nursing facility  Patient Expects to be Discharged to[de-identified] Skilled nursing facility  Current DME Used/Available at Home: None  Prior Level of Function/Work/Activity:  Patient with recent hospital admission and s/p LTACH stay and current in STR at SNF where he was requiring assistance with bed mobility and transfers as well as ADLs. Number of Personal Factors/Comorbidities that affect the Plan of Care: 1-2: MODERATE COMPLEXITY   EXAMINATION:   Most Recent Physical Functioning:   Gross Assessment:                  Posture:     Balance:  Sitting: Impaired  Sitting - Static: Fair (occasional) (once balance is obtained)  Sitting - Dynamic: Fair (occasional) Montoya Cradle-) Bed Mobility:  Supine to Sit: Moderate assistance;Assist x2  Sit to Supine: Stand-by asssistance  Scooting: Total assistance  Wheelchair Mobility:     Transfers:     Gait:            Body Structures Involved:  1. Bones  2. Joints  3. Muscles Body Functions Affected:  1. Neuromusculoskeletal  2. Movement Related Activities and Participation Affected:  1. Mobility  2.  Self Care   Number of elements that affect the Plan of Care: 4+: HIGH COMPLEXITY   CLINICAL PRESENTATION:   Presentation: Evolving clinical presentation with changing clinical characteristics: MODERATE COMPLEXITY   CLINICAL DECISION MAKIN [facility] Mobility Inpatient Short Form  How much difficulty does the patient currently have. .. Unable A Lot A Little None   1. Turning over in bed (including adjusting bedclothes, sheets and blankets)? [] 1   [x] 2   [] 3   [] 4   2. Sitting down on and standing up from a chair with arms ( e.g., wheelchair, bedside commode, etc.)   [x] 1   [] 2   [] 3   [] 4   3. Moving from lying on back to sitting on the side of the bed? [] 1   [x] 2   [] 3   [] 4   How much help from another person does the patient currently need. .. Total A Lot A Little None   4. Moving to and from a bed to a chair (including a wheelchair)? [x] 1   [] 2   [] 3   [] 4   5. Need to walk in hospital room? [x] 1   [] 2   [] 3   [] 4   6. Climbing 3-5 steps with a railing? [x] 1   [] 2   [] 3   [] 4   © 2007, Trustees of [address] [place] [address], under license to [employer]. All rights reserved      Score:  Initial: 8 Most Recent: 8 (Date: 2/13/18 )    Interpretation of Tool:  Represents activities that are increasingly more difficult (i.e. Bed mobility, Transfers, Gait). Score 24 23 22-20 19-15 14-10 9-7 6     Modifier CH CI CJ CK CL CM CN      ? Mobility - Walking and Moving Around:     - CURRENT STATUS: CM - 80%-99% impaired, limited or restricted    - GOAL STATUS: CL - 60%-79% impaired, limited or restricted    - D/C STATUS:  ---------------To be determined---------------  Payor: [employer] MEDICARE / Plan: [address] [place] [address] HMO / Product Type: Managed Care Medicare /      Medical Necessity:     · Patient demonstrates good rehab potential due to higher previous functional level. Reason for Services/Other Comments:  · Patient continues to require skilled intervention due to decreased functional mobility and balance/gait status from baseline. .   Use of outcome tool(s) and clinical judgement create a POC that gives a: Questionable prediction of patient's progress: MODERATE COMPLEXITY            TREATMENT:   (In addition to Assessment/Re-Assessment sessions the following treatments were rendered)   Pre-treatment Symptoms/Complaints:    Pain: Initial:      Post Session:  0/10     Therapeutic Activity: (    25 Minutes): Therapeutic activities including bed mobility, sit to supine, static/dyanmic sitting balance, LE ex and postural retraining to improve mobility, strength and balance. Required minimal assistance   to promote static and dynamic balance in sitting and promote coordination of bilateral, lower extremity(s) once obtained     Date:  2/15/18 Date:   Date:     Activity/Exercise Seated Parameters Parameters Parameters   Heel raises X 10 B     Toe raises X 10 B     LAQ's X 10 B     Hip Flex X 5 B     Hip ABD                        . Braces/Orthotics/LELines/Etc:   · IV  · kathleen catheter  · PEG  · O2 Device: Nasal cannula  Treatment/Session Assessment:    · Response to Treatment:  See above. · Interdisciplinary Collaboration:   o Physical Therapy Assistant  o Registered Nurse  o Rehabilitation Attendant  · After treatment position/precautions:   o Supine in bed  o Bed/Chair-wheels locked  o Bed in low position  o Call light within reach  o RN notified  o Side rails x 3  o Needs met; patient in NAD; HOB elevated   · Compliance with Program/Exercises: Will assess as treatment progresses. · Recommendations/Intent for next treatment session: \"Next visit will focus on advancements to more challenging activities and reduction in assistance provided\".     Total Treatment Duration:  PT Patient Time In/Time Out  Time In: 1007  Time Out: 1100 [facility]

## 2018-02-15 NOTE — PROGRESS NOTES
Hospitalist Progress Note    2/15/2018  Admit Date: 2018  7:41 PM   NAME: Brianna Giron   :  1950   MRN:  [id number]   Attending: Henry Lester MD  PCP:  None    SUBJECTIVE:     Brianna Giron is a 56JUG with TBI, DM2, chronic indwelling kathleen, PEG who was admitted from SNF with sepsis 2/2 RLL pneumonia, suspect aspiration. Also with sacral decub ulcer, which is chronic. Has been seen by plastic surgery and not currently a surgical candidate given his poor nutritional status. 2/15: no complaints this AM. BS well controlled off Lantus with TFs at goal. Reports improvement in breathing and decreased cough. Review of Systems negative with exception of pertinent positives noted above      PHYSICAL EXAM       Visit Vitals    /68 (BP 1 Location: Right arm, BP Patient Position: At rest)    Pulse 76    Temp 97.7 °F (36.5 °C)    Resp 19    Ht 5' 11\" (1.803 m)    Wt 61.6 kg (135 lb 14.4 oz)  Comment: pt no longer wearing prevalon boots; refuses to keep them on    SpO2 95%    BMI 18.95 kg/m2      Temp (24hrs), Av °F (36.7 °C), Min:97.5 °F (36.4 °C), Max:98.5 °F (36.9 °C)    Oxygen Therapy  O2 Sat (%): 95 % (02/15/18 0758)  Pulse via Oximetry: 94 beats per minute (18 0855)  O2 Device: Nasal cannula (02/15/18 0758)  O2 Flow Rate (L/min): 2 l/min (02/15/18 0758)    Intake/Output Summary (Last 24 hours) at 02/15/18 0904  Last data filed at 02/15/18 0639   Gross per 24 hour   Intake             6640 ml   Output             2550 ml   Net             4090 ml          General: No acute distress. Head:  Atraumatic Normocephalic. Eyes:  Anicteric.   Lungs:  Some mild coarseness at R base, normal resp effort on 2L  CVS:  Regular rate and rhythm, no BLE edema  Abdomen: Soft, NTTP, colostomy bag  Skin:   R lateral thigh with extensive area of debridement from hip to above knee      Recent Results (from the past 24 hour(s))   GLUCOSE, POC    Collection Time: 18 11:40 AM   Result Value Ref Range    Glucose (POC) 105 (H) 65 - 100 mg/dL   GLUCOSE, POC    Collection Time: 02/14/18  4:16 PM   Result Value Ref Range    Glucose (POC) 85 65 - 100 mg/dL   GLUCOSE, POC    Collection Time: 02/14/18  9:42 PM   Result Value Ref Range    Glucose (POC) 94 65 - 100 mg/dL   GLUCOSE, POC    Collection Time: 02/15/18  7:52 AM   Result Value Ref Range    Glucose (POC) 118 (H) 65 - 100 mg/dL         Imaging /Procedures /Studies   XR ABD (KUB)   Final Result   IMPRESSION: As above      XR CHEST SNGL V   Final Result   IMPRESSION:    1.  Stable findings of the chest as described above. XR CHEST SNGL V   Final Result   IMPRESSION:  New bibasilar infiltrates. XR ABD (KUB)   Final Result   IMPRESSION: No findings to suggest free air or obstruction. XR CHEST PORT   Final Result   IMPRESSION: Right lower lobe atelectasis or pneumonia.                   ASSESSMENT      Hospital Problems as of 2/15/2018  Date Reviewed: 2/7/2018          Codes Class Noted - Resolved POA    Dysphagia ICD-10-CM: R13.10  ICD-9-CM: 787.20  2/13/2018 - Present Yes        COPD (chronic obstructive pulmonary disease) ([facility] ICD-10-CM: J44.9  ICD-9-CM: 257  2/13/2018 - Present Yes        PEG (percutaneous endoscopic gastrostomy) status ([facility] ICD-10-CM: Z93.1  ICD-9-CM: V44.1  2/1/2018 - Present Yes        Candida UTI ICD-10-CM: B37.49  ICD-9-CM: 112.2  1/31/2018 - Present Yes        Aspiration pneumonia ([facility] ICD-10-CM: J69.0  ICD-9-CM: 507.0  1/30/2018 - Present Yes        * (Principal)Sepsis due to pneumonia [facility]) ICD-10-CM: J18.9, A41.9  ICD-9-CM: 045, 995.91  1/30/2018 - Present Unknown        Sacral ulcer ([facility] (Chronic) ICD-10-CM: N43.184  ICD-9-CM: 707.8  1/30/2018 - Present Yes        Type II diabetes mellitus with complication ([address] (Chronic) ICD-10-CM: E11.8  ICD-9-CM: 250.90  12/1/2017 - Present Yes                  Plan:  - Severe Sepsis secondary to pneumonia, UTI and sacral ulcer:   CXR with RLL pneumona, LA 4.2 on admission. Completed 8 day course of zosyn  WBC normalized. - HCAP:   Recent discharge. Likely aspiration pneumonia. Completed 8 days of zosyn on 2/14  Blood Cx NGTD. Repeat CXR with signs of right lung atelectasis  Continue incentive spirometry.     - UTI:   UA positive. However, pt has kathleen. U Cx pos for candida. Likely due to chronic kathleen and represents candiduria, however, started on Fluconazole, day 4/14    - DM2:   Holding lantus until BS improves  SSI ACHS     - Recent gangrenous necrotizing faciitis:   Wound appears to be stable. Plastic surgery saw inpatient and will re-eval as ab outpatient once nutritional status improved     - Sacral pressure Ulcer:   Unstageable. Plastic surgery recommendations appreciated --> hold off debridement until optimal medical condition reached and acute illness resolve. - Dysphagia, tube feeding:   MBS recent previous admission, continue NPO. Hx of TBI. Cont tube feeding. Dietitian following. Abd xray with no signs of obstruction or free air.      - Dementia:   stable     - TBI:  predisposed to aspiration, continue with PEG feeding. Cont NPO.      - Hx of COPD:   Not in exacerbation  Added tessalon prn to PEG     - Anemia:   Stable, Monitor Hgb. - Hypokalemia:   Replace prn through PEG tube.        DVT Prophylaxis: Lovenox  Dispo: stable for DC to STR, awaiting insurance pre-cert    Desire Phelan MD

## 2018-02-15 NOTE — ROUTINE PROCESS
A half eaten cookie was lying on the patient's bedside table. No change in patient's reassessment. Vital signs are stable. Notified Liyah Lorenzo MD. Will continue to monitoring patient.

## 2018-02-15 NOTE — PROGRESS NOTES
@1030 pt had lowered head of bed to flat, despite having been advised that [facility] must remain elevated. Locked pt control out.

## 2018-02-16 ENCOUNTER — PATIENT OUTREACH (OUTPATIENT)
Dept: CASE MANAGEMENT | Age: 68
End: 2018-02-16

## 2018-02-16 NOTE — PROGRESS NOTES
Please note this patient was an IP d/c [facility] and will be followed by Jessica Chowdary RN until d/c. Madhu Plaza LPN/ Care Coordinator  [facility]  [address] [place] [address]  [email or web address]. [facility] note will not be viewable in [address]

## 2018-02-21 ENCOUNTER — PATIENT OUTREACH (OUTPATIENT)
Dept: CASE MANAGEMENT | Age: 68
End: 2018-02-21

## 2018-02-21 NOTE — PROGRESS NOTES
Downtime progress note entry for [facility] : [facility] [facility] Follow up Outreach Note   Outreach type: Follow up  Patient name: Lazarus North   : [birth year]  MRN: [id number]    Date/Time of Outreach: 18     Reason for follow-up:   Continuation of care   Disease specific complaints/issues: Pt is not taking call at this time. no VM options. Patient progress towards goals set from last contact:      Has patient attended any PCP or specialist follow-up appointments since last contact? What was outcome of appointment? When is next follow-up scheduled? Review medications. Any medication changes since last outreach? Does patient have any questions or issues related to their medications? Home health active? If yes - any issue? Progress? Referrals needed?  (SW, Diabetes education, HH, etc. )    Other issues/Miscellaneous?  (Transportation, access to meals, ability to perform ADLs, adequate caregiver support, etc.)              Next Outreach Scheduled:      Next Steps/Goals:      Care  completing call:

## 2018-02-23 ENCOUNTER — PATIENT OUTREACH (OUTPATIENT)
Dept: CASE MANAGEMENT | Age: 68
End: 2018-02-23

## 2018-02-26 NOTE — PROGRESS NOTES
Downtime progress note entry for [facility] : Yang Pascual RN    Care  Follow up Outreach Note   Outreach type: Follow up  Patient name: Yoandy Feng  : 24  MRN: [id number]    Date/Time of Outreach: 18     Reason for follow-up:   Continuation of care   Disease specific complaints/issues: L Femur Fx       Patient progress towards goals set from last contact:   POC discussed with pts dtr. Pt is currently at [facility] at [facility]. According to dtr, pt continues to improve with PT, and OT assistance. HHN signed off today on pts care. incision site on left thigh is healing with no s/sx of infection. Pt ambulates with Rolator. Nurse continue to encourage pt to dangle feet at side of bed prior to ambulation. Has patient attended any PCP or specialist follow-up appointments since last contact? What was outcome of appointment? When is next follow-up scheduled? Saw Surgeon on the   Saw PCP on the   Seeing Surgeon   Seeing PCP     Review medications. Any medication changes since last outreach? Does patient have any questions or issues related to their medications? No needs or concerns in relation to meds. Pt is very strong and independent as verbalized by dtr. Currently taking Tylenol extra strength if needed. Tramadol available for moderate pain control. Pt is currently on Detrol to help with incontinence. Nurse educate on side effects of Detrol (dry mouth, dry eyes, blurred vision, dizziness, drowsiness, constipation, and possible joint pain with headaches) encourage to call PCP if any of these symptoms is exhibited. Call 911 in case of emergency. Home health active? If yes - any issue? Progress? OT, PT for one more week. Referrals needed?  (SW, Diabetes education, HH, etc. ) None needed as verbalized by pts dtr   Other issues/Miscellaneous?  (Transportation, access to meals, ability to perform ADLs, adequate caregiver support, etc.) Dtr transport to all appointment when needed. nurse at [facility] is available to check on pt. no caregiver needs or support needed at this time.      Next Outreach Scheduled: Week of the 26th     Next Steps/Goals:      Care  completing call: Katerina Schaffer

## 2018-03-01 ENCOUNTER — PATIENT OUTREACH (OUTPATIENT)
Dept: CASE MANAGEMENT | Age: 68
End: 2018-03-01

## 2018-03-02 ENCOUNTER — HOSPITAL ENCOUNTER (OUTPATIENT)
Dept: WOUND CARE | Age: 68
Discharge: HOME | End: 2018-03-02
Attending: SURGERY
Payer: MEDICARE

## 2018-03-02 PROCEDURE — 99215 OFFICE O/P EST HI 40 MIN: CPT

## 2018-03-02 PROCEDURE — 11043 DBRDMT MUSC&/FSCA 1ST 20/<: CPT

## 2018-03-07 ENCOUNTER — PATIENT OUTREACH (OUTPATIENT)
Dept: CASE MANAGEMENT | Age: 68
End: 2018-03-07

## 2018-03-08 NOTE — PROGRESS NOTES
Downtime progress note entry for [facility] : Ayala Woody RN    Care  Follow up Outreach Note   Outreach type: Follow up  Patient name: Jose Schmidt  : 50  MRN: [id number]    Date/Time of Outreach: 3/7/18     Reason for follow-up:   Continuation of care   Disease specific complaints/issues: Non Traumatic Subdural Hemorhage       Patient progress towards goals set from last contact:   Pts phone is not receiving phone calls at this time. pt is currently at [facility]. nurse will continue to follow and provide care after discharge. Has patient attended any PCP or specialist follow-up appointments since last contact? What was outcome of appointment? When is next follow-up scheduled? Review medications. Any medication changes since last outreach? Does patient have any questions or issues related to their medications? Home health active? If yes - any issue? Progress? Referrals needed?  (SW, Diabetes education, HH, etc. )    Other issues/Miscellaneous?  (Transportation, access to meals, ability to perform ADLs, adequate caregiver support, etc.)              Next Outreach Scheduled: Week of the 12th     Next Steps/Goals:      Care  completing call: Ayala Woody

## 2018-03-09 ENCOUNTER — HOSPITAL ENCOUNTER (OUTPATIENT)
Dept: WOUND CARE | Age: 68
Discharge: HOME | End: 2018-03-09
Attending: SURGERY
Payer: MEDICARE

## 2018-03-09 PROCEDURE — 77030019605

## 2018-03-09 PROCEDURE — 99214 OFFICE O/P EST MOD 30 MIN: CPT

## 2018-03-12 ENCOUNTER — HOSPITAL ENCOUNTER (EMERGENCY)
Age: 68
Discharge: NURSING, REHAB OR LONG-TERM CARE | End: 2018-03-12
Attending: EMERGENCY MEDICINE
Payer: MEDICARE

## 2018-03-12 ENCOUNTER — APPOINTMENT (OUTPATIENT)
Dept: GENERAL RADIOLOGY | Age: 68
End: 2018-03-12
Attending: EMERGENCY MEDICINE
Payer: MEDICARE

## 2018-03-12 VITALS
WEIGHT: 135 LBS | BODY MASS INDEX: 18.9 KG/M2 | DIASTOLIC BLOOD PRESSURE: 75 MMHG | OXYGEN SATURATION: 94 % | HEART RATE: 96 BPM | HEIGHT: 71 IN | SYSTOLIC BLOOD PRESSURE: 131 MMHG | RESPIRATION RATE: 20 BRPM

## 2018-03-12 DIAGNOSIS — R06.00 DYSPNEA, UNSPECIFIED TYPE: Primary | ICD-10-CM

## 2018-03-12 LAB
ALBUMIN SERPL-MCNC: 2.1 G/DL (ref 3.2–4.6)
ALBUMIN/GLOB SERPL: 0.3 {RATIO} (ref 1.2–3.5)
ALP SERPL-CCNC: 94 U/L (ref 50–136)
ALT SERPL-CCNC: 17 U/L (ref 12–65)
ANION GAP SERPL CALC-SCNC: 10 MMOL/L (ref 7–16)
AST SERPL-CCNC: 19 U/L (ref 15–37)
ATRIAL RATE: 93 BPM
BASOPHILS # BLD: 0 K/UL (ref 0–0.2)
BASOPHILS NFR BLD: 0 % (ref 0–2)
BILIRUB SERPL-MCNC: 0.4 MG/DL (ref 0.2–1.1)
BUN SERPL-MCNC: 16 MG/DL (ref 8–23)
CALCIUM SERPL-MCNC: 9.2 MG/DL (ref 8.3–10.4)
CALCULATED P AXIS, ECG09: 73 DEGREES
CALCULATED R AXIS, ECG10: 26 DEGREES
CALCULATED T AXIS, ECG11: 72 DEGREES
CHLORIDE SERPL-SCNC: 98 MMOL/L (ref 98–107)
CO2 SERPL-SCNC: 30 MMOL/L (ref 21–32)
CREAT SERPL-MCNC: 0.48 MG/DL (ref 0.8–1.5)
DIAGNOSIS, 93000: NORMAL
DIFFERENTIAL METHOD BLD: ABNORMAL
EOSINOPHIL # BLD: 0.2 K/UL (ref 0–0.8)
EOSINOPHIL NFR BLD: 2 % (ref 0.5–7.8)
ERYTHROCYTE [DISTWIDTH] IN BLOOD BY AUTOMATED COUNT: 18.3 % (ref 11.9–14.6)
GLOBULIN SER CALC-MCNC: 6.3 G/DL (ref 2.3–3.5)
GLUCOSE SERPL-MCNC: 103 MG/DL (ref 65–100)
HCT VFR BLD AUTO: 27.9 % (ref 41.1–50.3)
HGB BLD-MCNC: 8.3 G/DL (ref 13.6–17.2)
IMM GRANULOCYTES # BLD: 0.1 K/UL (ref 0–0.5)
IMM GRANULOCYTES NFR BLD AUTO: 1 % (ref 0–5)
LACTATE BLD-SCNC: 1.2 MMOL/L (ref 0.5–1.9)
LACTATE BLD-SCNC: 1.3 MMOL/L (ref 0.5–1.9)
LYMPHOCYTES # BLD: 3.9 K/UL (ref 0.5–4.6)
LYMPHOCYTES NFR BLD: 38 % (ref 13–44)
MCH RBC QN AUTO: 24.9 PG (ref 26.1–32.9)
MCHC RBC AUTO-ENTMCNC: 29.7 G/DL (ref 31.4–35)
MCV RBC AUTO: 83.5 FL (ref 79.6–97.8)
MONOCYTES # BLD: 1 K/UL (ref 0.1–1.3)
MONOCYTES NFR BLD: 10 % (ref 4–12)
NEUTS SEG # BLD: 4.9 K/UL (ref 1.7–8.2)
NEUTS SEG NFR BLD: 49 % (ref 43–78)
P-R INTERVAL, ECG05: 126 MS
PLATELET # BLD AUTO: 386 K/UL (ref 150–450)
PMV BLD AUTO: 10.1 FL (ref 10.8–14.1)
POTASSIUM SERPL-SCNC: 4.1 MMOL/L (ref 3.5–5.1)
PROT SERPL-MCNC: 8.4 G/DL (ref 6.3–8.2)
Q-T INTERVAL, ECG07: 328 MS
QRS DURATION, ECG06: 68 MS
QTC CALCULATION (BEZET), ECG08: 407 MS
RBC # BLD AUTO: 3.34 M/UL (ref 4.23–5.67)
SODIUM SERPL-SCNC: 138 MMOL/L (ref 136–145)
TROPONIN I BLD-MCNC: 0 NG/ML (ref 0.02–0.05)
TROPONIN I BLD-MCNC: 0.01 NG/ML (ref 0.02–0.05)
VENTRICULAR RATE, ECG03: 93 BPM
WBC # BLD AUTO: 10.2 K/UL (ref 4.3–11.1)

## 2018-03-12 PROCEDURE — 99285 EMERGENCY DEPT VISIT HI MDM: CPT | Performed by: EMERGENCY MEDICINE

## 2018-03-12 PROCEDURE — 80053 COMPREHEN METABOLIC PANEL: CPT | Performed by: EMERGENCY MEDICINE

## 2018-03-12 PROCEDURE — 93005 ELECTROCARDIOGRAM TRACING: CPT | Performed by: EMERGENCY MEDICINE

## 2018-03-12 PROCEDURE — 83605 ASSAY OF LACTIC ACID: CPT

## 2018-03-12 PROCEDURE — 71045 X-RAY EXAM CHEST 1 VIEW: CPT

## 2018-03-12 PROCEDURE — 85025 COMPLETE CBC W/AUTO DIFF WBC: CPT | Performed by: EMERGENCY MEDICINE

## 2018-03-12 PROCEDURE — 84484 ASSAY OF TROPONIN QUANT: CPT

## 2018-03-12 NOTE — ED TRIAGE NOTES
PT arrived to ED via EMS from [facility] c/o SOB. PT has Hx of breathing problems. EMS states PT was laying flat upon arrival to SNF.  PT denies any SOB upon arrival.

## 2018-03-12 NOTE — ED NOTES
I have reviewed discharge instructions with the patient. The patient verbalized understanding. Patient left ED via Discharge Method: stretcher to Nursing Home with transport. Opportunity for questions and clarification provided. Patient given 0 scripts. To continue your aftercare when you leave the hospital, you may receive an automated call from our care team to check in on how you are doing. This is a free service and part of our promise to provide the best care and service to meet your aftercare needs.  If you have questions, or wish to unsubscribe from this service please call [phone]. Thank you for Choosing our [facility] Emergency Department.

## 2018-03-12 NOTE — DISCHARGE INSTRUCTIONS
Shortness of Breath: Care Instructions  Your Care Instructions  Shortness of breath has many causes. Sometimes conditions such as anxiety can lead to shortness of breath. Some people get mild shortness of breath when they exercise. Trouble breathing also can be a symptom of a serious problem, such as asthma, lung disease, emphysema, heart problems, and pneumonia. If your shortness of breath continues, you may need tests and treatment. Watch for any changes in your breathing and other symptoms. Follow-up care is a key part of your treatment and safety. Be sure to make and go to all appointments, and call your doctor if you are having problems. It's also a good idea to know your test results and keep a list of the medicines you take. How can you care for yourself at home? · Do not smoke or allow others to smoke around you. If you need help quitting, talk to your doctor about stop-smoking programs and medicines. These can increase your chances of quitting for good. · Get plenty of rest and sleep. · Take your medicines exactly as prescribed. Call your doctor if you think you are having a problem with your medicine. · Find healthy ways to deal with stress. ¨ Exercise daily. ¨ Get plenty of sleep. ¨ Eat regularly and well. When should you call for help? Call 911 anytime you think you may need emergency care. For example, call if:  ? · You have severe shortness of breath. ? · You have symptoms of a heart attack. These may include:  ¨ Chest pain or pressure, or a strange feeling in the chest.  ¨ Sweating. ¨ Shortness of breath. ¨ Nausea or vomiting. ¨ Pain, pressure, or a strange feeling in the back, neck, jaw, or upper belly or in one or both shoulders or arms. ¨ Lightheadedness or sudden weakness. ¨ A fast or irregular heartbeat. After you call 911, the  may tell you to chew 1 adult-strength or 2 to 4 low-dose aspirin. Wait for an ambulance. Do not try to drive yourself.    ?Call your doctor now or seek immediate medical care if:  ? · Your shortness of breath gets worse or you start to wheeze. Wheezing is a high-pitched sound when you breathe. ? · You wake up at night out of breath or have to prop your head up on several pillows to breathe. ? · You are short of breath after only light activity or while at rest.   ? Watch closely for changes in your health, and be sure to contact your doctor if:  ? · You do not get better over the next 1 to 2 days. Where can you learn more? Go to http://thony-joshua.info/. Enter S780 in the search box to learn more about \"Shortness of Breath: Care Instructions. \"  Current as of: May 12, 2017  Content Version: 11.4  © 2687-9529 [employer]. Care instructions adapted under license by [employer] (which disclaims liability or warranty for this information). If you have questions about a medical condition or this instruction, always ask your healthcare professional. [address] any warranty or liability for your use of this information.

## 2018-03-13 ENCOUNTER — HOSPITAL ENCOUNTER (EMERGENCY)
Age: 68
Discharge: HOME | End: 2018-03-13
Attending: EMERGENCY MEDICINE
Payer: MEDICARE

## 2018-03-13 ENCOUNTER — APPOINTMENT (OUTPATIENT)
Dept: GENERAL RADIOLOGY | Age: 68
End: 2018-03-13
Attending: EMERGENCY MEDICINE
Payer: MEDICARE

## 2018-03-13 ENCOUNTER — PATIENT OUTREACH (OUTPATIENT)
Dept: CASE MANAGEMENT | Age: 68
End: 2018-03-13

## 2018-03-13 VITALS
HEART RATE: 88 BPM | HEIGHT: 71 IN | TEMPERATURE: 97.8 F | DIASTOLIC BLOOD PRESSURE: 72 MMHG | BODY MASS INDEX: 18.9 KG/M2 | SYSTOLIC BLOOD PRESSURE: 131 MMHG | OXYGEN SATURATION: 100 % | RESPIRATION RATE: 18 BRPM | WEIGHT: 135 LBS

## 2018-03-13 DIAGNOSIS — R06.02 SOB (SHORTNESS OF BREATH): Primary | ICD-10-CM

## 2018-03-13 LAB
ALBUMIN SERPL-MCNC: 2.3 G/DL (ref 3.2–4.6)
ALBUMIN/GLOB SERPL: 0.3 {RATIO} (ref 1.2–3.5)
ALP SERPL-CCNC: 99 U/L (ref 50–136)
ALT SERPL-CCNC: 16 U/L (ref 12–65)
ANION GAP SERPL CALC-SCNC: 8 MMOL/L (ref 7–16)
ARTERIAL PATENCY WRIST A: POSITIVE
AST SERPL-CCNC: 14 U/L (ref 15–37)
ATRIAL RATE: 109 BPM
BASE EXCESS BLDA CALC-SCNC: 2.7 MMOL/L (ref 0–3)
BDY SITE: ABNORMAL
BILIRUB SERPL-MCNC: 0.4 MG/DL (ref 0.2–1.1)
BUN SERPL-MCNC: 16 MG/DL (ref 8–23)
CALCIUM SERPL-MCNC: 7.5 MG/DL (ref 8.3–10.4)
CALCULATED P AXIS, ECG09: 73 DEGREES
CALCULATED R AXIS, ECG10: 11 DEGREES
CALCULATED T AXIS, ECG11: 76 DEGREES
CHLORIDE SERPL-SCNC: 99 MMOL/L (ref 98–107)
CO2 SERPL-SCNC: 31 MMOL/L (ref 21–32)
COHGB MFR BLD: 0.1 % (ref 0.5–1.5)
CREAT SERPL-MCNC: 0.59 MG/DL (ref 0.8–1.5)
DIAGNOSIS, 93000: NORMAL
DO-HGB BLD-MCNC: 15 % (ref 0–5)
ERYTHROCYTE [DISTWIDTH] IN BLOOD BY AUTOMATED COUNT: 18.1 % (ref 11.9–14.6)
FIO2 ON VENT: 21 %
GLOBULIN SER CALC-MCNC: 6.6 G/DL (ref 2.3–3.5)
GLUCOSE SERPL-MCNC: 146 MG/DL (ref 65–100)
HCO3 BLDA-SCNC: 26 MMOL/L (ref 22–26)
HCT VFR BLD AUTO: 29.9 % (ref 41.1–50.3)
HGB BLD-MCNC: 9.1 G/DL (ref 13.6–17.2)
HGB BLDMV-MCNC: 9.5 GM/DL (ref 11.7–15)
MCH RBC QN AUTO: 25.3 PG (ref 26.1–32.9)
MCHC RBC AUTO-ENTMCNC: 30.4 G/DL (ref 31.4–35)
MCV RBC AUTO: 83.1 FL (ref 79.6–97.8)
METHGB MFR BLD: 0.4 % (ref 0–1.5)
OXYHGB MFR BLDA: 85 % (ref 94–97)
P-R INTERVAL, ECG05: 124 MS
PCO2 BLDA: 36 MMHG (ref 35–45)
PH BLDA: 7.48 [PH] (ref 7.35–7.45)
PLATELET # BLD AUTO: 435 K/UL (ref 150–450)
PMV BLD AUTO: 10.5 FL (ref 10.8–14.1)
PO2 BLDA: 54 MMHG (ref 80–105)
POTASSIUM SERPL-SCNC: 4.2 MMOL/L (ref 3.5–5.1)
PROT SERPL-MCNC: 8.9 G/DL (ref 6.3–8.2)
Q-T INTERVAL, ECG07: 306 MS
QRS DURATION, ECG06: 68 MS
QTC CALCULATION (BEZET), ECG08: 412 MS
RBC # BLD AUTO: 3.6 M/UL (ref 4.23–5.67)
SAO2 % BLD: 85 % (ref 92–98.5)
SERVICE CMNT-IMP: ABNORMAL
SODIUM SERPL-SCNC: 138 MMOL/L (ref 136–145)
TROPONIN I BLD-MCNC: 0 NG/ML (ref 0.02–0.05)
VENTILATION MODE VENT: ABNORMAL
VENTRICULAR RATE, ECG03: 109 BPM
WBC # BLD AUTO: 11.8 K/UL (ref 4.3–11.1)

## 2018-03-13 PROCEDURE — 85027 COMPLETE CBC AUTOMATED: CPT | Performed by: EMERGENCY MEDICINE

## 2018-03-13 PROCEDURE — 99285 EMERGENCY DEPT VISIT HI MDM: CPT | Performed by: EMERGENCY MEDICINE

## 2018-03-13 PROCEDURE — 71045 X-RAY EXAM CHEST 1 VIEW: CPT

## 2018-03-13 PROCEDURE — 93005 ELECTROCARDIOGRAM TRACING: CPT | Performed by: EMERGENCY MEDICINE

## 2018-03-13 PROCEDURE — 36600 WITHDRAWAL OF ARTERIAL BLOOD: CPT

## 2018-03-13 PROCEDURE — 84484 ASSAY OF TROPONIN QUANT: CPT

## 2018-03-13 PROCEDURE — 82803 BLOOD GASES ANY COMBINATION: CPT

## 2018-03-13 PROCEDURE — 80053 COMPREHEN METABOLIC PANEL: CPT | Performed by: EMERGENCY MEDICINE

## 2018-03-13 NOTE — ED TRIAGE NOTES
Pt comes in via EMS from nursing home with complaint of SOB. EMS was called when patient began to c/o SOB while laying in bed, denies SOB at current time. Pt denies chest pain. Pt with hx of dementia is alert and oriented to person. Per nursing home and EMS staff, patient at baseline is alert and oriented to person, and has slurred speech at baseline also.

## 2018-03-13 NOTE — DISCHARGE INSTRUCTIONS
As we discussed we did not find an exact cause for your shortness of breath. It is important for you to stay on your oxygen. Please return to the emergency room with any fevers, vomiting, worsening symptoms, or additional concerns. Follow-up with your primary care doctor for reevaluation as needed.

## 2018-03-13 NOTE — PROGRESS NOTES
ABG completed and results given to Dr Jaciel To, patient placed on 4L NC post ABG. No other orders at this time.

## 2018-03-13 NOTE — ED PROVIDER NOTES
HPI Comments: 71-year-old gentleman presents from a nursing home with concerns about apparent shortness of breath. Nurse reports that EMS said that the nursing home was concerned about the way the patient appeared to be breathing. On arrival to the ER he appears to be at his mental baseline. There's been no report of fevers or cough. Patient says that he lives to flat he feels like he can't get a breath and per of note, his speech is very garbled which is apparently his baseline and it is very difficult to understand. .    Elements of this note were created using speech recognition software. As such, errors of speech recognition may be present. Patient is a 79 y.o. male presenting with shortness of breath. The history is provided by the patient. Shortness of Breath          Past Medical History:   Diagnosis Date    [facility]ronic obstructive [facility] disease) [facility]     Dementia     Diabetes ([facility]     Necrotizing fasciitis ([facility]        History reviewed. No pertinent surgical history. History reviewed. No pertinent family history. Social History     Social History    Marital status: SINGLE     Spouse name: N/A    Number of children: N/A    Years of education: N/A     Occupational History    Not on file. Social History Main Topics    Smoking status: Former Smoker    Smokeless tobacco: Not on file    Alcohol use No    Drug use: No    Sexual activity: Not on file     Other Topics Concern    Not on file     Social History Narrative         ALLERGIES: Review of patient's allergies indicates no known allergies. Review of Systems   Unable to perform ROS: Other (very difficult speech understand)   Respiratory: Positive for shortness of breath. Vitals:    03/13/18 0142   BP: 123/69   Pulse: 87   Resp: 20   Temp: 97.8 °F (36.6 °C)   Weight: 61.2 kg (135 lb)   Height: 5' 11\" (1.803 m)            Physical Exam   Constitutional: He appears well-developed and well-nourished.    HENT: Head: Normocephalic and atraumatic. Eyes: Right eye exhibits no discharge. Left eye exhibits no discharge. Pulmonary/Chest: No respiratory distress. He has no wheezes. He has no rales. No abnormal lung sounds   Abdominal: He exhibits no distension. There is no tenderness. There is no rebound and no guarding. Musculoskeletal: He exhibits no deformity. Neurological:   Patient is alert but his speech is nearly impossible to understand due to previous problems   Skin: Skin is warm and dry. Nursing note and vitals reviewed. MDM  Number of Diagnoses or Management Options  Diagnosis management comments: Check the patient's basic blood work and then get an x-ray of his lungs. The various Cipro. We have tried have not been picking up well so I will get an ABG. Patient's troponin continues to be negative over the last 24 hours. our ABG was performed on room air but the patient is normally on oxygen I do not think that ABG is valid.         ED Course       Procedures

## 2018-03-14 NOTE — PROGRESS NOTES
Downtime progress note entry for [facility] : Aimee Sim RN  Care  Follow up Outreach Note   Outreach type: Follow up  Patient name: Marco A Johnson  : 50  MRN: [id number]    Date/Time of Outreach: 3/13/18     Reason for follow-up:   Continuation of care   Disease specific complaints/issues: Non traumatic subdural hemorrhage       Patient progress towards goals set from last contact:   Pts phone # is not receiving calls at this time. Currently at [facility], nurse will continue to follow after discharge. Has patient attended any PCP or specialist follow-up appointments since last contact? What was outcome of appointment? When is next follow-up scheduled? Review medications. Any medication changes since last outreach? Does patient have any questions or issues related to their medications? Home health active? If yes - any issue? Progress? Referrals needed?  (SW, Diabetes education, HH, etc. )    Other issues/Miscellaneous?  (Transportation, access to meals, ability to perform ADLs, adequate caregiver support, etc.)              Next Outreach Scheduled: Week of the      Next Steps/Goals:      Care  completing call: Aimee Sim

## 2018-03-23 PROCEDURE — 99213 OFFICE O/P EST LOW 20 MIN: CPT

## 2018-03-24 ENCOUNTER — HOSPITAL ENCOUNTER (EMERGENCY)
Age: 68
Discharge: HOME | End: 2018-03-24
Attending: EMERGENCY MEDICINE
Payer: MEDICARE

## 2018-03-24 ENCOUNTER — APPOINTMENT (OUTPATIENT)
Dept: GENERAL RADIOLOGY | Age: 68
End: 2018-03-24
Attending: EMERGENCY MEDICINE
Payer: MEDICARE

## 2018-03-24 VITALS
DIASTOLIC BLOOD PRESSURE: 58 MMHG | WEIGHT: 135 LBS | OXYGEN SATURATION: 94 % | RESPIRATION RATE: 16 BRPM | SYSTOLIC BLOOD PRESSURE: 90 MMHG | HEIGHT: 71 IN | HEART RATE: 100 BPM | TEMPERATURE: 97.5 F | BODY MASS INDEX: 18.9 KG/M2

## 2018-03-24 DIAGNOSIS — K94.23 PEG TUBE MALFUNCTION (HCC): Primary | ICD-10-CM

## 2018-03-24 PROCEDURE — 74018 RADEX ABDOMEN 1 VIEW: CPT

## 2018-03-24 PROCEDURE — 77030008715 HC TU FEED GASTMY COVD -A

## 2018-03-24 PROCEDURE — 99284 EMERGENCY DEPT VISIT MOD MDM: CPT | Performed by: EMERGENCY MEDICINE

## 2018-03-24 PROCEDURE — 77030005103 HC CATH GASTMY BLN HALY -B

## 2018-03-24 PROCEDURE — 75810000109 HC GASTRO TUBE CHANGE: Performed by: EMERGENCY MEDICINE

## 2018-03-24 PROCEDURE — 74011636320 HC RX REV CODE- 636/320: Performed by: EMERGENCY MEDICINE

## 2018-03-24 RX ADMIN — DIATRIZOATE MEGLUMINE AND DIATRIZOATE SODIUM 30 ML: 660; 100 LIQUID ORAL; RECTAL at 19:13

## 2018-03-24 NOTE — ED PROVIDER NOTES
HPI Comments: Patient's PEG tube is clogged and sent here for change or flush. Patient is a 79 y.o. male presenting with feeding tube problem. The history is provided by the EMS personnel and the patient. The history is limited by the condition of the patient. Feeding Tube Problem    This is a new problem. Episode onset: uunknown. The problem occurs constantly. The problem has not changed since onset. The patient is experiencing no pain. Past Medical History:   Diagnosis Date    COPD (chronic obstructive [facility] disease) [facility]     Dementia     Diabetes [facility]     Necrotizing fasciitis ([facility]        No past surgical history on file. No family history on file. Social History     Social History    Marital status: SINGLE     Spouse name: N/A    Number of children: N/A    Years of education: N/A     Occupational History    Not on file. Social History Main Topics    Smoking status: Former Smoker    Smokeless tobacco: Not on file    Alcohol use No    Drug use: No    Sexual activity: Not on file     Other Topics Concern    Not on file     Social History Narrative         ALLERGIES: Review of patient's allergies indicates no known allergies. Review of Systems    Vitals:    03/24/18 1751   BP: 90/58   Pulse: 100   Resp: 16   Temp: 97.5 °F (36.4 °C)   SpO2: 94%   Weight: 61.2 kg (135 lb)   Height: 5' 11\" (1.803 m)            Physical Exam   Constitutional: He appears well-developed and well-nourished. No distress. Abdominal: Soft. There is no tenderness. Neurological: He is alert. Skin: Skin is warm and dry. He is not diaphoretic. Nursing note and vitals reviewed. MDM  Number of Diagnoses or Management Options  PEG tube malfunction [facility]):   Diagnosis management comments: Could not be flushed. Removed and replaced without difficulty at bedside. Checked placement via xray.       Risk of Complications, Morbidity, and/or Mortality  Presenting problems: minimal  Diagnostic procedures: minimal  Management options: minimal    Patient Progress  Patient progress: stable        ED Course       Procedures

## 2018-03-24 NOTE — ED NOTES
Patient up for discharge. [facility] called for patient transport back to facility.

## 2018-03-24 NOTE — ED NOTES
Attempted to flush feeding tube without success. Dr. Mihir Posadas aware. To be replaced with 18fr feeding tube, at the bedside.

## 2018-03-25 NOTE — ED NOTES
I have reviewed discharge instructions with the patient. The patient verbalized understanding. Patient left ED via Discharge Method: stretcher to Nursing Home with transport. Opportunity for questions and clarification provided. Patient given 0 scripts.

## 2018-03-27 ENCOUNTER — PATIENT OUTREACH (OUTPATIENT)
Dept: CASE MANAGEMENT | Age: 68
End: 2018-03-27

## 2018-03-28 ENCOUNTER — PATIENT OUTREACH (OUTPATIENT)
Dept: CASE MANAGEMENT | Age: 68
End: 2018-03-28

## 2018-03-28 NOTE — PROGRESS NOTES
Downtime progress note entry for [facility] : Katerina Schaffer RN    Care  Follow up Outreach Note   Outreach type: Follow up  Patient name: Kayce Chin  : 1950  MRN: [id number]    Date/Time of Outreach: 3/27/18     Reason for follow-up:   Continuation of care   Disease specific complaints/issues: Non-Traumatic Subdural Hemorrhage     Patient progress towards goals set from last contact:   LVM with pt. pt is currently at [address] [place] [address] in [place]. Nurse will continue to monitor and provide care after discharge. Has patient attended any PCP or specialist follow-up appointments since last contact? What was outcome of appointment? When is next follow-up scheduled? Review medications. Any medication changes since last outreach? Does patient have any questions or issues related to their medications? Home health active? If yes - any issue? Progress? Referrals needed?  (SW, Diabetes education, HH, etc. )    Other issues/Miscellaneous?  (Transportation, access to meals, ability to perform ADLs, adequate caregiver support, etc.)              Next Outreach Scheduled: Week of the 2nd     Next Steps/Goals:      Care  completing call: Katerina Schaffer

## 2018-03-29 NOTE — PROGRESS NOTES
Downtime progress note entry for [facility] : Tom Morataya. Aide Alexander RN    Transition of Care Discharge Follow-up Questionnaire   Date/Time of Call:  Patient name:  MAKAYLA:  N:   3/28/18 @ [id number] [address]  50  [id number]    What was the patient hospitalized for? Pneumonia   Does the patient understand his/her diagnosis and/or treatment and what happened during the hospitalization? 3/28/18 Called pt at [facility] but staff states pt is not able to talk but did forward to  voice mail and left detailed message to call back. Also contacted family and spoke with daughter Jenny Bernard who states pt is still in [facility] and states pt has not had rehab and is upset that pt has not received rehab but is communicating with facility. Advised of care  role and contact information given. Also advised that can assist with helping pt get an appt with pcp as per daughter not currently established with pcp. Daughter states pt was living in a assisted living facility until this recent illness. Advised would follow up upon discharge. Verbalizes an understanding. Did the patient receive discharge instructions? Review any discharge instructions (see notes in [facility]). Ask patient if they understand these. Do they have any questions? Were home services ordered (nursing, PT, OT, ST, etc.)? If so, has the first visit occurred? If not, why? (Assist with coordination of services if necessary.)          Was any DME ordered? If so, has it been received? If not, why?  (Assist with coordination of arranging DME orders if necessary.)          Complete a review of all medications (new, continued and discontinued meds per the D/C instructions and medication tab in [facility]). Were all new prescriptions filled? If not, why?  (Assist with obtainment of medications if necessary.)          Does the patient understand the purpose and dosing instructions for all medications?   (If patient has questions, provide explanation and education.)    Does the patient have any problems in performing ADLs? (If patient is unable to perform ADLs - what is the limiting factor(s)? Do they have a support system that can assist? If no support system is present, discuss possible assistance that they may be able to obtain.)              Does the patient have all follow-up appointments scheduled? 7 day f/up with PCP?    7-14 day f/up with specialist?    If f/up has not been made - what actions has the care coordinator made to accomplish this? Has transportation been arranged? [facility] follow-up should be within 7 days of discharge; all others should have PCP follow-up within 7 days of discharge; follow-ups with other specialists should be within 7-14 days of discharge.)    Any other questions or concerns expressed by the patient? Schedule next appointment with MARIANNE BABIN Coordinator or refer to RN Case Manager/  per the workflow guidelines. When is care coordinators next follow-up call scheduled? If referred for CCM - what RN care manager was the referral assigned? LINDA Call Completed By:   Macey Regalado.  Mayra Martino RN

## 2018-04-02 ENCOUNTER — HOSPITAL ENCOUNTER (OUTPATIENT)
Dept: INFUSION THERAPY | Age: 68
Discharge: HOME | End: 2018-04-02
Payer: MEDICARE

## 2018-04-02 VITALS
TEMPERATURE: 97.8 F | DIASTOLIC BLOOD PRESSURE: 66 MMHG | HEART RATE: 91 BPM | OXYGEN SATURATION: 99 % | RESPIRATION RATE: 18 BRPM | SYSTOLIC BLOOD PRESSURE: 120 MMHG

## 2018-04-02 PROCEDURE — 86900 BLOOD TYPING SEROLOGIC ABO: CPT

## 2018-04-02 PROCEDURE — 77030018667 ADMN ST IV BLD FENW -A

## 2018-04-02 PROCEDURE — 36430 TRANSFUSION BLD/BLD COMPNT: CPT

## 2018-04-02 PROCEDURE — 74011250636 HC RX REV CODE- 250/636

## 2018-04-02 PROCEDURE — P9016 RBC LEUKOCYTES REDUCED: HCPCS

## 2018-04-02 PROCEDURE — 86920 COMPATIBILITY TEST SPIN: CPT

## 2018-04-02 RX ORDER — SODIUM CHLORIDE 9 MG/ML
250 INJECTION, SOLUTION INTRAVENOUS AS NEEDED
Status: DISCONTINUED | OUTPATIENT
Start: 2018-04-02 | End: 2018-04-06 | Stop reason: HOSPADM

## 2018-04-02 RX ADMIN — SODIUM CHLORIDE 250 ML: 900 INJECTION, SOLUTION INTRAVENOUS at 10:30

## 2018-04-02 NOTE — PROGRESS NOTES
Arrived to the [facility] via stretcher with his aid. 2units PRBC's completed. Patient tolerated well. Any issues or concerns during appointment: no.  Patient aware of next infusion appointment on, no more apt's at this time. Discharged to nursing home via stretcher.

## 2018-04-03 ENCOUNTER — PATIENT OUTREACH (OUTPATIENT)
Dept: CASE MANAGEMENT | Age: 68
End: 2018-04-03

## 2018-04-03 LAB
ABO + RH BLD: NORMAL
BLD PROD TYP BPU: NORMAL
BLD PROD TYP BPU: NORMAL
BLOOD GROUP ANTIBODIES SERPL: NORMAL
BPU ID: NORMAL
BPU ID: NORMAL
CROSSMATCH RESULT,%XM: NORMAL
CROSSMATCH RESULT,%XM: NORMAL
SPECIMEN EXP DATE BLD: NORMAL
STATUS OF UNIT,%ST: NORMAL
STATUS OF UNIT,%ST: NORMAL
UNIT DIVISION, %UDIV: 0
UNIT DIVISION, %UDIV: 0

## 2018-04-04 NOTE — PROGRESS NOTES
Downtime Progress Note for [facility] : Jessy Ring RN    Care  Follow up Outreach Note   Outreach type:  Patient name:  :  MRN: Care  callJosh Morel  50  [id number]   Date/Time of Outreach: 4/3/18 @ [id number]     Reason for follow-up:   Weekly care  follow up. Disease specific complaints/issues: Sepsis/PNA       Patient progress towards goals set from last contact:   4/3/18 called pt and spoke with daughter Wesly Gracia who states pt had to have infusion yesterday due to anemia and cannot seem to get well enough to have surgery for wound on buttocks. Also states not sure if pt will be able to return to assisted living facility at this point. States pt is still under snf care at this time. Advised to contact care  with questions or concerns and verbalizes an understanding. Has patient attended any PCP or specialist follow-up appointments since last contact? What was outcome of appointment? When is next follow-up scheduled? No  NA   Review medications. Any medication changes since last outreach? Does patient have any questions or issues related to their medications? No  Na   Home health active? If yes - any issue? Progress? No   Referrals needed?  (SW, Diabetes education, HH, etc. ) Na   Other issues/Miscellaneous? (Transportation, access to meals, ability to perform ADLs, adequate caregiver support, etc.) Na     Next Outreach Scheduled: Will follow up upon discharge from rehab. Next Steps/Goals:   NA   Care  completing call: Jina Watkins.  Aayush Vital RN

## 2018-04-06 ENCOUNTER — HOSPITAL ENCOUNTER (OUTPATIENT)
Dept: WOUND CARE | Age: 68
Discharge: HOME | End: 2018-04-06
Attending: SURGERY
Payer: MEDICARE

## 2018-04-06 PROCEDURE — 97597 DBRDMT OPN WND 1ST 20 CM/<: CPT

## 2018-04-06 PROCEDURE — 97598 DBRDMT OPN WND ADDL 20CM/<: CPT

## 2018-04-13 ENCOUNTER — HOSPITAL ENCOUNTER (OUTPATIENT)
Dept: WOUND CARE | Age: 68
Discharge: HOME | End: 2018-04-13
Attending: SURGERY
Payer: MEDICARE

## 2018-04-13 ENCOUNTER — HOSPITAL ENCOUNTER (OUTPATIENT)
Dept: LAB | Age: 68
Discharge: HOME | End: 2018-04-13

## 2018-04-13 LAB
ALBUMIN SERPL-MCNC: 1.7 G/DL (ref 3.2–4.6)
ALBUMIN/GLOB SERPL: 0.3 {RATIO} (ref 1.2–3.5)
ALP SERPL-CCNC: 101 U/L (ref 50–136)
ALT SERPL-CCNC: 25 U/L (ref 12–65)
ANION GAP SERPL CALC-SCNC: 12 MMOL/L (ref 7–16)
AST SERPL-CCNC: 27 U/L (ref 15–37)
BASOPHILS # BLD: 0 K/UL (ref 0–0.2)
BASOPHILS NFR BLD: 0 % (ref 0–2)
BILIRUB SERPL-MCNC: 0.9 MG/DL (ref 0.2–1.1)
BUN SERPL-MCNC: 46 MG/DL (ref 8–23)
CALCIUM SERPL-MCNC: 9.4 MG/DL (ref 8.3–10.4)
CHLORIDE SERPL-SCNC: 111 MMOL/L (ref 98–107)
CO2 SERPL-SCNC: 26 MMOL/L (ref 21–32)
CREAT SERPL-MCNC: 0.87 MG/DL (ref 0.8–1.5)
DIFFERENTIAL METHOD BLD: ABNORMAL
EOSINOPHIL # BLD: 0 K/UL (ref 0–0.8)
EOSINOPHIL NFR BLD: 0 % (ref 0.5–7.8)
ERYTHROCYTE [DISTWIDTH] IN BLOOD BY AUTOMATED COUNT: 19.7 % (ref 11.9–14.6)
GLOBULIN SER CALC-MCNC: 6.3 G/DL (ref 2.3–3.5)
GLUCOSE SERPL-MCNC: 240 MG/DL (ref 65–100)
HCT VFR BLD AUTO: 36.1 % (ref 41.1–50.3)
HGB BLD-MCNC: 10.5 G/DL (ref 13.6–17.2)
IMM GRANULOCYTES # BLD: 0.2 K/UL (ref 0–0.5)
IMM GRANULOCYTES NFR BLD AUTO: 1 % (ref 0–5)
LYMPHOCYTES # BLD: 2.1 K/UL (ref 0.5–4.6)
LYMPHOCYTES NFR BLD: 10 % (ref 13–44)
MCH RBC QN AUTO: 25.6 PG (ref 26.1–32.9)
MCHC RBC AUTO-ENTMCNC: 29.1 G/DL (ref 31.4–35)
MCV RBC AUTO: 88 FL (ref 79.6–97.8)
MONOCYTES # BLD: 0.6 K/UL (ref 0.1–1.3)
MONOCYTES NFR BLD: 3 % (ref 4–12)
NEUTS SEG # BLD: 18.3 K/UL (ref 1.7–8.2)
NEUTS SEG NFR BLD: 87 % (ref 43–78)
PLATELET # BLD AUTO: 342 K/UL (ref 150–450)
PLATELET COMMENTS,PCOM: ADEQUATE
PMV BLD AUTO: 12.2 FL (ref 10.8–14.1)
POTASSIUM SERPL-SCNC: 4.8 MMOL/L (ref 3.5–5.1)
PROT SERPL-MCNC: 8 G/DL (ref 6.3–8.2)
RBC # BLD AUTO: 4.1 M/UL (ref 4.23–5.67)
RBC MORPH BLD: ABNORMAL
SODIUM SERPL-SCNC: 149 MMOL/L (ref 136–145)
WBC # BLD AUTO: 21 K/UL (ref 4.3–11.1)
WBC MORPH BLD: ABNORMAL

## 2018-04-13 PROCEDURE — 87075 CULTR BACTERIA EXCEPT BLOOD: CPT | Performed by: SURGERY

## 2018-04-13 PROCEDURE — 80053 COMPREHEN METABOLIC PANEL: CPT

## 2018-04-13 PROCEDURE — 99215 OFFICE O/P EST HI 40 MIN: CPT

## 2018-04-13 PROCEDURE — 87205 SMEAR GRAM STAIN: CPT | Performed by: SURGERY

## 2018-04-13 PROCEDURE — 11046 DBRDMT MUSC&/FSCA EA ADDL: CPT

## 2018-04-13 PROCEDURE — 11043 DBRDMT MUSC&/FSCA 1ST 20/<: CPT

## 2018-04-13 PROCEDURE — 87186 SC STD MICRODIL/AGAR DIL: CPT | Performed by: SURGERY

## 2018-04-13 PROCEDURE — 85025 COMPLETE CBC W/AUTO DIFF WBC: CPT

## 2018-04-13 PROCEDURE — 87077 CULTURE AEROBIC IDENTIFY: CPT | Performed by: SURGERY

## 2018-04-17 LAB
BACTERIA SPEC CULT: ABNORMAL
GRAM STN SPEC: ABNORMAL
SERVICE CMNT-IMP: ABNORMAL

## 2018-04-20 LAB
BACTERIA SPEC CULT: NORMAL
SERVICE CMNT-IMP: NORMAL

## 2018-05-13 NOTE — PROGRESS NOTES
Surgery postponed due to OR availability, emergency cases. Will plan for washout vac placement tomorrow. Resume feeds until that time. numerical 0-10

## 2018-12-04 NOTE — PROGRESS NOTES
Patient called office after appointment requested, syrup. He has tried over-the-counter. He has been prescribed guaifenesin with codeine cough syrup in the past with good effect. I will refill this for him today.     Mindi Saunders MD Problem: Mobility Impaired (Adult and Pediatric)  Goal: *Acute Goals and Plan of Care (Insert Text)  STG:  (1.)Mr. Destiny Parham will roll bilaterally with MINIMAL ASSIST within 3 day(s). (2.)Mr. Destiny Parham will transfer supine to sitting with MODERATE ASSIST x2 within 3 day(s). (3.)Mr. Camacho demonstrate FAIR STATIC sitting balance within 3 day(s). (4.)Mr. Destiny Parham will transfer from bed to chair and chair to bed with MAXIMAL ASSIST x2 within 3 day(s). (5.)Mr. Destiny Parham will tolerate 15+ minutes of therapeutic activity/exercise while maintaining stable vitals to improve functional strength and mobility within 3 day(s). LTG:  (1.)Mr. Destiny Parham will roll bilaterally with CONTACT GUARD ASSIST within 7 day(s). (2.)Mr. Destiny Parham will transfer supine to sitting with MINIMAL ASSIST x2 within 7 day(s). (3.)Mr. Camacho demonstrate [employer] DYNAMIC sitting balance within 7 day(s). (4.)Mr. Destiny Parham will transfer sit to stand with MAXIMAL ASSISTANCE within 7 day(s). (5.)Mr. Destiny Parham will tolerate 25+ minutes of therapeutic activity/exercise while maintaining stable vitals to improve functional strength and mobility within 7 day(s). ________________________________________________________________________________________________      PHYSICAL THERAPY: Daily Note, Treatment Day: 1st, AM 2/14/2018  INPATIENT: Hospital Day: 8  Payor: Jason Ayala / Plan: [address] [place] [address] HMO / Product Type: Managed Care Medicare /      NAME/AGE/GENDER: Lazarus North is a 79 y.o. male   PRIMARY DIAGNOSIS: Sepsis due to pneumonia ([facility] [address] Sepsis due to pneumonia ([facility] [address] Sepsis due to pneumonia [facility])        ICD-10: Treatment Diagnosis:   · Generalized Muscle Weakness (M62.81)  · Other abnormalities of gait and mobility (R26.89)   Precaution/Allergies:  Review of patient's allergies indicates no known allergies. ASSESSMENT:     Mr. Destiny Parham is a 79year old male with history significant for necrotizing fascitis R LE and admitted with sepsis/pneumonia. Patient seen this AM for physical therapy treatment session: presents s/p OT treatment, endorses \"some\" R LE pain (visually 4/10 with mobility), more confused this AM, and agreeable to PT with encouragement. Addressed static and dynamic sitting balance and postural retraining at edge of bed. Required maximal assistance for scooting. CGA for static sitting balance. Required moderate assistance x2 for sit to supine and maximal assistance for scooting up in bed with cueing to bridge and utilize B LE. Good progress today with patient increasing his mobility. Remains with decreased strength and functional activity tolerance. Continue with stated plan of care. This section established at most recent assessment   PROBLEM LIST (Impairments causing functional limitations):  1. Decreased Strength  2. Decreased ADL/Functional Activities  3. Decreased Transfer Abilities  4. Decreased Ambulation Ability/Technique  5. Decreased Balance  6. Decreased Activity Tolerance  7. Decreased Flexibility/Joint Mobility  8. Decreased [place] with Home Exercise Program   INTERVENTIONS PLANNED: (Benefits and precautions of physical therapy have been discussed with the patient.)  1. Balance Exercise  2. Bed Mobility  3. Family Education  4. Gait Training  5. Home Exercise Program (HEP)  6. Range of Motion (ROM)  7. Therapeutic Activites  8. Therapeutic Exercise/Strengthening  9. Transfer Training  10. Group Therapy     TREATMENT PLAN: Frequency/Duration: 3 times a week for duration of hospital stay  Rehabilitation Potential For Stated Goals: Octaviano Alston REHABILITATION/EQUIPMENT: (at time of discharge pending progress): Due to the probability of continued deficits (see above) this patient will likely need continued skilled physical therapy after discharge.   Equipment:    TBD              HISTORY:   History of Present Injury/Illness (Reason for Referral):  Weakness  Past Medical History/Comorbidities:   Mr. Parmjit Sanchez  has a past medical history of COPD (chronic obstructive pulmonary disease) ([facility] [address] Dementia; Diabetes ([facility] [address] and Necrotizing fasciitis ([facility] [address] Mr. Kylah Lomeli  has no past surgical history on file. Social History/Living Environment:   Home Environment: Skilled nursing facility ([facility])  [address] [place] [address] Mahendra Name: Domenico Watt  # Steps to Enter: 0  One/Two Story Residence: One story  Living Alone: No  Support Systems: Skilled nursing facility  Patient Expects to be Discharged to[de-identified] Skilled nursing facility  Current DME Used/Available at Home: None  Prior Level of Function/Work/Activity:  Patient with recent hospital admission and s/p LTACH stay and current in STR at SNF where he was requiring assistance with bed mobility and transfers as well as ADLs. Number of Personal Factors/Comorbidities that affect the Plan of Care: 1-2: MODERATE COMPLEXITY   EXAMINATION:   Most Recent Physical Functioning:   Gross Assessment:  AROM: Generally decreased, functional (Proximal B LE R>L)  Strength: Generally decreased, functional (B LE R>L)  Coordination: Generally decreased, functional (B LE)  Sensation: Impaired (Diminished light touch and deep pressure B LE R>L)               Posture:  Posture (WDL): Exceptions to WDL  Posture Assessment: Forward head, Rounded shoulders  Balance:  Sitting: Impaired  Sitting - Static: Fair (occasional)  Sitting - Dynamic: Fair (occasional) (-)  Standing:  (unable to assess) Bed Mobility:  Sit to Supine: Moderate assistance  Scooting: Maximum assistance  Wheelchair Mobility:     Transfers:     Gait:            Body Structures Involved:  1. Bones  2. Joints  3. Muscles Body Functions Affected:  1. Neuromusculoskeletal  2. Movement Related Activities and Participation Affected:  1. Mobility  2.  Self Care   Number of elements that affect the Plan of Care: 4+: HIGH COMPLEXITY   CLINICAL PRESENTATION:   Presentation: Evolving clinical presentation with changing clinical characteristics: MODERATE COMPLEXITY   CLINICAL DECISION MAKIN[address] [place] [address] AM-PAC 6 Clicks   Basic Mobility Inpatient Short Form  How much difficulty does the patient currently have. .. Unable A Lot A Little None   1. Turning over in bed (including adjusting bedclothes, sheets and blankets)? [] 1   [x] 2   [] 3   [] 4   2. Sitting down on and standing up from a chair with arms ( e.g., wheelchair, bedside commode, etc.)   [x] 1   [] 2   [] 3   [] 4   3. Moving from lying on back to sitting on the side of the bed? [] 1   [x] 2   [] 3   [] 4   How much help from another person does the patient currently need. .. Total A Lot A Little None   4. Moving to and from a bed to a chair (including a wheelchair)? [x] 1   [] 2   [] 3   [] 4   5. Need to walk in hospital room? [x] 1   [] 2   [] 3   [] 4   6. Climbing 3-5 steps with a railing? [x] 1   [] 2   [] 3   [] 4   © 2007, Trustees of [address] [place] [address] [address] [id number], under license to [employer]. All rights reserved      Score:  Initial: 8 Most Recent: 8 (Date: 18 )    Interpretation of Tool:  Represents activities that are increasingly more difficult (i.e. Bed mobility, Transfers, Gait). Score 24 23 22-20 19-15 14-10 9-7 6     Modifier CH CI CJ CK CL CM CN      ? Mobility - Walking and Moving Around:     - CURRENT STATUS: CM - 80%-99% impaired, limited or restricted    - GOAL STATUS: CL - 60%-79% impaired, limited or restricted    - D/C STATUS:  ---------------To be determined---------------  Payor: [employer] MEDICARE / Plan: [address] [place] [address] HMO / Product Type: Managed Care Medicare /      Medical Necessity:     · Patient demonstrates good rehab potential due to higher previous functional level. Reason for Services/Other Comments:  · Patient continues to require skilled intervention due to decreased functional mobility and balance/gait status from baseline. .   Use of outcome tool(s) and clinical judgement create a POC that gives a: Questionable prediction of patient's progress: MODERATE COMPLEXITY            TREATMENT:   (In addition to Assessment/Re-Assessment sessions the following treatments were rendered)   Pre-treatment Symptoms/Complaints:    Pain: Initial:   Pain Intensity 1: 4  Pain Location 1: Leg  Pain Orientation 1: Right  Pain Intervention(s) 1: Ambulation/Increased Activity, Emotional support, Position, Repositioned, Rest  Post Session:  0/10     Therapeutic Activity: (    10 Minutes): Therapeutic activities including bed mobility, sit to supine, static/dyanmic sitting balance, and postural retraining to improve mobility, strength and balance. Required minimal/moderate assistance   to promote static and dynamic balance in sitting and promote coordination of bilateral, lower extremity(s). Braces/Orthotics/Lines/Etc:   · IV  · kathleen catheter  · PEG  · O2 Device: Nasal cannula  Treatment/Session Assessment:    · Response to Treatment:  See above. · Interdisciplinary Collaboration:   o Physical Therapist  o Occupational Therapist  o Registered Nurse  · After treatment position/precautions:   o Supine in bed  o Bed/Chair-wheels locked  o Bed in low position  o Call light within reach  o RN notified  o Side rails x 3  o Needs met; patient in NAD; HOB elevated   · Compliance with Program/Exercises: Will assess as treatment progresses. · Recommendations/Intent for next treatment session: \"Next visit will focus on advancements to more challenging activities and reduction in assistance provided\".     Total Treatment Duration:  PT Patient Time In/Time Out  Time In: 0922  Time Out: [facility] NHUNG Valdez

## 2021-06-16 NOTE — PROGRESS NOTES
Problem: Self Care Deficits Care Plan (Adult)  Goal: *Acute Goals and Plan of Care (Insert Text)  1. Rene Lin will complete BUE strengthening 15-20 minutes in prep for ADL. Tex Garcia will complete grooming/oral care with minimal assistance. Tex Garcia will roll L/R with moderate assistance or less as needed for toileting/kim-care. Time frame: 4 - 7 visits    OCCUPATIONAL THERAPY: Daily Note, Treatment Day: 3rd and AM 12/21/2017  INPATIENT: Hospital Day: 21  Payor: Azam Alston / Plan: [address] [place] [address] HMO / Product Type: Managed Care Medicare /      [facility]: Rene Lin is a 79 y.o. 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 Em Jones, plastic surgery, who reported pt has no activity or weight bearing restriction but if excess posterior drainage noted (especially with R hip abduction) to 'back off.'\" ; Review of patient's allergies indicates no known allergies. ASSESSMENT:   Mr. Ace was admitted for the above diagnosis. Pt presents supine upon arrival with mitts. Mitts were removed for the session. Pt was jg to perform UE exercises (in grid below) to increase strength and activity tolerance to perform mobility and ADLs. Pt did well following commands today. More alert than previous session. Pt was able to perform mouth care himself some today as well. Pt left with foam block to increase hand strength for fine motor tasks. Pt progressing towards goals above. Will continue to benefit from skilled OT during stay.   This section established at most recent assessment   PROBLEM LIST (Impairments causing functional limitations):  1. Decreased Strength  2. Decreased ADL/Functional Activities  3. Decreased Transfer Abilities  4. Decreased Balance  5. Increased Pain  6. Decreased Activity Tolerance  7. Decreased Flexibility/Joint Mobility  8. Edema/Girth  9. Decreased [place] with Home Exercise Program   INTERVENTIONS PLANNED: (Benefits and precautions of occupational therapy have been discussed with the patient.)  1. Activities of daily living training  2. Therapeutic activity  3. Therapeutic exercise     TREATMENT PLAN: Frequency/Duration: Follow patient 3 times/week to address above goals. Rehabilitation Potential For Stated Goals: Good     RECOMMENDED REHABILITATION/EQUIPMENT: (at time of discharge pending progress): Due to the probability of continued deficits (see above) this patient will likely need continued skilled occupational therapy after discharge. Equipment:    None at this time          OCCUPATIONAL PROFILE AND HISTORY:   History of Present Injury/Illness (Reason for Referral):  Per MD H & P: Patient 67M with no known PMHx - has never seen a doctor as an adult. Per EMS report was brought to ED after syncopal episode. Patient opened the door for a friend and passed out falling backwards. Patient says he is here because he thinks he has pneumonia due to a cough (cxr clear). Patient is adentuous and hard to understand, poor historian. Multiple other issues revealed during ED course. Subdural hematoma on CT head - reviewed by neurosx (no intervention). WBC 21K, , Cr 1.5 and found to have large foul smelling abscess of right inner thigh with purulent drainage. Surgery has seen with plans for operation tonight. Past Medical History/Comorbidities:   Mr. Sullivan Homans  has a past medical history of Diabetes ([address] Mr. Sullivan Homans  has no past surgical history on file. Social History/Living Environment: Lives alone.    Home Environment: Apartment  # Steps to Enter: 0  One/Two Story Residence: One story  Living Alone: Yes  Support Systems: Family member(s)  Patient Expects to be Discharged to[de-identified] Unknown  Current DME Used/Available at Home: Walker, rollator, Cane, straight  Prior Level of Function/Work/Activity:  Patient typically independent with all; however, recently using a cane to walk due to falls over a period of 2 weeks. Dominant Side:         LEFT   Number of Personal Factors/Comorbidities that affect the Plan of Care: Brief history (0):  LOW COMPLEXITY   ASSESSMENT OF OCCUPATIONAL PERFORMANCE[de-identified]   Activities of Daily Living:           Basic ADLs (From Assessment) Complex ADLs (From Assessment)   Basic ADL  Feeding: Total assistance  Oral Facial Hygiene/Grooming: Maximum assistance  Bathing: Total assistance  Upper Body Dressing: Total assistance  Lower Body Dressing: Total assistance  Toileting: Total assistance Instrumental ADL  Meal Preparation: Total assistance  Homemaking: Total assistance  Medication Management: Total assistance  Financial Management: Total assistance   Grooming/Bathing/Dressing Activities of Daily Living                                     Most Recent Physical Functioning:   Gross Assessment:                  Posture:     Balance:    Bed Mobility:     Wheelchair Mobility:     Transfers:                   Patient Vitals for the past 6 hrs:   BP SpO2 O2 Flow Rate (L/min) Pulse   12/21/17 0400 112/55 97 % - 92   12/21/17 0425 - 97 % 50 l/min -   12/21/17 0715 - 94 % 50 l/min -   12/21/17 0729 116/69 98 % - 99       Mental Status  Neurologic State: Alert  Orientation Level: Unable to verbalize  Cognition: Follows commands  Perception: Appears intact  Perseveration: No perseveration noted  Safety/Judgement: Decreased awareness of environment                          Physical Skills Involved:  1. Range of Motion  2. Balance  3. Strength  4. Activity Tolerance  5. Sensation  6. Fine Motor Control  7. Gross Motor Control  8.  Pain (Chronic)  9. Edema  10. Skin Integrity Cognitive Skills Affected (resulting in the inability to perform in a timely and safe manner):  1. Executive Function  2. Divided Attention Psychosocial Skills Affected:  1. Habits/Routines  2. Environmental Adaptation  3. Social Interaction  4. Social Roles   Number of elements that affect the Plan of Care: 1-3:  LOW COMPLEXITY   CLINICAL DECISION MAKIN[address] [place] [address] AM-PAC 6 Clicks   Daily Activity Inpatient Short Form  How much help from another person does the patient currently need. .. Total A Lot A Little None   1. Putting on and taking off regular lower body clothing? [x] 1   [] 2   [] 3   [] 4   2. Bathing (including washing, rinsing, drying)? [x] 1   [] 2   [] 3   [] 4   3. Toileting, which includes using toilet, bedpan or urinal?   [x] 1   [] 2   [] 3   [] 4   4. Putting on and taking off regular upper body clothing? [x] 1   [] 2   [] 3   [] 4   5. Taking care of personal grooming such as brushing teeth? [x] 1   [] 2   [] 3   [] 4   6. Eating meals? [x] 1   [] 2   [] 3   [] 4   © , Trustees of [address] [place] [address], under license to [employer]. All rights reserved      Score:  Initial: 6 Most Recent: X (Date: -- )    Interpretation of Tool:  Represents activities that are increasingly more difficult (i.e. Bed mobility, Transfers, Gait). Score 24 23 22-20 19-15 14-10 9-7 6     Modifier CH CI CJ CK CL CM CN      ? Self Care:     - CURRENT STATUS: CN - 100% impaired, limited or restricted    - GOAL STATUS: CL - 60%-79% impaired, limited or restricted    - D/C STATUS:  ---------------To be determined---------------  Payor: [employer] MEDICARE / Plan: [address] [place] [address] HMO / Product Type: Managed Care Medicare /      Medical Necessity:     · Patient demonstrates fair rehab potential due to higher previous functional level.   Reason for Services/Other Comments:  · Patient continues to require skilled intervention due to decreased independence with activities of daily living and instrumental activities of daily living. Use of outcome tool(s) and clinical judgement create a POC that gives a: MODERATE COMPLEXITY         TREATMENT:   (In addition to Assessment/Re-Assessment sessions the following treatments were rendered)     Pre-treatment Symptoms/Complaints:    Pain: Initial:   Pain Intensity 1: 0  Post Session:  Less after repositioned. Therapeutic Exercise: (15 minutes):  Exercises per grid below to improve mobility, strength and activity tolerance. Required minimal visual, verbal and tactile cues to promote proper body alignment and promote proper body mechanics. Progressed resistance and repetitions as indicated. Date:  12/15/17 Date:  12/21/517 Date:     Activity/Exercise Parameters Parameters Parameters   Shoulder elevation 2 sets 5-10 reps (however, pt. Compensating by extending elbows)     Hands to head with shoulders supported at ~90° 2 sets 5 reps     Gripping 10 reps     Shoulder Abd/Adduction  10 reps (assistance needed with LUE)    Shoulder Flexion  10 reps AAROM    Elbow Flexion  10 reps with yellow theraband    Punches  10 reps AAROM          Braces/Orthotics/Lines/Etc:   · IV  · kathleen catheter  · nasogastric tube  · O2 Device: Nasal cannula  Treatment/Session Assessment:    Response to Treatment:  Tolerated well without complications. · Interdisciplinary Collaboration:   o Certified Occupational Therapy Assistant  o Registered Nurse  · After treatment position/precautions:   o Supine in bed  o Bed/Chair-wheels locked  o Bed in low position  o Call light within reach  o RN notified   · Compliance with Program/Exercises: Will assess as treatment progresses. · Recommendations/Intent for next treatment session: \"Next visit will focus on advancements to more challenging activities\".   Total Treatment Duration:OT Patient Time In/Time Out  Time In: 0915  Time Out: [address] [place] Essential hypertension

## 2022-08-17 NOTE — PROGRESS NOTES
Discussed with team and family. Plan for complex debridement in AM with possible dis-articulation and wound vac placement. Have discussed with Dr. Jackie Dutta as well for vascular involvement during surgery. Agreeable. No

## 2023-04-08 NOTE — PROGRESS NOTES
Hospitalist Progress Note    2018  Admit Date: 2018  7:41 PM   NAME: Froy Yoon   :  1950   MRN:  [id number]   Attending: Ted Fischer MD  PCP:  None    SUBJECTIVE:     Froy Yoon is a 40GPF with TBI, DM2, chronic indwelling kathleen, PEG who was admitted from SNF with sepsis 2/2 RLL pneumonia, suspect aspiration. Also with sacral decub ulcer, which is chronic. Has been seen by plastic surgery and not currently a surgical candidate given his poor nutritional status. : hypoglycemic again overnight. TFs not at current goal rate. Denies abd pain, nausea, SOB, CP. Review of Systems negative with exception of pertinent positives noted above      PHYSICAL EXAM       Visit Vitals    /73    Pulse 77    Temp 97.7 °F (36.5 °C)    Resp 17    Ht 5' 11\" (1.803 m)    Wt 63.9 kg (140 lb 14.4 oz)    SpO2 94%    BMI 19.65 kg/m2      Temp (24hrs), Av.9 °F (36.6 °C), Min:97.6 °F (36.4 °C), Max:98.4 °F (36.9 °C)    Oxygen Therapy  O2 Sat (%): 94 % (18 0717)  Pulse via Oximetry: 94 beats per minute (18 0855)  O2 Device: Nasal cannula (18 0855)  O2 Flow Rate (L/min): 1 l/min (18 0856)    Intake/Output Summary (Last 24 hours) at 18 0973  Last data filed at 18 0351   Gross per 24 hour   Intake             1770 ml   Output             1850 ml   Net              -80 ml          General: No acute distress. Head:  Atraumatic Normocephalic. Eyes:  Anicteric.   Lungs:  More coarse at R base, normal resp effort on 2L  CVS:  Regular rate and rhythm, no BLE edema  Abdomen: Soft, NTTP, colostomy bag  Skin:   R lateral thigh with extensive area of debridement from hip to above knee      Recent Results (from the past 24 hour(s))   GLUCOSE, POC    Collection Time: 18 11:37 AM   Result Value Ref Range    Glucose (POC) 101 (H) 65 - 100 mg/dL   GLUCOSE, POC    Collection Time: 18  3:19 PM   Result Value Ref Range    Glucose (POC) 103 (H) 65 - 100 mg/dL GLUCOSE, POC    Collection Time: 02/12/18  9:03 PM   Result Value Ref Range    Glucose (POC) 68 65 - 100 mg/dL   GLUCOSE, POC    Collection Time: 02/12/18 10:05 PM   Result Value Ref Range    Glucose (POC) 109 (H) 65 - 100 mg/dL   CBC W/O DIFF    Collection Time: 02/13/18  6:05 AM   Result Value Ref Range    WBC 8.2 4.3 - 11.1 K/uL    RBC 3.71 (L) 4.23 - 5.67 M/uL    HGB 9.8 (L) 13.6 - 17.2 g/dL    HCT 33.4 (L) 41.1 - 50.3 %    MCV 90.0 79.6 - 97.8 FL    MCH 26.4 26.1 - 32.9 PG    MCHC 29.3 (L) 31.4 - 35.0 g/dL    RDW 21.4 (H) 11.9 - 14.6 %    PLATELET 213 477 - 206 K/uL    MPV 10.1 (L) 10.8 - 55.6 FL   METABOLIC PANEL, BASIC    Collection Time: 02/13/18  6:05 AM   Result Value Ref Range    Sodium 138 136 - 145 mmol/L    Potassium 3.6 3.5 - 5.1 mmol/L    Chloride 105 98 - 107 mmol/L    CO2 24 21 - 32 mmol/L    Anion gap 9 7 - 16 mmol/L    Glucose 78 65 - 100 mg/dL    BUN 7 (L) 8 - 23 MG/DL    Creatinine 0.42 (L) 0.8 - 1.5 MG/DL    GFR est AA >60 >60 ml/min/1.73m2    GFR est non-AA >60 >60 ml/min/1.73m2    Calcium 8.2 (L) 8.3 - 10.4 MG/DL   GLUCOSE, POC    Collection Time: 02/13/18  7:16 AM   Result Value Ref Range    Glucose (POC) 67 65 - 100 mg/dL   GLUCOSE, POC    Collection Time: 02/13/18  9:31 AM   Result Value Ref Range    Glucose (POC) 89 65 - 100 mg/dL         Imaging /Procedures /Studies   XR ABD (KUB)   Final Result   IMPRESSION: As above      XR CHEST SNGL V   Final Result   IMPRESSION:    1.  Stable findings of the chest as described above. XR CHEST SNGL V   Final Result   IMPRESSION:  New bibasilar infiltrates. XR ABD (KUB)   Final Result   IMPRESSION: No findings to suggest free air or obstruction. XR CHEST PORT   Final Result   IMPRESSION: Right lower lobe atelectasis or pneumonia.                   ASSESSMENT      Hospital Problems as of 2/13/2018  Date Reviewed: 2/7/2018          Codes Class Noted - Resolved POA    Dysphagia ICD-10-CM: R13.10  ICD-9-CM: 787.20 2/13/2018 - Present Yes        COPD (chronic obstructive pulmonary disease) ([facility] ICD-10-CM: J44.9  ICD-9-CM: 496  2/13/2018 - Present Yes        PEG (percutaneous endoscopic gastrostomy) status ([facility] ICD-10-CM: Z93.1  ICD-9-CM: V44.1  2/1/2018 - Present Yes        Candida UTI ICD-10-CM: B37.49  ICD-9-CM: 112.2  1/31/2018 - Present Yes        Aspiration pneumonia ([facility] ICD-10-CM: J69.0  ICD-9-CM: 507.0  1/30/2018 - Present Yes        * (Principal)Sepsis due to pneumonia [facility]) ICD-10-CM: J18.9, A41.9  ICD-9-CM: 798, 995.91  1/30/2018 - Present Unknown        Sacral ulcer (HCC) (Chronic) ICD-10-CM: Z72.103  ICD-9-CM: 707.8  1/30/2018 - Present Yes        Type II diabetes mellitus with complication (HCC) (Chronic) ICD-10-CM: E11.8  ICD-9-CM: 250.90  12/1/2017 - Present Yes                  Plan:  - Severe Sepsis secondary to pneumonia, UTI and sacral ulcer:   CXR with RLL pneumona, LA 4.2 on admission. Continue zosyn, day 7/8  WBC normalized. - HCAP:   Recent discharge. Likely aspiration pneumonia. Cont Zosyn, day 7  Blood Cx NGTD. Repeat CXR with signs of right lung atelectasis  Continue incentive spirometry.     - UTI:   UA positive. However, pt has kathleen. U Cx pos for candida. Likely due to chronic kathleen and represents candiduria, however, started on Fluconazole, day 2/14    - DM2:   Decrease lantus dose and hold this morning as BS again low  SSI ACHS     - Recent gangrenous necrotizing faciitis:   Wound appears to be stable. Plastic surgery saw today and will re-eval as ab outpatient once nutritional status improved     - Sacral pressure Ulcer:   Unstageable. Plastic surgery recommendations appreciated --> hold off debridement until optimal medical condition reached and acute illness resolve. - Dysphagia, tube feeding:   MBS recent previous admission --> NPO. Hx of TBI. Cont tube feeding. Dietitian following.    Abd xray with no signs of obstruction or free air.      - Dementia:   stable - TBI:  predisposed to aspiration, continue with PEG feeding. Cont NPO.      - Hx of COPD:   Not in exacerbation  Add tessalon prn to PEG     - Anemia:   Stable, Monitor Hb.      - Hypokalemia:   Replace prn through PEG tube.        DVT Prophylaxis: Lovenox  Dispo: likely stable for DC to Nayeli Holliday MD CP Unstable angina Chest pain

## 2023-06-15 NOTE — PROGRESS NOTES
Problem: Self Care Deficits Care Plan (Adult)  Goal: *Acute Goals and Plan of Care (Insert Text)  1. Jose Schmidt will complete BUE strengthening 15-20 minutes in prep for ADL. Tex Garcia will complete grooming/oral care with minimal assistance. Tex Garcia will roll L/R with moderate assistance or less as needed for toileting/kim-care. Time frame: 4 - 7 visits    OCCUPATIONAL THERAPY: Daily Note, Treatment Day: 2nd and PM 12/19/2017  INPATIENT: Hospital Day: 23  Payor: [employer] [place] / Plan: [address] [place] [address] HMO / Product Type: Managed Care Medicare /      NAME/AGE/GENDER: Jose Schmidt is a 79 y.o. 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 Jocelyne Kearns, plastic surgery, who reported pt has no activity or weight bearing restriction but if excess posterior drainage noted (especially with R hip abduction) to 'back off.'\" ; Review of patient's allergies indicates no known allergies. ASSESSMENT:   Mr. Aniya Montana was admitted for the above diagnosis. Pt presents supine upon arrival with mitts. Mitts were removed for the session. Pt was assisted with shaving which he is dependent at this time due to NG tube and weakness in UEs. Performed hand washing in bed due to odor from mitts. Pt had a hard time staying on task and following commands. Still non-verbal at this time. He did give thumbs up for how he was feeling at end of session. RN in room and daughter at this time. Will continue to benefit from skilled OT during stay.   This section established at most recent assessment   PROBLEM LIST (Impairments causing functional limitations):  1. Decreased Strength  2. Decreased ADL/Functional Activities  3. Decreased Transfer Abilities  4. Decreased Balance  5. Increased Pain  6. Decreased Activity Tolerance  7. Decreased Flexibility/Joint Mobility  8. Edema/Girth  9. Decreased [place] with Home Exercise Program   INTERVENTIONS PLANNED: (Benefits and precautions of occupational therapy have been discussed with the patient.)  1. Activities of daily living training  2. Therapeutic activity  3. Therapeutic exercise     TREATMENT PLAN: Frequency/Duration: Follow patient 3 times/week to address above goals. Rehabilitation Potential For Stated Goals: Good     RECOMMENDED REHABILITATION/EQUIPMENT: (at time of discharge pending progress): Due to the probability of continued deficits (see above) this patient will likely need continued skilled occupational therapy after discharge. Equipment:    None at this time          OCCUPATIONAL PROFILE AND HISTORY:   History of Present Injury/Illness (Reason for Referral):  Per MD H & P: Patient 67M with no known PMHx - has never seen a doctor as an adult. Per EMS report was brought to ED after syncopal episode. Patient opened the door for a friend and passed out falling backwards. Patient says he is here because he thinks he has pneumonia due to a cough (cxr clear). Patient is adentuous and hard to understand, poor historian. Multiple other issues revealed during ED course. Subdural hematoma on CT head - reviewed by neurosx (no intervention). WBC 21K, , Cr 1.5 and found to have large foul smelling abscess of right inner thigh with purulent drainage. Surgery has seen with plans for operation tonight. Past Medical History/Comorbidities:   Mr. Bhavik Parr  has a past medical history of Diabetes ([facility] [address] Mr. Bhavik Parr  has no past surgical history on file. Social History/Living Environment: Lives alone.    Home Environment: Apartment  # Steps to Enter: 0  One/Two Story Residence: One story  Living Alone: Yes  Support Systems: Family member(s)  Patient Expects to be Discharged to[de-identified] Unknown  Current DME Used/Available at Home: Walker, rollator, Cane, straight  Prior Level of Function/Work/Activity:  Patient typically independent with all; however, recently using a cane to walk due to falls over a period of 2 weeks. Dominant Side:         LEFT   Number of Personal Factors/Comorbidities that affect the Plan of Care: Brief history (0):  LOW COMPLEXITY   ASSESSMENT OF OCCUPATIONAL PERFORMANCE[de-identified]   Activities of Daily Living:           Basic ADLs (From Assessment) Complex ADLs (From Assessment)   Basic ADL  Feeding: Total assistance  Oral Facial Hygiene/Grooming: Maximum assistance  Bathing: Total assistance  Upper Body Dressing: Total assistance  Lower Body Dressing: Total assistance  Toileting: Total assistance Instrumental ADL  Meal Preparation: Total assistance  Homemaking: Total assistance  Medication Management: Total assistance  Financial Management: Total assistance   Grooming/Bathing/Dressing Activities of Daily Living   Grooming  Washing Face: Maximum assistance  Washing Hands: Moderate assistance;Maximum assistance  Shaving: Total assistance (dependent) Cognitive Retraining  Attention to Task: Single task  Maintains Attention For (Time): 1 minute  Following Commands:  Follows one step commands/directions  Safety/Judgement: Decreased awareness of environment                               Most Recent Physical Functioning:   Gross Assessment:                  Posture:     Balance:    Bed Mobility:     Wheelchair Mobility:     Transfers:                   Patient Vitals for the past 6 hrs:   BP SpO2 Pulse   12/19/17 1153 114/70 90 % 99   12/19/17 1557 107/67 96 % 75       Mental Status  Neurologic State: Alert  Orientation Level: Unable to verbalize  Cognition: Decreased attention/concentration, Decreased command following  Perception: Appears intact  Perseveration: No perseveration noted  Safety/Judgement: Decreased awareness of environment                          Physical Skills Involved:  1. Range of Motion  2. Balance  3. Strength  4. Activity Tolerance  5. Sensation  6. Fine Motor Control  7. Gross Motor Control  8. Pain (Chronic)  9. Edema  10. Skin Integrity Cognitive Skills Affected (resulting in the inability to perform in a timely and safe manner):  1. Executive Function  2. Divided Attention Psychosocial Skills Affected:  1. Habits/Routines  2. Environmental Adaptation  3. Social Interaction  4. Social Roles   Number of elements that affect the Plan of Care: 1-3:  LOW COMPLEXITY   CLINICAL DECISION MAKIN[address] [place] [address] AM-PAC 6 Clicks   Daily Activity Inpatient Short Form  How much help from another person does the patient currently need. .. Total A Lot A Little None   1. Putting on and taking off regular lower body clothing? [x] 1   [] 2   [] 3   [] 4   2. Bathing (including washing, rinsing, drying)? [x] 1   [] 2   [] 3   [] 4   3. Toileting, which includes using toilet, bedpan or urinal?   [x] 1   [] 2   [] 3   [] 4   4. Putting on and taking off regular upper body clothing? [x] 1   [] 2   [] 3   [] 4   5. Taking care of personal grooming such as brushing teeth? [x] 1   [] 2   [] 3   [] 4   6. Eating meals? [x] 1   [] 2   [] 3   [] 4   © , Trustees of [address] [place] [address], under license to [employer]. All rights reserved      Score:  Initial: 6 Most Recent: X (Date: -- )    Interpretation of Tool:  Represents activities that are increasingly more difficult (i.e. Bed mobility, Transfers, Gait). Score 24 23 22-20 19-15 14-10 9-7 6     Modifier CH CI CJ CK CL CM CN      ?  Self Care:     - CURRENT STATUS: CN - 100% impaired, limited or restricted    - GOAL STATUS: CL - 60%-79% impaired, limited or restricted    - D/C STATUS:  ---------------To be determined---------------  Payor: [employer] / Plan: [address] [place] [address] HMO / Product Type: [employer] Medicare /      Medical Necessity:     · Patient demonstrates fair rehab potential due to higher previous functional level. Reason for Services/Other Comments:  · Patient continues to require skilled intervention due to decreased independence with activities of daily living and instrumental activities of daily living. Use of outcome tool(s) and clinical judgement create a POC that gives a: MODERATE COMPLEXITY         TREATMENT:   (In addition to Assessment/Re-Assessment sessions the following treatments were rendered)     Pre-treatment Symptoms/Complaints:    Pain: Initial:   Pain Intensity 1: 0  Post Session:  Less after repositioned. Self Care: (15 minutes): Procedure(s) (per grid) utilized to improve and/or restore self-care/home management as related to grooming. Required maximal verbal, manual and tactile cueing to facilitate activities of daily living skills. Cognitive Skills Development: (10 minutes): Procedure(s) (per grid) utilized to improve and/or restore cognitive functioning as related to ability to follow simple commands, attention to tasks and self awareness. Required moderate verbal and tactile cueing to facilitate perform graded activities focusing on attention skills. Date:  12/15/17 Date:   Date:     Activity/Exercise Parameters Parameters Parameters   Shoulder elevation 2 sets 5-10 reps (however, pt. Compensating by extending elbows)     Hands to head with shoulders supported at ~90° 2 sets 5 reps     Gripping 10 reps                                   Braces/Orthotics/Lines/Etc:   · IV  · kathleen catheter  · nasogastric tube  · O2 Device: Nasal cannula  Treatment/Session Assessment:    Response to Treatment:  Tolerated well without complications.   · Interdisciplinary Collaboration:   o Certified Occupational Therapy Assistant  o Registered Nurse  · After treatment position/precautions:   o Supine in bed  o Bed/Chair-wheels locked  o Bed in low position  o Call light within reach  o Family at bedside  o Nurse at bedside   · Compliance with Program/Exercises: Will assess as treatment progresses. · Recommendations/Intent for next treatment session: \"Next visit will focus on advancements to more challenging activities\".   Total Treatment Duration:OT Patient Time In/Time Out  Time In: 1340  Time Out: [address] [place] Metronidazole Counseling:  I discussed with the patient the risks of metronidazole including but not limited to seizures, nausea/vomiting, a metallic taste in the mouth, nausea/vomiting and severe allergy.
